# Patient Record
Sex: FEMALE | Race: WHITE | NOT HISPANIC OR LATINO | Employment: OTHER | URBAN - METROPOLITAN AREA
[De-identification: names, ages, dates, MRNs, and addresses within clinical notes are randomized per-mention and may not be internally consistent; named-entity substitution may affect disease eponyms.]

---

## 2017-02-01 ENCOUNTER — TRANSFERRED RECORDS (OUTPATIENT)
Dept: HEALTH INFORMATION MANAGEMENT | Facility: CLINIC | Age: 62
End: 2017-02-01

## 2017-02-09 ENCOUNTER — TELEPHONE (OUTPATIENT)
Dept: OTHER | Facility: CLINIC | Age: 62
End: 2017-02-09

## 2017-02-09 NOTE — TELEPHONE ENCOUNTER
2/9/2017    Call Regarding Onboarding are Choices    Attempt 1    Message on voicemail     Comments:           Outreach   ron

## 2017-02-22 NOTE — TELEPHONE ENCOUNTER
2/22/2017    Call Regarding Onboarding [employer] choices     Attempt     Message     Comments:         Outreach   cnt

## 2017-05-22 NOTE — TELEPHONE ENCOUNTER
5/22/2017    Call Regarding Onboarding [employer]    Attempt 3    Message on voicemail     Comments: No dep        Outreach   cnt

## 2017-12-06 ENCOUNTER — HOSPITAL ENCOUNTER (OUTPATIENT)
Dept: MAMMOGRAPHY | Facility: CLINIC | Age: 62
Discharge: HOME | End: 2017-12-06
Attending: PHYSICIAN ASSISTANT | Admitting: PHYSICIAN ASSISTANT
Payer: COMMERCIAL

## 2017-12-06 DIAGNOSIS — Z12.31 VISIT FOR SCREENING MAMMOGRAM: ICD-10-CM

## 2017-12-06 PROCEDURE — G0202 SCR MAMMO BI INCL CAD: HCPCS

## 2017-12-28 ENCOUNTER — TELEPHONE (OUTPATIENT)
Dept: FAMILY MEDICINE | Facility: OTHER | Age: 62
End: 2017-12-28

## 2017-12-28 DIAGNOSIS — G47.09 OTHER INSOMNIA: ICD-10-CM

## 2017-12-28 DIAGNOSIS — R41.840 POOR CONCENTRATION: ICD-10-CM

## 2017-12-28 DIAGNOSIS — F33.0 MAJOR DEPRESSIVE DISORDER, RECURRENT EPISODE, MILD (H): ICD-10-CM

## 2017-12-28 DIAGNOSIS — M81.0 OSTEOPOROSIS: ICD-10-CM

## 2017-12-28 RX ORDER — RALOXIFENE HYDROCHLORIDE 60 MG/1
1 TABLET, FILM COATED ORAL DAILY
Qty: 30 TABLET | Refills: 0 | Status: SHIPPED | OUTPATIENT
Start: 2017-12-28 | End: 2018-01-17

## 2017-12-28 RX ORDER — AMITRIPTYLINE HYDROCHLORIDE 10 MG/1
40 TABLET ORAL AT BEDTIME
Qty: 120 TABLET | Refills: 0 | Status: SHIPPED | OUTPATIENT
Start: 2017-12-28 | End: 2018-01-17

## 2017-12-28 RX ORDER — BUPROPION HYDROCHLORIDE 100 MG/1
100 TABLET, EXTENDED RELEASE ORAL 2 TIMES DAILY
Qty: 60 TABLET | Refills: 0 | Status: SHIPPED | OUTPATIENT
Start: 2017-12-28 | End: 2018-01-17

## 2017-12-28 NOTE — TELEPHONE ENCOUNTER
Spoke with patient appointment scheduled 1/17/2018  Patient would appreciate 1 month for amityptyline, Bupropion, and Evista, routing to RN  Nina Husain RT (R)

## 2017-12-28 NOTE — TELEPHONE ENCOUNTER
Medication is being filled for 1 time refill only due to:  Patient needs to be seen because it has been more than one year since last visit.     Next 5 appointments (look out 90 days)     Jan 17, 2018  1:00 PM CST   Office Visit with Caitlin Beasley PA-C   [facility] ([facility])    [address] [address]   [phone]                    José Miguel Rivero RN, BSN

## 2017-12-28 NOTE — TELEPHONE ENCOUNTER
Reason for Call:  Same Day Appointment, Requested Provider:  MATTHEW Hirsch    PCP: No Ref-Primary, Physician    Reason for visit: fu depression bones and sleep medication    Duration of symptoms:     Have you been treated for this in the past? Yes    Additional comments: is wondering if Caitlin would work her in on 1-3 or 1-4. Declined another provider  She will be out of her medications.    Can we leave a detailed message on this number? YES    Phone number patient can be reached at: Home number on file [phone] (home)    Best Time: any    Call taken on 12/28/2017 at 3:29 PM by Cristina Sánchez

## 2018-01-15 ENCOUNTER — MYC MEDICAL ADVICE (OUTPATIENT)
Dept: FAMILY MEDICINE | Facility: OTHER | Age: 63
End: 2018-01-15

## 2018-01-16 ASSESSMENT — PATIENT HEALTH QUESTIONNAIRE - PHQ9
SUM OF ALL RESPONSES TO PHQ QUESTIONS 1-9: 5
SUM OF ALL RESPONSES TO PHQ QUESTIONS 1-9: 5
10. IF YOU CHECKED OFF ANY PROBLEMS, HOW DIFFICULT HAVE THESE PROBLEMS MADE IT FOR YOU TO DO YOUR WORK, TAKE CARE OF THINGS AT HOME, OR GET ALONG WITH OTHER PEOPLE: SOMEWHAT DIFFICULT

## 2018-01-16 ASSESSMENT — ANXIETY QUESTIONNAIRES
5. BEING SO RESTLESS THAT IT IS HARD TO SIT STILL: SEVERAL DAYS
6. BECOMING EASILY ANNOYED OR IRRITABLE: SEVERAL DAYS
GAD7 TOTAL SCORE: 7
GAD7 TOTAL SCORE: 7
1. FEELING NERVOUS, ANXIOUS, OR ON EDGE: SEVERAL DAYS
4. TROUBLE RELAXING: SEVERAL DAYS
7. FEELING AFRAID AS IF SOMETHING AWFUL MIGHT HAPPEN: SEVERAL DAYS
2. NOT BEING ABLE TO STOP OR CONTROL WORRYING: SEVERAL DAYS
7. FEELING AFRAID AS IF SOMETHING AWFUL MIGHT HAPPEN: SEVERAL DAYS
3. WORRYING TOO MUCH ABOUT DIFFERENT THINGS: SEVERAL DAYS
GAD7 TOTAL SCORE: 7

## 2018-01-17 ENCOUNTER — OFFICE VISIT (OUTPATIENT)
Dept: FAMILY MEDICINE | Facility: OTHER | Age: 63
End: 2018-01-17
Payer: COMMERCIAL

## 2018-01-17 VITALS
HEART RATE: 76 BPM | HEIGHT: 63 IN | TEMPERATURE: 98.1 F | SYSTOLIC BLOOD PRESSURE: 114 MMHG | BODY MASS INDEX: 26.1 KG/M2 | DIASTOLIC BLOOD PRESSURE: 70 MMHG | RESPIRATION RATE: 12 BRPM | WEIGHT: 147.3 LBS

## 2018-01-17 DIAGNOSIS — K21.9 GASTROESOPHAGEAL REFLUX DISEASE WITHOUT ESOPHAGITIS: ICD-10-CM

## 2018-01-17 DIAGNOSIS — F33.0 MAJOR DEPRESSIVE DISORDER, RECURRENT EPISODE, MILD (H): ICD-10-CM

## 2018-01-17 DIAGNOSIS — R41.840 POOR CONCENTRATION: ICD-10-CM

## 2018-01-17 DIAGNOSIS — M81.0 AGE RELATED OSTEOPOROSIS, UNSPECIFIED PATHOLOGICAL FRACTURE PRESENCE: ICD-10-CM

## 2018-01-17 DIAGNOSIS — Z13.6 CARDIOVASCULAR SCREENING; LDL GOAL LESS THAN 160: ICD-10-CM

## 2018-01-17 DIAGNOSIS — Z76.89 ENCOUNTER TO ESTABLISH CARE: ICD-10-CM

## 2018-01-17 DIAGNOSIS — J45.30 MILD PERSISTENT ASTHMA WITHOUT COMPLICATION: Primary | ICD-10-CM

## 2018-01-17 DIAGNOSIS — Z13.1 SCREENING FOR DIABETES MELLITUS: ICD-10-CM

## 2018-01-17 DIAGNOSIS — G47.09 OTHER INSOMNIA: ICD-10-CM

## 2018-01-17 PROCEDURE — 99214 OFFICE O/P EST MOD 30 MIN: CPT | Performed by: PHYSICIAN ASSISTANT

## 2018-01-17 RX ORDER — AMITRIPTYLINE HYDROCHLORIDE 50 MG/1
50 TABLET ORAL AT BEDTIME
Qty: 30 TABLET | Refills: 5 | Status: SHIPPED | OUTPATIENT
Start: 2018-01-17 | End: 2018-02-01

## 2018-01-17 RX ORDER — RALOXIFENE HYDROCHLORIDE 60 MG/1
1 TABLET, FILM COATED ORAL DAILY
Qty: 90 TABLET | Refills: 3 | Status: SHIPPED | OUTPATIENT
Start: 2018-01-17 | End: 2018-02-22

## 2018-01-17 RX ORDER — BUPROPION HYDROCHLORIDE 150 MG/1
150 TABLET ORAL EVERY MORNING
Qty: 30 TABLET | Refills: 5 | Status: SHIPPED | OUTPATIENT
Start: 2018-01-17 | End: 2018-07-25

## 2018-01-17 ASSESSMENT — PATIENT HEALTH QUESTIONNAIRE - PHQ9: SUM OF ALL RESPONSES TO PHQ QUESTIONS 1-9: 5

## 2018-01-17 ASSESSMENT — ANXIETY QUESTIONNAIRES: GAD7 TOTAL SCORE: 7

## 2018-01-17 NOTE — LETTER
My Depression Action Plan  Name: Margareth Green   Date of Birth 1955  Date: 1/11/2018    My doctor: No Ref-Primary, Physician   My clinic: [address] [address] [phone]          GREEN    ZONE   Good Control    What it looks like:     Things are going generally well. You have normal up s and down s. You may even feel depressed from time to time, but bad moods usually last less than a day.   What you need to do:  1. Continue to care for yourself (see self care plan)  2. Check your depression survival kit and update it as needed  3. Follow your physician s recommendations including any medication.  4. Do not stop taking medication unless you consult with your physician first.           YELLOW         ZONE Getting Worse    What it looks like:     Depression is starting to interfere with your life.     It may be hard to get out of bed; you may be starting to isolate yourself from others.    Symptoms of depression are starting to last most all day and this has happened for several days.     You may have suicidal thoughts but they are not constant.   What you need to do:     1. Call your care team, your response to treatment will improve if you keep your care team informed of your progress. Yellow periods are signs an adjustment may need to be made.     2. Continue your self-care, even if you have to fake it!    3. Talk to someone in your support network    4. Open up your depression survival kit           RED    ZONE Medical Alert - Get Help    What it looks like:     Depression is seriously interfering with your life.     You may experience these or other symptoms: You can t get out of bed most days, can t work or engage in other necessary activities, you have trouble taking care of basic hygiene, or basic responsibilities, thoughts of suicide or death that will not go away, self-injurious behavior.     What you need to do:  1. Call your care  team and request a same-day appointment. If they are not available (weekends or after hours) call your local crisis line, emergency room or 911.      Electronically signed by: Ellis Ash, January 11, 2018    Depression Self Care Plan / Survival Kit    Self-Care for Depression  Here s the deal. Your body and mind are really not as separate as most people think.  What you do and think affects how you feel and how you feel influences what you do and think. This means if you do things that people who feel good do, it will help you feel better.  Sometimes this is all it takes.  There is also a place for medication and therapy depending on how severe your depression is, so be sure to consult with your medical provider and/ or Behavioral Health Consultant if your symptoms are worsening or not improving.     In order to better manage my stress, I will:    Exercise  Get some form of exercise, every day. This will help reduce pain and release endorphins, the  feel good  chemicals in your brain. This is almost as good as taking antidepressants!  This is not the same as joining a gym and then never going! (they count on that by the way ) It can be as simple as just going for a walk or doing some gardening, anything that will get you moving.      Hygiene   Maintain good hygiene (Get out of bed in the morning, Make your bed, Brush your teeth, Take a shower, and Get dressed like you were going to work, even if you are unemployed).  If your clothes don't fit try to get ones that do.    Diet  I will strive to eat foods that are good for me, drink plenty of water, and avoid excessive sugar, caffeine, alcohol, and other mood-altering substances.  Some foods that are helpful in depression are: complex carbohydrates, B vitamins, flaxseed, fish or fish oil, fresh fruits and vegetables.    Psychotherapy  I agree to participate in Individual Therapy (if recommended).    Medication  If prescribed medications, I agree to take  them.  Missing doses can result in serious side effects.  I understand that drinking alcohol, or other illicit drug use, may cause potential side effects.  I will not stop my medication abruptly without first discussing it with my provider.    Staying Connected With Others  I will stay in touch with my friends, family members, and my primary care provider/team.    Use your imagination  Be creative.  We all have a creative side; it doesn t matter if it s oil painting, sand castles, or mud pies! This will also kick up the endorphins.    Witness Beauty  (AKA stop and smell the roses) Take a look outside, even in mid-winter. Notice colors, textures. Watch the squirrels and birds.     Service to others  Be of service to others.  There is always someone else in need.  By helping others we can  get out of ourselves  and remember the really important things.  This also provides opportunities for practicing all the other parts of the program.    Humor  Laugh and be silly!  Adjust your TV habits for less news and crime-drama and more comedy.    Control your stress  Try breathing deep, massage therapy, biofeedback, and meditation. Find time to relax each day.     My support system    Clinic Contact:  Phone number:    Contact 1:  Phone number:    Contact 2:  Phone number:    Quaker/:  Phone number:    Therapist:  Phone number:    Local crisis center:    Phone number:    Other community support:  Phone number:

## 2018-01-17 NOTE — MR AVS SNAPSHOT
After Visit Summary   1/17/2018    Margareth Green    MRN: [id number]           Patient Information     Date Of Birth          1955        Visit Information        Provider Department      1/17/2018 1:00 PM Caitlin Beasley PA-C [facility]        Today's Diagnoses     Mild persistent asthma without complication    -  1    Other insomnia        Poor concentration        Major depressive disorder, recurrent episode, mild (H)        Age related osteoporosis, unspecified pathological fracture presence        Gastroesophageal reflux disease without esophagitis           Follow-ups after your visit        Additional Services     GASTROENTEROLOGY ADULT REF PROCEDURE ONLY       Last Lab Result: Creatinine (mg/dL)       Date                     Value                 10/28/2015               0.70             ----------  Body mass index is 26.46 kg/(m^2).     Needed:  No  Language:  English    Patient will be contacted to schedule procedure.     Please be aware that coverage of these services is subject to the terms and limitations of your health insurance plan.  Call member services at your health plan with any benefit or coverage questions.  Any procedures must be performed at a [place] facility OR coordinated by your clinic's referral office.    Please bring the following with you to your appointment:    (1) Any X-Rays, CTs or MRIs which have been performed.  Contact the facility where they were done to arrange for  prior to your scheduled appointment.    (2) List of current medications   (3) This referral request   (4) Any documents/labs given to you for this referral            [facility] REFERRAL       Your provider has referred you to [facility] Advance Care Planning Facilitator or Serious illness clinic support staff. The facilitator or support staff will contact you to schedule the appointment or for the follow up call    Reason for  "Referral: Basic Advance Care Planning - 1:1 need                  Who to contact     If you have questions or need follow up information about today's clinic visit or your schedule please contact [facility] directly at [phone].  Normal or non-critical lab and imaging results will be communicated to you by [employer], letter or phone within 4 business days after the clinic has received the results. If you do not hear from us within 7 days, please contact the clinic through [employer] or phone. If you have a critical or abnormal lab result, we will notify you by phone as soon as possible.  Submit refill requests through [employer] or call your pharmacy and they will forward the refill request to us. Please allow 3 business days for your refill to be completed.          Additional Information About Your Visit        [employer] Information     [employer] gives you secure access to your electronic health record. If you see a primary care provider, you can also send messages to your care team and make appointments. If you have questions, please call your primary care clinic.  If you do not have a primary care provider, please call [phone] and they will assist you.        Care EveryWhere ID     This is your Care EveryWhere ID. This could be used by other organizations to access your [place] medical records  [id number]        Your Vitals Were     Pulse Temperature Respirations Height Last Period BMI (Body Mass Index)    76 98.1  F (36.7  C) (Temporal) 12 5' 2.56\" (1.589 m) 02/26/2008 26.46 kg/m2       Blood Pressure from Last 3 Encounters:   01/17/18 114/70   11/30/16 110/72   10/28/15 128/76    Weight from Last 3 Encounters:   01/17/18 147 lb 4.8 oz (66.8 kg)   11/30/16 145 lb 14.4 oz (66.2 kg)   10/28/15 145 lb 9.6 oz (66 kg)              We Performed the Following     Asthma Action Plan (AAP)     DEPRESSION ACTION PLAN (DAP)     GASTROENTEROLOGY ADULT REF PROCEDURE ONLY     [facility] REFERRAL        "   Today's Medication Changes          These changes are accurate as of: 1/17/18  1:40 PM.  If you have any questions, ask your nurse or doctor.               Start taking these medicines.        Dose/Directions    buPROPion 150 MG 24 hr tablet   Commonly known as:  WELLBUTRIN XL   Used for:  Poor concentration, Major depressive disorder, recurrent episode, mild (H)   Replaces:  buPROPion 100 MG 12 hr tablet   Started by:  Caitlin Beasley PA-C        Dose:  150 mg   Take 1 tablet (150 mg) by mouth every morning   Quantity:  30 tablet   Refills:  5         These medicines have changed or have updated prescriptions.        Dose/Directions    amitriptyline 50 MG tablet   Commonly known as:  ELAVIL   This may have changed:    - medication strength  - how much to take   Used for:  Other insomnia   Changed by:  Caitlin Beasley PA-C        Dose:  50 mg   Take 1 tablet (50 mg) by mouth At Bedtime   Quantity:  30 tablet   Refills:  5         Stop taking these medicines if you haven't already. Please contact your care team if you have questions.     buPROPion 100 MG 12 hr tablet   Commonly known as:  WELLBUTRIN SR   Replaced by:  buPROPion 150 MG 24 hr tablet   Stopped by:  Caitlin Beasley PA-C                Where to get your medicines      These medications were sent to [facility] [address]   [address] [place] [address]     Phone:  [phone]     amitriptyline 50 MG tablet    buPROPion 150 MG 24 hr tablet    raloxifene 60 MG tablet                Primary Care Provider Fax #    Physician No Ref-Primary [phone]       No address on file        Equal Access to Services     [facility] AH: Hadii isabella avilao Sojavy, waaxda luqadaha, qaybta kaalmada adeegyada, briseyda bai. So [facility] [phone].    ATENCIÓN: Si habla español, tiene a wang disposición servicios gratuitos de asistencia lingüística. Llame al [phone].    We comply with  applicable federal civil rights laws and [place] laws. We do not discriminate on the basis of race, color, national origin, age, disability, sex, sexual orientation, or gender identity.            Thank you!     Thank you for choosing [facility]  for your care. Our goal is always to provide you with excellent care. Hearing back from our patients is one way we can continue to improve our services. Please take a few minutes to complete the written survey that you may receive in the mail after your visit with us. Thank you!             Your Updated Medication List - Protect others around you: Learn how to safely use, store and throw away your medicines at www.disposemymeds.org.          This list is accurate as of: 1/17/18  1:40 PM.  Always use your most recent med list.                   Brand Name Dispense Instructions for use Diagnosis    ACETAMINOPHEN PO      Take 1,000 mg by mouth as needed.        ALLEGRA 180 MG tablet   Generic drug:  fexofenadine     30    1 TABLET DAILY        amitriptyline 50 MG tablet    ELAVIL    30 tablet    Take 1 tablet (50 mg) by mouth At Bedtime    Other insomnia       BIOTIN PO           buPROPion 150 MG 24 hr tablet    WELLBUTRIN XL    30 tablet    Take 1 tablet (150 mg) by mouth every morning    Poor concentration, Major depressive disorder, recurrent episode, mild (H)       CALCIUM /VITAMIN D TABS   OR      1 TABLET DAILY        GAS-X PO           glucosamine sulfate 1000 MG Caps      1 daily        MAGNESIUM OXIDE PO           MECLIZINE HCL PO      Take by mouth as needed for dizziness        mometasone-formoterol 100-5 MCG/ACT oral inhaler    DULERA     Inhale 2 puffs into the lungs 2 times daily        NAPROXEN PO      Patient states she takes one daily        OMEGA-3 FISH OIL PO           PRILOSEC PO      Take by mouth daily        PROAIR  (90 BASE) MCG/ACT Inhaler   Generic drug:  albuterol     1    as needed    Mild persistent asthma       raloxifene  60 MG tablet    Evista    90 tablet    Take 1 tablet (60 mg) by mouth daily    Age related osteoporosis, unspecified pathological fracture presence       ranitidine 150 MG tablet    ZANTAC     Take  by mouth. 1 tablet in am, 75mg in pm    Irritable bowel syndrome       triamcinolone 55 MCG/ACT nasal inhaler    NASACORT     Spray 2 sprays into both nostrils daily        vitamin  s/Minerals Tabs      1 TABLET DAILY        vitamin B complex with vitamin C Tabs tablet    STRESS TAB     Take 2 tablets by mouth daily.        VITAMIN B-1 PO           VITAMIN B-12 PO           VITAMIN B-2 PO           VITAMIN B6 PO           VITAMIN C-MARK HIPS CR 1000 MG Tbcr      1 TABLET DAILY        VITAMIN E PO

## 2018-01-19 ENCOUNTER — TELEPHONE (OUTPATIENT)
Dept: FAMILY MEDICINE | Facility: CLINIC | Age: 63
End: 2018-01-19

## 2018-01-19 DIAGNOSIS — F33.0 MAJOR DEPRESSIVE DISORDER, RECURRENT EPISODE, MILD (H): ICD-10-CM

## 2018-01-19 DIAGNOSIS — K21.9 GASTROESOPHAGEAL REFLUX DISEASE WITHOUT ESOPHAGITIS: ICD-10-CM

## 2018-01-19 DIAGNOSIS — Z13.1 SCREENING FOR DIABETES MELLITUS: ICD-10-CM

## 2018-01-19 DIAGNOSIS — M81.0 AGE RELATED OSTEOPOROSIS, UNSPECIFIED PATHOLOGICAL FRACTURE PRESENCE: ICD-10-CM

## 2018-01-19 DIAGNOSIS — Z13.6 CARDIOVASCULAR SCREENING; LDL GOAL LESS THAN 160: ICD-10-CM

## 2018-01-19 LAB
ALBUMIN SERPL-MCNC: 4 G/DL (ref 3.4–5)
ALP SERPL-CCNC: 65 U/L (ref 40–150)
ALT SERPL W P-5'-P-CCNC: 25 U/L (ref 0–50)
ANION GAP SERPL CALCULATED.3IONS-SCNC: 7 MMOL/L (ref 3–14)
AST SERPL W P-5'-P-CCNC: 22 U/L (ref 0–45)
BILIRUB SERPL-MCNC: 0.5 MG/DL (ref 0.2–1.3)
BUN SERPL-MCNC: 14 MG/DL (ref 7–30)
CALCIUM SERPL-MCNC: 8.7 MG/DL (ref 8.5–10.1)
CHLORIDE SERPL-SCNC: 106 MMOL/L (ref 94–109)
CHOLEST SERPL-MCNC: 151 MG/DL
CO2 SERPL-SCNC: 29 MMOL/L (ref 20–32)
CREAT SERPL-MCNC: 0.88 MG/DL (ref 0.52–1.04)
ERYTHROCYTE [DISTWIDTH] IN BLOOD BY AUTOMATED COUNT: 13.7 % (ref 10–15)
GFR SERPL CREATININE-BSD FRML MDRD: 64 ML/MIN/1.7M2
GLUCOSE SERPL-MCNC: 90 MG/DL (ref 70–99)
HCT VFR BLD AUTO: 43 % (ref 35–47)
HDLC SERPL-MCNC: 75 MG/DL
HGB BLD-MCNC: 14.3 G/DL (ref 11.7–15.7)
LDLC SERPL CALC-MCNC: 60 MG/DL
MCH RBC QN AUTO: 31.4 PG (ref 26.5–33)
MCHC RBC AUTO-ENTMCNC: 33.3 G/DL (ref 31.5–36.5)
MCV RBC AUTO: 95 FL (ref 78–100)
NONHDLC SERPL-MCNC: 76 MG/DL
PLATELET # BLD AUTO: 215 10E9/L (ref 150–450)
POTASSIUM SERPL-SCNC: 4 MMOL/L (ref 3.4–5.3)
PROT SERPL-MCNC: 6.5 G/DL (ref 6.8–8.8)
RBC # BLD AUTO: 4.55 10E12/L (ref 3.8–5.2)
SODIUM SERPL-SCNC: 142 MMOL/L (ref 133–144)
TRIGL SERPL-MCNC: 79 MG/DL
WBC # BLD AUTO: 6.3 10E9/L (ref 4–11)

## 2018-01-19 PROCEDURE — 36415 COLL VENOUS BLD VENIPUNCTURE: CPT | Performed by: PHYSICIAN ASSISTANT

## 2018-01-19 PROCEDURE — 85027 COMPLETE CBC AUTOMATED: CPT | Performed by: PHYSICIAN ASSISTANT

## 2018-01-19 PROCEDURE — 80061 LIPID PANEL: CPT | Performed by: PHYSICIAN ASSISTANT

## 2018-01-19 PROCEDURE — 80053 COMPREHEN METABOLIC PANEL: CPT | Performed by: PHYSICIAN ASSISTANT

## 2018-01-19 ASSESSMENT — ASTHMA QUESTIONNAIRES: ACT_TOTALSCORE: 23

## 2018-01-25 RX ORDER — AMITRIPTYLINE HYDROCHLORIDE 10 MG/1
TABLET ORAL
COMMUNITY
Start: 2017-12-28 | End: 2018-02-23

## 2018-01-31 ENCOUNTER — HOSPITAL ENCOUNTER (OUTPATIENT)
Facility: CLINIC | Age: 63
Discharge: HOME | End: 2018-01-31
Attending: INTERNAL MEDICINE | Admitting: INTERNAL MEDICINE
Payer: COMMERCIAL

## 2018-01-31 VITALS
WEIGHT: 147.3 LBS | BODY MASS INDEX: 26.1 KG/M2 | DIASTOLIC BLOOD PRESSURE: 77 MMHG | TEMPERATURE: 98.1 F | HEART RATE: 74 BPM | RESPIRATION RATE: 14 BRPM | SYSTOLIC BLOOD PRESSURE: 123 MMHG | HEIGHT: 63 IN | OXYGEN SATURATION: 93 %

## 2018-01-31 LAB — UPPER GI ENDOSCOPY: NORMAL

## 2018-01-31 PROCEDURE — 25000128 H RX IP 250 OP 636: Performed by: INTERNAL MEDICINE

## 2018-01-31 PROCEDURE — 40000296 ZZH STATISTIC ENDO RECOVERY CLASS 1:2 FIRST HOUR: Performed by: INTERNAL MEDICINE

## 2018-01-31 PROCEDURE — 25000125 ZZHC RX 250: Performed by: INTERNAL MEDICINE

## 2018-01-31 PROCEDURE — 43235 EGD DIAGNOSTIC BRUSH WASH: CPT | Performed by: INTERNAL MEDICINE

## 2018-01-31 RX ORDER — ONDANSETRON 2 MG/ML
4 INJECTION INTRAMUSCULAR; INTRAVENOUS
Status: DISCONTINUED | OUTPATIENT
Start: 2018-01-31 | End: 2018-01-31 | Stop reason: HOSPADM

## 2018-01-31 RX ORDER — FENTANYL CITRATE 50 UG/ML
INJECTION, SOLUTION INTRAMUSCULAR; INTRAVENOUS PRN
Status: DISCONTINUED | OUTPATIENT
Start: 2018-01-31 | End: 2018-01-31 | Stop reason: HOSPADM

## 2018-01-31 RX ORDER — LIDOCAINE 40 MG/G
CREAM TOPICAL
Status: DISCONTINUED | OUTPATIENT
Start: 2018-01-31 | End: 2018-01-31 | Stop reason: HOSPADM

## 2018-01-31 RX ADMIN — LIDOCAINE HYDROCHLORIDE 1 ML: 10 INJECTION, SOLUTION EPIDURAL; INFILTRATION; INTRACAUDAL; PERINEURAL at 09:46

## 2018-01-31 NOTE — CONSULTS
[facility] GI Pre-Procedure Physical Assessment    Margareth Green MRN# [id number]   Age: 63 year old YOB: 1955      Date of Surgery: 1/31/2018  Location [facility]      Date of Exam 1/31/2018 Facility (Same day)       [facility] clinic[facility]  Primary care provider: No Ref-Primary, Physician         Active problem list:   Patient Active Problem List   Diagnosis     Dermatophytosis of nail     Symptomatic menopausal or female climacteric states     Generalized osteoarthrosis, unspecified site     Insomnia     Malaise and fatigue     Mild persistent asthma     Cervicalgia     Allergic rhinitis     Irritable bowel syndrome     Arthralgia     DDD (degenerative disc disease), lumbar     Menopausal syndrome     Osteopenia     Hammertoe     Derangement of TMJ (temporomandibular joint)     Stenosis of lumbosacral spine     Fall     CARDIOVASCULAR SCREENING; LDL GOAL LESS THAN 160     Atrophic vaginitis     Osteoporosis     Mild major depression (H)     Knee pain, left     Menopausal flushing     Advanced directives, counseling/discussion     Numbness of left jaw     Neck pain     TMJ (temporomandibular joint syndrome)     Thyroid fullness     Epistaxis     Major depressive disorder, recurrent episode, moderate (H)     Gastroesophageal reflux disease without esophagitis     Tremor     Cramp of limb            Medications (include herbals and vitamins):   Any Plavix use in the last 7 days?  No     Current Facility-Administered Medications   Medication     lidocaine 1 % 1 mL     lidocaine (LMX4) kit     sodium chloride (PF) 0.9% PF flush 3 mL     sodium chloride (PF) 0.9% PF flush 3 mL     ondansetron (ZOFRAN) injection 4 mg             Allergies:      Allergies   Allergen Reactions     Penicillins Itching     Yeast infection afterward     Allergy to Latex?  No  Allergy to tape?    No          Social History:     Social History   Substance Use Topics     Smoking status:  "Former Smoker     Packs/day: 0.50     Years: 30.00     Quit date: 3/20/1998     Smokeless tobacco: Never Used      Comment: No smokers in home     Alcohol use 4.0 oz/week      Comment: 2x/week            Physical Exam:   All vitals have been reviewed  Blood pressure 143/82, temperature 98.1  F (36.7  C), temperature source Oral, resp. rate 14, height 1.589 m (5' 2.56\"), weight 66.8 kg (147 lb 4.8 oz), last menstrual period 02/26/2008, SpO2 97 %, not currently breastfeeding.  Airway assessment:   Patient is able to open mouth wide  Patient is able to stick out tongue      Lungs:   No increased work of breathing, good air exchange, clear to auscultation bilaterally, no crackles or wheezing      Cardiovascular:   Normal apical impulse, regular rate and rhythm, normal S1 and S2, no S3 or S4, and no murmur noted           Lab / Radiology Results:   All laboratory data reviewed          Assessment:   Appropriately NPO  Chief complaint or anatomic assessment of involved area: reflux         Plan:   Moderate (conscious) sedation     Patient's active problems diagnostically and therapeutically optimized for the planned procedure  Risks, benefits, alternatives to sedation and blood explained and consent obtained  Risks, benefits, alternatives to procedure explained and consent obtained  Orders and progress notes are in the chart  Discharge from Phase 1 and / or Phase 2 recovery when patient meets criteria    I have reviewed the history and physical, lab finding(s), diagnostic data, medicaitons, and the plan for sedation.  I have determined this patient to be an appropriate candidate for the planned sedation / procedure and have reassessed the patient immediately prior to sedation / procedure.    I have personally and medically directed the administration of medications used.    Tirso Duran MD       "

## 2018-02-01 ENCOUNTER — MYC MEDICAL ADVICE (OUTPATIENT)
Dept: FAMILY MEDICINE | Facility: OTHER | Age: 63
End: 2018-02-01

## 2018-02-01 DIAGNOSIS — G47.09 OTHER INSOMNIA: ICD-10-CM

## 2018-02-01 RX ORDER — AMITRIPTYLINE HYDROCHLORIDE 10 MG/1
50 TABLET ORAL AT BEDTIME
Qty: 150 TABLET | Refills: 5 | Status: SHIPPED | OUTPATIENT
Start: 2018-02-01 | End: 2018-02-22

## 2018-02-01 NOTE — TELEPHONE ENCOUNTER
It appears that the endoscopy was normal, please read her my response to her lab results that I sent her on [facility] for the labs done on Jan 19  Caitlin Beasley PA-C

## 2018-02-01 NOTE — TELEPHONE ENCOUNTER
amitriptyline (ELAVIL) 10 MG tablet  Routing refill request to provider for review/approval because:  Medication is reported/historical    amitriptyline (ELAVIL) 50 MG tablet  Rx was sent 0117/2018 for 30 tabs and 5 refills.     Patient would like Rx changed to the 10mg tablet with signature stating can take up to 5 tablets per night verse the 50mg tablets.     Please review and advise. Kari Daniels, RN, BSN

## 2018-02-01 NOTE — TELEPHONE ENCOUNTER
She is taking 5 tabs a night of the 10mg tablets she would like this at the [facility] pharmacy. Also, she is wondering about her Endoscopy results and recent test results.     Trudy Rouse MA

## 2018-02-01 NOTE — TELEPHONE ENCOUNTER
Relayed results to patient from endoscopy, and January 19th. She is wondering if you have any concerns with her continuing to take the omeprazole, the gastroentologist mentioned she could continue to take this and she wanted to clarify with you that you are okay with this as you two had mentioned at a previous OV to potentially come off it and to only take the Ranitidine. And just as an FYI she is going to take 100mg of the wellbutrin until they are gone and then increase to the 150mg (as she has already paid for these).     Trudy Rouse MA

## 2018-02-01 NOTE — TELEPHONE ENCOUNTER
Ok I can change that, what pharmacy?  Does she take 5 each night, I am wondering how many to give her each month so she does not run out  Caitlin Beasley PA-C

## 2018-02-02 NOTE — TELEPHONE ENCOUNTER
Please call pt- if she is not doing as well on the ranitidine alone, it is ok to go back to the omeprazole.  It is always a good idea to try to only take ranitidine, though sometimes people need to be on omeprazole to control symptoms and that is ok.  Caitlin Beasley PA-C

## 2018-02-02 NOTE — TELEPHONE ENCOUNTER
Left message asking pt to return our call.  Please inform her of the message below.  Ellis Ash, CMA

## 2018-02-05 NOTE — TELEPHONE ENCOUNTER
LMTC,  Please call pt- if she is not doing as well on the ranitidine alone, it is ok to go back to the omeprazole.  It is always a good idea to try to only take ranitidine, though sometimes people need to be on omeprazole to control symptoms and that is ok.  Caitlin Beasley PA-C

## 2018-02-19 ENCOUNTER — HOSPITAL ENCOUNTER (OUTPATIENT)
Dept: BONE DENSITY | Facility: CLINIC | Age: 63
Discharge: HOME | End: 2018-02-19
Attending: PHYSICIAN ASSISTANT | Admitting: PHYSICIAN ASSISTANT
Payer: COMMERCIAL

## 2018-02-19 DIAGNOSIS — M81.0 AGE RELATED OSTEOPOROSIS, UNSPECIFIED PATHOLOGICAL FRACTURE PRESENCE: ICD-10-CM

## 2018-02-19 PROCEDURE — 77080 DXA BONE DENSITY AXIAL: CPT

## 2018-02-22 ENCOUNTER — MYC MEDICAL ADVICE (OUTPATIENT)
Dept: FAMILY MEDICINE | Facility: OTHER | Age: 63
End: 2018-02-22

## 2018-02-22 DIAGNOSIS — G47.09 OTHER INSOMNIA: ICD-10-CM

## 2018-02-22 DIAGNOSIS — M81.0 AGE RELATED OSTEOPOROSIS, UNSPECIFIED PATHOLOGICAL FRACTURE PRESENCE: ICD-10-CM

## 2018-02-22 NOTE — TELEPHONE ENCOUNTER
We did receive a fax request from [facility] that has been sent to our refill pool to address, responded to patient via Laura SAMANIEGO (R)

## 2018-02-23 RX ORDER — RALOXIFENE HYDROCHLORIDE 60 MG/1
1 TABLET, FILM COATED ORAL DAILY
Qty: 90 TABLET | Refills: 3 | Status: SHIPPED | OUTPATIENT
Start: 2018-02-23 | End: 2019-05-20

## 2018-02-23 RX ORDER — AMITRIPTYLINE HYDROCHLORIDE 10 MG/1
50 TABLET ORAL AT BEDTIME
Qty: 450 TABLET | Refills: 1 | Status: SHIPPED | OUTPATIENT
Start: 2018-02-23 | End: 2018-07-25

## 2018-02-26 ENCOUNTER — TELEPHONE (OUTPATIENT)
Dept: FAMILY MEDICINE | Facility: OTHER | Age: 63
End: 2018-02-26

## 2018-02-26 NOTE — TELEPHONE ENCOUNTER
Reason for Call:  Same Day Appointment, Requested Provider:  MATTHEW Hirsch    PCP: No Ref-Primary, Physician    Reason for visit: right side shoulder and neck pain    Duration of symptoms: since Sat    Have you been treated for this in the past? No    Additional comments: patient would like to be seen this week with Caitlin Beasley just not tomorrow.    Can we leave a detailed message on this number? YES    Phone number patient can be reached at: Home number on file [phone] (home) or Cell number on file:    Telephone Information:           Best Time: anytime    Call taken on 2/26/2018 at 1:51 PM by Lynne Rooney

## 2018-02-26 NOTE — PROGRESS NOTES
SUBJECTIVE:   Margareth Green is a 63 year old female who presents to clinic today for the following health issues:      HPI  Back Pain --      Duration: 3 weeks ago        Specific cause: massage and exercises perhaps?  She was doing exercises for seniors online and had a very deep painful massage.  She does recall bending forward feeling pain in her right upper back and neck    Description:   Location of pain: low back right, neck right and shoulders right  Character of pain: stabbing and fullness  Pain radiation:radiates into the right buttocks, radiates into the right leg and under arm on right side  New numbness or weakness in legs, not attributed to pain:  no     Intensity: Currently 7/10    History:   Pain interferes with job: No, however last couple of days has  History of back problems: broke tail bone in 2000  Any previous MRI or X-rays: None not with this new back and shoulder pain  Sees a specialist for back pain:  No, she has done physical therapy craniosacral therapy for neck and back pain in the past with good relief she has been using Tylenol and Advil as well as peppermint oil for some relief.  This morning she woke up and her back was extremely stiff.  The discomfort radiates down into her upper thigh on the right side.  In all the way down to her medial right upper arm  Therapies tried without relief: None    Alleviating factors:   Improved by: none      Precipitating factors:  Worsened by: Lifting, Bending and Standing    Functional and Psychosocial Screen (Papa STarT Back):      Most recent score:    PAPA START BACK TOTAL SCORE 5/22/2014   Total Score (all 9) 7             Accompanying Signs & Symptoms:  Risk of Fracture:  None  Risk of Cauda Equina:  None  Risk of Infection:  None  Risk of Cancer:  None  Risk of Ankylosing Spondylitis:  Onset at age <35, male, AND morning back stiffness. YES            Problem list and histories reviewed & adjusted, as indicated.  Additional history: Also  she continues to see her allergist, Dr. Song.  2-3 weeks ago he prescribed Z-Alvin and prednisone.  Her symptoms have mostly improved, however she has residual postnasal drip that she is using her Allegra Dulera Flonase pro-air and nasal wash for.  She just wants her lungs checked.  She does note that her radicular  symptoms were slightly better while on prednisone        BP Readings from Last 3 Encounters:   02/28/18 120/76   01/31/18 123/77   01/17/18 114/70    Wt Readings from Last 3 Encounters:   02/28/18 149 lb 3.2 oz (67.7 kg)   01/31/18 147 lb 4.8 oz (66.8 kg)   01/17/18 147 lb 4.8 oz (66.8 kg)                  Labs reviewed in EPIC    ROS:  CONSTITUTIONAL: NEGATIVE for fever, chills, change in weight  ENT/MOUTH: Residual postnasal drip  RESP: Asthma exacerbation a few weeks ago resolved with prednisone as prescribed by her allergist  CV: NEGATIVE for chest pain, palpitations or peripheral edema  MUSCULOSKELETAL: Cervical and lumbar radicular symptoms    OBJECTIVE:     /76  Pulse 70  Temp 97.6  F (36.4  C) (Temporal)  Resp 14  Wt 149 lb 3.2 oz (67.7 kg)  LMP 02/26/2008  BMI 26.8 kg/m2  Body mass index is 26.8 kg/(m^2).  GENERAL: healthy, alert and no distress  HENT: ear canals and TM's normal, nose and mouth without ulcers or lesions  HENT: Nasal mucosa is mildly erythematous  NECK: no adenopathy, no asymmetry, masses, or scars and thyroid normal to palpation  NECK: Full range of motion of cervical spine  RESP: lungs clear to auscultation - no rales, rhonchi or wheezes  CV: regular rate and rhythm, normal S1 S2, no S3 or S4, no murmur, click or rub, no peripheral edema and peripheral pulses strong  ABDOMEN: soft, nontender, no hepatosplenomegaly, no masses and bowel sounds normal  MS: no gross musculoskeletal defects noted, no edema  Comprehensive back pain exam:  Tenderness of Right cervicothoracic musculature and lumbosacral musculature, Pain limits the following motions: Flexion and Lower  extremity strength functional and equal on both sides  PSYCH: mentation appears normal, affect normal/bright    Diagnostic Test Results:  No results found. However, due to the size of the patient record, not all encounters were searched. Please check Results Review for a complete set of results.    ASSESSMENT/PLAN:         1. Cervical radiculopathy  Patient requests referral to see her physical therapist, Cathy.  This is a great place to start treatment, if she is not improving we would consider a prednisone burst or taper and even further down the road would be an MRI if she is not getting relief  - PHYSICAL THERAPY REFERRAL    2. Lumbar radiculopathy    - PHYSICAL THERAPY REFERRAL    3. At risk for falling    4.  Recent asthma exacerbation treated through her allergist    Electronically signed by Caitlin Beasley PA-C  [facility]  Answers for HPI/ROS submitted by the patient on 2/28/2018   If you checked off any problems, how difficult have these problems made it for you to do your work, take care of things at home, or get along with other people?: Not difficult at all  PHQ9 TOTAL SCORE: 4  TAMMY 7 TOTAL SCORE: 7  Electronically signed by Caitlin Beasley PA-C

## 2018-02-26 NOTE — TELEPHONE ENCOUNTER
Called patient and set her up with an appointment on 2/28/2018 at 11:00am. No further questions.     Keisha Hernández MA

## 2018-02-28 ENCOUNTER — OFFICE VISIT (OUTPATIENT)
Dept: FAMILY MEDICINE | Facility: OTHER | Age: 63
End: 2018-02-28
Payer: COMMERCIAL

## 2018-02-28 VITALS
SYSTOLIC BLOOD PRESSURE: 120 MMHG | WEIGHT: 149.2 LBS | TEMPERATURE: 97.6 F | DIASTOLIC BLOOD PRESSURE: 76 MMHG | HEART RATE: 70 BPM | BODY MASS INDEX: 26.8 KG/M2 | RESPIRATION RATE: 14 BRPM

## 2018-02-28 DIAGNOSIS — M54.16 LUMBAR RADICULOPATHY: ICD-10-CM

## 2018-02-28 DIAGNOSIS — J45.30 MILD PERSISTENT ASTHMA WITHOUT COMPLICATION: ICD-10-CM

## 2018-02-28 DIAGNOSIS — Z91.81 AT RISK FOR FALLING: ICD-10-CM

## 2018-02-28 DIAGNOSIS — M54.12 CERVICAL RADICULOPATHY: Primary | ICD-10-CM

## 2018-02-28 PROCEDURE — 99214 OFFICE O/P EST MOD 30 MIN: CPT | Performed by: PHYSICIAN ASSISTANT

## 2018-02-28 ASSESSMENT — ANXIETY QUESTIONNAIRES
1. FEELING NERVOUS, ANXIOUS, OR ON EDGE: SEVERAL DAYS
2. NOT BEING ABLE TO STOP OR CONTROL WORRYING: SEVERAL DAYS
GAD7 TOTAL SCORE: 7
6. BECOMING EASILY ANNOYED OR IRRITABLE: SEVERAL DAYS
5. BEING SO RESTLESS THAT IT IS HARD TO SIT STILL: SEVERAL DAYS
3. WORRYING TOO MUCH ABOUT DIFFERENT THINGS: SEVERAL DAYS
7. FEELING AFRAID AS IF SOMETHING AWFUL MIGHT HAPPEN: SEVERAL DAYS
GAD7 TOTAL SCORE: 7
GAD7 TOTAL SCORE: 7
4. TROUBLE RELAXING: SEVERAL DAYS
7. FEELING AFRAID AS IF SOMETHING AWFUL MIGHT HAPPEN: SEVERAL DAYS

## 2018-02-28 ASSESSMENT — PAIN SCALES - GENERAL: PAINLEVEL: SEVERE PAIN (7)

## 2018-02-28 ASSESSMENT — PATIENT HEALTH QUESTIONNAIRE - PHQ9
SUM OF ALL RESPONSES TO PHQ QUESTIONS 1-9: 4
10. IF YOU CHECKED OFF ANY PROBLEMS, HOW DIFFICULT HAVE THESE PROBLEMS MADE IT FOR YOU TO DO YOUR WORK, TAKE CARE OF THINGS AT HOME, OR GET ALONG WITH OTHER PEOPLE: NOT DIFFICULT AT ALL
SUM OF ALL RESPONSES TO PHQ QUESTIONS 1-9: 4

## 2018-02-28 NOTE — MR AVS SNAPSHOT
"              After Visit Summary   2/28/2018    Margareth Green    MRN: [id number]           Patient Information     Date Of Birth          1955        Visit Information        Provider Department      2/28/2018 11:00 AM Caitlin Beasley PA-C [facility] [place]        Today's Diagnoses     Cervical radiculopathy    -  1    Lumbar radiculopathy        At risk for falling        Mild persistent asthma without complication           Follow-ups after your visit        Additional Services     PHYSICAL THERAPY REFERRAL       *This therapy referral will be filtered to a centralized scheduling office at [facility] and the patient will receive a call to schedule an appointment at a [facility] location most convenient for them. *     [facility] provides Physical Therapy evaluation and treatment and many specialty services across the [facility] system.  If requesting a specialty program, please choose from the list below.    If you have not heard from the scheduling office within 2 business days, please call [phone] for all locations, with the exception of [place], please call [phone] and [facility], please call [phone]  Treatment: Evaluation & Treatment  Special Instructions/Modalities: none  Special Programs: None, she prefers to see Cathy James     Please be aware that coverage of these services is subject to the terms and limitations of your health insurance plan.  Call member services at your health plan with any benefit or coverage questions.      **Note to Provider:  If you are referring outside of [facility] for the therapy appointment, please list the name of the location in the \"special instructions\" above, print the referral and give to the patient to schedule the appointment.                  Your next 10 appointments already scheduled     Mar 12, 2018 11:00 AM CDT   Evaluation with Leana James, PT   [facility] [place] ([facility]) [address]  [place] [address] [phone]              Who to contact     If you have questions or need follow up information about today's clinic visit or your schedule please contact [facility] directly at [phone].  Normal or non-critical lab and imaging results will be communicated to you by MyChart, letter or phone within 4 business days after the clinic has received the results. If you do not hear from us within 7 days, please contact the clinic through [facility] or phone. If you have a critical or abnormal lab result, we will notify you by phone as soon as possible.  Submit refill requests through [email or web address] or call your pharmacy and they will forward the refill request to us. Please allow 3 business days for your refill to be completed.          Additional Information About Your Visit        MyChart Information     [email or web address] gives you secure access to your electronic health record. If you see a primary care provider, you can also send messages to your care team and make appointments. If you have questions, please call your primary care clinic.  If you do not have a primary care provider, please call [phone] and they will assist you.        Care EveryWhere ID     This is your Care EveryWhere ID. This could be used by other organizations to access your [place] medical records  [id number]        Your Vitals Were     Pulse Temperature Respirations Last Period BMI (Body Mass Index)       70 97.6  F (36.4  C) (Temporal) 14 02/26/2008 26.8 kg/m2        Blood Pressure from Last 3 Encounters:   02/28/18 120/76   01/31/18 123/77   01/17/18 114/70    Weight from Last 3 Encounters:   02/28/18 149 lb 3.2 oz (67.7 kg)   01/31/18 147 lb 4.8 oz (66.8 kg)   01/17/18 147 lb 4.8 oz (66.8 kg)              We Performed the Following     PHYSICAL THERAPY REFERRAL          Today's Medication Changes          These changes are accurate as of 2/28/18 12:53 PM.  If you have any  questions, ask your nurse or doctor.               These medicines have changed or have updated prescriptions.        Dose/Directions    buPROPion 150 MG 24 hr tablet   Commonly known as:  WELLBUTRIN XL   This may have changed:  how much to take   Used for:  Poor concentration, Major depressive disorder, recurrent episode, mild (H)        Dose:  150 mg   Take 1 tablet (150 mg) by mouth every morning   Quantity:  30 tablet   Refills:  5                Primary Care Provider Office Phone # Fax #    Caitlin Beasley PA-C [phone] [address] [place] [address]        Equal Access to Services     [facility]: Hadii isabella aguilar hadasho Soomaali, waaxda luqadaha, qaybta kaalmada pedro, briseyda berman . So [facility] [phone].    ATENCIÓN: Si habla español, tiene a wang disposición servicios gratuitos de asistencia lingüística. [facility] [phone].    We comply with applicable federal civil rights laws and [place] laws. We do not discriminate on the basis of race, color, national origin, age, disability, sex, sexual orientation, or gender identity.            Thank you!     Thank you for choosing [facility] [place]  for your care. Our goal is always to provide you with excellent care. Hearing back from our patients is one way we can continue to improve our services. Please take a few minutes to complete the written survey that you may receive in the mail after your visit with us. Thank you!             Your Updated Medication List - Protect others around you: Learn how to safely use, store and throw away your medicines at www.disposemymeds.org.          This list is accurate as of 2/28/18 12:53 PM.  Always use your most recent med list.                   Brand Name Dispense Instructions for use Diagnosis    ACETAMINOPHEN PO      Take 1,000 mg by mouth as needed.        ALLEGRA 180 MG tablet   Generic drug:  fexofenadine     30    1 TABLET DAILY         amitriptyline 10 MG tablet    ELAVIL    450 tablet    Take 5 tablets (50 mg) by mouth At Bedtime    Other insomnia       BIOTIN PO           buPROPion 150 MG 24 hr tablet    WELLBUTRIN XL    30 tablet    Take 1 tablet (150 mg) by mouth every morning    Poor concentration, Major depressive disorder, recurrent episode, mild (H)       CALCIUM /VITAMIN D TABS   OR      1 TABLET DAILY        GAS-X PO           glucosamine sulfate 1000 MG Caps      1 daily        MAGNESIUM OXIDE PO           MECLIZINE HCL PO      Take by mouth as needed for dizziness        mometasone-formoterol 100-5 MCG/ACT oral inhaler    DULERA     Inhale 2 puffs into the lungs 2 times daily        NAPROXEN PO      Patient states she takes one daily        OMEGA-3 FISH OIL PO           PRILOSEC PO      Take by mouth daily        PROAIR  (90 BASE) MCG/ACT Inhaler   Generic drug:  albuterol     1    as needed    Mild persistent asthma       raloxifene 60 MG tablet    Evista    90 tablet    Take 1 tablet (60 mg) by mouth daily    Age related osteoporosis, unspecified pathological fracture presence       ranitidine 150 MG tablet    ZANTAC     Take  by mouth. 1 tablet in am, 75mg in pm    Irritable bowel syndrome       triamcinolone 55 MCG/ACT nasal inhaler    NASACORT     Spray 2 sprays into both nostrils daily        vitamin  s/Minerals Tabs      1 TABLET DAILY        vitamin B complex with vitamin C Tabs tablet    STRESS TAB     Take 2 tablets by mouth daily.        VITAMIN B-1 PO           VITAMIN B-12 PO           VITAMIN B-2 PO           VITAMIN B6 PO           VITAMIN C-MARK HIPS CR 1000 MG Tbcr      1 TABLET DAILY        VITAMIN E PO

## 2018-03-01 ASSESSMENT — ANXIETY QUESTIONNAIRES: GAD7 TOTAL SCORE: 7

## 2018-03-01 ASSESSMENT — PATIENT HEALTH QUESTIONNAIRE - PHQ9: SUM OF ALL RESPONSES TO PHQ QUESTIONS 1-9: 4

## 2018-03-07 ENCOUNTER — HOSPITAL ENCOUNTER (OUTPATIENT)
Dept: PHYSICAL THERAPY | Facility: OTHER | Age: 63
Setting detail: THERAPIES SERIES
End: 2018-03-07
Attending: PHYSICIAN ASSISTANT
Payer: COMMERCIAL

## 2018-03-07 PROCEDURE — G8979 MOBILITY GOAL STATUS: HCPCS | Mod: GP,CH | Performed by: PHYSICAL THERAPIST

## 2018-03-07 PROCEDURE — 97110 THERAPEUTIC EXERCISES: CPT | Mod: GP | Performed by: PHYSICAL THERAPIST

## 2018-03-07 PROCEDURE — G8978 MOBILITY CURRENT STATUS: HCPCS | Mod: GP,CJ | Performed by: PHYSICAL THERAPIST

## 2018-03-07 PROCEDURE — 97162 PT EVAL MOD COMPLEX 30 MIN: CPT | Mod: GP | Performed by: PHYSICAL THERAPIST

## 2018-03-07 PROCEDURE — 40000718 ZZHC STATISTIC PT DEPARTMENT ORTHO VISIT: Performed by: PHYSICAL THERAPIST

## 2018-03-07 NOTE — PROGRESS NOTES
03/07/18 1100   General Information   Type of Visit Initial OP Ortho PT Evaluation   Start of Care Date 03/07/18   Referring Physician Caitlin Bealsey    Patient/Family Goals Statement to be able to move and exercise without pain, and able to kneel and get back up.    Orders Evaluate and Treat   Date of Order 02/28/18   Insurance Type [employer]   Medical Diagnosis Cervical radiculopathy, lumbar radiculopathy   Surgical/Medical history reviewed Yes   Precautions/Limitations no known precautions/limitations   Body Part(s)   Body Part(s) Lumbar Spine/SI;Cervical Spine   Presentation and Etiology   Pertinent history of current problem (include personal factors and/or comorbidities that impact the POC) On feb 24th patient started a exercise program on line was doing this since beg of Feb, and also she had a massage done and after that the LBP and whole back area pain. Then she just bent down to get something and pain started from the right side of the neck and went down to the posterior right thigh. Then she stopped what she was doing and couldnt move. Over the next days got a little better. Then 3 days later happened again. Today sx's are: right SI to the upper thigh right #7 worse with some of the ex's, kneeling, bending. neck pain #4 right and into the UT right. Left neck pain #5 and into the left jaw. and the left ear rings this started years ago. But the ear ringing is off and on. PMHX: neck and back pain, asthma, carpal tunnel, toe surgery.    Impairments A. Pain;D. Decreased ROM;E. Decreased flexibility;L. Tingling;M. Locking or catching;N. Headaches   Functional Limitations perform activities of daily living;perform desired leisure / sports activities   Onset date of current episode/exacerbation 02/24/18   Chronicity New   Prior Level of Function   Prior Level of Function-Mobility independent   Current Level of Function   Patient role/employment history F. Retired   Living environment House/townhome   Fall Risk  Screen   Fall screen completed by PT   Have you fallen 2 or more times in the past year? No   Have you fallen and had an injury in the past year? No   Is patient a fall risk? No   System Outcome Measures   Outcome Measures Low Back Pain (see Oswestry and Papa)   Lumbar Spine/SI Objective Findings   Posture good posture   Gait/Locomotion WNL   Balance/Proprioception (Single Leg Stance) WNL   Flexion ROM 70%   Extension ROM WNL   Repeated Extension-Standing ROM WNL and decreased sx's   Repeated Flexion-Standing ROM decreased sx's in standing and supine but not as much as the PPU   Lumbar ROM Comment limited flexion   Pelvic Screen WNL   Hamstring Flexibility poor   Hip Flexor Flexibility WNL   Repeated Extension Prone PPU 10 2 sets pt had the least pain with this, pain decreased to a #3 in the posterior right thigh   Palpation pain LB and right SI region   Cervical Spine   Observation slight forward head   Cervical Flexion ROM WNl   Cervical Extension ROM 50%   Cervical Right Side Bending ROM 50%   Cervical Left Side Bending ROM 50%   Cervical Right Rotation ROM 50%   Cervical Left Rotation ROM 50%   Thoracic Flexion ROM WNL   Shoulder AROM Screen WNL   Shoulder/Wrist/Hand Strength Comments WNL   Palpation tihgtness throughout the UT's and cervical, tightness also noted in the paraspinals   Planned Therapy Interventions   Planned Therapy Interventions joint mobilization;manual therapy;neuromuscular re-education;ROM;strengthening;stretching   Clinical Impression   Criteria for Skilled Therapeutic Interventions Met yes, treatment indicated   PT Diagnosis neck pain, LBP with radiculopathy   Influenced by the following impairments spine tightness, DDD   Functional limitations due to impairments Cannot bend, stoop, or kneel without pain. Looking down also increases pain   Clinical Presentation Evolving/Changing   Clinical Presentation Rationale several functional issues for the LB and neck see NDI and UMESH   Clinical  Decision Making (Complexity) Moderate complexity   Therapy Frequency 2 times/Week   Predicted Duration of Therapy Intervention (days/wks) 10 wks   Risk & Benefits of therapy have been explained Yes   Patient, Family & other staff in agreement with plan of care Yes   Clinical Impression Comments Patient has degenerative spine in the neck and has tightness throughout the neck and OCB. LBP and radiculopathy to the right thigh   Education Assessment   Preferred Learning Style Listening;Reading;Demonstration   Barriers to Learning No barriers   ORTHO GOALS   PT Ortho Eval Goals 1;2;3   Ortho Goal 1   Goal Identifier 1   Goal Description Patient has an increase in ROM of the neck by 20% with no pain and a function increase in the neck as measured by the NDI of 15%    Target Date 05/16/18   Ortho Goal 2   Goal Identifier 2   Goal Description Patient will bend, stoop and kneel with no pain in the LB or leg    Target Date 05/16/18   Ortho Goal 3   Goal Identifier 3   Goal Description Patient has an over all increase in pain free function iwht the LB as measured by the UMESH of 0-10%   Target Date 05/16/18   Total Evaluation Time   Total Evaluation Time 15   Thank you for the referral,            Leana James PT

## 2018-03-12 ENCOUNTER — HOSPITAL ENCOUNTER (OUTPATIENT)
Dept: PHYSICAL THERAPY | Facility: OTHER | Age: 63
Setting detail: THERAPIES SERIES
End: 2018-03-12
Attending: PHYSICIAN ASSISTANT
Payer: COMMERCIAL

## 2018-03-12 PROCEDURE — 40000718 ZZHC STATISTIC PT DEPARTMENT ORTHO VISIT: Performed by: PHYSICAL THERAPIST

## 2018-03-12 PROCEDURE — 97140 MANUAL THERAPY 1/> REGIONS: CPT | Mod: GP | Performed by: PHYSICAL THERAPIST

## 2018-03-14 ENCOUNTER — HOSPITAL ENCOUNTER (OUTPATIENT)
Dept: PHYSICAL THERAPY | Facility: OTHER | Age: 63
Setting detail: THERAPIES SERIES
End: 2018-03-14
Attending: PHYSICIAN ASSISTANT
Payer: COMMERCIAL

## 2018-03-14 PROCEDURE — 97140 MANUAL THERAPY 1/> REGIONS: CPT | Mod: GP | Performed by: PHYSICAL THERAPIST

## 2018-03-14 PROCEDURE — 40000718 ZZHC STATISTIC PT DEPARTMENT ORTHO VISIT: Performed by: PHYSICAL THERAPIST

## 2018-03-19 ENCOUNTER — HOSPITAL ENCOUNTER (OUTPATIENT)
Dept: PHYSICAL THERAPY | Facility: OTHER | Age: 63
Setting detail: THERAPIES SERIES
End: 2018-03-19
Attending: PHYSICIAN ASSISTANT
Payer: COMMERCIAL

## 2018-03-19 PROCEDURE — 97140 MANUAL THERAPY 1/> REGIONS: CPT | Mod: GP | Performed by: PHYSICAL THERAPIST

## 2018-03-19 PROCEDURE — 40000718 ZZHC STATISTIC PT DEPARTMENT ORTHO VISIT: Performed by: PHYSICAL THERAPIST

## 2018-03-23 ENCOUNTER — HOSPITAL ENCOUNTER (OUTPATIENT)
Dept: PHYSICAL THERAPY | Facility: OTHER | Age: 63
Setting detail: THERAPIES SERIES
End: 2018-03-23
Attending: PHYSICIAN ASSISTANT
Payer: COMMERCIAL

## 2018-03-23 PROCEDURE — 97110 THERAPEUTIC EXERCISES: CPT | Mod: GP | Performed by: PHYSICAL THERAPIST

## 2018-03-23 PROCEDURE — 97140 MANUAL THERAPY 1/> REGIONS: CPT | Mod: GP | Performed by: PHYSICAL THERAPIST

## 2018-03-23 PROCEDURE — 40000718 ZZHC STATISTIC PT DEPARTMENT ORTHO VISIT: Performed by: PHYSICAL THERAPIST

## 2018-03-26 ENCOUNTER — HOSPITAL ENCOUNTER (OUTPATIENT)
Dept: PHYSICAL THERAPY | Facility: OTHER | Age: 63
Setting detail: THERAPIES SERIES
End: 2018-03-26
Attending: PHYSICIAN ASSISTANT
Payer: COMMERCIAL

## 2018-03-26 PROCEDURE — 40000718 ZZHC STATISTIC PT DEPARTMENT ORTHO VISIT: Performed by: PHYSICAL THERAPIST

## 2018-03-26 PROCEDURE — 97140 MANUAL THERAPY 1/> REGIONS: CPT | Mod: GP | Performed by: PHYSICAL THERAPIST

## 2018-03-28 ENCOUNTER — HOSPITAL ENCOUNTER (OUTPATIENT)
Dept: PHYSICAL THERAPY | Facility: OTHER | Age: 63
Setting detail: THERAPIES SERIES
End: 2018-03-28
Attending: PHYSICIAN ASSISTANT
Payer: COMMERCIAL

## 2018-03-28 PROCEDURE — 40000718 ZZHC STATISTIC PT DEPARTMENT ORTHO VISIT: Performed by: PHYSICAL THERAPIST

## 2018-03-28 PROCEDURE — 97140 MANUAL THERAPY 1/> REGIONS: CPT | Mod: GP | Performed by: PHYSICAL THERAPIST

## 2018-03-28 PROCEDURE — 97110 THERAPEUTIC EXERCISES: CPT | Mod: GP | Performed by: PHYSICAL THERAPIST

## 2018-04-02 ENCOUNTER — HOSPITAL ENCOUNTER (OUTPATIENT)
Dept: PHYSICAL THERAPY | Facility: OTHER | Age: 63
Setting detail: THERAPIES SERIES
End: 2018-04-02
Attending: PHYSICIAN ASSISTANT
Payer: COMMERCIAL

## 2018-04-02 PROCEDURE — 40000718 ZZHC STATISTIC PT DEPARTMENT ORTHO VISIT: Performed by: PHYSICAL THERAPIST

## 2018-04-02 PROCEDURE — 97140 MANUAL THERAPY 1/> REGIONS: CPT | Mod: GP | Performed by: PHYSICAL THERAPIST

## 2018-04-04 ENCOUNTER — HOSPITAL ENCOUNTER (OUTPATIENT)
Dept: PHYSICAL THERAPY | Facility: OTHER | Age: 63
Setting detail: THERAPIES SERIES
End: 2018-04-04
Attending: PHYSICIAN ASSISTANT
Payer: COMMERCIAL

## 2018-04-04 PROCEDURE — 40000718 ZZHC STATISTIC PT DEPARTMENT ORTHO VISIT: Performed by: PHYSICAL THERAPIST

## 2018-04-04 PROCEDURE — 97140 MANUAL THERAPY 1/> REGIONS: CPT | Mod: GP | Performed by: PHYSICAL THERAPIST

## 2018-04-04 NOTE — PROGRESS NOTES
Outpatient Physical Therapy Progress Note     Patient: Margareth Green  : 1955    Beginning/End Dates of Reporting Period:  3/7/18 to 2018    Referring Provider: Caitlin Parisi Diagnosis: LBP, radiculopathy, neck pain     Client Self Report: Last night some pain from the right UT to the lower right jaw. soreness today #8 in the right upper neck. LBP has improved 60%. The referral pain from the LB is 90% better.    Objective Measurements:  Objective Measure: UMESH  Details: 16%      Objective Measure: NDI  Details: 38%          Goals:  Goal Identifier 1   Goal Description Patient has an increase in ROM of the neck by 20% with no pain and a function increase in the neck as measured by the NDI of 15%    Target Date 18   Date Met      Progress:                                        Not met but the neck is improving, tightness is less. cont     Goal Identifier 2   Goal Description Patient will bend, stoop and kneel with no pain in the LB or leg    Target Date 18   Date Met      Progress:                                      This is improved as seen on the UMESH, cont     Goal Identifier 3   Goal Description Patient has an over all increase in pain free function with the LB as measured by the UMESH of 0-10%   Target Date 18   Date Met      Progress:                                     Not met but much improved to 16%       Progress Toward Goals:   Progress this reporting period: Patient has had 60% improvement in the LBP and no longer has referral pain from the LB. 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 The neck pain is getting better slowly and sleep has improved. The pain does not go from the neck to the LB now. Will be starting on a gym fitness program next wk.           Plan:  Continue therapy per current plan of care. After the  apt will decrease to 1x per wk.    Discharge:  No    Thank you for the referral,            Leana James PT

## 2018-04-11 ENCOUNTER — HOSPITAL ENCOUNTER (OUTPATIENT)
Dept: PHYSICAL THERAPY | Facility: OTHER | Age: 63
Setting detail: THERAPIES SERIES
End: 2018-04-11
Attending: PHYSICIAN ASSISTANT
Payer: COMMERCIAL

## 2018-04-11 PROCEDURE — 97140 MANUAL THERAPY 1/> REGIONS: CPT | Mod: GP | Performed by: PHYSICAL THERAPIST

## 2018-04-11 PROCEDURE — 40000718 ZZHC STATISTIC PT DEPARTMENT ORTHO VISIT: Performed by: PHYSICAL THERAPIST

## 2018-04-16 ENCOUNTER — HOSPITAL ENCOUNTER (OUTPATIENT)
Dept: PHYSICAL THERAPY | Facility: OTHER | Age: 63
Setting detail: THERAPIES SERIES
End: 2018-04-16
Attending: PHYSICIAN ASSISTANT
Payer: COMMERCIAL

## 2018-04-16 PROCEDURE — 97140 MANUAL THERAPY 1/> REGIONS: CPT | Mod: GP | Performed by: PHYSICAL THERAPIST

## 2018-04-16 PROCEDURE — 40000718 ZZHC STATISTIC PT DEPARTMENT ORTHO VISIT: Performed by: PHYSICAL THERAPIST

## 2018-04-18 ENCOUNTER — HOSPITAL ENCOUNTER (OUTPATIENT)
Dept: PHYSICAL THERAPY | Facility: OTHER | Age: 63
Setting detail: THERAPIES SERIES
End: 2018-04-18
Attending: PHYSICIAN ASSISTANT
Payer: COMMERCIAL

## 2018-04-18 PROCEDURE — 40000718 ZZHC STATISTIC PT DEPARTMENT ORTHO VISIT: Performed by: PHYSICAL THERAPIST

## 2018-04-18 PROCEDURE — 97140 MANUAL THERAPY 1/> REGIONS: CPT | Mod: GP | Performed by: PHYSICAL THERAPIST

## 2018-04-25 ENCOUNTER — HOSPITAL ENCOUNTER (OUTPATIENT)
Dept: PHYSICAL THERAPY | Facility: OTHER | Age: 63
Setting detail: THERAPIES SERIES
End: 2018-04-25
Attending: PHYSICIAN ASSISTANT
Payer: COMMERCIAL

## 2018-04-25 PROCEDURE — 97140 MANUAL THERAPY 1/> REGIONS: CPT | Mod: GP | Performed by: PHYSICAL THERAPIST

## 2018-04-25 PROCEDURE — 40000718 ZZHC STATISTIC PT DEPARTMENT ORTHO VISIT: Performed by: PHYSICAL THERAPIST

## 2018-05-07 ENCOUNTER — HOSPITAL ENCOUNTER (OUTPATIENT)
Dept: PHYSICAL THERAPY | Facility: OTHER | Age: 63
Setting detail: THERAPIES SERIES
End: 2018-05-07
Attending: PHYSICIAN ASSISTANT
Payer: COMMERCIAL

## 2018-05-07 PROCEDURE — 97140 MANUAL THERAPY 1/> REGIONS: CPT | Mod: GP | Performed by: PHYSICAL THERAPIST

## 2018-05-07 PROCEDURE — 40000718 ZZHC STATISTIC PT DEPARTMENT ORTHO VISIT: Performed by: PHYSICAL THERAPIST

## 2018-05-07 PROCEDURE — 97110 THERAPEUTIC EXERCISES: CPT | Mod: GP | Performed by: PHYSICAL THERAPIST

## 2018-05-16 ENCOUNTER — HOSPITAL ENCOUNTER (OUTPATIENT)
Dept: PHYSICAL THERAPY | Facility: OTHER | Age: 63
Setting detail: THERAPIES SERIES
End: 2018-05-16
Attending: PHYSICIAN ASSISTANT
Payer: COMMERCIAL

## 2018-05-16 PROCEDURE — 40000718 ZZHC STATISTIC PT DEPARTMENT ORTHO VISIT: Performed by: PHYSICAL THERAPIST

## 2018-05-16 PROCEDURE — 97140 MANUAL THERAPY 1/> REGIONS: CPT | Mod: GP | Performed by: PHYSICAL THERAPIST

## 2018-05-18 ENCOUNTER — TELEPHONE (OUTPATIENT)
Dept: FAMILY MEDICINE | Facility: OTHER | Age: 63
End: 2018-05-18

## 2018-05-18 NOTE — TELEPHONE ENCOUNTER
Reason for Call:  Same Day Appointment, Requested Provider:  Caitlin Beasley     PCP: Caitlin Beasley    Reason for visit: stomach and back issues    Duration of symptoms: Wednesday night    Have you been treated for this in the past? No    Additional comments: is wondering if Caitlin would work her in on Tuesday. States not emergent. Is ok to lm would also like a nurse to call her today. She is aware NP is not in today.    Can we leave a detailed message on this number? YES    Phone number patient can be reached at: [phone]     Best Time: any    Call taken on 5/18/2018 at 8:21 AM by Cristina Sánchez

## 2018-05-18 NOTE — TELEPHONE ENCOUNTER
Spoke with patient. [place].  States that she has back and stomach issues. Just pain in general, couldn't pin point.  Could be gas, but not sure.  Started Wednesday night. States that she just wants and appointment, but will go in where she is if it worsens.  Appointment made for Tuesday.    José Miguel Rivero RN, BSN          The patient has stated that they are not currently in the state of [place].  I do not have a license to practice nursing outside of [place] and   giving medical advice to patients outside of the state I am licensed for constitutes   practicing nursing outside of the state.     Patient encouraged to call a local hospital, urgent care or clinic to seek medical advice.

## 2018-05-18 NOTE — TELEPHONE ENCOUNTER
RN-ok to use same day nurse only if appt needed for requested day. Please make a long appt(30min).    Kyra Dawson CMA (AAMA)

## 2018-07-23 ENCOUNTER — TELEPHONE (OUTPATIENT)
Dept: FAMILY MEDICINE | Facility: OTHER | Age: 63
End: 2018-07-23

## 2018-07-23 NOTE — PROGRESS NOTES
SUBJECTIVE:   Margareth Green is a 63 year old female who presents to clinic today for the following health issues:      HPI  Abnormal Mood Symptoms      Duration: started back in May, she has been taking 100 mg of Wellbutrin and wonders if she needs a higher dosage, at our last visit we increased her to 150 mg that she is think she continues to take the 100 mg dosage.    Description:  Depression: YES  Anxiety: YES- notices more when she has more things going on and really busy, or if wondering if she said wrong thing or if someone gets upset with her  Panic attacks: no     Accompanying signs and symptoms: feels something in her stomach and needs to regroup, see PHQ-9 and TAMMY scores    History (similar episodes/previous evaluation): None    Precipitating or alleviating factors: just happens and then goes away    Therapies tried and outcome: on wellbutrin for depression    Musculoskeletal problem/pain      Duration: started middle of June, noticed that when she stepped up on the ladder getting into the boat    Description  Location: right knee    Intensity:  8/10    Accompanying signs and symptoms: swelling and pain feels like maybe her knee will give out    History  Previous similar problem: YES- but on the left side  Previous evaluation:  x-ray-but thought maybe this was left     Precipitating or alleviating factors:  Trauma or overuse: no   Aggravating factors include: bending, up and down stairs, standing    Therapies tried and outcome: ice, tylenol arthritis, KEDs tape     Problem list and histories reviewed & adjusted, as indicated.  Additional history: as documented        BP Readings from Last 3 Encounters:   07/25/18 120/66   02/28/18 120/76   01/31/18 123/77    Wt Readings from Last 3 Encounters:   07/25/18 145 lb (65.8 kg)   02/28/18 149 lb 3.2 oz (67.7 kg)   01/31/18 147 lb 4.8 oz (66.8 kg)                  Labs reviewed in EPIC    ROS:  CONSTITUTIONAL: NEGATIVE for fever, chills, change in  "weight  ENT/MOUTH: NEGATIVE for ear, mouth and throat problems  RESP: NEGATIVE for significant cough or SOB  CV: NEGATIVE for chest pain, palpitations or peripheral edema  GI: NEGATIVE for nausea, abdominal pain, heartburn, or change in bowel habits  MUSCULOSKELETAL: Knee pain previous history of meniscal tear on the left  PSYCHIATRIC: See PHQ 9 and TAMMY 7 questionnaires for symptoms.   See PHQ 9 and TAMMY 7 questionnaires for symptoms.     OBJECTIVE:     /66  Pulse 78  Temp 97.9  F (36.6  C) (Temporal)  Resp 16  Ht 5' 2.56\" (1.589 m)  Wt 145 lb (65.8 kg)  LMP 02/26/2008  BMI 26.05 kg/m2  Body mass index is 26.05 kg/(m^2).  GENERAL: healthy, alert and no distress  NECK: no adenopathy, no asymmetry, masses, or scars and thyroid normal to palpation  RESP: lungs clear to auscultation - no rales, rhonchi or wheezes  CV: regular rate and rhythm, normal S1 S2, no S3 or S4, no murmur, click or rub, no peripheral edema and peripheral pulses strong  MS: Full range of motion with crepitus of bilateral knees she does have a small effusion on the right knee, no bony point tenderness range of motion passive and active is normal, negative Germania's, ligamentous exam is are stable  PSYCH: mentation appears normal, affect normal/bright    Diagnostic Test Results:  No results found. However, due to the size of the patient record, not all encounters were searched. Please check Results Review for a complete set of results.  Xray -right knee, medial joint line narrowing that is mild otherwise normal    ASSESSMENT/PLAN:         1. Poor concentration  We will try increased dosage to 300 mg,  - buPROPion (WELLBUTRIN XL) 150 MG 24 hr tablet; Take 2 tablets (300 mg) by mouth every morning  Dispense: 60 tablet; Refill: 5    2. Major depressive disorder, recurrent episode, mild (H)  Increase dosage recheck 1 month  - buPROPion (WELLBUTRIN XL) 150 MG 24 hr tablet; Take 2 tablets (300 mg) by mouth every morning  Dispense: 60 tablet; " Refill: 5    3. Other insomnia  Stable continue current meds  - amitriptyline (ELAVIL) 10 MG tablet; Take 5 tablets (50 mg) by mouth At Bedtime  Dispense: 450 tablet; Refill: 1    4. Right knee pain, unspecified chronicity  We will refer to sports medicine for their input and treatment  - XR Knee Right 3 Views; Future  - ORTHO  REFERRAL    This chart documentation was completed in part with Dragon voice recognition software.  Documentation is reviewed after completion, however, some words and grammatical errors may remain.  TOI Hirsch PA-C  [facility]  Electronically signed by Caitlin Beasley PA-C

## 2018-07-23 NOTE — TELEPHONE ENCOUNTER
Reason for Call:  Other appointment    Detailed comments: pt called, needing an appointment with NP for right knee pain. I offered the first opening, Monday, August 6th but she did not want to wait that long and only wanted to see NP. Wondering about being worked in sometime before that. Please advise.     Phone Number Patient can be reached at: Home number on file [phone] (home)    Best Time: any     Can we leave a detailed message on this number? YES    Call taken on 7/23/2018 at 1:16 PM by Merced Hernandez

## 2018-07-25 ENCOUNTER — RADIANT APPOINTMENT (OUTPATIENT)
Dept: GENERAL RADIOLOGY | Facility: OTHER | Age: 63
End: 2018-07-25
Attending: PHYSICIAN ASSISTANT
Payer: COMMERCIAL

## 2018-07-25 ENCOUNTER — OFFICE VISIT (OUTPATIENT)
Dept: FAMILY MEDICINE | Facility: OTHER | Age: 63
End: 2018-07-25
Payer: COMMERCIAL

## 2018-07-25 VITALS
HEIGHT: 63 IN | SYSTOLIC BLOOD PRESSURE: 120 MMHG | WEIGHT: 145 LBS | DIASTOLIC BLOOD PRESSURE: 66 MMHG | HEART RATE: 78 BPM | BODY MASS INDEX: 25.69 KG/M2 | RESPIRATION RATE: 16 BRPM | TEMPERATURE: 97.9 F

## 2018-07-25 DIAGNOSIS — R41.840 POOR CONCENTRATION: ICD-10-CM

## 2018-07-25 DIAGNOSIS — M25.561 RIGHT KNEE PAIN, UNSPECIFIED CHRONICITY: Primary | ICD-10-CM

## 2018-07-25 DIAGNOSIS — G47.09 OTHER INSOMNIA: ICD-10-CM

## 2018-07-25 DIAGNOSIS — M25.561 RIGHT KNEE PAIN, UNSPECIFIED CHRONICITY: ICD-10-CM

## 2018-07-25 DIAGNOSIS — F33.0 MAJOR DEPRESSIVE DISORDER, RECURRENT EPISODE, MILD (H): ICD-10-CM

## 2018-07-25 PROCEDURE — 73562 X-RAY EXAM OF KNEE 3: CPT | Mod: RT

## 2018-07-25 PROCEDURE — 99214 OFFICE O/P EST MOD 30 MIN: CPT | Performed by: PHYSICIAN ASSISTANT

## 2018-07-25 RX ORDER — BUPROPION HYDROCHLORIDE 150 MG/1
300 TABLET ORAL EVERY MORNING
Qty: 60 TABLET | Refills: 5 | Status: SHIPPED | OUTPATIENT
Start: 2018-07-25 | End: 2019-06-24

## 2018-07-25 RX ORDER — BUPROPION HYDROCHLORIDE 150 MG/1
150 TABLET ORAL EVERY MORNING
Qty: 30 TABLET | Refills: 5 | Status: SHIPPED | OUTPATIENT
Start: 2018-07-25 | End: 2018-07-25

## 2018-07-25 RX ORDER — AMITRIPTYLINE HYDROCHLORIDE 10 MG/1
50 TABLET ORAL AT BEDTIME
Qty: 450 TABLET | Refills: 1 | Status: SHIPPED | OUTPATIENT
Start: 2018-07-25 | End: 2018-08-21

## 2018-07-25 ASSESSMENT — PATIENT HEALTH QUESTIONNAIRE - PHQ9: 5. POOR APPETITE OR OVEREATING: SEVERAL DAYS

## 2018-07-25 ASSESSMENT — ANXIETY QUESTIONNAIRES
7. FEELING AFRAID AS IF SOMETHING AWFUL MIGHT HAPPEN: SEVERAL DAYS
1. FEELING NERVOUS, ANXIOUS, OR ON EDGE: MORE THAN HALF THE DAYS
IF YOU CHECKED OFF ANY PROBLEMS ON THIS QUESTIONNAIRE, HOW DIFFICULT HAVE THESE PROBLEMS MADE IT FOR YOU TO DO YOUR WORK, TAKE CARE OF THINGS AT HOME, OR GET ALONG WITH OTHER PEOPLE: SOMEWHAT DIFFICULT
5. BEING SO RESTLESS THAT IT IS HARD TO SIT STILL: SEVERAL DAYS
3. WORRYING TOO MUCH ABOUT DIFFERENT THINGS: SEVERAL DAYS
6. BECOMING EASILY ANNOYED OR IRRITABLE: SEVERAL DAYS
GAD7 TOTAL SCORE: 8
2. NOT BEING ABLE TO STOP OR CONTROL WORRYING: SEVERAL DAYS

## 2018-07-25 ASSESSMENT — PAIN SCALES - GENERAL: PAINLEVEL: EXTREME PAIN (8)

## 2018-07-25 NOTE — MR AVS SNAPSHOT
After Visit Summary   7/25/2018    Margareth Green    MRN: [id number]           Patient Information     Date Of Birth          1955        Visit Information        Provider Department      7/25/2018 11:00 AM Caitlin Beasley PA-C [facility]        Today's Diagnoses     Right knee pain, unspecified chronicity    -  1    Poor concentration        Major depressive disorder, recurrent episode, mild (H)        Other insomnia           Follow-ups after your visit        Additional Services     ORTHO  REFERRAL       [facility] is referring you to the Orthopedic  Services at [facility].       The  Representative will assist you in the coordination of your Orthopedic and Musculoskeletal Care as prescribed by your physician.    The  Representative will call you within 1 business day to help schedule your appointment, or you may contact the  Representative at:    All areas ~ [phone]     Type of Referral : Non Surgical       Timeframe requested: 3 - 5 days    Coverage of these services is subject to the terms and limitations of your health insurance plan.  Please call member services at your health plan with any benefit or coverage questions.      If X-rays, CT or MRI's have been performed, please contact the facility where they were done to arrange for , prior to your scheduled appointment.  Please bring this referral request to your appointment and present it to your specialist.                  Who to contact     If you have questions or need follow up information about today's clinic visit or your schedule please contact [facility] directly at [phone].  Normal or non-critical lab and imaging results will be communicated to you by MyChart, letter or phone within 4 business days after the clinic has received the results. If you do not hear from us within 7 days, please  "contact the clinic through [employer] or phone. If you have a critical or abnormal lab result, we will notify you by phone as soon as possible.  Submit refill requests through [employer] or call your pharmacy and they will forward the refill request to us. Please allow 3 business days for your refill to be completed.          Additional Information About Your Visit        [employer] Information     [employer] gives you secure access to your electronic health record. If you see a primary care provider, you can also send messages to your care team and make appointments. If you have questions, please call your primary care clinic.  If you do not have a primary care provider, please call [phone] and they will assist you.        Care EveryWhere ID     This is your Care EveryWhere ID. This could be used by other organizations to access your [facility] medical records  [id number]        Your Vitals Were     Pulse Temperature Respirations Height Last Period BMI (Body Mass Index)    78 97.9  F (36.6  C) (Temporal) 16 5' 2.56\" (1.589 m) 02/26/2008 26.05 kg/m2       Blood Pressure from Last 3 Encounters:   07/25/18 120/66   02/28/18 120/76   01/31/18 123/77    Weight from Last 3 Encounters:   07/25/18 145 lb (65.8 kg)   02/28/18 149 lb 3.2 oz (67.7 kg)   01/31/18 147 lb 4.8 oz (66.8 kg)              We Performed the Following     ORTHO  REFERRAL          Today's Medication Changes          These changes are accurate as of 7/25/18 12:13 PM.  If you have any questions, ask your nurse or doctor.               These medicines have changed or have updated prescriptions.        Dose/Directions    buPROPion 150 MG 24 hr tablet   Commonly known as:  WELLBUTRIN XL   This may have changed:  how much to take   Used for:  Poor concentration, Major depressive disorder, recurrent episode, mild (H)        Dose:  150 mg   Take 1 tablet (150 mg) by mouth every morning   Quantity:  30 tablet   Refills:  5            Where to get your " medicines      These medications were sent to [facility] - [address]  [address] [address]     Phone:  [phone]     buPROPion 150 MG 24 hr tablet         Call your pharmacy to confirm that your medication is ready for pickup. It may take up to 24 hours for them to receive the prescription. If the prescription is not ready within 3 business days, please contact your clinic or your provider.     We will let you know when these medications are ready. If you don't hear back within 3 business days, please contact us.     amitriptyline 10 MG tablet                Primary Care Provider Office Phone # Fax #    Caitlin Beasley PA-C [phone] [address] [place] [id number]        Equal Access to Services     BASILIA LEMONS : Hadii isabella aguilar hadasho Soomaali, waaxda luqadaha, qaybta kaalmada adeegyada, briseyda berman . So [facility] [phone].    ATENCIÓN: Si habla español, tiene a wang disposición servicios gratuitos de asistencia lingüística. Ll[facility] [phone].    We comply with applicable federal civil rights laws and [place] laws. We do not discriminate on the basis of race, color, national origin, age, disability, sex, sexual orientation, or gender identity.            Thank you!     Thank you for choosing [facility]  for your care. Our goal is always to provide you with excellent care. Hearing back from our patients is one way we can continue to improve our services. Please take a few minutes to complete the written survey that you may receive in the mail after your visit with us. Thank you!             Your Updated Medication List - Protect others around you: Learn how to safely use, store and throw away your medicines at www.disposemymeds.org.          This list is accurate as of 7/25/18 12:13 PM.  Always use your most recent med list.                   Brand Name Dispense Instructions for  use Diagnosis    ACETAMINOPHEN PO      Take 1,000 mg by mouth as needed.        ALLEGRA 180 MG tablet   Generic drug:  fexofenadine     30    1 TABLET DAILY        amitriptyline 10 MG tablet    ELAVIL    450 tablet    Take 5 tablets (50 mg) by mouth At Bedtime    Other insomnia       BIOTIN PO           buPROPion 150 MG 24 hr tablet    WELLBUTRIN XL    30 tablet    Take 1 tablet (150 mg) by mouth every morning    Poor concentration, Major depressive disorder, recurrent episode, mild (H)       CALCIUM /VITAMIN D TABS   OR      1 TABLET DAILY        GAS-X PO           glucosamine sulfate 1000 MG Caps      1 daily        MAGNESIUM OXIDE PO           MECLIZINE HCL PO      Take by mouth as needed for dizziness        mometasone-formoterol 100-5 MCG/ACT oral inhaler    DULERA     Inhale 2 puffs into the lungs 2 times daily        NAPROXEN PO      Patient states she takes one daily        OMEGA-3 FISH OIL PO           PRILOSEC PO      Take by mouth daily        PROAIR  (90 Base) MCG/ACT Inhaler   Generic drug:  albuterol     1    as needed    Mild persistent asthma       raloxifene 60 MG tablet    Evista    90 tablet    Take 1 tablet (60 mg) by mouth daily    Age related osteoporosis, unspecified pathological fracture presence       ranitidine 150 MG tablet    ZANTAC     Take  by mouth. 1 tablet in am, 75mg in pm    Irritable bowel syndrome       triamcinolone 55 MCG/ACT nasal inhaler    NASACORT     Spray 2 sprays into both nostrils daily        vitamin  s/Minerals Tabs      1 TABLET DAILY        vitamin B complex with vitamin C Tabs tablet    STRESS TAB     Take 2 tablets by mouth daily.        VITAMIN B-1 PO           VITAMIN B-12 PO           VITAMIN B-2 PO           VITAMIN B6 PO           VITAMIN C-MARK HIPS CR 1000 MG Tbcr      1 TABLET DAILY        VITAMIN E PO

## 2018-07-26 ASSESSMENT — PATIENT HEALTH QUESTIONNAIRE - PHQ9: SUM OF ALL RESPONSES TO PHQ QUESTIONS 1-9: 10

## 2018-07-26 ASSESSMENT — ANXIETY QUESTIONNAIRES: GAD7 TOTAL SCORE: 8

## 2018-07-26 ASSESSMENT — ASTHMA QUESTIONNAIRES: ACT_TOTALSCORE: 23

## 2018-08-07 NOTE — PROGRESS NOTES
SUBJECTIVE:                                                    Margareth Green is a 62 year old female who presents to clinic today for the following health issues:        History of Present Illness     Diet:  Regular (no restrictions)  Frequency of exercise:  2-3 days/week  Duration of exercise:  Less than 15 minutes  Taking medications regularly:  Yes  Additional concerns today:  YES    Answers for HPI/ROS submitted by the patient on 1/16/2018   If you checked off any problems, how difficult have these problems made it for you to do your work, take care of things at home, or get along with other people?: Somewhat difficult  PHQ9 TOTAL SCORE: 5  TAMMY 7 TOTAL SCORE: 7  PHQ-2 Score: 1      Establish Care-     Patient would like to discuss digestive report from Dr. Denise.  In December 2016 she was seen for increased reflux symptoms.  She was supposed to have upper GI endoscopy.  She was not able to afford this as her insurance no longer covered Dr. Denise or his group.  Her symptoms have improved now with the use of omeprazole 20 mg daily.  She will use an occasional Zantac as needed.  She has no bowel changes or blood in stool.  Her colonoscopy is up-to-date.    She sees Dr. Song for her asthma.  This is currently well controlled.    She is taking Wellbutrin 100 mg SR 1 pill daily for depression and anxiety.  It also helps with her concentration.  She believes her control could be better.  Previously, she took this medication twice daily but was having a hard time sleeping when she did this.  She is currently taking amitriptyline 40 mg nightly for insomnia.  She wonders if she can take a slightly higher dosage.    Her last DEXA scan indicated osteoporosis in 2015.  She is currently taking Evista for this.  Her only fracture was a tailbone fracture several years ago.              Problem list and histories reviewed & adjusted, as indicated.  Additional history: as documented        BP Readings from Last 3  Refill request received for Pravastatin  Refill done with msg that pt is due for fasting lab  Orders placed    "Encounters:   01/17/18 114/70   11/30/16 110/72   10/28/15 128/76    Wt Readings from Last 3 Encounters:   01/17/18 147 lb 4.8 oz (66.8 kg)   11/30/16 145 lb 14.4 oz (66.2 kg)   10/28/15 145 lb 9.6 oz (66 kg)                  Labs reviewed in EPIC      ROS:  C: NEGATIVE for fever, chills, change in weight  E/M: NEGATIVE for ear, mouth and throat problems  R: NEGATIVE for significant cough or SOB  CV: NEGATIVE for chest pain, palpitations or peripheral edema  GI: NEGATIVE for nausea, abdominal pain, heartburn, or change in bowel habits  PSYCHIATRIC: See PHQ 9 and TAMMY 7 questionnaires for symptoms.     OBJECTIVE:     /70 (BP Location: Left arm, Patient Position: Sitting, Cuff Size: Adult Regular)  Pulse 76  Temp 98.1  F (36.7  C) (Temporal)  Resp 12  Ht 5' 2.56\" (1.589 m)  Wt 147 lb 4.8 oz (66.8 kg)  LMP 02/26/2008  BMI 26.46 kg/m2  Body mass index is 26.46 kg/(m^2).  GENERAL: healthy, alert and no distress  NECK: no adenopathy, no asymmetry, masses, or scars and thyroid normal to palpation  RESP: lungs clear to auscultation - no rales, rhonchi or wheezes  CV: regular rate and rhythm, normal S1 S2, no S3 or S4, no murmur, click or rub, no peripheral edema and peripheral pulses strong  ABDOMEN: soft, nontender, no hepatosplenomegaly, no masses and bowel sounds normal  MS: no gross musculoskeletal defects noted, no edema  PSYCH: mentation appears normal, affect normal/bright    Diagnostic Test Results:  Results for orders placed or performed during the hospital encounter of 12/06/17   MA Screening Digital Bilateral    Narrative    MAMMOGRAPHY SCREENING DIGITAL BILATERAL  With CAD 12/6/2017 2:56 PM    BREAST SYMPTOMS: No current breast complaints.    COMPARISON: 11/28/2016, 1/14/2015, 12/11/2013, 12/10/2012.    BREAST DENSITY: Heterogeneously dense limiting mammographic  sensitivity.    COMMENTS: No findings of suspicion for malignancy.         Impression    IMPRESSION: BI-RADS CATEGORY: 1 -  " NEGATIVE.    RECOMMENDED FOLLOW-UP: Annual Mammography.    Exam results letter mailed to patient.    NANDINI TAFOYA MD       ASSESSMENT/PLAN:         1. Other insomnia  We will try a higher dosage, increase to 50 mg.  - [facility] REFERRAL  - amitriptyline (ELAVIL) 50 MG tablet; Take 1 tablet (50 mg) by mouth At Bedtime  Dispense: 30 tablet; Refill: 5    2. Poor concentration  We will try a different formulation discontinue 100 mg daily and increase to 150 mg daily if she is doing well she may call for 90 day refills with plans to recheck in 6 months  - [facility] REFERRAL  - buPROPion (WELLBUTRIN XL) 150 MG 24 hr tablet; Take 1 tablet (150 mg) by mouth every morning  Dispense: 30 tablet; Refill: 5    3. Major depressive disorder, recurrent episode, mild (H)  As above  - [facility] REFERRAL  - buPROPion (WELLBUTRIN XL) 150 MG 24 hr tablet; Take 1 tablet (150 mg) by mouth every morning  Dispense: 30 tablet; Refill: 5  - **CBC with platelets FUTURE anytime; Future  - **Comprehensive metabolic panel FUTURE anytime; Future    4. Age related osteoporosis, unspecified pathological fracture presence  She will schedule DEXA scan continue with current meds  - [facility] REFERRAL  - raloxifene (EVISTA) 60 MG tablet; Take 1 tablet (60 mg) by mouth daily  Dispense: 90 tablet; Refill: 3  - **CBC with platelets FUTURE anytime; Future  - **Comprehensive metabolic panel FUTURE anytime; Future    5. Mild persistent asthma without complication  She is seeing a asthma specialist, Dr. Song  - [facility] REFERRAL  - Asthma Action Plan (AAP)    6. Gastroesophageal reflux disease without esophagitis  We will proceed with upper GI endoscopy, my plan is to have her take ranitidine routinely with omeprazole as needed which is completely opposite of what she is doing now  - [facility] REFERRAL  - GASTROENTEROLOGY ADULT REF PROCEDURE ONLY  - **CBC with platelets FUTURE anytime; Future  - **Comprehensive  metabolic panel FUTURE anytime; Future    7. Encounter to establish care      8. CARDIOVASCULAR SCREENING; LDL GOAL LESS THAN 160    - Lipid panel reflex to direct LDL Fasting; Future    9. Screening for diabetes mellitus    - **Comprehensive metabolic panel FUTURE anytime; Future    This chart documentation was completed in part with Dragon voice recognition software.  Documentation is reviewed after completion, however, some words and grammatical errors may remain.  Caitlin Beasley PA-C     I will see her again in 1 year    Phone visit 6 months for depression insomnia and anxiety    Caitlin Beasley PA-C  [facility]  Electronically signed by Caitlin Beasley PA-C

## 2018-08-21 DIAGNOSIS — G47.09 OTHER INSOMNIA: ICD-10-CM

## 2018-08-22 RX ORDER — AMITRIPTYLINE HYDROCHLORIDE 10 MG/1
TABLET ORAL
Qty: 450 TABLET | Refills: 1 | Status: SHIPPED | OUTPATIENT
Start: 2018-08-22 | End: 2019-02-14

## 2018-08-22 NOTE — TELEPHONE ENCOUNTER
Elavil:  Sent to mail order as requested. Please cancel remaining refills from [facility].     Milana Goetz, RN, BSN

## 2018-08-22 NOTE — TELEPHONE ENCOUNTER
Spoke with Jagruti at [facility] pharmacy, she will cancel remaining refills   Closing encounter  Nina Husain RT (R)

## 2018-09-03 ENCOUNTER — MYC MEDICAL ADVICE (OUTPATIENT)
Dept: FAMILY MEDICINE | Facility: OTHER | Age: 63
End: 2018-09-03

## 2018-09-07 ENCOUNTER — OFFICE VISIT (OUTPATIENT)
Dept: FAMILY MEDICINE | Facility: OTHER | Age: 63
End: 2018-09-07
Payer: COMMERCIAL

## 2018-09-07 ENCOUNTER — RADIANT APPOINTMENT (OUTPATIENT)
Dept: GENERAL RADIOLOGY | Facility: OTHER | Age: 63
End: 2018-09-07
Attending: PHYSICIAN ASSISTANT
Payer: COMMERCIAL

## 2018-09-07 VITALS
HEART RATE: 88 BPM | BODY MASS INDEX: 26.23 KG/M2 | TEMPERATURE: 98.5 F | DIASTOLIC BLOOD PRESSURE: 74 MMHG | WEIGHT: 146 LBS | RESPIRATION RATE: 16 BRPM | SYSTOLIC BLOOD PRESSURE: 136 MMHG

## 2018-09-07 DIAGNOSIS — K21.9 GASTROESOPHAGEAL REFLUX DISEASE WITHOUT ESOPHAGITIS: ICD-10-CM

## 2018-09-07 DIAGNOSIS — M89.8X1 PAIN OF RIGHT CLAVICLE: ICD-10-CM

## 2018-09-07 DIAGNOSIS — M89.8X1 PAIN OF RIGHT CLAVICLE: Primary | ICD-10-CM

## 2018-09-07 DIAGNOSIS — F41.1 GAD (GENERALIZED ANXIETY DISORDER): ICD-10-CM

## 2018-09-07 PROCEDURE — 73000 X-RAY EXAM OF COLLAR BONE: CPT | Mod: RT

## 2018-09-07 PROCEDURE — 99214 OFFICE O/P EST MOD 30 MIN: CPT | Performed by: PHYSICIAN ASSISTANT

## 2018-09-07 RX ORDER — OMEPRAZOLE 40 MG/1
40 CAPSULE, DELAYED RELEASE ORAL DAILY
Qty: 90 CAPSULE | Refills: 1 | Status: SHIPPED | OUTPATIENT
Start: 2018-09-07 | End: 2019-10-14

## 2018-09-07 ASSESSMENT — PAIN SCALES - GENERAL: PAINLEVEL: NO PAIN (0)

## 2018-09-07 NOTE — PROGRESS NOTES
SUBJECTIVE:   Margareth Green is a 63 year old female who presents to clinic today for the following health issues:      HPI  Concern - Lump on chest  Onset: 10 days    Description:   Patient has a lump on the right clavicle     Intensity: mild    Progression of Symptoms:  same    Accompanying Signs & Symptoms:  Denies pain, redness,     Previous history of similar problem:   none    Problem list and histories reviewed & adjusted, as indicated.  Additional history: as documented    Patient Active Problem List   Diagnosis     Dermatophytosis of nail     Symptomatic menopausal or female climacteric states     Generalized osteoarthrosis, unspecified site     TAMMY (generalized anxiety disorder)     Insomnia     Malaise and fatigue     Mild persistent asthma     Cervicalgia     Allergic rhinitis     Irritable bowel syndrome     Arthralgia     DDD (degenerative disc disease), lumbar     Menopausal syndrome     Osteopenia     Hammertoe     Derangement of TMJ (temporomandibular joint)     Stenosis of lumbosacral spine     Fall     Atrophic vaginitis     Osteoporosis     Mild major depression (H)     Knee pain, left     Menopausal flushing     Advanced directives, counseling/discussion     Numbness of left jaw     Neck pain     TMJ (temporomandibular joint syndrome)     Thyroid fullness     Epistaxis     Major depressive disorder, recurrent episode, moderate (H)     Gastroesophageal reflux disease without esophagitis     Tremor     Cramp of limb     Past Surgical History:   Procedure Laterality Date     BL DUPLEX LO EXTREM ART UNILAT/LTD  4/3008    lower extr arterial doppler -no stenosis     C DEXA,BONE DENSITY,APPENDICULR SKELTN  12/2008    osteopenia     C NONSPECIFIC PROCEDURE  2009    Metatarsal phalangeal joint injection     COLONOSCOPY  10/22/2007    bx benign     COLONOSCOPY N/A 4/10/2015    Procedure: COMBINED COLONOSCOPY, SINGLE OR MULTIPLE BIOPSY/POLYPECTOMY BY BIOPSY;  Surgeon: Cl Wagner MD;  Location:   GI     ESOPHAGOSCOPY, GASTROSCOPY, DUODENOSCOPY (EGD), COMBINED N/A 2018    Procedure: COMBINED ESOPHAGOSCOPY, GASTROSCOPY, DUODENOSCOPY (EGD);  COMBINED ESOPHAGOSCOPY, GASTROSCOPY, DUODENOSCOPY (EGD);  Surgeon: Tirso Duran MD;  Location: [facility]     HC COLONOSCOPY THRU STOMA, DIAGNOSTIC  2002    bx. benign - flex sig in 5 yrs  or colonoscopy in 10 yrs     HC ECHO HEART XTHORACIC, STRESS/REST  2005    WNL     HC NCS MOTOR W/O F-WAVE, EACH NERVE  2008    CTS     HC PART REMV BONE METATARSAL HEAD,EA  01/11/10    Right foot plantar plate tear. Also correct hammertoe, 2nd digit & varicose vein ligation, heel.     HC REVISE MEDIAN N/CARPAL TUNNEL SURG      Left in , right in      INJ, ANES/STER, FACET LUMB/SAC      Left L5 nerve root injection     INJECT EPIDURAL TRANSFORAMINAL  10/09/2013    [facility]     INJECT JOINT SACROILIAC  3/19/14,14    [facility]       Social History   Substance Use Topics     Smoking status: Former Smoker     Packs/day: 0.50     Years: 30.00     Types: Cigarettes     Quit date: 3/20/1998     Smokeless tobacco: Never Used      Comment: No smokers in home     Alcohol use 4.0 oz/week      Comment: 2x/week     Family History   Problem Relation Age of Onset     Diabetes Father      Depression Father      Cerebrovascular Disease Mother      age 80     Arthritis Mother      Depression Mother      On meds     Eye Disorder Mother      Glaucoma     Osteoporosis Mother      Respiratory Mother       88 YO COPD     Chronic Obstructive Pulmonary Disease Mother      C.A.D. Brother      65 YO MI -     Chronic Obstructive Pulmonary Disease Brother      Myocardial Infarction Brother      Cancer Brother          Current Outpatient Prescriptions   Medication Sig Dispense Refill     ACETAMINOPHEN PO Take 1,000 mg by mouth as needed.       ALLEGRA 180 MG OR TABS 1 TABLET DAILY 30 0     amitriptyline (ELAVIL) 10 MG tablet TAKE 5 TABLETS AT BEDTIME 450  tablet 1     B Complex Vitamins (VITAMIN B COMPLEX) tablet Take 2 tablets by mouth daily.       BIOTIN PO        buPROPion (WELLBUTRIN XL) 150 MG 24 hr tablet Take 2 tablets (300 mg) by mouth every morning (Patient taking differently: Take 150 mg by mouth every morning ) 60 tablet 5     CALCIUM /VITAMIN D TABS   OR 1 TABLET DAILY       Cyanocobalamin (VITAMIN B-12 PO)        GLUCOSAMINE SULFATE 1000 MG OR CAPS 1 daily       MAGNESIUM OXIDE PO        MECLIZINE HCL PO Take by mouth as needed for dizziness       mometasone-formoterol (DULERA) 100-5 MCG/ACT oral inhaler Inhale 2 puffs into the lungs 2 times daily       NAPROXEN PO Patient states she takes one daily       Omega-3 Fatty Acids (OMEGA-3 FISH OIL PO)        omeprazole (PRILOSEC) 40 MG capsule Take 1 capsule (40 mg) by mouth daily Take 30-60 minutes before a meal. 90 capsule 1     PROAIR  (90 BASE) MCG/ACT IN AERS as needed 1 prn     Pyridoxine HCl (VITAMIN B6 PO)        raloxifene (EVISTA) 60 MG tablet Take 1 tablet (60 mg) by mouth daily 90 tablet 3     ranitidine (ZANTAC) 150 MG tablet Take  by mouth. 1 tablet in am, 75mg in pm       Riboflavin (VITAMIN B-2 PO)        Simethicone (GAS-X PO)        Thiamine HCl (VITAMIN B-1 PO)        triamcinolone (NASACORT) 55 MCG/ACT nasal inhaler Spray 2 sprays into both nostrils daily       UNABLE TO FIND MEDICATION NAME: Colloidal Silver in water swishes does not swallow.       VITAMIN C-MARK HIPS CR 1000 MG OR TBCR 1 TABLET DAILY       VITAMIN E PO        VITAMINS/MINERALS TABS   OR 1 TABLET DAILY       Allergies   Allergen Reactions     Penicillins Itching     Yeast infection afterward     Recent Labs   Lab Test  01/19/18   0829  10/28/15   1222  01/14/15   0915   08/07/13   0959  09/21/12   1021   12/29/11   0822   LDL  60   --   53   --    --    --    --   82   HDL  75   --   82   --    --    --    --   73   TRIG  79   --   63   --    --    --    --   52   ALT  25   --    --    --    --    --    --    --     CR  0.88  0.70   --    < >   --    --    < >   --    GFRESTIMATED  64  85   --    < >   --    --    < >   --    GFRESTBLACK  78  >90   GFR Calc     --    < >   --    --    < >   --    POTASSIUM  4.0  4.1   --    < >   --    --    < >   --    TSH   --    --    --    --   1.96  1.70   --    --     < > = values in this interval not displayed.      BP Readings from Last 3 Encounters:   09/07/18 136/74   07/25/18 120/66   02/28/18 120/76    Wt Readings from Last 3 Encounters:   09/07/18 146 lb (66.2 kg)   07/25/18 145 lb (65.8 kg)   02/28/18 149 lb 3.2 oz (67.7 kg)                  Labs reviewed in EPIC    ROS:  Constitutional, HEENT, cardiovascular, pulmonary, gi and gu systems are negative, except as otherwise noted.    OBJECTIVE:     /74 (Cuff Size: Adult Regular)  Pulse 88  Temp 98.5  F (36.9  C) (Temporal)  Resp 16  Wt 146 lb (66.2 kg)  LMP 02/26/2008  BMI 26.23 kg/m2  Body mass index is 26.23 kg/(m^2).  GENERAL: healthy, alert and no distress  NECK: no adenopathy, no asymmetry, masses, or scars and thyroid normal to palpation  RESP: lungs clear to auscultation - no rales, rhonchi or wheezes  CV: regular rate and rhythm, normal S1 S2, no S3 or S4, no murmur, click or rub, no peripheral edema and peripheral pulses strong  ABDOMEN: soft, nontender, no hepatosplenomegaly, no masses and bowel sounds normal  MS: no gross musculoskeletal defects noted, no edema  MS: She seems to have a slight thickening to the medial aspect of the clavicle on the right.  This is less noted on the left.  No other bony abnormalities are noted.  SKIN: no suspicious lesions or rashes to visible skin.  It is obvious that she enjoys her sun exposure and I cautioned her with respect to his skin cancer types.  NEURO: Normal strength and tone, mentation intact and speech normal  PSYCH: mentation appears normal, affect normal/bright    Diagnostic Test Results:  No results found. However, due to the size of the patient  record, not all encounters were searched. Please check Results Review for a complete set of results.  X-ray: negative for pathology today to my eye.  It will be overread by radiology.    ASSESSMENT/PLAN:     1. Pain of right clavicle  Suspect that there is just a little bit of extra calcium deposition in this region.  She may also have lost a little bit of overlying adipose tissue which is made the clavicle bit more prominent would have her simply observe.  - XR Clavicle Right; Future    2. Gastroesophageal reflux disease without esophagitis  She has been having a little bit more esophageal reflux and most likely acid production from generalized anxiety disorder etc. advised that he would refill her medications and have her observe she can follow-up with her PCP as needed.  - omeprazole (PRILOSEC) 40 MG capsule; Take 1 capsule (40 mg) by mouth daily Take 30-60 minutes before a meal.  Dispense: 90 capsule; Refill: 1    3. TAMMY (generalized anxiety disorder)  She may need to have an alternative to Wellbutrin as she has not tolerated 300 mg dose and is back to 150 mg.  Advised that she follow-up with her primary care provider.  - omeprazole (PRILOSEC) 40 MG capsule; Take 1 capsule (40 mg) by mouth daily Take 30-60 minutes before a meal.  Dispense: 90 capsule; Refill: 1    Follow-up with primary care provider with respect to anxiety related condition in the very near future.    Nathaniel Walton PA-C  [facility]

## 2018-09-07 NOTE — MR AVS SNAPSHOT
After Visit Summary   9/7/2018    Margareth Green    MRN: [id number]           Patient Information     Date Of Birth          1955        Visit Information        Provider Department      9/7/2018 11:00 AM Nathaniel Tucker PA-C [facility]        Today's Diagnoses     Pain of right clavicle    -  1    Gastroesophageal reflux disease without esophagitis        TAMMY (generalized anxiety disorder)           Follow-ups after your visit        Follow-up notes from your care team     Return in about 4 weeks (around 10/5/2018) for Medication Recheck, recheck of current condition.      Who to contact     If you have questions or need follow up information about today's clinic visit or your schedule please contact [facility] directly at [phone].  Normal or non-critical lab and imaging results will be communicated to you by MyChart, letter or phone within 4 business days after the clinic has received the results. If you do not hear from us within 7 days, please contact the clinic through MyChart or phone. If you have a critical or abnormal lab result, we will notify you by phone as soon as possible.  Submit refill requests through [facility] or call your pharmacy and they will forward the refill request to us. Please allow 3 business days for your refill to be completed.          Additional Information About Your Visit        MyChart Information     [employer] gives you secure access to your electronic health record. If you see a primary care provider, you can also send messages to your care team and make appointments. If you have questions, please call your primary care clinic.  If you do not have a primary care provider, please call [phone] and they will assist you.        Care EveryWhere ID     This is your Care EveryWhere ID. This could be used by other organizations to access your [facility] medical records  [id number]        Your Vitals Were     Pulse Temperature  Respirations Last Period BMI (Body Mass Index)       88 98.5  F (36.9  C) (Temporal) 16 02/26/2008 26.23 kg/m2        Blood Pressure from Last 3 Encounters:   09/07/18 136/74   07/25/18 120/66   02/28/18 120/76    Weight from Last 3 Encounters:   09/07/18 146 lb (66.2 kg)   07/25/18 145 lb (65.8 kg)   02/28/18 149 lb 3.2 oz (67.7 kg)                 Today's Medication Changes          These changes are accurate as of 9/7/18 11:58 AM.  If you have any questions, ask your nurse or doctor.               These medicines have changed or have updated prescriptions.        Dose/Directions    buPROPion 150 MG 24 hr tablet   Commonly known as:  WELLBUTRIN XL   This may have changed:  how much to take   Used for:  Poor concentration, Major depressive disorder, recurrent episode, mild (H)        Dose:  300 mg   Take 2 tablets (300 mg) by mouth every morning   Quantity:  60 tablet   Refills:  5       omeprazole 40 MG capsule   Commonly known as:  priLOSEC   This may have changed:    - medication strength  - how much to take  - additional instructions   Used for:  Gastroesophageal reflux disease without esophagitis, TAMMY (generalized anxiety disorder)   Changed by:  Nathaniel Tucker PA-C        Dose:  40 mg   Take 1 capsule (40 mg) by mouth daily Take 30-60 minutes before a meal.   Quantity:  90 capsule   Refills:  1            Where to get your medicines      These medications were sent to [facility] - [address]   [address] [place] [address]     Phone:  [phone]     omeprazole 40 MG capsule                Primary Care Provider Office Phone # Fax #    Caitlin Beasley PA-C [phone]       [address] [place] [address]        Equal Access to Services     JENIFFER LEMONS : Armani Cruz, stephanie noonan, qapatrick kaalchristian vasquez, briseyda bai. [facility] [phone].    ATENCIÓN: Si jayson scwharz wang  disposición servicios gratuitos de asistencia lingüística. Deedee torres [phone].    We comply with applicable federal civil rights laws and [place] laws. We do not discriminate on the basis of race, color, national origin, age, disability, sex, sexual orientation, or gender identity.            Thank you!     Thank you for choosing [facility]  for your care. Our goal is always to provide you with excellent care. Hearing back from our patients is one way we can continue to improve our services. Please take a few minutes to complete the written survey that you may receive in the mail after your visit with us. Thank you!             Your Updated Medication List - Protect others around you: Learn how to safely use, store and throw away your medicines at www.disposemymeds.org.          This list is accurate as of 9/7/18 11:58 AM.  Always use your most recent med list.                   Brand Name Dispense Instructions for use Diagnosis    ACETAMINOPHEN PO      Take 1,000 mg by mouth as needed.        ALLEGRA 180 MG tablet   Generic drug:  fexofenadine     30    1 TABLET DAILY        amitriptyline 10 MG tablet    ELAVIL    450 tablet    TAKE 5 TABLETS AT BEDTIME    Other insomnia       BIOTIN PO           buPROPion 150 MG 24 hr tablet    WELLBUTRIN XL    60 tablet    Take 2 tablets (300 mg) by mouth every morning    Poor concentration, Major depressive disorder, recurrent episode, mild (H)       CALCIUM /VITAMIN D TABS   OR      1 TABLET DAILY        GAS-X PO           glucosamine sulfate 1000 MG Caps      1 daily        MAGNESIUM OXIDE PO           MECLIZINE HCL PO      Take by mouth as needed for dizziness        mometasone-formoterol 100-5 MCG/ACT oral inhaler    DULERA     Inhale 2 puffs into the lungs 2 times daily        NAPROXEN PO      Patient states she takes one daily        OMEGA-3 FISH OIL PO           omeprazole 40 MG capsule    priLOSEC    90 capsule    Take 1 capsule (40 mg) by mouth  daily Take 30-60 minutes before a meal.    Gastroesophageal reflux disease without esophagitis, TAMMY (generalized anxiety disorder)       PROAIR  (90 Base) MCG/ACT inhaler   Generic drug:  albuterol     1    as needed    Mild persistent asthma       raloxifene 60 MG tablet    Evista    90 tablet    Take 1 tablet (60 mg) by mouth daily    Age related osteoporosis, unspecified pathological fracture presence       ranitidine 150 MG tablet    ZANTAC     Take  by mouth. 1 tablet in am, 75mg in pm    Irritable bowel syndrome       triamcinolone 55 MCG/ACT nasal inhaler    NASACORT     Spray 2 sprays into both nostrils daily        UNABLE TO FIND      MEDICATION NAME: Colloidal Silver in water swishes does not swallow.        vitamin  s/Minerals Tabs      1 TABLET DAILY        vitamin B complex with vitamin C Tabs tablet    STRESS TAB     Take 2 tablets by mouth daily.        VITAMIN B-1 PO           VITAMIN B-12 PO           VITAMIN B-2 PO           VITAMIN B6 PO           VITAMIN C-MARK HIPS CR 1000 MG Tbcr      1 TABLET DAILY        VITAMIN E PO

## 2018-10-27 ENCOUNTER — MYC MEDICAL ADVICE (OUTPATIENT)
Dept: FAMILY MEDICINE | Facility: OTHER | Age: 63
End: 2018-10-27

## 2018-11-01 NOTE — TELEPHONE ENCOUNTER
Pt has read message with no reply, will close.    Kyra Dawson CMA ([facility])

## 2018-11-16 ENCOUNTER — HOSPITAL ENCOUNTER (OUTPATIENT)
Dept: MAMMOGRAPHY | Facility: CLINIC | Age: 63
Discharge: HOME | End: 2018-11-16
Attending: PHYSICIAN ASSISTANT | Admitting: PHYSICIAN ASSISTANT
Payer: COMMERCIAL

## 2018-11-16 DIAGNOSIS — Z12.31 VISIT FOR SCREENING MAMMOGRAM: ICD-10-CM

## 2018-11-16 PROCEDURE — 77067 SCR MAMMO BI INCL CAD: CPT

## 2018-11-28 ENCOUNTER — TELEPHONE (OUTPATIENT)
Dept: FAMILY MEDICINE | Facility: OTHER | Age: 63
End: 2018-11-28

## 2018-11-28 NOTE — TELEPHONE ENCOUNTER
Reason for Call:  Form, our goal is to have forms completed with 72 hours, however, some forms may require a visit or additional information.    Type of letter, form or note:  medical    Who is the form from?: [facility] (if other please explain)    Where did the form come from: Patient or family brought in       What clinic location was the form placed at?: [facility] - [phone]    Where the form was placed: 's Box    What number is listed as a contact on the form?: n/a       Additional comments: please mail when completed. Please call and let patient know when card has been mailed.    Call taken on 11/28/2018 at 2:56 PM by Lynne Rooney

## 2018-11-29 NOTE — PROGRESS NOTES
Outpatient Physical Therapy Discharge Note     Patient: Margareth Green  : 1955    Beginning/End Dates of Reporting Period:  3/7/18 to 18    Referring Provider: Caitlin Parisi Diagnosis: neck pain and LBP     Client Self Report: LBP off and on. neck stiffness with the home traction.     Objective Measurements:  Objective Measure: UMESH  Details: 18  Objective Measure: Papa     Objective Measure: NDI  Details: 22       Goals:  Goal Identifier 1   Goal Description Patient has an increase in ROM of the neck by 20% with no pain and a function increase in the neck as measured by the NDI of 15%    Target Date 18   Date Met      Progress:                                       Not met, but improved     Goal Identifier 2   Goal Description Patient will bend, stoop and kneel with no pain in the LB or leg    Target Date 18   Date Met      Progress:                                            Not met     Goal Identifier 3   Goal Description Patient has an over all increase in pain free function with the LB as measured by the UMESH of 0-10%   Target Date 18   Date Met      Progress:                                            Not met       Progress Toward Goals:   Not assessed this period.          Plan:  Discharge from therapy.    Discharge:    Reason for Discharge: Patient chooses to discontinue therapy. Patient decided that she was doing well enough and has completed HEP, that she wanted to go on hold while primary PT was on medical leave. Following that leave pt never requested to return, assuming she is doing well.         Discharge Plan: Patient to continue home program.    Thank you for the referral,            Leana James PT

## 2018-11-29 NOTE — TELEPHONE ENCOUNTER
Form completed.  Please mail and call pt so she knows it has been done and close encounter.  Caitlin Beasley PA-C

## 2018-11-29 NOTE — ADDENDUM NOTE
Encounter addended by: Leana James, PT on: 11/29/2018  3:37 PM<BR>     Actions taken: Sign clinical note, Episode resolved

## 2018-12-05 ENCOUNTER — TELEPHONE (OUTPATIENT)
Dept: FAMILY MEDICINE | Facility: OTHER | Age: 63
End: 2018-12-05

## 2018-12-05 NOTE — TELEPHONE ENCOUNTER
Summary:    Patient is due/failing the following:   PHQ9    Action needed:   Patient needs to do PHQ9.    Type of outreach:    Sent [employer] message.    Questions for provider review:    None                                                                                                                                    Delores Lillisusu       Chart routed to Care Team .          Panel Management Review      Patient has the following on her problem list:     Depression / Dysthymia review    Measure:  Needs PHQ-9 score of 4 or less during index window.  Administer PHQ-9 and if score is 5 or more, send encounter to provider for next steps.        PHQ-9 SCORE 1/16/2018 2/28/2018 7/25/2018   PHQ-9 Total Score - - -   PHQ-9 Total Score MyChart 5 (Mild depression) 4 (Minimal depression) -   PHQ-9 Total Score 5 4 10       If PHQ-9 recheck is 5 or more, route to provider for next steps.    Patient is due for:  PHQ9    Asthma review     ACT Total Scores 7/25/2018   ACT TOTAL SCORE -   ASTHMA ER VISITS -   ASTHMA HOSPITALIZATIONS -   ACT TOTAL SCORE (Goal Greater than or Equal to 20) 23   In the past 12 months, how many times did you visit the emergency room for your asthma without being admitted to the hospital? 0   In the past 12 months, how many times were you hospitalized overnight because of your asthma? 0      1. Is Asthma diagnosis on the Problem List? Yes    2. Is Asthma listed on Health Maintenance? Yes    3. Patient is due for:  none      Composite cancer screening  Chart review shows that this patient is due/due soon for the following None

## 2018-12-26 ENCOUNTER — TELEPHONE (OUTPATIENT)
Dept: FAMILY MEDICINE | Facility: OTHER | Age: 63
End: 2018-12-26

## 2018-12-26 NOTE — TELEPHONE ENCOUNTER
Reason for call:  Symptom  Reason for call:  Patient reporting a symptom    Symptom or request: fell on ice on saturday    Duration (how long have symptoms been present): saturday    Have you been treated for this before? No    Additional comments: pt had broke her tailbone years ago and she had fell on the ice on Saturday and hit her head and injured her tailbone. She is wanting to know so ways to go about healing or should she come in for an evaluation. Pt is doing okay but she wants to make sure she is doing the right things. Her head is fine but her muscles are a little sore.     Phone Number patient can be reached at:  Cell number on file:    Telephone Information:   Mobile [phone]       Best Time:  anytime    Can we leave a detailed message on this number:  YES    Call taken on 12/26/2018 at 3:55 PM by Lluvia Oden

## 2018-12-26 NOTE — TELEPHONE ENCOUNTER
Margareth Green is a 63 year old female who presents with complaints of pain to lower back and back of head after slipping on ice Saturday.    NURSING ASSESSMENT:  /70 (Cuff Size: Adult Regular)  Pulse 6  Description:  Patient reports slipping from standing height Saturday striking her coccyx and back of head. Patient denies LOC, or headache, blurred vision since. Reports history of breaking her tailbone in the past and she was worried due to the pain. Patient denies problems with bowel or bladder, is ambulatory, denies any leg weakness, dizziness, pain to neck or c-spine tenderness. Pain appropriate to presentation without radiation. Patient not on blood thinners, or aspirin, without headache since fall, denies slurred speech.  Onset/duration:  Saturday  Precip. factors:  Slipped and fell  Associated symptoms:  Pain.  Improves/worsens symptoms:  Naproxen, heat  Pain scale (0-10)   5/10    Allergies:   Allergies   Allergen Reactions     Penicillins Itching     Yeast infection afterward       MEDICATIONS:   Taking medication(s) as prescribed? Yes  Taking over the counter medication(s?) Yes  Any medication side effects? No significant side effects    Any barriers to taking medication(s) as prescribed?  No  Medication(s) improving/managing symptoms?  Yes  Medication reconciliation completed: N/A      NURSING PLAN: Huddle with provider, plan includes due patient's ability to ambulate, no loss of bowel or bladder, not reporting intense or unbearable pain, did offer office visit tomorrow. Patient declined after reassurance. Recommended patient to take Naproxen with food and alternate with tylenol and since fall was >48 hours, recommended either heat or cold per patient preference. If pain does not improve 10-14 days make OV for follow up, or seek assessment in ED if she experiences loss of bowel or bladder, thunderclap headache, or othe    RECOMMENDED DISPOSITION:  Home care advice - continue home care measures as  above.  Will comply with recommendation: Yes  If further questions/concerns or if symptoms do not improve, worsen or new symptoms develop, call your PCP or [facility] as soon as possible.      Guideline used: Backpain  Telephone Triage Protocols for Nurses, Fifth Edition, Mitra Benitez RN

## 2019-01-02 ENCOUNTER — ANCILLARY PROCEDURE (OUTPATIENT)
Dept: GENERAL RADIOLOGY | Facility: OTHER | Age: 64
End: 2019-01-02
Payer: COMMERCIAL

## 2019-01-02 ENCOUNTER — TELEPHONE (OUTPATIENT)
Dept: EMERGENCY MEDICINE | Facility: CLINIC | Age: 64
End: 2019-01-02

## 2019-01-02 ENCOUNTER — TELEPHONE (OUTPATIENT)
Dept: FAMILY MEDICINE | Facility: OTHER | Age: 64
End: 2019-01-02

## 2019-01-02 ENCOUNTER — OFFICE VISIT (OUTPATIENT)
Dept: FAMILY MEDICINE | Facility: OTHER | Age: 64
End: 2019-01-02
Payer: COMMERCIAL

## 2019-01-02 VITALS
SYSTOLIC BLOOD PRESSURE: 112 MMHG | TEMPERATURE: 98.4 F | BODY MASS INDEX: 24.98 KG/M2 | RESPIRATION RATE: 16 BRPM | DIASTOLIC BLOOD PRESSURE: 62 MMHG | HEART RATE: 80 BPM | HEIGHT: 63 IN | WEIGHT: 141 LBS

## 2019-01-02 DIAGNOSIS — M54.2 NECK PAIN: ICD-10-CM

## 2019-01-02 DIAGNOSIS — R39.9 UTI SYMPTOMS: Primary | ICD-10-CM

## 2019-01-02 DIAGNOSIS — Z91.81 PERSONAL HISTORY OF FALL: ICD-10-CM

## 2019-01-02 DIAGNOSIS — M54.50 ACUTE BILATERAL LOW BACK PAIN WITHOUT SCIATICA: ICD-10-CM

## 2019-01-02 DIAGNOSIS — R31.9 URINARY TRACT INFECTION WITH HEMATURIA, SITE UNSPECIFIED: ICD-10-CM

## 2019-01-02 DIAGNOSIS — M54.6 ACUTE BILATERAL THORACIC BACK PAIN: ICD-10-CM

## 2019-01-02 DIAGNOSIS — N39.0 URINARY TRACT INFECTION WITH HEMATURIA, SITE UNSPECIFIED: ICD-10-CM

## 2019-01-02 LAB
ALBUMIN UR-MCNC: 30 MG/DL
APPEARANCE UR: CLEAR
BACTERIA #/AREA URNS HPF: ABNORMAL /HPF
BILIRUB UR QL STRIP: NEGATIVE
COLOR UR AUTO: YELLOW
GLUCOSE UR STRIP-MCNC: NEGATIVE MG/DL
HGB UR QL STRIP: ABNORMAL
KETONES UR STRIP-MCNC: NEGATIVE MG/DL
LEUKOCYTE ESTERASE UR QL STRIP: ABNORMAL
MUCOUS THREADS #/AREA URNS LPF: PRESENT /LPF
NITRATE UR QL: NEGATIVE
NON-SQ EPI CELLS #/AREA URNS LPF: ABNORMAL /LPF
PH UR STRIP: 6.5 PH (ref 5–7)
RBC #/AREA URNS AUTO: ABNORMAL /HPF
SOURCE: ABNORMAL
SP GR UR STRIP: 1.01 (ref 1–1.03)
UROBILINOGEN UR STRIP-ACNC: 0.2 EU/DL (ref 0.2–1)
WBC #/AREA URNS AUTO: ABNORMAL /HPF

## 2019-01-02 PROCEDURE — 87186 SC STD MICRODIL/AGAR DIL: CPT | Performed by: PHYSICIAN ASSISTANT

## 2019-01-02 PROCEDURE — 87088 URINE BACTERIA CULTURE: CPT | Performed by: PHYSICIAN ASSISTANT

## 2019-01-02 PROCEDURE — 81001 URINALYSIS AUTO W/SCOPE: CPT | Performed by: PHYSICIAN ASSISTANT

## 2019-01-02 PROCEDURE — 72040 X-RAY EXAM NECK SPINE 2-3 VW: CPT | Mod: FY

## 2019-01-02 PROCEDURE — 72100 X-RAY EXAM L-S SPINE 2/3 VWS: CPT | Mod: FY

## 2019-01-02 PROCEDURE — 87086 URINE CULTURE/COLONY COUNT: CPT | Performed by: PHYSICIAN ASSISTANT

## 2019-01-02 PROCEDURE — 99214 OFFICE O/P EST MOD 30 MIN: CPT | Performed by: PHYSICIAN ASSISTANT

## 2019-01-02 RX ORDER — SULFAMETHOXAZOLE/TRIMETHOPRIM 800-160 MG
1 TABLET ORAL 2 TIMES DAILY
Qty: 14 TABLET | Refills: 0 | Status: SHIPPED | OUTPATIENT
Start: 2019-01-02 | End: 2019-02-14

## 2019-01-02 RX ORDER — PHENAZOPYRIDINE HYDROCHLORIDE 200 MG/1
200 TABLET, FILM COATED ORAL 3 TIMES DAILY PRN
Qty: 6 TABLET | Refills: 0 | Status: SHIPPED | OUTPATIENT
Start: 2019-01-02 | End: 2019-02-14

## 2019-01-02 ASSESSMENT — ANXIETY QUESTIONNAIRES
7. FEELING AFRAID AS IF SOMETHING AWFUL MIGHT HAPPEN: SEVERAL DAYS
1. FEELING NERVOUS, ANXIOUS, OR ON EDGE: SEVERAL DAYS
7. FEELING AFRAID AS IF SOMETHING AWFUL MIGHT HAPPEN: SEVERAL DAYS
6. BECOMING EASILY ANNOYED OR IRRITABLE: SEVERAL DAYS
3. WORRYING TOO MUCH ABOUT DIFFERENT THINGS: SEVERAL DAYS
GAD7 TOTAL SCORE: 7
5. BEING SO RESTLESS THAT IT IS HARD TO SIT STILL: SEVERAL DAYS
GAD7 TOTAL SCORE: 7
4. TROUBLE RELAXING: SEVERAL DAYS
2. NOT BEING ABLE TO STOP OR CONTROL WORRYING: SEVERAL DAYS
GAD7 TOTAL SCORE: 7

## 2019-01-02 ASSESSMENT — PAIN SCALES - GENERAL: PAINLEVEL: EXTREME PAIN (8)

## 2019-01-02 ASSESSMENT — MIFFLIN-ST. JEOR: SCORE: 1146.71

## 2019-01-02 ASSESSMENT — PATIENT HEALTH QUESTIONNAIRE - PHQ9
SUM OF ALL RESPONSES TO PHQ QUESTIONS 1-9: 5
10. IF YOU CHECKED OFF ANY PROBLEMS, HOW DIFFICULT HAVE THESE PROBLEMS MADE IT FOR YOU TO DO YOUR WORK, TAKE CARE OF THINGS AT HOME, OR GET ALONG WITH OTHER PEOPLE: NOT DIFFICULT AT ALL
SUM OF ALL RESPONSES TO PHQ QUESTIONS 1-9: 5

## 2019-01-02 NOTE — TELEPHONE ENCOUNTER
"Drinks a lot of water, afebrile  Margareth Green is a 63 year old female who calls with lower abdominal pain.    NURSING ASSESSMENT:  Description: lower abdominal pressure with urination and blood in urine  Onset/duration:  today  Precip. factors:  Patient had a fall on 12/22/18 and believes she \"broke her tailbone\"; pain has worsened since patient was initially triaged on 12/26/18  Associated symptoms:  Flank pain, constipation, hematuria, denies cloudy urine, denies odor, denies increased frequency, denies fever  Improves/worsens symptoms:  Sitting down to urinate worsens lower abdominal pressure, taking naproxen to relief pain in low back  Pain scale (0-10)   7/10 lower abdominal, 8/10 tailbone  LMP/preg/breast feeding:  Not asked  Last exam/Treatment:  9/7/18  Allergies:   Allergies   Allergen Reactions     Penicillins Itching     Yeast infection afterward       MEDICATIONS:   Taking medication(s) as prescribed? not asked  Taking over the counter medication(s?) naproxen  Any medication side effects? Not asked    Any barriers to taking medication(s) as prescribed?  not asked  Medication(s) improving/managing symptoms?  Yes  Medication reconciliation completed: No      NURSING PLAN: Nursing advice to patient seek medical care within 2-4 hours; spoke with PCP who will work-in patient    RECOMMENDED DISPOSITION:  See in 4 hours-patient scheduled with PCP  Will comply with recommendation: Yes  If further questions/concerns or if symptoms do not improve, worsen or new symptoms develop, call your PCP or [facility] as soon as possible.      Guideline used: abdominal pain, urine abnormal color, urination painful  Telephone Triage Protocols for Nurses, Fifth Edition, Mitra Salazar RN    "

## 2019-01-02 NOTE — PROGRESS NOTES
"Ine   SUBJECTIVE:   Margareth Green is a 63 year old female who presents to clinic today for the following health issues:      HPI  URINARY TRACT SYMPTOMS  Onset: 12/22/18 fell landing on her tailbone, she did hit her head and since has had neck, upper back and lumbar pain and blood in urine started today , she is not sure if this is related or 2 separate things     Description:   Painful urination (Dysuria): no   Blood in urine (Hematuria): YES- just today  Delay in urine (Hesitency): YES- just today     Intensity: moderate, severe    Progression of Symptoms:  same    Accompanying Signs & Symptoms:  Fever/chills: no   Flank pain YES, B thoracic and coccyx ,  Not unilateral or true flank pain  Nausea and vomiting: no   Any vaginal symptoms: none  Abdominal/Pelvic Pain: YES, felt pressure before urinating     History:   History of frequent UTI's: no   History of kidney stones: no   Sexually Active: YES  Possibility of pregnancy: No    Precipitating factors:   Nothing     Therapies Tried and outcome: ice, heat, tylenol arthritis, naproxen   Problem list and histories reviewed & adjusted, as indicated.  Additional history: as documented        BP Readings from Last 3 Encounters:   01/02/19 112/62   09/07/18 136/74   07/25/18 120/66    Wt Readings from Last 3 Encounters:   01/02/19 64 kg (141 lb)   09/07/18 66.2 kg (146 lb)   07/25/18 65.8 kg (145 lb)                  Labs reviewed in EPIC    ROS:  CONSTITUTIONAL: NEGATIVE for fever, chills, change in weight  ENT/MOUTH: NEGATIVE for ear, mouth and throat problems  RESP: NEGATIVE for significant cough or SOB  CV: NEGATIVE for chest pain, palpitations or peripheral edema  : as above  MUSCULOSKELETAL: as above    OBJECTIVE:     /62   Pulse 80   Temp 98.4  F (36.9  C) (Temporal)   Resp 16   Ht 1.589 m (5' 2.56\")   Wt 64 kg (141 lb)   LMP 02/26/2008 (Approximate)   BMI 25.33 kg/m    Body mass index is 25.33 kg/m .  GENERAL: healthy, alert and no " distress  NECK: no adenopathy, no asymmetry, masses, or scars and thyroid normal to palpation  NECK: General vertebral tenderness over her entire cervical spine, also muscular tenderness to palpation over the paraspinal muscles of the cervical and thoracic musculature  RESP: lungs clear to auscultation - no rales, rhonchi or wheezes  CV: regular rate and rhythm, normal S1 S2, no S3 or S4, no murmur, click or rub, no peripheral edema and peripheral pulses strong  ABDOMEN: soft, nontender, no hepatosplenomegaly, no masses and bowel sounds normal  MS: No thoracic vertebral tenderness but generalized lumbar vertebral tenderness, corresponding lumbosacral muscular tenderness  SKIN: no suspicious lesions or rashes  NEURO: Normal strength and tone, sensory exam grossly normal and mentation intact  PSYCH: mentation appears normal, affect normal/bright    Diagnostic Test Results:  Results for orders placed or performed in visit on 01/02/19 (from the past 24 hour(s))   UA reflex to Microscopic and Culture   Result Value Ref Range    Color Urine Yellow     Appearance Urine Clear     Glucose Urine Negative NEG^Negative mg/dL    Bilirubin Urine Negative NEG^Negative    Ketones Urine Negative NEG^Negative mg/dL    Specific Gravity Urine 1.015 1.003 - 1.035    Blood Urine Large (A) NEG^Negative    pH Urine 6.5 5.0 - 7.0 pH    Protein Albumin Urine 30 (A) NEG^Negative mg/dL    Urobilinogen Urine 0.2 0.2 - 1.0 EU/dL    Nitrite Urine Negative NEG^Negative    Leukocyte Esterase Urine Moderate (A) NEG^Negative    Source Midstream Urine    Urine Microscopic   Result Value Ref Range    WBC Urine 10-25 (A) OTO5^0 - 5 /HPF    RBC Urine 5-10 (A) OTO2^O - 2 /HPF    Squamous Epithelial /LPF Urine Moderate (A) FEW^Few /LPF    Bacteria Urine Few (A) NEG^Negative /HPF    Mucous Urine Present (A) NEG^Negative /LPF     *Note: Due to a large number of results and/or encounters for the requested time period, some results have not been displayed. A  complete set of results can be found in Results Review.     Xray -C-spine she does have arthritic changes, L-spine also arthritic changes, she does have some moderate scoliosis noted of the lumbar region, I do not see any acute fractures the radiology report is pending    ASSESSMENT/PLAN:         1. UTI symptoms    - UA reflex to Microscopic and Culture  - Urine Culture Aerobic Bacterial    2. Neck pain  She will continue with naproxen ice and/or heat and do physical therapy to see if this will improve her symptoms  - XR Cervical Spine 2/3 Views; Future  - PHYSICAL THERAPY REFERRAL; Future    3. Acute bilateral low back pain without sciatica  PT as above  - XR Lumbar Spine 2/3 Views; Future  - PHYSICAL THERAPY REFERRAL; Future    4. Urinary tract infection with hematuria, site unspecified  Increase fluids, await culture change meds as needed  - sulfamethoxazole-trimethoprim (BACTRIM DS/SEPTRA DS) 800-160 MG tablet; Take 1 tablet by mouth 2 times daily for 7 days  Dispense: 14 tablet; Refill: 0  - phenazopyridine (PYRIDIUM) 200 MG tablet; Take 1 tablet (200 mg) by mouth 3 times daily as needed for irritation  Dispense: 6 tablet; Refill: 0    5. Acute bilateral thoracic back pain  As above  - PHYSICAL THERAPY REFERRAL; Future    6. Personal history of fall  Fall on ice at her home   - PHYSICAL THERAPY REFERRAL; Future    This chart documentation was completed in part with Dragon voice recognition software.  Documentation is reviewed after completion, however, some words and grammatical errors may remain.  TOI Hirsch PA-C  [facility]  Answers for HPI/ROS submitted by the patient on 1/2/2019   TAMMY 7 TOTAL SCORE: 7    Conflicting answers have been found for some questions. Please document the patient's answers manually.

## 2019-01-02 NOTE — PROGRESS NOTES
"  SUBJECTIVE:   Margareht Green is a 63 year old female who presents to clinic today for the following health issues:  {Provider please address medication reconciliation discrepancies--rooming staff please delete if no med/rec issues}    HPI  ABDOMINAL   PAIN     Onset: ***    Description:   Character: {.:167589}  Location: {.:534869}  Radiation: {.:681222::\"None\"}    Intensity: {.:955497}    Progression of Symptoms:  {.:118313}    Accompanying Signs & Symptoms:  Fever/Chills?: { :255346}  Gas/Bloating: { :715029}  Nausea: { :399065}  Vomitting: { :185790}  Diarrhea?: { :941668}  Constipation:{ :047705}  Dysuria or Hematuria: { :203613}  Blood in urine: YES  Dizzy: { :171418}  Headaches:{ :505773}   History:   Trauma: YES- fell 12/22/18  Previous similar pain: { :110873}   Previous tests done: { .:965215}    Precipitating factors:   ***    Alleviating factors:  ***    Therapies Tried and outcome: ***    LMP:  {NOT applicable:245547::\"not applicable\"}     Problem list and histories reviewed & adjusted, as indicated.  Additional history: {NONE - AS DOCUMENTED:253206::\"as documented\"}    {ACUTE Problem SUPERLIST - brief histories:129435}    {HIST REVIEW/ LINKS 2:772253}    {PROVIDER CHARTING PREFERENCE:383774}  "

## 2019-01-03 ENCOUNTER — HOSPITAL ENCOUNTER (OUTPATIENT)
Dept: CT IMAGING | Facility: CLINIC | Age: 64
Discharge: HOME | End: 2019-01-03
Attending: PHYSICIAN ASSISTANT | Admitting: PHYSICIAN ASSISTANT
Payer: COMMERCIAL

## 2019-01-03 ENCOUNTER — TELEPHONE (OUTPATIENT)
Dept: FAMILY MEDICINE | Facility: OTHER | Age: 64
End: 2019-01-03

## 2019-01-03 DIAGNOSIS — M54.2 NECK PAIN: ICD-10-CM

## 2019-01-03 DIAGNOSIS — M48.02 CERVICAL STENOSIS OF SPINE: ICD-10-CM

## 2019-01-03 DIAGNOSIS — R93.7 ABNORMAL X-RAY OF CERVICAL SPINE: Primary | ICD-10-CM

## 2019-01-03 PROCEDURE — 72125 CT NECK SPINE W/O DYE: CPT

## 2019-01-03 ASSESSMENT — ASTHMA QUESTIONNAIRES: ACT_TOTALSCORE: 23

## 2019-01-03 ASSESSMENT — ANXIETY QUESTIONNAIRES: GAD7 TOTAL SCORE: 7

## 2019-01-03 NOTE — TELEPHONE ENCOUNTER
----- Message from JUAN Mojica CNP sent at 1/2/2019  6:23 PM CST -----  All, I was asked by Dr. Costello to contact Margareth as he was concerned about a finding on her cervical spine x-ray around C6/C7 region, his read is not officially in the computer yet to tell you what he told me but he was concerned about a ligamentous injury it sounds like she saw you in clinic today and has no deficits or concerns, I did encourage her to come to the ER for a CT scan but she did not want to pay an ER bill which I totally understand so I did put in for an outpatient CT of her cervical spine which I wanted her to have done by tomorrow morning, and told her I would send you a message for follow-up.  Thanks much and happy new year,  Susu

## 2019-01-03 NOTE — TELEPHONE ENCOUNTER
LMTC please inform patient of appointment scheduled:  Appointment made with Gallo STORY in [place] at 11:00 am to go over Cervical stenosis and DDD of cervical Spine  Thanks  Nina Husain RT (R)

## 2019-01-03 NOTE — TELEPHONE ENCOUNTER
Spoke with pt and she is going in this morning to have this done. Will leave encounter open until we get results.    Kyra Dawson CMA ([facility])

## 2019-01-03 NOTE — TELEPHONE ENCOUNTER
Please call pt-I received messages from radiology and ER staff,  her order is in for her ct scan of her neck, please help her set it up.  We will get back to her with these results she should hold off on PT before getting these results   Caitlin Beasley PA-C

## 2019-01-03 NOTE — ED PROVIDER NOTES
I called patient per radiology request, Dr. Costello, I informed her of her questionable x-ray findings on her cervical spine that he reported to me, concerns for an unstable neck injury or ligamentous injury.  I advised patient to come into the emergency room for a CT scan of her cervical spine per radiology recommendations.  Patient reports she does not wish to incur an emergency room visit bill and will come in tomorrow morning for the scan.  Risk/benefits discussed, patient verbalized understanding of this.  Patient was evaluated in clinic this morning and denies any current numbness, tingling, neck pain.  Patient encouraged to come to the ED if any of these occur.  I did place an order for an outpatient CT scan and did update her primary care provider, Caitlin Beasley with regard to this phone call and encounter.         Ying Perry, JUAN CNP  01/02/19 [id number]

## 2019-01-03 NOTE — TELEPHONE ENCOUNTER
Spoke with pt- she is aware of her results, Dr. Costello is concerned regarding the progression of her cervical degeneration and the anterolisthesis of of c7/t1.  She was called in for CT this morning, no acute fractures noted thankfully.  There is some concern for inflammatory arthritis causing this as well.  Pt is no longer seeing [place] [facility], she was given the option of seeing a spine specialty in [place] as well as rheumatology however would like to see spine specialty first and see what they recommend  Caitlin Beasley PA-C

## 2019-01-03 NOTE — TELEPHONE ENCOUNTER
Please set up an appointment for her to see Ronald or [facility] cervical stenosis and DDD of cervical spine and call pt with appt info  Caitlin Beasley PA-C

## 2019-01-04 ENCOUNTER — HOSPITAL ENCOUNTER (OUTPATIENT)
Dept: PHYSICAL THERAPY | Facility: OTHER | Age: 64
Setting detail: THERAPIES SERIES
End: 2019-01-04
Attending: PHYSICIAN ASSISTANT
Payer: COMMERCIAL

## 2019-01-04 DIAGNOSIS — M54.6 ACUTE BILATERAL THORACIC BACK PAIN: ICD-10-CM

## 2019-01-04 DIAGNOSIS — Z91.81 PERSONAL HISTORY OF FALL: ICD-10-CM

## 2019-01-04 DIAGNOSIS — M54.50 ACUTE BILATERAL LOW BACK PAIN WITHOUT SCIATICA: ICD-10-CM

## 2019-01-04 DIAGNOSIS — M54.2 NECK PAIN: ICD-10-CM

## 2019-01-04 LAB
BACTERIA SPEC CULT: ABNORMAL
Lab: ABNORMAL
SPECIMEN SOURCE: ABNORMAL

## 2019-01-04 PROCEDURE — G8978 MOBILITY CURRENT STATUS: HCPCS | Mod: GP,CJ | Performed by: PHYSICAL THERAPIST

## 2019-01-04 PROCEDURE — G8979 MOBILITY GOAL STATUS: HCPCS | Mod: GP,CH | Performed by: PHYSICAL THERAPIST

## 2019-01-04 PROCEDURE — 97140 MANUAL THERAPY 1/> REGIONS: CPT | Mod: GP | Performed by: PHYSICAL THERAPIST

## 2019-01-04 PROCEDURE — 97161 PT EVAL LOW COMPLEX 20 MIN: CPT | Mod: GP | Performed by: PHYSICAL THERAPIST

## 2019-01-04 NOTE — PROGRESS NOTES
01/04/19 1300   General Information   Type of Visit OP Ortho PT Re-evaluation   Start of Care Date 01/04/19   Referring Physician Caitlin Beasley PA-C   Patient/Family Goals Statement To have no pain in the back, trunk, and upper shoulders   Orders Evaluate and Treat   Date of Order 01/02/19   Insurance Type [employer]   Medical Diagnosis Neck pain, acute bilateral LBP, acute B thoracic pain, personal HX of a fall   Surgical/Medical history reviewed Yes   Precautions/Limitations no known precautions/limitations   Body Part(s)   Body Part(s) Lumbar Spine/SI;Cervical Spine   Presentation and Etiology   Pertinent history of current problem (include personal factors and/or comorbidities that impact the POC) [place], fell backwards and hit the back of her head, hit tailbone. Felt some neck pain right away. iced neck after this. Came in to see Dr about abdominal pain, and had a UTI. Then Dr said xray for the back and neck. Then ER called her wanted her to come in, and yesterday she had a CAT scan of the neck and the radiologist met with pt to give her the results. And primary set her up with spine specialist. Today primary complaint is LBP #9. thoracic pain #8. UT pain #9. Patient states that her neck does not really hurt. PMHX: includes broken tailbone, spine pain for which she saw PT for in the past and it was very helpful.    Impairments A. Pain;D. Decreased ROM;E. Decreased flexibility;L. Tingling   Functional Limitations perform activities of daily living;perform desired leisure / sports activities   Onset date of current episode/exacerbation 12/22/18   Chronicity New   Pain/symptoms exacerbated by G. Certain positions;B. Walking;A. Sitting;C. Lifting;I. Bending;K. Home tasks;J. ADL   Prior Level of Function   Prior Level of Function-Mobility independent   Current Level of Function   Patient role/employment history F. Retired   Fall Risk Screen   Fall screen completed by PT    Have you fallen 2 or more times in the past year? No   Have you fallen and had an injury in the past year? Yes   Is patient a fall risk? No   Fall screen comments balance is good just slipped on ice   Lumbar Spine/SI Objective Findings   Observation patient is moving very slowly and points to the TL junction area as painful   Posture WNL   Gait/Locomotion WNL   Balance/Proprioception (Single Leg Stance) WNL   Flexion ROM WNL but pain in the TL area and the sacrum   Extension ROM WNL   Lumbar/Hip/Knee/Foot Strength Comments WFL   Hamstring Flexibility WNL   Cervical Spine   Cervical Flexion ROM limited by stiffness in all motions, but no pain   Thoracic Extension ROM painful but WNL   Shoulder/Wrist/Hand Strength Comments WNL   Palpation Pain in the B UT's, sacral area and diaphragm   Planned Therapy Interventions   Planned Therapy Interventions manual therapy;neuromuscular re-education;ROM;strengthening;stretching   Planned Modality Interventions   Planned Modality Interventions Electrical stimulation   Clinical Impression   Criteria for Skilled Therapeutic Interventions Met yes, treatment indicated   PT Diagnosis back pain, neck stiffness, TL junction pain, sacral pain   Influenced by the following impairments degerative spine   Functional limitations due to impairments Patient is having pain moving in and out of postures   Clinical Presentation Evolving/Changing   Clinical Presentation Rationale thinks she is getting better over all   Clinical Decision Making (Complexity) Low complexity   Therapy Frequency 1 time/week   Predicted Duration of Therapy Intervention (days/wks) 10 wks   Risk & Benefits of therapy have been explained Yes   Patient, Family & other staff in agreement with plan of care Yes   Clinical Impression Comments Patient is having a lot of stiffness and hard time moving in and out of postures. Her diaphragm and TL junction area is tight and painful.    Education Assessment   Preferred Learning  Style Listening;Reading;Demonstration   Barriers to Learning No barriers   ORTHO GOALS   PT Ortho Eval Goals 1;2   Ortho Goal 1   Goal Identifier 1   Goal Description Patient can resume her walks with no back pain   Target Date 03/15/19   Ortho Goal 2   Goal Identifier 2   Goal Description Patient is able to go in and out of postures with no pain   Target Date 03/15/19   Total Evaluation Time   PT Catherine, Low Complexity Minutes ([id number]) 15   Thank you for the referral,            Leana James PT

## 2019-01-08 ENCOUNTER — HOSPITAL ENCOUNTER (OUTPATIENT)
Dept: PHYSICAL THERAPY | Facility: OTHER | Age: 64
Setting detail: THERAPIES SERIES
End: 2019-01-08
Attending: PHYSICIAN ASSISTANT
Payer: COMMERCIAL

## 2019-01-08 PROCEDURE — 97140 MANUAL THERAPY 1/> REGIONS: CPT | Mod: GP | Performed by: PHYSICAL THERAPIST

## 2019-01-14 ENCOUNTER — OFFICE VISIT (OUTPATIENT)
Dept: NEUROSURGERY | Facility: CLINIC | Age: 64
End: 2019-01-14
Payer: COMMERCIAL

## 2019-01-14 VITALS
HEIGHT: 63 IN | WEIGHT: 142 LBS | DIASTOLIC BLOOD PRESSURE: 70 MMHG | SYSTOLIC BLOOD PRESSURE: 130 MMHG | BODY MASS INDEX: 25.16 KG/M2 | TEMPERATURE: 97.8 F

## 2019-01-14 DIAGNOSIS — M50.30 DEGENERATION OF CERVICAL DISC WITHOUT MYELOPATHY: Primary | ICD-10-CM

## 2019-01-14 DIAGNOSIS — M54.2 CERVICALGIA: ICD-10-CM

## 2019-01-14 PROCEDURE — 99203 OFFICE O/P NEW LOW 30 MIN: CPT | Performed by: PHYSICIAN ASSISTANT

## 2019-01-14 ASSESSMENT — MIFFLIN-ST. JEOR: SCORE: 1151.73

## 2019-01-14 NOTE — PATIENT INSTRUCTIONS
1. Cervical flexion/extension MRI ordered at [facility]. Please call to schedule at [phone]. I will call you with results.     2. Continue with physical therapy    3. Please call our office at [phone] with any questions or concerns

## 2019-01-14 NOTE — LETTER
1/14/2019         RE: Margareth Green  [address] [facility] [address]        Dear Colleague,    Thank you for referring your patient, Margareth Green, to the [facility]. Please see a copy of my visit note below.    Martin Cardenas  [facility]  Neurosurgery Clinic Visit      CC: Neck pain    Primary care Provider: Caitlin Beasley      Reason For Visit:   I was asked by Caitlin Beasley PA-C to consult on the patient for cervical stenosis of spine.      HPI: Margareth Green is a 63 year old female who presents for evaluation of chronic neck pain x many yeras. Pain is located in bilateral neck pain L>R and radiates to bilateral shoulders. Describes the pain as a constant ache. Cannot recall anything in particular that worsens the pain. Pain is alleviated with physical therapy and heat/ice. She has been doing physical therapy. No MRI or injections. Denies radicular pain to the extremities, weakness, paresthesias, gait issues, or bladder/bowel incontinence.    Current pain: 5/10 At worst: 10/10    Past Medical History:   Diagnosis Date     Abnormal Papanicolaou smear of vagina and vaginal HPV 12/02    ASCUS (HPV neg). 3/03 WNL but no transitional zome - repeat 3months     Allergic rhinitis, cause unspecified      Backache, unspecified      Carpal tunnel syndrome     s/p repair     Cervicalgia      Depressive disorder, not elsewhere classified     doing well on Wellbutrin     Derangement of TMJ (temporomandibular joint) 3/2010    internal derangement     Dermatophytosis of nail      Diaphragmatic hernia without mention of obstruction or gangrene 9/07    small hiatal hernia     Diffuse cystic mastopathy 2000    FCBD     Esophageal reflux 4/2005     Hammertoe 10/09    see podiatry consult     Headache(784.0)     Muscle contrature headaches     Intramural leiomyoma of uterus 7/2007    on US     Irregular menstrual cycle     better with BCP -also PMS is better with BCP      IRRITABLE COLON 2/18/2008     Menopause     lmp 2/2008     Mild persistent asthma      Mole (skin) 2003    9 x 6 mm rt ant chest     Motion sickness      Neuroma 11/2008    lt foot     Osteopenia 12/2008     Other malaise and fatigue 4/2005    stress     Personal history of tobacco use, presenting hazards to health     Quit in 1998     Solitary cyst of breast 12/2005    lt 6 oclock     Stenosis of lumbosacral spine     l4-5 and l5-s1     Symptomatic menopausal or female climacteric states 12/2002    perimenopausal. LMP 2/2008       Past Medical History reviewed with patient during visit.    Past Surgical History:   Procedure Laterality Date     BL DUPLEX LO EXTREM ART UNILAT/LTD  4/3008    lower extr arterial doppler -no stenosis     C DEXA,BONE DENSITY,APPENDICULR SKELTN  12/2008    osteopenia     C NONSPECIFIC PROCEDURE  2009    Metatarsal phalangeal joint injection     COLONOSCOPY  10/22/2007    bx benign     COLONOSCOPY N/A 4/10/2015    Procedure: COMBINED COLONOSCOPY, SINGLE OR MULTIPLE BIOPSY/POLYPECTOMY BY BIOPSY;  Surgeon: Cl Wagner MD;  Location: [facility]     ESOPHAGOSCOPY, GASTROSCOPY, DUODENOSCOPY (EGD), COMBINED N/A 1/31/2018    Procedure: COMBINED ESOPHAGOSCOPY, GASTROSCOPY, DUODENOSCOPY (EGD);  COMBINED ESOPHAGOSCOPY, GASTROSCOPY, DUODENOSCOPY (EGD);  Surgeon: Tirso Duran MD;  Location: [facility]     HC COLONOSCOPY THRU STOMA, DIAGNOSTIC  7/2002    bx. benign - flex sig in 5 yrs  or colonoscopy in 10 yrs     HC ECHO HEART XTHORACIC, STRESS/REST  2/2005    WNL     HC NCS MOTOR W/O F-WAVE, EACH NERVE  7/2008    CTS     HC PART REMV BONE METATARSAL HEAD,EA  01/11/10    Right foot plantar plate tear. Also correct hammertoe, 2nd digit & varicose vein ligation, heel.     HC REVISE MEDIAN N/CARPAL TUNNEL SURG      Left in 1997, right in 1999     INJ, ANES/STER, FACET LUMB/SAC  2010    Left L5 nerve root injection     INJECT EPIDURAL TRANSFORAMINAL  10/09/2013    [facility] [place]     INJECT JOINT  SACROILIAC  3/19/14,4/21/14    [facility]     Past Surgical History reviewed with patient during visit.    Current Outpatient Medications   Medication     ACETAMINOPHEN PO     ALLEGRA 180 MG OR TABS     amitriptyline (ELAVIL) 10 MG tablet     B Complex Vitamins (VITAMIN B COMPLEX) tablet     BIOTIN PO     buPROPion (WELLBUTRIN XL) 150 MG 24 hr tablet     CALCIUM /VITAMIN D TABS   OR     Cyanocobalamin (VITAMIN B-12 PO)     GLUCOSAMINE SULFATE 1000 MG OR CAPS     MAGNESIUM OXIDE PO     MECLIZINE HCL PO     mometasone-formoterol (DULERA) 100-5 MCG/ACT oral inhaler     NAPROXEN PO     Omega-3 Fatty Acids (OMEGA-3 FISH OIL PO)     omeprazole (PRILOSEC) 40 MG capsule     PROAIR  (90 BASE) MCG/ACT IN AERS     Pyridoxine HCl (VITAMIN B6 PO)     raloxifene (EVISTA) 60 MG tablet     ranitidine (ZANTAC) 150 MG tablet     Riboflavin (VITAMIN B-2 PO)     Simethicone (GAS-X PO)     Thiamine HCl (VITAMIN B-1 PO)     triamcinolone (NASACORT) 55 MCG/ACT nasal inhaler     UNABLE TO FIND     VITAMIN C-MARK HIPS CR 1000 MG OR TBCR     VITAMIN E PO     VITAMINS/MINERALS TABS   OR     No current facility-administered medications for this visit.        Allergies   Allergen Reactions     Penicillins Itching     Yeast infection afterward       Social History     Socioeconomic History     Marital status:      Spouse name: Robel     Number of children: 1     Years of education: 12     Highest education level: Not on file   Social Needs     Financial resource strain: Not on file     Food insecurity - worry: Not on file     Food insecurity - inability: Not on file     Transportation needs - medical: Not on file     Transportation needs - non-medical: Not on file   Occupational History     Occupation: HIMS clermary     Employer: [employer]   Tobacco Use     Smoking status: Former Smoker     Packs/day: 0.50     Years: 30.00     Pack years: 15.00     Types: Cigarettes     Last attempt to quit: 3/20/1998      Years since quittin.8     Smokeless tobacco: Never Used     Tobacco comment: No smokers in home   Substance and Sexual Activity     Alcohol use: Yes     Alcohol/week: 4.0 oz     Comment: 2x/week     Drug use: No     Sexual activity: Yes     Partners: Male     Birth control/protection: Surgical, Male Surgical     Comment: spouse -vas   Other Topics Concern      Service No     Blood Transfusions No     Caffeine Concern Yes     Comment: pop: 1c/wk     Occupational Exposure No     Hobby Hazards No     Sleep Concern Yes     Comment: On meds     Stress Concern Yes     Weight Concern No     Special Diet No     Back Care Yes     Comment: Hx back pain     Exercise Yes     Comment: stretching qd     Bike Helmet No     Seat Belt Yes     Self-Exams Yes     Parent/sibling w/ CABG, MI or angioplasty before 65F 55M? Not Asked   Social History Narrative    Lives with spouse.. No domestic violence issues.       Family History   Problem Relation Age of Onset     Diabetes Father      Depression Father      Cerebrovascular Disease Mother         age 80     Arthritis Mother      Depression Mother         On meds     Eye Disorder Mother         Glaucoma     Osteoporosis Mother      Respiratory Mother          86 YO COPD     Chronic Obstructive Pulmonary Disease Mother      C.A.D. Brother         67 YO MI -     Chronic Obstructive Pulmonary Disease Brother      Myocardial Infarction Brother      Cancer Brother           ROS: 10 point ROS neg other than the symptoms noted above in the HPI.    Vital Signs: LMP 2008 (Approximate)     Examination:  Constitutional:  Alert, well nourished, NAD.  HEENT: Normocephalic, atraumatic.   Pulmonary:  Without shortness of breath, normal effort.   Lymph: no lymphadenopathy to low back or LE.   Integumentary: Skin is free of rashes or lesions.   Cardiovascular:  No pitting edema of BLE.      Neurological:  Awake  Alert  Oriented x 3  Speech clear  Cranial nerves II - XII grossly  intact  PERRL  EOMI  Face symmetric  Tongue midline  Motor exam   Shoulder Abduction:  Right:  5/5   Left:  5/5  Biceps:                      Right:  5/5   Left:  5/5  Triceps:                     Right:  5/5   Left:  5/5  Wrist Extensors:       Right:  5/5   Left:  5/5  Wrist Flexors:           Right:  5/5   Left:  5/5  Intrinsics:                   Right:  5/5   Left:  5/5  Hip Flexor:                Right: 5/5  Left:  5/5  Hip Adductor:             Right:  5/5  Left:  5/5  Hip Abductor:             Right:  5/5  Left:  5/5  Gastroc Soleus:        Right:  5/5  Left:  5/5  Tib/Ant:                      Right:  5/5  Left:  5/5  EHL:                          Right:  5/5  Left:  5/5       Sensation normal to bilateral upper and lower extremities.    Reflexes are 2+ in the brachial radialis and triceps. Negative Katharine sign bilaterally.    Musculoskeletal:  Gait: Able to stand from a seated position. Normal non-antalgic, non-myelopathic gait.  Able to heel/toe walk without loss of balance  Cervical examination reveals slightly decreased range of motion with extension and rotation to the left with slight increase in pain.  Mild tenderness to palpation of the cervical spine and paraspinous muscles bilaterally.      Imaging:   CT CERVICAL SPINE WITHOUT CONTRAST January 3, 2019 9:58 AM      HISTORY: Neck pain.     TECHNIQUE: CT cervical spine without IV contrast. Transverse, sagittal and coronal reformatted images were obtained from the source data. Radiation dose for this scan was reduced using automated exposure  control, adjustment of the mA and/or kV according to patient size, or iterative reconstruction technique.     COMPARISON: Plain film cervical spine dated 1/2/2019, CT cervical spine dated 5/1/2014 and MRI cervical spine dated 8/12/2013.     FINDINGS: No acute fracture is identified. Multilevel degenerative changes are seen throughout the cervical spine and will be discussed on a level by level basis below.  Prevertebral soft tissues are unremarkable. No adenopathy is identified. Visualized portions of the  lung apices are clear. Findings on a level by level are discussed below:     C1-C2: Severe degenerative changes are seen at the left lateral mass articulation between C1 and C2 with near complete joint space loss and with subchondral cyst formation. There is mild subluxation of C1 to the right as compared to C2. No fracture.     C2-C3: There is severe disc height loss with endplate eburnation and large anterior spondylotic spurs and small uncovertebral spurs. Left-sided facet arthropathy causes mild left neural foraminal stenosis. No significant central canal stenosis or right neural foraminal stenosis.     C3-C4: There is severe disc height loss. There is endplate eburnation and cyst formation. Large anterior spondylotic spurs are noted. They are small to moderate size uncovertebral spurs especially on the right. The uncovertebral spurs cause moderate right neural foraminal  stenosis but no significant left neural foraminal stenosis. The uncovertebral spurring and mild retrolisthesis of C3 on C4 mildly efface the anterior thecal sac and displaces the cord posteriorly but does not cause cord deformation. AP dimension of the canal measures approximately 0.9 cm at this level.     C4-C5: Anterolisthesis of C4 on C5 is again noted, similar to the prior study. There is moderate to severe disc height loss. There is mild eburnation of the endplates and cyst formation. Moderate to large size anterior spondylotic spurs are noted. Tiny uncovertebral spurs are seen. There is moderate right facet arthropathy which along with  minimal facet arthropathy combine to cause moderate right neural foraminal stenosis. There is mild effacement of the anterior cecal sac with minimal posterior displacement of the cord but no cord deformation. AP dimension of the canal measures 1.2 cm at this level.     C5-C6: There is severe disc height  loss. Large anterior spondylotic spurring is noted. There is endplate eburnation and cyst formation. There are small to moderate size uncovertebral spurs which cause  moderate to severe left neural foraminal stenosis and mild right neural foraminal stenosis. The uncovertebral spurs efface the anterior thecal sac and displace the cord posteriorly but do not cause cord deformation. The canal measures 1.0 cm in AP dimension at this level.     C6-C7: There is severe disc height loss. There are moderate anterior spondylotic spurs and posterior uncovertebral spurs. The uncovertebral spurring causes moderate to severe left neural foraminal stenosis but  no significant right neural foraminal stenosis. The uncovertebral spurring also effaces the anterior thecal sac and mildly displaces it posteriorly but does not indent the cord. The AP dimension of the canal is 1.0 cm at this level.     C7-T1: There is moderate disc height loss which is new since the prior study. There is anterolisthesis of C7 on T1 measuring up to 0.5 cm. This is also new since the prior study. There is severe facet arthropathy bilaterally but worse on the right. Cyst formation in the superior aspect of the right T1 facet is noted. The inferior facet of  C7 on the right extends into this area cystic change and could cause pain. Facet arthropathy and mild uncovertebral spurring combine to cause moderate to severe left and mild-to-moderate right neural foraminal stenosis. There does not appear to be significant central  canal stenosis at this level. No fracture is seen at this level.     T1-T2: Disc is of normal appearance. Facets are normal in appearance. No significant central canal or neural foraminal stenosis.                                                                      IMPRESSION:  1. No acute fracture.  2. New anterolisthesis of C7 on T1 measuring 0.5 cm since the prior CT dated 5/1/2014. This appears to be degenerative in etiology due to new  degenerative disc disease and severe facet arthropathy bilaterally.  There is cystic change in the superior articular facet of T1 at the C7-T1 joint with subluxation of the inferior facet of C7 into the cystic area causing anterolisthesis and may also cause pain. Given the significant change in this area as compared to the prior CT, this could represent an inflammatory arthropathy. Degenerative change may  also be due to lack of motion elsewhere in the neck secondary to chronic degenerative change and with most of the motion concentrated at the C7-T1 articulation.  3. Multilevel degenerative disc and facet disease as described above. There are areas of anterolisthesis and retrolisthesis which are stable as compared to the prior studies. Central canal and neural foraminal stenosis as described above. This has slightly worsened at multiple  levels as compared to the prior study.     I discussed the findings with the patient at the time of the exam. I also discussed the findings and lack of acute fracture with Caitlin Beasley at the time of the exam.     NANDINI TAFOYA MD    Assessment/Plan:   Margareth Green is a 63 year old female who presents for evaluation of chronic neck pain x many yeras. Pain is located in bilateral neck pain L>R and radiates to bilateral shoulders. Describes the pain as a constant ache.  Cervical CT reviewed in office today with multilevel degenerative change and worsening subluxation at C7-T1. She does not have any deficits or weakness on exam. She has also been doing physical therapy with good relief. Will obtain cervical flex/ex MRI to evaluate areas of listhesis and r/o any ligamentous injury. I will call her with these results. Patient voiced understanding and agreement.          Gretel Ralph PA-C  [facility]  [address] [address]    Tel [phone]  Pager [phone]      Again, thank you for allowing me to  participate in the care of your patient.        Sincerely,        Gretel Ralph PA-C

## 2019-01-14 NOTE — PROGRESS NOTES
Martin Cardenas  [facility]  Neurosurgery Clinic Visit      CC: Neck pain    Primary care Provider: Caitlin Beasley      Reason For Visit:   I was asked by Caitlin Beasley PA-C to consult on the patient for cervical stenosis of spine.      HPI: Margareth Green is a 63 year old female who presents for evaluation of chronic neck pain x many yeras. Pain is located in bilateral neck pain L>R and radiates to bilateral shoulders. Describes the pain as a constant ache. Cannot recall anything in particular that worsens the pain. Pain is alleviated with physical therapy and heat/ice. She has been doing physical therapy. No MRI or injections. Denies radicular pain to the extremities, weakness, paresthesias, gait issues, or bladder/bowel incontinence.    Current pain: 5/10 At worst: 10/10    Past Medical History:   Diagnosis Date     Abnormal Papanicolaou smear of vagina and vaginal HPV 12/02    ASCUS (HPV neg). 3/03 WNL but no transitional zome - repeat 3months     Allergic rhinitis, cause unspecified      Backache, unspecified      Carpal tunnel syndrome     s/p repair     Cervicalgia      Depressive disorder, not elsewhere classified     doing well on Wellbutrin     Derangement of TMJ (temporomandibular joint) 3/2010    internal derangement     Dermatophytosis of nail      Diaphragmatic hernia without mention of obstruction or gangrene 9/07    small hiatal hernia     Diffuse cystic mastopathy 2000    FCBD     Esophageal reflux 4/2005     Susieertoe 10/09    see podiatry consult     Headache(784.0)     Muscle contrature headaches     Intramural leiomyoma of uterus 7/2007    on US     Irregular menstrual cycle     better with BCP -also PMS is better with BCP     IRRITABLE COLON 2/18/2008     Menopause     lmp 2/2008     Mild persistent asthma      Mole (skin) 2003    9 x 6 mm rt ant chest     Motion sickness      Neuroma 11/2008    lt foot     Osteopenia 12/2008     Other malaise and fatigue 4/2005     stress     Personal history of tobacco use, presenting hazards to health     Quit in 1998     Solitary cyst of breast 12/2005    lt 6 oclock     Stenosis of lumbosacral spine     l4-5 and l5-s1     Symptomatic menopausal or female climacteric states 12/2002    perimenopausal. LMP 2/2008       Past Medical History reviewed with patient during visit.    Past Surgical History:   Procedure Laterality Date     BL DUPLEX LO EXTREM ART UNILAT/LTD  4/3008    lower extr arterial doppler -no stenosis     C DEXA,BONE DENSITY,APPENDICULR SKELTN  12/2008    osteopenia     C NONSPECIFIC PROCEDURE  2009    Metatarsal phalangeal joint injection     COLONOSCOPY  10/22/2007    bx benign     COLONOSCOPY N/A 4/10/2015    Procedure: COMBINED COLONOSCOPY, SINGLE OR MULTIPLE BIOPSY/POLYPECTOMY BY BIOPSY;  Surgeon: Cl Wagner MD;  Location: [facility]     ESOPHAGOSCOPY, GASTROSCOPY, DUODENOSCOPY (EGD), COMBINED N/A 1/31/2018    Procedure: COMBINED ESOPHAGOSCOPY, GASTROSCOPY, DUODENOSCOPY (EGD);  COMBINED ESOPHAGOSCOPY, GASTROSCOPY, DUODENOSCOPY (EGD);  Surgeon: Tirso Duran MD;  Location: [facility]     HC COLONOSCOPY THRU STOMA, DIAGNOSTIC  7/2002    bx. benign - flex sig in 5 yrs  or colonoscopy in 10 yrs     HC ECHO HEART XTHORACIC, STRESS/REST  2/2005    WNL     HC NCS MOTOR W/O F-WAVE, EACH NERVE  7/2008    CTS     HC PART REMV BONE METATARSAL HEAD,EA  01/11/10    Right foot plantar plate tear. Also correct hammertoe, 2nd digit & varicose vein ligation, heel.     HC REVISE MEDIAN N/CARPAL TUNNEL SURG      Left in 1997, right in 1999     INJ, ANES/STER, FACET LUMB/SAC  2010    Left L5 nerve root injection     INJECT EPIDURAL TRANSFORAMINAL  10/09/2013    [facility]     INJECT JOINT SACROILIAC  3/19/14,4/21/14    [facility]     Past Surgical History reviewed with patient during visit.    Current Outpatient Medications   Medication     ACETAMINOPHEN PO     ALLEGRA 180 MG OR TABS     amitriptyline (ELAVIL) 10 MG  tablet     B Complex Vitamins (VITAMIN B COMPLEX) tablet     BIOTIN PO     buPROPion (WELLBUTRIN XL) 150 MG 24 hr tablet     CALCIUM /VITAMIN D TABS   OR     Cyanocobalamin (VITAMIN B-12 PO)     GLUCOSAMINE SULFATE 1000 MG OR CAPS     MAGNESIUM OXIDE PO     MECLIZINE HCL PO     mometasone-formoterol (DULERA) 100-5 MCG/ACT oral inhaler     NAPROXEN PO     Omega-3 Fatty Acids (OMEGA-3 FISH OIL PO)     omeprazole (PRILOSEC) 40 MG capsule     PROAIR  (90 BASE) MCG/ACT IN AERS     Pyridoxine HCl (VITAMIN B6 PO)     raloxifene (EVISTA) 60 MG tablet     ranitidine (ZANTAC) 150 MG tablet     Riboflavin (VITAMIN B-2 PO)     Simethicone (GAS-X PO)     Thiamine HCl (VITAMIN B-1 PO)     triamcinolone (NASACORT) 55 MCG/ACT nasal inhaler     UNABLE TO FIND     VITAMIN C-MARK HIPS CR 1000 MG OR TBCR     VITAMIN E PO     VITAMINS/MINERALS TABS   OR     No current facility-administered medications for this visit.        Allergies   Allergen Reactions     Penicillins Itching     Yeast infection afterward       Social History     Socioeconomic History     Marital status:      Spouse name: Robel     Number of children: 1     Years of education: 12     Highest education level: Not on file   Social Needs     Financial resource strain: Not on file     Food insecurity - worry: Not on file     Food insecurity - inability: Not on file     Transportation needs - medical: Not on file     Transportation needs - non-medical: Not on file   Occupational History     Occupation: HIMS clerk     Employer: [employer]   Tobacco Use     Smoking status: Former Smoker     Packs/day: 0.50     Years: 30.00     Pack years: 15.00     Types: Cigarettes     Last attempt to quit: 3/20/1998     Years since quittin.8     Smokeless tobacco: Never Used     Tobacco comment: No smokers in home   Substance and Sexual Activity     Alcohol use: Yes     Alcohol/week: 4.0 oz     Comment: 2x/week     Drug use: No     Sexual activity: Yes      Partners: Male     Birth control/protection: Surgical, Male Surgical     Comment: spouse -vas   Other Topics Concern      Service No     Blood Transfusions No     Caffeine Concern Yes     Comment: pop: 1c/wk     Occupational Exposure No     Hobby Hazards No     Sleep Concern Yes     Comment: On meds     Stress Concern Yes     Weight Concern No     Special Diet No     Back Care Yes     Comment: Hx back pain     Exercise Yes     Comment: stretching qd     Bike Helmet No     Seat Belt Yes     Self-Exams Yes     Parent/sibling w/ CABG, MI or angioplasty before 65F 55M? Not Asked   Social History Narrative    Lives with spouse.. No domestic violence issues.       Family History   Problem Relation Age of Onset     Diabetes Father      Depression Father      Cerebrovascular Disease Mother         age 80     Arthritis Mother      Depression Mother         On meds     Eye Disorder Mother         Glaucoma     Osteoporosis Mother      Respiratory Mother          86 YO COPD     Chronic Obstructive Pulmonary Disease Mother      C.A.D. Brother         67 YO MI -     Chronic Obstructive Pulmonary Disease Brother      Myocardial Infarction Brother      Cancer Brother           ROS: 10 point ROS neg other than the symptoms noted above in the HPI.    Vital Signs: LMP 2008 (Approximate)     Examination:  Constitutional:  Alert, well nourished, NAD.  HEENT: Normocephalic, atraumatic.   Pulmonary:  Without shortness of breath, normal effort.   Lymph: no lymphadenopathy to low back or LE.   Integumentary: Skin is free of rashes or lesions.   Cardiovascular:  No pitting edema of BLE.      Neurological:  Awake  Alert  Oriented x 3  Speech clear  Cranial nerves II - XII grossly intact  PERRL  EOMI  Face symmetric  Tongue midline  Motor exam   Shoulder Abduction:  Right:  5/5   Left:  5/5  Biceps:                      Right:  5/5   Left:  5/5  Triceps:                     Right:  5/5   Left:  5/5  Wrist Extensors:        Right:  5/5   Left:  5/5  Wrist Flexors:           Right:  5/5   Left:  5/5  Intrinsics:                   Right:  5/5   Left:  5/5  Hip Flexor:                Right: 5/5  Left:  5/5  Hip Adductor:             Right:  5/5  Left:  5/5  Hip Abductor:             Right:  5/5  Left:  5/5  Gastroc Soleus:        Right:  5/5  Left:  5/5  Tib/Ant:                      Right:  5/5  Left:  5/5  EHL:                          Right:  5/5  Left:  5/5       Sensation normal to bilateral upper and lower extremities.    Reflexes are 2+ in the brachial radialis and triceps. Negative Katharine sign bilaterally.    Musculoskeletal:  Gait: Able to stand from a seated position. Normal non-antalgic, non-myelopathic gait.  Able to heel/toe walk without loss of balance  Cervical examination reveals slightly decreased range of motion with extension and rotation to the left with slight increase in pain.  Mild tenderness to palpation of the cervical spine and paraspinous muscles bilaterally.      Imaging:   CT CERVICAL SPINE WITHOUT CONTRAST January 3, 2019 9:58 AM      HISTORY: Neck pain.     TECHNIQUE: CT cervical spine without IV contrast. Transverse, sagittal and coronal reformatted images were obtained from the source data. Radiation dose for this scan was reduced using automated exposure  control, adjustment of the mA and/or kV according to patient size, or iterative reconstruction technique.     COMPARISON: Plain film cervical spine dated 1/2/2019, CT cervical spine dated 5/1/2014 and MRI cervical spine dated 8/12/2013.     FINDINGS: No acute fracture is identified. Multilevel degenerative changes are seen throughout the cervical spine and will be discussed on a level by level basis below. Prevertebral soft tissues are unremarkable. No adenopathy is identified. Visualized portions of the  lung apices are clear. Findings on a level by level are discussed below:     C1-C2: Severe degenerative changes are seen at the left lateral mass  articulation between C1 and C2 with near complete joint space loss and with subchondral cyst formation. There is mild subluxation of C1 to the right as compared to C2. No fracture.     C2-C3: There is severe disc height loss with endplate eburnation and large anterior spondylotic spurs and small uncovertebral spurs. Left-sided facet arthropathy causes mild left neural foraminal stenosis. No significant central canal stenosis or right neural foraminal stenosis.     C3-C4: There is severe disc height loss. There is endplate eburnation and cyst formation. Large anterior spondylotic spurs are noted. They are small to moderate size uncovertebral spurs especially on the right. The uncovertebral spurs cause moderate right neural foraminal  stenosis but no significant left neural foraminal stenosis. The uncovertebral spurring and mild retrolisthesis of C3 on C4 mildly efface the anterior thecal sac and displaces the cord posteriorly but does not cause cord deformation. AP dimension of the canal measures approximately 0.9 cm at this level.     C4-C5: Anterolisthesis of C4 on C5 is again noted, similar to the prior study. There is moderate to severe disc height loss. There is mild eburnation of the endplates and cyst formation. Moderate to large size anterior spondylotic spurs are noted. Tiny uncovertebral spurs are seen. There is moderate right facet arthropathy which along with  minimal facet arthropathy combine to cause moderate right neural foraminal stenosis. There is mild effacement of the anterior cecal sac with minimal posterior displacement of the cord but no cord deformation. AP dimension of the canal measures 1.2 cm at this level.     C5-C6: There is severe disc height loss. Large anterior spondylotic spurring is noted. There is endplate eburnation and cyst formation. There are small to moderate size uncovertebral spurs which cause  moderate to severe left neural foraminal stenosis and mild right neural foraminal  stenosis. The uncovertebral spurs efface the anterior thecal sac and displace the cord posteriorly but do not cause cord deformation. The canal measures 1.0 cm in AP dimension at this level.     C6-C7: There is severe disc height loss. There are moderate anterior spondylotic spurs and posterior uncovertebral spurs. The uncovertebral spurring causes moderate to severe left neural foraminal stenosis but  no significant right neural foraminal stenosis. The uncovertebral spurring also effaces the anterior thecal sac and mildly displaces it posteriorly but does not indent the cord. The AP dimension of the canal is 1.0 cm at this level.     C7-T1: There is moderate disc height loss which is new since the prior study. There is anterolisthesis of C7 on T1 measuring up to 0.5 cm. This is also new since the prior study. There is severe facet arthropathy bilaterally but worse on the right. Cyst formation in the superior aspect of the right T1 facet is noted. The inferior facet of  C7 on the right extends into this area cystic change and could cause pain. Facet arthropathy and mild uncovertebral spurring combine to cause moderate to severe left and mild-to-moderate right neural foraminal stenosis. There does not appear to be significant central  canal stenosis at this level. No fracture is seen at this level.     T1-T2: Disc is of normal appearance. Facets are normal in appearance. No significant central canal or neural foraminal stenosis.                                                                      IMPRESSION:  1. No acute fracture.  2. New anterolisthesis of C7 on T1 measuring 0.5 cm since the prior CT dated 5/1/2014. This appears to be degenerative in etiology due to new degenerative disc disease and severe facet arthropathy bilaterally.  There is cystic change in the superior articular facet of T1 at the C7-T1 joint with subluxation of the inferior facet of C7 into the cystic area causing anterolisthesis and may  also cause pain. Given the significant change in this area as compared to the prior CT, this could represent an inflammatory arthropathy. Degenerative change may  also be due to lack of motion elsewhere in the neck secondary to chronic degenerative change and with most of the motion concentrated at the C7-T1 articulation.  3. Multilevel degenerative disc and facet disease as described above. There are areas of anterolisthesis and retrolisthesis which are stable as compared to the prior studies. Central canal and neural foraminal stenosis as described above. This has slightly worsened at multiple  levels as compared to the prior study.     I discussed the findings with the patient at the time of the exam. I also discussed the findings and lack of acute fracture with Caitlin Beasley at the time of the exam.     NANDINI TAFOYA MD    Assessment/Plan:   Margareth Green is a 63 year old female who presents for evaluation of chronic neck pain x many yeras. Pain is located in bilateral neck pain L>R and radiates to bilateral shoulders. Describes the pain as a constant ache.  Cervical CT reviewed in office today with multilevel degenerative change and worsening subluxation at C7-T1. She does not have any deficits or weakness on exam. She has also been doing physical therapy with good relief. Will obtain cervical flex/ex MRI to evaluate areas of listhesis and r/o any ligamentous injury. I will call her with these results. Patient voiced understanding and agreement.          Gretel Ralph PA-C  [facility]  [address] [address]    Tel [phone]  Pager [phone]

## 2019-01-16 ENCOUNTER — TRANSFERRED RECORDS (OUTPATIENT)
Dept: HEALTH INFORMATION MANAGEMENT | Facility: CLINIC | Age: 64
End: 2019-01-16

## 2019-01-17 ENCOUNTER — HOSPITAL ENCOUNTER (OUTPATIENT)
Dept: PHYSICAL THERAPY | Facility: OTHER | Age: 64
Setting detail: THERAPIES SERIES
End: 2019-01-17
Attending: PHYSICIAN ASSISTANT
Payer: COMMERCIAL

## 2019-01-17 PROCEDURE — 97140 MANUAL THERAPY 1/> REGIONS: CPT | Mod: GP | Performed by: PHYSICAL THERAPIST

## 2019-01-18 ENCOUNTER — TELEPHONE (OUTPATIENT)
Dept: NEUROSURGERY | Facility: CLINIC | Age: 64
End: 2019-01-18

## 2019-01-18 NOTE — TELEPHONE ENCOUNTER
LVM for patient regarding MRI results. Multilevel degenerative changes noted along with fixed listhesis at two different levels. Not having nay radicular symptoms or weakness. Would recommend continue with PT at this time. Can also try AIRAM if pain persists.

## 2019-01-21 NOTE — TELEPHONE ENCOUNTER
Patient returned call went over below results. She will continue with PT as recommended and will call back if she wants to proceed with an AIRAM.

## 2019-01-31 ENCOUNTER — HOSPITAL ENCOUNTER (OUTPATIENT)
Dept: PHYSICAL THERAPY | Facility: OTHER | Age: 64
Setting detail: THERAPIES SERIES
End: 2019-01-31
Attending: PHYSICIAN ASSISTANT
Payer: COMMERCIAL

## 2019-01-31 PROCEDURE — 97140 MANUAL THERAPY 1/> REGIONS: CPT | Mod: GP | Performed by: PHYSICAL THERAPIST

## 2019-02-07 ENCOUNTER — HOSPITAL ENCOUNTER (OUTPATIENT)
Dept: PHYSICAL THERAPY | Facility: OTHER | Age: 64
Setting detail: THERAPIES SERIES
End: 2019-02-07
Attending: PHYSICIAN ASSISTANT
Payer: COMMERCIAL

## 2019-02-07 PROCEDURE — 97140 MANUAL THERAPY 1/> REGIONS: CPT | Mod: GP | Performed by: PHYSICAL THERAPIST

## 2019-02-07 NOTE — PROGRESS NOTES
"SUBJECTIVE:   Margareth Green is a 64 year old female who presents for Preventive Visit.  {PVP to remind patient that this is not necessarily a physical exam; physical exam may or may not be done:423412::\"click delete button to remove this line now\"}  {PVP to inform patient that additional E&M charge may apply, if additional problems addressed:810489::\"click delete button to remove this line now\"}  Are you in the first 12 months of your Medicare coverage?  {No Yes:805241::\"No\"}    HPI  Do you feel safe in your environment? {YES/NO/NA:829371}    Do you have a Health Care Directive? {HEALTHCARE DIRECTIVE STATUS:761881}    {Hearing Test Done (Optional):859969}  Fall risk  {Document Fall Risk in the Assessments Section of the Navigator:024941}  {If any of the above assessments are answered yes, consider ordering appropriate referrals (Optional):808945::\"click delete button to remove this line now\"}  Cognitive Screening { :746670}    {Do you have sleep apnea, excessive snoring or daytime drowsiness? (Optional):912125}    Reviewed and updated as needed this visit by clinical staff         Reviewed and updated as needed this visit by Provider        Social History     Tobacco Use     Smoking status: Former Smoker     Packs/day: 0.50     Years: 30.00     Pack years: 15.00     Types: Cigarettes     Last attempt to quit: 3/20/1998     Years since quittin.9     Smokeless tobacco: Never Used     Tobacco comment: No smokers in home   Substance Use Topics     Alcohol use: Yes     Alcohol/week: 4.0 oz     Comment: 2x/week       No flowsheet data found.{add AUDIT responses (Optional) (A score of 7 for adult men is an indication of hazardous drinking; a score of 8 or more is an indication of an alcohol use disorder.  A score of 7 or more for adult women is an indication of hazardous drinking or an alchohol use disorder):304255}    {Outside tests to abstract? :919921}    {additional problems to add (Optional):402408}    Current " "providers sharing in care for this patient include:   Patient Care Team:  Caitlin Beasley PA-C as PCP - General (Family Practice)  Caitlin Beasley PA-C as PCP - Assigned PCP    The following health maintenance items are reviewed in Epic and correct as of today:  Health Maintenance   Topic Date Due     ZOSTER IMMUNIZATION (1 of 2) 2003     ADVANCE DIRECTIVE PLANNING Q5 YRS  2017     ASTHMA ACTION PLAN Q1 YR  2019     ASTHMA CONTROL TEST Q6 MOS  2019     PHQ-9 Q6 MONTHS  2019     FALL RISK ASSESSMENT  2019     PAP Q3 YR  2019     HPV Q3 Years  2019     PNEUMOCOCCAL IMMUNIZATION 65+ LOW/MEDIUM RISK (2 of 2 - PPSV23) 2019     DEXA Q2 YR  2020     MAMMO SCREEN Q2 YR (SYSTEM ASSIGNED)  2020     DTAP/TDAP/TD IMMUNIZATION (4 - Td) 2022     LIPID SCREEN Q5 YR FEMALE (SYSTEM ASSIGNED)  2023     COLON CANCER SCREEN (SYSTEM ASSIGNED)  04/10/2025     DEPRESSION ACTION PLAN  Completed     INFLUENZA VACCINE  Completed     HEPATITIS C SCREENING  Completed     IPV IMMUNIZATION  Aged Out     MENINGITIS IMMUNIZATION  Aged Out     {Chronicprobdata (Optional):534665}  {Decision Support (Optional):978716}    Review of Systems  {ROS COMP (Optional):886498}    OBJECTIVE:   LMP 2008 (Approximate)  Estimated body mass index is 25.48 kg/m  as calculated from the following:    Height as of 19: 1.59 m (5' 2.59\").    Weight as of 19: 64.4 kg (142 lb).  Physical Exam  {Exam (Optional) :875634}    {Diagnostic Test Results (Optional):052622::\"Diagnostic Test Results:\",\"none \"}    ASSESSMENT / PLAN:   {Diag Picklist:789874}    End of Life Planning:  Patient currently has an advanced directive: { :589578}    COUNSELING:  {Medicare Counselin}    BP Readings from Last 1 Encounters:   19 130/70     Estimated body mass index is 25.48 kg/m  as calculated from the following:    Height as of 19: 1.59 m (5' 2.59\").    Weight as of 19: 64.4 " kg (142 lb).    {BP Counseling- Complete if BP >= 120/80  (Optional):745592}  {Weight Management Plan (ACO) Complete if BMI is abnormal-  Ages 18-64  BMI >24.9.  Age 65+ with BMI <23 or >30 (Optional):146243}     reports that she quit smoking about 20 years ago. Her smoking use included cigarettes. She has a 15.00 pack-year smoking history. she has never used smokeless tobacco.  {Tobacco Cessation -- Complete if patient is a smoker (Optional):374308}    Appropriate preventive services were discussed with this patient, including applicable screening as appropriate for cardiovascular disease, diabetes, osteopenia/osteoporosis, and glaucoma.  As appropriate for age/gender, discussed screening for colorectal cancer, prostate cancer, breast cancer, and cervical cancer. Checklist reviewing preventive services available has been given to the patient.    Reviewed patients plan of care and provided an AVS. The {CarePlan:625989} for Margareth meets the Care Plan requirement. This Care Plan has been established and reviewed with the {PATIENT, FAMILY MEMBER, CAREGIVER:943572}.    Counseling Resources:  ATP IV Guidelines  Pooled Cohorts Equation Calculator  Breast Cancer Risk Calculator  FRAX Risk Assessment  ICSI Preventive Guidelines  Dietary Guidelines for Americans, 2010  USDA's MyPlate  ASA Prophylaxis  Lung CA Screening    Caitlin Beasley PA-C  [facility]

## 2019-02-12 ENCOUNTER — MYC MEDICAL ADVICE (OUTPATIENT)
Dept: FAMILY MEDICINE | Facility: OTHER | Age: 64
End: 2019-02-12

## 2019-02-13 ENCOUNTER — HOSPITAL ENCOUNTER (OUTPATIENT)
Dept: PHYSICAL THERAPY | Facility: OTHER | Age: 64
Setting detail: THERAPIES SERIES
End: 2019-02-13
Attending: PHYSICIAN ASSISTANT
Payer: COMMERCIAL

## 2019-02-13 PROCEDURE — 97140 MANUAL THERAPY 1/> REGIONS: CPT | Mod: GP | Performed by: PHYSICAL THERAPIST

## 2019-02-13 ASSESSMENT — PATIENT HEALTH QUESTIONNAIRE - PHQ9
SUM OF ALL RESPONSES TO PHQ QUESTIONS 1-9: 6
10. IF YOU CHECKED OFF ANY PROBLEMS, HOW DIFFICULT HAVE THESE PROBLEMS MADE IT FOR YOU TO DO YOUR WORK, TAKE CARE OF THINGS AT HOME, OR GET ALONG WITH OTHER PEOPLE: SOMEWHAT DIFFICULT
SUM OF ALL RESPONSES TO PHQ QUESTIONS 1-9: 6

## 2019-02-13 ASSESSMENT — ANXIETY QUESTIONNAIRES
6. BECOMING EASILY ANNOYED OR IRRITABLE: NOT AT ALL
1. FEELING NERVOUS, ANXIOUS, OR ON EDGE: SEVERAL DAYS
3. WORRYING TOO MUCH ABOUT DIFFERENT THINGS: SEVERAL DAYS
2. NOT BEING ABLE TO STOP OR CONTROL WORRYING: SEVERAL DAYS
7. FEELING AFRAID AS IF SOMETHING AWFUL MIGHT HAPPEN: NOT AT ALL
GAD7 TOTAL SCORE: 3
4. TROUBLE RELAXING: NOT AT ALL
7. FEELING AFRAID AS IF SOMETHING AWFUL MIGHT HAPPEN: NOT AT ALL
5. BEING SO RESTLESS THAT IT IS HARD TO SIT STILL: NOT AT ALL

## 2019-02-14 ENCOUNTER — ANCILLARY PROCEDURE (OUTPATIENT)
Dept: GENERAL RADIOLOGY | Facility: OTHER | Age: 64
End: 2019-02-14
Attending: PHYSICIAN ASSISTANT
Payer: COMMERCIAL

## 2019-02-14 ENCOUNTER — OFFICE VISIT (OUTPATIENT)
Dept: FAMILY MEDICINE | Facility: OTHER | Age: 64
End: 2019-02-14
Payer: COMMERCIAL

## 2019-02-14 VITALS
RESPIRATION RATE: 16 BRPM | BODY MASS INDEX: 24.95 KG/M2 | DIASTOLIC BLOOD PRESSURE: 70 MMHG | WEIGHT: 139 LBS | HEART RATE: 76 BPM | TEMPERATURE: 98.5 F | SYSTOLIC BLOOD PRESSURE: 126 MMHG

## 2019-02-14 DIAGNOSIS — J45.41 MODERATE PERSISTENT ASTHMA WITH EXACERBATION: ICD-10-CM

## 2019-02-14 DIAGNOSIS — R00.0 TACHYCARDIA: ICD-10-CM

## 2019-02-14 DIAGNOSIS — R06.09 DOE (DYSPNEA ON EXERTION): ICD-10-CM

## 2019-02-14 DIAGNOSIS — R05.9 COUGH: ICD-10-CM

## 2019-02-14 DIAGNOSIS — G47.09 OTHER INSOMNIA: ICD-10-CM

## 2019-02-14 DIAGNOSIS — R06.09 DOE (DYSPNEA ON EXERTION): Primary | ICD-10-CM

## 2019-02-14 LAB
ERYTHROCYTE [DISTWIDTH] IN BLOOD BY AUTOMATED COUNT: 13.2 % (ref 10–15)
HCT VFR BLD AUTO: 41.6 % (ref 35–47)
HGB BLD-MCNC: 13.7 G/DL (ref 11.7–15.7)
MCH RBC QN AUTO: 30.9 PG (ref 26.5–33)
MCHC RBC AUTO-ENTMCNC: 32.9 G/DL (ref 31.5–36.5)
MCV RBC AUTO: 94 FL (ref 78–100)
PLATELET # BLD AUTO: 291 10E9/L (ref 150–450)
RBC # BLD AUTO: 4.43 10E12/L (ref 3.8–5.2)
TSH SERPL DL<=0.005 MIU/L-ACNC: 1.5 MU/L (ref 0.4–4)
WBC # BLD AUTO: 11.6 10E9/L (ref 4–11)

## 2019-02-14 PROCEDURE — 84443 ASSAY THYROID STIM HORMONE: CPT | Performed by: PHYSICIAN ASSISTANT

## 2019-02-14 PROCEDURE — 36415 COLL VENOUS BLD VENIPUNCTURE: CPT | Performed by: PHYSICIAN ASSISTANT

## 2019-02-14 PROCEDURE — 71046 X-RAY EXAM CHEST 2 VIEWS: CPT | Mod: FY

## 2019-02-14 PROCEDURE — 93000 ELECTROCARDIOGRAM COMPLETE: CPT | Performed by: PHYSICIAN ASSISTANT

## 2019-02-14 PROCEDURE — 99214 OFFICE O/P EST MOD 30 MIN: CPT | Performed by: PHYSICIAN ASSISTANT

## 2019-02-14 PROCEDURE — 85027 COMPLETE CBC AUTOMATED: CPT | Performed by: PHYSICIAN ASSISTANT

## 2019-02-14 RX ORDER — NORTRIPTYLINE HCL 10 MG
10-30 CAPSULE ORAL AT BEDTIME
Qty: 270 CAPSULE | Refills: 3 | Status: SHIPPED | OUTPATIENT
Start: 2019-02-14 | End: 2019-11-21 | Stop reason: DRUGHIGH

## 2019-02-14 ASSESSMENT — ANXIETY QUESTIONNAIRES: GAD7 TOTAL SCORE: 3

## 2019-02-14 ASSESSMENT — PAIN SCALES - GENERAL: PAINLEVEL: NO PAIN (0)

## 2019-02-14 ASSESSMENT — PATIENT HEALTH QUESTIONNAIRE - PHQ9: SUM OF ALL RESPONSES TO PHQ QUESTIONS 1-9: 6

## 2019-02-14 NOTE — PROGRESS NOTES
"ekg    SUBJECTIVE:   Margareth Green is a 64 year old female who presents to clinic today for the following health issues:      Asthma     Asthma:     Cough::  YES (brown sputum )    Wheezing::  YES    Dyspnea::  YES    Use of rescue inhaler::  At least daily (at least twice a day)    Taking Asthma medication as prescribed::  Yes    Asthma triggers::  Cold air, Humidity, Pollens, Dust mites, Smoke and Exercise or sports    ER/UC Visits or Admissions::  None    Would like to discuss heart concerns   Shortness of breath-heart or asthma  Feels like heart races from time to time  This only happens when walking even short distances in the last few weeks--is currently having her first asthma exacerbation in many years.  She sees Dr. Song at [facility] in [place].  He has been seeing her since she was age 14.  They have treated her with a prednisone burst and Zithromax for her cough.  She no longer is feeling ill though she is still fatigued.  She is using her pro-air inhaler more frequently to improve her symptoms.  She is not exercising at all because of her asthma and also because of her chronic neck issues that she is seeing Cathy in physical therapy for.  She wonders if she is just deconditioned.  Before her asthma attack, while on vacation, they were walking and she did notice that her heart rate was increased and she felt a little dyspnea on exertion.  It resolved with rest in 5-10 minutes.  Patient states she has not been doing exercise as above and wonders if this is why.  She did not feel lightheaded, dizzy, no chest pain, radiation of pain to her arm or her neck, and she denies palpitations.        Review MRI from when she fell just to follow up  Seeing [facility]      Vaginal Concerns  itching and has used vagisil  \"feel something hard\"--she uses Vagisil and a feminine itch wipe which resolves her symptoms, she did notice while cleaning that there was something hard in her left labia that " was not on the right side.  Does not bother her other than the fact she does not know what it is        Problem list and histories reviewed & adjusted, as indicated.  Additional history: Insurance will not cover amitriptyline anymore.  They prefer nortriptyline.  She is using this for insomnia        BP Readings from Last 3 Encounters:   02/14/19 126/70   01/14/19 130/70   01/02/19 112/62    Wt Readings from Last 3 Encounters:   02/14/19 63 kg (139 lb)   01/14/19 64.4 kg (142 lb)   01/02/19 64 kg (141 lb)                  Labs reviewed in EPIC    ROS:  CONSTITUTIONAL: NEGATIVE for fever, chills, change in weight  ENT/MOUTH: NEGATIVE for ear, mouth and throat problems  RESP: Asthma exacerbation being treated by her allergist/asthma specialist  CV: Increased heart rate, she has never measured her temp to see what it actually was is only occurs with exercise  GI: NEGATIVE for nausea, abdominal pain, heartburn, or change in bowel habits  : Hard spot within her left labia    OBJECTIVE:     /70   Pulse 76   Temp 98.5  F (36.9  C) (Temporal)   Resp 16   Wt 63 kg (139 lb)   LMP 02/26/2008 (Approximate)   BMI 24.95 kg/m    Body mass index is 24.95 kg/m .  GENERAL: healthy, alert and no distress  NECK: no adenopathy, no asymmetry, masses, or scars and thyroid normal to palpation  RESP: lungs clear to auscultation - no rales, rhonchi or wheezes  CV: regular rate and rhythm, normal S1 S2, no S3 or S4, no murmur, click or rub, no peripheral edema and peripheral pulses strong  ABDOMEN: soft, nontender, no hepatosplenomegaly, no masses and bowel sounds normal   (female): normal female external genitalia, when palpating her labia minorna on the left there is a palpable thickness with in the superior aspect of this there is a similar thickness on the right just it is not as firm visibly though they appear normal no masses or visual abnormalities of the labia majora is bilaterally  MS: no gross musculoskeletal defects  noted, no edema  SKIN: no suspicious lesions or rashes  PSYCH: mentation appears normal, affect normal/bright    Diagnostic Test Results:  No results found. However, due to the size of the patient record, not all encounters were searched. Please check Results Review for a complete set of results.  CXR -Per my read, she has calcified spots in her left upper quadrant no cardiomegaly or obvious infiltrates, radiology report pending  EKG - appears normal, NSR, normal axis, normal intervals, no acute ST/T changes c/w ischemia, no LVH by voltage criteria, unchanged from previous tracings    ASSESSMENT/PLAN:         1. PHILIP (dyspnea on exertion)  May be due to deconditioning, we will observe for now and await her laboratory workup.  If it is worsening or changing she may need stress testing and Holter monitor.    - EKG 12-lead complete w/read - Clinics  - XR Chest 2 Views; Future    2. Other insomnia  May require dosage adjustment she becomes accustomed to a new medication  - nortriptyline (PAMELOR) 10 MG capsule; Take 1-3 capsules (10-30 mg) by mouth At Bedtime  Dispense: 270 capsule; Refill: 3    3. Tachycardia  If this is persisting, we would consider Holter monitor to further define she will try to measure her heart rate the next time it happens it could be due to deconditioning and increase use of albuterol though it was present prior to this  - EKG 12-lead complete w/read - Clinics  - XR Chest 2 Views; Future  - TSH with free T4 reflex  - CBC with platelets    4. Moderate persistent asthma with exacerbation  Improving, she will continue to treat with her asthma specialist  - XR Chest 2 Views; Future    5. Cough  I do not see any need to extend her antibiotics at this time  - XR Chest 2 Views; Future    This chart documentation was completed in part with Dragon voice recognition software.  Documentation is reviewed after completion, however, some words and grammatical errors may remain.  Caitlin Beasley PA-C       Caitlin Beasley PA-C  [facility]  Answers for HPI/ROS submitted by the patient on 2/13/2019   If you checked off any problems, how difficult have these problems made it for you to do your work, take care of things at home, or get along with other people?: Somewhat difficult  PHQ9 TOTAL SCORE: 6  TAMMY 7 TOTAL SCORE: 3

## 2019-02-15 ASSESSMENT — ASTHMA QUESTIONNAIRES: ACT_TOTALSCORE: 11

## 2019-02-19 ENCOUNTER — TELEPHONE (OUTPATIENT)
Dept: OTHER | Facility: CLINIC | Age: 64
End: 2019-02-19

## 2019-02-19 NOTE — TELEPHONE ENCOUNTER
RN spoke with Catilin.  Recommended CTA abdomen/pelvis with contrast to evaluate for splenic artery aneurysms.  Caitlin stated she will call with results to determine if referral is needed.  WENDY RobbN, RN

## 2019-02-19 NOTE — TELEPHONE ENCOUNTER
JEZ Hirsch at [facility], states on Margareth's chest xray on 2/14/19 the report indicates possible splenic artery aneurysms.  She wants to know if action is needed. She can also put in a referral to our group. Her number is [phone].    Routing to [facility] RN Surg Triage to review w/dylan Zarate -  at [facility]

## 2019-02-20 ENCOUNTER — TELEPHONE (OUTPATIENT)
Dept: FAMILY MEDICINE | Facility: OTHER | Age: 64
End: 2019-02-20

## 2019-02-20 DIAGNOSIS — R93.5 ABNORMAL FINDINGS ON DIAGNOSTIC IMAGING OF OTHER ABDOMINAL REGIONS, INCLUDING RETROPERITONEUM: Primary | ICD-10-CM

## 2019-02-20 NOTE — TELEPHONE ENCOUNTER
Please call pt- I have ordered the CTA of abdomen pelvic as recommended by vascular.      Caitlin Beasley PA-C       I spoke to Margareth earlier this afternoon guarding the incidental finding of calcific densities on her left upper abdomen that may represent splenic artery aneurysm.  I called to speak to vascular yesterday, they recommended doing a CTA of abdomen pelvis with contrast and following up with them if this does turn out to be a splenic artery aneurysm to see if further diagnostic studies or treatment warranted.  Caitlin Beasley PA-C

## 2019-02-20 NOTE — TELEPHONE ENCOUNTER
Patient has been informed, she will call to schedule   Closing encounter  Nina Husain RT (R)

## 2019-02-21 ENCOUNTER — HOSPITAL ENCOUNTER (OUTPATIENT)
Dept: PHYSICAL THERAPY | Facility: OTHER | Age: 64
Setting detail: THERAPIES SERIES
End: 2019-02-21
Attending: PHYSICIAN ASSISTANT
Payer: COMMERCIAL

## 2019-02-21 ENCOUNTER — HOSPITAL ENCOUNTER (OUTPATIENT)
Dept: CT IMAGING | Facility: CLINIC | Age: 64
Discharge: HOME | End: 2019-02-21
Attending: PHYSICIAN ASSISTANT | Admitting: PHYSICIAN ASSISTANT
Payer: COMMERCIAL

## 2019-02-21 DIAGNOSIS — R93.5 ABNORMAL FINDINGS ON DIAGNOSTIC IMAGING OF OTHER ABDOMINAL REGIONS, INCLUDING RETROPERITONEUM: ICD-10-CM

## 2019-02-21 PROCEDURE — 25000125 ZZHC RX 250: Performed by: RADIOLOGY

## 2019-02-21 PROCEDURE — 25000128 H RX IP 250 OP 636: Performed by: RADIOLOGY

## 2019-02-21 PROCEDURE — 97140 MANUAL THERAPY 1/> REGIONS: CPT | Mod: GP

## 2019-02-21 PROCEDURE — 74174 CTA ABD&PLVS W/CONTRAST: CPT

## 2019-02-21 RX ORDER — IOPAMIDOL 755 MG/ML
500 INJECTION, SOLUTION INTRAVASCULAR ONCE
Status: COMPLETED | OUTPATIENT
Start: 2019-02-21 | End: 2019-02-21

## 2019-02-21 RX ADMIN — IOPAMIDOL 85 ML: 755 INJECTION, SOLUTION INTRAVENOUS at 14:38

## 2019-02-21 RX ADMIN — SODIUM CHLORIDE 80 ML: 9 INJECTION, SOLUTION INTRAVENOUS at 14:38

## 2019-02-22 ENCOUNTER — MYC MEDICAL ADVICE (OUTPATIENT)
Dept: FAMILY MEDICINE | Facility: OTHER | Age: 64
End: 2019-02-22

## 2019-02-22 DIAGNOSIS — K76.9 HEPATIC LESION: Primary | ICD-10-CM

## 2019-02-25 NOTE — TELEPHONE ENCOUNTER
I just spoke to patient, she is aware of liver lesions, I will order liver MRI, please call her to schedule.  She will be at her home number all day  Caitlin Beasley PA-C

## 2019-02-26 ENCOUNTER — HOSPITAL ENCOUNTER (OUTPATIENT)
Dept: MRI IMAGING | Facility: CLINIC | Age: 64
Discharge: HOME | End: 2019-02-26
Attending: PHYSICIAN ASSISTANT | Admitting: PHYSICIAN ASSISTANT
Payer: COMMERCIAL

## 2019-02-26 ENCOUNTER — MYC MEDICAL ADVICE (OUTPATIENT)
Dept: FAMILY MEDICINE | Facility: OTHER | Age: 64
End: 2019-02-26

## 2019-02-26 DIAGNOSIS — K76.9 HEPATIC LESION: ICD-10-CM

## 2019-02-26 PROCEDURE — 74183 MRI ABD W/O CNTR FLWD CNTR: CPT

## 2019-02-26 PROCEDURE — 25500064 ZZH RX 255 OP 636: Performed by: PHYSICIAN ASSISTANT

## 2019-02-26 PROCEDURE — A9585 GADOBUTROL INJECTION: HCPCS | Performed by: PHYSICIAN ASSISTANT

## 2019-02-26 RX ORDER — GADOBUTROL 604.72 MG/ML
7.5 INJECTION INTRAVENOUS ONCE
Status: COMPLETED | OUTPATIENT
Start: 2019-02-26 | End: 2019-02-26

## 2019-02-26 RX ADMIN — GADOBUTROL 6.5 ML: 604.72 INJECTION INTRAVENOUS at 09:17

## 2019-02-27 ENCOUNTER — HOSPITAL ENCOUNTER (OUTPATIENT)
Dept: PHYSICAL THERAPY | Facility: OTHER | Age: 64
Setting detail: THERAPIES SERIES
End: 2019-02-27
Attending: PHYSICIAN ASSISTANT
Payer: COMMERCIAL

## 2019-02-27 PROCEDURE — 97140 MANUAL THERAPY 1/> REGIONS: CPT | Mod: GP | Performed by: PHYSICAL THERAPIST

## 2019-03-06 ENCOUNTER — HOSPITAL ENCOUNTER (OUTPATIENT)
Dept: PHYSICAL THERAPY | Facility: OTHER | Age: 64
Setting detail: THERAPIES SERIES
End: 2019-03-06
Attending: PHYSICIAN ASSISTANT
Payer: COMMERCIAL

## 2019-03-06 PROCEDURE — 97110 THERAPEUTIC EXERCISES: CPT | Mod: GP | Performed by: PHYSICAL THERAPIST

## 2019-03-06 PROCEDURE — 97140 MANUAL THERAPY 1/> REGIONS: CPT | Mod: GP | Performed by: PHYSICAL THERAPIST

## 2019-03-22 ENCOUNTER — HOSPITAL ENCOUNTER (OUTPATIENT)
Dept: PHYSICAL THERAPY | Facility: OTHER | Age: 64
Setting detail: THERAPIES SERIES
End: 2019-03-22
Attending: PHYSICIAN ASSISTANT
Payer: COMMERCIAL

## 2019-03-22 PROCEDURE — 97140 MANUAL THERAPY 1/> REGIONS: CPT | Mod: GP | Performed by: PHYSICAL THERAPIST

## 2019-03-25 NOTE — ADDENDUM NOTE
Encounter addended by: Leana James, PT on: 3/25/2019 1:46 PM   Actions taken: Flowsheet accepted, Sign clinical note, Document created, Document edited

## 2019-03-25 NOTE — PROGRESS NOTES
[facility]          OUTPATIENT PHYSICAL THERAPY ORTHOPEDIC EVALUATION  PLAN OF TREATMENT FOR OUTPATIENT REHABILITATION  (COMPLETE FOR INITIAL CLAIMS ONLY)  Patient's Last Name, First Name, M.I.  YOB: 1955  Margareth Green    Provider s Name:  [facility]   Medical Record No.  [id number]   Start of Care Date:  01/04/19   Onset Date:  12/22/18   Type:     _X__PT   ___OT   ___SLP Medical Diagnosis:  Neck pain, acute bilateral LBP, acute B thoracic pain, personal HX of a fall     PT Diagnosis:  back pain, neck stiffness, TL junction pain, sacral pain   Visits from SOC:  1      _________________________________________________________________________________  Plan of Treatment/Functional Goals:  manual therapy, neuromuscular re-education, ROM, strengthening, stretching     Electrical stimulation     Goals  Goal Identifier: 1  Goal Description: Patient can resume her walks with no back pain  Target Date: 03/15/19    Goal Identifier: 2  Goal Description: Patient is able to go in and out of postures with no pain  Target Date: 03/15/19        Therapy Frequency:  1 time/week  Predicted Duration of Therapy Intervention:  10 wks    Leana James, PT                 I CERTIFY THE NEED FOR THESE SERVICES FURNISHED UNDER        THIS PLAN OF TREATMENT AND WHILE UNDER MY CARE     (Physician co-signature of this document indicates review and certification of the therapy plan).                       Certification Date From:  01/04/19   Certification Date To:  03/15/19    Referring Provider:  Caitlin Beasley PA-C    Initial Assessment        See Epic Evaluation Start of Care Date: 01/04/19

## 2019-03-25 NOTE — PROGRESS NOTES
01/04/19 1300   General Information   Type of Visit OP Ortho PT Re-evaluation   Start of Care Date 01/04/19   Referring Physician Caitlin Beasley PA-C   Patient/Family Goals Statement To have no pain in the back, trunk, and upper shoulders   Orders Evaluate and Treat   Date of Order 01/02/19   Insurance Type [employer]   Medical Diagnosis Neck pain, acute bilateral LBP, acute B thoracic pain, personal HX of a fall   Surgical/Medical history reviewed Yes   Precautions/Limitations no known precautions/limitations   Body Part(s)   Body Part(s) Lumbar Spine/SI;Cervical Spine   Presentation and Etiology   Pertinent history of current problem (include personal factors and/or comorbidities that impact the POC) [place], fell backwards and hit the back of her head, hit tailbone. Felt some neck pain right away. iced neck after this. Came in to see Dr about abdominal pain, and had a UTI. Then Dr said xray for the back and neck. Then ER called her wanted her to come in, and yesterday she had a CAT scan of the neck and the radiologist met with pt to give her the results. And primary set her up with spine specialist. Today primary complaint is LBP #9. thoracic pain #8. UT pain #9. Patient states that her neck does not really hurt. PMHX: includes broken tailbone, spine pain for which she saw PT for in the past and it was very helpful.    Impairments A. Pain;D. Decreased ROM;E. Decreased flexibility;L. Tingling   Functional Limitations perform activities of daily living;perform desired leisure / sports activities   Onset date of current episode/exacerbation 12/22/18   Chronicity New   Pain/symptoms exacerbated by G. Certain positions;B. Walking;A. Sitting;C. Lifting;I. Bending;K. Home tasks;J. ADL   Prior Level of Function   Prior Level of Function-Mobility independent   Current Level of Function   Patient role/employment history F. Retired   Fall Risk Screen   Fall screen completed by PT    Have you fallen 2 or more times in the past year? No   Have you fallen and had an injury in the past year? Yes   Is patient a fall risk? No   Fall screen comments balance is good just slipped on ice   Lumbar Spine/SI Objective Findings   Observation patient is moving very slowly and points to the TL junction area as painful   Posture WNL   Gait/Locomotion WNL   Balance/Proprioception (Single Leg Stance) WNL   Flexion ROM WNL but pain in the TL area and the sacrum   Extension ROM WNL   Lumbar/Hip/Knee/Foot Strength Comments WFL   Hamstring Flexibility WNL   Cervical Spine   Cervical Flexion ROM limited by stiffness in all motions, but no pain   Thoracic Extension ROM painful but WNL   Shoulder/Wrist/Hand Strength Comments WNL   Palpation Pain in the B UT's, sacral area and diaphragm   Planned Therapy Interventions   Planned Therapy Interventions manual therapy;neuromuscular re-education;ROM;strengthening;stretching   Planned Modality Interventions   Planned Modality Interventions Electrical stimulation   Clinical Impression   Criteria for Skilled Therapeutic Interventions Met yes, treatment indicated   PT Diagnosis back pain, neck stiffness, TL junction pain, sacral pain   Influenced by the following impairments degerative spine   Functional limitations due to impairments Patient is having pain moving in and out of postures   Clinical Presentation Evolving/Changing   Clinical Presentation Rationale thinks she is getting better over all   Clinical Decision Making (Complexity) Low complexity   Therapy Frequency 1 time/week   Predicted Duration of Therapy Intervention (days/wks) 10 wks   Risk & Benefits of therapy have been explained Yes   Patient, Family & other staff in agreement with plan of care Yes   Clinical Impression Comments Patient is having a lot of stiffness and hard time moving in and out of postures. Her diaphragm and TL junction area is tight and painful.    Education Assessment   Preferred Learning  Style Listening;Reading;Demonstration   Barriers to Learning No barriers   ORTHO GOALS   PT Ortho Eval Goals 1;2   Ortho Goal 1   Goal Identifier 1   Goal Description Patient can resume her walks with no back pain   Target Date 03/15/19   Ortho Goal 2   Goal Identifier 2   Goal Description Patient is able to go in and out of postures with no pain   Target Date 03/15/19   Total Evaluation Time   PT Eval, Low Complexity Minutes (86112) 15   PT G-Codes   Mobility: Walking and Moving Around Current Status () CJ   Current Mobility Modifier Rationale stiffness with changing postures   Mobility: Walking and Moving Around Goal Status () CH   Therapy Certification   Certification date from 01/04/19   Certification date to 03/15/19   Medical Diagnosis Neck pain, acute bilateral LBP, acute B thoracic pain, personal HX of a fall   Thank you for the referral,            Leana James PT

## 2019-06-03 ENCOUNTER — MYC MEDICAL ADVICE (OUTPATIENT)
Dept: FAMILY MEDICINE | Facility: OTHER | Age: 64
End: 2019-06-03

## 2019-06-03 DIAGNOSIS — M81.0 AGE RELATED OSTEOPOROSIS, UNSPECIFIED PATHOLOGICAL FRACTURE PRESENCE: ICD-10-CM

## 2019-06-04 RX ORDER — RALOXIFENE HYDROCHLORIDE 60 MG/1
1 TABLET, FILM COATED ORAL DAILY
Qty: 90 TABLET | Refills: 3 | Status: SHIPPED | OUTPATIENT
Start: 2019-06-04 | End: 2020-09-17

## 2019-06-04 NOTE — TELEPHONE ENCOUNTER
Prescription approved per [facility] Refill Protocol.  José Miguel Rivero, RN, BSN

## 2019-06-24 ENCOUNTER — MYC REFILL (OUTPATIENT)
Dept: FAMILY MEDICINE | Facility: OTHER | Age: 64
End: 2019-06-24

## 2019-06-24 DIAGNOSIS — F33.0 MAJOR DEPRESSIVE DISORDER, RECURRENT EPISODE, MILD (H): ICD-10-CM

## 2019-06-24 DIAGNOSIS — R41.840 POOR CONCENTRATION: ICD-10-CM

## 2019-06-25 RX ORDER — BUPROPION HYDROCHLORIDE 150 MG/1
300 TABLET ORAL EVERY MORNING
Qty: 180 TABLET | Refills: 0 | Status: SHIPPED | OUTPATIENT
Start: 2019-06-25 | End: 2019-10-14

## 2019-06-25 NOTE — TELEPHONE ENCOUNTER
Prescription approved per [facility] Refill Protocol.  José Miguel Rivero, RN, BSN

## 2019-09-04 NOTE — PROGRESS NOTES
Outpatient Physical Therapy Discharge Note     Patient: Margareth Green  : 1955    Beginning/End Dates of Reporting Period:  19 to 3/22/19    Referring Provider: Caitlin Beasley PA-C    Therapy Diagnosis: neck pain, LBP, fall     Client Self Report: no huge complaints but LBP still off and on and if comes on avg is about #6. Patient called on 4/10 and is doing well all goals met.     Objective Measurements:      doing well no pain          Goals:  Goal Identifier 1   Goal Description Patient can resume her walks with no back pain   Target Date 03/15/19   Date Met   4/10/19   Progress:     Goal Identifier 2   Goal Description Patient is able to go in and out of postures with no pain   Target Date 03/15/19   Date Met   4/10/19   Progress:       Progress Toward Goals:   Not assessed this period.          Plan:  Discharge from therapy.    Discharge:    Reason for Discharge: Patient has met all goals.        Discharge Plan: Patient to continue home program.    Thank you for the referral,            Leana James PT

## 2019-09-04 NOTE — ADDENDUM NOTE
Encounter addended by: Leana James, PT on: 9/4/2019 8:47 AM   Actions taken: Sign clinical note, Episode resolved

## 2019-09-27 ENCOUNTER — HEALTH MAINTENANCE LETTER (OUTPATIENT)
Age: 64
End: 2019-09-27

## 2019-10-09 NOTE — PROGRESS NOTES
Subjective     Margareth Green is a 64 year old female who presents to clinic today for the following health issues:    HPI   Rash  Onset: since summer, like July on     Description:   Location: under the breasts she believes is related to being sweaty and hot.,  She will also get a rash on her face intermittently.  She is not sure this is related.  This rash can be more pimply and red.  They do itch a slight amount sometimes it can be red and flaky as well  Character: raised, flakey, red  Itching (Pruritis): YES    Progression of Symptoms:  intermittent    Accompanying Signs & Symptoms:  Fever: no   Body aches or joint pain: no   Sore throat symptoms: no   Recent cold symptoms: no     History:   Previous similar rash: no     Precipitating factors:   Exposure to similar rash: no   New exposures: None   Recent travel: no     Alleviating factors:  A & D ointment, silver water with cotton swab    Therapies Tried and outcome: cornstarch with paper towel, A & D ointment, silver water with cotton swab, lavender oil    She has noticed worsening of anxiety.  She did try increasing the Wellbutrin to 300 mg that was too much and she went back to her her 150 mg dosage.  She just notices that she is struggling more with depression and anxiety than she typically does  See PHQ 9 and TAMMY 7 questionnaires for symptoms.     In February when I saw her last, she had been struggling with some dyspnea on exertion and intermittent tachycardia.  At the time she opted to observe her symptoms and follow-up if not improving.  She continues to have the symptoms on occasion.  She has the tachycardia a few times a week.  She notices her heart rate is fast and pounds in her chest.  She has never checked her pulse.  She relaxes and sits down or lays down and it self resolves in time.  It may happen with activity or with rest.  No specific chest pains, however she does still get winded more easily.  She is still not exercising routinely.  She  does have a history of left-sided neck pain, shoulder pain, and arm pain.  Sometimes when her heart is beating faster she has pains on the left side of her upper extremities though only when laying down , this does not occur when she is being active.  She is not sure if that is related to her heart or to her known musculoskeletal issues.  She has seen physical therapy for this in the past but is not currently.  Also,her heartburn has been increasing.  When I saw her last we took her off of her daily omeprazole and had her use Zantac.  Now that Zantac has a questionable carcinogen, she talked to the pharmacist who recommended Pepcid.  That does not always control her symptoms.  She is increased belching and gas.  She has no blood in her stools.    Patient Active Problem List   Diagnosis     Dermatophytosis of nail     Symptomatic menopausal or female climacteric states     Generalized osteoarthrosis, unspecified site     TAMMY (generalized anxiety disorder)     Insomnia     Malaise and fatigue     Mild persistent asthma     Cervicalgia     Allergic rhinitis     Irritable bowel syndrome     Arthralgia     DDD (degenerative disc disease), lumbar     Menopausal syndrome     Osteopenia     Hammertoe     Derangement of TMJ (temporomandibular joint)     Stenosis of lumbosacral spine     Fall     Atrophic vaginitis     Osteoporosis     Mild major depression (H)     Knee pain, left     Menopausal flushing     Advanced directives, counseling/discussion     Numbness of left jaw     Neck pain     TMJ (temporomandibular joint syndrome)     Thyroid fullness     Epistaxis     Major depressive disorder, recurrent episode, moderate (H)     Gastroesophageal reflux disease without esophagitis     Tremor     Cramp of limb     Past Surgical History:   Procedure Laterality Date     BL DUPLEX LO EXTREM ART UNILAT/LTD  4/3008    lower extr arterial doppler -no stenosis     C DEXA,BONE DENSITY,APPENDICULR SKELTN  12/2008    osteopenia     C  NONSPECIFIC PROCEDURE      Metatarsal phalangeal joint injection     COLONOSCOPY  10/22/2007    bx benign     COLONOSCOPY N/A 4/10/2015    Procedure: COMBINED COLONOSCOPY, SINGLE OR MULTIPLE BIOPSY/POLYPECTOMY BY BIOPSY;  Surgeon: Cl Wagner MD;  Location: [facility]     ESOPHAGOSCOPY, GASTROSCOPY, DUODENOSCOPY (EGD), COMBINED N/A 2018    Procedure: COMBINED ESOPHAGOSCOPY, GASTROSCOPY, DUODENOSCOPY (EGD);  COMBINED ESOPHAGOSCOPY, GASTROSCOPY, DUODENOSCOPY (EGD);  Surgeon: Tirso Duran MD;  Location: [facility]     HC COLONOSCOPY THRU STOMA, DIAGNOSTIC  2002    bx. benign - flex sig in 5 yrs  or colonoscopy in 10 yrs     HC ECHO HEART XTHORACIC, STRESS/REST  2005    WNL     HC NCS MOTOR W/O F-WAVE, EACH NERVE  2008    CTS     HC PART REMV BONE METATARSAL HEAD,EA  01/11/10    Right foot plantar plate tear. Also correct hammertoe, 2nd digit & varicose vein ligation, heel.     HC REVISE MEDIAN N/CARPAL TUNNEL SURG      Left in , right in      INJ, ANES/STER, FACET LUMB/SAC  2010    Left L5 nerve root injection     INJECT EPIDURAL TRANSFORAMINAL  10/09/2013    [facility]     INJECT JOINT SACROILIAC  3/19/14,14    [facility]       Social History     Tobacco Use     Smoking status: Former Smoker     Packs/day: 0.50     Years: 30.00     Pack years: 15.00     Types: Cigarettes     Last attempt to quit: 3/20/1998     Years since quittin.5     Smokeless tobacco: Never Used     Tobacco comment: No smokers in home   Substance Use Topics     Alcohol use: Yes     Alcohol/week: 6.7 standard drinks     Comment: 2x/week     Family History   Problem Relation Age of Onset     Diabetes Father      Depression Father      Cerebrovascular Disease Mother         age 80     Arthritis Mother      Depression Mother         On meds     Eye Disorder Mother         Glaucoma     Osteoporosis Mother      Respiratory Mother          86 YO COPD     Chronic Obstructive Pulmonary Disease  Mother      [email or web address]. Brother         65 YO MI -     Chronic Obstructive Pulmonary Disease Brother      Myocardial Infarction Brother      Cancer Brother          Current Outpatient Medications   Medication Sig Dispense Refill     ACETAMINOPHEN PO Take 500-650 mg by mouth as needed        ALLEGRA 180 MG OR TABS 1 TABLET DAILY 30 0     B Complex Vitamins (VITAMIN B COMPLEX) tablet Take 1 tablet by mouth daily        buPROPion (WELLBUTRIN XL) 150 MG 24 hr tablet Take 1 tablet (150 mg) by mouth every morning 90 tablet 1     CALCIUM /VITAMIN D TABS   OR 1 TABLET DAILY       Cholecalciferol (VITAMIN D PO)        Cyanocobalamin (VITAMIN B-12 PO)        Famotidine (PEPCID PO)        GLUCOSAMINE SULFATE 1000 MG OR CAPS 1 daily       MAGNESIUM OXIDE PO        MECLIZINE HCL PO Take by mouth as needed for dizziness       MELATONIN PO        mometasone-formoterol (DULERA) 100-5 MCG/ACT oral inhaler Inhale 1 puff into the lungs 2 times daily        NAPROXEN PO Patient states she takes one daily       nortriptyline (PAMELOR) 10 MG capsule Take 1-3 capsules (10-30 mg) by mouth At Bedtime 270 capsule 3     Omega-3 Fatty Acids (OMEGA-3 FISH OIL PO)        PROAIR  (90 BASE) MCG/ACT IN AERS as needed 1 prn     Pyridoxine HCl (VITAMIN B6 PO)        raloxifene (EVISTA) 60 MG tablet Take 1 tablet (60 mg) by mouth daily 90 tablet 3     Riboflavin (VITAMIN B-2 PO)        sertraline (ZOLOFT) 25 MG tablet Take 1 tablet (25 mg) by mouth daily 30 tablet 5     Simethicone (GAS-X PO)        Thiamine HCl (VITAMIN B-1 PO)        triamcinolone (NASACORT) 55 MCG/ACT nasal inhaler Spray 2 sprays into both nostrils daily       UNABLE TO FIND daily MEDICATION NAME: Tumeric       UNABLE TO FIND MEDICATION NAME: Colloidal Silver in water swishes does not swallow.       VITAMIN C-MARK HIPS CR 1000 MG OR TBCR 1 TABLET DAILY       VITAMIN E PO        VITAMINS/MINERALS TABS   OR 1 TABLET DAILY       BP Readings from Last 3 Encounters:   10/14/19 108/60    02/14/19 126/70   01/14/19 130/70    Wt Readings from Last 3 Encounters:   10/14/19 63 kg (139 lb)   02/14/19 63 kg (139 lb)   01/14/19 64.4 kg (142 lb)                    Reviewed and updated as needed this visit by Provider  Tobacco  Allergies  Meds  Problems  Med Hx  Surg Hx  Fam Hx         Review of Systems   ROS COMP: CONSTITUTIONAL: NEGATIVE for fever, chills, change in weight  ENT/MOUTH: NEGATIVE for ear, mouth and throat problems  RESP: Dyspnea on exertion  CV: NEGATIVE for chest pain, palpitations or peripheral edema  GI: Increased heartburn, not taking omeprazole routinely as we discussed using famotidine currently  MUSCULOSKELETAL: Left-sided neck pain shoulder pain left upper arm pain --has history of this wonders if this is related to her heart or to her musculoskeletal issue  PSYCHIATRIC: Increased depression and anxiety See PHQ 9 and TAMMY 7 questionnaires for symptoms.       Objective    /60   Pulse 80   Temp 97.8  F (36.6  C) (Temporal)   Resp 16   Wt 63 kg (139 lb)   LMP 02/26/2008 (Approximate)   BMI 24.95 kg/m    Body mass index is 24.95 kg/m .  Physical Exam   GENERAL: healthy, alert and no distress  NECK: no adenopathy, no asymmetry, masses, or scars and thyroid normal to palpation  RESP: lungs clear to auscultation - no rales, rhonchi or wheezes  CV: regular rate and rhythm, normal S1 S2, no S3 or S4, no murmur, click or rub, no peripheral edema and peripheral pulses strong  CV: no chest wall pain  ABDOMEN: soft, nontender, no hepatosplenomegaly, no masses and bowel sounds normal  MS: no gross musculoskeletal defects noted, no edema  NEURO: Normal strength and tone, mentation intact and speech normal  PSYCH: mentation appears normal, affect normal/bright    Diagnostic Test Results:  Labs reviewed in Epic  Results for orders placed or performed in visit on 10/14/19 (from the past 24 hour(s))   CBC with platelets   Result Value Ref Range    WBC 7.6 4.0 - 11.0 10e9/L    RBC  Count 4.20 3.8 - 5.2 10e12/L    Hemoglobin 13.1 11.7 - 15.7 g/dL    Hematocrit 40.2 35.0 - 47.0 %    MCV 96 78 - 100 fl    MCH 31.2 26.5 - 33.0 pg    MCHC 32.6 31.5 - 36.5 g/dL    RDW 12.7 10.0 - 15.0 %    Platelet Count 223 150 - 450 10e9/L     *Note: Due to a large number of results and/or encounters for the requested time period, some results have not been displayed. A complete set of results can be found in Results Review.     EKG - appears normal, NSR, normal axis, normal intervals, no acute ST/T changes c/w ischemia, no LVH by voltage criteria, unchanged from previous tracings        Assessment & Plan     1. Tachycardia, paroxysmal (H)  Will assess with zio patch, consider cardio depending on results   - EKG 12-lead complete w/read - Clinics  - CBC with platelets  - TSH with free T4 reflex  - Comprehensive metabolic panel (BMP + Alb, Alk Phos, ALT, AST, Total. Bili, TP)  - Zio Patch Holter Adult Pediatric Greater than 48 hrs; Future  - Echocardiogram Exercise Stress; Future    2. PHILIP (dyspnea on exertion)  unknown etiology, we will work-up from a cardiac standpoint  - EKG 12-lead complete w/read - Clinics  - CBC with platelets  - TSH with free T4 reflex  - Comprehensive metabolic panel (BMP + Alb, Alk Phos, ALT, AST, Total. Bili, TP)  - Zio Patch Holter Adult Pediatric Greater than 48 hrs; Future  - Echocardiogram Exercise Stress; Future    3. Cervical radiculopathy      4. Family history of MI (myocardial infarction)  We will rule out her dyspnea on exertion and tachycardia with the stress test  - Echocardiogram Exercise Stress; Future    5. Pain in joint, upper arm, left    - Echocardiogram Exercise Stress; Future    6. Poor concentration  Continue same dosage  - buPROPion (WELLBUTRIN XL) 150 MG 24 hr tablet; Take 1 tablet (150 mg) by mouth every morning  Dispense: 90 tablet; Refill: 1    7. Major depressive disorder, recurrent episode, mild (H)  We will add Zoloft,Discussed use of medication, common side  effects, how medication works and the fact that improvement in anticipated in 4-6 weeks.   - sertraline (ZOLOFT) 25 MG tablet; Take 1 tablet (25 mg) by mouth daily  Dispense: 30 tablet; Refill: 5  - buPROPion (WELLBUTRIN XL) 150 MG 24 hr tablet; Take 1 tablet (150 mg) by mouth every morning  Dispense: 90 tablet; Refill: 1  Should recheck in 1 month if not improving  8. Anxiety    - sertraline (ZOLOFT) 25 MG tablet; Take 1 tablet (25 mg) by mouth daily  Dispense: 30 tablet; Refill: 5       This chart documentation was completed in part with Dragon voice recognition software.  Documentation is reviewed after completion, however, some words and grammatical errors may remain.  Caitlin Beasley PA-C      Return in about 6 months (around 4/14/2020) for depression/anxiety.    Caitlin Beasley PA-C  [facility]

## 2019-10-10 ENCOUNTER — TELEPHONE (OUTPATIENT)
Dept: FAMILY MEDICINE | Facility: OTHER | Age: 64
End: 2019-10-10

## 2019-10-10 NOTE — TELEPHONE ENCOUNTER
Patient is schedule for Monday. Please gather more information on her symptoms.    José Miguel Rivero, RN, BSN

## 2019-10-14 ENCOUNTER — OFFICE VISIT (OUTPATIENT)
Dept: FAMILY MEDICINE | Facility: OTHER | Age: 64
End: 2019-10-14
Payer: COMMERCIAL

## 2019-10-14 VITALS
DIASTOLIC BLOOD PRESSURE: 60 MMHG | TEMPERATURE: 97.8 F | WEIGHT: 139 LBS | SYSTOLIC BLOOD PRESSURE: 108 MMHG | RESPIRATION RATE: 16 BRPM | HEART RATE: 80 BPM | BODY MASS INDEX: 24.95 KG/M2

## 2019-10-14 DIAGNOSIS — M54.12 CERVICAL RADICULOPATHY: ICD-10-CM

## 2019-10-14 DIAGNOSIS — F41.9 ANXIETY: ICD-10-CM

## 2019-10-14 DIAGNOSIS — R41.840 POOR CONCENTRATION: ICD-10-CM

## 2019-10-14 DIAGNOSIS — M25.522 PAIN IN JOINT, UPPER ARM, LEFT: ICD-10-CM

## 2019-10-14 DIAGNOSIS — R06.09 DOE (DYSPNEA ON EXERTION): ICD-10-CM

## 2019-10-14 DIAGNOSIS — Z82.49 FAMILY HISTORY OF MI (MYOCARDIAL INFARCTION): ICD-10-CM

## 2019-10-14 DIAGNOSIS — F33.0 MAJOR DEPRESSIVE DISORDER, RECURRENT EPISODE, MILD (H): ICD-10-CM

## 2019-10-14 DIAGNOSIS — I47.9 TACHYCARDIA, PAROXYSMAL (H): Primary | ICD-10-CM

## 2019-10-14 LAB
ALBUMIN SERPL-MCNC: 3.6 G/DL (ref 3.4–5)
ALP SERPL-CCNC: 57 U/L (ref 40–150)
ALT SERPL W P-5'-P-CCNC: 27 U/L (ref 0–50)
ANION GAP SERPL CALCULATED.3IONS-SCNC: 6 MMOL/L (ref 3–14)
AST SERPL W P-5'-P-CCNC: 23 U/L (ref 0–45)
BILIRUB SERPL-MCNC: 0.2 MG/DL (ref 0.2–1.3)
BUN SERPL-MCNC: 22 MG/DL (ref 7–30)
CALCIUM SERPL-MCNC: 8.4 MG/DL (ref 8.5–10.1)
CHLORIDE SERPL-SCNC: 107 MMOL/L (ref 94–109)
CO2 SERPL-SCNC: 27 MMOL/L (ref 20–32)
CREAT SERPL-MCNC: 1.18 MG/DL (ref 0.52–1.04)
ERYTHROCYTE [DISTWIDTH] IN BLOOD BY AUTOMATED COUNT: 12.7 % (ref 10–15)
GFR SERPL CREATININE-BSD FRML MDRD: 48 ML/MIN/{1.73_M2}
GLUCOSE SERPL-MCNC: 107 MG/DL (ref 70–99)
HCT VFR BLD AUTO: 40.2 % (ref 35–47)
HGB BLD-MCNC: 13.1 G/DL (ref 11.7–15.7)
MCH RBC QN AUTO: 31.2 PG (ref 26.5–33)
MCHC RBC AUTO-ENTMCNC: 32.6 G/DL (ref 31.5–36.5)
MCV RBC AUTO: 96 FL (ref 78–100)
PLATELET # BLD AUTO: 223 10E9/L (ref 150–450)
POTASSIUM SERPL-SCNC: 3.8 MMOL/L (ref 3.4–5.3)
PROT SERPL-MCNC: 6.2 G/DL (ref 6.8–8.8)
RBC # BLD AUTO: 4.2 10E12/L (ref 3.8–5.2)
SODIUM SERPL-SCNC: 140 MMOL/L (ref 133–144)
TSH SERPL DL<=0.005 MIU/L-ACNC: 2.29 MU/L (ref 0.4–4)
WBC # BLD AUTO: 7.6 10E9/L (ref 4–11)

## 2019-10-14 PROCEDURE — 80053 COMPREHEN METABOLIC PANEL: CPT | Performed by: PHYSICIAN ASSISTANT

## 2019-10-14 PROCEDURE — 84443 ASSAY THYROID STIM HORMONE: CPT | Performed by: PHYSICIAN ASSISTANT

## 2019-10-14 PROCEDURE — 85027 COMPLETE CBC AUTOMATED: CPT | Performed by: PHYSICIAN ASSISTANT

## 2019-10-14 PROCEDURE — 36415 COLL VENOUS BLD VENIPUNCTURE: CPT | Performed by: PHYSICIAN ASSISTANT

## 2019-10-14 PROCEDURE — 99214 OFFICE O/P EST MOD 30 MIN: CPT | Performed by: PHYSICIAN ASSISTANT

## 2019-10-14 PROCEDURE — 93000 ELECTROCARDIOGRAM COMPLETE: CPT | Performed by: PHYSICIAN ASSISTANT

## 2019-10-14 RX ORDER — BUPROPION HYDROCHLORIDE 150 MG/1
150 TABLET ORAL EVERY MORNING
Qty: 90 TABLET | Refills: 1 | Status: SHIPPED | OUTPATIENT
Start: 2019-10-14 | End: 2020-05-19

## 2019-10-14 RX ORDER — SERTRALINE HYDROCHLORIDE 25 MG/1
25 TABLET, FILM COATED ORAL DAILY
Qty: 30 TABLET | Refills: 5 | Status: SHIPPED | OUTPATIENT
Start: 2019-10-14 | End: 2020-02-05

## 2019-10-14 ASSESSMENT — ASTHMA QUESTIONNAIRES
QUESTION_5 LAST FOUR WEEKS HOW WOULD YOU RATE YOUR ASTHMA CONTROL: WELL CONTROLLED
ACT_TOTALSCORE: 21
QUESTION_1 LAST FOUR WEEKS HOW MUCH OF THE TIME DID YOUR ASTHMA KEEP YOU FROM GETTING AS MUCH DONE AT WORK, SCHOOL OR AT HOME: A LITTLE OF THE TIME
ACUTE_EXACERBATION_TODAY: NO
QUESTION_4 LAST FOUR WEEKS HOW OFTEN HAVE YOU USED YOUR RESCUE INHALER OR NEBULIZER MEDICATION (SUCH AS ALBUTEROL): ONCE A WEEK OR LESS
QUESTION_2 LAST FOUR WEEKS HOW OFTEN HAVE YOU HAD SHORTNESS OF BREATH: ONCE OR TWICE A WEEK
QUESTION_3 LAST FOUR WEEKS HOW OFTEN DID YOUR ASTHMA SYMPTOMS (WHEEZING, COUGHING, SHORTNESS OF BREATH, CHEST TIGHTNESS OR PAIN) WAKE YOU UP AT NIGHT OR EARLIER THAN USUAL IN THE MORNING: NOT AT ALL

## 2019-10-14 ASSESSMENT — PAIN SCALES - GENERAL: PAINLEVEL: NO PAIN (0)

## 2019-10-14 NOTE — LETTER
My Asthma Action Plan    Name: Margareth Green   YOB: 1955  Date: 10/14/2019   My doctor: Caitlin Beasley PA-C   My clinic: [facility]        My Control Medicine: Mometasone furoate + formoterol (Dulera) -  100/5 mcg 1 puff bid  My Rescue Medicine: Albuterol (Proair/Ventolin/Proventil HFA) 2-4 puffs EVERY 4 HOURS as needed. Use a spacer if recommended by your provider.   My Asthma Severity:   Moderate Persistent  Know your asthma triggers:                GREEN ZONE   Good Control    I feel good    No cough or wheeze    Can work, sleep and play without asthma symptoms       Take your asthma control medicine every day.     1. If exercise triggers your asthma, take your rescue medication    15 minutes before exercise or sports, and    During exercise if you have asthma symptoms  2. Spacer to use with inhaler: If you have a spacer, make sure to use it with your inhaler             YELLOW ZONE Getting Worse  I have ANY of these:    I do not feel good    Cough or wheeze    Chest feels tight    Wake up at night   1. Keep taking your Green Zone medications  2. Start taking your rescue medicine:    every 20 minutes for up to 1 hour. Then every 4 hours for 24-48 hours.  3. If you stay in the Yellow Zone for more than 12-24 hours, contact your doctor.  4. If you do not return to the Green Zone in 12-24 hours or you get worse, start taking your oral steroid medicine if prescribed by your provider.           RED ZONE Medical Alert - Get Help  I have ANY of these:    I feel awful    Medicine is not helping    Breathing getting harder    Trouble walking or talking    Nose opens wide to breathe       1. Take your rescue medicine NOW  2. If your provider has prescribed an oral steroid medicine, start taking it NOW  3. Call your doctor NOW  4. If you are still in the Red Zone after 20 minutes and you have not reached your doctor:    Take your rescue medicine again and    Call 911 or go to the  emergency room right away    See your regular doctor within 2 weeks of an Emergency Room or Urgent Care visit for follow-up treatment.          Annual Reminders:  Meet with Asthma Educator,  Flu Shot in the Fall, consider Pneumonia Vaccination for patients with asthma (aged 19 and older).    [facility]    [facility] [address] [facility] [id number] [place]   [facility]  [facility] [address] [place], [place] - [address]  [facility]  FOR [facility] - [address]  [facility] - [address]                          Asthma Triggers  How To Control Things That Make Your Asthma Worse    Triggers are things that make your asthma worse.  Look at the list below to help you find your triggers and what you can do about them.  You can help prevent asthma flare-ups by staying away from your triggers.      Trigger                                                          What you can do   Cigarette Smoke  Tobacco smoke can make asthma worse. Do not allow smoking in your home, car or around you.  Be sure no one smokes at a child s day care or school.  If you smoke, ask your health care provider for ways to help you quit.  Ask family members to quit too.  Ask your health care provider for a referral to Quit Plan to help you quit smoking, or call 9-293-417-PLAN.     Colds, Flu, Bronchitis  These are common triggers of asthma. Wash your hands often.  Don t touch your eyes, nose or mouth.  Get a flu shot every year.     Dust Mites  These are tiny bugs that live in cloth or carpet. They are too small to see. Wash sheets and blankets in hot water every week.   Encase pillows and mattress in dust mite proof covers.  Avoid having carpet if you can. If you have carpet, vacuum weekly.   Use a dust mask and HEPA vacuum.   Pollen and Outdoor Mold  Some people are  allergic to trees, grass, or weed pollen, or molds. Try to keep your windows closed.  Limit time out doors when pollen count is high.   Ask you health care provider about taking medicine during allergy season.     Animal Dander  Some people are allergic to skin flakes, urine or saliva from pets with fur or feathers. Keep pets with fur or feathers out of your home.    If you can t keep the pet outdoors, then keep the pet out of your bedroom.  Keep the bedroom door closed.  Keep pets off cloth furniture and away from stuffed toys.     Mice, Rats, and Cockroaches   Some people are allergic to the waste from these pests.   Cover food and garbage.  Clean up spills and food crumbs.  Store grease in the refrigerator.   Keep food out of the bedroom.   Indoor Mold  This can be a trigger if your home has high moisture. Fix leaking faucets, pipes, or other sources of water.   Clean moldy surfaces.  Dehumidify basement if it is damp and smelly.   Smoke, Strong Odors, and Sprays  These can reduce air quality. Stay away from strong odors and sprays, such as perfume, powder, hair spray, paints, smoke incense, paint, cleaning products, candles and new carpet.   Exercise or Sports  Some people with asthma have this trigger. Be active!  Ask your doctor about taking medicine before sports or exercise to prevent symptoms.    Warm up for 5-10 minutes before and after sports or exercise.     Other Triggers of Asthma  Cold air:  Cover your nose and mouth with a scarf.  Sometimes laughing or crying can be a trigger.  Some medicines and food can trigger asthma.

## 2019-10-15 ASSESSMENT — ASTHMA QUESTIONNAIRES: ACT_TOTALSCORE: 21

## 2019-10-16 ENCOUNTER — TELEPHONE (OUTPATIENT)
Dept: FAMILY MEDICINE | Facility: OTHER | Age: 64
End: 2019-10-16

## 2019-10-16 ENCOUNTER — HOSPITAL ENCOUNTER (OUTPATIENT)
Dept: CARDIOLOGY | Facility: CLINIC | Age: 64
Discharge: HOME | End: 2019-10-16
Attending: PHYSICIAN ASSISTANT | Admitting: PHYSICIAN ASSISTANT
Payer: COMMERCIAL

## 2019-10-16 DIAGNOSIS — M25.522 PAIN IN JOINT, UPPER ARM, LEFT: ICD-10-CM

## 2019-10-16 DIAGNOSIS — I47.9 TACHYCARDIA, PAROXYSMAL (H): ICD-10-CM

## 2019-10-16 DIAGNOSIS — R06.09 DOE (DYSPNEA ON EXERTION): ICD-10-CM

## 2019-10-16 DIAGNOSIS — Z82.49 FAMILY HISTORY OF MI (MYOCARDIAL INFARCTION): ICD-10-CM

## 2019-10-16 PROCEDURE — 93325 DOPPLER ECHO COLOR FLOW MAPG: CPT | Mod: TC

## 2019-10-16 PROCEDURE — 0298T ZZC EXT ECG > 48HR TO 21 DAY REVIEW AND INTERPRETATN: CPT | Performed by: INTERNAL MEDICINE

## 2019-10-16 PROCEDURE — 0296T ZIO PATCH HOLTER ADULT PEDIATRIC GREATER THAN 48 HRS: CPT

## 2019-10-16 PROCEDURE — 93016 CV STRESS TEST SUPVJ ONLY: CPT | Performed by: INTERNAL MEDICINE

## 2019-10-16 PROCEDURE — 93321 DOPPLER ECHO F-UP/LMTD STD: CPT | Mod: 26 | Performed by: INTERNAL MEDICINE

## 2019-10-16 PROCEDURE — 93325 DOPPLER ECHO COLOR FLOW MAPG: CPT | Mod: 26 | Performed by: INTERNAL MEDICINE

## 2019-10-16 PROCEDURE — 93018 CV STRESS TEST I&R ONLY: CPT | Performed by: INTERNAL MEDICINE

## 2019-10-16 PROCEDURE — 93350 STRESS TTE ONLY: CPT | Mod: 26 | Performed by: INTERNAL MEDICINE

## 2019-10-16 NOTE — TELEPHONE ENCOUNTER
Please call patient, her stress test was completely normal.  This is great news.  Caitlin Beasley PA-C

## 2019-10-23 ENCOUNTER — TELEPHONE (OUTPATIENT)
Dept: FAMILY MEDICINE | Facility: OTHER | Age: 64
End: 2019-10-23

## 2019-10-23 DIAGNOSIS — H93.13 TINNITUS, BILATERAL: Primary | ICD-10-CM

## 2019-10-23 NOTE — TELEPHONE ENCOUNTER
Pt walked into clinic this evening with questions on Wellbutrin, Zoloft and nortriptyline. She is wondering when is the best time to take them and will this effect her sleep. She also stated she saw her asthma doctor today, Dr Edenilson Song, and they would like her stress test results faxed to them, pt is giving a one time verbal ok to do this. She did not have a fax number for them just a phone number, [phone]; please fax this. Pt also stated she continues to have ringing in her ears and Dr Song would like her to follow up with ENT and would like ENT referral placed for this. She also stated she is wondering if she will need to see cardiology or not and does have the Ziopatch on currently and wanted to know when she would get results. I did tell her this takes several weeks for the results and that this will tell us which way to go in regards to the referral. She stated understanding. She also wanted to know when her pap/hpv was done last and when she would be due for her next one. This is due this November and pt was scheduled.    Huddled with Caitlin and she stated to take Wellbutrin in the morning and the zoloft and nortriptyline at bed time as these can make you drowsy.   Information given to pt and states understanding. She would like a call when the referral for ENT is done to help schedule.     Provider please review and advise. ENT referral pending and stress test needs to be faxed tomorrow morning.    Kyra Dawson CMA ([facility])

## 2019-10-24 NOTE — TELEPHONE ENCOUNTER
Patient was given numbers to schedule her appointment with ENT- per her request. Will look into faxing stress test results.  Kristal Nolan MA on 10/24/2019 at 12:17 PM

## 2019-10-28 ENCOUNTER — MYC MEDICAL ADVICE (OUTPATIENT)
Dept: FAMILY MEDICINE | Facility: OTHER | Age: 64
End: 2019-10-28

## 2019-10-28 DIAGNOSIS — I47.9 TACHYCARDIA, PAROXYSMAL (H): Primary | ICD-10-CM

## 2019-10-28 DIAGNOSIS — R06.09 DOE (DYSPNEA ON EXERTION): ICD-10-CM

## 2019-10-28 NOTE — TELEPHONE ENCOUNTER
Please call pt- cardio referral is placed, please help her schedule she wants to ensure her zio patch results are in before her appt  Caitlin Beasley PA-C

## 2019-11-06 ENCOUNTER — TELEPHONE (OUTPATIENT)
Dept: FAMILY MEDICINE | Facility: OTHER | Age: 64
End: 2019-11-06

## 2019-11-06 DIAGNOSIS — G47.09 OTHER INSOMNIA: ICD-10-CM

## 2019-11-06 NOTE — TELEPHONE ENCOUNTER
Ally is having trouble swallowing 3 Nortriptyline capsules. Would it work to change her dose from 30mg to 25mg taking 1 capsule nightly so she does not have to swallow 3 capsules?  Please send to [facility] pharmacy if appropriate.    Thank you,  Meagan Moe [facility]  [phone]

## 2019-11-07 RX ORDER — NORTRIPTYLINE HCL 25 MG
25 CAPSULE ORAL AT BEDTIME
Qty: 90 CAPSULE | Refills: 0 | Status: SHIPPED | OUTPATIENT
Start: 2019-11-07 | End: 2019-11-21

## 2019-11-08 ENCOUNTER — OFFICE VISIT (OUTPATIENT)
Dept: FAMILY MEDICINE | Facility: OTHER | Age: 64
End: 2019-11-08
Payer: COMMERCIAL

## 2019-11-08 VITALS
TEMPERATURE: 97.7 F | SYSTOLIC BLOOD PRESSURE: 112 MMHG | BODY MASS INDEX: 25.47 KG/M2 | DIASTOLIC BLOOD PRESSURE: 80 MMHG | WEIGHT: 138.4 LBS | HEART RATE: 72 BPM | OXYGEN SATURATION: 97 % | RESPIRATION RATE: 18 BRPM | HEIGHT: 62 IN

## 2019-11-08 DIAGNOSIS — M54.2 NECK PAIN: Primary | ICD-10-CM

## 2019-11-08 DIAGNOSIS — R20.0 NUMBNESS OF LEFT JAW: ICD-10-CM

## 2019-11-08 DIAGNOSIS — M54.2 CERVICALGIA: ICD-10-CM

## 2019-11-08 PROCEDURE — 99214 OFFICE O/P EST MOD 30 MIN: CPT | Performed by: PHYSICIAN ASSISTANT

## 2019-11-08 RX ORDER — PREDNISONE 20 MG/1
40 TABLET ORAL DAILY
Qty: 10 TABLET | Refills: 0 | Status: SHIPPED | OUTPATIENT
Start: 2019-11-08 | End: 2019-11-21

## 2019-11-08 RX ORDER — CYCLOBENZAPRINE HCL 5 MG
5 TABLET ORAL 3 TIMES DAILY PRN
Qty: 30 TABLET | Refills: 0 | Status: SHIPPED | OUTPATIENT
Start: 2019-11-08 | End: 2020-09-17

## 2019-11-08 ASSESSMENT — ANXIETY QUESTIONNAIRES
5. BEING SO RESTLESS THAT IT IS HARD TO SIT STILL: SEVERAL DAYS
7. FEELING AFRAID AS IF SOMETHING AWFUL MIGHT HAPPEN: NOT AT ALL
IF YOU CHECKED OFF ANY PROBLEMS ON THIS QUESTIONNAIRE, HOW DIFFICULT HAVE THESE PROBLEMS MADE IT FOR YOU TO DO YOUR WORK, TAKE CARE OF THINGS AT HOME, OR GET ALONG WITH OTHER PEOPLE: NOT DIFFICULT AT ALL
2. NOT BEING ABLE TO STOP OR CONTROL WORRYING: NOT AT ALL
6. BECOMING EASILY ANNOYED OR IRRITABLE: SEVERAL DAYS
GAD7 TOTAL SCORE: 4
1. FEELING NERVOUS, ANXIOUS, OR ON EDGE: SEVERAL DAYS
3. WORRYING TOO MUCH ABOUT DIFFERENT THINGS: NOT AT ALL

## 2019-11-08 ASSESSMENT — PATIENT HEALTH QUESTIONNAIRE - PHQ9
SUM OF ALL RESPONSES TO PHQ QUESTIONS 1-9: 5
5. POOR APPETITE OR OVEREATING: SEVERAL DAYS

## 2019-11-08 ASSESSMENT — MIFFLIN-ST. JEOR: SCORE: 1124.28

## 2019-11-08 NOTE — PATIENT INSTRUCTIONS
- Heat/ice   - Massage is fine  - Topical Salonpas  - Tylenol as needed  - Prednisone 2 tablets once daily in the morning. Food in stomach.   - Cyclobenzaprine 1-2 tablets up 3 times per day as needed, no driving on medication.   Let us know if you need physical therapy referral.

## 2019-11-08 NOTE — PROGRESS NOTES
"Subjective     Margareth Green is a 64 year old female who presents to clinic today for the following health issues:    HPI   Neck Pain  Onset: started Wednesday evening in mouth right below cheek line on the left side, right side is having some pinching.     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 She was doing a lot more than usual and was doing a lot of mopping.   Previous neck pain: YES  Previous surgery or injections: YES- many years ago on the back injections.   Previous Imaging (MRI,X ray): YES- last February and March    Precipitating factors:   Does movement increase the pain:  YES- a little bit.     Alleviating factors:  Tylenol arthritis, naproxen(but she states that she isn't supposed to take this)    Therapies Tried and outcome:  Prednisone(has taken this in the past to \"calm this down\") she has done PT in the past but is wanting to see if she can get it \"calmed down\" before she tries it again. Tylenol arthritis     -she notes that her ears are ringing but she does have an appt with ENT at the end of the month.  -she will be seeing Caitlin Beasley as well for her physical and wants to make sure that this is all cleared up.     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  On Wednesday she woke up and was having right sided neck pain into the shoulder.  Hurts with movements.  It is about the same since it " started. She has had this in the past, in January had a fall and had MRI done showing some abnormalities.  She was able to go through physical therapy and resolve her symptoms.  In the past when this has caused flares she was able to use prednisone and resolve her symptoms.   - She also started to have left jaw numbness and facial numbness near the cheek that started Wednesday night. She notes this is not a new problem for her and has happened in the past when her neck flares.  She denies jaw locking or catching, no history of TMJ disorder, no dental concerns.    - She has chronic headaches that are unchanged an no vision changes.  No difficulty moving or speaking outside of pain from the neck.  No pain into the arms.  No chest pain, shortness of breath. No fevers or chills.  No rashes.  No sinus symptoms such as rhinorrhea, congestion, sore throat, ear pain.  She does have tinnitus that she will be seeing Dr. Amador for coming up soon. No recent head trauma.  No signs of facial droop.   - She did recently start zoloft, feeling a little sluggish but noticed that it is helping her. She continues on Wellbutrin and started taking a single 25 mg tablet of amitryptline at night since it was causing her indigestion with taking 3 tablets.      Patient Active Problem List   Diagnosis     Dermatophytosis of nail     Symptomatic menopausal or female climacteric states     Generalized osteoarthrosis, unspecified site     TAMMY (generalized anxiety disorder)     Insomnia     Malaise and fatigue     Mild persistent asthma     Cervicalgia     Allergic rhinitis     Irritable bowel syndrome     Arthralgia     DDD (degenerative disc disease), lumbar     Menopausal syndrome     Osteopenia     Hammertoe     Derangement of TMJ (temporomandibular joint)     Stenosis of lumbosacral spine     Fall     Atrophic vaginitis     Osteoporosis     Mild major depression (H)     Knee pain, left     Menopausal flushing     Advanced directives,  counseling/discussion     Numbness of left jaw     Neck pain     TMJ (temporomandibular joint syndrome)     Thyroid fullness     Epistaxis     Major depressive disorder, recurrent episode, moderate (H)     Gastroesophageal reflux disease without esophagitis     Tremor     Cramp of limb     Past Surgical History:   Procedure Laterality Date     BL DUPLEX LO EXTREM ART UNILAT/LTD  3008    lower extr arterial doppler -no stenosis     C DEXA,BONE DENSITY,APPENDICULR SKELTN  2008    osteopenia     C NONSPECIFIC PROCEDURE      Metatarsal phalangeal joint injection     COLONOSCOPY  10/22/2007    bx benign     COLONOSCOPY N/A 4/10/2015    Procedure: COMBINED COLONOSCOPY, SINGLE OR MULTIPLE BIOPSY/POLYPECTOMY BY BIOPSY;  Surgeon: Cl Wagner MD;  Location: [facility]     ESOPHAGOSCOPY, GASTROSCOPY, DUODENOSCOPY (EGD), COMBINED N/A 2018    Procedure: COMBINED ESOPHAGOSCOPY, GASTROSCOPY, DUODENOSCOPY (EGD);  COMBINED ESOPHAGOSCOPY, GASTROSCOPY, DUODENOSCOPY (EGD);  Surgeon: Tirso Duran MD;  Location: [facility]     HC COLONOSCOPY THRU STOMA, DIAGNOSTIC  2002    bx. benign - flex sig in 5 yrs  or colonoscopy in 10 yrs     HC ECHO HEART XTHORACIC, STRESS/REST  2005    WNL     HC NCS MOTOR W/O F-WAVE, EACH NERVE  2008    CTS     HC PART REMV BONE METATARSAL HEAD,EA  01/11/10    Right foot plantar plate tear. Also correct hammertoe, 2nd digit & varicose vein ligation, heel.     HC REVISE MEDIAN N/CARPAL TUNNEL SURG      Left in , right in      INJ, ANES/STER, FACET LUMB/SAC      Left L5 nerve root injection     INJECT EPIDURAL TRANSFORAMINAL  10/09/2013    [facility]     INJECT JOINT SACROILIAC  3/19/14,14    [facility]       Social History     Tobacco Use     Smoking status: Former Smoker     Packs/day: 0.50     Years: 30.00     Pack years: 15.00     Types: Cigarettes     Last attempt to quit: 3/20/1998     Years since quittin.6     Smokeless tobacco: Never Used      Tobacco comment: No smokers in home   Substance Use Topics     Alcohol use: Yes     Alcohol/week: 6.7 standard drinks     Comment: 2x/week     Family History   Problem Relation Age of Onset     Diabetes Father      Depression Father      Cerebrovascular Disease Mother         age 80     Arthritis Mother      Depression Mother         On meds     Eye Disorder Mother         Glaucoma     Osteoporosis Mother      Respiratory Mother          88 YO COPD     Chronic Obstructive Pulmonary Disease Mother      C.A.D. Brother         67 YO MI -     Chronic Obstructive Pulmonary Disease Brother      Myocardial Infarction Brother      Cancer Brother          Current Outpatient Medications   Medication Sig Dispense Refill     ACETAMINOPHEN PO Take 500-650 mg by mouth as needed        ALLEGRA 180 MG OR TABS 1 TABLET DAILY 30 0     B Complex Vitamins (VITAMIN B COMPLEX) tablet Take 1 tablet by mouth daily        buPROPion (WELLBUTRIN XL) 150 MG 24 hr tablet Take 1 tablet (150 mg) by mouth every morning 90 tablet 1     CALCIUM /VITAMIN D TABS   OR 1 TABLET DAILY       Cholecalciferol (VITAMIN D PO)        Cyanocobalamin (VITAMIN B-12 PO)        cyclobenzaprine (FLEXERIL) 5 MG tablet Take 1 tablet (5 mg) by mouth 3 times daily as needed for muscle spasms 30 tablet 0     Famotidine (PEPCID PO)        GLUCOSAMINE SULFATE 1000 MG OR CAPS 1 daily       MAGNESIUM OXIDE PO        MECLIZINE HCL PO Take by mouth as needed for dizziness       MELATONIN PO        mometasone-formoterol (DULERA) 100-5 MCG/ACT oral inhaler Inhale 1 puff into the lungs 2 times daily        NAPROXEN PO Patient states she takes one daily       nortriptyline (PAMELOR) 10 MG capsule Take 1-3 capsules (10-30 mg) by mouth At Bedtime 270 capsule 3     Omega-3 Fatty Acids (OMEGA-3 FISH OIL PO)        predniSONE (DELTASONE) 20 MG tablet Take 2 tablets (40 mg) by mouth daily for 5 days 10 tablet 0     PROAIR  (90 BASE) MCG/ACT IN AERS as needed 1 prn      "raloxifene (EVISTA) 60 MG tablet Take 1 tablet (60 mg) by mouth daily 90 tablet 3     Riboflavin (VITAMIN B-2 PO)        sertraline (ZOLOFT) 25 MG tablet Take 1 tablet (25 mg) by mouth daily 30 tablet 5     Simethicone (GAS-X PO)        Thiamine HCl (VITAMIN B-1 PO)        triamcinolone (NASACORT) 55 MCG/ACT nasal inhaler Spray 2 sprays into both nostrils daily       UNABLE TO FIND daily MEDICATION NAME: Tumeric       UNABLE TO FIND MEDICATION NAME: Colloidal Silver in water swishes does not swallow.       VITAMIN C-MARK HIPS CR 1000 MG OR TBCR 1 TABLET DAILY       VITAMIN E PO        VITAMINS/MINERALS TABS   OR 1 TABLET DAILY       nortriptyline (PAMELOR) 25 MG capsule Take 1 capsule (25 mg) by mouth At Bedtime (Patient not taking: Reported on 11/8/2019) 90 capsule 0     Pyridoxine HCl (VITAMIN B6 PO)        Allergies   Allergen Reactions     Penicillins Itching     Yeast infection afterward       Reviewed and updated as needed this visit by Provider         Review of Systems   ROS COMP: Constitutional, HEENT, cardiovascular, pulmonary, GI, , musculoskeletal, neuro, skin, endocrine and psych systems are negative, except as otherwise noted.      Objective    /80   Pulse 72   Temp 97.7  F (36.5  C) (Temporal)   Resp 18   Ht 1.58 m (5' 2.21\")   Wt 62.8 kg (138 lb 6.4 oz)   LMP 02/26/2008 (Approximate)   SpO2 97%   BMI 25.15 kg/m    Body mass index is 25.15 kg/m .  Physical Exam   GENERAL: healthy, alert and no distress  EYES: Eyes grossly normal to inspection, PERRL and conjunctivae and sclerae normal  HENT: ear canals and TM's normal, nose and mouth without ulcers or lesions, non-tender over TM joints bilaterally without catching or locking.  Dentition appears normal, gumline healthy upper and lower.   NECK: no adenopathy, no asymmetry, masses, or scars and thyroid normal to palpation  RESP: lungs clear to auscultation - no rales, rhonchi or wheezes  CV: regular rate and rhythm, normal S1 S2, no S3 or " S4, no murmur, click or rub, no peripheral edema and peripheral pulses strong  MS: no gross musculoskeletal defects noted, no edema, 5/5 upper extremity strength bilaterally normal  strength bilaterally. Decreased active ROM of the cervical spine with rotation bilaterally and lateral bending bilaterally, questionably positive spurlings on left with increased numbness sensation in face no arm radiculopathy or paresthesias., negative Spurlings on right. No point tenderness over the cervical spine, increased muscle tension and pain with palpation over the right trapezius, minimal on left.   SKIN: no suspicious lesions or rashes  NEURO: Normal strength and tone, sensory exam grossly normal, mentation intact, speech normal, cranial nerves 2-12 intact, DTR's normal and symmetric 1+ biceps and gait normal  PSYCH: mentation appears normal, affect normal/bright    Diagnostic Test Results:  Labs reviewed in Epic        Assessment & Plan       ICD-10-CM    1. Neck pain M54.2 predniSONE (DELTASONE) 20 MG tablet     cyclobenzaprine (FLEXERIL) 5 MG tablet   2. Numbness of left jaw R20.0 predniSONE (DELTASONE) 20 MG tablet     cyclobenzaprine (FLEXERIL) 5 MG tablet   3. Cervicalgia M54.2 predniSONE (DELTASONE) 20 MG tablet     cyclobenzaprine (FLEXERIL) 5 MG tablet        She notes that these are not new symptoms just a flare and she responds to prednisone in the past as well as physical therapy.  On examination and per history there are no red flag signs which is reassuring.  Reminded of potential side effects with prednisone.  Discussed OTC therapies including tylenol, ice/heat, Salonpas.  Also given cyclobenzaprine to use as needed, advised no driving, discussed potential side effects. She will consider physical therapy and let us know if she needs a referral.  Otherwise will follow-up if not improving, worse or new concerns.     Return in about 1 week (around 11/15/2019) for If not improving, sooner if worse or new  concerns.     Options for treatment and follow-up care were reviewed with the patient and/or guardian. Patient and/or guardian engaged in the decision making process and verbalized understanding of the options discussed and agreed with the final plan.     Reynaldo Aguilera PA-C  [facility]

## 2019-11-09 ASSESSMENT — ASTHMA QUESTIONNAIRES: ACT_TOTALSCORE: 22

## 2019-11-09 ASSESSMENT — ANXIETY QUESTIONNAIRES: GAD7 TOTAL SCORE: 4

## 2019-11-13 ENCOUNTER — MYC MEDICAL ADVICE (OUTPATIENT)
Dept: FAMILY MEDICINE | Facility: OTHER | Age: 64
End: 2019-11-13

## 2019-11-15 NOTE — PATIENT INSTRUCTIONS
Patient Education   Personalized Prevention Plan  You are due for the preventive services outlined below.  Your care team is available to assist you in scheduling these services.  If you have already completed any of these items, please share that information with your care team to update in your medical record.  Health Maintenance Due   Topic Date Due     Zoster (Shingles) Vaccine (1 of 2) 01/21/2003     Discuss Advance Care Planning  09/21/2017     Annual Wellness Visit  01/21/2018     Flu Vaccine (1) 09/01/2019             Last pneumonia shot is due after Dec 28, 2019

## 2019-11-18 ASSESSMENT — ENCOUNTER SYMPTOMS
HEMATOCHEZIA: 0
PARESTHESIAS: 0
NERVOUS/ANXIOUS: 0
HEARTBURN: 1
HEADACHES: 1
PALPITATIONS: 0
DIARRHEA: 0
CHILLS: 0
JOINT SWELLING: 0
EYE PAIN: 0
WEAKNESS: 1
SHORTNESS OF BREATH: 1
NAUSEA: 0
COUGH: 0
DIZZINESS: 0
BREAST MASS: 0
ARTHRALGIAS: 1
SORE THROAT: 0
DYSURIA: 0
ABDOMINAL PAIN: 0
CONSTIPATION: 0
HEMATURIA: 0
FREQUENCY: 0
FEVER: 0
MYALGIAS: 1

## 2019-11-18 ASSESSMENT — ANXIETY QUESTIONNAIRES
7. FEELING AFRAID AS IF SOMETHING AWFUL MIGHT HAPPEN: NOT AT ALL
5. BEING SO RESTLESS THAT IT IS HARD TO SIT STILL: NOT AT ALL
2. NOT BEING ABLE TO STOP OR CONTROL WORRYING: SEVERAL DAYS
6. BECOMING EASILY ANNOYED OR IRRITABLE: NOT AT ALL
4. TROUBLE RELAXING: SEVERAL DAYS
GAD7 TOTAL SCORE: 3
3. WORRYING TOO MUCH ABOUT DIFFERENT THINGS: SEVERAL DAYS
7. FEELING AFRAID AS IF SOMETHING AWFUL MIGHT HAPPEN: NOT AT ALL
GAD7 TOTAL SCORE: 3
1. FEELING NERVOUS, ANXIOUS, OR ON EDGE: NOT AT ALL
GAD7 TOTAL SCORE: 3

## 2019-11-18 ASSESSMENT — ACTIVITIES OF DAILY LIVING (ADL): CURRENT_FUNCTION: NO ASSISTANCE NEEDED

## 2019-11-19 ASSESSMENT — PATIENT HEALTH QUESTIONNAIRE - PHQ9: SUM OF ALL RESPONSES TO PHQ QUESTIONS 1-9: 5

## 2019-11-19 ASSESSMENT — ANXIETY QUESTIONNAIRES: GAD7 TOTAL SCORE: 3

## 2019-11-20 ENCOUNTER — HOSPITAL ENCOUNTER (OUTPATIENT)
Dept: MAMMOGRAPHY | Facility: CLINIC | Age: 64
Discharge: HOME | End: 2019-11-20
Attending: PHYSICIAN ASSISTANT | Admitting: PHYSICIAN ASSISTANT
Payer: COMMERCIAL

## 2019-11-20 DIAGNOSIS — Z12.31 VISIT FOR SCREENING MAMMOGRAM: ICD-10-CM

## 2019-11-20 PROCEDURE — 77067 SCR MAMMO BI INCL CAD: CPT

## 2019-11-21 ENCOUNTER — OFFICE VISIT (OUTPATIENT)
Dept: FAMILY MEDICINE | Facility: OTHER | Age: 64
End: 2019-11-21
Payer: COMMERCIAL

## 2019-11-21 VITALS
DIASTOLIC BLOOD PRESSURE: 66 MMHG | WEIGHT: 137 LBS | SYSTOLIC BLOOD PRESSURE: 126 MMHG | HEART RATE: 74 BPM | HEIGHT: 62 IN | BODY MASS INDEX: 25.21 KG/M2 | RESPIRATION RATE: 16 BRPM | TEMPERATURE: 98.4 F

## 2019-11-21 DIAGNOSIS — G47.09 OTHER INSOMNIA: ICD-10-CM

## 2019-11-21 DIAGNOSIS — Z13.220 SCREENING FOR HYPERLIPIDEMIA: ICD-10-CM

## 2019-11-21 DIAGNOSIS — Z00.00 ENCOUNTER FOR MEDICARE ANNUAL WELLNESS EXAM: Primary | ICD-10-CM

## 2019-11-21 LAB
ANION GAP SERPL CALCULATED.3IONS-SCNC: 4 MMOL/L (ref 3–14)
BUN SERPL-MCNC: 17 MG/DL (ref 7–30)
CALCIUM SERPL-MCNC: 8.6 MG/DL (ref 8.5–10.1)
CHLORIDE SERPL-SCNC: 107 MMOL/L (ref 94–109)
CHOLEST SERPL-MCNC: 155 MG/DL
CO2 SERPL-SCNC: 30 MMOL/L (ref 20–32)
CREAT SERPL-MCNC: 1.03 MG/DL (ref 0.52–1.04)
GFR SERPL CREATININE-BSD FRML MDRD: 56 ML/MIN/{1.73_M2}
GLUCOSE SERPL-MCNC: 93 MG/DL (ref 70–99)
HDLC SERPL-MCNC: 76 MG/DL
LDLC SERPL CALC-MCNC: 66 MG/DL
NONHDLC SERPL-MCNC: 79 MG/DL
POTASSIUM SERPL-SCNC: 3.9 MMOL/L (ref 3.4–5.3)
SODIUM SERPL-SCNC: 141 MMOL/L (ref 133–144)
TRIGL SERPL-MCNC: 67 MG/DL

## 2019-11-21 PROCEDURE — 36415 COLL VENOUS BLD VENIPUNCTURE: CPT | Performed by: PHYSICIAN ASSISTANT

## 2019-11-21 PROCEDURE — 80061 LIPID PANEL: CPT | Performed by: PHYSICIAN ASSISTANT

## 2019-11-21 PROCEDURE — 80048 BASIC METABOLIC PNL TOTAL CA: CPT | Performed by: PHYSICIAN ASSISTANT

## 2019-11-21 PROCEDURE — G0402 INITIAL PREVENTIVE EXAM: HCPCS | Performed by: PHYSICIAN ASSISTANT

## 2019-11-21 RX ORDER — NORTRIPTYLINE HCL 25 MG
25 CAPSULE ORAL AT BEDTIME
Qty: 90 CAPSULE | Refills: 1 | Status: SHIPPED | OUTPATIENT
Start: 2019-11-21 | End: 2020-02-05 | Stop reason: ALTCHOICE

## 2019-11-21 ASSESSMENT — ENCOUNTER SYMPTOMS
HEMATURIA: 0
SORE THROAT: 0
NERVOUS/ANXIOUS: 0
FREQUENCY: 0
CONSTIPATION: 0
ABDOMINAL PAIN: 0
DIARRHEA: 0
JOINT SWELLING: 0
PALPITATIONS: 0
CHILLS: 0
NAUSEA: 0
HEADACHES: 1
WEAKNESS: 1
PARESTHESIAS: 0
MYALGIAS: 1
SHORTNESS OF BREATH: 1
EYE PAIN: 0
COUGH: 0
DYSURIA: 0
HEARTBURN: 1
DIZZINESS: 0
BREAST MASS: 0
ARTHRALGIAS: 1
HEMATOCHEZIA: 0
FEVER: 0

## 2019-11-21 ASSESSMENT — PAIN SCALES - GENERAL: PAINLEVEL: NO PAIN (0)

## 2019-11-21 ASSESSMENT — ACTIVITIES OF DAILY LIVING (ADL): CURRENT_FUNCTION: NO ASSISTANCE NEEDED

## 2019-11-21 ASSESSMENT — MIFFLIN-ST. JEOR: SCORE: 1120.43

## 2019-11-21 NOTE — ACP (ADVANCE CARE PLANNING)
Have you ever done Advance Care Planning? (For example, a Health Directive, POLST, or a discussion with a medical provider or your loved ones about your wishes): Yes, advance care planning is on file.    Kyra Dawson CMA (AAMA)

## 2019-11-22 ASSESSMENT — ASTHMA QUESTIONNAIRES: ACT_TOTALSCORE: 22

## 2019-11-22 NOTE — PROGRESS NOTES
ENT Consultation    Margareth Green is a 64 year old female who is seen in consultation at the request of self.        Chief Complaint - Tinnitus    History of Present Illness - Margareth Green is a 64 year old female who presents to me today with pulsatile tinnitus involving her left ear.  It was more intermittent but lately more steady.  This is been ongoing for 4 years but again more steady now.  And she feels a heartbeat in here.  In the last few weeks has been pretty much constant.  She does wear earplugs in the left side because her  snores loudly to her left.  She also has had some left ear neck and shoulder sharp pains off and on.  She does have temporomandibular disorder and TMJ issues and does wear .  Does not feel that hearing change significantly.  She has no difficulty understanding others.  Denies any history of ear infections.  She denies any dizziness or vertigo associated with her pains or tinnitus..      Past Medical History -   Patient Active Problem List   Diagnosis     Dermatophytosis of nail     Symptomatic menopausal or female climacteric states     Generalized osteoarthrosis, unspecified site     TAMMY (generalized anxiety disorder)     Insomnia     Malaise and fatigue     Mild persistent asthma     Cervicalgia     Allergic rhinitis     Irritable bowel syndrome     Arthralgia     DDD (degenerative disc disease), lumbar     Menopausal syndrome     Osteopenia     Hammertoe     Derangement of TMJ (temporomandibular joint)     Stenosis of lumbosacral spine     Fall     Atrophic vaginitis     Osteoporosis     Mild major depression (H)     Knee pain, left     Menopausal flushing     Advanced directives, counseling/discussion     Numbness of left jaw     Neck pain     TMJ (temporomandibular joint syndrome)     Thyroid fullness     Epistaxis     Major depressive disorder, recurrent episode, moderate (H)     Gastroesophageal reflux disease without esophagitis     Tremor     Cramp of  limb       Current Medications -   Current Outpatient Medications:      ACETAMINOPHEN PO, Take 500-650 mg by mouth as needed , Disp: , Rfl:      ALLEGRA 180 MG OR TABS, 1 TABLET DAILY, Disp: 30, Rfl: 0     B Complex Vitamins (VITAMIN B COMPLEX) tablet, Take 1 tablet by mouth daily , Disp: , Rfl:      buPROPion (WELLBUTRIN XL) 150 MG 24 hr tablet, Take 1 tablet (150 mg) by mouth every morning, Disp: 90 tablet, Rfl: 1     CALCIUM /VITAMIN D TABS   OR, 1 TABLET DAILY, Disp: , Rfl:      Cholecalciferol (VITAMIN D PO), , Disp: , Rfl:      Cyanocobalamin (VITAMIN B-12 PO), , Disp: , Rfl:      cyclobenzaprine (FLEXERIL) 5 MG tablet, Take 1 tablet (5 mg) by mouth 3 times daily as needed for muscle spasms, Disp: 30 tablet, Rfl: 0     Famotidine (PEPCID PO), , Disp: , Rfl:      GLUCOSAMINE SULFATE 1000 MG OR CAPS, 1 daily, Disp: , Rfl:      MAGNESIUM OXIDE PO, , Disp: , Rfl:      MECLIZINE HCL PO, Take by mouth as needed for dizziness, Disp: , Rfl:      MELATONIN PO, , Disp: , Rfl:      mometasone-formoterol (DULERA) 100-5 MCG/ACT oral inhaler, Inhale 1 puff into the lungs 2 times daily , Disp: , Rfl:      NAPROXEN PO, Take by mouth as needed , Disp: , Rfl:      nortriptyline (PAMELOR) 25 MG capsule, Take 1 capsule (25 mg) by mouth At Bedtime, Disp: 90 capsule, Rfl: 1     Omega-3 Fatty Acids (OMEGA-3 FISH OIL PO), , Disp: , Rfl:      PROAIR  (90 BASE) MCG/ACT IN AERS, as needed, Disp: 1, Rfl: prn     raloxifene (EVISTA) 60 MG tablet, Take 1 tablet (60 mg) by mouth daily, Disp: 90 tablet, Rfl: 3     Riboflavin (VITAMIN B-2 PO), , Disp: , Rfl:      sertraline (ZOLOFT) 25 MG tablet, Take 1 tablet (25 mg) by mouth daily, Disp: 30 tablet, Rfl: 5     Simethicone (GAS-X PO), , Disp: , Rfl:      Thiamine HCl (VITAMIN B-1 PO), , Disp: , Rfl:      triamcinolone (NASACORT) 55 MCG/ACT nasal inhaler, Spray 2 sprays into both nostrils daily, Disp: , Rfl:      UNABLE TO FIND, daily MEDICATION NAME: Tumeric, Disp: , Rfl:      UNABLE  TO FIND, MEDICATION NAME: Colloidal Silver in water swishes does not swallow., Disp: , Rfl:      VITAMIN C-MARK HIPS CR 1000 MG OR TBCR, 1 TABLET DAILY, Disp: , Rfl:      VITAMIN E PO, , Disp: , Rfl:      VITAMINS/MINERALS TABS   OR, 1 TABLET DAILY, Disp: , Rfl:     Allergies -   Allergies   Allergen Reactions     Penicillins Itching     Yeast infection afterward       Social History -   Social History     Socioeconomic History     Marital status:      Spouse name: Robel     Number of children: 1     Years of education: 12     Highest education level: Not on file   Occupational History     Occupation: [employer]     Employer: [employer]   Social Needs     Financial resource strain: Not on file     Food insecurity:     Worry: Not on file     Inability: Not on file     Transportation needs:     Medical: Not on file     Non-medical: Not on file   Tobacco Use     Smoking status: Former Smoker     Packs/day: 0.50     Years: 30.00     Pack years: 15.00     Types: Cigarettes     Last attempt to quit: 3/20/1998     Years since quittin.6     Smokeless tobacco: Never Used     Tobacco comment: No smokers in home   Substance and Sexual Activity     Alcohol use: Yes     Alcohol/week: 6.7 standard drinks     Comment: 2x/week     Drug use: No     Sexual activity: Yes     Partners: Male     Birth control/protection: None     Comment: spouse -vas   Lifestyle     Physical activity:     Days per week: Not on file     Minutes per session: Not on file     Stress: Not on file   Relationships     Social connections:     Talks on phone: Not on file     Gets together: Not on file     Attends Cheondoism service: Not on file     Active member of club or organization: Not on file     Attends meetings of clubs or organizations: Not on file     Relationship status: Not on file     Intimate partner violence:     Fear of current or ex partner: Not on file     Emotionally abused: Not on file     Physically abused: Not on  file     Forced sexual activity: Not on file   Other Topics Concern      Service No     Blood Transfusions No     Caffeine Concern Yes     Comment: pop: 1c/wk     Occupational Exposure No     Hobby Hazards No     Sleep Concern Yes     Comment: On meds     Stress Concern Yes     Weight Concern No     Special Diet No     Back Care Yes     Comment: Hx back pain     Exercise Yes     Comment: stretching qd     Bike Helmet No     Seat Belt Yes     Self-Exams Yes     Parent/sibling w/ CABG, MI or angioplasty before 65F 55M? Yes     Comment: Father - Heart attack   Social History Narrative    Lives with spouse.. No domestic violence issues.       Family History -   Family History   Problem Relation Age of Onset     Diabetes Father      Depression Father      Cerebrovascular Disease Mother         age 80     Arthritis Mother      Depression Mother         On meds     Eye Disorder Mother         Glaucoma     Osteoporosis Mother      Respiratory Mother          88 YO COPD     Chronic Obstructive Pulmonary Disease Mother      C.A.D. Brother         65 YO MI -     Chronic Obstructive Pulmonary Disease Brother      Myocardial Infarction Brother      Other Cancer Brother      Cancer Brother        Review of Systems - As per HPI and PMHx, otherwise 10+ system review of the head and neck negative.    Physical Exam    LMP 2008 (Approximate)     General - The patient is in no distress. Alert and oriented to person and place, answers questions and cooperates with examination appropriately.    SKIN - No suspicious lesions or rashes.  Respiration - No respiratory distress.     Neurologic - CN II-XII are grossly intact. No focal neurologic deficits.     Voice and Breathing - The patient was breathing comfortably without the use of accessory muscles. There was no wheezing, stridor, or stertor.  The patients voice was clear and strong.    Ears - The tympanic membranes are normal in appearance, bony landmarks are intact.   No retraction, perforation, or masses. No fluid or purulence was seen in the external canal or the middle ear. No evidence of infection of the middle ear or external canal, cerumen was normal in appearance.    Eyes - Extraocular movements intact, and the pupils were reactive to light.  Sclera were not icteric or injected, conjunctiva were pink and moist.    Mouth - Examination of the oral cavity showed pink, healthy oral mucosa. No lesions or ulcerations noted.  The tongue was mobile and midline.    Throat - The walls of the oropharynx were smooth, symmetric, and had no lesions or ulcerations.  The uvula was midline on elevation.      Neck - Palpation of the occipital, submental, submandibular, internal jugular chain, and supraclavicular nodes did not demonstrate any abnormal lymph nodes or masses. No parotid masses. Palpation of the thyroid was soft and smooth, with no nodules or goiter appreciated.  The trachea was mobile and midline.  There is definite appears to be some tenderness in the area of the temporomandibular  space on palpation on the left as well as around the TMJ area.      Performed in clinic today:  Audiologic Studies - An audiogram and tympanogram were performed today as part of the evaluation and personally reviewed. The tympanogram shows Type A curves on the right and Type A curves on the left, with Normal canal volumes and middle ear pressures.  The audiogram showed Mild high-frequency SNHL on the right and Mild 1 low-frequency SNHL and then the mild high-frequency SNHLon the left.  Word recognition score 100% on the right versus 100% on the left.      Assessment and Plan - Margareth Green is a 64 year old female who presents to me today with subjective pulsatile tinnitus also temporomandibular disorder on the left side possibly reflecting the pain that she is experiencing.  In the meantime advised evaluation physical therapy for the neck issues as well as follow-up with a dental  specialist in the TMJ area to further address this problem which could be the source of her other symptomatology.  Patient will see me back after MRI of the brain will be obtained with and without contrast to further evaluate pulsatile tinnitus.      Sly Amador MD  Otolaryngology  [facility]

## 2019-11-25 ENCOUNTER — OFFICE VISIT (OUTPATIENT)
Dept: OTOLARYNGOLOGY | Facility: CLINIC | Age: 64
End: 2019-11-25
Payer: COMMERCIAL

## 2019-11-25 ENCOUNTER — OFFICE VISIT (OUTPATIENT)
Dept: AUDIOLOGY | Facility: CLINIC | Age: 64
End: 2019-11-25
Payer: COMMERCIAL

## 2019-11-25 VITALS
SYSTOLIC BLOOD PRESSURE: 140 MMHG | DIASTOLIC BLOOD PRESSURE: 90 MMHG | BODY MASS INDEX: 25.21 KG/M2 | HEIGHT: 62 IN | WEIGHT: 137 LBS

## 2019-11-25 DIAGNOSIS — H93.13 TINNITUS OF BOTH EARS: ICD-10-CM

## 2019-11-25 DIAGNOSIS — H93.12 TINNITUS, LEFT: ICD-10-CM

## 2019-11-25 DIAGNOSIS — M26.609 TMJ (TEMPOROMANDIBULAR JOINT SYNDROME): ICD-10-CM

## 2019-11-25 DIAGNOSIS — H90.3 SENSORINEURAL HEARING LOSS, BILATERAL: Primary | ICD-10-CM

## 2019-11-25 DIAGNOSIS — M54.2 CERVICALGIA: Primary | ICD-10-CM

## 2019-11-25 PROCEDURE — 99204 OFFICE O/P NEW MOD 45 MIN: CPT | Performed by: OTOLARYNGOLOGY

## 2019-11-25 PROCEDURE — 92557 COMPREHENSIVE HEARING TEST: CPT | Performed by: AUDIOLOGIST

## 2019-11-25 PROCEDURE — 99207 ZZC NO CHARGE LOS: CPT | Performed by: AUDIOLOGIST

## 2019-11-25 PROCEDURE — 92550 TYMPANOMETRY & REFLEX THRESH: CPT | Performed by: AUDIOLOGIST

## 2019-11-25 ASSESSMENT — MIFFLIN-ST. JEOR: SCORE: 1120.4

## 2019-11-25 ASSESSMENT — PAIN SCALES - GENERAL: PAINLEVEL: NO PAIN (0)

## 2019-11-25 NOTE — LETTER
11/25/2019         RE: Margareth Green  [address] [facility] [address]        Dear Colleague,    Thank you for referring your patient, Margareth Green, to the [facility]. Please see a copy of my visit note below.    ENT Consultation    Margareth Green is a 64 year old female who is seen in consultation at the request of self.        Chief Complaint - Tinnitus    History of Present Illness - Margareth Green is a 64 year old female who presents to me today with pulsatile tinnitus involving her left ear.  It was more intermittent but lately more steady.  This is been ongoing for 4 years but again more steady now.  And she feels a heartbeat in here.  In the last few weeks has been pretty much constant.  She does wear earplugs in the left side because her  snores loudly to her left.  She also has had some left ear neck and shoulder sharp pains off and on.  She does have temporomandibular disorder and TMJ issues and does wear .  Does not feel that hearing change significantly.  She has no difficulty understanding others.  Denies any history of ear infections.  She denies any dizziness or vertigo associated with her pains or tinnitus..      Past Medical History -   Patient Active Problem List   Diagnosis     Dermatophytosis of nail     Symptomatic menopausal or female climacteric states     Generalized osteoarthrosis, unspecified site     TAMMY (generalized anxiety disorder)     Insomnia     Malaise and fatigue     Mild persistent asthma     Cervicalgia     Allergic rhinitis     Irritable bowel syndrome     Arthralgia     DDD (degenerative disc disease), lumbar     Menopausal syndrome     Osteopenia     Hammertoe     Derangement of TMJ (temporomandibular joint)     Stenosis of lumbosacral spine     Fall     Atrophic vaginitis     Osteoporosis     Mild major depression (H)     Knee pain, left     Menopausal flushing     Advanced directives, counseling/discussion      Numbness of left jaw     Neck pain     TMJ (temporomandibular joint syndrome)     Thyroid fullness     Epistaxis     Major depressive disorder, recurrent episode, moderate (H)     Gastroesophageal reflux disease without esophagitis     Tremor     Cramp of limb       Current Medications -   Current Outpatient Medications:      ACETAMINOPHEN PO, Take 500-650 mg by mouth as needed , Disp: , Rfl:      ALLEGRA 180 MG OR TABS, 1 TABLET DAILY, Disp: 30, Rfl: 0     B Complex Vitamins (VITAMIN B COMPLEX) tablet, Take 1 tablet by mouth daily , Disp: , Rfl:      buPROPion (WELLBUTRIN XL) 150 MG 24 hr tablet, Take 1 tablet (150 mg) by mouth every morning, Disp: 90 tablet, Rfl: 1     CALCIUM /VITAMIN D TABS   OR, 1 TABLET DAILY, Disp: , Rfl:      Cholecalciferol (VITAMIN D PO), , Disp: , Rfl:      Cyanocobalamin (VITAMIN B-12 PO), , Disp: , Rfl:      cyclobenzaprine (FLEXERIL) 5 MG tablet, Take 1 tablet (5 mg) by mouth 3 times daily as needed for muscle spasms, Disp: 30 tablet, Rfl: 0     Famotidine (PEPCID PO), , Disp: , Rfl:      GLUCOSAMINE SULFATE 1000 MG OR CAPS, 1 daily, Disp: , Rfl:      MAGNESIUM OXIDE PO, , Disp: , Rfl:      MECLIZINE HCL PO, Take by mouth as needed for dizziness, Disp: , Rfl:      MELATONIN PO, , Disp: , Rfl:      mometasone-formoterol (DULERA) 100-5 MCG/ACT oral inhaler, Inhale 1 puff into the lungs 2 times daily , Disp: , Rfl:      NAPROXEN PO, Take by mouth as needed , Disp: , Rfl:      nortriptyline (PAMELOR) 25 MG capsule, Take 1 capsule (25 mg) by mouth At Bedtime, Disp: 90 capsule, Rfl: 1     Omega-3 Fatty Acids (OMEGA-3 FISH OIL PO), , Disp: , Rfl:      PROAIR  (90 BASE) MCG/ACT IN AERS, as needed, Disp: 1, Rfl: prn     raloxifene (EVISTA) 60 MG tablet, Take 1 tablet (60 mg) by mouth daily, Disp: 90 tablet, Rfl: 3     Riboflavin (VITAMIN B-2 PO), , Disp: , Rfl:      sertraline (ZOLOFT) 25 MG tablet, Take 1 tablet (25 mg) by mouth daily, Disp: 30 tablet, Rfl: 5     Simethicone (GAS-X PO),  , Disp: , Rfl:      Thiamine HCl (VITAMIN B-1 PO), , Disp: , Rfl:      triamcinolone (NASACORT) 55 MCG/ACT nasal inhaler, Spray 2 sprays into both nostrils daily, Disp: , Rfl:      UNABLE TO FIND, daily MEDICATION NAME: Tumeric, Disp: , Rfl:      UNABLE TO FIND, MEDICATION NAME: Colloidal Silver in water swishes does not swallow., Disp: , Rfl:      VITAMIN C-MARK HIPS CR 1000 MG OR TBCR, 1 TABLET DAILY, Disp: , Rfl:      VITAMIN E PO, , Disp: , Rfl:      VITAMINS/MINERALS TABS   OR, 1 TABLET DAILY, Disp: , Rfl:     Allergies -   Allergies   Allergen Reactions     Penicillins Itching     Yeast infection afterward       Social History -   Social History     Socioeconomic History     Marital status:      Spouse name: Robel     Number of children: 1     Years of education: 12     Highest education level: Not on file   Occupational History     Occupation: HIMS clerk     Employer: [employer]   Social Needs     Financial resource strain: Not on file     Food insecurity:     Worry: Not on file     Inability: Not on file     Transportation needs:     Medical: Not on file     Non-medical: Not on file   Tobacco Use     Smoking status: Former Smoker     Packs/day: 0.50     Years: 30.00     Pack years: 15.00     Types: Cigarettes     Last attempt to quit: 3/20/1998     Years since quittin.6     Smokeless tobacco: Never Used     Tobacco comment: No smokers in home   Substance and Sexual Activity     Alcohol use: Yes     Alcohol/week: 6.7 standard drinks     Comment: 2x/week     Drug use: No     Sexual activity: Yes     Partners: Male     Birth control/protection: None     Comment: spouse -vas   Lifestyle     Physical activity:     Days per week: Not on file     Minutes per session: Not on file     Stress: Not on file   Relationships     Social connections:     Talks on phone: Not on file     Gets together: Not on file     Attends Anabaptist service: Not on file     Active member of club or organization:  Not on file     Attends meetings of clubs or organizations: Not on file     Relationship status: Not on file     Intimate partner violence:     Fear of current or ex partner: Not on file     Emotionally abused: Not on file     Physically abused: Not on file     Forced sexual activity: Not on file   Other Topics Concern      Service No     Blood Transfusions No     Caffeine Concern Yes     Comment: pop: 1c/wk     Occupational Exposure No     Hobby Hazards No     Sleep Concern Yes     Comment: On meds     Stress Concern Yes     Weight Concern No     Special Diet No     Back Care Yes     Comment: Hx back pain     Exercise Yes     Comment: stretching qd     Bike Helmet No     Seat Belt Yes     Self-Exams Yes     Parent/sibling w/ CABG, MI or angioplasty before 65F 55M? Yes     Comment: Father - Heart attack   Social History Narrative    Lives with spouse.. No domestic violence issues.       Family History -   Family History   Problem Relation Age of Onset     Diabetes Father      Depression Father      Cerebrovascular Disease Mother         age 80     Arthritis Mother      Depression Mother         On meds     Eye Disorder Mother         Glaucoma     Osteoporosis Mother      Respiratory Mother          88 YO COPD     Chronic Obstructive Pulmonary Disease Mother      C.A.D. Brother         65 YO MI -     Chronic Obstructive Pulmonary Disease Brother      Myocardial Infarction Brother      Other Cancer Brother      Cancer Brother        Review of Systems - As per HPI and PMHx, otherwise 10+ system review of the head and neck negative.    Physical Exam    LMP 2008 (Approximate)     General - The patient is in no distress. Alert and oriented to person and place, answers questions and cooperates with examination appropriately.    SKIN - No suspicious lesions or rashes.  Respiration - No respiratory distress.     Neurologic - CN II-XII are grossly intact. No focal neurologic deficits.     Voice and Breathing  - The patient was breathing comfortably without the use of accessory muscles. There was no wheezing, stridor, or stertor.  The patients voice was clear and strong.    Ears - The tympanic membranes are normal in appearance, bony landmarks are intact.  No retraction, perforation, or masses. No fluid or purulence was seen in the external canal or the middle ear. No evidence of infection of the middle ear or external canal, cerumen was normal in appearance.    Eyes - Extraocular movements intact, and the pupils were reactive to light.  Sclera were not icteric or injected, conjunctiva were pink and moist.    Mouth - Examination of the oral cavity showed pink, healthy oral mucosa. No lesions or ulcerations noted.  The tongue was mobile and midline.    Throat - The walls of the oropharynx were smooth, symmetric, and had no lesions or ulcerations.  The uvula was midline on elevation.      Neck - Palpation of the occipital, submental, submandibular, internal jugular chain, and supraclavicular nodes did not demonstrate any abnormal lymph nodes or masses. No parotid masses. Palpation of the thyroid was soft and smooth, with no nodules or goiter appreciated.  The trachea was mobile and midline.  There is definite appears to be some tenderness in the area of the temporomandibular  space on palpation on the left as well as around the TMJ area.      Performed in clinic today:  Audiologic Studies - An audiogram and tympanogram were performed today as part of the evaluation and personally reviewed. The tympanogram shows Type A curves on the right and Type A curves on the left, with Normal canal volumes and middle ear pressures.  The audiogram showed Mild high-frequency SNHL on the right and Mild 1 low-frequency SNHL and then the mild high-frequency SNHLon the left.  Word recognition score 100% on the right versus 100% on the left.      Assessment and Plan - Margareth Green is a 64 year old female who presents to me today  with subjective pulsatile tinnitus also temporomandibular disorder on the left side possibly reflecting the pain that she is experiencing.  In the meantime advised evaluation physical therapy for the neck issues as well as follow-up with a dental specialist in the TMJ area to further address this problem which could be the source of her other symptomatology.  Patient will see me back after MRI of the brain will be obtained with and without contrast to further evaluate pulsatile tinnitus.      Sly Amador MD  Otolaryngology  [facility]      Again, thank you for allowing me to participate in the care of your patient.        Sincerely,        Sly Amador MD, MD

## 2019-11-25 NOTE — PROGRESS NOTES
AUDIOLOGY REPORT     SUMMARY: Audiology visit completed. See audiogram for results.     RECOMMENDATIONS: Follow-up with ENT    Sterling Rodrigues Licensed Audiologist #[id number]

## 2019-11-27 ENCOUNTER — ALLIED HEALTH/NURSE VISIT (OUTPATIENT)
Dept: FAMILY MEDICINE | Facility: CLINIC | Age: 64
End: 2019-11-27
Payer: COMMERCIAL

## 2019-11-27 ENCOUNTER — HOSPITAL ENCOUNTER (OUTPATIENT)
Dept: MRI IMAGING | Facility: CLINIC | Age: 64
Discharge: HOME | End: 2019-11-27
Attending: OTOLARYNGOLOGY | Admitting: OTOLARYNGOLOGY
Payer: COMMERCIAL

## 2019-11-27 ENCOUNTER — TELEPHONE (OUTPATIENT)
Dept: FAMILY MEDICINE | Facility: OTHER | Age: 64
End: 2019-11-27

## 2019-11-27 ENCOUNTER — OFFICE VISIT (OUTPATIENT)
Dept: CARDIOLOGY | Facility: CLINIC | Age: 64
End: 2019-11-27
Attending: PHYSICIAN ASSISTANT
Payer: COMMERCIAL

## 2019-11-27 VITALS
HEIGHT: 65 IN | HEART RATE: 88 BPM | DIASTOLIC BLOOD PRESSURE: 76 MMHG | WEIGHT: 138.5 LBS | OXYGEN SATURATION: 98 % | SYSTOLIC BLOOD PRESSURE: 124 MMHG | BODY MASS INDEX: 23.07 KG/M2

## 2019-11-27 DIAGNOSIS — H93.12 TINNITUS, LEFT: ICD-10-CM

## 2019-11-27 DIAGNOSIS — Z01.00 EXAMINATION OF EYES AND VISION: Primary | ICD-10-CM

## 2019-11-27 DIAGNOSIS — I47.9 TACHYCARDIA, PAROXYSMAL (H): Primary | ICD-10-CM

## 2019-11-27 PROCEDURE — 25500064 ZZH RX 255 OP 636: Performed by: OTOLARYNGOLOGY

## 2019-11-27 PROCEDURE — 99207 ZZC NO CHARGE NURSE ONLY: CPT

## 2019-11-27 PROCEDURE — A9585 GADOBUTROL INJECTION: HCPCS | Performed by: OTOLARYNGOLOGY

## 2019-11-27 PROCEDURE — 99203 OFFICE O/P NEW LOW 30 MIN: CPT | Performed by: INTERNAL MEDICINE

## 2019-11-27 PROCEDURE — 70553 MRI BRAIN STEM W/O & W/DYE: CPT

## 2019-11-27 RX ORDER — GADOBUTROL 604.72 MG/ML
7.5 INJECTION INTRAVENOUS ONCE
Status: COMPLETED | OUTPATIENT
Start: 2019-11-27 | End: 2019-11-27

## 2019-11-27 RX ADMIN — GADOBUTROL 6 ML: 604.72 INJECTION INTRAVENOUS at 15:28

## 2019-11-27 ASSESSMENT — MIFFLIN-ST. JEOR: SCORE: 1169.11

## 2019-11-27 NOTE — TELEPHONE ENCOUNTER
Please call Margareth, as our clinic  was reviewing the charges on her most recent visit, she noticed that Margareth was within the first 12 months of her medicare coverage.  We should have done a vision screening test for her, is she willing to come in to see the float nurse to have her vision screened?  There will be no charge for this.  She can come in next week or whenever it is convenient for her in the next 2 weeks.      When she comes in, please document on her wellness visit   Caitlin Beasley PA-C

## 2019-11-27 NOTE — LETTER
2019    Caitlin Beasley PA-C  [address] [place] [address]    RE: Margareth Green       Dear Colleague,    I had the pleasure of seeing Margareth Green in the [facility].    HISTORY OF PRESENT ILLNESS:  64  1 child 2 grand daughters Has asthma ( Dr.Harold Song) asthma and allergy specialist. PCN allergies. Occasional alcohol. No illicit drugs. Not involved in regular exercise. Retired worked in medical records. 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     Father  of MI/brother  of MI. Gilbert that at times heart was pumping more rapidly/pounding after exercise, after alcohol. For several monhts. Occasional chest tightness.Having insommia.  with sleep apnea and had been snoring.     Orders this Visit:  No orders of the defined types were placed in this encounter.    No orders of the defined types were placed in this encounter.    There are no discontinued medications.    No diagnosis found.    CURRENT MEDICATIONS:  Current Outpatient Medications   Medication Sig Dispense Refill     ACETAMINOPHEN PO Take 500-650 mg by mouth as needed        ALLEGRA 180 MG OR TABS 1 TABLET DAILY 30 0     B Complex Vitamins (VITAMIN B COMPLEX) tablet Take 1 tablet by mouth daily        buPROPion (WELLBUTRIN XL) 150 MG 24 hr tablet Take 1 tablet (150 mg) by mouth every morning 90 tablet 1     CALCIUM /VITAMIN D TABS   OR 1 TABLET DAILY       Cholecalciferol (VITAMIN D PO)        Cyanocobalamin (VITAMIN B-12 PO)        cyclobenzaprine (FLEXERIL) 5 MG tablet Take 1 tablet (5 mg) by mouth 3 times daily as needed for muscle spasms 30 tablet 0     Famotidine (PEPCID PO)        GLUCOSAMINE SULFATE 1000 MG OR CAPS 1 daily       MAGNESIUM OXIDE PO        MECLIZINE HCL PO Take by mouth as needed for dizziness       MELATONIN PO        mometasone-formoterol (DULERA) 100-5 MCG/ACT oral inhaler Inhale 1 puff into the lungs 2 times daily        NAPROXEN PO Take by mouth as needed  "       nortriptyline (PAMELOR) 25 MG capsule Take 1 capsule (25 mg) by mouth At Bedtime 90 capsule 1     Omega-3 Fatty Acids (OMEGA-3 FISH OIL PO)        PROAIR  (90 BASE) MCG/ACT IN AERS as needed 1 prn     raloxifene (EVISTA) 60 MG tablet Take 1 tablet (60 mg) by mouth daily 90 tablet 3     Riboflavin (VITAMIN B-2 PO)        sertraline (ZOLOFT) 25 MG tablet Take 1 tablet (25 mg) by mouth daily 30 tablet 5     Simethicone (GAS-X PO)        Thiamine HCl (VITAMIN B-1 PO)        triamcinolone (NASACORT) 55 MCG/ACT nasal inhaler Spray 2 sprays into both nostrils daily       UNABLE TO FIND daily MEDICATION NAME: Tumeric       UNABLE TO FIND MEDICATION NAME: Colloidal Silver in water swishes does not swallow.       VITAMIN C-MARK HIPS CR 1000 MG OR TBCR 1 TABLET DAILY       VITAMIN E PO        VITAMINS/MINERALS TABS   OR 1 TABLET DAILY         ALLERGIES     Allergies   Allergen Reactions     Penicillins Itching     Yeast infection afterward       PAST MEDICAL, SURGICAL, FAMILY, SOCIAL HISTORY:  History was reviewed and updated as needed, see medical record.    Review of Systems:  A 12-point review of systems was completed, see medical record for detailed review of systems information.    Physical Exam:  Vitals: /76 (BP Location: Right arm, Patient Position: Fowlers, Cuff Size: Adult Regular)   Pulse 88   Ht 1.651 m (5' 5\")   Wt 62.8 kg (138 lb 8 oz)   LMP 02/26/2008 (Approximate)   SpO2 98%   BMI 23.05 kg/m       Constitutional:           Skin:           Head:           Eyes:           ENT:           Neck:           Chest:           Cardiac:                    Abdomen:           Vascular:                                        Extremities and Back:           Neurological:           ASSESSMENT: No evidence of pathologic rhythm. Cholesterol and SBP ideal.        RECOMMENDATIONS:   1) Mediterranean style diet and exercise program   No further cardiac testing  Consider thyroid function testing      Recent " Lab Results:  LIPID RESULTS:  Lab Results   Component Value Date    CHOL 155 2019    HDL 76 2019    LDL 66 2019    TRIG 67 2019    CHOLHDLRATIO 1.8 2015       LIVER ENZYME RESULTS:  Lab Results   Component Value Date    AST 23 10/14/2019    ALT 27 10/14/2019       CBC RESULTS:  Lab Results   Component Value Date    WBC 7.6 10/14/2019    RBC 4.20 10/14/2019    HGB 13.1 10/14/2019    HCT 40.2 10/14/2019    MCV 96 10/14/2019    MCH 31.2 10/14/2019    MCHC 32.6 10/14/2019    RDW 12.7 10/14/2019     10/14/2019       BMP RESULTS:  Lab Results   Component Value Date     2019    POTASSIUM 3.9 2019    CHLORIDE 107 2019    CO2 30 2019    ANIONGAP 4 2019    GLC 93 2019    BUN 17 2019    CR 1.03 2019    GFRESTIMATED 56 (L) 2019    GFRESTBLACK 65 2019    TOLU 8.6 2019        A1C RESULTS:  No results found for: A1C    INR RESULTS:  Lab Results   Component Value Date    INR 1.00 2012       We greatly appreciate the opportunity to be involved in the care of your patient, Margareth Green.    Sincerely,  Lui Tyson MD        Caitlin Beasley PA-C  [address] [place] [address]                                                                       Service Date: 2019      Caitlin Beasley PA-C    [facility]    [address] [place] [address]      RE: Margareth Green   MRN: [id number]   : 1955      Dear Ms. Gale:       Margareth Green, a 64-year-old woman, was evaluated in consultation at your request for palpitations and a family history of coronary artery disease.      During a recent physical examination, Ms. Green mentioned that she had been subject to occasional palpitations and was concerned about her heart because of a family history of coronary disease in her father and brother.  You referred the patient for both a stress echocardiogram and a Zio Patch/Holter  monitor study.      The patient completed 8 minutes and 27 seconds on a Dewayne protocol (10 METs) without chest pain or EKG changes.  Her baseline echo showed a normal ejection fraction of 55%-60% with normal LV chamber size and no regional wall motion abnormalities.  With exercise, there was no evidence of inducible ischemia.  There were occasional premature atrial contractions present.      Her Zio Patch monitor showed a normal sinus rhythm with an average of 86 beats per minute.  There were rare episodes of nonsustained SVT which did not provoke symptoms.      Followup was arranged in Cardiology Clinic.      The patient denies exertional chest discomfort, syncope, lightheadedness or loss of consciousness.  Her episodes of palpitations seem most prominent after alcohol consumption or vigorous exercise.  Occasionally, she notes her heart pounding in the early morning hours.  There have been no episodes awaking her from sleep or causing significant disability.      Her cardiac risk factors include a family history of premature coronary disease.  There is no history of hypertension, dyslipidemia, cigarette smoking or diabetes mellitus.      REVIEW OF SYSTEMS:  A 12-point review of systems was performed.  The patient mentioned difficulties with insomnia.  She has never had thyroid function studies drawn.  She sees an allergist on a regular basis and carries a diagnosis of asthma but is currently not wheezing.      MEDICATIONS:     1.  Famotidine on a p.r.n. basis.   2.  She is on multiple vitamins.     3.  Raloxifene (Evista) 60 mg daily.      ALLERGIES:  Penicillin, manifested by rash and itching.      SOCIAL HISTORY:  The patient is .  She has 1 child and 2 grandchildren.  She frequently looks after her 10-year-old and 12-year-old grandchildren.  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      PHYSICAL EXAMINATION:   GENERAL:  Today, demonstrates a very pleasant,  cooperative and intelligent 64-year-old woman.   VITAL SIGNS:  Her blood pressure was 124/76, her heart rate is 88 and regular.  Her height is 1.65 meters, her weight is 62.8 kg, BMI is 23.   LUNGS:  Clear to percussion and auscultation.   CARDIOVASCULAR:  Shows a normal S1 with a normal S2.  There is no S3.  There is no murmur, rub or click.   EXTREMITIES:  Her pulses are full symmetrical.      DIAGNOSTIC STUDIES:  The results of the patient's stress echo and Zio Patch monitor are in the above text.  Her ECG performed on 10/04/2019 showed a normal sinus rhythm with normal axis, normal intervals.      ASSESSMENT:  Ms. Green is subject to occasional palpitations but Holter monitor does not demonstrate worrisome  dysrhythmia.  Her negative stress echo is reassuring.  The patient's LDL cholesterol and systolic blood pressure are optimal.      I do not believe any further cardiac testing is warranted at this time.  The patient would likely benefit from an exercise program and a Mediterranean-style diet.  You may wish to consider thyroid function studies. We would be happy to reassess if her symptoms change or progress.     RECOMMENDATIONS:   1.  Mediterranean-style diet.   2.  Regular exercise program 4-7 times per week, 40 minutes.    3.  No indication for further cardiac testing.   4.  You may wish to consider thyroid function studies.      We have appreciated the opportunity to see your patient, Margareth Green.       Sincerely,          Lui Tyson MD           D: 2019   T: 2019   MT: VINCE      Name:     MARGARETH GREEN   MRN:      [id number]        Account:      [id number]   :      1955           Service Date: 2019      Document: [id number]        Thank you for allowing me to participate in the care of your patient.    Sincerely,     Lui Tyson MD     [facility]

## 2019-11-27 NOTE — PROGRESS NOTES
Service Date: 2019      Caitlin Beasley PA-C    [facility]    [address] [place] [address]      RE: Margareth Green   MRN: [id number]   : 1955      Dear Ms. Beasley:       Margareth Green, a 64-year-old woman, was evaluated in consultation at your request for palpitations and a family history of coronary artery disease.      During a recent physical examination, Ms. Green mentioned that she had been subject to occasional palpitations and was concerned about her heart because of a family history of coronary disease in her father and brother.  You referred the patient for both a stress echocardiogram and a Zio Patch/Holter monitor study.      The patient completed 8 minutes and 27 seconds on a Dewayne protocol (10 METs) without chest pain or EKG changes.  Her baseline echo showed a normal ejection fraction of 55%-60% with normal LV chamber size and no regional wall motion abnormalities.  With exercise, there was no evidence of inducible ischemia.  There were occasional premature atrial contractions present.      Her Zio Patch monitor showed a normal sinus rhythm with an average of 86 beats per minute.  There were rare episodes of nonsustained SVT which did not provoke symptoms.      Followup was arranged in Cardiology Clinic.      The patient denies exertional chest discomfort, syncope, lightheadedness or loss of consciousness.  Her episodes of palpitations seem most prominent after alcohol consumption or vigorous exercise.  Occasionally, she notes her heart pounding in the early morning hours.  There have been no episodes awaking her from sleep or causing significant disability.      Her cardiac risk factors include a family history of premature coronary disease.  There is no history of hypertension, dyslipidemia, cigarette smoking or diabetes mellitus.      REVIEW OF SYSTEMS:  A 12-point review of systems was performed.  The patient mentioned difficulties with insomnia.  She  has never had thyroid function studies drawn.  She sees an allergist on a regular basis and carries a diagnosis of asthma but is currently not wheezing.      MEDICATIONS:     1.  Famotidine on a p.r.n. basis.   2.  She is on multiple vitamins.     3.  Raloxifene (Evista) 60 mg daily.      ALLERGIES:  Penicillin, manifested by rash and itching.      SOCIAL HISTORY:  The patient is .  She has 1 child and 2 grandchildren.  She frequently looks after her 10-year-old and 12-year-old grandchildren.  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      PHYSICAL EXAMINATION:   GENERAL:  Today, demonstrates a very pleasant, cooperative and intelligent 64-year-old woman.   VITAL SIGNS:  Her blood pressure was 124/76, her heart rate is 88 and regular.  Her height is 1.65 meters, her weight is 62.8 kg, BMI is 23.   LUNGS:  Clear to percussion and auscultation.   CARDIOVASCULAR:  Shows a normal S1 with a normal S2.  There is no S3.  There is no murmur, rub or click.   EXTREMITIES:  Her pulses are full symmetrical.      DIAGNOSTIC STUDIES:  The results of the patient's stress echo and Zio Patch monitor are in the above text.  Her ECG performed on 10/04/2019 showed a normal sinus rhythm with normal axis, normal intervals.      ASSESSMENT:  Ms. Green is subject to occasional palpitations but Holter monitor does not demonstrate worrisome  dysrhythmia.  Her negative stress echo is reassuring.  The patient's LDL cholesterol and systolic blood pressure are optimal.      I do not believe any further cardiac testing is warranted at this time.  The patient would likely benefit from an exercise program and a Mediterranean-style diet.  You may wish to consider thyroid function studies. We would be happy to reassess if her symptoms change or progress.     RECOMMENDATIONS:   1.  Mediterranean-style diet.   2.  Regular exercise program 4-7 times per week, 40 minutes.    3.  No indication for  further cardiac testing.   4.  You may wish to consider thyroid function studies.      We have appreciated the opportunity to see your patient, Margareth Green.       Sincerely,          MD ELMA Coon MD             D: 2019   T: 2019   MT: VINCE      Name:     MARGARETH GREEN   MRN:      [id number]        Account:      [id number]   :      1955           Service Date: 2019      Document: [id number]

## 2019-11-27 NOTE — TELEPHONE ENCOUNTER
Patient is going to [place]  today and will schedule her vision screening with float. Please copy into the actual Medicare visit discussed below. Thank you.  Kristal Nolan MA on 11/27/2019 at 9:50 AM

## 2019-11-27 NOTE — PROGRESS NOTES
Patient came in for an eye exam for her midicare physical from 11/21/19 that was not completed at the visit.  it was     VISION   Wears glasses: worn for testing  Tool used: Cannon   Right eye:        10/12.5 (20/25)  Left eye:          10/12.5 (20/25)  Visual Acuity: Pass  H Plus Lens Screening: Pass  Color vision screening: Pass     Merline Bee MA 11/27/2019

## 2019-11-27 NOTE — PROGRESS NOTES
HISTORY OF PRESENT ILLNESS:  64  1 child 2 grand daughters Has asthma ( Dr.Harold Song) asthma and allergy specialist. PCN allergies. Occasional alcohol. No illicit drugs. Not involved in regular exercise. Retired worked in medical records. 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     Father  of MI/brother  of MI. [place] that at times heart was pumping more rapidly/pounding after exercise, after alcohol. For several monhts. Occasional chest tightness.Having insommia.  with sleep apnea and had been snoring.     Orders this Visit:  No orders of the defined types were placed in this encounter.    No orders of the defined types were placed in this encounter.    There are no discontinued medications.    No diagnosis found.    CURRENT MEDICATIONS:  Current Outpatient Medications   Medication Sig Dispense Refill     ACETAMINOPHEN PO Take 500-650 mg by mouth as needed        ALLEGRA 180 MG OR TABS 1 TABLET DAILY 30 0     B Complex Vitamins (VITAMIN B COMPLEX) tablet Take 1 tablet by mouth daily        buPROPion (WELLBUTRIN XL) 150 MG 24 hr tablet Take 1 tablet (150 mg) by mouth every morning 90 tablet 1     CALCIUM /VITAMIN D TABS   OR 1 TABLET DAILY       Cholecalciferol (VITAMIN D PO)        Cyanocobalamin (VITAMIN B-12 PO)        cyclobenzaprine (FLEXERIL) 5 MG tablet Take 1 tablet (5 mg) by mouth 3 times daily as needed for muscle spasms 30 tablet 0     Famotidine (PEPCID PO)        GLUCOSAMINE SULFATE 1000 MG OR CAPS 1 daily       MAGNESIUM OXIDE PO        MECLIZINE HCL PO Take by mouth as needed for dizziness       MELATONIN PO        mometasone-formoterol (DULERA) 100-5 MCG/ACT oral inhaler Inhale 1 puff into the lungs 2 times daily        NAPROXEN PO Take by mouth as needed        nortriptyline (PAMELOR) 25 MG capsule Take 1 capsule (25 mg) by mouth At Bedtime 90 capsule 1     Omega-3 Fatty Acids (OMEGA-3 FISH OIL PO)        PROAIR  (90 BASE) MCG/ACT IN AERS as needed 1 prn      "raloxifene (EVISTA) 60 MG tablet Take 1 tablet (60 mg) by mouth daily 90 tablet 3     Riboflavin (VITAMIN B-2 PO)        sertraline (ZOLOFT) 25 MG tablet Take 1 tablet (25 mg) by mouth daily 30 tablet 5     Simethicone (GAS-X PO)        Thiamine HCl (VITAMIN B-1 PO)        triamcinolone (NASACORT) 55 MCG/ACT nasal inhaler Spray 2 sprays into both nostrils daily       UNABLE TO FIND daily MEDICATION NAME: Tumeric       UNABLE TO FIND MEDICATION NAME: Colloidal Silver in water swishes does not swallow.       VITAMIN C-MARK HIPS CR 1000 MG OR TBCR 1 TABLET DAILY       VITAMIN E PO        VITAMINS/MINERALS TABS   OR 1 TABLET DAILY         ALLERGIES     Allergies   Allergen Reactions     Penicillins Itching     Yeast infection afterward       PAST MEDICAL, SURGICAL, FAMILY, SOCIAL HISTORY:  History was reviewed and updated as needed, see medical record.    Review of Systems:  A 12-point review of systems was completed, see medical record for detailed review of systems information.    Physical Exam:  Vitals: /76 (BP Location: Right arm, Patient Position: Fowlers, Cuff Size: Adult Regular)   Pulse 88   Ht 1.651 m (5' 5\")   Wt 62.8 kg (138 lb 8 oz)   LMP 02/26/2008 (Approximate)   SpO2 98%   BMI 23.05 kg/m      Constitutional:           Skin:           Head:           Eyes:           ENT:           Neck:           Chest:           Cardiac:                    Abdomen:           Vascular:                                        Extremities and Back:           Neurological:           ASSESSMENT: No evidence of pathologic rhythm. Cholesterol and SBP ideal.        RECOMMENDATIONS:   1) Mediterranean style diet and exercise program   No further cardiac testing  Consider thyroid function testing      Recent Lab Results:  LIPID RESULTS:  Lab Results   Component Value Date    CHOL 155 11/21/2019    HDL 76 11/21/2019    LDL 66 11/21/2019    TRIG 67 11/21/2019    CHOLHDLRATIO 1.8 01/14/2015       LIVER ENZYME RESULTS:  Lab " Results   Component Value Date    AST 23 10/14/2019    ALT 27 10/14/2019       CBC RESULTS:  Lab Results   Component Value Date    WBC 7.6 10/14/2019    RBC 4.20 10/14/2019    HGB 13.1 10/14/2019    HCT 40.2 10/14/2019    MCV 96 10/14/2019    MCH 31.2 10/14/2019    MCHC 32.6 10/14/2019    RDW 12.7 10/14/2019     10/14/2019       BMP RESULTS:  Lab Results   Component Value Date     11/21/2019    POTASSIUM 3.9 11/21/2019    CHLORIDE 107 11/21/2019    CO2 30 11/21/2019    ANIONGAP 4 11/21/2019    GLC 93 11/21/2019    BUN 17 11/21/2019    CR 1.03 11/21/2019    GFRESTIMATED 56 (L) 11/21/2019    GFRESTBLACK 65 11/21/2019    TOLU 8.6 11/21/2019        A1C RESULTS:  No results found for: A1C    INR RESULTS:  Lab Results   Component Value Date    INR 1.00 01/30/2012       We greatly appreciate the opportunity to be involved in the care of your patient, Margarteh Green.    Sincerely,  Lui Tyson MD      CC  [facility]  [address] [place] [address]

## 2019-12-02 ENCOUNTER — DOCUMENTATION ONLY (OUTPATIENT)
Dept: OTHER | Facility: CLINIC | Age: 64
End: 2019-12-02

## 2019-12-02 ENCOUNTER — MYC MEDICAL ADVICE (OUTPATIENT)
Dept: OTOLARYNGOLOGY | Facility: CLINIC | Age: 64
End: 2019-12-02

## 2019-12-02 NOTE — TELEPHONE ENCOUNTER
I have discussed the results of MRI with the patient.  She understands that venous formation that was noted may need further evaluation with neurosurgery.  I am placing an referral order to neurosurgery here in [place].  Sly Amador MD

## 2019-12-03 ENCOUNTER — MYC MEDICAL ADVICE (OUTPATIENT)
Dept: OTOLARYNGOLOGY | Facility: CLINIC | Age: 64
End: 2019-12-03

## 2019-12-03 DIAGNOSIS — Q28.3: Primary | ICD-10-CM

## 2019-12-12 ENCOUNTER — TELEPHONE (OUTPATIENT)
Dept: NEUROSURGERY | Facility: OTHER | Age: 64
End: 2019-12-12

## 2019-12-12 ENCOUNTER — MYC MEDICAL ADVICE (OUTPATIENT)
Dept: FAMILY MEDICINE | Facility: OTHER | Age: 64
End: 2019-12-12

## 2019-12-12 ENCOUNTER — OFFICE VISIT (OUTPATIENT)
Dept: NEUROSURGERY | Facility: CLINIC | Age: 64
End: 2019-12-12
Payer: COMMERCIAL

## 2019-12-12 VITALS
OXYGEN SATURATION: 99 % | HEART RATE: 99 BPM | WEIGHT: 137.9 LBS | BODY MASS INDEX: 24.43 KG/M2 | TEMPERATURE: 98.3 F | HEIGHT: 63 IN | DIASTOLIC BLOOD PRESSURE: 86 MMHG | SYSTOLIC BLOOD PRESSURE: 140 MMHG

## 2019-12-12 DIAGNOSIS — G50.1 ATYPICAL FACIAL PAIN: ICD-10-CM

## 2019-12-12 DIAGNOSIS — I67.9 CEREBRAL VASCULAR DISORDER: Primary | ICD-10-CM

## 2019-12-12 PROCEDURE — 99203 OFFICE O/P NEW LOW 30 MIN: CPT | Performed by: NEUROLOGICAL SURGERY

## 2019-12-12 ASSESSMENT — MIFFLIN-ST. JEOR: SCORE: 1126.7

## 2019-12-12 ASSESSMENT — PAIN SCALES - GENERAL: PAINLEVEL: NO PAIN (0)

## 2019-12-12 NOTE — PROGRESS NOTES
"I was asked by Dr. Amador to see this patient in consultation    64F w/ pulsatile tinnitus, left facial pain, left jugular vein dilation.  Chronic ringing in the left ear, worse in the last 6 months.  Worse with elevated heart rate and activity.  Describes as a \"spring or soft animal\" type of noise.  Daily, nearly constant.  No hearing loss.  Also having dysesthesia, and occasional sharp pains in the left face.  MR Brain without overt trigeminal compression.  Also shows left dominant venous system, with large jugular vein, and possible focal stenosis near the jugular foramen.       Past Medical History:   Diagnosis Date     Abnormal Papanicolaou smear of vagina and vaginal HPV 12/02    ASCUS (HPV neg). 3/03 WNL but no transitional zome - repeat 3months     Allergic rhinitis, cause unspecified      Arthritis      Backache, unspecified      Carpal tunnel syndrome     s/p repair     Cervicalgia      Depressive disorder      Depressive disorder, not elsewhere classified     doing well on Wellbutrin     Derangement of TMJ (temporomandibular joint) 3/2010    internal derangement     Dermatophytosis of nail      Diaphragmatic hernia without mention of obstruction or gangrene 9/07    small hiatal hernia     Diffuse cystic mastopathy 2000    FCBD     Esophageal reflux 4/2005     Hammertoe 10/09    see podiatry consult     Headache(784.0)     Muscle contrature headaches     Intramural leiomyoma of uterus 7/2007    on US     Irregular menstrual cycle     better with BCP -also PMS is better with BCP     IRRITABLE COLON 2/18/2008     Menopause     lmp 2/2008     Mild persistent asthma      Mole (skin) 2003    9 x 6 mm rt ant chest     Motion sickness      Neuroma 11/2008    lt foot     Osteopenia 12/2008     Other malaise and fatigue 4/2005    stress     Personal history of tobacco use, presenting hazards to health     Quit in 1998     Solitary cyst of breast 12/2005    lt 6 oclock     Stenosis of lumbosacral spine     l4-5 " and l5-s1     Symptomatic menopausal or female climacteric states 12/2002    perimenopausal. LMP 2/2008     Past Surgical History:   Procedure Laterality Date     BIOPSY  4/10/2015     BL DUPLEX LO EXTREM ART UNILAT/LTD  4/3008    lower extr arterial doppler -no stenosis     C DEXA,BONE DENSITY,APPENDICULR SKELTN  12/2008    osteopenia     C NONSPECIFIC PROCEDURE  2009    Metatarsal phalangeal joint injection     COLONOSCOPY  10/22/2007    bx benign     COLONOSCOPY N/A 4/10/2015    Procedure: COMBINED COLONOSCOPY, SINGLE OR MULTIPLE BIOPSY/POLYPECTOMY BY BIOPSY;  Surgeon: Cl Wagner MD;  Location:  GI     ESOPHAGOSCOPY, GASTROSCOPY, DUODENOSCOPY (EGD), COMBINED N/A 1/31/2018    Procedure: COMBINED ESOPHAGOSCOPY, GASTROSCOPY, DUODENOSCOPY (EGD);  COMBINED ESOPHAGOSCOPY, GASTROSCOPY, DUODENOSCOPY (EGD);  Surgeon: Tirso Duran MD;  Location:  GI     EYE SURGERY       HC COLONOSCOPY THRU STOMA, DIAGNOSTIC  7/2002    bx. benign - flex sig in 5 yrs  or colonoscopy in 10 yrs     HC ECHO HEART XTHORACIC, STRESS/REST  2/2005    WNL     HC NCS MOTOR W/O F-WAVE, EACH NERVE  7/2008    CTS     HC PART REMV BONE METATARSAL HEAD,EA  01/11/10    Right foot plantar plate tear. Also correct hammertoe, 2nd digit & varicose vein ligation, heel.     HC REVISE MEDIAN N/CARPAL TUNNEL SURG      Left in 1997, right in 1999     INJ, ANES/STER, FACET LUMB/SAC  2010    Left L5 nerve root injection     INJECT EPIDURAL TRANSFORAMINAL  10/09/2013    [facility]     INJECT JOINT SACROILIAC  3/19/14,4/21/14    [facility]     Social History     Socioeconomic History     Marital status:      Spouse name: Robel     Number of children: 1     Years of education: 12     Highest education level: Not on file   Occupational History     Occupation: HIMS clerk     Employer: [employer]   Social Needs     Financial resource strain: Not on file     Food insecurity:     Worry: Not on file     Inability:  Not on file     Transportation needs:     Medical: Not on file     Non-medical: Not on file   Tobacco Use     Smoking status: Former Smoker     Packs/day: 0.50     Years: 30.00     Pack years: 15.00     Types: Cigarettes     Last attempt to quit: 3/20/1998     Years since quittin.7     Smokeless tobacco: Never Used     Tobacco comment: No smokers in home   Substance and Sexual Activity     Alcohol use: Yes     Alcohol/week: 6.7 standard drinks     Comment: 2x/week     Drug use: No     Sexual activity: Yes     Partners: Male     Birth control/protection: None     Comment: spouse -vas   Lifestyle     Physical activity:     Days per week: Not on file     Minutes per session: Not on file     Stress: Not on file   Relationships     Social connections:     Talks on phone: Not on file     Gets together: Not on file     Attends [employer]ism service: Not on file     Active member of club or organization: Not on file     Attends meetings of clubs or organizations: Not on file     Relationship status: Not on file     Intimate partner violence:     Fear of current or ex partner: Not on file     Emotionally abused: Not on file     Physically abused: Not on file     Forced sexual activity: Not on file   Other Topics Concern      Service No     Blood Transfusions No     Caffeine Concern Yes     Comment: pop: 1c/wk     Occupational Exposure No     Hobby Hazards No     Sleep Concern Yes     Comment: On meds     Stress Concern Yes     Weight Concern No     Special Diet No     Back Care Yes     Comment: Hx back pain     Exercise Yes     Comment: stretching qd     Bike Helmet No     Seat Belt Yes     Self-Exams Yes     Parent/sibling w/ CABG, MI or angioplasty before 65F 55M? Yes     Comment: Father - Heart attack   Social History Narrative    Lives with spouse.. No domestic violence issues.     Family History   Problem Relation Age of Onset     Diabetes Father      Depression Father      Cerebrovascular Disease Mother         " age 80     Arthritis Mother      Depression Mother         On meds     Eye Disorder Mother         Glaucoma     Osteoporosis Mother      Respiratory Mother          86 YO COPD     Chronic Obstructive Pulmonary Disease Mother      C.A.D. Brother         65 YO MI -     Chronic Obstructive Pulmonary Disease Brother      Myocardial Infarction Brother      Other Cancer Brother      Cancer Brother         ROS: 10 point ROS neg other than the symptoms noted above in the HPI.    Physical Exam  BP (!) 140/86   Pulse 99   Temp 98.3  F (36.8  C) (Temporal)   Ht 1.588 m (5' 2.5\")   Wt 62.6 kg (137 lb 14.4 oz)   LMP 2008 (Approximate)   SpO2 99%   BMI 24.82 kg/m    HEENT:  Normocephalic, atraumatic.  PERRLA.  EOM s intact.  Visual fields full to gross exam  Neck:  Supple, non-tender, without lymphadenopathy.  Heart:  No peripheral edema  Lungs:  No SOB  Abdomen:  Non-distended.   Skin:  Warm and dry.  Extremities:  No edema, cyanosis or clubbing.  Psychiatric:  No apparent distress  Musculoskeletal:  Normal bulk and tone    NEUROLOGICAL EXAMINATION:     Mental status:  Alert and Oriented x 3, speech is fluent.  Cranial nerves:  II-XII intact.   Motor:    Shoulder Abduction:  Right:  5/5   Left:  5/5  Biceps:                      Right:  5/5   Left:  5/5  Triceps:                     Right:  5/5   Left:  5/5  Wrist Extensors:       Right:  5/5   Left:  5/5  Wrist Flexors:           Right:  5/5   Left:  5/5  interosseus :            Right:  5/5   Left:  5/5  Hip Flexion:                Right: 5/5  Left:  5/5  Quadriceps:             Right:  5/5  Left:  5/5  Hamstrings:             Right:  5/5  Left:  5/5  Gastroc Soleus:        Right:  5/5  Left:  5/5  Tib/Ant:                      Right:  5/5  Left:  5/5  EHL:                     Right:  5/5  Left:  5/5  Sensation:  Intact  Reflexes:  Negative Babinski.  Negative Clonus.  Negative Mcdonald's.  Coordination:  Smooth finger to nose testing.   Negative pronator " drift.  Smooth tandem walking.    A/P:  64F w/ pulsatile tinnitus, left facial pain, left jugular vein dilation    I had a discussion with the patient, reviewing the history, symptoms, and imaging  Will obtain MR Venogram  Will refer to Dr. Corrigan to discuss possible role for angiogram

## 2019-12-12 NOTE — LETTER
"    12/12/2019         RE: Margareth Green  [address] [facility] [address]        Dear Colleague,    Thank you for referring your patient, Margareth Green, to the [facility]. Please see a copy of my visit note below.    Margareth Green is a 64 year old female who presents for:  Chief Complaint   Patient presents with     Consult     Referred by Dr. Arita        Initial Vitals:  BP (!) 140/86   Pulse 99   Temp 98.3  F (36.8  C) (Temporal)   Ht 5' 2.5\" (1.588 m)   Wt 137 lb 14.4 oz (62.6 kg)   LMP 02/26/2008 (Approximate)   SpO2 99%   BMI 24.82 kg/m    Estimated body mass index is 24.82 kg/m  as calculated from the following:    Height as of this encounter: 5' 2.5\" (1.588 m).    Weight as of this encounter: 137 lb 14.4 oz (62.6 kg).. Body surface area is 1.66 meters squared. BP completed using cuff size: regular  No Pain (0)      Symptoms:  Ringing in left ear.        Mitra Manzo MA      I was asked by Dr. Amador to see this patient in consultation    64F w/ pulsatile tinnitus, left facial pain, left jugular vein dilation.  Chronic ringing in the left ear, worse in the last 6 months.  Worse with elevated heart rate and activity.  Describes as a \"spring or soft animal\" type of noise.  Daily, nearly constant.  No hearing loss.  Also having dysesthesia, and occasional sharp pains in the left face.  MR Brain without overt trigeminal compression.  Also shows left dominant venous system, with large jugular vein, and possible focal stenosis near the jugular foramen.       Past Medical History:   Diagnosis Date     Abnormal Papanicolaou smear of vagina and vaginal HPV 12/02    ASCUS (HPV neg). 3/03 WNL but no transitional zome - repeat 3months     Allergic rhinitis, cause unspecified      Arthritis      Backache, unspecified      Carpal tunnel syndrome     s/p repair     Cervicalgia      Depressive disorder      Depressive disorder, not elsewhere classified     doing well " on Wellbutrin     Derangement of TMJ (temporomandibular joint) 3/2010    internal derangement     Dermatophytosis of nail      Diaphragmatic hernia without mention of obstruction or gangrene 9/07    small hiatal hernia     Diffuse cystic mastopathy 2000    FCBD     Esophageal reflux 4/2005     Hammertoe 10/09    see podiatry consult     Headache(784.0)     Muscle contrature headaches     Intramural leiomyoma of uterus 7/2007    on US     Irregular menstrual cycle     better with BCP -also PMS is better with BCP     IRRITABLE COLON 2/18/2008     Menopause     lmp 2/2008     Mild persistent asthma      Mole (skin) 2003    9 x 6 mm rt ant chest     Motion sickness      Neuroma 11/2008    lt foot     Osteopenia 12/2008     Other malaise and fatigue 4/2005    stress     Personal history of tobacco use, presenting hazards to health     Quit in 1998     Solitary cyst of breast 12/2005    lt 6 oclock     Stenosis of lumbosacral spine     l4-5 and l5-s1     Symptomatic menopausal or female climacteric states 12/2002    perimenopausal. LMP 2/2008     Past Surgical History:   Procedure Laterality Date     BIOPSY  4/10/2015     BL DUPLEX LO EXTREM ART UNILAT/LTD  4/3008    lower extr arterial doppler -no stenosis     C DEXA,BONE DENSITY,APPENDICULR SKELTN  12/2008    osteopenia     C NONSPECIFIC PROCEDURE  2009    Metatarsal phalangeal joint injection     COLONOSCOPY  10/22/2007    bx benign     COLONOSCOPY N/A 4/10/2015    Procedure: COMBINED COLONOSCOPY, SINGLE OR MULTIPLE BIOPSY/POLYPECTOMY BY BIOPSY;  Surgeon: Cl Wagner MD;  Location:  GI     ESOPHAGOSCOPY, GASTROSCOPY, DUODENOSCOPY (EGD), COMBINED N/A 1/31/2018    Procedure: COMBINED ESOPHAGOSCOPY, GASTROSCOPY, DUODENOSCOPY (EGD);  COMBINED ESOPHAGOSCOPY, GASTROSCOPY, DUODENOSCOPY (EGD);  Surgeon: Tirso Duran MD;  Location:  GI     EYE SURGERY       HC COLONOSCOPY THRU STOMA, DIAGNOSTIC  7/2002    bx. benign - flex sig in 5 yrs  or colonoscopy in 10 yrs      HC ECHO HEART XTHORACIC, STRESS/REST  2005    WNL     HC NCS MOTOR W/O F-WAVE, EACH NERVE  2008    CTS     HC PART REMV BONE METATARSAL HEAD,EA  01/11/10    Right foot plantar plate tear. Also correct hammertoe, 2nd digit & varicose vein ligation, heel.     HC REVISE MEDIAN N/CARPAL TUNNEL SURG      Left in , right in      INJ, ANES/STER, FACET LUMB/SAC      Left L5 nerve root injection     INJECT EPIDURAL TRANSFORAMINAL  10/09/2013    [facility]     INJECT JOINT SACROILIAC  3/19/14,14    [facility]     Social History     Socioeconomic History     Marital status:      Spouse name: Robel     Number of children: 1     Years of education: 12     Highest education level: Not on file   Occupational History     Occupation: HIMS clerk     Employer: [employer]   Social Needs     Financial resource strain: Not on file     Food insecurity:     Worry: Not on file     Inability: Not on file     Transportation needs:     Medical: Not on file     Non-medical: Not on file   Tobacco Use     Smoking status: Former Smoker     Packs/day: 0.50     Years: 30.00     Pack years: 15.00     Types: Cigarettes     Last attempt to quit: 3/20/1998     Years since quittin.7     Smokeless tobacco: Never Used     Tobacco comment: No smokers in home   Substance and Sexual Activity     Alcohol use: Yes     Alcohol/week: 6.7 standard drinks     Comment: 2x/week     Drug use: No     Sexual activity: Yes     Partners: Male     Birth control/protection: None     Comment: spouse -vas   Lifestyle     Physical activity:     Days per week: Not on file     Minutes per session: Not on file     Stress: Not on file   Relationships     Social connections:     Talks on phone: Not on file     Gets together: Not on file     Attends Yazidism service: Not on file     Active member of club or organization: Not on file     Attends meetings of clubs or organizations: Not on file      "Relationship status: Not on file     Intimate partner violence:     Fear of current or ex partner: Not on file     Emotionally abused: Not on file     Physically abused: Not on file     Forced sexual activity: Not on file   Other Topics Concern      Service No     Blood Transfusions No     Caffeine Concern Yes     Comment: pop: 1c/wk     Occupational Exposure No     Hobby Hazards No     Sleep Concern Yes     Comment: On meds     Stress Concern Yes     Weight Concern No     Special Diet No     Back Care Yes     Comment: Hx back pain     Exercise Yes     Comment: stretching qd     Bike Helmet No     Seat Belt Yes     Self-Exams Yes     Parent/sibling w/ CABG, MI or angioplasty before 65F 55M? Yes     Comment: Father - Heart attack   Social History Narrative    Lives with spouse.. No domestic violence issues.     Family History   Problem Relation Age of Onset     Diabetes Father      Depression Father      Cerebrovascular Disease Mother         age 80     Arthritis Mother      Depression Mother         On meds     Eye Disorder Mother         Glaucoma     Osteoporosis Mother      Respiratory Mother          88 YO COPD     Chronic Obstructive Pulmonary Disease Mother      C.A.D. Brother         67 YO MI -     Chronic Obstructive Pulmonary Disease Brother      Myocardial Infarction Brother      Other Cancer Brother      Cancer Brother         ROS: 10 point ROS neg other than the symptoms noted above in the HPI.    Physical Exam  BP (!) 140/86   Pulse 99   Temp 98.3  F (36.8  C) (Temporal)   Ht 1.588 m (5' 2.5\")   Wt 62.6 kg (137 lb 14.4 oz)   LMP 2008 (Approximate)   SpO2 99%   BMI 24.82 kg/m     HEENT:  Normocephalic, atraumatic.  PERRLA.  EOM s intact.  Visual fields full to gross exam  Neck:  Supple, non-tender, without lymphadenopathy.  Heart:  No peripheral edema  Lungs:  No SOB  Abdomen:  Non-distended.   Skin:  Warm and dry.  Extremities:  No edema, cyanosis or clubbing.  Psychiatric:  No " apparent distress  Musculoskeletal:  Normal bulk and tone    NEUROLOGICAL EXAMINATION:     Mental status:  Alert and Oriented x 3, speech is fluent.  Cranial nerves:  II-XII intact.   Motor:    Shoulder Abduction:  Right:  5/5   Left:  5/5  Biceps:                      Right:  5/5   Left:  5/5  Triceps:                     Right:  5/5   Left:  5/5  Wrist Extensors:       Right:  5/5   Left:  5/5  Wrist Flexors:           Right:  5/5   Left:  5/5  interosseus :            Right:  5/5   Left:  5/5  Hip Flexion:                Right: 5/5  Left:  5/5  Quadriceps:             Right:  5/5  Left:  5/5  Hamstrings:             Right:  5/5  Left:  5/5  Gastroc Soleus:        Right:  5/5  Left:  5/5  Tib/Ant:                      Right:  5/5  Left:  5/5  EHL:                     Right:  5/5  Left:  5/5  Sensation:  Intact  Reflexes:  Negative Babinski.  Negative Clonus.  Negative Mcdonald's.  Coordination:  Smooth finger to nose testing.   Negative pronator drift.  Smooth tandem walking.    A/P:  64F w/ pulsatile tinnitus, left facial pain, left jugular vein dilation    I had a discussion with the patient, reviewing the history, symptoms, and imaging  Will obtain MR Venogram  Will refer to Dr. Corrigan to discuss possible role for angiogram         Again, thank you for allowing me to participate in the care of your patient.        Sincerely,        Ethan Cardenas MD

## 2019-12-12 NOTE — PROGRESS NOTES
"Margareth Green is a 64 year old female who presents for:  Chief Complaint   Patient presents with     Consult     Referred by Dr. Arita        Initial Vitals:  BP (!) 140/86   Pulse 99   Temp 98.3  F (36.8  C) (Temporal)   Ht 5' 2.5\" (1.588 m)   Wt 137 lb 14.4 oz (62.6 kg)   LMP 02/26/2008 (Approximate)   SpO2 99%   BMI 24.82 kg/m   Estimated body mass index is 24.82 kg/m  as calculated from the following:    Height as of this encounter: 5' 2.5\" (1.588 m).    Weight as of this encounter: 137 lb 14.4 oz (62.6 kg).. Body surface area is 1.66 meters squared. BP completed using cuff size: regular  No Pain (0)      Symptoms:  Ringing in left ear.        Mitra Manzo MA    "

## 2019-12-12 NOTE — TELEPHONE ENCOUNTER
Reason for Call:  Other call back    Detailed comments: Pt called and said that her scan has to be coded differently for her ins to cover it. Please call her when this is complete so she can schedule     Phone Number Patient can be reached at: Home number on file [phone] (home)    Best Time: NA    Can we leave a detailed message on this number? YES    Call taken on 12/12/2019 at 4:37 PM by Joaquina Rouse

## 2019-12-12 NOTE — PATIENT INSTRUCTIONS
Today's Visit    1. Ordered a MR venogram scan. To schedule, please call [phone]. You should schedule this within one week. We will call you with the results. If you don't hear from us 7 days after the scan, please call us.  2.  Recommendation to see Toñito Loredo at the [facility]: Call Phone: [phone]    Please call our clinic with any questions or concerns    Manuela GUZMAN RN ([facility] Nurse)  [facility] -- [address] [address]  Phone:  [phone]   Fax:  [phone]

## 2019-12-13 ENCOUNTER — TELEPHONE (OUTPATIENT)
Dept: FAMILY MEDICINE | Facility: OTHER | Age: 64
End: 2019-12-13

## 2019-12-13 NOTE — TELEPHONE ENCOUNTER
LM for pt to return call. When call is returned verify pt is going here as I do not see anything in her chart that she does and if so she will need to come and sign a KENIA for us to share records with them as we do not have a KENIA or a referral on file for this clinic. We will also need to know more specifically which date they are looking for.    Kyra Dawson CMA ([facility])

## 2019-12-13 NOTE — TELEPHONE ENCOUNTER
Patient called and was informed by insurance that we need to call [employer], ask for the PA-C line and ask them for the PC code that would cover the procedure. Called [facility] and they stated that we need to talk to our coding department to get a code that will be covered.

## 2019-12-13 NOTE — TELEPHONE ENCOUNTER
LOV with Dr. Cardenas on 12/12. Patient states that when she called to schedule MR venogram yesterday they told her that her insurance will not cover it. They also said that the order needs to be coded differently in order for them to do so. Informed patient that she should call her insurance and find out what they need us to put in order for them to cover it.

## 2019-12-13 NOTE — TELEPHONE ENCOUNTER
Reason for Call:  Other Records    Detailed comments: [facility] in [place] received records on EKGs done but not the OV notes. Please send OV notes from last several appointments.     Phone Number Patient can be reached at: Allergy and Asthma Phone: [phone]   FAX # [phone]    Best Time: yes    Can we leave a detailed message on this number? YES    Call taken on 12/13/2019 at 1:59 PM by Lisset Jacobson

## 2019-12-16 ENCOUNTER — TELEPHONE (OUTPATIENT)
Dept: NEUROSURGERY | Facility: CLINIC | Age: 64
End: 2019-12-16

## 2019-12-16 NOTE — TELEPHONE ENCOUNTER
KENIA left at  to complete. Once this is done we can then fax records as requested below. Will post pone till Wednesday for follow up.    Kyra Dawson CMA ([facility])

## 2019-12-16 NOTE — TELEPHONE ENCOUNTER
Patient called back and said the NP knows she goes there since she was 14 years old.     Patient will stop in to fill out KENIA.

## 2019-12-16 NOTE — TELEPHONE ENCOUNTER
According to MRI scheduling, order needs to be more specific in order for insurance to cover. Addended prior visit and changed order. Called and spoke to [facility] MRI scheduling, and they said that everything should be good to go now. Talked with patient, gave her number to call and schedule. No further questions or concerns at this time.

## 2019-12-18 NOTE — TELEPHONE ENCOUNTER
KENIA still up at [facility]  awaiting completion. Will post pone until Friday 12/20/2019 if has not done by Friday can reach out to patient.     Trudy Rouse MA

## 2019-12-20 ENCOUNTER — HOSPITAL ENCOUNTER (OUTPATIENT)
Dept: MRI IMAGING | Facility: CLINIC | Age: 64
Discharge: HOME | End: 2019-12-20
Attending: NEUROLOGICAL SURGERY | Admitting: NEUROLOGICAL SURGERY
Payer: COMMERCIAL

## 2019-12-20 DIAGNOSIS — I67.9 CEREBRAL VASCULAR DISORDER: ICD-10-CM

## 2019-12-20 DIAGNOSIS — G50.1 ATYPICAL FACIAL PAIN: ICD-10-CM

## 2019-12-20 PROCEDURE — 25500064 ZZH RX 255 OP 636: Performed by: NEUROLOGICAL SURGERY

## 2019-12-20 PROCEDURE — A9585 GADOBUTROL INJECTION: HCPCS | Performed by: NEUROLOGICAL SURGERY

## 2019-12-20 PROCEDURE — 70546 MR ANGIOGRAPH HEAD W/O&W/DYE: CPT

## 2019-12-20 RX ORDER — GADOBUTROL 604.72 MG/ML
7.5 INJECTION INTRAVENOUS ONCE
Status: COMPLETED | OUTPATIENT
Start: 2019-12-20 | End: 2019-12-20

## 2019-12-20 RX ADMIN — GADOBUTROL 6 ML: 604.72 INJECTION INTRAVENOUS at 17:04

## 2019-12-20 NOTE — TELEPHONE ENCOUNTER
Spoke with patient she states she is aware and will stop by to sign in the next week or so  Postponing   Thanks  Nina Husain RT (R)

## 2019-12-23 ENCOUNTER — TELEPHONE (OUTPATIENT)
Dept: NEUROSURGERY | Facility: CLINIC | Age: 64
End: 2019-12-23

## 2019-12-23 NOTE — TELEPHONE ENCOUNTER
Per Dr. Cardenas, MR Venogram didn't show any obvious abnormality, although there still could be a focal stenosis as we were concerned about on the MRI.  Okay to see Dr. Corrigan as previously discussed (referral already placed). Dr. Cardenas also said if the symptoms aren't too bothersome, we can plan for conservative management and observation.  No high risk pathologic issues going on.     Discussed options with patient and she has decided to monitor her symptoms for now. Advised her to call our clinic with any questions/concerns and provided her with the # for scheduling with Dr. Corrigan. Patient voiced agreement with plan.

## 2020-01-07 NOTE — TELEPHONE ENCOUNTER
Closing encounter as staff at clinic have not seen pt come to do this and it is not scanned into her chart. Multiple attempts made to have KENIA signed.    Kyra Dawson CMA ([facility])

## 2020-01-10 ENCOUNTER — MYC MEDICAL ADVICE (OUTPATIENT)
Dept: FAMILY MEDICINE | Facility: OTHER | Age: 65
End: 2020-01-10

## 2020-01-13 ENCOUNTER — TRANSFERRED RECORDS (OUTPATIENT)
Dept: HEALTH INFORMATION MANAGEMENT | Facility: CLINIC | Age: 65
End: 2020-01-13
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
## 2020-02-04 ASSESSMENT — ANXIETY QUESTIONNAIRES
GAD7 TOTAL SCORE: 7
2. NOT BEING ABLE TO STOP OR CONTROL WORRYING: SEVERAL DAYS
3. WORRYING TOO MUCH ABOUT DIFFERENT THINGS: SEVERAL DAYS
4. TROUBLE RELAXING: SEVERAL DAYS
7. FEELING AFRAID AS IF SOMETHING AWFUL MIGHT HAPPEN: SEVERAL DAYS
7. FEELING AFRAID AS IF SOMETHING AWFUL MIGHT HAPPEN: SEVERAL DAYS
1. FEELING NERVOUS, ANXIOUS, OR ON EDGE: SEVERAL DAYS
5. BEING SO RESTLESS THAT IT IS HARD TO SIT STILL: SEVERAL DAYS
GAD7 TOTAL SCORE: 7
6. BECOMING EASILY ANNOYED OR IRRITABLE: SEVERAL DAYS
GAD7 TOTAL SCORE: 7

## 2020-02-05 ENCOUNTER — OFFICE VISIT (OUTPATIENT)
Dept: FAMILY MEDICINE | Facility: OTHER | Age: 65
End: 2020-02-05
Payer: COMMERCIAL

## 2020-02-05 VITALS
TEMPERATURE: 98.1 F | WEIGHT: 138.8 LBS | HEART RATE: 88 BPM | RESPIRATION RATE: 16 BRPM | DIASTOLIC BLOOD PRESSURE: 70 MMHG | HEIGHT: 62 IN | BODY MASS INDEX: 25.54 KG/M2 | SYSTOLIC BLOOD PRESSURE: 120 MMHG

## 2020-02-05 DIAGNOSIS — F33.0 MAJOR DEPRESSIVE DISORDER, RECURRENT EPISODE, MILD (H): ICD-10-CM

## 2020-02-05 DIAGNOSIS — R94.4 ABNORMAL RENAL FUNCTION TEST: ICD-10-CM

## 2020-02-05 DIAGNOSIS — F41.9 ANXIETY: ICD-10-CM

## 2020-02-05 DIAGNOSIS — G47.09 OTHER INSOMNIA: Primary | ICD-10-CM

## 2020-02-05 PROCEDURE — 99214 OFFICE O/P EST MOD 30 MIN: CPT | Performed by: PHYSICIAN ASSISTANT

## 2020-02-05 RX ORDER — SERTRALINE HYDROCHLORIDE 25 MG/1
25 TABLET, FILM COATED ORAL DAILY
Qty: 90 TABLET | Refills: 1 | Status: SHIPPED | OUTPATIENT
Start: 2020-02-05 | End: 2020-09-17 | Stop reason: ALTCHOICE

## 2020-02-05 RX ORDER — AMITRIPTYLINE HYDROCHLORIDE 10 MG/1
30-40 TABLET ORAL AT BEDTIME
Qty: 270 TABLET | Status: SHIPPED | OUTPATIENT
Start: 2020-02-05 | End: 2021-04-13

## 2020-02-05 ASSESSMENT — ANXIETY QUESTIONNAIRES: GAD7 TOTAL SCORE: 7

## 2020-02-05 ASSESSMENT — PAIN SCALES - GENERAL: PAINLEVEL: NO PAIN (0)

## 2020-02-05 ASSESSMENT — PATIENT HEALTH QUESTIONNAIRE - PHQ9: SUM OF ALL RESPONSES TO PHQ QUESTIONS 1-9: 6

## 2020-02-05 ASSESSMENT — MIFFLIN-ST. JEOR: SCORE: 1121.09

## 2020-02-06 ASSESSMENT — ASTHMA QUESTIONNAIRES: ACT_TOTALSCORE: 22

## 2020-03-04 DIAGNOSIS — R94.4 ABNORMAL RENAL FUNCTION TEST: ICD-10-CM

## 2020-03-04 LAB
CREAT SERPL-MCNC: 0.92 MG/DL (ref 0.52–1.04)
GFR SERPL CREATININE-BSD FRML MDRD: 64 ML/MIN/{1.73_M2}

## 2020-03-04 PROCEDURE — 82565 ASSAY OF CREATININE: CPT | Performed by: PHYSICIAN ASSISTANT

## 2020-03-04 PROCEDURE — 36415 COLL VENOUS BLD VENIPUNCTURE: CPT | Performed by: PHYSICIAN ASSISTANT

## 2020-03-16 ENCOUNTER — NURSE TRIAGE (OUTPATIENT)
Dept: FAMILY MEDICINE | Facility: OTHER | Age: 65
End: 2020-03-16

## 2020-03-16 ENCOUNTER — APPOINTMENT (OUTPATIENT)
Dept: CT IMAGING | Facility: CLINIC | Age: 65
End: 2020-03-16
Attending: EMERGENCY MEDICINE
Payer: COMMERCIAL

## 2020-03-16 ENCOUNTER — HOSPITAL ENCOUNTER (EMERGENCY)
Facility: CLINIC | Age: 65
Discharge: HOME | End: 2020-03-16
Attending: EMERGENCY MEDICINE | Admitting: EMERGENCY MEDICINE
Payer: COMMERCIAL

## 2020-03-16 VITALS
RESPIRATION RATE: 18 BRPM | OXYGEN SATURATION: 100 % | BODY MASS INDEX: 24.71 KG/M2 | DIASTOLIC BLOOD PRESSURE: 83 MMHG | SYSTOLIC BLOOD PRESSURE: 143 MMHG | HEART RATE: 82 BPM | TEMPERATURE: 98.6 F | WEIGHT: 136 LBS

## 2020-03-16 DIAGNOSIS — K57.32 DIVERTICULITIS OF COLON: ICD-10-CM

## 2020-03-16 LAB
ALBUMIN SERPL-MCNC: 3.6 G/DL (ref 3.4–5)
ALBUMIN UR-MCNC: NEGATIVE MG/DL
ALP SERPL-CCNC: 56 U/L (ref 40–150)
ALT SERPL W P-5'-P-CCNC: 23 U/L (ref 0–50)
ANION GAP SERPL CALCULATED.3IONS-SCNC: 5 MMOL/L (ref 3–14)
APPEARANCE UR: ABNORMAL
AST SERPL W P-5'-P-CCNC: 20 U/L (ref 0–45)
BASOPHILS # BLD AUTO: 0.1 10E9/L (ref 0–0.2)
BASOPHILS NFR BLD AUTO: 0.7 %
BILIRUB SERPL-MCNC: 0.4 MG/DL (ref 0.2–1.3)
BILIRUB UR QL STRIP: NEGATIVE
BUN SERPL-MCNC: 17 MG/DL (ref 7–30)
CALCIUM SERPL-MCNC: 8.5 MG/DL (ref 8.5–10.1)
CHLORIDE SERPL-SCNC: 107 MMOL/L (ref 94–109)
CO2 SERPL-SCNC: 28 MMOL/L (ref 20–32)
COLOR UR AUTO: ABNORMAL
CREAT SERPL-MCNC: 0.91 MG/DL (ref 0.52–1.04)
DIFFERENTIAL METHOD BLD: NORMAL
EOSINOPHIL NFR BLD AUTO: 3.3 %
ERYTHROCYTE [DISTWIDTH] IN BLOOD BY AUTOMATED COUNT: 13.1 % (ref 10–15)
GFR SERPL CREATININE-BSD FRML MDRD: 65 ML/MIN/{1.73_M2}
GLUCOSE SERPL-MCNC: 92 MG/DL (ref 70–99)
GLUCOSE UR STRIP-MCNC: NEGATIVE MG/DL
HCT VFR BLD AUTO: 40.4 % (ref 35–47)
HGB BLD-MCNC: 13.2 G/DL (ref 11.7–15.7)
HGB UR QL STRIP: NEGATIVE
HYALINE CASTS #/AREA URNS LPF: 3 /LPF (ref 0–2)
IMM GRANULOCYTES # BLD: 0.1 10E9/L (ref 0–0.4)
IMM GRANULOCYTES NFR BLD: 0.5 %
KETONES UR STRIP-MCNC: NEGATIVE MG/DL
LEUKOCYTE ESTERASE UR QL STRIP: NEGATIVE
LIPASE SERPL-CCNC: 195 U/L (ref 73–393)
LYMPHOCYTES # BLD AUTO: 2.3 10E9/L (ref 0.8–5.3)
LYMPHOCYTES NFR BLD AUTO: 21.4 %
MCH RBC QN AUTO: 31 PG (ref 26.5–33)
MCHC RBC AUTO-ENTMCNC: 32.7 G/DL (ref 31.5–36.5)
MCV RBC AUTO: 95 FL (ref 78–100)
MONOCYTES # BLD AUTO: 1 10E9/L (ref 0–1.3)
MONOCYTES NFR BLD AUTO: 8.8 %
MUCOUS THREADS #/AREA URNS LPF: PRESENT /LPF
NEUTROPHILS # BLD AUTO: 7 10E9/L (ref 1.6–8.3)
NEUTROPHILS NFR BLD AUTO: 65.3 %
NITRATE UR QL: NEGATIVE
NRBC # BLD AUTO: 0 10*3/UL
NRBC BLD AUTO-RTO: 0 /100
PH UR STRIP: 5 PH (ref 5–7)
PLATELET # BLD AUTO: 244 10E9/L (ref 150–450)
POTASSIUM SERPL-SCNC: 3.6 MMOL/L (ref 3.4–5.3)
PROT SERPL-MCNC: 7.1 G/DL (ref 6.8–8.8)
RBC # BLD AUTO: 4.26 10E12/L (ref 3.8–5.2)
RBC #/AREA URNS AUTO: 6 /HPF (ref 0–2)
SODIUM SERPL-SCNC: 140 MMOL/L (ref 133–144)
SOURCE: ABNORMAL
SP GR UR STRIP: 1.02 (ref 1–1.03)
SQUAMOUS #/AREA URNS AUTO: 1 /HPF (ref 0–1)
UROBILINOGEN UR STRIP-MCNC: 0 MG/DL (ref 0–2)
WBC # BLD AUTO: 10.8 10E9/L (ref 4–11)
WBC #/AREA URNS AUTO: 2 /HPF (ref 0–5)

## 2020-03-16 PROCEDURE — 25800030 ZZH RX IP 258 OP 636: Performed by: EMERGENCY MEDICINE

## 2020-03-16 PROCEDURE — 96374 THER/PROPH/DIAG INJ IV PUSH: CPT | Mod: 59 | Performed by: EMERGENCY MEDICINE

## 2020-03-16 PROCEDURE — 25000128 H RX IP 250 OP 636: Performed by: EMERGENCY MEDICINE

## 2020-03-16 PROCEDURE — 74177 CT ABD & PELVIS W/CONTRAST: CPT

## 2020-03-16 PROCEDURE — 83690 ASSAY OF LIPASE: CPT | Performed by: EMERGENCY MEDICINE

## 2020-03-16 PROCEDURE — 85025 COMPLETE CBC W/AUTO DIFF WBC: CPT | Performed by: EMERGENCY MEDICINE

## 2020-03-16 PROCEDURE — 81001 URINALYSIS AUTO W/SCOPE: CPT | Performed by: EMERGENCY MEDICINE

## 2020-03-16 PROCEDURE — 96361 HYDRATE IV INFUSION ADD-ON: CPT | Performed by: EMERGENCY MEDICINE

## 2020-03-16 PROCEDURE — 99284 EMERGENCY DEPT VISIT MOD MDM: CPT | Mod: Z6 | Performed by: EMERGENCY MEDICINE

## 2020-03-16 PROCEDURE — 80053 COMPREHEN METABOLIC PANEL: CPT | Performed by: EMERGENCY MEDICINE

## 2020-03-16 PROCEDURE — 99285 EMERGENCY DEPT VISIT HI MDM: CPT | Mod: 25 | Performed by: EMERGENCY MEDICINE

## 2020-03-16 RX ORDER — CIPROFLOXACIN 500 MG/1
500 TABLET, FILM COATED ORAL 2 TIMES DAILY
Qty: 14 TABLET | Refills: 0 | Status: SHIPPED | OUTPATIENT
Start: 2020-03-16 | End: 2020-03-23

## 2020-03-16 RX ORDER — METRONIDAZOLE 500 MG/1
500 TABLET ORAL 4 TIMES DAILY
Qty: 28 TABLET | Refills: 0 | Status: SHIPPED | OUTPATIENT
Start: 2020-03-16 | End: 2020-03-23

## 2020-03-16 RX ORDER — SODIUM CHLORIDE 9 MG/ML
1000 INJECTION, SOLUTION INTRAVENOUS CONTINUOUS
Status: DISCONTINUED | OUTPATIENT
Start: 2020-03-16 | End: 2020-03-16 | Stop reason: HOSPADM

## 2020-03-16 RX ORDER — IOPAMIDOL 755 MG/ML
100 INJECTION, SOLUTION INTRAVASCULAR ONCE
Status: COMPLETED | OUTPATIENT
Start: 2020-03-16 | End: 2020-03-16

## 2020-03-16 RX ORDER — KETOROLAC TROMETHAMINE 15 MG/ML
15 INJECTION, SOLUTION INTRAMUSCULAR; INTRAVENOUS ONCE
Status: COMPLETED | OUTPATIENT
Start: 2020-03-16 | End: 2020-03-16

## 2020-03-16 RX ORDER — OXYCODONE AND ACETAMINOPHEN 5; 325 MG/1; MG/1
1-2 TABLET ORAL EVERY 4 HOURS PRN
Qty: 12 TABLET | Refills: 0 | Status: SHIPPED | OUTPATIENT
Start: 2020-03-16 | End: 2020-09-17

## 2020-03-16 RX ADMIN — KETOROLAC TROMETHAMINE 15 MG: 15 INJECTION, SOLUTION INTRAMUSCULAR; INTRAVENOUS at 15:57

## 2020-03-16 RX ADMIN — SODIUM CHLORIDE 1000 ML: 9 INJECTION, SOLUTION INTRAVENOUS at 15:55

## 2020-03-16 RX ADMIN — IOPAMIDOL 70 ML: 755 INJECTION, SOLUTION INTRAVENOUS at 16:52

## 2020-03-16 NOTE — ED PROVIDER NOTES
"  History     Chief Complaint   Patient presents with     Abdominal Pain     The history is provided by the patient.     Margareth Green is a 65 year old female who presents to the emergency department for abdominal pain. Patient reports having lower abdominal pain for 3 days. She states it is lower/generalized abdominal pain. She states the pain is worse when she goes from sitting to standing or when she bends over. She states it is a constant pain. She did have a loose stool today. She reports having headaches and back pain with it. She denies any nausea, vomiting, diarrhea, urinary symptoms, vaginal bleeding, fever or chills. She denies any known injury, history of kidney infections, abdominal surgeries, exposure, recent travel or recent antibiotic use. She has had a colonoscopy done which was \"normal.\"  However records show that in 2015 her colonoscopy revealed diverticulosis of the sigmoid colon.  Patient states she has asthma but has not been coughing or have any shortness of breath recently.    Allergies:  Allergies   Allergen Reactions     Penicillins Itching     Yeast infection afterward       Problem List:    Patient Active Problem List    Diagnosis Date Noted     Gastroesophageal reflux disease without esophagitis 11/30/2016     Priority: Medium     Tremor 11/30/2016     Priority: Medium     Cramp of limb 11/30/2016     Priority: Medium     Major depressive disorder, recurrent episode, moderate (H) 10/13/2015     Priority: Medium     Epistaxis 08/15/2013     Priority: Medium     Do you wish to do the replacement in the background? yes         Numbness of left jaw 08/07/2013     Priority: Medium     Neck pain 08/07/2013     Priority: Medium     TMJ (temporomandibular joint syndrome) 08/07/2013     Priority: Medium     Thyroid fullness 08/07/2013     Priority: Medium     Menopausal flushing 05/10/2012     Priority: Medium     Knee pain, left 03/22/2012     Priority: Medium     Mild major depression (H) " 11/21/2011     Priority: Medium     Osteoporosis 12/30/2010     Priority: Medium     Atrophic vaginitis 12/07/2010     Priority: Medium     Fall 10/19/2010     Priority: Medium     Stenosis of lumbosacral spine      Priority: Medium     l4-5 and l5-s1       Derangement of TMJ (temporomandibular joint) 03/04/2010     Priority: Medium     Hammertoe      Priority: Medium     Osteopenia      Priority: Medium     Menopausal syndrome 12/06/2008     Priority: Medium     DDD (degenerative disc disease), lumbar 06/24/2008     Priority: Medium     Arthralgia 06/12/2008     Priority: Medium     Irritable bowel syndrome 02/18/2008     Priority: Medium     Allergic rhinitis 11/25/2005     Priority: Medium     Problem list name updated by automated process. Provider to review       Mild persistent asthma 10/25/2005     Priority: Medium     Cervicalgia 10/25/2005     Priority: Medium     Malaise and fatigue 04/26/2005     Priority: Medium     Problem list name updated by automated process. Provider to review       Insomnia 12/09/2004     Priority: Medium     Problem list name updated by automated process. Provider to review       TAMMY (generalized anxiety disorder) 09/12/2003     Priority: Medium     Generalized osteoarthrosis, unspecified site 12/19/2002     Priority: Medium     Symptomatic menopausal or female climacteric states 12/15/2002     Priority: Medium     Dermatophytosis of nail 08/20/2002     Priority: Medium        Past Medical History:    Past Medical History:   Diagnosis Date     Abnormal Papanicolaou smear of vagina and vaginal HPV 12/02     Allergic rhinitis, cause unspecified      Arthritis      Backache, unspecified      Carpal tunnel syndrome      Cervicalgia      Depressive disorder      Depressive disorder, not elsewhere classified      Derangement of TMJ (temporomandibular joint) 3/2010     Dermatophytosis of nail      Diaphragmatic hernia without mention of obstruction or gangrene 9/07     Diffuse cystic  mastopathy 2000     Esophageal reflux 4/2005     Hammertoe 10/09     Headache(784.0)      Intramural leiomyoma of uterus 7/2007     Irregular menstrual cycle      IRRITABLE COLON 2/18/2008     Malaise and fatigue      Menopause      Mild persistent asthma      Mole (skin) 2003     Motion sickness      Neuroma 11/2008     Osteopenia 12/2008     Other malaise and fatigue 4/2005     Personal history of tobacco use, presenting hazards to health      Solitary cyst of breast 12/2005     Stenosis of lumbosacral spine      Symptomatic menopausal or female climacteric states 12/2002       Past Surgical History:    Past Surgical History:   Procedure Laterality Date     BIOPSY  4/10/2015     BL DUPLEX LO EXTREM ART UNILAT/LTD  4/3008    lower extr arterial doppler -no stenosis     C DEXA,BONE DENSITY,APPENDICULR SKELTN  12/2008    osteopenia     C NONSPECIFIC PROCEDURE  2009    Metatarsal phalangeal joint injection     COLONOSCOPY  10/22/2007    bx benign     COLONOSCOPY N/A 4/10/2015    Procedure: COMBINED COLONOSCOPY, SINGLE OR MULTIPLE BIOPSY/POLYPECTOMY BY BIOPSY;  Surgeon: Cl Wagner MD;  Location:  GI     ESOPHAGOSCOPY, GASTROSCOPY, DUODENOSCOPY (EGD), COMBINED N/A 1/31/2018    Procedure: COMBINED ESOPHAGOSCOPY, GASTROSCOPY, DUODENOSCOPY (EGD);  COMBINED ESOPHAGOSCOPY, GASTROSCOPY, DUODENOSCOPY (EGD);  Surgeon: Tirso uDran MD;  Location:  GI     EYE SURGERY       HC COLONOSCOPY THRU STOMA, DIAGNOSTIC  7/2002    bx. benign - flex sig in 5 yrs  or colonoscopy in 10 yrs     HC ECHO HEART XTHORACIC, STRESS/REST  2/2005    WNL     HC NCS MOTOR W/O F-WAVE, EACH NERVE  7/2008    CTS     HC PART REMV BONE METATARSAL HEAD,EA  01/11/10    Right foot plantar plate tear. Also correct hammertoe, 2nd digit & varicose vein ligation, heel.     HC REVISE MEDIAN N/CARPAL TUNNEL SURG      Left in 1997, right in 1999     INJ, ANES/STER, FACET LUMB/SAC  2010    Left L5 nerve root injection     INJECT EPIDURAL TRANSFORAMINAL   10/09/2013    [facility]     INJECT JOINT SACROILIAC  3/19/14,14    [facility]       Family History:    Family History   Problem Relation Age of Onset     Diabetes Father      Depression Father      Cerebrovascular Disease Mother         age 80     Arthritis Mother      Depression Mother         On meds     Eye Disorder Mother         Glaucoma     Osteoporosis Mother      Respiratory Mother          86 YO COPD     Chronic Obstructive Pulmonary Disease Mother      C.A.D. Brother         67 YO MI -     Chronic Obstructive Pulmonary Disease Brother      Myocardial Infarction Brother      Other Cancer Brother      Cancer Brother        Social History:  Marital Status:   [2]  Social History     Tobacco Use     Smoking status: Former Smoker     Packs/day: 0.50     Years: 30.00     Pack years: 15.00     Types: Cigarettes     Last attempt to quit: 3/20/1998     Years since quittin.0     Smokeless tobacco: Never Used     Tobacco comment: No smokers in home   Substance Use Topics     Alcohol use: Yes     Alcohol/week: 6.7 standard drinks     Comment: 2x/week     Drug use: No        Medications:    ciprofloxacin (CIPRO) 500 MG tablet  metroNIDAZOLE (FLAGYL) 500 MG tablet  oxyCODONE-acetaminophen (PERCOCET) 5-325 MG tablet  ACETAMINOPHEN PO  ALLEGRA 180 MG OR TABS  amitriptyline (ELAVIL) 10 MG tablet  B Complex Vitamins (VITAMIN B COMPLEX) tablet  buPROPion (WELLBUTRIN XL) 150 MG 24 hr tablet  CALCIUM /VITAMIN D TABS   OR  Cholecalciferol (VITAMIN D PO)  Cyanocobalamin (VITAMIN B-12 PO)  cyclobenzaprine (FLEXERIL) 5 MG tablet  Famotidine (PEPCID PO)  GLUCOSAMINE SULFATE 1000 MG OR CAPS  MAGNESIUM OXIDE PO  MECLIZINE HCL PO  MELATONIN PO  mometasone-formoterol (DULERA) 100-5 MCG/ACT oral inhaler  NAPROXEN PO  Omega-3 Fatty Acids (OMEGA-3 FISH OIL PO)  PROAIR  (90 BASE) MCG/ACT IN AERS  raloxifene (EVISTA) 60 MG tablet  Riboflavin (VITAMIN B-2 PO)  sertraline (ZOLOFT) 25 MG  tablet  Simethicone (GAS-X PO)  Thiamine HCl (VITAMIN B-1 PO)  triamcinolone (NASACORT) 55 MCG/ACT nasal inhaler  UNABLE TO FIND  UNABLE TO FIND  VITAMIN C-MARK HIPS CR 1000 MG OR TBCR  VITAMIN E PO  VITAMINS/MINERALS TABS   OR          Review of Systems   All other systems reviewed and are negative.      Physical Exam   BP: (!) 143/83  Pulse: 82  Temp: 98.6  F (37  C)  Resp: 18  Weight: 61.7 kg (136 lb)  SpO2: 100 %      Physical Exam  Vitals signs and nursing note reviewed.     Alert cooperative female in mild to moderate stress.  HEENT shows no scleral icterus.  On mucosa is moist.  Speech is clear.  Neck is supple.  Lungs were clear without adventitious sounds.  Cardiac auscultation was normal.  No CVA tenderness.  Abdominal exam she has active bowel sounds.  On palpation she is diffusely tender in the lower abdomen without guarding rebound.  No organomegaly or masses are noted.  No skin rash over the flank or abdomen.    ED Course        Procedures               Critical Care time:  none               Results for orders placed or performed during the hospital encounter of 03/16/20 (from the past 24 hour(s))   CBC with platelets differential   Result Value Ref Range    WBC 10.8 4.0 - 11.0 10e9/L    RBC Count 4.26 3.8 - 5.2 10e12/L    Hemoglobin 13.2 11.7 - 15.7 g/dL    Hematocrit 40.4 35.0 - 47.0 %    MCV 95 78 - 100 fl    MCH 31.0 26.5 - 33.0 pg    MCHC 32.7 31.5 - 36.5 g/dL    RDW 13.1 10.0 - 15.0 %    Platelet Count 244 150 - 450 10e9/L    Diff Method Automated Method     % Neutrophils 65.3 %    % Lymphocytes 21.4 %    % Monocytes 8.8 %    % Eosinophils 3.3 %    % Basophils 0.7 %    % Immature Granulocytes 0.5 %    Nucleated RBCs 0 0 /100    Absolute Neutrophil 7.0 1.6 - 8.3 10e9/L    Absolute Lymphocytes 2.3 0.8 - 5.3 10e9/L    Absolute Monocytes 1.0 0.0 - 1.3 10e9/L    Absolute Basophils 0.1 0.0 - 0.2 10e9/L    Abs Immature Granulocytes 0.1 0 - 0.4 10e9/L    Absolute Nucleated RBC 0.0    Comprehensive  metabolic panel   Result Value Ref Range    Sodium 140 133 - 144 mmol/L    Potassium 3.6 3.4 - 5.3 mmol/L    Chloride 107 94 - 109 mmol/L    Carbon Dioxide 28 20 - 32 mmol/L    Anion Gap 5 3 - 14 mmol/L    Glucose 92 70 - 99 mg/dL    Urea Nitrogen 17 7 - 30 mg/dL    Creatinine 0.91 0.52 - 1.04 mg/dL    GFR Estimate 65 >60 mL/min/[1.73_m2]    GFR Estimate If Black 76 >60 mL/min/[1.73_m2]    Calcium 8.5 8.5 - 10.1 mg/dL    Bilirubin Total 0.4 0.2 - 1.3 mg/dL    Albumin 3.6 3.4 - 5.0 g/dL    Protein Total 7.1 6.8 - 8.8 g/dL    Alkaline Phosphatase 56 40 - 150 U/L    ALT 23 0 - 50 U/L    AST 20 0 - 45 U/L   Lipase   Result Value Ref Range    Lipase 195 73 - 393 U/L   UA reflex to Microscopic   Result Value Ref Range    Color Urine Marge     Appearance Urine Cloudy     Glucose Urine Negative NEG^Negative mg/dL    Bilirubin Urine Negative NEG^Negative    Ketones Urine Negative NEG^Negative mg/dL    Specific Gravity Urine 1.017 1.003 - 1.035    Blood Urine Negative NEG^Negative    pH Urine 5.0 5.0 - 7.0 pH    Protein Albumin Urine Negative NEG^Negative mg/dL    Urobilinogen mg/dL 0.0 0.0 - 2.0 mg/dL    Nitrite Urine Negative NEG^Negative    Leukocyte Esterase Urine Negative NEG^Negative    Source Midstream Urine     RBC Urine 6 (H) 0 - 2 /HPF    WBC Urine 2 0 - 5 /HPF    Squamous Epithelial /HPF Urine 1 0 - 1 /HPF    Mucous Urine Present (A) NEG^Negative /LPF    Hyaline Casts 3 (H) 0 - 2 /LPF   CT Abdomen Pelvis w Contrast    Narrative    CT ABDOMEN AND PELVIS WITH CONTRAST  3/16/2020 4:56 PM     HISTORY: Lower abdominal pain with history of sigmoid diverticulosis.    COMPARISON: February 21, 2019    TECHNIQUE: Volumetric helical acquisition of CT images from the lung  bases through the symphysis pubis were acquired after administration  of Isovue 370, 70mL  intravenous contrast. Coronal images  reconstructed from axial image data. Radiation dose for this scan was  reduced using automated exposure control, adjustment of  the mA and/or  kV according to patient size, or iterative reconstruction technique.    FINDINGS: There is inflammation around the sigmoid colon consistent  with acute diverticulitis. No evidence for intraperitoneal free air or  abscess formation. Trace free fluid. Mild diverticulosis. Peripherally  calcified lesion in the splenic hilum is stable, of unknown etiology.  Hemangiomata and cysts in the liver are stable The liver, spleen,  adrenal glands, kidneys and pancreas demonstrate no worrisome focal  lesion. Nonobstructing stone in the upper left kidney noted measuring  4 mm. No ureteral stones or hydronephrosis. Lung bases are clear. No  pleural or pericardial effusion. Bone windows reveal no suspicious  lesions. No free air in the abdomen. There are no abdominal or pelvic  lymph nodes that are abnormal by size criteria. There are no dilated  loops of small intestine or large bowel to suggest ileus or  obstruction.      Impression    IMPRESSION: Acute diverticulitis. No evidence for intraperitoneal free  air or abscess formation.     *Note: Due to a large number of results and/or encounters for the requested time period, some results have not been displayed. A complete set of results can be found in Results Review.       Medications   0.9% sodium chloride BOLUS (0 mLs Intravenous Stopped 3/16/20 1652)     Followed by   sodium chloride 0.9% infusion (has no administration in time range)   ketorolac (TORADOL) injection 15 mg (15 mg Intravenous Given 3/16/20 1557)   sodium chloride (PF) 0.9% PF flush 100 mL (60 mLs Intracatheter Given 3/16/20 1652)   sodium chloride (PF) 0.9% PF flush 3 mL (3 mLs Intravenous Given 3/16/20 1652)   iopamidol (ISOVUE-370) solution 100 mL (70 mLs Intravenous Given 3/16/20 1652)     IV is established and blood work is ordered.  Urinalysis is ordered.  With her previous history of diverticulosis by colonoscopy a CT of the abdomen with oral water and IV contrast is ordered to further assess  "for diverticulitis.  Assessments & Plan (with Medical Decision Making)   Margareth Green is a 65 year old female who presents to the emergency department for abdominal pain. Patient reports having lower abdominal pain for 3 days. She states it is lower/generalized abdominal pain. She states the pain is worse when she goes from sitting to standing or when she bends over. She states it is a constant pain. She did have a loose stool today. She reports having headaches and back pain with it. She denies any nausea, vomiting, diarrhea, urinary symptoms, vaginal bleeding, fever or chills. She denies any known injury, history of kidney infections, abdominal surgeries, exposure, recent travel or recent antibiotic use. She has had a colonoscopy done which was \"normal.\"  However records show that in 2015 her colonoscopy revealed diverticulosis of the sigmoid colon.  Patient states she has asthma but has not been coughing or have any shortness of breath recently.  On exam patient was afebrile vitally stable.  She had diffuse lower abdominal pain without guarding or rebound.  No organomegaly or masses.  No CVA tenderness.  Her blood and urine are unremarkable.  With the known history of diverticulosis by colonoscopy a CT was obtained and showed acute diverticulitis of the sigmoid colon.  Suspect this is the cause of her pain.  Should be given Cipro/ Flagyl due to a penicillin allergy.  Also Percocet for severe pain.  Handout on diverticulitis and diverticular diet is provided.  Reasons to return to emergency room were discussed.  I have reviewed the nursing notes.    I have reviewed the findings, diagnosis, plan and need for follow up with the patient.       New Prescriptions    CIPROFLOXACIN (CIPRO) 500 MG TABLET    Take 1 tablet (500 mg) by mouth 2 times daily for 7 days    METRONIDAZOLE (FLAGYL) 500 MG TABLET    Take 1 tablet (500 mg) by mouth 4 times daily for 7 days    OXYCODONE-ACETAMINOPHEN (PERCOCET) 5-325 MG TABLET    " Take 1-2 tablets by mouth every 4 hours as needed for severe pain       Final diagnoses:   Diverticulitis of colon     This document serves as a record of services personally performed by Morris Day MD. It was created on their behalf by Ebonie Garcia, a trained medical scribe. The creation of this record is based on the provider's personal observations and the statements of the patient. This document has been checked and approved by the attending provider.    Note: Chart documentation done in part with Dragon Voice Recognition software. Although reviewed after completion, some word and grammatical errors may remain.    3/16/2020   [facility] EMERGENCY DEPARTMENT     Morris Day MD  03/16/20 3360

## 2020-03-16 NOTE — ED TRIAGE NOTES
Presents to ED with generalized lower abdominal pain. Reports no N/V/D. Patient noticed the pain Saturday. No known injury. Patient states she notices the pain when she goes from sitting to standing or when she bends over. Denies urinary symptoms.

## 2020-03-16 NOTE — TELEPHONE ENCOUNTER
Reason for call:  Patient reporting a symptom    Symptom or request: lower abdominal pain, pressure and hurts with movement. Feels best when laying down. Normal BM movements    Duration (how long have symptoms been present): since Saturday afternoon.     Have you been treated for this before? No    Additional comments: not sure what to do    Phone Number patient can be reached at:  Home number on file [phone] (home)    Best Time:  any    Can we leave a detailed message on this number:  YES    Call taken on 3/16/2020 at 10:43 AM by Hailey Mcneil

## 2020-03-16 NOTE — TELEPHONE ENCOUNTER
I spoke with the pt who states on Saturday she started to have abdominal pain. When she sits it hurts. BMs have been normal, a little more runny today. No fever. Headaches. Heat helps some. Pain and pressure with sitting. Lower abdominal pain, low by pubic bone. Normal urination. No appetite. Drinking a lot. Little nausea. Feels a little light headed. Movement makes the pain worse. If she pushes in that area it hurts. Lower to mid-back is also sore. No bumps, feels like her stomach is somewhat distended around belly button area. No recent travel, asthma. 7/10    Additional Information    Negative: Passed out (i.e., fainted, collapsed and was not responding)    Negative: Shock suspected (e.g., cold/pale/clammy skin, too weak to stand, low BP, rapid pulse)    Negative: Sounds like a life-threatening emergency to the triager    Negative: Chest pain    Negative: Pain is mainly in upper abdomen (if needed ask: 'is it mainly above the belly button?')    Negative: Abdominal pain and pregnant > 20 weeks    Negative: Abdominal pain and pregnant < 20 weeks    Negative: SEVERE abdominal pain (e.g., excruciating)    Negative: Vomiting red blood or black (coffee ground) material    Negative: Bloody, black, or tarry bowel movements    Negative: Vomiting bile (green color)    Negative: Patient sounds very sick or weak to the triager    Negative: Vomiting and abdomen looks much more swollen than usual    Negative: White of the eyes have turned yellow (i.e., jaundice)    Negative: Blood in urine (red, pink, or tea-colored)    Negative: Fever > 103 F (39.4 C)    Negative: Fever > 101 F (38.3 C) and over 60 years of age    Negative: Fever > 100.0 F (37.8 C) and has diabetes mellitus or a weak immune system (e.g., HIV positive, cancer chemotherapy, organ transplant, splenectomy, chronic steroids)    Negative: Fever > 100.0 F (37.8 C) and bedridden (e.g., nursing home patient, stroke, chronic illness, recovering from surgery)     Negative: Pregnant or could be pregnant (i.e., missed last menstrual period)    Constant abdominal pain lasting > 2 hours    Protocols used: ABDOMINAL PAIN - FEMALE-A-OH    Marge Guevara, MSN, RN

## 2020-03-16 NOTE — DISCHARGE INSTRUCTIONS
Cipro twice daily for 7 days.  Flagyl 4 times daily for 7 days.  Do not drink alcohol with this.  Ibuprofen or Percocet for pain.  Encourage fluids.  If you have worsening symptoms develop worsening abdominal pain, vomiting, fever, or bloody stools you should return for reassessment

## 2020-03-16 NOTE — ED AVS SNAPSHOT
[facility] Emergency Department  [address] [place] [address]  Phone:  [phone]  Fax:  [phone]                                    Margareth Green   MRN: [id number]    Department:  [facility] Emergency Department   Date of Visit:  3/16/2020           After Visit Summary Signature Page    I have received my discharge instructions, and my questions have been answered. I have discussed any challenges I see with this plan with the nurse or doctor.    ..........................................................................................................................................  Patient/Patient Representative Signature      ..........................................................................................................................................  Patient Representative Print Name and Relationship to Patient    ..................................................               ................................................  Date                                   Time    ..........................................................................................................................................  Reviewed by Signature/Title    ...................................................              ..............................................  Date                                               Time          22EPIC Rev 08/18

## 2020-03-20 ENCOUNTER — TELEPHONE (OUTPATIENT)
Dept: FAMILY MEDICINE | Facility: OTHER | Age: 65
End: 2020-03-20

## 2020-03-20 DIAGNOSIS — K57.32 DIVERTICULITIS OF COLON: Primary | ICD-10-CM

## 2020-03-20 RX ORDER — SULFAMETHOXAZOLE/TRIMETHOPRIM 800-160 MG
1 TABLET ORAL 2 TIMES DAILY
Qty: 6 TABLET | Refills: 0 | Status: SHIPPED | OUTPATIENT
Start: 2020-03-20 | End: 2020-03-23

## 2020-03-20 NOTE — TELEPHONE ENCOUNTER
I think it would be reasonable to continue the metronidazole, stop cipro and in place of cipro, start bactrim for 6 doses. I called and left message for patient regarding this.  Dulce Maria Jacobson, JEEVAN-C

## 2020-03-20 NOTE — TELEPHONE ENCOUNTER
Patient is having following side effects from medications  Flagyl and Cipro. Nightmares, dixzzines, depression, restlessness.  Has been on both meds for 5 days. Wondering if she can or should stop the cipro and just take flagyl?   Wanting Caitlin to look into her chart and see lab results and test's from ER visit on 3/16/2020 for diverticulitis and see what she thinks, patient is on bland diet and not having much pain at this time. [phone] is asking for call back today from anyone.

## 2020-04-20 ENCOUNTER — MYC MEDICAL ADVICE (OUTPATIENT)
Dept: FAMILY MEDICINE | Facility: OTHER | Age: 65
End: 2020-04-20

## 2020-04-20 ENCOUNTER — TELEPHONE (OUTPATIENT)
Dept: FAMILY MEDICINE | Facility: OTHER | Age: 65
End: 2020-04-20

## 2020-04-20 NOTE — TELEPHONE ENCOUNTER
Reason for Call:  Other call back    Detailed comments: Pt called and she said she has not had a normal bowl movement since she was in the ER on 3/16. She denies any fever,coouph she feels fine. She was on medications and she was wondering if that could be a side effect. And can she take fiber pills if that would help her. Avelino advise thank you    Phone Number Patient can be reached at: Home number on file [phone] (home)    Best Time: anytime    Can we leave a detailed message on this number? YES    Call taken on 4/20/2020 at 1:13 PM by Myla Salgado

## 2020-05-06 ENCOUNTER — MYC MEDICAL ADVICE (OUTPATIENT)
Dept: FAMILY MEDICINE | Facility: OTHER | Age: 65
End: 2020-05-06

## 2020-05-06 NOTE — TELEPHONE ENCOUNTER
Spoke to patient and advised her of message below. Patient declined appointment. Patient would like to wait for NP to return to clinic and view her message and would like response from PCP. Will flag for provider to review. Please advise.

## 2020-05-06 NOTE — TELEPHONE ENCOUNTER
I think it would be best to set up a virtual visit to discuss her changes in bowel movements since ER visit in March. She can set up an appointment with Caitlin who will be back next week or I can see her if she would like to talk sooner.  Dulce Maria Jacobson, JEEVAN-C

## 2020-05-08 NOTE — TELEPHONE ENCOUNTER
Laura almazan has not been read, I spoke with  informed him that she has MyChart message from Caitlin he will relay   Thanks  Nina Husain RT (R)

## 2020-05-19 DIAGNOSIS — F33.0 MAJOR DEPRESSIVE DISORDER, RECURRENT EPISODE, MILD (H): ICD-10-CM

## 2020-05-19 DIAGNOSIS — R41.840 POOR CONCENTRATION: ICD-10-CM

## 2020-05-19 RX ORDER — BUPROPION HYDROCHLORIDE 150 MG/1
TABLET ORAL
Qty: 90 TABLET | Refills: 0 | Status: SHIPPED | OUTPATIENT
Start: 2020-05-19 | End: 2020-09-17

## 2020-05-19 NOTE — TELEPHONE ENCOUNTER
Pending Prescriptions:                       Disp   Refills    buPROPion (WELLBUTRIN XL) 150 MG 24 hr tab*90 tab*3        Sig: TAKE 1 TABLET EVERY MORNING    PHQ 11/8/2019 11/18/2019 2/4/2020   PHQ-9 Total Score 5 5 6   Q9: Thoughts of better off dead/self-harm past 2 weeks Not at all Not at all Not at all       Routing refill request to provider for review/approval because:  Labs out of range:  PHQ9    Marge Guevara, MSN, RN

## 2020-09-11 DIAGNOSIS — F33.0 MAJOR DEPRESSIVE DISORDER, RECURRENT EPISODE, MILD (H): ICD-10-CM

## 2020-09-11 DIAGNOSIS — R41.840 POOR CONCENTRATION: ICD-10-CM

## 2020-09-11 DIAGNOSIS — M81.0 AGE RELATED OSTEOPOROSIS, UNSPECIFIED PATHOLOGICAL FRACTURE PRESENCE: ICD-10-CM

## 2020-09-13 NOTE — TELEPHONE ENCOUNTER
Pending Prescriptions:                       Disp   Refills    buPROPion (WELLBUTRIN XL) 150 MG 24 hr tab*90 tab*3        Sig: TAKE 1 TABLET EVERY MORNING    raloxifene (EVISTA) 60 MG tablet [Pharmacy*90 tab*3        Sig: TAKE 1 TABLET DAILY      Support staff:  Please call patient to schedule a visit. (F2F, video, phone, or E Visit) follow up  Then ask if the patient will need a refill of the above medication before the visit.  Please reflag for RN and route to the Refill pool to give a 30 or 90 day helen to get them to their next visit or to remove medication and to close the encounter.    After 3 attempts to reach patient, please route to provider to review and advise.    Thank you,  Merced Bishop RN

## 2020-09-17 ENCOUNTER — VIRTUAL VISIT (OUTPATIENT)
Dept: FAMILY MEDICINE | Facility: OTHER | Age: 65
End: 2020-09-17
Payer: COMMERCIAL

## 2020-09-17 DIAGNOSIS — M81.0 AGE RELATED OSTEOPOROSIS, UNSPECIFIED PATHOLOGICAL FRACTURE PRESENCE: ICD-10-CM

## 2020-09-17 DIAGNOSIS — R41.840 POOR CONCENTRATION: ICD-10-CM

## 2020-09-17 DIAGNOSIS — F33.0 MAJOR DEPRESSIVE DISORDER, RECURRENT EPISODE, MILD (H): ICD-10-CM

## 2020-09-17 DIAGNOSIS — G47.09 OTHER INSOMNIA: ICD-10-CM

## 2020-09-17 PROCEDURE — 99214 OFFICE O/P EST MOD 30 MIN: CPT | Mod: 95 | Performed by: PHYSICIAN ASSISTANT

## 2020-09-17 RX ORDER — RALOXIFENE HYDROCHLORIDE 60 MG/1
1 TABLET, FILM COATED ORAL DAILY
Qty: 90 TABLET | Refills: 3 | Status: SHIPPED | OUTPATIENT
Start: 2020-09-17 | End: 2021-06-23

## 2020-09-17 RX ORDER — BUPROPION HYDROCHLORIDE 150 MG/1
TABLET ORAL
Qty: 90 TABLET | Refills: 3 | OUTPATIENT
Start: 2020-09-17

## 2020-09-17 RX ORDER — BUPROPION HYDROCHLORIDE 150 MG/1
TABLET ORAL
Qty: 90 TABLET | Refills: 1 | Status: SHIPPED | OUTPATIENT
Start: 2020-09-17 | End: 2021-03-16

## 2020-09-17 RX ORDER — RALOXIFENE HYDROCHLORIDE 60 MG/1
TABLET, FILM COATED ORAL
Qty: 90 TABLET | Refills: 3 | OUTPATIENT
Start: 2020-09-17

## 2020-09-17 ASSESSMENT — ANXIETY QUESTIONNAIRES
GAD7 TOTAL SCORE: 5
1. FEELING NERVOUS, ANXIOUS, OR ON EDGE: SEVERAL DAYS
3. WORRYING TOO MUCH ABOUT DIFFERENT THINGS: SEVERAL DAYS
2. NOT BEING ABLE TO STOP OR CONTROL WORRYING: SEVERAL DAYS
IF YOU CHECKED OFF ANY PROBLEMS ON THIS QUESTIONNAIRE, HOW DIFFICULT HAVE THESE PROBLEMS MADE IT FOR YOU TO DO YOUR WORK, TAKE CARE OF THINGS AT HOME, OR GET ALONG WITH OTHER PEOPLE: SOMEWHAT DIFFICULT
5. BEING SO RESTLESS THAT IT IS HARD TO SIT STILL: MORE THAN HALF THE DAYS
7. FEELING AFRAID AS IF SOMETHING AWFUL MIGHT HAPPEN: NOT AT ALL
6. BECOMING EASILY ANNOYED OR IRRITABLE: NOT AT ALL

## 2020-09-17 ASSESSMENT — PATIENT HEALTH QUESTIONNAIRE - PHQ9
SUM OF ALL RESPONSES TO PHQ QUESTIONS 1-9: 7
5. POOR APPETITE OR OVEREATING: NOT AT ALL

## 2020-09-17 NOTE — PROGRESS NOTES
"Margareth Green is a 65 year old female who is being evaluated via a billable telephone visit.      The patient has been notified of following:     \"This telephone visit will be conducted via a call between you and your physician/provider. We have found that certain health care needs can be provided without the need for a physical exam.  This service lets us provide the care you need with a short phone conversation.  If a prescription is necessary we can send it directly to your pharmacy.  If lab work is needed we can place an order for that and you can then stop by our lab to have the test done at a later time.    Telephone visits are billed at different rates depending on your insurance coverage. During this emergency period, for some insurers they may be billed the same as an in-person visit.  Please reach out to your insurance provider with any questions.    If during the course of the call the physician/provider feels a telephone visit is not appropriate, you will not be charged for this service.\"    Patient has given verbal consent for Telephone visit?  Yes    What phone number would you like to be contacted at? [phone]    How would you like to obtain your AVS? Laura Richmond     Margareth Green is a 65 year old female who presents via phone visit today for the following health issues:    HPI           Depression and Anxiety Follow-Up    How are you doing with your depression since your last visit? No change, she continues to do well with 150 mg of bupropion.  At times she thinks she may want to try a higher dosage but does not want to go all the way up to 300.  Overall she is doing well    How are you doing with your anxiety since your last visit?  No change    Are you having other symptoms that might be associated with depression or anxiety? No    Have you had a significant life event? No     Do you have any concerns with your use of alcohol or other drugs? No    Social History     Tobacco Use "     Smoking status: Former Smoker     Packs/day: 0.50     Years: 30.00     Pack years: 15.00     Types: Cigarettes     Last attempt to quit: 3/20/1998     Years since quittin.5     Smokeless tobacco: Never Used     Tobacco comment: No smokers in home   Substance Use Topics     Alcohol use: Yes     Alcohol/week: 6.7 standard drinks     Comment: 2x/week     Drug use: No     PHQ 2019   PHQ-9 Total Score 5 6 7   Q9: Thoughts of better off dead/self-harm past 2 weeks Not at all Not at all Not at all     TAMMY-7 SCORE 2019   Total Score - - -   Total Score 3 (minimal anxiety) 7 (mild anxiety) -   Total Score 3 7 5     Last PHQ-9 2020   1.  Little interest or pleasure in doing things 1   2.  Feeling down, depressed, or hopeless 0   3.  Trouble falling or staying asleep, or sleeping too much 3   4.  Feeling tired or having little energy 2   5.  Poor appetite or overeating 0   6.  Feeling bad about yourself 0   7.  Trouble concentrating 1   8.  Moving slowly or restless 0   Q9: Thoughts of better off dead/self-harm past 2 weeks 0   PHQ-9 Total Score 7   Difficulty at work, home, or with people Somewhat difficult     TAMMY-7  2020   1. Feeling nervous, anxious, or on edge 1   2. Not being able to stop or control worrying 1   3. Worrying too much about different things 1   4. Trouble relaxing 0   5. Being so restless that it is hard to sit still 2   6. Becoming easily annoyed or irritable 0   7. Feeling afraid, as if something awful might happen 0   TAMMY-7 Total Score 5   If you checked any problems, how difficult have they made it for you to do your work, take care of things at home, or get along with other people? Somewhat difficult       Suicide Assessment Five-step Evaluation and Treatment (SAFE-T)            Review of Systems   CONSTITUTIONAL: NEGATIVE for fever, chills, change in weight  ENT/MOUTH: She has a bloody nose on occasion after using her nasal spray.   She is easily able to get it to stop she does wonders if it something that she should worry about.  RESP: NEGATIVE for significant cough or SOB  CV: NEGATIVE for chest pain, palpitations or peripheral edema  MUSCULOSKELETAL: She is more stiff and sore than usual, she has not followed up with physical therapy in a while.  She wonders if she should contact her physical therapist.  She has not had any injuries.  She uses Tylenol arthritis with some relief and when she is really having a bad day she will use naproxen but does limit its use.  PSYCHIATRIC: See PHQ 9 and TAMMY 7 questionnaires for symptoms.  She sleeps best with 40 mg of amitriptyline at bedtime but then wakes up groggy.  She does not wake up groggy with 30 mg of amitriptyline but it does not work as well       Objective          Vitals:  No vitals were obtained today due to virtual visit.    healthy, alert and no distress  PSYCH: Alert and oriented times 3; coherent speech, normal   rate and volume, able to articulate logical thoughts, able   to abstract reason, no tangential thoughts, no hallucinations   or delusions  Her affect is normal and pleasant  RESP: No cough, no audible wheezing, able to talk in full sentences  Remainder of exam unable to be completed due to telephone visits            Assessment/Plan:    Patient reports she had her second shingles vaccination over the summer at [facility] I would like herrecord updated but she is not sure when she had the shot done    Assessment & Plan   This visit was conducted virtually due to the current COVID pandemic  Poor concentration  Symptoms improved, continue current meds she does not wish to make med changes at this time  - buPROPion (WELLBUTRIN XL) 150 MG 24 hr tablet; TAKE 1 TABLET EVERY MORNING    Major depressive disorder, recurrent episode, mild (H)  Improved, continue current meds  - buPROPion (WELLBUTRIN XL) 150 MG 24 hr tablet; TAKE 1 TABLET EVERY MORNING    Age related osteoporosis,  "unspecified pathological fracture presence  She is doing well on current meds, she is due for a bone density test but prefers to wait due to COVID  - raloxifene (EVISTA) 60 MG tablet; Take 1 tablet (60 mg) by mouth daily    Other insomnia  Doing well with amitriptyline, occasionally uses melatonin as well       BMI:   Estimated body mass index is 24.71 kg/m  as calculated from the following:    Height as of 2/5/20: 1.58 m (5' 2.21\").    Weight as of 3/16/20: 61.7 kg (136 lb).               No follow-ups on file.    Caitlin Beasley PA-C  [facility]    Phone call duration:  18 minutes              "

## 2020-09-18 ASSESSMENT — ANXIETY QUESTIONNAIRES: GAD7 TOTAL SCORE: 5

## 2020-10-09 ENCOUNTER — ALLIED HEALTH/NURSE VISIT (OUTPATIENT)
Dept: FAMILY MEDICINE | Facility: OTHER | Age: 65
End: 2020-10-09
Payer: COMMERCIAL

## 2020-10-09 ENCOUNTER — TELEPHONE (OUTPATIENT)
Dept: FAMILY MEDICINE | Facility: OTHER | Age: 65
End: 2020-10-09

## 2020-10-09 VITALS — RESPIRATION RATE: 18 BRPM | DIASTOLIC BLOOD PRESSURE: 80 MMHG | SYSTOLIC BLOOD PRESSURE: 139 MMHG | HEART RATE: 87 BPM

## 2020-10-09 DIAGNOSIS — Z78.9 TRIAGE ASSESSMENT CLASS 3, NONURGENT: Primary | ICD-10-CM

## 2020-10-09 DIAGNOSIS — Z01.30 BP CHECK: ICD-10-CM

## 2020-10-09 NOTE — TELEPHONE ENCOUNTER
Call lost in transfer.  Per reception: elevated heart rate at allergy appointment and they said to follow up with primary.    Will try patient later as she was also going to an eye doctor appointment.  José Miguel Rivero, WENDYN, RN, PHN

## 2020-10-09 NOTE — PROGRESS NOTES
Patient here because allergy had let her know her pulse was elevated.  She does not recall what it was.  Vitals taken in clinic  /80   Pulse 87   Resp 18   LMP 02/26/2008 (Approximate)      She denies any pain, SOB, dizziness, and headache.   She does appear worried or slight anxious about her pulse. She does state that she has been itchy all over advised to try some lotion and follow up at physical or sooner if worsening. Patient education on how to take her pulse and will monitor at home and call clinic with any questions or concerns.    ACT  Updated.    ACT Total Scores 10/9/2020   ACT TOTAL SCORE -   ASTHMA ER VISITS -   ASTHMA HOSPITALIZATIONS -   ACT TOTAL SCORE (Goal Greater than or Equal to 20) 20   In the past 12 months, how many times did you visit the emergency room for your asthma without being admitted to the hospital? 0   In the past 12 months, how many times were you hospitalized overnight because of your asthma? 0         No further questions at this time.    José Miguel Rivero, WENDYN, RN, PHN

## 2020-10-10 ASSESSMENT — ASTHMA QUESTIONNAIRES: ACT_TOTALSCORE: 20

## 2020-10-28 ENCOUNTER — MYC MEDICAL ADVICE (OUTPATIENT)
Dept: FAMILY MEDICINE | Facility: OTHER | Age: 65
End: 2020-10-28

## 2020-10-28 DIAGNOSIS — M54.50 ACUTE BILATERAL LOW BACK PAIN WITHOUT SCIATICA: Primary | ICD-10-CM

## 2020-11-04 ENCOUNTER — HOSPITAL ENCOUNTER (OUTPATIENT)
Dept: PHYSICAL THERAPY | Facility: CLINIC | Age: 65
Setting detail: THERAPIES SERIES
End: 2020-11-04
Attending: PHYSICIAN ASSISTANT
Payer: COMMERCIAL

## 2020-11-04 DIAGNOSIS — M54.50 ACUTE BILATERAL LOW BACK PAIN WITHOUT SCIATICA: ICD-10-CM

## 2020-11-04 PROCEDURE — 97162 PT EVAL MOD COMPLEX 30 MIN: CPT | Mod: GP | Performed by: PHYSICAL THERAPIST

## 2020-11-04 PROCEDURE — 97530 THERAPEUTIC ACTIVITIES: CPT | Mod: GP | Performed by: PHYSICAL THERAPIST

## 2020-11-04 PROCEDURE — 97110 THERAPEUTIC EXERCISES: CPT | Mod: GP | Performed by: PHYSICAL THERAPIST

## 2020-11-04 NOTE — PROGRESS NOTES
[facility]          OUTPATIENT PHYSICAL THERAPY ORTHOPEDIC EVALUATION  PLAN OF TREATMENT FOR OUTPATIENT REHABILITATION  (COMPLETE FOR INITIAL CLAIMS ONLY)  Patient's Last Name, First Name, M.I.  YOB: 1955  Margareth Green    Provider s Name:  [facility]   Medical Record No.  [id number]   Start of Care Date:  11/04/20   Onset Date:  09/04/20   Type:     _X__PT   ___OT   ___SLP Medical Diagnosis:  Acute LBP without sciatica     PT Diagnosis:  LBP with referral pain to the right LE   Visits from SOC:  1      _________________________________________________________________________________  Plan of Treatment/Functional Goals:  joint mobilization, manual therapy, neuromuscular re-education, ROM, strengthening, stretching     Electrical stimulation, Ultrasound     Goals  Goal Identifier: 1  Goal Description: Patient has a 50% decrease in LBP with all functions see by UMESH score of 15%  Target Date: 02/01/21    Goal Identifier: 2  Goal Description: Patient is able to go sit to stand from any chair and toilet without pain in the LB or the right LE  Target Date: 02/01/21           Therapy Frequency:  1 time/week  Predicted Duration of Therapy Intervention:  90 days    Leana James, PT                 I CERTIFY THE NEED FOR THESE SERVICES FURNISHED UNDER        THIS PLAN OF TREATMENT AND WHILE UNDER MY CARE     (Physician co-signature of this document indicates review and certification of the therapy plan).                       Certification Date From:  11/04/20   Certification Date To:  02/01/21    Referring Provider:  Caitlin Beasley PA-C    Initial Assessment        See Epic Evaluation Start of Care Date: 11/04/20

## 2020-11-04 NOTE — PROGRESS NOTES
11/04/20 1300   General Information   Type of Visit Initial OP Ortho PT Evaluation   Start of Care Date 11/04/20   Referring Physician Caitlin Beasley PA-C   Patient/Family Goals Statement To no longer have back pain and able to go sit to stand with out pain   Orders Evaluate and Treat   Date of Order 10/30/20   Certification Required? Yes   Medical Diagnosis Acute bilateral LBP without sciatica   Surgical/Medical history reviewed Yes   Precautions/Limitations no known precautions/limitations   Body Part(s)   Body Part(s) Lumbar Spine/SI   Presentation and Etiology   Pertinent history of current problem (include personal factors and/or comorbidities that impact the POC) Patient has LBP that started about 2 months ago.  incidious onset. Got the new chair for the living room and hurts worse with sitting in it for periods of time. hip pain is on the right, points to the right SI/buttock region. Butt pain is a #7, LBP #7. seems the pain is getting worse. Worse with bending, can wakes with this, always worse in the am. Took 2 capsules of CBD oil and was almost painfree. But has no more of them. When lays flat on the belly, the pain decreases. Pain increases in sitting and then it is very hard for her to stand from the sitting position and even off the toilet.    Impairments A. Pain;D. Decreased ROM;E. Decreased flexibility;F. Decreased strength and endurance   Functional Limitations perform activities of daily living;perform desired leisure / sports activities   Symptom Location LB right SI buttocks   How/Where did it occur From insidious onset   Onset date of current episode/exacerbation 09/04/20   Chronicity Recurrent   Prior Level of Function   Prior Level of Function-Mobility independent   Current Level of Function   Patient role/employment history F. Retired   [place]   Fall Risk Screen   Fall screen completed by PT   Have you fallen 2 or more times in the past year? No   Have you fallen  and had an injury in the past year? No   Is patient a fall risk? No   Lumbar Spine/SI Objective Findings   Posture foreward head   Gait/Locomotion WNL   Balance/Proprioception (Single Leg Stance) WNL   Flexion ROM 80%   Extension ROM WNL   Repeated Extension-Standing ROM WNL and decreased the thigh pain   Repeated Flexion-Standing ROM increased LBP to a #7 and the thigh still hurt   Lumbar/Hip/Knee/Foot Strength Comments WFL   Hamstring Flexibility slight tightness   Hip Flexor Flexibility slight tightness   Repeated Extension Prone best postion is CRISTOBAL pain in the thigh moved upper thigh and was a #.5 and LBP #4-5 and smaller size   Segmental Mobility pain at L5-S1   Palpation pain in the LB and the right SI region and upper thigh right   Planned Therapy Interventions   Planned Therapy Interventions joint mobilization;manual therapy;neuromuscular re-education;ROM;strengthening;stretching   Planned Modality Interventions   Planned Modality Interventions Electrical stimulation;Ultrasound   Clinical Impression   Criteria for Skilled Therapeutic Interventions Met yes, treatment indicated   PT Diagnosis LBP with referral pain to the right LE   Influenced by the following impairments degenerative disc   Functional limitations due to impairments See UMESH   Clinical Presentation Evolving/Changing   Clinical Presentation Rationale several functions an issue   Clinical Decision Making (Complexity) Moderate complexity   Therapy Frequency 1 time/week   Predicted Duration of Therapy Intervention (days/wks) 90 days   Risk & Benefits of therapy have been explained Yes   Patient, Family & other staff in agreement with plan of care Yes   Clinical Impression Comments LBP with referral to the right LE, extensor responder   Education Assessment   Preferred Learning Style Listening;Reading;Demonstration   Barriers to Learning No barriers   ORTHO GOALS   PT Ortho Eval Goals 1;2   Ortho Goal 1   Goal Identifier 1   Goal Description Patient  has a 50% decrease in LBP with all functions see by UMESH score of 15%   Target Date 02/01/21   Ortho Goal 2   Goal Identifier 2   Goal Description Patient is able to go sit to stand from any chair and toilet without pain in the LB or the right LE   Target Date 02/01/21   Total Evaluation Time   PT Eval, Moderate Complexity Minutes (50991) 15   Therapy Certification   Certification date from 11/04/20   Certification date to 02/01/21   Medical Diagnosis t+89   Thank you for the referral,            Leana James PT

## 2020-11-10 ENCOUNTER — HOSPITAL ENCOUNTER (OUTPATIENT)
Dept: PHYSICAL THERAPY | Facility: CLINIC | Age: 65
Setting detail: THERAPIES SERIES
End: 2020-11-10
Attending: PHYSICIAN ASSISTANT
Payer: COMMERCIAL

## 2020-11-10 PROCEDURE — 97110 THERAPEUTIC EXERCISES: CPT | Mod: GP | Performed by: PHYSICAL THERAPIST

## 2020-11-10 PROCEDURE — 97140 MANUAL THERAPY 1/> REGIONS: CPT | Mod: GP | Performed by: PHYSICAL THERAPIST

## 2020-11-17 ENCOUNTER — HOSPITAL ENCOUNTER (OUTPATIENT)
Dept: PHYSICAL THERAPY | Facility: CLINIC | Age: 65
Setting detail: THERAPIES SERIES
End: 2020-11-17
Attending: PHYSICIAN ASSISTANT
Payer: COMMERCIAL

## 2020-11-17 PROCEDURE — 97110 THERAPEUTIC EXERCISES: CPT | Mod: GP | Performed by: PHYSICAL THERAPIST

## 2020-11-17 PROCEDURE — 97140 MANUAL THERAPY 1/> REGIONS: CPT | Mod: GP | Performed by: PHYSICAL THERAPIST

## 2020-11-20 NOTE — PATIENT INSTRUCTIONS
Patient Education   Personalized Prevention Plan  You are due for the preventive services outlined below.  Your care team is available to assist you in scheduling these services.  If you have already completed any of these items, please share that information with your care team to update in your medical record.  Health Maintenance Due   Topic Date Due     Pneumococcal Vaccine (2 of 2 - PPSV23) 12/26/2019     Osteoporosis Screening  02/19/2020     Asthma Action Plan - yearly  10/14/2020     Annual Wellness Visit  11/21/2020     FALL RISK ASSESSMENT  11/21/2020

## 2020-11-20 NOTE — PROGRESS NOTES
"Answers for HPI/ROS submitted by the patient on 11/24/2020   If you checked off any problems, how difficult have these problems made it for you to do your work, take care of things at home, or get along with other people?: Not difficult at all  PHQ9 TOTAL SCORE: 7  TAMMY 7 TOTAL SCORE: 7  SUBJECTIVE:   Margareth Green is a 65 year old female who presents for Preventive Visit.      Patient has been advised of split billing requirements and indicates understanding: Yes   Are you in the first 12 months of your Medicare coverage?  No    Healthy Habits:     In general, how would you rate your overall health?  Good    Frequency of exercise:  2-3 days/week    Duration of exercise:  15-30 minutes    Do you usually eat at least 4 servings of fruit and vegetables a day, include whole grains    & fiber and avoid regularly eating high fat or \"junk\" foods?  Yes    Taking medications regularly:  Yes    Medication side effects:  None    Ability to successfully perform activities of daily living:  No assistance needed    Home Safety:  No safety concerns identified    Hearing Impairment:  Difficulty understanding soft or whispered speech    In the past 6 months, have you been bothered by leaking of urine?  No    In general, how would you rate your overall mental or emotional health?  Good      PHQ-2 Total Score: 2    Additional concerns today:  No    Do you feel safe in your environment? Yes    Have you ever done Advance Care Planning? (For example, a Health Directive, POLST, or a discussion with a medical provider or your loved ones about your wishes): Yes, advance care planning is on file.      Fall risk   Completed  Normal see flow sheet      Cognitive Screening   1) Repeat 3 items (Leader, Season, Table)    2) Clock draw: NORMAL  3) 3 item recall: Recalls 2 objects   Results: NORMAL clock, 1-2 items recalled: COGNITIVE IMPAIRMENT LESS LIKELY    Mini-CogTM Copyright S [employer]. Licensed by the author for use in [employer] " Services; reprinted with permission (soob@[facility]). All rights reserved.      Do you have sleep apnea, excessive snoring or daytime drowsiness?: no    Reviewed and updated as needed this visit by clinical staff  Tobacco  Allergies  Meds   Med Hx  Surg Hx  Fam Hx  Soc Hx        Reviewed and updated as needed this visit by Provider                Social History     Tobacco Use     Smoking status: Former Smoker     Packs/day: 0.50     Years: 30.00     Pack years: 15.00     Types: Cigarettes     Quit date: 3/20/1998     Years since quittin.6     Smokeless tobacco: Never Used     Tobacco comment: No smokers in home   Substance Use Topics     Alcohol use: Yes     Alcohol/week: 6.7 standard drinks     Comment: 2x/week         Alcohol Use 2020   Prescreen: >3 drinks/day or >7 drinks/week? Yes   Prescreen: >3 drinks/day or >7 drinks/week? -   AUDIT SCORE  4           Current providers sharing in care for this patient include:   Patient Care Team:  Caitlin Beasley PA-C as PCP - General (Family Practice)  Caitlin Beasley PA-C as Assigned PCP  Lui Tyson MD as Assigned Heart and Vascular Provider  Sly Amador MD as Assigned Surgical Provider  Ethan Cardenas MD as Assigned Neuroscience Provider    The following health maintenance items are reviewed in Epic and correct as of today:  Health Maintenance   Topic Date Due     DEXA  2020     ASTHMA ACTION PLAN  10/14/2020     FALL RISK ASSESSMENT  2020     ASTHMA CONTROL TEST  2021     PHQ-9  2021     MAMMO SCREENING  2021     MEDICARE ANNUAL WELLNESS VISIT  2021     DTAP/TDAP/TD IMMUNIZATION (4 - Td) 2022     LIPID  2024     COLORECTAL CANCER SCREENING  04/10/2025     ADVANCE CARE PLANNING  2025     HEPATITIS C SCREENING  Completed     DEPRESSION ACTION PLAN  Completed     INFLUENZA VACCINE  Completed     Pneumococcal Vaccine: 65+ Years  Completed     ZOSTER IMMUNIZATION  Completed      Pneumococcal Vaccine: Pediatrics (0 to 5 Years) and At-Risk Patients (6 to 64 Years)  Aged Out     IPV IMMUNIZATION  Aged Out     MENINGITIS IMMUNIZATION  Aged Out     HEPATITIS B IMMUNIZATION  Aged Out     Labs reviewed in EPIC  BP Readings from Last 3 Encounters:   11/24/20 120/72   10/09/20 139/80   03/16/20 (!) 143/83    Wt Readings from Last 3 Encounters:   11/24/20 63.7 kg (140 lb 8 oz)   03/16/20 61.7 kg (136 lb)   02/05/20 63 kg (138 lb 12.8 oz)                  Patient Active Problem List   Diagnosis     Dermatophytosis of nail     Symptomatic menopausal or female climacteric states     Generalized osteoarthrosis, unspecified site     TAMMY (generalized anxiety disorder)     Insomnia     Malaise and fatigue     Mild persistent asthma     Cervicalgia     Allergic rhinitis     Irritable bowel syndrome     Arthralgia     DDD (degenerative disc disease), lumbar     Menopausal syndrome     Osteopenia     Hammertoe     Derangement of TMJ (temporomandibular joint)     Stenosis of lumbosacral spine     Fall     Atrophic vaginitis     Osteoporosis     Mild major depression (H)     Knee pain, left     Menopausal flushing     Numbness of left jaw     Neck pain     TMJ (temporomandibular joint syndrome)     Thyroid fullness     Epistaxis     Major depressive disorder, recurrent episode, moderate (H)     Gastroesophageal reflux disease without esophagitis     Tremor     Cramp of limb     Past Surgical History:   Procedure Laterality Date     BIOPSY  4/10/2015     BL DUPLEX LO EXTREM ART UNILAT/LTD  4/3008    lower extr arterial doppler -no stenosis     C DEXA,BONE DENSITY,APPENDICULR SKELTN  12/2008    osteopenia     COLONOSCOPY  10/22/2007    bx benign     COLONOSCOPY N/A 4/10/2015    Procedure: COMBINED COLONOSCOPY, SINGLE OR MULTIPLE BIOPSY/POLYPECTOMY BY BIOPSY;  Surgeon: Cl Wagner MD;  Location: [facility]     ESOPHAGOSCOPY, GASTROSCOPY, DUODENOSCOPY (EGD), COMBINED N/A 1/31/2018    Procedure: COMBINED  ESOPHAGOSCOPY, GASTROSCOPY, DUODENOSCOPY (EGD);  COMBINED ESOPHAGOSCOPY, GASTROSCOPY, DUODENOSCOPY (EGD);  Surgeon: Tirso Duran MD;  Location:  GI     EYE SURGERY       HC COLONOSCOPY THRU STOMA, DIAGNOSTIC  2002    bx. benign - flex sig in 5 yrs  or colonoscopy in 10 yrs     HC ECHO HEART XTHORACIC, STRESS/REST  2005    WNL     HC NCS MOTOR W/O F-WAVE, EACH NERVE  2008    CTS     HC PART REMV BONE METATARSAL HEAD,EA  01/11/10    Right foot plantar plate tear. Also correct hammertoe, 2nd digit & varicose vein ligation, heel.     HC REVISE MEDIAN N/CARPAL TUNNEL SURG      Left in , right in      INJ, ANES/STER, FACET LUMB/SAC      Left L5 nerve root injection     INJECT EPIDURAL TRANSFORAMINAL  10/09/2013    [facility]     INJECT JOINT SACROILIAC  3/19/14,14    [facility]     ZZC NONSPECIFIC PROCEDURE  2009    Metatarsal phalangeal joint injection       Social History     Tobacco Use     Smoking status: Former Smoker     Packs/day: 0.50     Years: 30.00     Pack years: 15.00     Types: Cigarettes     Quit date: 3/20/1998     Years since quittin.6     Smokeless tobacco: Never Used     Tobacco comment: No smokers in home   Substance Use Topics     Alcohol use: Yes     Alcohol/week: 6.7 standard drinks     Comment: 2x/week     Family History   Problem Relation Age of Onset     Diabetes Father      Depression Father      Cerebrovascular Disease Mother         age 80     Arthritis Mother      Depression Mother         On meds     Eye Disorder Mother         Glaucoma     Osteoporosis Mother      Respiratory Mother          88 YO COPD     Chronic Obstructive Pulmonary Disease Mother      C.A.D. Brother         67 YO MI -     Chronic Obstructive Pulmonary Disease Brother      Myocardial Infarction Brother      Other Cancer Brother      Cancer Brother          Current Outpatient Medications   Medication Sig Dispense Refill     ACETAMINOPHEN PO Take 500-650 mg  by mouth as needed        ALLEGRA 180 MG OR TABS 1 TABLET DAILY 30 0     amitriptyline (ELAVIL) 10 MG tablet Take 3-4 tablets (30-40 mg) by mouth At Bedtime 270 tablet prn     B Complex Vitamins (VITAMIN B COMPLEX) tablet Take 1 tablet by mouth daily        buPROPion (WELLBUTRIN XL) 150 MG 24 hr tablet TAKE 1 TABLET EVERY MORNING 90 tablet 1     CALCIUM /VITAMIN D TABS   OR 1 TABLET DAILY       Cholecalciferol (VITAMIN D PO)        Cyanocobalamin (VITAMIN B-12 PO)        Famotidine (PEPCID PO)        GLUCOSAMINE SULFATE 1000 MG OR CAPS 1 daily       MECLIZINE HCL PO Take by mouth as needed for dizziness       MELATONIN PO        mometasone-formoterol (DULERA) 100-5 MCG/ACT oral inhaler Inhale 1 puff into the lungs 2 times daily        NAPROXEN PO Take by mouth as needed        Omega-3 Fatty Acids (OMEGA-3 FISH OIL PO)        omeprazole (PRILOSEC) 20 MG DR capsule Take 1 capsule (20 mg) by mouth daily 30 capsule 0     PROAIR  (90 BASE) MCG/ACT IN AERS as needed 1 prn     raloxifene (EVISTA) 60 MG tablet Take 1 tablet (60 mg) by mouth daily 90 tablet 3     Riboflavin (VITAMIN B-2 PO)        Simethicone (GAS-X PO)        Thiamine HCl (VITAMIN B-1 PO)        triamcinolone (NASACORT) 55 MCG/ACT nasal inhaler Spray 2 sprays into both nostrils daily       UNABLE TO FIND daily MEDICATION NAME: Tumeric       UNABLE TO FIND MEDICATION NAME: Colloidal Silver in water swishes does not swallow.       VITAMIN C-MARK HIPS CR 1000 MG OR TBCR 1 TABLET DAILY       VITAMIN E PO        VITAMINS/MINERALS TABS   OR 1 TABLET DAILY       Allergies   Allergen Reactions     Penicillins Itching     Yeast infection afterward     Pneumonia Vaccine:UTD  Mammogram Screening: Mammogram Screening: Patient over age 50, mutual decision to screen reflected in health maintenance.  History of abnormal Pap smear: NO - age 65 - see link Cervical Cytology Screening Guidelines  Last 3 Pap and HPV Results:   PAP / HPV Latest Ref Rng & Units 12/5/2016  "11/30/2016 12/23/2013   PAP - - NIL NIL   HPV 16 DNA NEG Negative - -   HPV 18 DNA NEG Negative - -   OTHER HR HPV NEG Negative - -       Review of Systems   Gastrointestinal: Positive for heartburn.   Breasts:  Negative for tenderness, breast mass and discharge.   Genitourinary: Negative for pelvic pain, vaginal bleeding and vaginal discharge.   Musculoskeletal: Positive for myalgias.   Psychiatric/Behavioral: The patient is nervous/anxious.    She has started to have heartburn daily despite use of famotidine over-the-counter.  In the past she has been on omeprazole which has been most effective.  She wonders if she can use it again for short time.    Physical therapy has helped her back pain.  It is no longer radiating all the way down to her mid thigh it only radiates to her buttocks now.  She is needing less Tylenol sometimes as needed needed at all in a day.  She is found lying on a massage table and relaxing to be most beneficial multiple times a day.  Patient wonders if she needs an MRI if she is getting better    She follows with her asthma specialist for her asthma.  She continues to have good control.    On occasion she has a lot of itching in the anterior aspect of her neck though she does not have any rash.  Its been intermittent for a year.    Her sleep can still be a challenge.  She takes 30 mg of amitriptyline.  Sometimes she will use melatonin as well    She has done well with raloxifene for her osteopenia.  Last DEXA scan was in 2018      OBJECTIVE:   /72   Pulse 73   Temp 97.6  F (36.4  C) (Temporal)   Resp (!) 120   Ht 1.584 m (5' 2.36\")   Wt 63.7 kg (140 lb 8 oz)   LMP 02/26/2008 (Approximate)   SpO2 99%   BMI 25.40 kg/m   Estimated body mass index is 25.4 kg/m  as calculated from the following:    Height as of this encounter: 1.584 m (5' 2.36\").    Weight as of this encounter: 63.7 kg (140 lb 8 oz).  Physical Exam  GENERAL APPEARANCE: healthy, alert and no distress  EYES: Eyes " grossly normal to inspection, PERRL and conjunctivae and sclerae normal  HENT: ear canals and TM's normal, nose and mouth without ulcers or lesions, oropharynx clear and oral mucous membranes moist  NECK: no adenopathy, no asymmetry, masses, or scars and thyroid normal to palpation  RESP: lungs clear to auscultation - no rales, rhonchi or wheezes  BREAST: normal without masses, tenderness or nipple discharge and no palpable axillary masses or adenopathy  CV: regular rate and rhythm, normal S1 S2, no S3 or S4, no murmur, click or rub, no peripheral edema and peripheral pulses strong  ABDOMEN: soft, nontender, no hepatosplenomegaly, no masses and bowel sounds normal  MS: no musculoskeletal defects are noted and gait is age appropriate without ataxia  SKIN: no suspicious lesions or rashes  NEURO: Normal strength and tone, sensory exam grossly normal, mentation intact and speech normal  PSYCH: mentation appears normal and affect normal/bright    Diagnostic Test Results:  Labs reviewed in Epic  none     ASSESSMENT / PLAN:   1. Encounter for Medicare annual wellness exam  Recommended annual wellness visits, as far as her back pain goes there is no need to do an MRI of her symptoms are improving though if symptoms worsen we can readdress this at a later date  2. Gastroesophageal reflux disease without esophagitis  She may continue with famotidine we will also treat for 30 days with omeprazole to get her symptoms to calm down.  If symptoms are persisting we need to do further evaluation  - omeprazole (PRILOSEC) 20 MG DR capsule; Take 1 capsule (20 mg) by mouth daily  Dispense: 30 capsule; Refill: 0    3. Need for vaccination  Updated  - Pneumococcal vaccine 23 valent PPSV23  (Pneumovax) [88403]    4. Encounter for screening mammogram for breast cancer  Ordered however she would like to wait until the surgeon cases of Covid has gone down  - *MA Screening Digital Bilateral; Future    5. Screening for hyperlipidemia  Ordered  "for future  - Lipid panel reflex to direct LDL Fasting; Future    6. Tachycardia, paroxysmal (H)  Patient will try to increase fluids, she has been evaluated by cardiology and is already avoiding caffeine    7. Osteopenia, unspecified location  Patient continues with raloxifene, we should repeat a DEXA scan at some point the patient would like to wait on this as well until Covid is over      Patient has been advised of split billing requirements and indicates understanding: Yes  COUNSELING:  Reviewed preventive health counseling, as reflected in patient instructions    Estimated body mass index is 25.4 kg/m  as calculated from the following:    Height as of this encounter: 1.584 m (5' 2.36\").    Weight as of this encounter: 63.7 kg (140 lb 8 oz).        She reports that she quit smoking about 22 years ago. Her smoking use included cigarettes. She has a 15.00 pack-year smoking history. She has never used smokeless tobacco.      Appropriate preventive services were discussed with this patient, including applicable screening as appropriate for cardiovascular disease, diabetes, osteopenia/osteoporosis, and glaucoma.  As appropriate for age/gender, discussed screening for colorectal cancer, prostate cancer, breast cancer, and cervical cancer. Checklist reviewing preventive services available has been given to the patient.    Reviewed patients plan of care and provided an AVS. The Basic Care Plan (routine screening as documented in Health Maintenance) for Margareth meets the Care Plan requirement. This Care Plan has been established and reviewed with the Patient.    Counseling Resources:  ATP IV Guidelines  Pooled Cohorts Equation Calculator  Breast Cancer Risk Calculator  Breast Cancer: Medication to Reduce Risk  FRAX Risk Assessment  ICSI Preventive Guidelines  Dietary Guidelines for Americans, 2010  USDA's MyPlate  ASA Prophylaxis  Lung CA Screening    Caitlin Beasley PA-C  [facility]    Identified " Health Risks:

## 2020-11-24 ENCOUNTER — OFFICE VISIT (OUTPATIENT)
Dept: FAMILY MEDICINE | Facility: OTHER | Age: 65
End: 2020-11-24
Payer: COMMERCIAL

## 2020-11-24 VITALS
OXYGEN SATURATION: 99 % | SYSTOLIC BLOOD PRESSURE: 120 MMHG | BODY MASS INDEX: 25.86 KG/M2 | HEIGHT: 62 IN | TEMPERATURE: 97.6 F | WEIGHT: 140.5 LBS | RESPIRATION RATE: 120 BRPM | HEART RATE: 73 BPM | DIASTOLIC BLOOD PRESSURE: 72 MMHG

## 2020-11-24 DIAGNOSIS — I47.9 TACHYCARDIA, PAROXYSMAL (H): ICD-10-CM

## 2020-11-24 DIAGNOSIS — Z13.220 SCREENING FOR HYPERLIPIDEMIA: ICD-10-CM

## 2020-11-24 DIAGNOSIS — Z12.31 ENCOUNTER FOR SCREENING MAMMOGRAM FOR BREAST CANCER: ICD-10-CM

## 2020-11-24 DIAGNOSIS — Z23 NEED FOR VACCINATION: ICD-10-CM

## 2020-11-24 DIAGNOSIS — M85.80 OSTEOPENIA, UNSPECIFIED LOCATION: ICD-10-CM

## 2020-11-24 DIAGNOSIS — K21.9 GASTROESOPHAGEAL REFLUX DISEASE WITHOUT ESOPHAGITIS: ICD-10-CM

## 2020-11-24 DIAGNOSIS — Z00.00 ENCOUNTER FOR MEDICARE ANNUAL WELLNESS EXAM: Primary | ICD-10-CM

## 2020-11-24 PROCEDURE — G0009 ADMIN PNEUMOCOCCAL VACCINE: HCPCS | Performed by: PHYSICIAN ASSISTANT

## 2020-11-24 PROCEDURE — 90732 PPSV23 VACC 2 YRS+ SUBQ/IM: CPT | Performed by: PHYSICIAN ASSISTANT

## 2020-11-24 PROCEDURE — G0438 PPPS, INITIAL VISIT: HCPCS | Performed by: PHYSICIAN ASSISTANT

## 2020-11-24 ASSESSMENT — ENCOUNTER SYMPTOMS
BREAST MASS: 0
NERVOUS/ANXIOUS: 1
MYALGIAS: 1
HEARTBURN: 1

## 2020-11-24 ASSESSMENT — ANXIETY QUESTIONNAIRES
GAD7 TOTAL SCORE: 7
3. WORRYING TOO MUCH ABOUT DIFFERENT THINGS: SEVERAL DAYS
7. FEELING AFRAID AS IF SOMETHING AWFUL MIGHT HAPPEN: SEVERAL DAYS
GAD7 TOTAL SCORE: 7
5. BEING SO RESTLESS THAT IT IS HARD TO SIT STILL: SEVERAL DAYS
1. FEELING NERVOUS, ANXIOUS, OR ON EDGE: SEVERAL DAYS
6. BECOMING EASILY ANNOYED OR IRRITABLE: SEVERAL DAYS
4. TROUBLE RELAXING: SEVERAL DAYS
7. FEELING AFRAID AS IF SOMETHING AWFUL MIGHT HAPPEN: SEVERAL DAYS
GAD7 TOTAL SCORE: 7
2. NOT BEING ABLE TO STOP OR CONTROL WORRYING: SEVERAL DAYS

## 2020-11-24 ASSESSMENT — ACTIVITIES OF DAILY LIVING (ADL): CURRENT_FUNCTION: NO ASSISTANCE NEEDED

## 2020-11-24 ASSESSMENT — PATIENT HEALTH QUESTIONNAIRE - PHQ9
SUM OF ALL RESPONSES TO PHQ QUESTIONS 1-9: 7
10. IF YOU CHECKED OFF ANY PROBLEMS, HOW DIFFICULT HAVE THESE PROBLEMS MADE IT FOR YOU TO DO YOUR WORK, TAKE CARE OF THINGS AT HOME, OR GET ALONG WITH OTHER PEOPLE: NOT DIFFICULT AT ALL
SUM OF ALL RESPONSES TO PHQ QUESTIONS 1-9: 7

## 2020-11-24 ASSESSMENT — MIFFLIN-ST. JEOR: SCORE: 1131.3

## 2020-11-25 ENCOUNTER — HOSPITAL ENCOUNTER (OUTPATIENT)
Dept: PHYSICAL THERAPY | Facility: CLINIC | Age: 65
Setting detail: THERAPIES SERIES
End: 2020-11-25
Attending: PHYSICIAN ASSISTANT
Payer: COMMERCIAL

## 2020-11-25 DIAGNOSIS — Z13.220 SCREENING FOR HYPERLIPIDEMIA: ICD-10-CM

## 2020-11-25 LAB
CHOLEST SERPL-MCNC: 169 MG/DL
HDLC SERPL-MCNC: 82 MG/DL
LDLC SERPL CALC-MCNC: 66 MG/DL
NONHDLC SERPL-MCNC: 87 MG/DL
TRIGL SERPL-MCNC: 105 MG/DL

## 2020-11-25 PROCEDURE — 97140 MANUAL THERAPY 1/> REGIONS: CPT | Mod: GP | Performed by: PHYSICAL THERAPIST

## 2020-11-25 PROCEDURE — 36415 COLL VENOUS BLD VENIPUNCTURE: CPT | Performed by: PHYSICIAN ASSISTANT

## 2020-11-25 PROCEDURE — 80061 LIPID PANEL: CPT | Performed by: PHYSICIAN ASSISTANT

## 2020-11-25 ASSESSMENT — ANXIETY QUESTIONNAIRES: GAD7 TOTAL SCORE: 7

## 2020-11-25 ASSESSMENT — ASTHMA QUESTIONNAIRES: ACT_TOTALSCORE: 21

## 2020-12-03 ENCOUNTER — HOSPITAL ENCOUNTER (OUTPATIENT)
Dept: PHYSICAL THERAPY | Facility: CLINIC | Age: 65
Setting detail: THERAPIES SERIES
End: 2020-12-03
Attending: PHYSICIAN ASSISTANT
Payer: COMMERCIAL

## 2020-12-03 PROCEDURE — 97110 THERAPEUTIC EXERCISES: CPT | Mod: GP | Performed by: PHYSICAL THERAPIST

## 2020-12-03 PROCEDURE — 97140 MANUAL THERAPY 1/> REGIONS: CPT | Mod: GP | Performed by: PHYSICAL THERAPIST

## 2020-12-29 ENCOUNTER — HOSPITAL ENCOUNTER (OUTPATIENT)
Dept: PHYSICAL THERAPY | Facility: CLINIC | Age: 65
Setting detail: THERAPIES SERIES
End: 2020-12-29
Attending: PHYSICIAN ASSISTANT
Payer: COMMERCIAL

## 2020-12-29 PROCEDURE — 97140 MANUAL THERAPY 1/> REGIONS: CPT | Mod: GP | Performed by: PHYSICAL THERAPIST

## 2021-01-05 NOTE — PROGRESS NOTES
Outpatient Physical Therapy Discharge Note     Patient: Margareth Green  : 1955    Beginning/End Dates of Reporting Period:  20 to 2021    Referring Provider: Caitlin Beasley PA-C    Therapy Diagnosis: LBP     Client Self Report: Sometimes has #0 pain now. Very pleased and wants to cont on her own.     Objective Measurements:  LBP #0 and comes and goes at a low level       Goals:  Goal Identifier 1   Goal Description Patient has a 50% decrease in LBP with all functions see by UMESH score of 15%   Target Date 21   Date Met      Progress:                                             Did not do the UMESH at the last visit     Goal Identifier 2   Goal Description Patient is able to go sit to stand from any chair and toilet without pain in the LB or the right LE(goal met)   Target Date 21   Date Met   20   Progress:                                                 Progress Toward Goals:   Progress this reporting period: Patient is doing very well, has a massage table she is using for her HEP. Patient feels she has the tools to cont to progress and would like to be discharged today.           Plan:  Discharge from therapy.    Discharge:    Reason for Discharge: Patient chooses to discontinue therapy.        Discharge Plan: Patient to continue home program.    Thank you for the referral,            Leana James PT

## 2021-01-15 ENCOUNTER — MYC MEDICAL ADVICE (OUTPATIENT)
Dept: FAMILY MEDICINE | Facility: OTHER | Age: 66
End: 2021-01-15

## 2021-03-13 DIAGNOSIS — R41.840 POOR CONCENTRATION: ICD-10-CM

## 2021-03-13 DIAGNOSIS — F33.0 MAJOR DEPRESSIVE DISORDER, RECURRENT EPISODE, MILD (H): ICD-10-CM

## 2021-03-15 NOTE — TELEPHONE ENCOUNTER
Pending Prescriptions:                       Disp   Refills    buPROPion (WELLBUTRIN XL) 150 MG 24 hr tab*90 tab*3        Sig: TAKE 1 TABLET EVERY MORNING    Routing refill request to provider for review/approval because:  Labs out of range:  Phq9    PHQ-9 score:    PHQ 11/24/2020   PHQ-9 Total Score 7   Q9: Thoughts of better off dead/self-harm past 2 weeks Not at all

## 2021-03-16 RX ORDER — BUPROPION HYDROCHLORIDE 150 MG/1
TABLET ORAL
Qty: 90 TABLET | Refills: 0 | Status: SHIPPED | OUTPATIENT
Start: 2021-03-16 | End: 2021-06-23

## 2021-03-16 NOTE — TELEPHONE ENCOUNTER
90-day supply approved, please call patient she is due for a virtual visit or in person if she would rather for a recheck of this med before her next refill  Caitlin Beasley PA-C

## 2021-04-11 DIAGNOSIS — G47.09 OTHER INSOMNIA: ICD-10-CM

## 2021-04-13 RX ORDER — AMITRIPTYLINE HYDROCHLORIDE 10 MG/1
TABLET ORAL
Qty: 270 TABLET | Refills: 1 | Status: SHIPPED | OUTPATIENT
Start: 2021-04-13 | End: 2021-06-23

## 2021-04-13 NOTE — TELEPHONE ENCOUNTER
Prescription approved per [facility] Refill Protocol.    Nina Mcleod RN on 4/13/2021 at 7:41 AM

## 2021-05-01 NOTE — NURSING NOTE
Prior to immunization administration, verified patients identity using patient s name and date of birth. Please see Immunization Activity for additional information.     Screening Questionnaire for Adult Immunization    Are you sick today?   No   Do you have allergies to medications, food, a vaccine component or latex?   No   Have you ever had a serious reaction after receiving a vaccination?   No   Do you have a long-term health problem with heart, lung, kidney, or metabolic disease (e.g., diabetes), asthma, a blood disorder, no spleen, complement component deficiency, a cochlear implant, or a spinal fluid leak?  Are you on long-term aspirin therapy?   Yes   Do you have cancer, leukemia, HIV/AIDS, or any other immune system problem?   No   Do you have a parent, brother, or sister with an immune system problem?   No   In the past 3 months, have you taken medications that affect  your immune system, such as prednisone, other steroids, or anticancer drugs; drugs for the treatment of rheumatoid arthritis, Crohn s disease, or psoriasis; or have you had radiation treatments?   No   Have you had a seizure, or a brain or other nervous system problem?   No   During the past year, have you received a transfusion of blood or blood    products, or been given immune (gamma) globulin or antiviral drug?   No   For women: Are you pregnant or is there a chance you could become       pregnant during the next month?   No   Have you received any vaccinations in the past 4 weeks?   No     Immunization questionnaire: Pt answered yes to asthma, provider aware and vaccine given..        Per orders of Caitlin Beasley PA-C, injection of Pneumovax  given by Genna Rivera MA. Patient instructed to remain in clinic for 15 minutes afterwards, and to report any adverse reaction to me immediately.       Screening performed by Genna Rivera MA on 11/24/2020 at 11:37 AM.    
today

## 2021-06-21 ENCOUNTER — MYC MEDICAL ADVICE (OUTPATIENT)
Dept: FAMILY MEDICINE | Facility: OTHER | Age: 66
End: 2021-06-21

## 2021-06-21 NOTE — PROGRESS NOTES
Assessment & Plan     Other insomnia  Working well at current dosage, continue current meds  - amitriptyline (ELAVIL) 10 MG tablet; TAKE 3 TO 4 TABLETS AT BEDTIME    Poor concentration  Improved concentration continue current meds recheck 6 months  - buPROPion (WELLBUTRIN XL) 150 MG 24 hr tablet; TAKE 1 TABLET EVERY MORNING    Major depressive disorder, recurrent episode, mild (H)  Well-controlled, stable, continue current meds recheck 6 months  - buPROPion (WELLBUTRIN XL) 150 MG 24 hr tablet; TAKE 1 TABLET EVERY MORNING    Age related osteoporosis, unspecified pathological fracture presence  Doing well, continue current meds, if bone density is progressing would consider switching over to Fosamax  - raloxifene (EVISTA) 60 MG tablet; Take 1 tablet (60 mg) by mouth daily    Symptomatic menopausal or female climacteric states    - DEXA HIP/PELVIS/SPINE - Future; Future    Osteopenia, unspecified location    - DEXA HIP/PELVIS/SPINE - Future; Future    Gastroesophageal reflux disease without esophagitis  Doing well with famotidine over-the-counter occasionally uses omeprazole which is fine to use as needed    Encounter for screening mammogram for breast cancer  Patient will schedule  - MA Screen Bilateral w/Clarence; Future    Strain of thoracic spine--discussed x-rays however because she had no direct trauma and she has no bony point tenderness we will hold off on any imaging at this time.  This can be reconsidered in the future if she fails to improve with physical therapy as anticipated  New diagnosis, she will see her therapist, patient request prednisone as this has worked well for her in the past  - PHYSICAL THERAPY REFERRAL; Future  - predniSONE (DELTASONE) 20 MG tablet; Take 1 tablet (20 mg) by mouth daily    Long term (current) use of aromatase inhibitors     - DEXA HIP/PELVIS/SPINE - Future; Future    Tachycardia, paroxysmal (H)  Has not experienced any symptoms recently, and is not tachycardic today              "  BMI:   Estimated body mass index is 22.6 kg/m  as calculated from the following:    Height as of 11/24/20: 1.584 m (5' 2.36\").    Weight as of this encounter: 56.7 kg (125 lb).           Return in about 6 months (around 12/23/2021), or if symptoms worsen or fail to improve, for Physical Exam.    TOI Hirsch [facility] JHONY Zuluaga is a 66 year old who presents for the following health issues     History of Present Illness       Back Pain:  She presents for follow up of back pain. Patient's back pain is a recurring problem.  Location of back pain:  Left lower back, left middle of back, left side of neck, left shoulder, left hip and left side of waist  Description of back pain: sharp and stabbing  Back pain spreads: left buttocks, left shoulder and left side of neck    Since patient first noticed back pain, pain is: always present, but gets better and worse  Does back pain interfere with her job:  Not applicable      She eats 2-3 servings of fruits and vegetables daily.She consumes 1 sweetened beverage(s) daily.She exercises with enough effort to increase her heart rate 9 or less minutes per day.  She exercises with enough effort to increase her heart rate 3 or less days per week.   She is taking medications regularly.     She reports she typically has had intermittent left lumbar pain but about 2 weeks ago after working in her garden she has experienced left thoracic back pain.  It does radiate down into her lumbar region and now is affecting her upper left back and neck.  She contacted her physical therapist, Cathy James, who gave her some exercises but is just not helpful.  She feels that things might actually be getting worse.  She is applied ice heat she is tried to sit with good posture and she has tried Tylenol and naproxen.  Naproxen helps temporarily but she is not getting better.  In the past, she has used prednisone which has been very helpful for her " musculoskeletal pain as well.  She has a massage table that she typically uses but it is just too painful even to use her massage table.  She continues to see an allergy and asthma specialist in [place].    Her long-term allergist has retired she is seeing one of his partners but now this person is now retiring.  She is considering her options.  I assured her I am able to manage and prescribe her asthma if she wishes.  She will consider this    Her reflux is well controlled with famotidine daily she does use omeprazole only when needed but that is not very often.    She is doing well with Evista for her osteopenia.  She has no side effects or issues with its use.    She continues to use amitriptyline at bedtime and she does get good sleep with this.  She is using Wellbutrin for her concentration and depression.  She is find this to be very helpful without any side effects.See PHQ 9 and TAMMY 7 questionnaires for symptoms.               Review of Systems   Constitutional, HEENT, cardiovascular, pulmonary, gi and gu systems are negative, except as otherwise noted.      Objective    LMP 02/26/2008 (Approximate)   There is no height or weight on file to calculate BMI.  Physical Exam   GENERAL: healthy, alert and no distress  NECK: no adenopathy, no asymmetry, masses, or scars and thyroid normal to palpation  RESP: lungs clear to auscultation - no rales, rhonchi or wheezes  CV: regular rate and rhythm, normal S1 S2, no S3 or S4, no murmur, click or rub, no peripheral edema and peripheral pulses strong  ABDOMEN: soft, nontender, no hepatosplenomegaly, no masses and bowel sounds normal  MS: no gross musculoskeletal defects noted, no edema  Comprehensive back pain exam:  Tenderness of Thoracic left-sided musculature, no vertebral tenderness at the cervical thoracic or lumbar levels  PSYCH: mentation appears normal, affect normal/bright    No results found for any visits on 06/23/21.

## 2021-06-23 ENCOUNTER — OFFICE VISIT (OUTPATIENT)
Dept: FAMILY MEDICINE | Facility: OTHER | Age: 66
End: 2021-06-23
Payer: COMMERCIAL

## 2021-06-23 VITALS
TEMPERATURE: 98.7 F | SYSTOLIC BLOOD PRESSURE: 116 MMHG | HEART RATE: 79 BPM | WEIGHT: 125 LBS | BODY MASS INDEX: 22.6 KG/M2 | OXYGEN SATURATION: 97 % | DIASTOLIC BLOOD PRESSURE: 64 MMHG | RESPIRATION RATE: 14 BRPM

## 2021-06-23 DIAGNOSIS — Z12.31 ENCOUNTER FOR SCREENING MAMMOGRAM FOR BREAST CANCER: ICD-10-CM

## 2021-06-23 DIAGNOSIS — M81.0 AGE RELATED OSTEOPOROSIS, UNSPECIFIED PATHOLOGICAL FRACTURE PRESENCE: ICD-10-CM

## 2021-06-23 DIAGNOSIS — K21.9 GASTROESOPHAGEAL REFLUX DISEASE WITHOUT ESOPHAGITIS: ICD-10-CM

## 2021-06-23 DIAGNOSIS — M85.80 OSTEOPENIA, UNSPECIFIED LOCATION: ICD-10-CM

## 2021-06-23 DIAGNOSIS — F33.0 MAJOR DEPRESSIVE DISORDER, RECURRENT EPISODE, MILD (H): ICD-10-CM

## 2021-06-23 DIAGNOSIS — R41.840 POOR CONCENTRATION: ICD-10-CM

## 2021-06-23 DIAGNOSIS — Z79.811 LONG TERM (CURRENT) USE OF AROMATASE INHIBITORS: ICD-10-CM

## 2021-06-23 DIAGNOSIS — G47.09 OTHER INSOMNIA: Primary | ICD-10-CM

## 2021-06-23 DIAGNOSIS — N95.1 SYMPTOMATIC MENOPAUSAL OR FEMALE CLIMACTERIC STATES: ICD-10-CM

## 2021-06-23 DIAGNOSIS — I47.9 TACHYCARDIA, PAROXYSMAL (H): ICD-10-CM

## 2021-06-23 DIAGNOSIS — S29.012A STRAIN OF THORACIC SPINE: ICD-10-CM

## 2021-06-23 PROCEDURE — 99214 OFFICE O/P EST MOD 30 MIN: CPT | Performed by: PHYSICIAN ASSISTANT

## 2021-06-23 RX ORDER — RALOXIFENE HYDROCHLORIDE 60 MG/1
1 TABLET, FILM COATED ORAL DAILY
Qty: 90 TABLET | Refills: 3 | Status: SHIPPED | OUTPATIENT
Start: 2021-06-23 | End: 2021-11-09

## 2021-06-23 RX ORDER — PREDNISONE 20 MG/1
20 TABLET ORAL DAILY
Qty: 5 TABLET | Refills: 0 | Status: SHIPPED | OUTPATIENT
Start: 2021-06-23 | End: 2021-07-13

## 2021-06-23 RX ORDER — BUPROPION HYDROCHLORIDE 150 MG/1
TABLET ORAL
Qty: 90 TABLET | Refills: 1 | Status: SHIPPED | OUTPATIENT
Start: 2021-06-23 | End: 2021-11-17

## 2021-06-23 RX ORDER — AMITRIPTYLINE HYDROCHLORIDE 10 MG/1
TABLET ORAL
Qty: 270 TABLET | Status: SHIPPED | OUTPATIENT
Start: 2021-06-23 | End: 2022-08-17

## 2021-06-23 ASSESSMENT — ASTHMA QUESTIONNAIRES
QUESTION_2 LAST FOUR WEEKS HOW OFTEN HAVE YOU HAD SHORTNESS OF BREATH: ONCE OR TWICE A WEEK
ACT_TOTALSCORE: 23
QUESTION_3 LAST FOUR WEEKS HOW OFTEN DID YOUR ASTHMA SYMPTOMS (WHEEZING, COUGHING, SHORTNESS OF BREATH, CHEST TIGHTNESS OR PAIN) WAKE YOU UP AT NIGHT OR EARLIER THAN USUAL IN THE MORNING: NOT AT ALL
QUESTION_5 LAST FOUR WEEKS HOW WOULD YOU RATE YOUR ASTHMA CONTROL: COMPLETELY CONTROLLED
QUESTION_1 LAST FOUR WEEKS HOW MUCH OF THE TIME DID YOUR ASTHMA KEEP YOU FROM GETTING AS MUCH DONE AT WORK, SCHOOL OR AT HOME: NONE OF THE TIME
QUESTION_4 LAST FOUR WEEKS HOW OFTEN HAVE YOU USED YOUR RESCUE INHALER OR NEBULIZER MEDICATION (SUCH AS ALBUTEROL): ONCE A WEEK OR LESS

## 2021-06-23 ASSESSMENT — PAIN SCALES - GENERAL: PAINLEVEL: NO PAIN (0)

## 2021-06-24 ASSESSMENT — ASTHMA QUESTIONNAIRES: ACT_TOTALSCORE: 23

## 2021-06-30 ENCOUNTER — HOSPITAL ENCOUNTER (OUTPATIENT)
Dept: MAMMOGRAPHY | Facility: CLINIC | Age: 66
End: 2021-06-30
Attending: PHYSICIAN ASSISTANT
Payer: COMMERCIAL

## 2021-06-30 ENCOUNTER — HOSPITAL ENCOUNTER (OUTPATIENT)
Dept: BONE DENSITY | Facility: CLINIC | Age: 66
End: 2021-06-30
Attending: PHYSICIAN ASSISTANT
Payer: COMMERCIAL

## 2021-06-30 DIAGNOSIS — Z79.811 LONG TERM (CURRENT) USE OF AROMATASE INHIBITORS: ICD-10-CM

## 2021-06-30 DIAGNOSIS — M85.80 OSTEOPENIA, UNSPECIFIED LOCATION: ICD-10-CM

## 2021-06-30 DIAGNOSIS — N95.1 SYMPTOMATIC MENOPAUSAL OR FEMALE CLIMACTERIC STATES: ICD-10-CM

## 2021-06-30 DIAGNOSIS — Z12.31 ENCOUNTER FOR SCREENING MAMMOGRAM FOR BREAST CANCER: ICD-10-CM

## 2021-06-30 PROCEDURE — 77063 BREAST TOMOSYNTHESIS BI: CPT

## 2021-06-30 PROCEDURE — 77080 DXA BONE DENSITY AXIAL: CPT

## 2021-07-07 ENCOUNTER — HOSPITAL ENCOUNTER (OUTPATIENT)
Dept: PHYSICAL THERAPY | Facility: CLINIC | Age: 66
Setting detail: THERAPIES SERIES
End: 2021-07-07
Attending: PHYSICIAN ASSISTANT
Payer: COMMERCIAL

## 2021-07-07 DIAGNOSIS — S29.012A STRAIN OF THORACIC SPINE: ICD-10-CM

## 2021-07-07 PROCEDURE — 97161 PT EVAL LOW COMPLEX 20 MIN: CPT | Mod: GP | Performed by: PHYSICAL THERAPIST

## 2021-07-07 PROCEDURE — 97112 NEUROMUSCULAR REEDUCATION: CPT | Mod: GP | Performed by: PHYSICAL THERAPIST

## 2021-07-07 PROCEDURE — 97140 MANUAL THERAPY 1/> REGIONS: CPT | Mod: GP | Performed by: PHYSICAL THERAPIST

## 2021-07-08 ENCOUNTER — HOSPITAL ENCOUNTER (OUTPATIENT)
Dept: PHYSICAL THERAPY | Facility: CLINIC | Age: 66
Setting detail: THERAPIES SERIES
End: 2021-07-08
Attending: PHYSICIAN ASSISTANT
Payer: COMMERCIAL

## 2021-07-08 ENCOUNTER — TELEPHONE (OUTPATIENT)
Dept: FAMILY MEDICINE | Facility: OTHER | Age: 66
End: 2021-07-08

## 2021-07-08 PROCEDURE — 97140 MANUAL THERAPY 1/> REGIONS: CPT | Mod: GP | Performed by: PHYSICAL THERAPIST

## 2021-07-08 NOTE — TELEPHONE ENCOUNTER
Patient was seen by PT specialist (DENIS Velasquez). The patient was informed she needed an x-ray and that she should schedule with Caitlin to discuss further. Patient is wondering if Caitlin can work her in sooner than the end of July?      Please have team contact patient and scheduled.       Lesly Murillo RN, BSN  [facility]  July 8, 2021

## 2021-07-08 NOTE — PROGRESS NOTES
07/07/21 1416   General Information   Type of Visit Initial OP Ortho PT Evaluation   Start of Care Date 07/07/21   Referring Physician Caitlin RUELAS   Patient/Family Goals Statement get rid of the current pain or at least decrease it so she can exercise and complete her home activities with less pain.    Orders Evaluate and Treat   Date of Order 06/23/21   Certification Required? Yes  ([employer]are non fairivew partners, televisits are ok)   Medical Diagnosis Strain of thoracic spine S29.012A    Surgical/Medical history reviewed Yes   Precautions/Limitations no known precautions/limitations   Weight-Bearing Status - LUE full weight-bearing   Weight-Bearing Status - RUE full weight-bearing   Weight-Bearing Status - LLE full weight-bearing   Weight-Bearing Status - RLE full weight-bearing   General Information Comments History: Insomnia, decreased concentration ,depression, osteoporosis/osteopenia, menopausal states, chronic pain low back. neck and shoulders       Present No   Body Part(s)   Body Part(s) Cervical Spine;Lumbar Spine/SI   Presentation and Etiology   Pertinent history of current problem (include personal factors and/or comorbidities that impact the POC) 65 yo female who has had low back, neck and shoulder pain for years. She had been seeing Leana James PT for this and she feels it was very helpful. She has been completing her PT exercises until this episode as the pain is stopping her. Today, she is here for a different pain. She reports this pain started approximately 1 month ago after bending for her gardening and yard work.  She also reports using a ladder to get out of a pool and this set off her R side of her back. She called PARDEEP James PT for advice and it was recommended she walk, use ice, and bend no more than 10minutes at a time. She has tried the recommendations and stretching on her massage table without success. She had 5 days of Prednisone without benefit. She  has insomnia and feels 6 ours of sleep is good for her. She has had this for yrs. She feels she has been using good bending technique. She has increased symptoms with sneezing and coughing, bowel movement, bladder, deep breathing, swallowing are negative. She has tinnitus and this has been present for yrs without change.    Impairments A. Pain;F. Decreased strength and endurance;N. Headaches  (light headedness a little)   Functional Limitations perform activities of daily living;perform required work activities;perform desired leisure / sports activities   Symptom Location Symptoms from L side T7 to iliac crest and into the posterior thigh a few inches above knee and shifted to the R.    How/Where did it occur   (repeated bending, gradual onset)   Onset date of current episode/exacerbation 06/07/21  (approximately 1 month ago)   Chronicity New  (in the area, had in neck and shld, LB)   Pain rating (0-10 point scale) Other   Pain rating comment Now: 8/10, range: 5/10 to 10/10   Pain quality A. Sharp   Frequency of pain/symptoms A. Constant   Pain/symptoms are: Other   Pain symptoms comment morning right when she gets up needs medication and sometimes at night   Pain/symptoms exacerbated by I. Bending;M. Other;A. Sitting   Pain exacerbation comment rising from bed and chairs, rolling in bed   Pain/symptoms eased by I. OTC medication(s);K. Other   Pain eased by comment Standing helps, heat/ice, walking   Progression of symptoms since onset: Unchanged   Current / Previous Interventions   Diagnostic Tests:   (none at this time will consider based on progress)   Prior Level of Function   Functional Level Prior Comment symptoms but manageable with exercises and PT, medications for flare ups   Current Level of Function   Current Community Support   ( - supportive)   Patient role/employment history F. Retired   Living environment House/townhome   Home/community accessibility no concerns   Fall Risk Screen   Fall  screen completed by PT   Have you fallen 2 or more times in the past year? No   Have you fallen and had an injury in the past year? No   Is patient a fall risk? No   Abuse Screen (yes response referral indicated)   Feels Unsafe at Home or Work/School no   Feels Threatened by Someone no   Does Anyone Try to Keep You From Having Contact with Others or Doing Things Outside Your Home? no   Physical Signs of Abuse Present no   System Outcome Measures   Outcome Measures Low Back Pain (see Oswestry and Papa)  (UMESH: 16-10-32, PAPA 5-2 medium)   Lumbar Spine/SI Objective Findings   Observation appears uncomfortable and changing positions in chair. Offered other positions such as laying or standing   Integumentary negative low back   Posture forward head, scoliosis of spine with L pelvic rotation, hips are ER and eversion R>L in standing and lumbar curve concave R with concave L in thoracic spine.    Gait/Locomotion slow, cautious   Flexion ROM no change supine bilateral knee to chest   Extension ROM no change prone lying   Right Side Bending ROM moderately tight with increased stretch L low back   Left Side Bending ROM Moderately tight   Repeated Extension-Standing ROM x 10 no change in back and decrease in size L lower posterior thigh from orange to grape after 2 nd set of 10 reps to 4 inches below gluteal fold. Completed after flexion.    Repeated Flexion-Standing ROM x 10 increased L posterior symptoms baseline 4/10 size of orange 4 inches below gluteal fold -> 5/10 grape size to just above the posterior knee joint L   Pelvic Screen positive prone spring test, negative posterior SIJ compression test, negative thigh thrust - bilaterally   Hip Flexor Flexibility in prone reproduction of symptoms with femoral nerve test leg component R and L   SLR positive bilaterally sural nerve R approximately 45 degrees and L approximately 30 degrees. No change with neck flexion- completed supine. She had positive median nerve test  bilaterally noting reproduction of symptoms in R low back with L and R testing in sitting.    Crossover SLR positive R  and negative L   Palpation increased symptoms lumbar and thoracic spine in prone with grade 1-2 PA glides, tender along her paraspinal muscles as well. Non tender over SIJs.    Planned Therapy Interventions   Planned Therapy Interventions joint mobilization;manual therapy;neuromuscular re-education;strengthening;ROM;stretching;other (see comments)   Planned Therapy Interventions Comment Back and neck care, pain education   Planned Modality Interventions   Planned Modality Interventions Comments as needed   Clinical Impression   Criteria for Skilled Therapeutic Interventions Met yes, treatment indicated   PT Diagnosis neural tightness and soft tissue pain   Influenced by the following impairments responded better to extension vs flexion, positive dural tightness, nerve tightness, spreading pain, chronic pain, history of osetoporosis/osteopenia   Functional limitations due to impairments general activities especially with flexion, sleep   Clinical Presentation Stable/Uncomplicated   Clinical Presentation Rationale clinical judgement   Clinical Decision Making (Complexity) Low complexity   Predicted Duration of Therapy Intervention (days/wks) 2 times per week x 6 weeks    Risk & Benefits of therapy have been explained Yes   Patient, Family & other staff in agreement with plan of care Yes   Clinical Impression Comments Margareth has a long history of back, neck and shoulder pain. She is noting her symptoms are spreading and this is inhibiting her ability to complete her exercises. She is also not having success with the Prednisone. She has had maual MFR in the past which was helpful. She was uncomfortable after the assessment today. She is reponding to extension and she has positive neural symptoms of tightness. Nociceptive: 30%, neurogenic: 50% and nociplastic 20%. She had successfully been seeing Cathy  Erika PT and they have a great therapeutic alliance. I recommended she continue her sessions with Cathy and she agreed.    Education Assessment   Preferred Learning Style Reading;Listening;Demonstration;Pictures/video   Barriers to Learning No barriers   ORTHO GOALS   PT Ortho Eval Goals 1;2;3   Ortho Goal 1   Goal Identifier Calm symptoms   Goal Description Margareth will be able to apply tools including positioning, posture and gentle activities to calm her symptoms 40%   Target Date 07/28/21   Ortho Goal 2   Goal Identifier Exercises   Goal Description Margareth will be able to return to her previous home program which was helpful in manging her symptoms as noted with a 20 point improvement in UMESH and shift toward low with AKILA   Target Date 08/19/21   Ortho Goal 3   Goal Identifier Function   Goal Description Margareth will be able to return to her gardening and yard work without her symptoms increasing and demonstrate proper technique /bosy posture   Target Date 08/19/21   Total Evaluation Time   PT Eval, Low Complexity Minutes (22945) 40   Therapy Certification   Certification date from 07/07/21   Certification date to 08/18/21   Medical Diagnosis Strain of thoracic spine S29.012A

## 2021-07-08 NOTE — PROGRESS NOTES
[facility]          OUTPATIENT PHYSICAL THERAPY ORTHOPEDIC EVALUATION  PLAN OF TREATMENT FOR OUTPATIENT REHABILITATION  (COMPLETE FOR INITIAL CLAIMS ONLY)  Patient's Last Name, First Name, M.I.  YOB: 1955  Margareth Green    Provider s Name:  [facility]   Medical Record No.  [id number]   Start of Care Date:  07/07/21   Onset Date:  06/07/21(approximately 1 month ago)   Type:     _X__PT   ___OT   ___SLP Medical Diagnosis:  Strain of thoracic spine S29.012A      PT Diagnosis:  neural tightness and soft tissue pain   Visits from SOC:  1      _________________________________________________________________________________  Plan of Treatment/Functional Goals:  joint mobilization, manual therapy, neuromuscular re-education, strengthening, ROM, stretching, other (see comments)  Back and neck care, pain education     as needed  Goals  Goal Identifier: Calm symptoms  Goal Description: Margareth will be able to apply tools including positioning, posture and gentle activities to calm her symptoms 40%  Target Date: 07/28/21    Goal Identifier: Exercises  Goal Description: Margareth will be able to return to her previous home program which was helpful in manging her symptoms as noted with a 20 point improvement in UMESH and shift toward low with AKIAL  Target Date: 08/19/21    Goal Identifier: Function  Goal Description: Margareth will be able to return to her gardening and yard work without her symptoms increasing and demonstrate proper technique /bosy posture  Target Date: 08/19/21          Therapy Frequency:     Predicted Duration of Therapy Intervention:  2 times per week x 6 weeks     Joaquina Velasquez, PT                 I CERTIFY THE NEED FOR THESE SERVICES FURNISHED UNDER        THIS PLAN OF TREATMENT AND WHILE UNDER MY CARE     (Physician co-signature of this document indicates review and certification of the therapy plan).                        Certification Date From:  07/07/21   Certification Date To:  08/18/21    Referring Provider:  Caitlin RUELAS    Initial Assessment        See Epic Evaluation Start of Care Date: 07/07/21

## 2021-07-12 ASSESSMENT — ANXIETY QUESTIONNAIRES
3. WORRYING TOO MUCH ABOUT DIFFERENT THINGS: NOT AT ALL
7. FEELING AFRAID AS IF SOMETHING AWFUL MIGHT HAPPEN: NOT AT ALL
7. FEELING AFRAID AS IF SOMETHING AWFUL MIGHT HAPPEN: NOT AT ALL
GAD7 TOTAL SCORE: 0
1. FEELING NERVOUS, ANXIOUS, OR ON EDGE: NOT AT ALL
5. BEING SO RESTLESS THAT IT IS HARD TO SIT STILL: NOT AT ALL
GAD7 TOTAL SCORE: 0
2. NOT BEING ABLE TO STOP OR CONTROL WORRYING: NOT AT ALL
GAD7 TOTAL SCORE: 0
6. BECOMING EASILY ANNOYED OR IRRITABLE: NOT AT ALL
4. TROUBLE RELAXING: NOT AT ALL

## 2021-07-12 ASSESSMENT — PATIENT HEALTH QUESTIONNAIRE - PHQ9
10. IF YOU CHECKED OFF ANY PROBLEMS, HOW DIFFICULT HAVE THESE PROBLEMS MADE IT FOR YOU TO DO YOUR WORK, TAKE CARE OF THINGS AT HOME, OR GET ALONG WITH OTHER PEOPLE: NOT DIFFICULT AT ALL
SUM OF ALL RESPONSES TO PHQ QUESTIONS 1-9: 3
SUM OF ALL RESPONSES TO PHQ QUESTIONS 1-9: 3

## 2021-07-12 NOTE — PROGRESS NOTES
Assessment & Plan     Acute left-sided thoracic back pain  We will await her laboratory work-up, x-ray findings are normal no sign of any fracture  - XR Ribs & Chest Left G/E 3 Views; Future  - CBC with platelets; Future  - Comprehensive metabolic panel (BMP + Alb, Alk Phos, ALT, AST, Total. Bili, TP); Future  - UA Macro with Reflex to Micro and Culture - lab collect; Future  - UA Macro with Reflex to Micro and Culture - lab collect  - CBC with platelets  - Comprehensive metabolic panel (BMP + Alb, Alk Phos, ALT, AST, Total. Bili, TP)  - Urine Microscopic  She may proceed with physical therapy, if she continues to not improve, we could look at some abdominal imaging though she has no symptoms to support that today  Screening for diabetes mellitus    - Comprehensive metabolic panel (BMP + Alb, Alk Phos, ALT, AST, Total. Bili, TP); Future  - Comprehensive metabolic panel (BMP + Alb, Alk Phos, ALT, AST, Total. Bili, TP)                 Return in about 1 month (around 8/13/2021), or if symptoms worsen or fail to improve.    Caitlin Beasley PA-C  [facility]    Basiloi Zuluaga is a 66 year old who presents for the following health issues     HPI       Pt presents for a follow up on her back pain. Was seen by physical therapy and advised to have an xray done before  therapy can begin.  She has a long-term history of lumbar back pain.  She presented to clinic several weeks ago after she had an onset of left thoracic pain while gardening in early June.  She had no trauma or direct blows to her back.  Therapy is worried that she may have a fracture.  Thankfully she does not have any pain at night that wakes her from sleep.  Her symptoms are not improving as they typically would with physical therapy.  She denies any urinary frequency, urgency, dysuria or hematuria.  Her reflux is normal and well controlled.  She denies any ab pain, bowel changes, coughing, shortness of breath, or chest pains.   She admits to bruising easily, she points out a bruise that she got for no apparent reason but states she is always bruised easily.  This is nothing new she had a colonoscopy in 2015 which was normal other than diverticulosis and an upper GI endoscopy in 2018 which was also normal        Review of Systems   Constitutional, HEENT, cardiovascular, pulmonary, gi and gu systems are negative, except as otherwise noted.      Objective    /60   Pulse 85   Temp 98  F (36.7  C) (Temporal)   Resp 18   Wt 57.6 kg (127 lb)   LMP 02/26/2008 (Approximate)   SpO2 99%   BMI 22.96 kg/m    Body mass index is 22.96 kg/m .  Physical Exam   GENERAL: healthy, alert and no distress  NECK: no adenopathy, no asymmetry, masses, or scars and thyroid normal to palpation  RESP: lungs clear to auscultation - no rales, rhonchi or wheezes  CV: regular rate and rhythm, normal S1 S2, no S3 or S4, no murmur, click or rub, no peripheral edema and peripheral pulses strong  ABDOMEN: soft, nontender, no hepatosplenomegaly, no masses and bowel sounds normal  MS: She is tender to palpation over left posterior lateral ribs overlying skin is normal  SKIN: no suspicious lesions or rashes  NEURO: Normal strength and tone, mentation intact and speech normal  PSYCH: mentation appears normal, affect normal/bright    Results for orders placed or performed in visit on 07/13/21   XR Ribs & Chest Left G/E 3 Views     Status: None    Narrative    XR RIBS & CHEST LT 3VW 7/13/2021 10:18 AM     HISTORY: Acute left-sided thoracic back pain      Impression    IMPRESSION: No apparent left rib displaced fracture. The lungs appear  clear. No apparent pneumothorax or pleural effusion. 2 adjacent  calcific rings are noted in the left upper quadrant, possibly  representing splenic artery aneurysm, the appearance unchanged from  2/14/2019. Left renal stones may be present as well. Left shoulder  calcific tendinitis/bursitis is incidentally noted.    JAMARI NORTH MD          SYSTEM ID:  [id number]   Results for orders placed or performed in visit on 07/13/21   UA Macro with Reflex to Micro and Culture - lab collect     Status: Abnormal    Specimen: Urine, Clean Catch   Result Value Ref Range    Color Urine Yellow Colorless, Straw, Light Yellow, Yellow    Appearance Urine Clear Clear    Glucose Urine Negative Negative mg/dL    Bilirubin Urine Negative Negative    Ketones Urine Negative Negative mg/dL    Specific Gravity Urine 1.010 1.003 - 1.035    Blood Urine Trace (A) Negative    pH Urine 7.0 5.0 - 7.0    Protein Albumin Urine Negative Negative mg/dL    Urobilinogen Urine 0.2 0.2, 1.0 E.U./dL    Nitrite Urine Negative Negative    Leukocyte Esterase Urine Negative Negative   CBC with platelets     Status: Normal   Result Value Ref Range    WBC Count 6.3 4.0 - 11.0 10e3/uL    RBC Count 4.29 3.80 - 5.20 10e6/uL    Hemoglobin 13.5 11.7 - 15.7 g/dL    Hematocrit 41.2 35.0 - 47.0 %    MCV 96 78 - 100 fL    MCH 31.5 26.5 - 33.0 pg    MCHC 32.8 31.5 - 36.5 g/dL    RDW 14.0 10.0 - 15.0 %    Platelet Count 227 150 - 450 10e3/uL   Comprehensive metabolic panel (BMP + Alb, Alk Phos, ALT, AST, Total. Bili, TP)     Status: Abnormal   Result Value Ref Range    Sodium 141 133 - 144 mmol/L    Potassium 3.6 3.4 - 5.3 mmol/L    Chloride 109 94 - 109 mmol/L    Carbon Dioxide (CO2) 31 20 - 32 mmol/L    Anion Gap 1 (L) 3 - 14 mmol/L    Urea Nitrogen 21 7 - 30 mg/dL    Creatinine 0.99 0.52 - 1.04 mg/dL    Calcium 8.8 8.5 - 10.1 mg/dL    Glucose 77 70 - 99 mg/dL    Alkaline Phosphatase 60 40 - 150 U/L    AST 22 0 - 45 U/L    ALT 25 0 - 50 U/L    Protein Total 6.7 (L) 6.8 - 8.8 g/dL    Albumin 3.7 3.4 - 5.0 g/dL    Bilirubin Total 0.5 0.2 - 1.3 mg/dL    GFR Estimate 59 (L) >60 mL/min/1.73m2   Urine Microscopic     Status: Abnormal   Result Value Ref Range    RBC Urine 0-2 0-2 /HPF /HPF    WBC Urine 0-5 0-5 /HPF /HPF    Squamous Epithelials Urine Few (A) None Seen /LPF    Narrative    Urine Culture not  indicated     XR Ribs & Chest Left G/E 3 Views    Result Date: 7/13/2021  XR RIBS & CHEST LT 3VW 7/13/2021 10:18 AM HISTORY: Acute left-sided thoracic back pain     IMPRESSION: No apparent left rib displaced fracture. The lungs appear clear. No apparent pneumothorax or pleural effusion. 2 adjacent calcific rings are noted in the left upper quadrant, possibly representing splenic artery aneurysm, the appearance unchanged from 2/14/2019. Left renal stones may be present as well. Left shoulder calcific tendinitis/bursitis is incidentally noted. JAMARI NORTH MD   SYSTEM ID:  [id number]            Answers for HPI/ROS submitted by the patient on 7/12/2021  If you checked off any problems, how difficult have these problems made it for you to do your work, take care of things at home, or get along with other people?: Not difficult at all  PHQ9 TOTAL SCORE: 3  TAMMY 7 TOTAL SCORE: 0

## 2021-07-13 ENCOUNTER — OFFICE VISIT (OUTPATIENT)
Dept: FAMILY MEDICINE | Facility: OTHER | Age: 66
End: 2021-07-13
Payer: COMMERCIAL

## 2021-07-13 ENCOUNTER — ANCILLARY PROCEDURE (OUTPATIENT)
Dept: GENERAL RADIOLOGY | Facility: OTHER | Age: 66
End: 2021-07-13
Attending: PHYSICIAN ASSISTANT
Payer: COMMERCIAL

## 2021-07-13 VITALS
DIASTOLIC BLOOD PRESSURE: 60 MMHG | OXYGEN SATURATION: 99 % | WEIGHT: 127 LBS | SYSTOLIC BLOOD PRESSURE: 110 MMHG | BODY MASS INDEX: 22.96 KG/M2 | RESPIRATION RATE: 18 BRPM | TEMPERATURE: 98 F | HEART RATE: 85 BPM

## 2021-07-13 DIAGNOSIS — M54.6 ACUTE LEFT-SIDED THORACIC BACK PAIN: ICD-10-CM

## 2021-07-13 DIAGNOSIS — Z13.1 SCREENING FOR DIABETES MELLITUS: ICD-10-CM

## 2021-07-13 DIAGNOSIS — R31.21 ASYMPTOMATIC MICROSCOPIC HEMATURIA: ICD-10-CM

## 2021-07-13 DIAGNOSIS — N18.31 STAGE 3A CHRONIC KIDNEY DISEASE (H): ICD-10-CM

## 2021-07-13 DIAGNOSIS — M54.6 ACUTE LEFT-SIDED THORACIC BACK PAIN: Primary | ICD-10-CM

## 2021-07-13 LAB
ALBUMIN SERPL-MCNC: 3.7 G/DL (ref 3.4–5)
ALBUMIN UR-MCNC: NEGATIVE MG/DL
ALP SERPL-CCNC: 60 U/L (ref 40–150)
ALT SERPL W P-5'-P-CCNC: 25 U/L (ref 0–50)
ANION GAP SERPL CALCULATED.3IONS-SCNC: 1 MMOL/L (ref 3–14)
APPEARANCE UR: CLEAR
AST SERPL W P-5'-P-CCNC: 22 U/L (ref 0–45)
BILIRUB SERPL-MCNC: 0.5 MG/DL (ref 0.2–1.3)
BILIRUB UR QL STRIP: NEGATIVE
BUN SERPL-MCNC: 21 MG/DL (ref 7–30)
CALCIUM SERPL-MCNC: 8.8 MG/DL (ref 8.5–10.1)
CHLORIDE BLD-SCNC: 109 MMOL/L (ref 94–109)
CO2 SERPL-SCNC: 31 MMOL/L (ref 20–32)
COLOR UR AUTO: YELLOW
CREAT SERPL-MCNC: 0.99 MG/DL (ref 0.52–1.04)
ERYTHROCYTE [DISTWIDTH] IN BLOOD BY AUTOMATED COUNT: 14 % (ref 10–15)
GFR SERPL CREATININE-BSD FRML MDRD: 59 ML/MIN/1.73M2
GLUCOSE BLD-MCNC: 77 MG/DL (ref 70–99)
GLUCOSE UR STRIP-MCNC: NEGATIVE MG/DL
HCT VFR BLD AUTO: 41.2 % (ref 35–47)
HGB BLD-MCNC: 13.5 G/DL (ref 11.7–15.7)
HGB UR QL STRIP: ABNORMAL
KETONES UR STRIP-MCNC: NEGATIVE MG/DL
LEUKOCYTE ESTERASE UR QL STRIP: NEGATIVE
MCH RBC QN AUTO: 31.5 PG (ref 26.5–33)
MCHC RBC AUTO-ENTMCNC: 32.8 G/DL (ref 31.5–36.5)
MCV RBC AUTO: 96 FL (ref 78–100)
NITRATE UR QL: NEGATIVE
PH UR STRIP: 7 [PH] (ref 5–7)
PLATELET # BLD AUTO: 227 10E3/UL (ref 150–450)
POTASSIUM BLD-SCNC: 3.6 MMOL/L (ref 3.4–5.3)
PROT SERPL-MCNC: 6.7 G/DL (ref 6.8–8.8)
RBC # BLD AUTO: 4.29 10E6/UL (ref 3.8–5.2)
RBC #/AREA URNS AUTO: ABNORMAL /HPF
SODIUM SERPL-SCNC: 141 MMOL/L (ref 133–144)
SP GR UR STRIP: 1.01 (ref 1–1.03)
SQUAMOUS #/AREA URNS AUTO: ABNORMAL /LPF
UROBILINOGEN UR STRIP-ACNC: 0.2 E.U./DL
WBC # BLD AUTO: 6.3 10E3/UL (ref 4–11)
WBC #/AREA URNS AUTO: ABNORMAL /HPF

## 2021-07-13 PROCEDURE — 99214 OFFICE O/P EST MOD 30 MIN: CPT | Performed by: PHYSICIAN ASSISTANT

## 2021-07-13 PROCEDURE — 71101 X-RAY EXAM UNILAT RIBS/CHEST: CPT | Mod: LT | Performed by: RADIOLOGY

## 2021-07-13 PROCEDURE — 81001 URINALYSIS AUTO W/SCOPE: CPT | Performed by: PHYSICIAN ASSISTANT

## 2021-07-13 PROCEDURE — 80053 COMPREHEN METABOLIC PANEL: CPT | Performed by: PHYSICIAN ASSISTANT

## 2021-07-13 PROCEDURE — 85027 COMPLETE CBC AUTOMATED: CPT | Performed by: PHYSICIAN ASSISTANT

## 2021-07-13 PROCEDURE — 36415 COLL VENOUS BLD VENIPUNCTURE: CPT | Performed by: PHYSICIAN ASSISTANT

## 2021-07-13 ASSESSMENT — PATIENT HEALTH QUESTIONNAIRE - PHQ9: SUM OF ALL RESPONSES TO PHQ QUESTIONS 1-9: 3

## 2021-07-13 ASSESSMENT — PAIN SCALES - GENERAL: PAINLEVEL: MODERATE PAIN (5)

## 2021-07-13 ASSESSMENT — ANXIETY QUESTIONNAIRES: GAD7 TOTAL SCORE: 0

## 2021-07-14 ENCOUNTER — HOSPITAL ENCOUNTER (OUTPATIENT)
Dept: PHYSICAL THERAPY | Facility: CLINIC | Age: 66
Setting detail: THERAPIES SERIES
End: 2021-07-14
Attending: PHYSICIAN ASSISTANT
Payer: COMMERCIAL

## 2021-07-14 PROBLEM — N18.30 CHRONIC KIDNEY DISEASE, STAGE 3 (H): Status: ACTIVE | Noted: 2021-07-14

## 2021-07-14 PROCEDURE — 97140 MANUAL THERAPY 1/> REGIONS: CPT | Mod: GP | Performed by: PHYSICAL THERAPIST

## 2021-07-14 ASSESSMENT — ASTHMA QUESTIONNAIRES: ACT_TOTALSCORE: 23

## 2021-07-19 ENCOUNTER — HOSPITAL ENCOUNTER (OUTPATIENT)
Dept: PHYSICAL THERAPY | Facility: CLINIC | Age: 66
Setting detail: THERAPIES SERIES
End: 2021-07-19
Attending: PHYSICIAN ASSISTANT
Payer: COMMERCIAL

## 2021-07-19 PROCEDURE — 97140 MANUAL THERAPY 1/> REGIONS: CPT | Mod: GP | Performed by: PHYSICAL THERAPIST

## 2021-07-22 ENCOUNTER — HOSPITAL ENCOUNTER (OUTPATIENT)
Dept: PHYSICAL THERAPY | Facility: CLINIC | Age: 66
Setting detail: THERAPIES SERIES
End: 2021-07-22
Attending: PHYSICIAN ASSISTANT
Payer: COMMERCIAL

## 2021-07-22 PROCEDURE — 97140 MANUAL THERAPY 1/> REGIONS: CPT | Mod: GP | Performed by: PHYSICAL THERAPIST

## 2021-07-29 ENCOUNTER — HOSPITAL ENCOUNTER (OUTPATIENT)
Dept: PHYSICAL THERAPY | Facility: CLINIC | Age: 66
Setting detail: THERAPIES SERIES
End: 2021-07-29
Attending: PHYSICIAN ASSISTANT
Payer: COMMERCIAL

## 2021-07-29 PROCEDURE — 97140 MANUAL THERAPY 1/> REGIONS: CPT | Mod: GP | Performed by: PHYSICAL THERAPIST

## 2021-08-03 ENCOUNTER — MYC MEDICAL ADVICE (OUTPATIENT)
Dept: FAMILY MEDICINE | Facility: OTHER | Age: 66
End: 2021-08-03

## 2021-08-05 ENCOUNTER — APPOINTMENT (OUTPATIENT)
Dept: LAB | Facility: CLINIC | Age: 66
End: 2021-08-05
Payer: COMMERCIAL

## 2021-08-05 ENCOUNTER — HOSPITAL ENCOUNTER (OUTPATIENT)
Dept: PHYSICAL THERAPY | Facility: CLINIC | Age: 66
Setting detail: THERAPIES SERIES
End: 2021-08-05
Attending: PHYSICIAN ASSISTANT
Payer: COMMERCIAL

## 2021-08-05 LAB
ALBUMIN UR-MCNC: NEGATIVE MG/DL
APPEARANCE UR: CLEAR
BILIRUB UR QL STRIP: NEGATIVE
COLOR UR AUTO: YELLOW
GLUCOSE UR STRIP-MCNC: NEGATIVE MG/DL
HGB UR QL STRIP: NEGATIVE
KETONES UR STRIP-MCNC: NEGATIVE MG/DL
LEUKOCYTE ESTERASE UR QL STRIP: NEGATIVE
NITRATE UR QL: NEGATIVE
PH UR STRIP: 7 [PH] (ref 5–7)
SP GR UR STRIP: 1.01 (ref 1–1.03)
UROBILINOGEN UR STRIP-MCNC: NORMAL MG/DL

## 2021-08-05 PROCEDURE — 97140 MANUAL THERAPY 1/> REGIONS: CPT | Mod: GP | Performed by: PHYSICAL THERAPIST

## 2021-08-05 PROCEDURE — 97110 THERAPEUTIC EXERCISES: CPT | Mod: GP | Performed by: PHYSICAL THERAPIST

## 2021-08-05 PROCEDURE — 81003 URINALYSIS AUTO W/O SCOPE: CPT | Performed by: PHYSICIAN ASSISTANT

## 2021-08-12 ENCOUNTER — HOSPITAL ENCOUNTER (OUTPATIENT)
Dept: PHYSICAL THERAPY | Facility: CLINIC | Age: 66
Setting detail: THERAPIES SERIES
End: 2021-08-12
Attending: PHYSICIAN ASSISTANT
Payer: COMMERCIAL

## 2021-08-12 PROCEDURE — 97140 MANUAL THERAPY 1/> REGIONS: CPT | Mod: GP | Performed by: PHYSICAL THERAPIST

## 2021-08-19 ENCOUNTER — HOSPITAL ENCOUNTER (OUTPATIENT)
Dept: PHYSICAL THERAPY | Facility: CLINIC | Age: 66
Setting detail: THERAPIES SERIES
End: 2021-08-19
Attending: PHYSICIAN ASSISTANT
Payer: COMMERCIAL

## 2021-08-19 PROCEDURE — 97140 MANUAL THERAPY 1/> REGIONS: CPT | Mod: GP | Performed by: PHYSICAL THERAPIST

## 2021-08-19 NOTE — PROGRESS NOTES
Rehabilitation Services      OUTPATIENT PHYSICAL THERAPY  PLAN OF TREATMENT FOR OUTPATIENT REHABILITATION    Patient's Last Name, First Name, M.I.                YOB: 1955  Margareth Green                        Provider's Name  Leana James, PT Medical Record No.  [id number]                               Onset Date: 6/7/21   Start of Care Date: 7/7/21   Type:     _X_PT   ___OT   ___SLP Medical Diagnosis: strain of the thoracic spine                       PT Diagnosis: Neck pain, thoracic pain and LBP      _________________________________________________________________________________  Plan of Treatment:    Frequency/Duration: 1x wk for 5 wks      Goals:  Goal Identifier Calm symptoms   Goal Description Margareth will be able to apply tools including positioning, posture and gentle activities to calm her symptoms 40%   Target Date 07/28/21   Date Met  08/19/21   Progress (detail required for progress note):     Goal Identifier Exercises   Goal Description Margareth will be able to return to her previous home program which was helpful in manging her symptoms as noted with a 20 point improvement in UMESH and shift toward low with AKILA   Target Date 08/19/21   Date Met  08/19/21   Progress (detail required for progress note):     Goal Identifier Function   Goal Description Margareth will be able to return to her gardening and yard work without her symptoms increasing and demonstrate proper technique /bosy posture   Target Date 09/22/21   Date Met      Progress (detail required for progress note):not met but improving           Certification date from 8/18/21 to 9/22/21.    Leana James, PT          I CERTIFY THE NEED FOR THESE SERVICES FURNISHED UNDER        THIS PLAN OF TREATMENT AND WHILE UNDER MY CARE     (Physician co-signature of this document indicates review and certification of the therapy plan).                 Referring Provider: Caitlin RUELAS

## 2021-09-03 ENCOUNTER — HOSPITAL ENCOUNTER (OUTPATIENT)
Dept: PHYSICAL THERAPY | Facility: CLINIC | Age: 66
Setting detail: THERAPIES SERIES
End: 2021-09-03
Attending: PHYSICIAN ASSISTANT
Payer: COMMERCIAL

## 2021-09-03 PROCEDURE — 97140 MANUAL THERAPY 1/> REGIONS: CPT | Mod: GP | Performed by: PHYSICAL THERAPIST

## 2021-09-09 ENCOUNTER — HOSPITAL ENCOUNTER (OUTPATIENT)
Dept: PHYSICAL THERAPY | Facility: CLINIC | Age: 66
Setting detail: THERAPIES SERIES
End: 2021-09-09
Attending: PHYSICIAN ASSISTANT
Payer: COMMERCIAL

## 2021-09-09 PROCEDURE — 97140 MANUAL THERAPY 1/> REGIONS: CPT | Mod: GP | Performed by: PHYSICAL THERAPIST

## 2021-09-10 DIAGNOSIS — M81.0 AGE RELATED OSTEOPOROSIS, UNSPECIFIED PATHOLOGICAL FRACTURE PRESENCE: ICD-10-CM

## 2021-09-13 RX ORDER — RALOXIFENE HYDROCHLORIDE 60 MG/1
TABLET, FILM COATED ORAL
Qty: 90 TABLET | Refills: 3 | OUTPATIENT
Start: 2021-09-13

## 2021-09-16 ENCOUNTER — HOSPITAL ENCOUNTER (OUTPATIENT)
Dept: PHYSICAL THERAPY | Facility: CLINIC | Age: 66
Setting detail: THERAPIES SERIES
End: 2021-09-16
Attending: PHYSICIAN ASSISTANT
Payer: COMMERCIAL

## 2021-09-16 PROCEDURE — 97140 MANUAL THERAPY 1/> REGIONS: CPT | Mod: GP | Performed by: PHYSICAL THERAPIST

## 2021-09-16 NOTE — PROGRESS NOTES
Outpatient Physical Therapy Discharge Note     Patient: Margareth Green  : 1955    Beginning/End Dates of Reporting Period:  21 to 21    Referring Provider: Caitlin kay PA-C    Therapy Diagnosis: trunk and rib pain and neck pain     Client Self Report: Pain #2 in the ribs    Objective Measurements:  Pain minimal in the ribs              Goals:  Goal Identifier Calm symptoms   Goal Description Margareth will be able to apply tools including positioning, posture and gentle activities to calm her symptoms 40%   Target Date 21   Date Met  21   Progress (detail required for progress note):       Goal Identifier Exercises   Goal Description Margareth will be able to return to her previous home program which was helpful in manging her symptoms as noted with a 20 point improvement in UMESH and shift toward low with AKILA   Target Date 21   Date Met  21   Progress (detail required for progress note):       Goal Identifier Function   Goal Description Margareth will be able to return to her gardening and yard work without her symptoms increasing and demonstrate proper technique /bosy posture   Target Date 21   Date Met  21   Progress (detail required for progress note): has some sx's with this but rests often.          Plan:  Discharge from therapy.    Discharge:    Reason for Discharge: Patient has met all goals.        Discharge Plan: Patient to continue home program.    Thank you for the referral,            Leana James PT

## 2021-09-17 ENCOUNTER — MYC MEDICAL ADVICE (OUTPATIENT)
Dept: FAMILY MEDICINE | Facility: OTHER | Age: 66
End: 2021-09-17

## 2021-10-23 ENCOUNTER — HEALTH MAINTENANCE LETTER (OUTPATIENT)
Age: 66
End: 2021-10-23

## 2021-11-08 ENCOUNTER — MYC MEDICAL ADVICE (OUTPATIENT)
Dept: FAMILY MEDICINE | Facility: OTHER | Age: 66
End: 2021-11-08
Payer: COMMERCIAL

## 2021-11-08 DIAGNOSIS — M81.0 AGE RELATED OSTEOPOROSIS, UNSPECIFIED PATHOLOGICAL FRACTURE PRESENCE: ICD-10-CM

## 2021-11-09 DIAGNOSIS — M81.0 AGE RELATED OSTEOPOROSIS, UNSPECIFIED PATHOLOGICAL FRACTURE PRESENCE: ICD-10-CM

## 2021-11-09 RX ORDER — RALOXIFENE HYDROCHLORIDE 60 MG/1
1 TABLET, FILM COATED ORAL DAILY
Qty: 90 TABLET | Refills: 3 | OUTPATIENT
Start: 2021-11-09

## 2021-11-09 RX ORDER — RALOXIFENE HYDROCHLORIDE 60 MG/1
1 TABLET, FILM COATED ORAL DAILY
Qty: 90 TABLET | Refills: 2 | Status: SHIPPED | OUTPATIENT
Start: 2021-11-09 | End: 2022-09-15

## 2021-11-09 NOTE — TELEPHONE ENCOUNTER
Responded to [employer].     Called [facility]. RX was cancelled on their end from June. We have been denying requests on our end, because in our system we provided 3 additional refills from your Ram[facility] prescription and thought you would not be out. [facility] is working on getting this fixed on their end.   Spoke to technician to get this matter figured out.   This nurse sent in a new RX because it would be 7-10 days to get this fixed.   New RX sent.   Patient was updated.   No further action is needed.       Lesly NGUYENN, RN, PHN  November 9, 2021

## 2021-11-17 DIAGNOSIS — F33.0 MAJOR DEPRESSIVE DISORDER, RECURRENT EPISODE, MILD (H): ICD-10-CM

## 2021-11-17 DIAGNOSIS — R41.840 POOR CONCENTRATION: ICD-10-CM

## 2021-11-17 RX ORDER — BUPROPION HYDROCHLORIDE 150 MG/1
TABLET ORAL
Qty: 90 TABLET | Refills: 1 | Status: SHIPPED | OUTPATIENT
Start: 2021-11-17 | End: 2022-07-06

## 2021-12-02 ENCOUNTER — MYC MEDICAL ADVICE (OUTPATIENT)
Dept: FAMILY MEDICINE | Facility: OTHER | Age: 66
End: 2021-12-02
Payer: COMMERCIAL

## 2021-12-02 DIAGNOSIS — U07.1 INFECTION DUE TO 2019 NOVEL CORONAVIRUS: Primary | ICD-10-CM

## 2021-12-03 RX ORDER — PREDNISONE 20 MG/1
20 TABLET ORAL DAILY
Qty: 7 TABLET | Refills: 0 | Status: SHIPPED | OUTPATIENT
Start: 2021-12-03 | End: 2021-12-10

## 2021-12-06 ENCOUNTER — HOSPITAL ENCOUNTER (EMERGENCY)
Facility: CLINIC | Age: 66
Discharge: ANOTHER HOSPITAL | End: 2021-12-06
Attending: FAMILY MEDICINE | Admitting: STUDENT IN AN ORGANIZED HEALTH CARE EDUCATION/TRAINING PROGRAM
Payer: COMMERCIAL

## 2021-12-06 ENCOUNTER — APPOINTMENT (OUTPATIENT)
Dept: CT IMAGING | Facility: CLINIC | Age: 66
End: 2021-12-06
Attending: FAMILY MEDICINE
Payer: COMMERCIAL

## 2021-12-06 ENCOUNTER — NURSE TRIAGE (OUTPATIENT)
Dept: FAMILY MEDICINE | Facility: OTHER | Age: 66
End: 2021-12-06
Payer: COMMERCIAL

## 2021-12-06 VITALS
TEMPERATURE: 97.9 F | DIASTOLIC BLOOD PRESSURE: 82 MMHG | RESPIRATION RATE: 20 BRPM | BODY MASS INDEX: 22.06 KG/M2 | HEART RATE: 84 BPM | SYSTOLIC BLOOD PRESSURE: 128 MMHG | OXYGEN SATURATION: 92 % | WEIGHT: 122 LBS

## 2021-12-06 DIAGNOSIS — D69.6 THROMBOCYTOPENIA (H): ICD-10-CM

## 2021-12-06 DIAGNOSIS — R09.02 HYPOXIA: ICD-10-CM

## 2021-12-06 DIAGNOSIS — U07.1 COVID-19 VIRUS INFECTION: ICD-10-CM

## 2021-12-06 DIAGNOSIS — Z87.09 HISTORY OF ASTHMA: ICD-10-CM

## 2021-12-06 LAB
ALBUMIN SERPL-MCNC: 2.4 G/DL (ref 3.4–5)
ALP SERPL-CCNC: 33 U/L (ref 40–150)
ALT SERPL W P-5'-P-CCNC: 22 U/L (ref 0–50)
ANION GAP SERPL CALCULATED.3IONS-SCNC: 5 MMOL/L (ref 3–14)
AST SERPL W P-5'-P-CCNC: 54 U/L (ref 0–45)
BASE EXCESS BLDA CALC-SCNC: 0.8 MMOL/L (ref -9–1.8)
BASOPHILS # BLD AUTO: 0 10E3/UL (ref 0–0.2)
BASOPHILS NFR BLD AUTO: 0 %
BILIRUB SERPL-MCNC: 0.3 MG/DL (ref 0.2–1.3)
BUN SERPL-MCNC: 13 MG/DL (ref 7–30)
CALCIUM SERPL-MCNC: 7.9 MG/DL (ref 8.5–10.1)
CHLORIDE BLD-SCNC: 105 MMOL/L (ref 94–109)
CO2 SERPL-SCNC: 26 MMOL/L (ref 20–32)
CREAT SERPL-MCNC: 0.68 MG/DL (ref 0.52–1.04)
D DIMER PPP FEU-MCNC: 0.77 UG/ML FEU (ref 0–0.5)
EOSINOPHIL # BLD AUTO: 0 10E3/UL (ref 0–0.7)
EOSINOPHIL NFR BLD AUTO: 0 %
ERYTHROCYTE [DISTWIDTH] IN BLOOD BY AUTOMATED COUNT: 13 % (ref 10–15)
GFR SERPL CREATININE-BSD FRML MDRD: 90 ML/MIN/1.73M2
GLUCOSE BLD-MCNC: 135 MG/DL (ref 70–99)
HCO3 BLD-SCNC: 24 MMOL/L (ref 21–28)
HCT VFR BLD AUTO: 39.4 % (ref 35–47)
HGB BLD-MCNC: 13.2 G/DL (ref 11.7–15.7)
IMM GRANULOCYTES # BLD: 0 10E3/UL
IMM GRANULOCYTES NFR BLD: 1 %
LIPASE SERPL-CCNC: 284 U/L (ref 73–393)
LYMPHOCYTES # BLD AUTO: 0.3 10E3/UL (ref 0.8–5.3)
LYMPHOCYTES NFR BLD AUTO: 6 %
MAGNESIUM SERPL-MCNC: 1.9 MG/DL (ref 1.6–2.3)
MCH RBC QN AUTO: 31 PG (ref 26.5–33)
MCHC RBC AUTO-ENTMCNC: 33.5 G/DL (ref 31.5–36.5)
MCV RBC AUTO: 93 FL (ref 78–100)
MONOCYTES # BLD AUTO: 0.2 10E3/UL (ref 0–1.3)
MONOCYTES NFR BLD AUTO: 4 %
NEUTROPHILS # BLD AUTO: 4.5 10E3/UL (ref 1.6–8.3)
NEUTROPHILS NFR BLD AUTO: 89 %
NRBC # BLD AUTO: 0 10E3/UL
NRBC BLD AUTO-RTO: 0 /100
NT-PROBNP SERPL-MCNC: 393 PG/ML (ref 0–900)
O2/TOTAL GAS SETTING VFR VENT: 40 %
PCO2 BLD: 34 MM HG (ref 35–45)
PH BLD: 7.46 [PH] (ref 7.35–7.45)
PLATELET # BLD AUTO: 90 10E3/UL (ref 150–450)
PO2 BLD: 53 MM HG (ref 80–105)
POTASSIUM BLD-SCNC: 3.9 MMOL/L (ref 3.4–5.3)
PROT SERPL-MCNC: 6 G/DL (ref 6.8–8.8)
RBC # BLD AUTO: 4.26 10E6/UL (ref 3.8–5.2)
SODIUM SERPL-SCNC: 136 MMOL/L (ref 133–144)
TROPONIN I SERPL HS-MCNC: 13 NG/L
WBC # BLD AUTO: 5 10E3/UL (ref 4–11)

## 2021-12-06 PROCEDURE — 85379 FIBRIN DEGRADATION QUANT: CPT | Performed by: FAMILY MEDICINE

## 2021-12-06 PROCEDURE — 250N000013 HC RX MED GY IP 250 OP 250 PS 637: Performed by: FAMILY MEDICINE

## 2021-12-06 PROCEDURE — 99285 EMERGENCY DEPT VISIT HI MDM: CPT | Performed by: STUDENT IN AN ORGANIZED HEALTH CARE EDUCATION/TRAINING PROGRAM

## 2021-12-06 PROCEDURE — 96375 TX/PRO/DX INJ NEW DRUG ADDON: CPT | Performed by: FAMILY MEDICINE

## 2021-12-06 PROCEDURE — 36600 WITHDRAWAL OF ARTERIAL BLOOD: CPT

## 2021-12-06 PROCEDURE — 250N000011 HC RX IP 250 OP 636: Performed by: FAMILY MEDICINE

## 2021-12-06 PROCEDURE — 84484 ASSAY OF TROPONIN QUANT: CPT | Performed by: FAMILY MEDICINE

## 2021-12-06 PROCEDURE — 96361 HYDRATE IV INFUSION ADD-ON: CPT | Performed by: STUDENT IN AN ORGANIZED HEALTH CARE EDUCATION/TRAINING PROGRAM

## 2021-12-06 PROCEDURE — 99285 EMERGENCY DEPT VISIT HI MDM: CPT | Mod: 25 | Performed by: FAMILY MEDICINE

## 2021-12-06 PROCEDURE — 258N000003 HC RX IP 258 OP 636: Performed by: FAMILY MEDICINE

## 2021-12-06 PROCEDURE — 96361 HYDRATE IV INFUSION ADD-ON: CPT | Performed by: FAMILY MEDICINE

## 2021-12-06 PROCEDURE — 250N000009 HC RX 250: Performed by: FAMILY MEDICINE

## 2021-12-06 PROCEDURE — 82803 BLOOD GASES ANY COMBINATION: CPT | Performed by: FAMILY MEDICINE

## 2021-12-06 PROCEDURE — 36415 COLL VENOUS BLD VENIPUNCTURE: CPT | Performed by: FAMILY MEDICINE

## 2021-12-06 PROCEDURE — 83880 ASSAY OF NATRIURETIC PEPTIDE: CPT | Performed by: FAMILY MEDICINE

## 2021-12-06 PROCEDURE — 96374 THER/PROPH/DIAG INJ IV PUSH: CPT | Mod: 59 | Performed by: FAMILY MEDICINE

## 2021-12-06 PROCEDURE — 83690 ASSAY OF LIPASE: CPT | Performed by: FAMILY MEDICINE

## 2021-12-06 PROCEDURE — 99285 EMERGENCY DEPT VISIT HI MDM: CPT | Mod: 25 | Performed by: STUDENT IN AN ORGANIZED HEALTH CARE EDUCATION/TRAINING PROGRAM

## 2021-12-06 PROCEDURE — 96375 TX/PRO/DX INJ NEW DRUG ADDON: CPT | Mod: 59 | Performed by: STUDENT IN AN ORGANIZED HEALTH CARE EDUCATION/TRAINING PROGRAM

## 2021-12-06 PROCEDURE — 83735 ASSAY OF MAGNESIUM: CPT | Performed by: FAMILY MEDICINE

## 2021-12-06 PROCEDURE — 85025 COMPLETE CBC W/AUTO DIFF WBC: CPT | Performed by: FAMILY MEDICINE

## 2021-12-06 PROCEDURE — 82040 ASSAY OF SERUM ALBUMIN: CPT | Performed by: FAMILY MEDICINE

## 2021-12-06 PROCEDURE — 71275 CT ANGIOGRAPHY CHEST: CPT

## 2021-12-06 PROCEDURE — 96374 THER/PROPH/DIAG INJ IV PUSH: CPT | Mod: 59 | Performed by: STUDENT IN AN ORGANIZED HEALTH CARE EDUCATION/TRAINING PROGRAM

## 2021-12-06 RX ORDER — AMITRIPTYLINE HYDROCHLORIDE 10 MG/1
40 TABLET ORAL AT BEDTIME
Status: DISCONTINUED | OUTPATIENT
Start: 2021-12-06 | End: 2021-12-07 | Stop reason: HOSPADM

## 2021-12-06 RX ORDER — DEXAMETHASONE SODIUM PHOSPHATE 10 MG/ML
6 INJECTION, SOLUTION INTRAMUSCULAR; INTRAVENOUS EVERY 24 HOURS
Status: DISCONTINUED | OUTPATIENT
Start: 2021-12-06 | End: 2021-12-07 | Stop reason: HOSPADM

## 2021-12-06 RX ORDER — ALBUTEROL SULFATE 90 UG/1
2 AEROSOL, METERED RESPIRATORY (INHALATION)
Status: DISCONTINUED | OUTPATIENT
Start: 2021-12-06 | End: 2021-12-07 | Stop reason: HOSPADM

## 2021-12-06 RX ORDER — FAMOTIDINE 20 MG/1
20 TABLET, FILM COATED ORAL DAILY PRN
COMMUNITY
End: 2024-02-21

## 2021-12-06 RX ORDER — MECLIZINE HYDROCHLORIDE 25 MG/1
25 TABLET ORAL DAILY PRN
COMMUNITY
End: 2024-07-08

## 2021-12-06 RX ORDER — PHENOL 1.4 %
10 AEROSOL, SPRAY (ML) MUCOUS MEMBRANE
COMMUNITY
End: 2024-07-08

## 2021-12-06 RX ORDER — RALOXIFENE HYDROCHLORIDE 60 MG/1
60 TABLET, FILM COATED ORAL DAILY
Status: DISCONTINUED | OUTPATIENT
Start: 2021-12-06 | End: 2021-12-07 | Stop reason: HOSPADM

## 2021-12-06 RX ORDER — SENNOSIDES 8.6 MG
650 CAPSULE ORAL EVERY 8 HOURS PRN
Status: ON HOLD | COMMUNITY
End: 2023-10-30

## 2021-12-06 RX ORDER — ONDANSETRON 2 MG/ML
4 INJECTION INTRAMUSCULAR; INTRAVENOUS EVERY 6 HOURS PRN
Status: DISCONTINUED | OUTPATIENT
Start: 2021-12-06 | End: 2021-12-07 | Stop reason: HOSPADM

## 2021-12-06 RX ORDER — PANTOPRAZOLE SODIUM 40 MG/1
40 TABLET, DELAYED RELEASE ORAL DAILY
Status: DISCONTINUED | OUTPATIENT
Start: 2021-12-06 | End: 2021-12-07 | Stop reason: HOSPADM

## 2021-12-06 RX ORDER — ACETAMINOPHEN 500 MG
1000 TABLET ORAL EVERY 6 HOURS PRN
Status: DISCONTINUED | OUTPATIENT
Start: 2021-12-06 | End: 2021-12-07 | Stop reason: HOSPADM

## 2021-12-06 RX ORDER — IOPAMIDOL 755 MG/ML
100 INJECTION, SOLUTION INTRAVASCULAR ONCE
Status: COMPLETED | OUTPATIENT
Start: 2021-12-06 | End: 2021-12-06

## 2021-12-06 RX ORDER — BUPROPION HYDROCHLORIDE 150 MG/1
150 TABLET ORAL EVERY MORNING
Status: DISCONTINUED | OUTPATIENT
Start: 2021-12-07 | End: 2021-12-07 | Stop reason: HOSPADM

## 2021-12-06 RX ORDER — MULTIPLE VITAMINS W/ MINERALS TAB 9MG-400MCG
1 TAB ORAL DAILY
COMMUNITY

## 2021-12-06 RX ORDER — NAPROXEN SODIUM 220 MG
220 TABLET ORAL DAILY PRN
COMMUNITY
End: 2021-12-21

## 2021-12-06 RX ADMIN — SODIUM CHLORIDE 70 ML: 9 INJECTION, SOLUTION INTRAVENOUS at 13:03

## 2021-12-06 RX ADMIN — ACETAMINOPHEN 1000 MG: 500 TABLET, FILM COATED ORAL at 21:35

## 2021-12-06 RX ADMIN — DEXAMETHASONE SODIUM PHOSPHATE 6 MG: 10 INJECTION, SOLUTION INTRAMUSCULAR; INTRAVENOUS at 12:21

## 2021-12-06 RX ADMIN — IOPAMIDOL 65 ML: 755 INJECTION, SOLUTION INTRAVENOUS at 13:03

## 2021-12-06 RX ADMIN — PANTOPRAZOLE SODIUM 40 MG: 40 TABLET, DELAYED RELEASE ORAL at 17:59

## 2021-12-06 RX ADMIN — SODIUM CHLORIDE 50 ML: 9 INJECTION, SOLUTION INTRAVENOUS at 17:27

## 2021-12-06 RX ADMIN — REMDESIVIR 200 MG: 100 INJECTION, POWDER, LYOPHILIZED, FOR SOLUTION INTRAVENOUS at 17:20

## 2021-12-06 RX ADMIN — SODIUM CHLORIDE, POTASSIUM CHLORIDE, SODIUM LACTATE AND CALCIUM CHLORIDE 500 ML: 600; 310; 30; 20 INJECTION, SOLUTION INTRAVENOUS at 12:20

## 2021-12-06 RX ADMIN — AMITRIPTYLINE HYDROCHLORIDE 40 MG: 10 TABLET, FILM COATED ORAL at 21:35

## 2021-12-06 RX ADMIN — ACETAMINOPHEN 1000 MG: 500 TABLET, FILM COATED ORAL at 15:41

## 2021-12-06 NOTE — TELEPHONE ENCOUNTER
Nurse Triage SBAR  Is this a 2nd Level Triage? NO    SITUATION:                                                    (Clearly and briefly define the situation)  Margareth Green is a 66 year old female     Patient calling stating that she was prescribed Prednisone on Friday and started taking on Saturday, still taking it and doing better on it.     Current O2 level is at 83%    BACKGROUND:                                                    (Provide clear, relevant background information that relates to the situation)  Current Medication: Prednisone 20 MG and inhalers  Hx: Asthma  Last seen: 7/13/2021    Patient currently COVID positive. Last week patient was having some SOB, worsening. Has been checking O2 and running 80's to 90's.     NURSE ASSESSMENT:                                                    (A statement of your professional conclusion)    Patient's O2 levels are low and have been consistently ranging between 80's and 90's with currently at 83%.        (See information below for more triage details.)  RECOMMENDATION(S) and PLAN:                                                    (What do you need from this individual?)  Protocol Recommended Disposition: To ED, another person to drive - due to O2 level  Will comply with recommendation: yes  Going to [facility]    Routing to provider for recommendation on-No.     GO TO ED NOW: You need to be seen in the Emergency Department. Go to the ER at [facility]. Leave now. Drive carefully.      Encourage to return call clinic triage nurse if further questions/concerns that may come up or if symptoms do not improve, worsen, or new symptoms develop.    NOTES: Disposition was determined by the first positive assessment question, therefore all previous assessment questions were negative.  Guideline used: Breathing difficulties  Protocol Clinical References    WING Lozano/[facility]  December 6, 2021      Reason  for Disposition    MODERATE difficulty breathing (e.g., speaks in phrases, SOB even at rest, pulse 100-120) of new onset or worse than normal    Additional Information    Negative: Breathing stopped and hasn't returned    Negative: Choking on something    Negative: SEVERE difficulty breathing (e.g., struggling for each breath, speaks in single words, pulse > 120)    Negative: Bluish (or gray) lips or face    Negative: Difficult to awaken or acting confused (e.g., disoriented, slurred speech)    Negative: Passed out (i.e., fainted, collapsed and was not responding)    Negative: Wheezing started suddenly after medicine, an allergic food, or bee sting    Negative: Stridor    Negative: Slow, shallow and weak breathing    Negative: Sounds like a life-threatening emergency to the triager    Negative: Chest pain    Negative: Wheezing (high pitched whistling sound) and previous asthma attacks or use of asthma medicines    Negative: Difficulty breathing and only present when coughing    Negative: Difficulty breathing and only from stuffy or runny nose    Negative: Difficulty breathing and within 14 days of COVID-19 Exposure    Protocols used: BREATHING DIFFICULTY-A-OH

## 2021-12-06 NOTE — ED PROVIDER NOTES
History     Chief Complaint   Patient presents with     Shortness of Breath     HPI  Margareth Green is a 66 year old female who presents with concerns of complications from her COVID-19 infection.  Patient tested positive a week ago.  Patient has had symptoms for 10 days now.  Patient does have a history of asthma which she is on inhalers for.  Patient states since being diagnosed with Covid she has been monitoring her oxygen levels at home.  She uses her inhalers which helps for a little bit.  She has been having sats in the mid 80s but this morning they were down in the 70s.  Patient states that she is continuing to have a cough, she has had some nausea but no vomiting but has had some diarrhea.  Been having some back pain that she is unsure if it is pleuritic.  She is also been having some headaches and body aches.  Patient is not vaccinated for COVID-19.    Allergies:  Allergies   Allergen Reactions     Penicillins Itching     Yeast infection afterward       Problem List:    Patient Active Problem List    Diagnosis Date Noted     Chronic kidney disease, stage 3 (H) 07/14/2021     Priority: Medium     Tachycardia, paroxysmal (H) 11/24/2020     Priority: Medium     Gastroesophageal reflux disease without esophagitis 11/30/2016     Priority: Medium     Tremor 11/30/2016     Priority: Medium     Cramp of limb 11/30/2016     Priority: Medium     Major depressive disorder, recurrent episode, moderate (H) 10/13/2015     Priority: Medium     Epistaxis 08/15/2013     Priority: Medium     Do you wish to do the replacement in the background? yes         Numbness of left jaw 08/07/2013     Priority: Medium     Neck pain 08/07/2013     Priority: Medium     TMJ (temporomandibular joint syndrome) 08/07/2013     Priority: Medium     Thyroid fullness 08/07/2013     Priority: Medium     Menopausal flushing 05/10/2012     Priority: Medium     Knee pain, left 03/22/2012     Priority: Medium     Mild major depression (H)  11/21/2011     Priority: Medium     Osteoporosis 12/30/2010     Priority: Medium     Atrophic vaginitis 12/07/2010     Priority: Medium     Fall 10/19/2010     Priority: Medium     Stenosis of lumbosacral spine      Priority: Medium     l4-5 and l5-s1       Derangement of TMJ (temporomandibular joint) 03/04/2010     Priority: Medium     Hammertoe      Priority: Medium     Osteopenia      Priority: Medium     Menopausal syndrome 12/06/2008     Priority: Medium     DDD (degenerative disc disease), lumbar 06/24/2008     Priority: Medium     Arthralgia 06/12/2008     Priority: Medium     Irritable bowel syndrome 02/18/2008     Priority: Medium     Allergic rhinitis 11/25/2005     Priority: Medium     Problem list name updated by automated process. Provider to review       Mild persistent asthma 10/25/2005     Priority: Medium     Cervicalgia 10/25/2005     Priority: Medium     Malaise and fatigue 04/26/2005     Priority: Medium     Problem list name updated by automated process. Provider to review       Insomnia 12/09/2004     Priority: Medium     Problem list name updated by automated process. Provider to review       TAMMY (generalized anxiety disorder) 09/12/2003     Priority: Medium     Generalized osteoarthrosis, unspecified site 12/19/2002     Priority: Medium     Symptomatic menopausal or female climacteric states 12/15/2002     Priority: Medium     Dermatophytosis of nail 08/20/2002     Priority: Medium        Past Medical History:    Past Medical History:   Diagnosis Date     Abnormal Papanicolaou smear of vagina and vaginal HPV 12/02     Allergic rhinitis, cause unspecified      Arthritis      Backache, unspecified      Carpal tunnel syndrome      Cervicalgia      Depressive disorder      Depressive disorder, not elsewhere classified      Derangement of TMJ (temporomandibular joint) 3/2010     Dermatophytosis of nail      Diaphragmatic hernia without mention of obstruction or gangrene 9/07     Diffuse cystic  mastopathy 2000     Esophageal reflux 4/2005     Hammertoe 10/09     Headache(784.0)      Intramural leiomyoma of uterus 7/2007     Irregular menstrual cycle      IRRITABLE COLON 2/18/2008     Malaise and fatigue      Menopause      Mild persistent asthma      Mole (skin) 2003     Motion sickness      Neuroma 11/2008     Osteopenia 12/2008     Other malaise and fatigue 4/2005     Personal history of tobacco use, presenting hazards to health      Solitary cyst of breast 12/2005     Stenosis of lumbosacral spine      Symptomatic menopausal or female climacteric states 12/2002     Tachycardia, paroxysmal (H) 11/24/2020       Past Surgical History:    Past Surgical History:   Procedure Laterality Date     BIOPSY  4/10/2015     BL DUPLEX LO EXTREM ART UNILAT/LTD  4/3008    lower extr arterial doppler -no stenosis     C DEXA,BONE DENSITY,APPENDICULR SKELTN  12/2008    osteopenia     COLONOSCOPY  10/22/2007    bx benign     COLONOSCOPY N/A 4/10/2015    Procedure: COMBINED COLONOSCOPY, SINGLE OR MULTIPLE BIOPSY/POLYPECTOMY BY BIOPSY;  Surgeon: Cl Wagner MD;  Location:  GI     ESOPHAGOSCOPY, GASTROSCOPY, DUODENOSCOPY (EGD), COMBINED N/A 1/31/2018    Procedure: COMBINED ESOPHAGOSCOPY, GASTROSCOPY, DUODENOSCOPY (EGD);  COMBINED ESOPHAGOSCOPY, GASTROSCOPY, DUODENOSCOPY (EGD);  Surgeon: Tirso Duran MD;  Location:  GI     EYE SURGERY       HC ECHO HEART XTHORACIC, STRESS/REST  2/2005    WNL     HC PART REMV BONE METATARSAL HEAD,EA  01/11/10    Right foot plantar plate tear. Also correct hammertoe, 2nd digit & varicose vein ligation, heel.     HC REVISE MEDIAN N/CARPAL TUNNEL SURG      Left in 1997, right in 1999     INJ, ANES/STER, FACET LUMB/SAC  2010    Left L5 nerve root injection     INJECT EPIDURAL TRANSFORAMINAL  10/09/2013    [facility]     INJECT JOINT SACROILIAC  3/19/14,4/21/14    [facility]     Z NONSPECIFIC PROCEDURE  2009    Metatarsal phalangeal joint injection     [facility]  COLONOSCOPY THRU STOMA, DIAGNOSTIC  2002    bx. benign - flex sig in 5 yrs  or colonoscopy in 10 yrs     [facility] NCS MOTOR W/O F-WAVE, EACH NERVE  2008    CTS       Family History:    Family History   Problem Relation Age of Onset     Diabetes Father      Depression Father      Cerebrovascular Disease Mother         age 80     Arthritis Mother      Depression Mother         On meds     Eye Disorder Mother         Glaucoma     Osteoporosis Mother      Respiratory Mother          88 YO COPD     Chronic Obstructive Pulmonary Disease Mother      C.A.D. Brother         65 YO MI -     Chronic Obstructive Pulmonary Disease Brother      Myocardial Infarction Brother      Other Cancer Brother      Cancer Brother        Social History:  Marital Status:   [2]  Social History     Tobacco Use     Smoking status: Former Smoker     Packs/day: 0.50     Years: 30.00     Pack years: 15.00     Types: Cigarettes     Quit date: 3/20/1998     Years since quittin.7     Smokeless tobacco: Never Used     Tobacco comment: No smokers in home   Substance Use Topics     Alcohol use: Yes     Alcohol/week: 6.7 standard drinks     Comment: 2x/week     Drug use: No        Medications:    ACETAMINOPHEN PO  ALLEGRA 180 MG OR TABS  amitriptyline (ELAVIL) 10 MG tablet  buPROPion (WELLBUTRIN XL) 150 MG 24 hr tablet  CALCIUM /VITAMIN D TABS   OR  Cholecalciferol (VITAMIN D PO)  Famotidine (PEPCID PO)  MECLIZINE HCL PO  MELATONIN PO  mometasone-formoterol (DULERA) 100-5 MCG/ACT oral inhaler  NAPROXEN PO  omeprazole (PRILOSEC) 20 MG DR capsule  predniSONE (DELTASONE) 20 MG tablet  PROAIR  (90 BASE) MCG/ACT IN AERS  raloxifene (EVISTA) 60 MG tablet  Simethicone (GAS-X PO)  triamcinolone (NASACORT) 55 MCG/ACT nasal inhaler  UNABLE TO FIND  VITAMIN C-MARK HIPS CR 1000 MG OR TBCR  VITAMIN E PO  VITAMINS/MINERALS TABS   OR          Review of Systems   All other systems reviewed and are negative.      Physical Exam   BP: 128/87  Pulse:  105  Temp: 98.5  F (36.9  C)  Resp: 20  Weight: 55.3 kg (122 lb)  SpO2: (!) 85 %      Physical Exam  Vitals and nursing note reviewed.   Constitutional:       General: She is not in acute distress.     Appearance: She is well-developed. She is not diaphoretic.   HENT:      Head: Normocephalic and atraumatic.      Nose: Nose normal.      Mouth/Throat:      Pharynx: No oropharyngeal exudate.   Eyes:      Conjunctiva/sclera: Conjunctivae normal.   Cardiovascular:      Rate and Rhythm: Normal rate and regular rhythm.      Heart sounds: Normal heart sounds. No murmur heard.  No friction rub.   Pulmonary:      Effort: Pulmonary effort is normal. No respiratory distress.      Breath sounds: No stridor. Wheezing and rhonchi present. No rales.   Abdominal:      General: Bowel sounds are normal. There is no distension.      Palpations: Abdomen is soft. There is no mass.      Tenderness: There is no abdominal tenderness. There is no guarding.   Musculoskeletal:         General: No tenderness. Normal range of motion.      Cervical back: Normal range of motion and neck supple.   Skin:     General: Skin is warm and dry.      Capillary Refill: Capillary refill takes less than 2 seconds.      Findings: No erythema.   Neurological:      Mental Status: She is alert and oriented to person, place, and time.   Psychiatric:         Judgment: Judgment normal.         ED Course                 Procedures      Results for orders placed or performed during the hospital encounter of 12/06/21 (from the past 24 hour(s))   CBC with platelets differential    Narrative    The following orders were created for panel order CBC with platelets differential.  Procedure                               Abnormality         Status                     ---------                               -----------         ------                     CBC with platelets and d...[[id number]]  Abnormal            Final result                 Please view results for these tests  on the individual orders.   Comprehensive metabolic panel   Result Value Ref Range    Sodium 136 133 - 144 mmol/L    Potassium 3.9 3.4 - 5.3 mmol/L    Chloride 105 94 - 109 mmol/L    Carbon Dioxide (CO2) 26 20 - 32 mmol/L    Anion Gap 5 3 - 14 mmol/L    Urea Nitrogen 13 7 - 30 mg/dL    Creatinine 0.68 0.52 - 1.04 mg/dL    Calcium 7.9 (L) 8.5 - 10.1 mg/dL    Glucose 135 (H) 70 - 99 mg/dL    Alkaline Phosphatase 33 (L) 40 - 150 U/L    AST 54 (H) 0 - 45 U/L    ALT 22 0 - 50 U/L    Protein Total 6.0 (L) 6.8 - 8.8 g/dL    Albumin 2.4 (L) 3.4 - 5.0 g/dL    Bilirubin Total 0.3 0.2 - 1.3 mg/dL    GFR Estimate 90 >60 mL/min/1.73m2   Troponin I   Result Value Ref Range    Troponin I High Sensitivity 13 <54 ng/L   Magnesium   Result Value Ref Range    Magnesium 1.9 1.6 - 2.3 mg/dL   Nt probnp inpatient (BNP)   Result Value Ref Range    N terminal Pro BNP Inpatient 393 0 - 900 pg/mL   D dimer quantitative   Result Value Ref Range    D-Dimer Quantitative 0.77 (H) 0.00 - 0.50 ug/mL FEU    Narrative    This D-dimer assay is intended for use in conjunction with a clinical pretest probability assessment model to exclude pulmonary embolism (PE) and deep venous thrombosis (DVT) in outpatients suspected of PE or DVT. The cut-off value is 0.50 ug/mL FEU.   Lipase   Result Value Ref Range    Lipase 284 73 - 393 U/L   CBC with platelets and differential   Result Value Ref Range    WBC Count 5.0 4.0 - 11.0 10e3/uL    RBC Count 4.26 3.80 - 5.20 10e6/uL    Hemoglobin 13.2 11.7 - 15.7 g/dL    Hematocrit 39.4 35.0 - 47.0 %    MCV 93 78 - 100 fL    MCH 31.0 26.5 - 33.0 pg    MCHC 33.5 31.5 - 36.5 g/dL    RDW 13.0 10.0 - 15.0 %    Platelet Count 90 (L) 150 - 450 10e3/uL    % Neutrophils 89 %    % Lymphocytes 6 %    % Monocytes 4 %    % Eosinophils 0 %    % Basophils 0 %    % Immature Granulocytes 1 %    NRBCs per 100 WBC 0 <1 /100    Absolute Neutrophils 4.5 1.6 - 8.3 10e3/uL    Absolute Lymphocytes 0.3 (L) 0.8 - 5.3 10e3/uL    Absolute  Monocytes 0.2 0.0 - 1.3 10e3/uL    Absolute Eosinophils 0.0 0.0 - 0.7 10e3/uL    Absolute Basophils 0.0 0.0 - 0.2 10e3/uL    Absolute Immature Granulocytes 0.0 <=0.4 10e3/uL    Absolute NRBCs 0.0 10e3/uL   CT Chest Pulmonary Embolism w Contrast    Narrative    CT CHEST PULMONARY EMBOLISM WITH CONTRAST 12/6/2021 1:21 PM    CLINICAL HISTORY: PE suspected, high probability.    TECHNIQUE: CT angiogram chest during arterial phase injection IV  contrast. 2D and 3D MIP reconstructions were performed by the CT  technologist. Dose reduction techniques were used.     CONTRAST: 65mL, Isovue 370    COMPARISON: Chest x-ray 7/13/2021. CT abdomen and pelvis 3/16/2020.    FINDINGS:  ANGIOGRAM CHEST: Pulmonary arteries are normal caliber and negative  for pulmonary emboli. Thoracic aorta is negative for dissection.    LUNGS AND PLEURA: Patchy bilateral irregular groundglass areas of  consolidation involving all lobes of both lungs. This is worse on the  left than the right, and most severe at the lower lungs. No effusions.  Right lower lobe nodule is 0.5 cm series 6 image 182.    MEDIASTINUM/AXILLAE: Small hiatal hernia. Several enlarged lymph  nodes. Subcarinal example is 1.3 cm, series 4 image 70. Right hilar  example is 1.5 cm image 70. There are other examples.    CORONARY ARTERY CALCIFICATION: Mild.    UPPER ABDOMEN: No acute abnormality. Hepatic cyst incidentally seen.  Peripherally calcified lobulated structure at the spleen may be an old  granulomatous process measuring 2.2 cm image 117.    MUSCULOSKELETAL: Age indeterminant T12 compression deformity. It is  new since a CT of the abdomen from 3/16/2020.      Impression    IMPRESSION:  1.  Patchy bilateral prominent irregular groundglass consolidation  most consistent with an atypical infectious etiology such as  potentially COVID-19 pneumonia.  2.  No evidence for pulmonary embolism.  3.  Pulmonary nodule incidentally seen on the right. See below for  follow up.  4.  Age  indeterminant T12 compression fracture deformity. It is new  since a prior CT abdomen from 3/16/2020.    Recommendations for one or multiple incidental lung nodules < 6mm :    Low risk patients: No routine follow-up.    High risk patients: Optional follow-up CT at 12 months; if  unchanged, no further follow-up.    *Low Risk: Minimal or absent history of smoking or other known risk  factors.  *Nonsolid (ground-glass) or partly solid nodules may require longer  follow-up to exclude indolent adenocarcinoma.  *Recommendations based on Guidelines for the Management of Incidental  Pulmonary Nodules Detected at CT: From the Fleischner Society 2017,  Radiology 2017.    ES SIEGEL MD         SYSTEM ID:  [id number]   Blood gas arterial   Result Value Ref Range    pH Arterial 7.46 (H) 7.35 - 7.45    pCO2 Arterial 34 (L) 35 - 45 mm Hg    pO2 Arterial 53 (L) 80 - 105 mm Hg    FIO2 40     Bicarbonate Arterial 24 21 - 28 mmol/L    Base Excess/Deficit (+/-) 0.8 -9.0 - 1.8 mmol/L     *Note: Due to a large number of results and/or encounters for the requested time period, some results have not been displayed. A complete set of results can be found in Results Review.       Medications   albuterol (PROVENTIL HFA/VENTOLIN HFA) inhaler (has no administration in time range)   lactated ringers BOLUS 500 mL (has no administration in time range)   ondansetron (ZOFRAN) injection 4 mg (has no administration in time range)   acetaminophen (TYLENOL) tablet 1,000 mg (has no administration in time range)   dexamethasone PF (DECADRON) injection 6 mg (has no administration in time range)     Is a 66-year-old female who is on day 10 of symptoms and is Covid positive.  Patient has a history of asthma and chronic renal insufficiency.  Labs were consistent with COVID-19.  CT scan was done did not show any PE and findings consistent with COVID-19.  Patient does have a history of chronic renal insufficiency but her GFR was high enough that we were able to  start remdesivir on her today.  Patient was started on dexamethasone.  ABG was a little bit low with the oxygenation level, we did move the patient up from a nasal cannula to a liters oxygen mask.  Patient sats have been very stable with this.  At this point patient is getting need hospitalization for hypoxia and COVID-19 infection.  There are no beds available in the [facility] and patient will be signed out at change of shift.  Patient remains stable.    Assessments & Plan (with Medical Decision Making)  COVID-19 infection     I have reviewed the nursing notes.    I have reviewed the findings, diagnosis, plan and need for follow up with the patient.              12/6/2021   [facility] EMERGENCY DEPT     Wilmer Jose MD  12/06/21 [id number]

## 2021-12-06 NOTE — ED PROVIDER NOTES
Emergency Department Patient Sign-out     Brief HPI:  This is a 66 year old female signed out to me by Dr. Jose.  Please see initial provider note for details of the presentation.      Exam:   Patient Vitals for the past 24 hrs:   BP Temp Temp src Pulse Resp SpO2 Weight   12/06/21 2005 (!) 144/66 97.9  F (36.6  C) Oral 91 20 93 % --   12/06/21 1800 125/81 -- -- 84 -- 94 % --   12/06/21 1700 116/75 -- -- 70 -- 94 % --   12/06/21 1600 (!) 143/61 -- -- 84 -- 96 % --   12/06/21 1538 -- -- -- -- -- 96 % --   12/06/21 1500 128/69 -- -- 82 -- 95 % --   12/06/21 1430 -- -- -- -- -- 94 % --   12/06/21 1400 128/69 -- -- 88 -- 94 % --   12/06/21 1340 125/70 -- -- 96 -- 93 % --   12/06/21 1300 -- -- -- -- -- 94 % --   12/06/21 1245 -- -- -- -- -- 97 % --   12/06/21 1240 -- -- -- -- -- (!) 87 % --   12/06/21 1230 -- -- -- -- -- 95 % --   12/06/21 1220 -- -- -- -- -- 91 % --   12/06/21 1210 -- -- -- -- -- 95 % --   12/06/21 1200 -- -- -- -- -- 97 % --   12/06/21 1150 -- -- -- -- -- 93 % --   12/06/21 1145 -- -- -- -- -- 97 % --   12/06/21 1140 -- -- -- -- -- 96 % --   12/06/21 1135 -- -- -- -- -- 95 % --   12/06/21 1130 -- -- -- -- -- 95 % --   12/06/21 1125 -- -- -- -- -- 96 % --   12/06/21 1045 128/87 98.5  F (36.9  C) Oral 105 20 (!) 85 % 55.3 kg (122 lb)         ED RESULTS:   Results for orders placed or performed during the hospital encounter of 12/06/21 (from the past 24 hour(s))   CBC with platelets differential     Status: Abnormal    Collection Time: 12/06/21 12:13 PM    Narrative    The following orders were created for panel order CBC with platelets differential.  Procedure                               Abnormality         Status                     ---------                               -----------         ------                     CBC with platelets and d...[[id number]]  Abnormal            Final result                 Please view results for these tests on the individual orders.   Comprehensive metabolic  panel     Status: Abnormal    Collection Time: 12/06/21 12:13 PM   Result Value Ref Range    Sodium 136 133 - 144 mmol/L    Potassium 3.9 3.4 - 5.3 mmol/L    Chloride 105 94 - 109 mmol/L    Carbon Dioxide (CO2) 26 20 - 32 mmol/L    Anion Gap 5 3 - 14 mmol/L    Urea Nitrogen 13 7 - 30 mg/dL    Creatinine 0.68 0.52 - 1.04 mg/dL    Calcium 7.9 (L) 8.5 - 10.1 mg/dL    Glucose 135 (H) 70 - 99 mg/dL    Alkaline Phosphatase 33 (L) 40 - 150 U/L    AST 54 (H) 0 - 45 U/L    ALT 22 0 - 50 U/L    Protein Total 6.0 (L) 6.8 - 8.8 g/dL    Albumin 2.4 (L) 3.4 - 5.0 g/dL    Bilirubin Total 0.3 0.2 - 1.3 mg/dL    GFR Estimate 90 >60 mL/min/1.73m2   Troponin I     Status: Normal    Collection Time: 12/06/21 12:13 PM   Result Value Ref Range    Troponin I High Sensitivity 13 <54 ng/L   Magnesium     Status: Normal    Collection Time: 12/06/21 12:13 PM   Result Value Ref Range    Magnesium 1.9 1.6 - 2.3 mg/dL   Nt probnp inpatient (BNP)     Status: Normal    Collection Time: 12/06/21 12:13 PM   Result Value Ref Range    N terminal Pro BNP Inpatient 393 0 - 900 pg/mL   D dimer quantitative     Status: Abnormal    Collection Time: 12/06/21 12:13 PM   Result Value Ref Range    D-Dimer Quantitative 0.77 (H) 0.00 - 0.50 ug/mL FEU    Narrative    This D-dimer assay is intended for use in conjunction with a clinical pretest probability assessment model to exclude pulmonary embolism (PE) and deep venous thrombosis (DVT) in outpatients suspected of PE or DVT. The cut-off value is 0.50 ug/mL FEU.   Lipase     Status: Normal    Collection Time: 12/06/21 12:13 PM   Result Value Ref Range    Lipase 284 73 - 393 U/L   CBC with platelets and differential     Status: Abnormal    Collection Time: 12/06/21 12:13 PM   Result Value Ref Range    WBC Count 5.0 4.0 - 11.0 10e3/uL    RBC Count 4.26 3.80 - 5.20 10e6/uL    Hemoglobin 13.2 11.7 - 15.7 g/dL    Hematocrit 39.4 35.0 - 47.0 %    MCV 93 78 - 100 fL    MCH 31.0 26.5 - 33.0 pg    MCHC 33.5 31.5 - 36.5  g/dL    RDW 13.0 10.0 - 15.0 %    Platelet Count 90 (L) 150 - 450 10e3/uL    % Neutrophils 89 %    % Lymphocytes 6 %    % Monocytes 4 %    % Eosinophils 0 %    % Basophils 0 %    % Immature Granulocytes 1 %    NRBCs per 100 WBC 0 <1 /100    Absolute Neutrophils 4.5 1.6 - 8.3 10e3/uL    Absolute Lymphocytes 0.3 (L) 0.8 - 5.3 10e3/uL    Absolute Monocytes 0.2 0.0 - 1.3 10e3/uL    Absolute Eosinophils 0.0 0.0 - 0.7 10e3/uL    Absolute Basophils 0.0 0.0 - 0.2 10e3/uL    Absolute Immature Granulocytes 0.0 <=0.4 10e3/uL    Absolute NRBCs 0.0 10e3/uL   CT Chest Pulmonary Embolism w Contrast     Status: None    Collection Time: 12/06/21  1:21 PM    Narrative    CT CHEST PULMONARY EMBOLISM WITH CONTRAST 12/6/2021 1:21 PM    CLINICAL HISTORY: PE suspected, high probability.    TECHNIQUE: CT angiogram chest during arterial phase injection IV  contrast. 2D and 3D MIP reconstructions were performed by the CT  technologist. Dose reduction techniques were used.     CONTRAST: 65mL, Isovue 370    COMPARISON: Chest x-ray 7/13/2021. CT abdomen and pelvis 3/16/2020.    FINDINGS:  ANGIOGRAM CHEST: Pulmonary arteries are normal caliber and negative  for pulmonary emboli. Thoracic aorta is negative for dissection.    LUNGS AND PLEURA: Patchy bilateral irregular groundglass areas of  consolidation involving all lobes of both lungs. This is worse on the  left than the right, and most severe at the lower lungs. No effusions.  Right lower lobe nodule is 0.5 cm series 6 image 182.    MEDIASTINUM/AXILLAE: Small hiatal hernia. Several enlarged lymph  nodes. Subcarinal example is 1.3 cm, series 4 image 70. Right hilar  example is 1.5 cm image 70. There are other examples.    CORONARY ARTERY CALCIFICATION: Mild.    UPPER ABDOMEN: No acute abnormality. Hepatic cyst incidentally seen.  Peripherally calcified lobulated structure at the spleen may be an old  granulomatous process measuring 2.2 cm image 117.    MUSCULOSKELETAL: Age indeterminant T12  compression deformity. It is  new since a CT of the abdomen from 3/16/2020.      Impression    IMPRESSION:  1.  Patchy bilateral prominent irregular groundglass consolidation  most consistent with an atypical infectious etiology such as  potentially COVID-19 pneumonia.  2.  No evidence for pulmonary embolism.  3.  Pulmonary nodule incidentally seen on the right. See below for  follow up.  4.  Age indeterminant T12 compression fracture deformity. It is new  since a prior CT abdomen from 3/16/2020.    Recommendations for one or multiple incidental lung nodules < 6mm :    Low risk patients: No routine follow-up.    High risk patients: Optional follow-up CT at 12 months; if  unchanged, no further follow-up.    *Low Risk: Minimal or absent history of smoking or other known risk  factors.  *Nonsolid (ground-glass) or partly solid nodules may require longer  follow-up to exclude indolent adenocarcinoma.  *Recommendations based on Guidelines for the Management of Incidental  Pulmonary Nodules Detected at CT: From the Fleischner Society 2017,  Radiology 2017.    ES SIEGEL MD         SYSTEM ID:  [id number]   Blood gas arterial     Status: Abnormal    Collection Time: 12/06/21  1:42 PM   Result Value Ref Range    pH Arterial 7.46 (H) 7.35 - 7.45    pCO2 Arterial 34 (L) 35 - 45 mm Hg    pO2 Arterial 53 (L) 80 - 105 mm Hg    FIO2 40     Bicarbonate Arterial 24 21 - 28 mmol/L    Base Excess/Deficit (+/-) 0.8 -9.0 - 1.8 mmol/L         ED MEDICATIONS:   Medications   albuterol (PROVENTIL HFA/VENTOLIN HFA) inhaler (has no administration in time range)   ondansetron (ZOFRAN) injection 4 mg (has no administration in time range)   acetaminophen (TYLENOL) tablet 1,000 mg (1,000 mg Oral Given 12/6/21 2135)   dexamethasone PF (DECADRON) injection 6 mg (6 mg Intravenous Given 12/6/21 1221)   remdesivir (VEKURY) 100 mg in 0.9% NaCl 250 mL intermittent infusion SOLN 100 mg (has no administration in time range)     And   0.9% sodium chloride  BOLUS (has no administration in time range)   amitriptyline (ELAVIL) tablet 40 mg (40 mg Oral Given 12/6/21 2135)   buPROPion (WELLBUTRIN XL) 24 hr tablet 150 mg (has no administration in time range)   pantoprazole (PROTONIX) EC tablet 40 mg (40 mg Oral Given 12/6/21 1759)   raloxifene (EVISTA) tablet 60 mg (0 mg Oral Hold 12/6/21 1919)   melatonin tablet 10 mg (has no administration in time range)   lactated ringers BOLUS 500 mL (0 mLs Intravenous Stopped 12/6/21 1400)   iopamidol (ISOVUE-370) solution 100 mL (65 mLs Intravenous Given 12/6/21 1303)   sodium chloride 0.9 % bag 500mL for CT scan flush use (70 mLs As instructed Given 12/6/21 1303)   remdesivir 200 mg in sodium chloride 0.9 % 250 mL intermittent infusion (200 mg Intravenous New Bag 12/6/21 1720)     Followed by   0.9% sodium chloride BOLUS (50 mLs Intravenous New Bag 12/6/21 1727)         Plan:    Plan at time of transfer of care was to continue monitoring patient while she remained on oxygen mask at 8 L while awaiting available bed during Covid pandemic.  She seemed to tolerate supplemental oxygen well and without requirement of titration or additional symptomatic medications.  Patient was eventually accepted by [facility] hospitalist Dr. Jordan without reservation.  Patient and daughter Lisbeth are in agreement with transfer plan.      Impression:    ICD-10-CM    1. COVID-19 virus infection  U07.1    2. Hypoxia  R09.02    3. History of asthma  Z87.09    4. Thrombocytopenia (H)  D69.6            DO Yahaira Marmolejo Thomas, DO  12/06/21 2102

## 2021-12-07 NOTE — PROGRESS NOTES
Report given to Renu, and Doctor Yahaira talked with patient and patient's daughter Lisbeth via phone of transfer

## 2021-12-08 ENCOUNTER — PATIENT OUTREACH (OUTPATIENT)
Dept: FAMILY MEDICINE | Facility: OTHER | Age: 66
End: 2021-12-08
Payer: COMMERCIAL

## 2021-12-08 NOTE — TELEPHONE ENCOUNTER
Reason for follow up call: Margareth CHINMAY Peter appeared on our list for being seen in and recenlty discharge from the Emergency Room/In Patient Hospital Admission.    Chief Complaint   Patient presents with     Hospital F/U     COVID       TCM Episode no    Encounter routed for Clinic Triage RN to call for follow up    See mychart encounters.    José Miguel Rivero, WENDYN, RN, PHN  [facility] ~ Registered Nurse  Clinic Triage ~ [facility]  December 8, 2021

## 2021-12-17 ENCOUNTER — MEDICAL CORRESPONDENCE (OUTPATIENT)
Dept: HEALTH INFORMATION MANAGEMENT | Facility: CLINIC | Age: 66
End: 2021-12-17
Payer: COMMERCIAL

## 2021-12-20 ENCOUNTER — TELEPHONE (OUTPATIENT)
Dept: FAMILY MEDICINE | Facility: OTHER | Age: 66
End: 2021-12-20
Payer: COMMERCIAL

## 2021-12-20 NOTE — TELEPHONE ENCOUNTER
Please call homecare -orders approved, she will be seen by one of my partners tomorrow in clinic for her face-to-face appointment.  Caitlin Beasley PA-C

## 2021-12-20 NOTE — TELEPHONE ENCOUNTER
Skilled nursing   2x week for 2 weeks  1x week for 4 weeks  PT evaluation     Went home on supplemental oxygen, education,   [facility]  Ph. [phone]

## 2021-12-20 NOTE — PROGRESS NOTES
Assessment & Plan     Hospital discharge follow-up  Infection due to 2019 novel coronavirus  Overall patient still feels about the same as she did when she left the hospital but she is requiring less oxygen as of now.  Does feel slightly more shortness of breath especially with exertion and working with home physical therapy.  Utilizing approximately 1 L at rest and 3 with exertion.  She does have frequent nursing visits and home PT visits.  COVID-19 get well loop referral was placed.  Has 1 more day left of prednisone.  Continue with daily Dulera and as needed albuterol  - COVID-19 GetWell Loop Referral    Other insomnia  Appears to be chronic and has been challenging at times.  Recent prednisone has worsened this.  Currently on amitriptyline.  Will prescribe hydroxyzine to see if this makes any difference for her.  Advised her to cut down on the Ativan use which she thinks is fair.  - hydrOXYzine (ATARAX) 25 MG tablet; Take 1 tablet (25 mg) by mouth nightly as needed for anxiety    GERD  Stable, wishes to only utilize Pepcid, stop omeprazole    Dizziness  Chronic and stable taking as needed meclizine    Chronic back pain  Patient takes naproxen as needed for this, it appears she did have a kidney injury earlier in the year but not recently, patient does not have CKD.  Advised her to utilize Tylenol arthritis versus NSAIDs.      Return in about 4 weeks (around 1/18/2022).    SELIN ESCALONA MD  [facility] JHONY Zuluaga is a 66 year old who presents for the following health issues     History of Present Illness       She eats 0-1 servings of fruits and vegetables daily.She consumes 2 sweetened beverage(s) daily.She exercises with enough effort to increase her heart rate 9 or less minutes per day.  She exercises with enough effort to increase her heart rate 3 or less days per week.   She is taking medications regularly.     Post hospitalization for      How are you feeling  today? No change  In the past 24 hours have you had shortness of breath when speaking, walking, or climbing stairs? I don't have breathing problems  Do you have a cough? I don't have a cough  When is the last time you had a fever greater than 100? 0  Are you having any other symptoms? None   Do you have any other stressors you would like to discuss with your provider? No        Hospital Follow-up Visit:    Hospital/Nursing Home/IP Rehab Facility: Critical access hospital  Date of Admission: 12/7/21  Date of Discharge: 12/15/2021  Reason(s) for Admission: Covid pneumonia with hypoxia      Was your hospitalization related to COVID-19? YES   How are you feeling today? No change  In the past 24 hours have you had shortness of breath when speaking, walking, or climbing stairs? My breathing issues have improved  Do you have a cough? I don't have a cough  When is the last time you had a fever greater than 100? ?  Are you having any other symptoms? Shortness of breath   Do you have any other stressors you would like to discuss with your provider? OTHER: Noted in assessment and plan          Summary of hospitalization:  CareEverywhere information obtained and reviewed  Diagnostic Tests/Treatments reviewed.  Follow up needed: Noted in assessment and plan  Other Healthcare Providers Involved in Patient s Care:         With [facility] nursing visits as well as home physical therapy  Update since discharge: stable.       Post Discharge Medication Reconciliation: discharge medications reconciled and changed, per note/orders.  Plan of care communicated with patient                Review of Systems   Constitutional, HEENT, cardiovascular, pulmonary, gi and gu systems are negative, except as otherwise noted.      Objective    /88   Pulse 84   Temp 98.2  F (36.8  C) (Temporal)   Resp 18   LMP 02/26/2008 (Approximate)   SpO2 97%   There is no height or weight on file to calculate BMI.  Physical Exam  Vitals and  nursing note reviewed.   Constitutional:       General: She is not in acute distress.     Appearance: Normal appearance. She is not ill-appearing, toxic-appearing or diaphoretic.      Comments: On supplemental oxygen   HENT:      Head: Normocephalic and atraumatic.      Right Ear: Tympanic membrane, ear canal and external ear normal. There is no impacted cerumen.      Left Ear: Tympanic membrane, ear canal and external ear normal. There is no impacted cerumen.      Nose: Nose normal. No congestion or rhinorrhea.      Mouth/Throat:      Mouth: Mucous membranes are moist.      Pharynx: Oropharynx is clear. No oropharyngeal exudate or posterior oropharyngeal erythema.   Eyes:      General: No scleral icterus.        Right eye: No discharge.         Left eye: No discharge.      Extraocular Movements: Extraocular movements intact.      Conjunctiva/sclera: Conjunctivae normal.      Pupils: Pupils are equal, round, and reactive to light.   Cardiovascular:      Rate and Rhythm: Normal rate and regular rhythm.      Heart sounds: No murmur heard.      Pulmonary:      Effort: Pulmonary effort is normal. No respiratory distress.      Breath sounds: Normal breath sounds. No stridor.      Comments: Decreased breath sounds throughout  Abdominal:      General: There is no distension.      Palpations: Abdomen is soft.      Tenderness: There is no abdominal tenderness.      Hernia: No hernia is present.   Musculoskeletal:         General: Normal range of motion.      Cervical back: Normal range of motion.      Right lower leg: No edema.      Left lower leg: No edema.   Lymphadenopathy:      Cervical: No cervical adenopathy.   Skin:     General: Skin is warm.   Neurological:      Mental Status: She is alert.   Psychiatric:         Mood and Affect: Mood normal.         Behavior: Behavior normal.         Thought Content: Thought content normal.         Judgment: Judgment normal.

## 2021-12-21 ENCOUNTER — TELEPHONE (OUTPATIENT)
Dept: FAMILY MEDICINE | Facility: OTHER | Age: 66
End: 2021-12-21

## 2021-12-21 ENCOUNTER — OFFICE VISIT (OUTPATIENT)
Dept: FAMILY MEDICINE | Facility: OTHER | Age: 66
End: 2021-12-21
Payer: COMMERCIAL

## 2021-12-21 VITALS
SYSTOLIC BLOOD PRESSURE: 114 MMHG | RESPIRATION RATE: 18 BRPM | TEMPERATURE: 98.2 F | OXYGEN SATURATION: 97 % | HEART RATE: 84 BPM | DIASTOLIC BLOOD PRESSURE: 88 MMHG

## 2021-12-21 DIAGNOSIS — U07.1 INFECTION DUE TO 2019 NOVEL CORONAVIRUS: ICD-10-CM

## 2021-12-21 DIAGNOSIS — G47.09 OTHER INSOMNIA: ICD-10-CM

## 2021-12-21 DIAGNOSIS — Z09 HOSPITAL DISCHARGE FOLLOW-UP: Primary | ICD-10-CM

## 2021-12-21 PROCEDURE — 99214 OFFICE O/P EST MOD 30 MIN: CPT | Performed by: STUDENT IN AN ORGANIZED HEALTH CARE EDUCATION/TRAINING PROGRAM

## 2021-12-21 RX ORDER — HYDROXYZINE HYDROCHLORIDE 25 MG/1
25 TABLET, FILM COATED ORAL
Qty: 30 TABLET | Refills: 0 | Status: SHIPPED | OUTPATIENT
Start: 2021-12-21 | End: 2022-01-20

## 2021-12-21 RX ORDER — NAPROXEN 500 MG/1
500 TABLET ORAL PRN
Status: ON HOLD | COMMUNITY
End: 2023-10-30

## 2021-12-21 RX ORDER — LORAZEPAM 0.5 MG/1
0.5 TABLET ORAL
COMMUNITY
Start: 2021-12-15 | End: 2022-01-20

## 2021-12-21 RX ORDER — HYDROXYZINE HYDROCHLORIDE 25 MG/1
25 TABLET, FILM COATED ORAL
Qty: 20 TABLET | Refills: 0 | Status: SHIPPED | OUTPATIENT
Start: 2021-12-21 | End: 2021-12-21

## 2021-12-21 ASSESSMENT — PATIENT HEALTH QUESTIONNAIRE - PHQ9
SUM OF ALL RESPONSES TO PHQ QUESTIONS 1-9: 7
SUM OF ALL RESPONSES TO PHQ QUESTIONS 1-9: 7
10. IF YOU CHECKED OFF ANY PROBLEMS, HOW DIFFICULT HAVE THESE PROBLEMS MADE IT FOR YOU TO DO YOUR WORK, TAKE CARE OF THINGS AT HOME, OR GET ALONG WITH OTHER PEOPLE: SOMEWHAT DIFFICULT

## 2021-12-21 ASSESSMENT — PAIN SCALES - GENERAL: PAINLEVEL: NO PAIN (0)

## 2021-12-22 ASSESSMENT — PATIENT HEALTH QUESTIONNAIRE - PHQ9: SUM OF ALL RESPONSES TO PHQ QUESTIONS 1-9: 7

## 2021-12-22 ASSESSMENT — ASTHMA QUESTIONNAIRES: ACT_TOTALSCORE: 8

## 2021-12-22 NOTE — TELEPHONE ENCOUNTER
Reason for Call: Request for an order or referral:    Order or referral being requested: requesting verbal orders for treatment and   1x a week for 1 week   2x a week for 2 weeks   1x for 4 weeks   Except for 12/21    Date needed: as soon as possible    Has the patient been seen by the PCP for this problem? Not Applicable    Additional comments: none     Phone number Patient can be reached at:  Other phone number:  [phone]    Best Time:  Any     Can we leave a detailed message on this number?  YES    Call taken on 12/21/2021 at 6:56 PM by Aisha Rodriguez

## 2021-12-27 ENCOUNTER — TELEPHONE (OUTPATIENT)
Dept: FAMILY MEDICINE | Facility: OTHER | Age: 66
End: 2021-12-27
Payer: COMMERCIAL

## 2021-12-27 DIAGNOSIS — U07.1 INFECTION DUE TO 2019 NOVEL CORONAVIRUS: Primary | ICD-10-CM

## 2021-12-27 RX ORDER — PREDNISONE 20 MG/1
40 TABLET ORAL DAILY
Qty: 10 TABLET | Refills: 0 | Status: SHIPPED | OUTPATIENT
Start: 2021-12-27 | End: 2022-01-01
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
## 2021-12-27 NOTE — TELEPHONE ENCOUNTER
Reason for Call:  Form, our goal is to have forms completed with 72 hours, however, some forms may require a visit or additional information.    Type of letter, form or note:  Home Health Certification    Who is the form from?: Home care    Where did the form come from: form was faxed in    What clinic location was the form placed at?: [facility] [phone]    Where the form was placed: Team D Box/Folder    What number is listed as a contact on the form?: [phone]       Additional comments: Please complete form and return to [phone]    Call taken on 12/27/2021 at 12:13 PM by Magdalena Livingston

## 2021-12-28 ENCOUNTER — TELEPHONE (OUTPATIENT)
Dept: FAMILY MEDICINE | Facility: OTHER | Age: 66
End: 2021-12-28

## 2021-12-28 DIAGNOSIS — Z53.9 DIAGNOSIS NOT YET DEFINED: Primary | ICD-10-CM

## 2021-12-28 PROCEDURE — G0180 MD CERTIFICATION HHA PATIENT: HCPCS | Performed by: PHYSICIAN ASSISTANT

## 2021-12-28 NOTE — TELEPHONE ENCOUNTER
The prednisone works better at a 40 mg dose, be sure she is taking it in the morning, she should take it with food/breakfast so it does not upset her stomach.  She can try to 20 mg but it may not be as effective for her breathing.  Let me know when she thinks of this.       Thank you,     Freddy Prater MD

## 2021-12-28 NOTE — TELEPHONE ENCOUNTER
Dr. Prater - please advise patient.     Patient was prescribed prednisone yesterday. Patient to take 2 tabs (40mg total) by mouth daily for 5 days.   She took the first dose this morning and now she is feeling very jittery and anxious to the point it is making her uncomfortable.  Patient is wondering if she should continue on this dose or if she should taper down to 20mg a day instead.   She does not believe she has ever taken this high of a prednisone dose before.     Thank you!  Hellen NGUYENN, RN

## 2022-01-19 ENCOUNTER — TELEPHONE (OUTPATIENT)
Dept: FAMILY MEDICINE | Facility: OTHER | Age: 67
End: 2022-01-19

## 2022-01-19 ENCOUNTER — OFFICE VISIT (OUTPATIENT)
Dept: FAMILY MEDICINE | Facility: OTHER | Age: 67
End: 2022-01-19
Payer: COMMERCIAL

## 2022-01-19 VITALS
DIASTOLIC BLOOD PRESSURE: 70 MMHG | WEIGHT: 121 LBS | BODY MASS INDEX: 22.84 KG/M2 | RESPIRATION RATE: 20 BRPM | TEMPERATURE: 98.6 F | HEART RATE: 98 BPM | HEIGHT: 61 IN | OXYGEN SATURATION: 98 % | SYSTOLIC BLOOD PRESSURE: 124 MMHG

## 2022-01-19 DIAGNOSIS — Z13.1 SCREENING FOR DIABETES MELLITUS: ICD-10-CM

## 2022-01-19 DIAGNOSIS — F33.0 MAJOR DEPRESSIVE DISORDER, RECURRENT EPISODE, MILD (H): ICD-10-CM

## 2022-01-19 DIAGNOSIS — Z00.00 ENCOUNTER FOR MEDICARE ANNUAL WELLNESS EXAM: Primary | ICD-10-CM

## 2022-01-19 DIAGNOSIS — R73.09 HIGH GLUCOSE LEVEL: ICD-10-CM

## 2022-01-19 DIAGNOSIS — S22.080S T12 COMPRESSION FRACTURE, SEQUELA: ICD-10-CM

## 2022-01-19 DIAGNOSIS — Z86.16 PERSONAL HISTORY OF COVID-19: ICD-10-CM

## 2022-01-19 DIAGNOSIS — J45.30 MILD PERSISTENT ASTHMA, UNSPECIFIED WHETHER COMPLICATED: ICD-10-CM

## 2022-01-19 PROBLEM — I47.9 TACHYCARDIA, PAROXYSMAL (H): Status: RESOLVED | Noted: 2020-11-24 | Resolved: 2022-01-19

## 2022-01-19 PROBLEM — N18.30 CHRONIC KIDNEY DISEASE, STAGE 3 (H): Status: RESOLVED | Noted: 2021-07-14 | Resolved: 2022-01-19

## 2022-01-19 LAB
CREAT UR-MCNC: 30 MG/DL
HBA1C MFR BLD: 5.6 % (ref 0–5.6)
MICROALBUMIN UR-MCNC: <5 MG/L
MICROALBUMIN/CREAT UR: NORMAL MG/G{CREAT}

## 2022-01-19 PROCEDURE — 99397 PER PM REEVAL EST PAT 65+ YR: CPT | Performed by: STUDENT IN AN ORGANIZED HEALTH CARE EDUCATION/TRAINING PROGRAM

## 2022-01-19 PROCEDURE — 82043 UR ALBUMIN QUANTITATIVE: CPT | Performed by: STUDENT IN AN ORGANIZED HEALTH CARE EDUCATION/TRAINING PROGRAM

## 2022-01-19 PROCEDURE — 83036 HEMOGLOBIN GLYCOSYLATED A1C: CPT | Performed by: STUDENT IN AN ORGANIZED HEALTH CARE EDUCATION/TRAINING PROGRAM

## 2022-01-19 PROCEDURE — 36415 COLL VENOUS BLD VENIPUNCTURE: CPT | Performed by: STUDENT IN AN ORGANIZED HEALTH CARE EDUCATION/TRAINING PROGRAM

## 2022-01-19 RX ORDER — ALBUTEROL SULFATE 90 UG/1
1 AEROSOL, METERED RESPIRATORY (INHALATION) EVERY 6 HOURS PRN
Qty: 1 G | Status: SHIPPED | OUTPATIENT
Start: 2022-01-19 | End: 2023-06-26

## 2022-01-19 ASSESSMENT — ENCOUNTER SYMPTOMS
COUGH: 0
NERVOUS/ANXIOUS: 0
HEADACHES: 1
FEVER: 0
DIZZINESS: 0
CHILLS: 0
HEARTBURN: 1
CONSTIPATION: 0
NAUSEA: 0
HEMATURIA: 0
MYALGIAS: 1
FREQUENCY: 0
PARESTHESIAS: 0
ABDOMINAL PAIN: 0
HEMATOCHEZIA: 0
EYE PAIN: 0
SORE THROAT: 0
PALPITATIONS: 0
SHORTNESS OF BREATH: 1
DYSURIA: 0
JOINT SWELLING: 0
DIARRHEA: 0
BREAST MASS: 0
WEAKNESS: 1

## 2022-01-19 ASSESSMENT — PAIN SCALES - GENERAL: PAINLEVEL: NO PAIN (0)

## 2022-01-19 ASSESSMENT — ACTIVITIES OF DAILY LIVING (ADL)
CURRENT_FUNCTION: HOUSEWORK REQUIRES ASSISTANCE
CURRENT_FUNCTION: SHOPPING REQUIRES ASSISTANCE
CURRENT_FUNCTION: TRANSPORTATION REQUIRES ASSISTANCE

## 2022-01-19 ASSESSMENT — MIFFLIN-ST. JEOR: SCORE: 1022.84

## 2022-01-19 NOTE — PROGRESS NOTES
The patient reports that she has difficulty with activities of daily living. I have asked that the patient make a follow up appointment in 8-12 weeks where this issue will be further evaluated and addressed.

## 2022-01-19 NOTE — PATIENT INSTRUCTIONS
SLEEP HYGIENE INSTRUCTIONS    Sleep only as much as you need to feel refreshed during the following day. Restricting your time in bed helps to consolidate and deepen your sleep. Excessively long times in bed lead to fragmented and shallow sleep. Get up at your regular time the next day, no matter how little you slept.     Get up at the same time each day, 7 days a week. A regular wake time in the morning leads to regular times of sleep onset, and helps you to set your  biological clock .      Exercise regularly. Schedule exercise times so that they do not occur within 3 hours of when you intend to go to bed. Exercise makes it easier to initiate sleep and deepen sleep. Even a simple walk can be helpful.       Make sure your bedroom is comfortable and free from light and noise. A comfortable, noise-free sleep environment will reduce the likelihood that you will wake up during the night. Noise that does not awaken you may also disturb the quality of your sleep. Carpeting, insulated curtains and closing the door may help.      Make sure that your bedroom is at a comfortable temperature during the night. Excessively warm or cold sleep environments may disturb sleep.      Eat regular meals and do not go to bed hungry. Hunger may disturb sleep. A light snack at bedtime (especially carbohydrates) may help sleep, but avoid greasy or  heavy  foods. Try not to eat any large meals within 2-3 hours of bedtime     Avoid excessive liquids in the evening. Reducing liquid intake will minimize the need for nighttime trips to the bathroom.      Cut down on all caffeine products. Caffeinated beverages and food (coffee, tea, cola, chocolate) can cause difficulty falling asleep, awakenings during the night, and shallow sleep. Even caffeine early in the day can disrupt nighttime sleep. Avoid caffeine any time after lunch     Avoid alcohol, especially in the evening. Although alcohol helps tense people fall asleep more easily, it causes  "awakenings later in the night.      Smoking may disturb sleep. Nicotine is a stimulant. Try not to smoke during the night when you have trouble sleeping.     Don t take your problems to bed. Plan some time earlier in the evening for working on your problems or planning the next day s activities. Worrying may interfere with initiating sleep and produce shallow sleep.     Train yourself to use the bedroom only for sleeping and sexual activity. This will help condition your brain to see bed as the place for falling sleeping. Do not read, watch TV, or eat in bed.       Get up and get out of bed if unable to fall asleep after 20 to 30 minutes of lying in bed.  Instead, turn on the light, leave the bedroom and do something different like reading a book. Don t engage in stimulating activity. Return to bed only when you are sleepy.     Put the clock under the bed or turn it so that you can t see it. Clock watching may lead to frustration, anger, and worry, which interfere with sleep.       Avoid naps. Staying awake during the day helps you to fall asleep at night.     Avoid \"screen time.\" At least 1 hour prior to bedtime avoid any time on electronic devices such as cell phones, tablets, television and computers.  These screens can be activating to the brain and may disturb sleep.              Patient Education   Personalized Prevention Plan  You are due for the preventive services outlined below.  Your care team is available to assist you in scheduling these services.  If you have already completed any of these items, please share that information with your care team to update in your medical record.  Health Maintenance Due   Topic Date Due     Kidney Microalbumin Urine Test  Never done     COVID-19 Vaccine (1) Never done     Asthma Action Plan - yearly  10/14/2020     Flu Vaccine (1) 09/01/2021     FALL RISK ASSESSMENT  11/24/2021     Cholesterol Lab  11/25/2021     Activities of Daily Living    Your Health Risk Assessment " indicates you have difficulties with activities of daily living such as housework, bathing, preparing meals, taking medication, etc. Please make a follow up appointment for us to address this issue in more detail.

## 2022-01-19 NOTE — TELEPHONE ENCOUNTER
Home care nurse calling. Was seen by NM today    Sounds like she having some esophageal spasm that she did not mention in the visit.  Saying that she is getting around them.  Will lead to a coughing fit that will last for hours.  Trying to avoid things that will aggravate it.     She is having some other neurologic symptoms.  Like swelling in right foot that she wanted to mention.      She is also Requesting at Theraputic titration to 0 Liters of oxygen.     Any further questions please call  [phone] Mitra at [facility].    José Miguel Rivero, WENDYN, RN, PHN  [facility] ~ Registered Nurse  Clinic Triage ~ [facility]  January 19, 2022

## 2022-01-20 ENCOUNTER — TELEPHONE (OUTPATIENT)
Dept: FAMILY MEDICINE | Facility: OTHER | Age: 67
End: 2022-01-20
Payer: COMMERCIAL

## 2022-01-20 RX ORDER — ALBUTEROL SULFATE 90 UG/1
2 AEROSOL, METERED RESPIRATORY (INHALATION) EVERY 6 HOURS
Qty: 18 G | Refills: 1 | Status: SHIPPED | OUTPATIENT
Start: 2022-01-20 | End: 2023-09-25

## 2022-01-20 NOTE — TELEPHONE ENCOUNTER
Question set received. Placed in [facility] folder for completion.     Prior Authorization Retail Medication Request    Medication/Dose: albuterol (PROAIR HFA/PROVENTIL HFA/VENTOLIN HFA) 108 (90 Base) MCG/ACT inhaler  ICD code (if different than what is on RX):  Mild persistent asthma, unspecified whether complicated [J45.30] - Personal history of COVID-19 [Z86.16]   Previously Tried and Failed:    Rationale:      Insurance Name: [employer]  Insurance ID:  [id number]    Pharmacy Information (if different than what is on RX)  Name:  [facility] - [facility] [address]  Phone:  [phone]

## 2022-01-20 NOTE — TELEPHONE ENCOUNTER
Yes she did not mention most first complaints to me, we could set up by virtual/video visit to discuss them further?  In regards to her oxygen I thought I mentioned to her that she can titrate down as needed especially if she is no longer needing the oxygen.  It appears that she was down to 1 L and at times not requiring any oxygen at rest.  We can also discuss this over a virtual visit if it is easiest.      Thank you,    Freddy Prater MD

## 2022-01-25 NOTE — TELEPHONE ENCOUNTER
Called to inform Mitra of the message below. No further questions or concerns at this time.     WENDY GonsalvesN, RN, PHN  [facility]  January 25, 2022

## 2022-01-25 NOTE — TELEPHONE ENCOUNTER
Central Prior Authorization Team   Phone: [phone]    PA Initiation    Medication: albuterol (PROAIR HFA/PROVENTIL HFA/VENTOLIN HFA) 108 (90 Base) MCG/ACT inhaler  Insurance Company: [employer] - Phone [phone] Fax [phone]  Pharmacy Filling the Rx: [facility] - [address]  Filling Pharmacy Phone: [phone]  Filling Pharmacy Fax:    Start Date: 1/25/2022

## 2022-01-29 NOTE — TELEPHONE ENCOUNTER
Prior Authorization Not Needed per Insurance    Medication: albuterol (PROAIR HFA/PROVENTIL HFA/VENTOLIN HFA) 108 (90 Base) MCG/ACT inhaler  Insurance Company: [employer] - Phone [phone] Fax [phone]  Expected CoPay:      Pharmacy Filling the Rx: [facility] - [address]  Pharmacy Notified: Yes  Patient Notified: Yes

## 2022-02-09 ENCOUNTER — MYC MEDICAL ADVICE (OUTPATIENT)
Dept: FAMILY MEDICINE | Facility: OTHER | Age: 67
End: 2022-02-09
Payer: COMMERCIAL

## 2022-02-09 DIAGNOSIS — R09.02 HYPOXEMIA: Primary | ICD-10-CM

## 2022-02-10 ENCOUNTER — MYC MEDICAL ADVICE (OUTPATIENT)
Dept: FAMILY MEDICINE | Facility: OTHER | Age: 67
End: 2022-02-10
Payer: COMMERCIAL

## 2022-02-22 ENCOUNTER — TELEPHONE (OUTPATIENT)
Dept: FAMILY MEDICINE | Facility: OTHER | Age: 67
End: 2022-02-22
Payer: COMMERCIAL

## 2022-02-22 NOTE — TELEPHONE ENCOUNTER
Patient calling in regards to COVID. Was positive November 28th.     Patient states she is going to an event and needs a negative test    Positive test in media on 12/6/21    Called to try and schedule a test but was told no due to still being within 90 days.     Can letter be written or should patient be tested again?    Patient requesting this go to [facility]  February 22, 2022

## 2022-02-22 NOTE — TELEPHONE ENCOUNTER
Notified patient that provider has written a letter and this can be found in my chart.     Hellen NGUYENN, RN

## 2022-02-22 NOTE — LETTER
[facility]  [address]  [place] [address]  Phone: [phone]    February 22, 2022        Margareth Green  [address] [address]          To whom it may concern:    RE: Margareth MOY Green    I would not recommend COVID testing for Margareth at this time since she is within her 90 day window of her previous positive test. There is a high chance of false-positive result.     Please contact me for questions or concerns.      Sincerely,        Freddy Prater MD

## 2022-03-07 ENCOUNTER — MYC MEDICAL ADVICE (OUTPATIENT)
Dept: FAMILY MEDICINE | Facility: OTHER | Age: 67
End: 2022-03-07
Payer: COMMERCIAL

## 2022-03-08 ENCOUNTER — TELEPHONE (OUTPATIENT)
Dept: FAMILY MEDICINE | Facility: OTHER | Age: 67
End: 2022-03-08
Payer: COMMERCIAL

## 2022-03-08 NOTE — TELEPHONE ENCOUNTER
I am sorry, my availability tomorrow morning has already been filled.  I am only virtual on Thursday.  I could see her next week in one of my same days otherwise she can see one of my partners.  Sorry I cannot make that work tomorrow.  Caitlin Beasley PA-C

## 2022-03-08 NOTE — TELEPHONE ENCOUNTER
Nurse Triage SBAR  Is this a 2nd Level Triage? NO    SITUATION:                                                      Margareth Green is a 67 year old female right sided hip and back pain since last week.   CT on 12/6/21 showed T12 compression Fracture    BACKGROUND:                                                      Current Medication related to illness: Has tried Tylenol Arthritis, Naproxen, Ice packs, Massage table, and Chiropractic care which do help control symptoms.    Hx: Patient reports she developed right sided hip and back pain last week. No injury, was doing dishes and noticed it started to hurt. Describes the pain as a burning sensation that starts in her right hip and radiates up her back. No weakness, numbness, or tingling in her legs. No difficulty ambulating. Activity makes the pain worse. Currently rates pain as mild, but states that is because she has just been resting this morning.   No urinary symptoms present. No loss of bladder or bowel control.     NURSE ASSESSMENT:                                                      Provider review for scheduling     RECOMMENDATION(S) and PLAN:                                                      Patient prefers to be seen by PCP. Are you able to work her in tomorrow, or see her in clinic Wed morning?

## 2022-03-08 NOTE — TELEPHONE ENCOUNTER
Please call Russ and triage her symptoms.  It is not clear if it is musculoskeletal or urinary based on her brief comments.  Caitlin Beasley PA-C

## 2022-03-13 ASSESSMENT — ANXIETY QUESTIONNAIRES
7. FEELING AFRAID AS IF SOMETHING AWFUL MIGHT HAPPEN: NOT AT ALL
3. WORRYING TOO MUCH ABOUT DIFFERENT THINGS: MORE THAN HALF THE DAYS
4. TROUBLE RELAXING: MORE THAN HALF THE DAYS
5. BEING SO RESTLESS THAT IT IS HARD TO SIT STILL: SEVERAL DAYS
GAD7 TOTAL SCORE: 9
2. NOT BEING ABLE TO STOP OR CONTROL WORRYING: MORE THAN HALF THE DAYS
7. FEELING AFRAID AS IF SOMETHING AWFUL MIGHT HAPPEN: NOT AT ALL
1. FEELING NERVOUS, ANXIOUS, OR ON EDGE: SEVERAL DAYS
6. BECOMING EASILY ANNOYED OR IRRITABLE: SEVERAL DAYS

## 2022-03-13 ASSESSMENT — PATIENT HEALTH QUESTIONNAIRE - PHQ9
SUM OF ALL RESPONSES TO PHQ QUESTIONS 1-9: 10
10. IF YOU CHECKED OFF ANY PROBLEMS, HOW DIFFICULT HAVE THESE PROBLEMS MADE IT FOR YOU TO DO YOUR WORK, TAKE CARE OF THINGS AT HOME, OR GET ALONG WITH OTHER PEOPLE: SOMEWHAT DIFFICULT
SUM OF ALL RESPONSES TO PHQ QUESTIONS 1-9: 10

## 2022-03-14 ENCOUNTER — OFFICE VISIT (OUTPATIENT)
Dept: FAMILY MEDICINE | Facility: OTHER | Age: 67
End: 2022-03-14
Payer: COMMERCIAL

## 2022-03-14 VITALS
HEIGHT: 62 IN | BODY MASS INDEX: 24.11 KG/M2 | WEIGHT: 131 LBS | SYSTOLIC BLOOD PRESSURE: 120 MMHG | DIASTOLIC BLOOD PRESSURE: 70 MMHG | RESPIRATION RATE: 20 BRPM | HEART RATE: 78 BPM | OXYGEN SATURATION: 99 % | TEMPERATURE: 97.9 F

## 2022-03-14 DIAGNOSIS — M54.6 ACUTE RIGHT-SIDED THORACIC BACK PAIN: ICD-10-CM

## 2022-03-14 DIAGNOSIS — U07.1 INFECTION DUE TO 2019 NOVEL CORONAVIRUS: ICD-10-CM

## 2022-03-14 DIAGNOSIS — M54.50 LUMBOSACRAL PAIN: ICD-10-CM

## 2022-03-14 DIAGNOSIS — M25.551 HIP PAIN, RIGHT: Primary | ICD-10-CM

## 2022-03-14 DIAGNOSIS — Z23 NEED FOR VACCINATION: ICD-10-CM

## 2022-03-14 DIAGNOSIS — G47.09 OTHER INSOMNIA: ICD-10-CM

## 2022-03-14 DIAGNOSIS — F41.1 GAD (GENERALIZED ANXIETY DISORDER): ICD-10-CM

## 2022-03-14 PROCEDURE — 90715 TDAP VACCINE 7 YRS/> IM: CPT | Performed by: PHYSICIAN ASSISTANT

## 2022-03-14 PROCEDURE — 90471 IMMUNIZATION ADMIN: CPT | Performed by: PHYSICIAN ASSISTANT

## 2022-03-14 PROCEDURE — 99214 OFFICE O/P EST MOD 30 MIN: CPT | Mod: 25 | Performed by: PHYSICIAN ASSISTANT

## 2022-03-14 RX ORDER — HYDROXYZINE HYDROCHLORIDE 25 MG/1
25-50 TABLET, FILM COATED ORAL EVERY 8 HOURS PRN
Qty: 60 TABLET | Refills: 1 | Status: SHIPPED | OUTPATIENT
Start: 2022-03-14 | End: 2023-04-05

## 2022-03-14 ASSESSMENT — ANXIETY QUESTIONNAIRES: GAD7 TOTAL SCORE: 9

## 2022-03-14 ASSESSMENT — PATIENT HEALTH QUESTIONNAIRE - PHQ9
10. IF YOU CHECKED OFF ANY PROBLEMS, HOW DIFFICULT HAVE THESE PROBLEMS MADE IT FOR YOU TO DO YOUR WORK, TAKE CARE OF THINGS AT HOME, OR GET ALONG WITH OTHER PEOPLE: SOMEWHAT DIFFICULT
SUM OF ALL RESPONSES TO PHQ QUESTIONS 1-9: 10

## 2022-03-14 ASSESSMENT — PAIN SCALES - GENERAL: PAINLEVEL: SEVERE PAIN (7)

## 2022-03-14 NOTE — PROGRESS NOTES
Assessment & Plan     Hip pain, right  May be sciatic related though patient states it feels different than usual, we will have her do some physical therapy she may have a component of bursitis as well could consider referral for injection if not improving  - Physical Therapy Referral; Future    Lumbosacral pain  Ice, heat, rest physical therapy  - Physical Therapy Referral; Future    Acute right-sided thoracic back pain  Physical therapy may use ice and heat as above  - Physical Therapy Referral; Future    Infection due to 2019 novel coronavirus  Patient is curious about her immune status, she plans to do this test in a few months as she recently has had Covid  - SARS-CoV-2 Nucleocapsid Total Ab; Future    Other insomnia  She may try to increase dosing for insomnia or anxiety throughout the day  - hydrOXYzine (ATARAX) 25 MG tablet; Take 1-2 tablets (25-50 mg) by mouth every 8 hours as needed for anxiety or other (insomnia)    TAMMY (generalized anxiety disorder)  We will continue her on Wellbutrin, could add a low-dose fluoxetine as well if needed  - hydrOXYzine (ATARAX) 25 MG tablet; Take 1-2 tablets (25-50 mg) by mouth every 8 hours as needed for anxiety or other (insomnia)    Need for vaccination  Updated she declined Covid and flu shot today  - TDAP VACCINE (Adacel, Boostrix)  [[id number]]                 No follow-ups on file.    TO[facility]    Basilio Zuluaga is a 67 year old who presents for the following health issues     History of Present Illness       Back Pain:  She presents for follow up of back pain. Patient's back pain is a recurring problem.  Location of back pain:  Right lower back  Description of back pain: burning  Back pain spreads: right buttocks    Since patient first noticed back pain, pain is: gradually worsening  Does back pain interfere with her job:  Not applicable      Mental Health Follow-up:  Patient presents to follow-up on Depression &  Anxiety.Patient's depression since last visit has been:  Medium  The patient is not having other symptoms associated with depression.  Patient's anxiety since last visit has been:  Better  The patient is having other symptoms associated with anxiety.  Any significant life events: health concerns  Patient is not feeling anxious or having panic attacks.  Patient has no concerns about alcohol or drug use.       Today's PHQ-9         PHQ-9 Total Score: 10  PHQ-9 Q9 Thoughts of better off dead/self-harm past 2 weeks :   (P) Not at all    How difficult have these problems made it for you to do your work, take care of things at home, or get along with other people: Somewhat difficult    Today's TAMMY-7 Score: 9    She eats 2-3 servings of fruits and vegetables daily.She consumes 1 sweetened beverage(s) daily.She exercises with enough effort to increase her heart rate 10 to 19 minutes per day.  She exercises with enough effort to increase her heart rate 3 or less days per week.   She is taking medications regularly.     Patient states since her COVID hospitalization in [facility], she has been feeling better.  She still does not have her taste and smell back but her shortness of breath and dyspnea on exertion is improving.  She is no longer requiring oxygen.  She does check her oxygenation routinely.  She is no longer doing home care PT either.  She would like to check her antibodies in a few months to see where they are at.  She is still not vaccinated against Covid.  She is not sure if she got monoclonal antibodies or not, therefore we should wait till closer to 90 days after her infection.      While in the hospital, they discovered that she had a T12 compound fracture of unknown acuity.  She does not have any specific pain in that region but wonders if her buttocks pain may be related to this.  She has had since being hospitalized a lot of upper back pain neck and shoulder discomfort left-sided greater than  "right.  She did see a chiropractor for this which has been helpful.  She is using Tylenol and naproxen as well as using her massage table, and ice.  The pain she gets in her buttocks is a burning pain.  She states it feels different than her typical sciatic pain and she also has pain into her thoracic region.    She continues to sleep poorly. Dr. Prater  gave her hydroxyzine 25 mg at bedtime but she does not think that it worked very well.  She does have some depression and anxiety.  She feels that depression may be more of an issue than anxiety.  She still wants to wait to make any major medical changes until it has been a bit longer after her Covid infection.    Review of Systems   As documented above       Objective    /70   Pulse 78   Temp 97.9  F (36.6  C) (Temporal)   Resp 20   Ht 1.57 m (5' 1.81\")   Wt 59.4 kg (131 lb)   LMP 02/26/2008 (Approximate)   SpO2 99%   BMI 24.11 kg/m    Body mass index is 24.11 kg/m .  Physical Exam   GENERAL: healthy, alert and no distress  NECK: no adenopathy, no asymmetry, masses, or scars and thyroid normal to palpation  RESP: lungs clear to auscultation - no rales, rhonchi or wheezes  CV: regular rate and rhythm, normal S1 S2, no S3 or S4, no murmur, click or rub, no peripheral edema and peripheral pulses strong  ABDOMEN: soft, nontender, no hepatosplenomegaly, no masses and bowel sounds normal  MS: no gross musculoskeletal defects noted, no edema  SKIN: no suspicious lesions or rashes  NEURO: Normal strength and tone, mentation intact and speech normal  BACK: She is tender to palpation over her right upper back and thoracic regions, no CVA tenderness, no lumbar vertebral tenderness though she does have some lumbosacral muscular tenderness on the right greater than the left she is tender also to a patient over her right trochanteric bursa  PSYCH: mentation appears normal, affect normal/bright    Office Visit on 01/19/2022   Component Date Value Ref Range Status "     Creatinine Urine mg/dL 01/19/2022 30  mg/dL Final     Albumin Urine mg/L 01/19/2022 <5  mg/L Final     Albumin Urine mg/g Cr 01/19/2022    Final    Unable to calculate:  Urine creatinine or albumin value below detectable level     Hemoglobin A1C 01/19/2022 5.6  0.0 - 5.6 % Final    Normal <5.7%   Prediabetes 5.7-6.4%    Diabetes 6.5% or higher     Note: Adopted from ADA consensus guidelines.

## 2022-03-14 NOTE — PATIENT INSTRUCTIONS
genesight  Psychotropic - please check on insurance coverage  sars  cov2 antibody test- code is U07.1  May use hydroxyzine 1-2 pills ( 25-50 mg) at bedtime to help you sleep

## 2022-03-14 NOTE — PROGRESS NOTES
Prior to immunization administration, verified patients identity using patient s name and date of birth. Please see Immunization Activity for additional information.     Screening Questionnaire for Adult Immunization    Are you sick today?   No   Do you have allergies to medications, food, a vaccine component or latex?   No   Have you ever had a serious reaction after receiving a vaccination?   No   Do you have a long-term health problem with heart, lung, kidney, or metabolic disease (e.g., diabetes), asthma, a blood disorder, no spleen, complement component deficiency, a cochlear implant, or a spinal fluid leak?  Are you on long-term aspirin therapy?   No   Do you have cancer, leukemia, HIV/AIDS, or any other immune system problem?   No   Do you have a parent, brother, or sister with an immune system problem?   No   In the past 3 months, have you taken medications that affect  your immune system, such as prednisone, other steroids, or anticancer drugs; drugs for the treatment of rheumatoid arthritis, Crohn s disease, or psoriasis; or have you had radiation treatments?   No   Have you had a seizure, or a brain or other nervous system problem?   No   During the past year, have you received a transfusion of blood or blood    products, or been given immune (gamma) globulin or antiviral drug?   No   For women: Are you pregnant or is there a chance you could become       pregnant during the next month?   No   Have you received any vaccinations in the past 4 weeks?   No     Immunization questionnaire answers were all negative.        Per orders of NP, injection of TDAP given by Snow Morales MA. Patient instructed to remain in clinic for 15 minutes afterwards, and to report any adverse reaction to me immediately.       Screening performed by Snow Morales MA on 3/14/2022 at 11:16 AM.

## 2022-03-22 ENCOUNTER — HOSPITAL ENCOUNTER (OUTPATIENT)
Dept: PHYSICAL THERAPY | Facility: CLINIC | Age: 67
Setting detail: THERAPIES SERIES
Discharge: HOME | End: 2022-03-22
Attending: PHYSICIAN ASSISTANT
Payer: COMMERCIAL

## 2022-03-22 DIAGNOSIS — M54.6 ACUTE RIGHT-SIDED THORACIC BACK PAIN: ICD-10-CM

## 2022-03-22 DIAGNOSIS — M25.551 HIP PAIN, RIGHT: ICD-10-CM

## 2022-03-22 DIAGNOSIS — M54.50 LUMBOSACRAL PAIN: ICD-10-CM

## 2022-03-22 PROCEDURE — 97162 PT EVAL MOD COMPLEX 30 MIN: CPT | Mod: GP | Performed by: PHYSICAL THERAPIST

## 2022-03-22 PROCEDURE — 97140 MANUAL THERAPY 1/> REGIONS: CPT | Mod: GP | Performed by: PHYSICAL THERAPIST

## 2022-03-22 PROCEDURE — 97110 THERAPEUTIC EXERCISES: CPT | Mod: GP | Performed by: PHYSICAL THERAPIST

## 2022-03-22 NOTE — PROGRESS NOTES
[facility]    OUTPATIENT PHYSICAL THERAPY ORTHOPEDIC EVALUATION  PLAN OF TREATMENT FOR OUTPATIENT REHABILITATION  (COMPLETE FOR INITIAL CLAIMS ONLY)  Patient's Last Name, First Name, M.I.  YOB: 1955  Margareth Green    Provider s Name:  [facility]   Medical Record No.  [id number]   Start of Care Date:  03/22/22   Onset Date:  03/08/22   Type:     _X__PT   ___OT   ___SLP Medical Diagnosis:  Hip pain right, lumbosacral pain, acute right sided thoracic pain     PT Diagnosis:  LBP with referral pain to the right buttocks, neck pain with referral pain to the right UT, right shoulder blade region pain   Visits from SOC:  1      _________________________________________________________________________________  Plan of Treatment/Functional Goals:  joint mobilization, manual therapy, motor coordination training, neuromuscular re-education, ROM, strengthening, stretching     Electrical stimulation     Goals  Goal Identifier: 1  Goal Description: Patient LBP/buttock pain is 50% better and seen by a 50% increase in function with a ODDI score of 33%  Target Date: 06/19/22    Goal Identifier: 2  Goal Description: Patient has 20% incresae in AROM of the neck with no pain and no referral pain into the shoulder region  Target Date: 06/19/22           Therapy Frequency:  1 time/week  Predicted Duration of Therapy Intervention:  90 days    Leana James, PT                 I CERTIFY THE NEED FOR THESE SERVICES FURNISHED UNDER        THIS PLAN OF TREATMENT AND WHILE UNDER MY CARE     (Physician co-signature of this document indicates review and certification of the therapy plan).                       Certification Date From:  03/22/22   Certification Date To:  06/19/22    Referring Provider:  Caitlin Beasley PA-C    Initial Assessment        See Epic Evaluation Start of Care Date:  03/22/22

## 2022-03-22 NOTE — PROGRESS NOTES
"   03/22/22 1300   General Information   Type of Visit Initial OP Ortho PT Evaluation   Start of Care Date 03/22/22   Referring Physician Caitlin Beasley PA-C   Patient/Family Goals Statement To have not more pain in the LB, right pelvis, right hip and upper back and neck   Orders Evaluate and Treat   Date of Order 03/14/22   Certification Required? Yes   Medical Diagnosis Hip pain right, lumbosacral pain, acute right sided thoracic pain   Surgical/Medical history reviewed Yes   Precautions/Limitations no known precautions/limitations   Body Part(s)   Body Part(s) Cervical Spine;Lumbar Spine/SI   Presentation and Etiology   Pertinent history of current problem (include personal factors and/or comorbidities that impact the POC) In the Middle of Nov had pnuemonia and got more prednisone. Then pt got Covid in late Nov. and had CT for the lungs and found that she has a compression fx at T12. Needed to admit pt for Covid and was sent to [facility] to the hospital. Was in hospital for 10 days. Then went home with home O2 for 2 months. Also had home PT and home nursing. Out of the blue has right hip and back pain burning with standing or walking and so intense needs to lay or sit to get rid of it. This will last for 10 min then will go back to a #0 again. Also has pain on the right upper back shoulder region #6. Right side of the neck is a #6. Cannot lay on her right side without pain increase in the neck and upper shoulder region. Pain is not every time she stands or moves, but is worse as the day goes on and with increase in activity. Seen chiro for the \"knots\" in the upper back.    Impairments A. Pain;D. Decreased ROM;E. Decreased flexibility;F. Decreased strength and endurance;J. Burning   Functional Limitations perform activities of daily living;perform desired leisure / sports activities   How/Where did it occur From insidious onset   Onset date of current episode/exacerbation 03/08/22   Chronicity Recurrent "   Pain/symptoms eased by F. Certain positions;E. Changing positions   Progression of symptoms since onset: Unchanged   Prior Level of Function   Prior Level of Function-Mobility independent   Current Level of Function   Patient role/employment history F. Retired   [facility] [place]   Fall Risk Screen   Fall screen completed by PT   Have you fallen 2 or more times in the past year? No   Have you fallen and had an injury in the past year? No   Is patient a fall risk? No   Abuse Screen (yes response referral indicated)   Feels Unsafe at Home or Work/School no   Feels Threatened by Someone no   Does Anyone Try to Keep You From Having Contact with Others or Doing Things Outside Your Home? no   Physical Signs of Abuse Present no   System Outcome Measures   Outcome Measures Low Back Pain (see Oswestry and Papa)   Lumbar Spine/SI Objective Findings   Posture rounded shoulders and forward head   Gait/Locomotion WNL   Balance/Proprioception (Single Leg Stance) WNL   Flexion ROM 70%   Extension ROM 90%   Repeated Extension-Standing ROM able to do with no increase in pain   Repeated Flexion-Standing ROM able to do but increase pain in the buttocks and LB   Pelvic Screen WNL   Lumbar/Hip/Knee/Foot Strength Comments 4- for all major mm groups   Hamstring Flexibility tightness   Hip Flexor Flexibility tightness   Repeated Extension Prone pt best pain relieving position is prone over 1 pillow   Palpation pain at the L5-S1 right   Cervical Spine   Observation more forward head than when PT seen pt in the past   Posture rounded shoulders and forward head   Cervical Flexion ROM WNL   Cervical Extension ROM 80%   Cervical Right Side Bending ROM 50%   Cervical Left Side Bending ROM 50%   Cervical Right Rotation ROM 50%   Cervical Left Rotation ROM 50%   Thoracic Flexion ROM WNL   Thoracic Extension ROM poor   Shoulder/Wrist/Hand Strength Comments 4- for all tests   Upper Trapezius Flexibility tightness   Levator Scapula  Flexibility tightness   Scalene Flexibility tightness  (SCM B very tight)   Pectoralis Minor Flexibility tightness   Cervical Flexibility Comments SCM very tight and forward head, PT can fit 3 fingers behind pt head when she stands with back to the wall   Segmental Mobility-Cervical C7-T3 very tight and rotated right   Palpation pain at the OCB and C7 and C5-6 right   Planned Therapy Interventions   Planned Therapy Interventions joint mobilization;manual therapy;motor coordination training;neuromuscular re-education;ROM;strengthening;stretching   Planned Modality Interventions   Planned Modality Interventions Electrical stimulation   Clinical Impression   Criteria for Skilled Therapeutic Interventions Met yes, treatment indicated   PT Diagnosis LBP with referral pain to the right buttocks, neck pain with referral pain to the right UT, right shoulder blade region   Influenced by the following impairments spine DDD, covid 19   Functional limitations due to impairments pt UMESH is 46.67%, pain is limiting a lot of function   Clinical Presentation Evolving/Changing   Clinical Presentation Rationale several issues including deconditioned due to Covid    Clinical Decision Making (Complexity) Moderate complexity   Therapy Frequency 1 time/week   Predicted Duration of Therapy Intervention (days/wks) 90 days   Risk & Benefits of therapy have been explained Yes   Patient, Family & other staff in agreement with plan of care Yes   Clinical Impression Comments LBP with referral pain to the right buttocks, neck pain with referral pain to the right UT, right shoulder blade region. Patient very deconditioned due to Covid 19   Education Assessment   Preferred Learning Style Listening;Reading;Demonstration   Barriers to Learning No barriers   ORTHO GOALS   PT Ortho Eval Goals 1;2   Ortho Goal 1   Goal Identifier 1   Goal Description Patient LBP/buttock pain is 50% better and seen by a 50% increase in function with a ODDI score of 33%    Target Date 06/19/22   Ortho Goal 2   Goal Identifier 2   Goal Description Patient has 20% incresae in AROM of the neck with no pain and no referral pain into the shoulder region   Target Date 06/19/22   Total Evaluation Time   PT Eval, Moderate Complexity Minutes (02021) 15   Therapy Certification   Certification date from 03/22/22   Certification date to 03/22/11   Medical Diagnosis Hip pain right, lumbosacral pain, acute right sided thoracic pain   Thank you for the referral,            Leana James PT

## 2022-03-25 ENCOUNTER — TELEPHONE (OUTPATIENT)
Dept: FAMILY MEDICINE | Facility: OTHER | Age: 67
End: 2022-03-25
Payer: COMMERCIAL

## 2022-03-25 ENCOUNTER — VIRTUAL VISIT (OUTPATIENT)
Dept: FAMILY MEDICINE | Facility: OTHER | Age: 67
End: 2022-03-25
Payer: COMMERCIAL

## 2022-03-25 DIAGNOSIS — J45.31 MILD PERSISTENT ASTHMA WITH EXACERBATION: Primary | ICD-10-CM

## 2022-03-25 PROCEDURE — 99441 PR PHYSICIAN TELEPHONE EVALUATION 5-10 MIN: CPT | Mod: 95 | Performed by: PHYSICIAN ASSISTANT

## 2022-03-25 RX ORDER — PREDNISONE 20 MG/1
40 TABLET ORAL DAILY
Qty: 10 TABLET | Refills: 0 | Status: SHIPPED | OUTPATIENT
Start: 2022-03-25 | End: 2022-03-30

## 2022-03-25 NOTE — TELEPHONE ENCOUNTER
Patient calling regarding cough. States she has a history of asthma and her cough seems to be worsening since last night. She is spitting out phlegm that she coughs up but is fearful of it going into her lungs - hospitalized for 10 days back in December with covid.   Denies fever. O2 96%. Continuing to monitor these at home. Taking her inhalers as well.  She states her pulmonary provider had given her 25 mg of Prednisone to keep on hand for these coughing flares but she is almost out. She would like this refilled (not active on med list). PCP is out today. Recommended she be seen in person and advised urgent care as no clinic openings. Patient was wondering about telephone appointment - scheduled with a covering provider for telephone appointment this afternoon to discuss medication. Patient will be seen in urgent care if she feels her symptoms worsen and cancel her telephone appointment.    Hellen NGUYENN, RN

## 2022-03-25 NOTE — PROGRESS NOTES
Margareth is a 67 year old who is being evaluated via a billable telephone visit.      What phone number would you like to be contacted at? [phone]  How would you like to obtain your AVS? MyChart    Assessment & Plan     Mild persistent asthma with exacerbation  Suspect new acute viral URI.  Encouraged her to take home COVID test to be sure it isn't related.  Encouraged to take OTC therapies as needed and monitor symptoms.  Did give Prednisone to use for 5 days, take in the AM with food in stomach, she has tolerated this medication previously.  She will use her Proair every 4-6 hours as needed.   - predniSONE (DELTASONE) 20 MG tablet; Take 2 tablets (40 mg) by mouth daily for 5 days      Return in about 1 week (around 4/1/2022) for If not improving, sooner if worse or new concerns.    Options for treatment and follow-up care were reviewed with the patient and/or guardian. Patient and/or guardian engaged in the decision making process and verbalized understanding of the options discussed and agreed with the final plan.    Reynaldo Aguilera PA-C  [facility]   Margareth is a 67 year old who presents for the following health issues     HPI     Acute Illness  Acute illness concerns: Saw Caitlin Beasley PA-C recently.  At that time she had started physical therapy which has been going fine.  But the last few days she feels something coming  On.  She had an asthma attack over night.  She had COVID in December and was hospitalized for 10 days.  She has been coughing and coughing up mucus, brown/green in color.  O2 is 98%  Onset/Duration: This started overnight.   Symptoms:  Fever: no  Chills/Sweats: no  Headache (location?): YES  Sinus Pressure: no  Conjunctivitis:  no  Ear Pain: no  Rhinorrhea: no  Congestion: YES  Sore Throat: no  Cough: YES-productive of green sputum  Wheeze: no  Shortness of breath: no  Decreased Appetite: no  Nausea: no  Vomiting: no  Diarrhea: no  Fatigue/Achiness:  YES- but was due to some issues with back pain.   Sick/Strep Exposure: no  Therapies tried and outcome: Using her Proair inhaler 2 puffs today.  Allegra.  Dulera.  Nasacort. Last night took some cough syrup.     Review of Systems   Constitutional, HEENT, cardiovascular, pulmonary, gi and gu systems are negative, except as otherwise noted.      Objective           Vitals:  No vitals were obtained today due to virtual visit.    Physical Exam   healthy, alert and no distress  PSYCH: Alert and oriented times 3; coherent speech, normal   rate and volume, able to articulate logical thoughts, able   to abstract reason, no tangential thoughts, no hallucinations   or delusions  Her affect is normal  RESP: No cough, no audible wheezing, able to talk in full sentences  Remainder of exam unable to be completed due to telephone visits            Phone call duration: 9:35 minutes

## 2022-03-29 ENCOUNTER — HOSPITAL ENCOUNTER (OUTPATIENT)
Dept: PHYSICAL THERAPY | Facility: CLINIC | Age: 67
Setting detail: THERAPIES SERIES
Discharge: HOME | End: 2022-03-29
Attending: PHYSICIAN ASSISTANT
Payer: COMMERCIAL

## 2022-03-29 PROCEDURE — 97140 MANUAL THERAPY 1/> REGIONS: CPT | Mod: GP | Performed by: PHYSICAL THERAPIST

## 2022-04-06 ENCOUNTER — HOSPITAL ENCOUNTER (OUTPATIENT)
Dept: PHYSICAL THERAPY | Facility: CLINIC | Age: 67
Setting detail: THERAPIES SERIES
Discharge: HOME | End: 2022-04-06
Attending: PHYSICIAN ASSISTANT
Payer: COMMERCIAL

## 2022-04-06 PROCEDURE — 97140 MANUAL THERAPY 1/> REGIONS: CPT | Mod: GP | Performed by: PHYSICAL THERAPIST

## 2022-04-06 PROCEDURE — 97530 THERAPEUTIC ACTIVITIES: CPT | Mod: GP | Performed by: PHYSICAL THERAPIST

## 2022-04-08 ENCOUNTER — LAB (OUTPATIENT)
Dept: LAB | Facility: OTHER | Age: 67
End: 2022-04-08
Payer: COMMERCIAL

## 2022-04-08 DIAGNOSIS — U07.1 INFECTION DUE TO 2019 NOVEL CORONAVIRUS: ICD-10-CM

## 2022-04-08 PROCEDURE — 36415 COLL VENOUS BLD VENIPUNCTURE: CPT

## 2022-04-08 PROCEDURE — 99000 SPECIMEN HANDLING OFFICE-LAB: CPT

## 2022-04-08 PROCEDURE — 86769 SARS-COV-2 COVID-19 ANTIBODY: CPT | Mod: 90

## 2022-04-10 LAB — SARS-COV-2 AB SERPL QL IA: POSITIVE

## 2022-04-11 ENCOUNTER — MYC MEDICAL ADVICE (OUTPATIENT)
Dept: FAMILY MEDICINE | Facility: OTHER | Age: 67
End: 2022-04-11
Payer: COMMERCIAL

## 2022-04-11 NOTE — TELEPHONE ENCOUNTER
Please call to gather more information, I will do my best to call her today but more info would be good  Caitlin Beasley PA-C

## 2022-04-11 NOTE — TELEPHONE ENCOUNTER
Patient has questions about her COVID antibody Positive test result. RN reviewed basics of that lab result. Patient reports that she has had COVID in the past, but still has questions for provider.     Rosa Villagomez RN on 4/11/2022 at 1:02 PM

## 2022-04-11 NOTE — TELEPHONE ENCOUNTER
Spoke to pt-she wonders how long her antibodies will last.  I told her there is no way of knowing this, she had COVID in December 2021.  She is able to have the vaccination at this point.  I recommended [employer] or [employer].  She is worried she will get sick from the vaccine.  I assured her she will not get Covid from the vaccination but may have an immune response.  Patient understood  Caitlin Beasley PA-C

## 2022-04-13 ENCOUNTER — HOSPITAL ENCOUNTER (OUTPATIENT)
Dept: PHYSICAL THERAPY | Facility: CLINIC | Age: 67
Setting detail: THERAPIES SERIES
Discharge: HOME | End: 2022-04-13
Attending: PHYSICIAN ASSISTANT
Payer: COMMERCIAL

## 2022-04-13 PROCEDURE — 97530 THERAPEUTIC ACTIVITIES: CPT | Mod: GP | Performed by: PHYSICAL THERAPIST

## 2022-04-13 PROCEDURE — 97110 THERAPEUTIC EXERCISES: CPT | Mod: GP | Performed by: PHYSICAL THERAPIST

## 2022-04-13 PROCEDURE — 97140 MANUAL THERAPY 1/> REGIONS: CPT | Mod: GP | Performed by: PHYSICAL THERAPIST

## 2022-04-20 ENCOUNTER — HOSPITAL ENCOUNTER (OUTPATIENT)
Dept: PHYSICAL THERAPY | Facility: CLINIC | Age: 67
Setting detail: THERAPIES SERIES
Discharge: HOME | End: 2022-04-20
Attending: PHYSICIAN ASSISTANT
Payer: COMMERCIAL

## 2022-04-20 PROCEDURE — 97140 MANUAL THERAPY 1/> REGIONS: CPT | Mod: GP | Performed by: PHYSICAL THERAPIST

## 2022-04-25 ENCOUNTER — MYC MEDICAL ADVICE (OUTPATIENT)
Dept: FAMILY MEDICINE | Facility: OTHER | Age: 67
End: 2022-04-25
Payer: COMMERCIAL

## 2022-04-25 DIAGNOSIS — J45.21 EXACERBATION OF INTERMITTENT ASTHMA, UNSPECIFIED ASTHMA SEVERITY: Primary | ICD-10-CM

## 2022-04-25 RX ORDER — PREDNISONE 20 MG/1
20 TABLET ORAL DAILY
Qty: 5 TABLET | Refills: 0 | Status: SHIPPED | OUTPATIENT
Start: 2022-04-25 | End: 2022-04-30

## 2022-04-26 ENCOUNTER — ANCILLARY PROCEDURE (OUTPATIENT)
Dept: GENERAL RADIOLOGY | Facility: OTHER | Age: 67
End: 2022-04-26
Attending: PHYSICIAN ASSISTANT
Payer: COMMERCIAL

## 2022-04-26 ENCOUNTER — OFFICE VISIT (OUTPATIENT)
Dept: FAMILY MEDICINE | Facility: OTHER | Age: 67
End: 2022-04-26
Payer: COMMERCIAL

## 2022-04-26 ENCOUNTER — NURSE TRIAGE (OUTPATIENT)
Dept: FAMILY MEDICINE | Facility: OTHER | Age: 67
End: 2022-04-26
Payer: COMMERCIAL

## 2022-04-26 VITALS
SYSTOLIC BLOOD PRESSURE: 117 MMHG | BODY MASS INDEX: 23.74 KG/M2 | WEIGHT: 129 LBS | TEMPERATURE: 98.6 F | OXYGEN SATURATION: 96 % | HEART RATE: 87 BPM | HEIGHT: 62 IN | DIASTOLIC BLOOD PRESSURE: 60 MMHG

## 2022-04-26 DIAGNOSIS — J01.00 ACUTE NON-RECURRENT MAXILLARY SINUSITIS: ICD-10-CM

## 2022-04-26 DIAGNOSIS — R51.9 HEADACHE DUE TO VIRAL INFECTION: ICD-10-CM

## 2022-04-26 DIAGNOSIS — B34.9 HEADACHE DUE TO VIRAL INFECTION: ICD-10-CM

## 2022-04-26 DIAGNOSIS — R05.9 COUGH: ICD-10-CM

## 2022-04-26 DIAGNOSIS — J18.9 PNEUMONIA OF LEFT LUNG DUE TO INFECTIOUS ORGANISM, UNSPECIFIED PART OF LUNG: Primary | ICD-10-CM

## 2022-04-26 PROCEDURE — 71046 X-RAY EXAM CHEST 2 VIEWS: CPT | Performed by: RADIOLOGY

## 2022-04-26 PROCEDURE — U0003 INFECTIOUS AGENT DETECTION BY NUCLEIC ACID (DNA OR RNA); SEVERE ACUTE RESPIRATORY SYNDROME CORONAVIRUS 2 (SARS-COV-2) (CORONAVIRUS DISEASE [COVID-19]), AMPLIFIED PROBE TECHNIQUE, MAKING USE OF HIGH THROUGHPUT TECHNOLOGIES AS DESCRIBED BY CMS-2020-01-R: HCPCS | Performed by: PHYSICIAN ASSISTANT

## 2022-04-26 PROCEDURE — 99214 OFFICE O/P EST MOD 30 MIN: CPT | Mod: CS | Performed by: PHYSICIAN ASSISTANT

## 2022-04-26 PROCEDURE — U0005 INFEC AGEN DETEC AMPLI PROBE: HCPCS | Performed by: PHYSICIAN ASSISTANT

## 2022-04-26 RX ORDER — CEFDINIR 300 MG/1
300 CAPSULE ORAL 2 TIMES DAILY
Qty: 20 CAPSULE | Refills: 0 | Status: CANCELLED | OUTPATIENT
Start: 2022-04-26 | End: 2022-05-06

## 2022-04-26 RX ORDER — AZITHROMYCIN 250 MG/1
TABLET, FILM COATED ORAL
Qty: 6 TABLET | Refills: 0 | Status: SHIPPED | OUTPATIENT
Start: 2022-04-26 | End: 2022-05-01

## 2022-04-26 RX ORDER — CEFDINIR 300 MG/1
300 CAPSULE ORAL 2 TIMES DAILY
Qty: 20 CAPSULE | Refills: 0 | Status: SHIPPED | OUTPATIENT
Start: 2022-04-26 | End: 2022-05-06

## 2022-04-26 ASSESSMENT — PAIN SCALES - GENERAL: PAINLEVEL: MODERATE PAIN (5)

## 2022-04-26 NOTE — TELEPHONE ENCOUNTER
Provider Response to 2nd Level Triage Request    I have reviewed the RN documentation. My recommendation is:  Face To Face Visit. have her come at 130, she is being squeezed in I cannot address anyother issure and there will likely be a wait   That is 130 today  Caitlin Beasley PA-C

## 2022-04-26 NOTE — PROGRESS NOTES
Assessment & Plan     Cough    - XR Chest 2 Views; Future  - Symptomatic; Yes; 4/23/2022 COVID-19 Virus (Coronavirus) by PCR Nose    Headache due to viral infection    - Symptomatic; Yes; 4/23/2022 COVID-19 Virus (Coronavirus) by PCR Nose    Acute non-recurrent maxillary sinusitis  We will treat with Zithromax  - azithromycin (ZITHROMAX) 250 MG tablet; Take 2 tablets (500 mg) by mouth daily for 1 day, THEN 1 tablet (250 mg) daily for 4 days.    Pneumonia of left lung due to infectious organism, unspecified part of lung  It is not clear if this is a new evolving infection or still residual from her COVID pneumonia she had in December.  Since she is feeling ill and is symptomatic, we will treat her as an acute community-acquired pneumonia with both cefdinir and Zithromax.  She will continue to use her inhalers and prednisone as prescribed  - cefdinir (OMNICEF) 300 MG capsule; Take 1 capsule (300 mg) by mouth 2 times daily for 10 days  Her COVID test is still pending         Return in about 3 days (around 4/29/2022).    Caitlin Beasley PA-C  [facility]    Basilio Zuluaga is a 67 year old who presents for the following health issues     HPI       Patient has sent multiple [employer] messages regarding her symptoms.  Her symptoms began Saturday but worsened Sunday, 3 days ago.  She is feeling very rundown she has had a temp as high as 100 last evening.  She  has a lot of sinus pain and pressure and brown nasal drainage as well as sputum.  She has some flecks of blood in her sputum.  She is having drainage from both sinuses as well as a productive cough.  She did have COVID early in December but has not done a COVID test at this point.  Her inhalers have been helping but she has been having to use them every 2 hours.  Also instead of 1 puff that she normally would use she is requiring 2.  She was started on prednisone yesterday but that has not entirely helped at this point.  She has a lifelong  "history of asthma her asthma specialist previously would just prescribe antibiotics for her over the phone since he had known her since her teens however he has retired.      Review of Systems   As documented above       Objective    /60   Pulse 87   Temp 98.6  F (37  C) (Temporal)   Ht 1.572 m (5' 1.89\")   Wt 58.5 kg (129 lb)   LMP 02/26/2008 (Approximate)   SpO2 96%   BMI 23.68 kg/m    Body mass index is 23.68 kg/m .  Physical Exam   GENERAL: healthy, alert and no distress  EYES: Eyes grossly normal to inspection, PERRL and conjunctivae and sclerae normal  HENT: normal cephalic/atraumatic, ear canals and TM's normal, nose and mouth without ulcers or lesions, nasal mucosa edematous , oropharynx clear, oral mucous membranes moist and sinuses: maxillary tenderness on bilaterally  NECK: no adenopathy, no asymmetry, masses, or scars and thyroid normal to palpation  RESP: lungs clear to auscultation - no rales, rhonchi or wheezes  RESP: She is moving a good amount of air however she does cough with deep inspiration  CV: regular rate and rhythm, normal S1 S2, no S3 or S4, no murmur, click or rub, no peripheral edema and peripheral pulses strong  ABDOMEN: soft, nontender, no hepatosplenomegaly, no masses and bowel sounds normal  MS: no gross musculoskeletal defects noted, no edema    Lab on 04/08/2022   Component Date Value Ref Range Status     SARS-CoV-2 Nucleocapsid Total Ab, S 04/08/2022 Positive (A) Negative Final    SARS-CoV-2 antibodies detected. Results suggest recent   or prior SARS-CoV-2 infection. Correlation with   epidemiologic risk factors and other clinical and   laboratory findings is recommended. Serologic results   should not be used to diagnose recent SARS-CoV-2 infection.   Protective immunity cannot be inferred based on these   results alone. False positive results may occur due to   cross reactivity from pre-existing antibodies or   other possible causes.     -------------------ADDITIONAL " INFORMATION-------------------  Testing was performed using the Roche Elecsys   Anti-SARS-CoV-2 Reagent assay from [employer],   which has received Emergency Use Authorization(EUA)  by the U.S. Food and Drug Administration.     Fact sheets for this Emergency Use Authorization (EUA)   assay can be found at the following links:      For Healthcare Providers:  https://www.fda.gov/media/827731/download  For Patients:  https://www.fda.gov/media/310523/download     Patient's Race 04/08/2022 White   Final     Patient's Ethnicity 04/08/2022 Not    Final       Test Performed by:  [place] [address]  : Lokesh Juarez M.D. Ph.D.; CLIA# [id number]

## 2022-04-26 NOTE — TELEPHONE ENCOUNTER
Called patient and she will be here at 1:30    Patient scheduled.    Jagruti Veronica, WENDYN, RN  [facility]  April 26, 2022

## 2022-04-26 NOTE — TELEPHONE ENCOUNTER
Patient calling back looking for status of work in    José Miguel Rivero, WENDYN, RN, PHN  [facility] ~ Registered Nurse  Clinic Triage ~ [facility]  April 26, 2022

## 2022-04-26 NOTE — TELEPHONE ENCOUNTER
Coughing since Sunday. With the weather changes it has been worse.     No coughing up blood. No breathing issues. But breathing and chest feel heavy. No chest pain, just heavy. Has Asthma and has been triggered more.    Temp was 99.6 today.     Sneezing. Is also having wheezing. Has a headache pain at 5/10.    Took Prednisone yesterday and one today.     Recommended steam shower and honey in warm water. Patient states she will try this and also humidifier.     Patient states this is a reoccurrence and she needs to be seen in-person verses virtually.     Per protocol patient should be seen in-person today due to the wheezing.     Patient states she cannot do virtual or e-visit due to insurance this costs more and cannot afford it.     Requesting to be worked-in today.    Routing to PCP    CHENTE Lozano, RN  [facility]  April 26, 2022      Reason for Disposition    Wheezing is present    Additional Information    Negative: Bluish (or gray) lips or face    Negative: Severe difficulty breathing (e.g., struggling for each breath, speaks in single words)    Negative: Rapid onset of cough and has hives    Negative: Coughing started suddenly after medicine, an allergic food or bee sting    Negative: Difficulty breathing after exposure to flames, smoke, or fumes    Negative: Sounds like a life-threatening emergency to the triager    Negative: Previous asthma attacks and this feels like asthma attack    Negative: Chest pain present when not coughing    Negative: Difficulty breathing    Negative: Passed out (i.e., fainted, collapsed and was not responding)    Negative: Patient sounds very sick or weak to the triager    Negative: Coughed up > 1 tablespoon (15 ml) blood (Exception: blood-tinged sputum)    Negative: Fever > 103 F (39.4 C)    Negative: Fever > 101 F (38.3 C) and over 60 years of age    Negative: Fever > 100.0 F (37.8 C) and has diabetes mellitus or a weak immune system (e.g., HIV  positive, cancer chemotherapy, organ transplant, splenectomy, chronic steroids)    Negative: Fever > 100.0 F (37.8 C) and bedridden (e.g., nursing home patient, stroke, chronic illness, recovering from surgery)    Negative: Increasing ankle swelling    Protocols used: COUGH-A-OH

## 2022-04-27 ENCOUNTER — HOSPITAL ENCOUNTER (OUTPATIENT)
Dept: PHYSICAL THERAPY | Facility: CLINIC | Age: 67
Setting detail: THERAPIES SERIES
Discharge: HOME | End: 2022-04-27
Attending: PHYSICIAN ASSISTANT
Payer: COMMERCIAL

## 2022-04-27 LAB — SARS-COV-2 RNA RESP QL NAA+PROBE: NEGATIVE

## 2022-04-27 PROCEDURE — 97140 MANUAL THERAPY 1/> REGIONS: CPT | Mod: GP | Performed by: PHYSICAL THERAPIST

## 2022-04-27 NOTE — TELEPHONE ENCOUNTER
Routing to provider. Patient has further questions regarding pneumonia.     Hellen Briones BSN, RN

## 2022-05-05 ENCOUNTER — HOSPITAL ENCOUNTER (OUTPATIENT)
Dept: PHYSICAL THERAPY | Facility: CLINIC | Age: 67
Setting detail: THERAPIES SERIES
Discharge: HOME | End: 2022-05-05
Attending: PHYSICIAN ASSISTANT
Payer: COMMERCIAL

## 2022-05-05 PROCEDURE — 97140 MANUAL THERAPY 1/> REGIONS: CPT | Mod: GP | Performed by: PHYSICAL THERAPIST

## 2022-05-10 ENCOUNTER — HOSPITAL ENCOUNTER (OUTPATIENT)
Dept: PHYSICAL THERAPY | Facility: CLINIC | Age: 67
Setting detail: THERAPIES SERIES
Discharge: HOME | End: 2022-05-10
Attending: PHYSICIAN ASSISTANT
Payer: COMMERCIAL

## 2022-05-10 PROCEDURE — 97530 THERAPEUTIC ACTIVITIES: CPT | Mod: GP | Performed by: PHYSICAL THERAPIST

## 2022-05-10 PROCEDURE — 97140 MANUAL THERAPY 1/> REGIONS: CPT | Mod: GP | Performed by: PHYSICAL THERAPIST

## 2022-05-10 PROCEDURE — 97110 THERAPEUTIC EXERCISES: CPT | Mod: GP | Performed by: PHYSICAL THERAPIST

## 2022-05-18 ENCOUNTER — HOSPITAL ENCOUNTER (OUTPATIENT)
Dept: PHYSICAL THERAPY | Facility: CLINIC | Age: 67
Setting detail: THERAPIES SERIES
Discharge: HOME | End: 2022-05-18
Attending: PHYSICIAN ASSISTANT
Payer: COMMERCIAL

## 2022-05-18 PROCEDURE — 97140 MANUAL THERAPY 1/> REGIONS: CPT | Mod: GP | Performed by: PHYSICAL THERAPIST

## 2022-05-25 ENCOUNTER — TELEPHONE (OUTPATIENT)
Dept: FAMILY MEDICINE | Facility: OTHER | Age: 67
End: 2022-05-25
Payer: COMMERCIAL

## 2022-05-25 ENCOUNTER — HOSPITAL ENCOUNTER (OUTPATIENT)
Dept: PHYSICAL THERAPY | Facility: CLINIC | Age: 67
Setting detail: THERAPIES SERIES
Discharge: HOME | End: 2022-05-25
Attending: PHYSICIAN ASSISTANT
Payer: COMMERCIAL

## 2022-05-25 DIAGNOSIS — J45.40 MODERATE PERSISTENT ASTHMA, UNSPECIFIED WHETHER COMPLICATED: Primary | ICD-10-CM

## 2022-05-25 PROCEDURE — 97140 MANUAL THERAPY 1/> REGIONS: CPT | Mod: GP | Performed by: PHYSICAL THERAPIST

## 2022-05-25 NOTE — TELEPHONE ENCOUNTER
----- Message from Leana James, PT sent at 5/25/2022  9:27 AM CDT -----  Regarding: referral to pulmonary rehab  Margareth Manriquez is here in PT and we both feel she is improving with the spine pain, upper shoulder pain and LBP.     However her SOB and ability to increase activity is very tough. I it possible to get her an order for pulmonary rehab so she can work with them on this and is safely monitored. Doing this in PT is very tough because we cannot monitor other than O2 SAT and off and on BP.      THANKS    Cathy

## 2022-05-25 NOTE — PROGRESS NOTES
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 Also c/o neck stiffness yet and leg weakness.    Objective Measurements:  Objective Measure: UMESH  Details: 22.22%  Objective Measure: neck pain  Details: #4       Goals:  Goal Identifier 1   Goal Description Patient LBP/buttock pain is 50% better and seen by a 50% increase in function with a UMESH score of 33%   Target Date 22   Date Met  22   Progress (detail required for progress note): goal met     Goal Identifier 2   Goal Description Patient has 20% increase in AROM of the neck with no pain and no referral pain into the shoulder region   Target Date 22   Date Met      Progress (detail required for progress note): Neck pain is 20% better with pain but still stiff and ROM is 50% of normal with rotation, cont goal     Goal Identifier 3   Goal Description Patient is able to walk from the house to the [facility] about 600 ft without leg fatigue and weakness and can safely get on and off her jet ski   Target Date 22   Date Met      Progress (detail required for progress note): new goal         Plan:  Continue therapy per current plan of care.    Discharge:  No    Thank you for the referral,            Leana James PT

## 2022-06-01 ENCOUNTER — HOSPITAL ENCOUNTER (OUTPATIENT)
Dept: PHYSICAL THERAPY | Facility: CLINIC | Age: 67
Setting detail: THERAPIES SERIES
Discharge: HOME | End: 2022-06-01
Attending: PHYSICIAN ASSISTANT
Payer: COMMERCIAL

## 2022-06-01 PROCEDURE — 97140 MANUAL THERAPY 1/> REGIONS: CPT | Mod: GP | Performed by: PHYSICAL THERAPIST

## 2022-06-08 ENCOUNTER — HOSPITAL ENCOUNTER (OUTPATIENT)
Dept: PHYSICAL THERAPY | Facility: CLINIC | Age: 67
Setting detail: THERAPIES SERIES
Discharge: HOME | End: 2022-06-08
Attending: PHYSICIAN ASSISTANT
Payer: COMMERCIAL

## 2022-06-08 PROCEDURE — 97140 MANUAL THERAPY 1/> REGIONS: CPT | Mod: GP | Performed by: PHYSICAL THERAPIST

## 2022-06-15 ENCOUNTER — HOSPITAL ENCOUNTER (OUTPATIENT)
Dept: PHYSICAL THERAPY | Facility: CLINIC | Age: 67
Setting detail: THERAPIES SERIES
Discharge: HOME | End: 2022-06-15
Attending: PHYSICIAN ASSISTANT
Payer: COMMERCIAL

## 2022-06-15 PROCEDURE — 97140 MANUAL THERAPY 1/> REGIONS: CPT | Mod: GP | Performed by: PHYSICAL THERAPIST

## 2022-06-22 ENCOUNTER — HOSPITAL ENCOUNTER (OUTPATIENT)
Dept: CARDIAC REHAB | Facility: CLINIC | Age: 67
Discharge: HOME | End: 2022-06-22
Attending: PHYSICIAN ASSISTANT
Payer: COMMERCIAL

## 2022-06-22 DIAGNOSIS — J45.40 MODERATE PERSISTENT ASTHMA, UNSPECIFIED WHETHER COMPLICATED: ICD-10-CM

## 2022-06-22 PROCEDURE — 94625 PHY/QHP OP PULM RHB W/O MNTR: CPT

## 2022-06-28 ENCOUNTER — HOSPITAL ENCOUNTER (OUTPATIENT)
Dept: CARDIAC REHAB | Facility: CLINIC | Age: 67
Discharge: HOME | End: 2022-06-28
Attending: PHYSICIAN ASSISTANT
Payer: COMMERCIAL

## 2022-06-28 PROCEDURE — 94625 PHY/QHP OP PULM RHB W/O MNTR: CPT

## 2022-06-29 ENCOUNTER — HOSPITAL ENCOUNTER (OUTPATIENT)
Dept: PHYSICAL THERAPY | Facility: CLINIC | Age: 67
Setting detail: THERAPIES SERIES
Discharge: HOME | End: 2022-06-29
Attending: PHYSICIAN ASSISTANT
Payer: COMMERCIAL

## 2022-06-29 PROCEDURE — 97140 MANUAL THERAPY 1/> REGIONS: CPT | Mod: GP | Performed by: PHYSICAL THERAPIST

## 2022-06-29 PROCEDURE — 97110 THERAPEUTIC EXERCISES: CPT | Mod: GP | Performed by: PHYSICAL THERAPIST

## 2022-06-30 ENCOUNTER — HOSPITAL ENCOUNTER (OUTPATIENT)
Dept: CARDIAC REHAB | Facility: CLINIC | Age: 67
Discharge: HOME | End: 2022-06-30
Attending: PHYSICIAN ASSISTANT
Payer: COMMERCIAL

## 2022-06-30 PROCEDURE — G0239 OTH RESP PROC, GROUP: HCPCS | Performed by: REHABILITATION PRACTITIONER

## 2022-07-05 ENCOUNTER — HOSPITAL ENCOUNTER (OUTPATIENT)
Dept: CARDIAC REHAB | Facility: CLINIC | Age: 67
Discharge: HOME | End: 2022-07-05
Attending: PHYSICIAN ASSISTANT
Payer: COMMERCIAL

## 2022-07-05 PROCEDURE — 94625 PHY/QHP OP PULM RHB W/O MNTR: CPT | Performed by: REHABILITATION PRACTITIONER

## 2022-07-06 ENCOUNTER — MYC MEDICAL ADVICE (OUTPATIENT)
Dept: FAMILY MEDICINE | Facility: CLINIC | Age: 67
End: 2022-07-06

## 2022-07-12 ENCOUNTER — HOSPITAL ENCOUNTER (OUTPATIENT)
Dept: PHYSICAL THERAPY | Facility: CLINIC | Age: 67
Setting detail: THERAPIES SERIES
Discharge: HOME | End: 2022-07-12
Attending: PHYSICIAN ASSISTANT
Payer: COMMERCIAL

## 2022-07-12 PROCEDURE — 97110 THERAPEUTIC EXERCISES: CPT | Mod: GP | Performed by: PHYSICAL THERAPIST

## 2022-07-12 PROCEDURE — 97140 MANUAL THERAPY 1/> REGIONS: CPT | Mod: GP | Performed by: PHYSICAL THERAPIST

## 2022-07-12 NOTE — PROGRESS NOTES
[facility]    OUTPATIENT PHYSICAL THERAPY  PLAN OF TREATMENT FOR OUTPATIENT REHABILITATION AND PROGRESS NOTE           Patient's Last Name, First Name, Margareth Gamez Date of Birth  1955   Provider's Name  [facility] Medical Record No.  [id number]    Onset Date  3/8/22 Start of Care Date  3/22/22   Type:     _X_PT   ___OT   ___SLP Medical Diagnosis  Hip pain right, lumbosacral pain, acute right sided thoracic pain   PT Diagnosis  All resolved except the right sided pain, right shoulder pain Plan of Treatment  Frequency/Duration: 1x per wk 90 days  Certification date from 6/19/22 to 9/19/22     Goals:  Goal Identifier 1   Goal Description Patient LBP/buttock pain is 50% better and seen by a 50% increase in function with a UMESH score of 33%   Target Date 06/19/22   Date Met  05/25/22   Progress (detail required for progress note): goal met     Goal Identifier 2   Goal Description Patient has 20% increase in AROM of the neck with no pain and no referral pain into the shoulder region   Target Date 06/19/22   Date Met  07/12/22   Progress (detail required for progress note):       Goal Identifier 3   Goal Description Patient is able to walk from the house to the [facility] about 600 ft without leg fatigue and weakness and can safely get on and off her jet ski   Target Date 06/19/22   Date Met  06/29/22   Progress (detail required for progress note): new goal     Goal Identifier 4   Goal Description Patient is able to sleep on her right side with no right shoulder pain   Target Date 09/19/22   Date Met      Progress (detail required for progress note): new goal           Beginning/End Dates of Progress Note Reporting Period:  5/25/22 to 7/12/22    Progress Toward Goals:   Progress this reporting period: Patient has met many goal see above, no longer LBP and  trunk pain. However since pt had Covid she she had right shoulder pain and is unable to lay on the right side. The AC joint is tight and poor scapular humeral rhythm. .    Client Self (Subjective) Report for Progress Note Reporting Period: Last wk was hurting and thinks she had a virus bug. pulmonary rehab is complete. Still cannot sleep on the right side and the shoulder seems tight all the time.        Objective Measurements:   Objective Measure: Shoulder ROM  Details: WZ=820, ER=80, IR=WNL    Objective Measure: neck pain  Details: #2    Objective Measure: Ability to lay on the right side  Details: unable                              I CERTIFY THE NEED FOR THESE SERVICES FURNISHED UNDER        THIS PLAN OF TREATMENT AND WHILE UNDER MY CARE     (Physician co-signature of this document indicates review and certification of the therapy plan).                Referring Provider: Caitlin James, PT

## 2022-07-15 ENCOUNTER — OFFICE VISIT (OUTPATIENT)
Dept: FAMILY MEDICINE | Facility: OTHER | Age: 67
End: 2022-07-15
Payer: COMMERCIAL

## 2022-07-15 ENCOUNTER — ALLIED HEALTH/NURSE VISIT (OUTPATIENT)
Dept: FAMILY MEDICINE | Facility: OTHER | Age: 67
End: 2022-07-15

## 2022-07-15 VITALS
DIASTOLIC BLOOD PRESSURE: 76 MMHG | WEIGHT: 126 LBS | RESPIRATION RATE: 16 BRPM | OXYGEN SATURATION: 100 % | SYSTOLIC BLOOD PRESSURE: 136 MMHG | TEMPERATURE: 98.4 F | BODY MASS INDEX: 23.13 KG/M2 | HEART RATE: 82 BPM

## 2022-07-15 DIAGNOSIS — L03.311 CELLULITIS OF RIGHT ABDOMINAL WALL: Primary | ICD-10-CM

## 2022-07-15 DIAGNOSIS — Z78.9 TRIAGE ASSESSMENT CLASS 3, NONURGENT: Primary | ICD-10-CM

## 2022-07-15 PROCEDURE — 99207 PR NO CHARGE NURSE ONLY: CPT

## 2022-07-15 PROCEDURE — 99213 OFFICE O/P EST LOW 20 MIN: CPT | Performed by: PHYSICIAN ASSISTANT

## 2022-07-15 RX ORDER — CEPHALEXIN 500 MG/1
500 CAPSULE ORAL 3 TIMES DAILY
Qty: 30 CAPSULE | Refills: 0 | Status: SHIPPED | OUTPATIENT
Start: 2022-07-15 | End: 2022-07-25

## 2022-07-15 ASSESSMENT — PAIN SCALES - GENERAL: PAINLEVEL: MILD PAIN (2)

## 2022-07-15 NOTE — PROGRESS NOTES
See provider visit.  Possible cellulitis from unknown bite.    José Miguel Rivero, WENDYN, RN, PHN  [facility] ~ Registered Nurse  Clinic Triage ~ [place] [facility]  July 15, 2022

## 2022-07-15 NOTE — PROGRESS NOTES
Assessment & Plan     Cellulitis of right abdominal wall  Right lateral abdominal wall at the belt line thought to be due to bug bite.  Treat and observe  - cephALEXin (KEFLEX) 500 MG capsule; Take 1 capsule (500 mg) by mouth 3 times daily for 10 days     No follow-ups on file.    Nathaniel Walton PA-C  [facility] JHONY Zuluaga is a 67 year old, presenting for the following health issues:  Insect Bite      HPI     Patient walk into clinic with concern of bug bite on right hip.  Thought it may have happened wednesday when she was in the woods and gardening. More red today and warm to touch.  approx 3 x 2 with 1 inch pink around it.      Huddled with provider.  Ok to add.  MA to complete rooming.    José Miguel Rivero, WENDYN, RN, PHN  [facility] ~ Registered Nurse  Clinic Triage ~ [place] [facility]  July 15, 2022    Review of Systems   Constitutional, HEENT, cardiovascular, pulmonary, GI, , musculoskeletal, neuro, skin, endocrine and psych systems are negative, except as otherwise noted.      Objective    /76 (BP Location: Right arm, Patient Position: Chair, Cuff Size: Adult Regular)   Pulse 82   Temp 98.4  F (36.9  C) (Temporal)   Resp 16   Wt 57.2 kg (126 lb)   LMP 02/26/2008 (Approximate)   SpO2 100%   BMI 23.13 kg/m    Body mass index is 23.13 kg/m .  Physical Exam   GENERAL: healthy, alert and no distress  MS: no gross musculoskeletal defects noted, no edema  SKIN: no suspicious lesions or rashes to exposed visible skin with exception of an area of cellulitis to the right lower abdominal wall laterally at the belt line.  Is approximately 3 cm wide by 10 cm long and running in a horizontal orientation in this region.  There is a fair amount of induration.  I do not see any evidence of a bug bite at this point time no central necrosis is noted and no bull's-eye type rash is present at this point.  She denies having felt a sting or bite in this region.  She denies  any true concerns related to tick bite.  NEURO: Normal strength and tone, mentation intact and speech normal  PSYCH: mentation appears normal, affect normal/bright    No results found. However, due to the size of the patient record, not all encounters were searched. Please check Results Review for a complete set of results.              .  ..

## 2022-07-18 ENCOUNTER — MYC MEDICAL ADVICE (OUTPATIENT)
Dept: FAMILY MEDICINE | Facility: OTHER | Age: 67
End: 2022-07-18

## 2022-07-18 NOTE — TELEPHONE ENCOUNTER
See [employer] message and media picture.    Quyen Tello RN  [facility] ~ Registered Nurse  Clinic Triage ~ [facility]  July 18, 2022

## 2022-07-26 ENCOUNTER — MYC MEDICAL ADVICE (OUTPATIENT)
Dept: FAMILY MEDICINE | Facility: CLINIC | Age: 67
End: 2022-07-26

## 2022-07-26 ASSESSMENT — PATIENT HEALTH QUESTIONNAIRE - PHQ9
SUM OF ALL RESPONSES TO PHQ QUESTIONS 1-9: 4
SUM OF ALL RESPONSES TO PHQ QUESTIONS 1-9: 4

## 2022-07-27 ENCOUNTER — HOSPITAL ENCOUNTER (OUTPATIENT)
Dept: PHYSICAL THERAPY | Facility: CLINIC | Age: 67
Setting detail: THERAPIES SERIES
Discharge: HOME | End: 2022-07-27
Attending: PHYSICIAN ASSISTANT
Payer: COMMERCIAL

## 2022-07-27 ENCOUNTER — VIRTUAL VISIT (OUTPATIENT)
Dept: FAMILY MEDICINE | Facility: CLINIC | Age: 67
End: 2022-07-27
Payer: COMMERCIAL

## 2022-07-27 VITALS
TEMPERATURE: 98.1 F | OXYGEN SATURATION: 98 % | DIASTOLIC BLOOD PRESSURE: 80 MMHG | SYSTOLIC BLOOD PRESSURE: 132 MMHG | BODY MASS INDEX: 23.37 KG/M2 | RESPIRATION RATE: 12 BRPM | HEART RATE: 87 BPM | WEIGHT: 127 LBS | HEIGHT: 62 IN

## 2022-07-27 DIAGNOSIS — R41.840 POOR CONCENTRATION: ICD-10-CM

## 2022-07-27 DIAGNOSIS — M54.2 CERVICALGIA: Primary | ICD-10-CM

## 2022-07-27 DIAGNOSIS — M79.675 PAIN OF TOE OF LEFT FOOT: ICD-10-CM

## 2022-07-27 DIAGNOSIS — F33.0 MAJOR DEPRESSIVE DISORDER, RECURRENT EPISODE, MILD (H): ICD-10-CM

## 2022-07-27 DIAGNOSIS — M25.511 RIGHT SHOULDER PAIN, UNSPECIFIED CHRONICITY: ICD-10-CM

## 2022-07-27 PROCEDURE — 97140 MANUAL THERAPY 1/> REGIONS: CPT | Mod: GP | Performed by: PHYSICAL THERAPIST

## 2022-07-27 PROCEDURE — 99214 OFFICE O/P EST MOD 30 MIN: CPT | Performed by: PHYSICIAN ASSISTANT

## 2022-07-27 RX ORDER — BUPROPION HYDROCHLORIDE 150 MG/1
TABLET ORAL
Qty: 90 TABLET | Refills: 1 | Status: SHIPPED | OUTPATIENT
Start: 2022-07-27 | End: 2022-10-06

## 2022-07-27 ASSESSMENT — ASTHMA QUESTIONNAIRES
QUESTION_2 LAST FOUR WEEKS HOW OFTEN HAVE YOU HAD SHORTNESS OF BREATH: ONCE OR TWICE A WEEK
QUESTION_5 LAST FOUR WEEKS HOW WOULD YOU RATE YOUR ASTHMA CONTROL: COMPLETELY CONTROLLED
ACT_TOTALSCORE: 23
QUESTION_4 LAST FOUR WEEKS HOW OFTEN HAVE YOU USED YOUR RESCUE INHALER OR NEBULIZER MEDICATION (SUCH AS ALBUTEROL): ONCE A WEEK OR LESS
QUESTION_3 LAST FOUR WEEKS HOW OFTEN DID YOUR ASTHMA SYMPTOMS (WHEEZING, COUGHING, SHORTNESS OF BREATH, CHEST TIGHTNESS OR PAIN) WAKE YOU UP AT NIGHT OR EARLIER THAN USUAL IN THE MORNING: NOT AT ALL
QUESTION_1 LAST FOUR WEEKS HOW MUCH OF THE TIME DID YOUR ASTHMA KEEP YOU FROM GETTING AS MUCH DONE AT WORK, SCHOOL OR AT HOME: NONE OF THE TIME
ACT_TOTALSCORE: 23

## 2022-07-27 ASSESSMENT — PAIN SCALES - GENERAL: PAINLEVEL: MODERATE PAIN (4)

## 2022-07-27 ASSESSMENT — PATIENT HEALTH QUESTIONNAIRE - PHQ9
SUM OF ALL RESPONSES TO PHQ QUESTIONS 1-9: 4
10. IF YOU CHECKED OFF ANY PROBLEMS, HOW DIFFICULT HAVE THESE PROBLEMS MADE IT FOR YOU TO DO YOUR WORK, TAKE CARE OF THINGS AT HOME, OR GET ALONG WITH OTHER PEOPLE: SOMEWHAT DIFFICULT

## 2022-07-27 NOTE — PROGRESS NOTES
"Margareth is a 67 year old who is being evaluated via a billable telephone visit.      What phone number would you like to be contacted at? ***  How would you like to obtain your AVS? {AVS Preference:940847}    {PROVIDER CHARTING PREFERENCE:720200}    Subjective   Margareth is a 67 year old{ACCOMPANIED BY STATEMENT (Optional):909094}, presenting for the following health issues:  No chief complaint on file.      History of Present Illness       Reason for visit:  Right arm/shoulder pain  Symptom onset:  More than a month  Symptoms include:  Tightness, sore  Symptom intensity:  Moderate  Symptom progression:  Staying the same  Had these symptoms before:  No  What makes it worse:  Sleeping on my right side  What makes it better:  PT helps    She eats 2-3 servings of fruits and vegetables daily.She consumes 1 sweetened beverage(s) daily.She exercises with enough effort to increase her heart rate 20 to 29 minutes per day.  She exercises with enough effort to increase her heart rate 4 days per week.   She is taking medications regularly.    Today's PHQ-9         PHQ-9 Total Score: 4    PHQ-9 Q9 Thoughts of better off dead/self-harm past 2 weeks :   Not at all    How difficult have these problems made it for you to do your work, take care of things at home, or get along with other people: Somewhat difficult       {SUPERLIST (Optional):543646}  {additonal problems for provider to add (Optional):493738}    Review of Systems   {ROS COMP (Optional):694151}      Objective           Vitals:  No vitals were obtained today due to virtual visit.    Physical Exam   {GENERAL APPEARANCE:50::\"healthy\",\"alert\",\"no distress\"}  PSYCH: Alert and oriented times 3; coherent speech, normal   rate and volume, able to articulate logical thoughts, able   to abstract reason, no tangential thoughts, no hallucinations   or delusions  Her affect is { :0822941::\"normal\"}  RESP: No cough, no audible wheezing, able to talk in full sentences  Remainder of " exam unable to be completed due to telephone visits    {Diagnostic Test Results (Optional):558390}    {AMBULATORY ATTESTATION (Optional):606526}        Phone call duration: *** minutes    .  ..

## 2022-07-27 NOTE — PROGRESS NOTES
Assessment & Plan     Cervicalgia  Will consider MRI of her cervical spine, physical therapy is helpful but is not improving her symptoms as we had hoped  - XR Cervical Spine 2/3 Views; Future    Right shoulder pain, unspecified chronicity  Questioning radicular pain into her right shoulder, she does have some arthritic changes noted certainly but she could have both issues  - XR Shoulder Right G/E 3 Views; Future    Poor concentration  For now we will continue current dosage, could consider increasing dosage to 300 mg  - buPROPion (WELLBUTRIN XL) 150 MG 24 hr tablet; TAKE 1 TABLET EVERY MORNING    Major depressive disorder, recurrent episode, mild (H)  Stable, continue current meds  - buPROPion (WELLBUTRIN XL) 150 MG 24 hr tablet; TAKE 1 TABLET EVERY MORNING    Pain of toe of left foot  Patient continues to have issues with her toe, we will refer to podiatry  - Orthopedic  Referral; Future    Hematomas of left forearm they are very tiny, she will continue to observe and follow-up at once if they seem to be worsening    No follow-ups on file.    Caitlin Beasley PA-C  [facility] JAMES MOY Peter is a 67 year old female who presents to clinic today for the following health issues accompanied by her :    Shoulder Pain    Arm Pain    History of Present Illness       Reason for visit:  Right arm/shoulder pain  Symptom onset:  More than a month  Symptoms include:  Tightness, sore  Symptom intensity:  Moderate  Symptom progression:  Staying the same  Had these symptoms before:  No  What makes it worse:  Sleeping on my right side  What makes it better:  PT helps    She eats 2-3 servings of fruits and vegetables daily.She consumes 1 sweetened beverage(s) daily.She exercises with enough effort to increase her heart rate 20 to 29 minutes per day.  She exercises with enough effort to increase her heart rate 4 days per week.   She is taking medications regularly.    Today's PHQ-9    "      PHQ-9 Total Score: 4    PHQ-9 Q9 Thoughts of better off dead/self-harm past 2 weeks :   Not at all    How difficult have these problems made it for you to do your work, take care of things at home, or get along with other people: Somewhat difficult  She has been working with physical therapy for neck pain and shoulder pain.  Her right shoulder has caused a lot of pain.  It is very tight and her physical therapist is wondering if she is not having a radicular, referred pain from her neck.  She has significant arthritic changes in her neck.  She also has a lot of tightness in the musculature of her upper chest.  She is here today to consider x-rays and possibly MRI.    She bumped her left forearm recently.  She had a significant bruise which has since healed.  She has several small bumps in the region of the bruise and would like me to check this.  One bump is already gone away.    Several years ago, she had surgery to correct hammertoe on her left second toe.  She continues to have trouble with it.  She wonders what she should do at this point.          Review of Systems   As documented above       Objective    /80   Pulse 87   Temp 98.1  F (36.7  C) (Temporal)   Resp 12   Ht 1.572 m (5' 1.89\")   Wt 57.6 kg (127 lb)   LMP 02/26/2008 (Approximate)   SpO2 98%   BMI 23.31 kg/m    Body mass index is 23.31 kg/m .  Physical Exam   GENERAL: healthy, alert and no distress  NECK: Range of motion limited by pain she is tender to palpation of her cervical musculature no specific bony point tenderness  RESP: lungs clear to auscultation - no rales, rhonchi or wheezes  CV: regular rate and rhythm, normal S1 S2, no S3 or S4, no murmur, click or rub, no peripheral edema and peripheral pulses strong  MS: Right shoulder she is tender to palpation over the anterior aspect of the shoulder in the region of the subacromial bursa, she is also tender at the AC joint  SKIN: Left dorsal forearm, she has 2 small " subcutaneous masses that are approximately 2 to 3 mm each overlying skin is normal  PSYCH: mentation appears normal, affect normal/bright    Results for orders placed or performed in visit on 07/27/22   XR Shoulder Right G/E 3 Views     Status: None    Narrative    RIGHT SHOULDER THREE OR MORE VIEWS   7/27/2022 2:45 PM     HISTORY:  Right shoulder pain, unspecified chronicity.    COMPARISON: Radiographs from 9/7/2018.      Impression    IMPRESSION: Minimal osteoarthrosis of the AC joint and severe  osteoarthrosis of the glenohumeral joint again noted. Diffuse bone  demineralization. There is no evidence of fracture. No subluxation or  dislocation.    NARESH PERKINS MD         SYSTEM ID:  [id number]   Results for orders placed or performed in visit on 07/27/22   XR Cervical Spine 2/3 Views     Status: None    Narrative    CERVICAL SPINE TWO - THREE VIEWS   7/27/2022 2:46 PM     HISTORY: Chronic neck pain; Cervicalgia.    COMPARISON: CT cervical spine dated 1/3/2019, Cervical spine x-rays  dated 1/2/2019.    FINDINGS:  The cervical spine is visualized from the craniocervical  junction through the  cervical thoracic junction on the lateral view.  Degenerative changes are seen throughout the cervical spine with  significant disc height loss seen from C2 through at least T1. Grade 2  anterolisthesis of C4 on C5 measures approximately 0.4 cm which may be  slightly more prominent than on the prior CT examination and is  slightly more prominent than on the prior plain films dated 1/2/2019.  This is likely due to facet arthropathy. Grade 2 anterolisthesis of C7  on T1 measures approximately 0.6 cm and is similar to the prior study.  Mild retrolisthesis of C6 on C7 measures less than 0.2 cm and grade 1  anterolisthesis of C5 on C6 also measures less than 0.2 cm.  Prevertebral soft tissues are grossly within normal limits. No obvious  acute fracture or malalignment is otherwise seen. Facet arthropathy is  seen throughout the  cervical spine. Visualized portions of the lung  apices are clear. The lateral masses of C1 appear to be slightly  subluxed to the right as compared to the lateral masses of C2 but they  appear symmetric.      Impression    IMPRESSION:  1. Multilevel degenerative changes of the cervical spine are again  noted. Grade 2 anterolisthesis of C7 on T1 is again noted and is  similar to the prior CT and x-rays of the cervical spine from 2019.  Grade 2 anterolisthesis of C4 on C5 appears to have progressed since  the prior studies. These are likely due to facet arthropathy. Further  evaluation with MRI cervical spine may be helpful.  2. No definite acute fracture is seen. No other evidence for  malalignment.    NANDINI TAFOYA MD         SYSTEM ID:  [id number]

## 2022-07-27 NOTE — TELEPHONE ENCOUNTER
Reji Zuluaga,    This is scheduled as in-person visit. I will go ahead and change this to a telephone call. If you prefer a video visit then let us know and I can change that. Otherwise you are all set for a telephone call between 1:40-2:00 pm with Caitlin Beasley.     Let us know if you need anything else.     Take Care,  WENDY LozanoN, RN  Dk/Samuel oTm [facility]

## 2022-08-01 ENCOUNTER — HOSPITAL ENCOUNTER (OUTPATIENT)
Dept: MRI IMAGING | Facility: CLINIC | Age: 67
Discharge: HOME | End: 2022-08-01
Attending: PHYSICIAN ASSISTANT | Admitting: PHYSICIAN ASSISTANT
Payer: COMMERCIAL

## 2022-08-01 DIAGNOSIS — M50.30 DDD (DEGENERATIVE DISC DISEASE), CERVICAL: ICD-10-CM

## 2022-08-01 DIAGNOSIS — M54.12 CERVICAL RADICULOPATHY: ICD-10-CM

## 2022-08-01 PROCEDURE — 72141 MRI NECK SPINE W/O DYE: CPT

## 2022-08-03 ENCOUNTER — MYC MEDICAL ADVICE (OUTPATIENT)
Dept: FAMILY MEDICINE | Facility: CLINIC | Age: 67
End: 2022-08-03

## 2022-08-03 ENCOUNTER — HOSPITAL ENCOUNTER (OUTPATIENT)
Dept: PHYSICAL THERAPY | Facility: CLINIC | Age: 67
Setting detail: THERAPIES SERIES
Discharge: HOME | End: 2022-08-03
Attending: PHYSICIAN ASSISTANT
Payer: COMMERCIAL

## 2022-08-03 PROCEDURE — 97110 THERAPEUTIC EXERCISES: CPT | Mod: GP | Performed by: PHYSICAL THERAPIST

## 2022-08-10 ENCOUNTER — HOSPITAL ENCOUNTER (OUTPATIENT)
Dept: PHYSICAL THERAPY | Facility: CLINIC | Age: 67
Setting detail: THERAPIES SERIES
Discharge: HOME | End: 2022-08-10
Attending: PHYSICIAN ASSISTANT
Payer: COMMERCIAL

## 2022-08-10 PROCEDURE — 97140 MANUAL THERAPY 1/> REGIONS: CPT | Mod: GP | Performed by: PHYSICAL THERAPIST

## 2022-08-16 DIAGNOSIS — G47.09 OTHER INSOMNIA: ICD-10-CM

## 2022-08-17 RX ORDER — AMITRIPTYLINE HYDROCHLORIDE 10 MG/1
TABLET ORAL
Qty: 270 TABLET | Refills: 4 | Status: SHIPPED | OUTPATIENT
Start: 2022-08-17 | End: 2023-08-28

## 2022-08-18 ENCOUNTER — HOSPITAL ENCOUNTER (OUTPATIENT)
Dept: PHYSICAL THERAPY | Facility: CLINIC | Age: 67
Setting detail: THERAPIES SERIES
Discharge: HOME | End: 2022-08-18
Attending: PHYSICIAN ASSISTANT
Payer: COMMERCIAL

## 2022-08-18 PROCEDURE — 97140 MANUAL THERAPY 1/> REGIONS: CPT | Mod: GP | Performed by: PHYSICAL THERAPIST

## 2022-08-30 ENCOUNTER — HOSPITAL ENCOUNTER (OUTPATIENT)
Dept: PHYSICAL THERAPY | Facility: CLINIC | Age: 67
Setting detail: THERAPIES SERIES
Discharge: HOME | End: 2022-08-30
Attending: PHYSICIAN ASSISTANT
Payer: COMMERCIAL

## 2022-08-30 PROCEDURE — 97140 MANUAL THERAPY 1/> REGIONS: CPT | Mod: GP | Performed by: PHYSICAL THERAPIST

## 2022-09-01 ENCOUNTER — MYC MEDICAL ADVICE (OUTPATIENT)
Dept: FAMILY MEDICINE | Facility: CLINIC | Age: 67
End: 2022-09-01

## 2022-09-01 DIAGNOSIS — M50.30 DDD (DEGENERATIVE DISC DISEASE), CERVICAL: ICD-10-CM

## 2022-09-01 DIAGNOSIS — M54.12 CERVICAL RADICULOPATHY: Primary | ICD-10-CM

## 2022-09-07 ENCOUNTER — HOSPITAL ENCOUNTER (OUTPATIENT)
Dept: PHYSICAL THERAPY | Facility: CLINIC | Age: 67
Setting detail: THERAPIES SERIES
Discharge: HOME | End: 2022-09-07
Attending: PHYSICIAN ASSISTANT
Payer: COMMERCIAL

## 2022-09-07 PROCEDURE — 97140 MANUAL THERAPY 1/> REGIONS: CPT | Mod: GP | Performed by: PHYSICAL THERAPIST

## 2022-09-12 ENCOUNTER — OFFICE VISIT (OUTPATIENT)
Dept: NEUROSURGERY | Facility: CLINIC | Age: 67
End: 2022-09-12
Attending: PHYSICIAN ASSISTANT
Payer: COMMERCIAL

## 2022-09-12 DIAGNOSIS — M50.30 DDD (DEGENERATIVE DISC DISEASE), CERVICAL: ICD-10-CM

## 2022-09-12 DIAGNOSIS — M54.12 CERVICAL RADICULOPATHY: ICD-10-CM

## 2022-09-12 PROCEDURE — 99213 OFFICE O/P EST LOW 20 MIN: CPT | Performed by: PHYSICIAN ASSISTANT

## 2022-09-12 NOTE — LETTER
9/12/2022         RE: Margareth Green  [address] [facility] [address]        Dear Colleague,    Thank you for referring your patient, Margareth Green, to the [facility] [place]. Please see a copy of my visit note below.    Dr. Ethan Cardenas  [facility]  Neurosurgery Clinic Visit      CC: right side neck pain, right arm pain    Primary care Provider: Caitlin Beasley    Referring Provider:  Caitlin Beasley PA-C      Reason For Visit:   I was asked to consult on the patient for neck pain, arm pain.      HPI: Margareth Green is a 67 year old female who presents for evaluation of her chief complaint of right sided neck and arm pain, as well as some numbness in the outer aspect of her right lower arm and fifth finger.  She has a lot of trouble sleeping on her right side.  She had previously been having trouble sleeping on her left side, so has really had bilateral symptoms.  She has been working with physical therapy, which does provide her with good symptomatic improvement.  She has not had any injections for her neck, but has had them years ago in her lower back.  No focal deficits that she has noticed.  No problems with fine motor control.  No bowel or bladder issues, or any problems with balance or coordination.    Past Medical History:   Diagnosis Date     Abnormal Papanicolaou smear of vagina and vaginal HPV 12/02    ASCUS (HPV neg). 3/03 WNL but no transitional zome - repeat 3months     Allergic rhinitis, cause unspecified      Arthritis      Backache, unspecified      Carpal tunnel syndrome     s/p repair     Cervicalgia      Chronic kidney disease, stage 3 (H) 7/14/2021     Depressive disorder      Depressive disorder, not elsewhere classified     doing well on Wellbutrin     Derangement of TMJ (temporomandibular joint) 3/2010    internal derangement     Dermatophytosis of nail      Diaphragmatic hernia without mention of obstruction or gangrene  9/07    small hiatal hernia     Diffuse cystic mastopathy 2000    FCBD     Esophageal reflux 4/2005     Hammertoe 10/09    see podiatry consult     Headache(784.0)     Muscle contrature headaches     Intramural leiomyoma of uterus 7/2007    on US     Irregular menstrual cycle     better with BCP -also PMS is better with BCP     IRRITABLE COLON 2/18/2008     Malaise and fatigue      Menopause     lmp 2/2008     Mild persistent asthma      Mole (skin) 2003    9 x 6 mm rt ant chest     Motion sickness      Neuroma 11/2008    lt foot     Osteopenia 12/2008     Other malaise and fatigue 4/2005    stress     Personal history of tobacco use, presenting hazards to health     Quit in 1998     Solitary cyst of breast 12/2005    lt 6 oclock     Stenosis of lumbosacral spine     l4-5 and l5-s1     Symptomatic menopausal or female climacteric states 12/2002    perimenopausal. LMP 2/2008     Tachycardia, paroxysmal (H) 11/24/2020       Past Medical History reviewed with patient during visit.    Past Surgical History:   Procedure Laterality Date     BIOPSY  4/10/2015     BL DUPLEX LO EXTREM ART UNILAT/LTD  4/3008    lower extr arterial doppler -no stenosis     C DEXA,BONE DENSITY,APPENDICULR SKELTN  12/2008    osteopenia     COLONOSCOPY  10/22/2007    bx benign     COLONOSCOPY N/A 4/10/2015    Procedure: COMBINED COLONOSCOPY, SINGLE OR MULTIPLE BIOPSY/POLYPECTOMY BY BIOPSY;  Surgeon: Cl Wagner MD;  Location:  GI     ESOPHAGOSCOPY, GASTROSCOPY, DUODENOSCOPY (EGD), COMBINED N/A 1/31/2018    Procedure: COMBINED ESOPHAGOSCOPY, GASTROSCOPY, DUODENOSCOPY (EGD);  COMBINED ESOPHAGOSCOPY, GASTROSCOPY, DUODENOSCOPY (EGD);  Surgeon: Tirso Duran MD;  Location:  GI     EYE SURGERY       HC PART REMV BONE METATARSAL HEAD,EA  01/11/10    Right foot plantar plate tear. Also correct hammertoe, 2nd digit & varicose vein ligation, heel.     HC REVISE MEDIAN N/CARPAL TUNNEL SURG      Left in 1997, right in 1999     INJ, ANES/STER,  FACET LUMB/SAC  2010    Left L5 nerve root injection     INJECT EPIDURAL TRANSFORAMINAL  10/09/2013    [facility]     INJECT JOINT SACROILIAC  3/19/14,4/21/14    [facility]     [facility] NONSPECIFIC PROCEDURE  2009    Metatarsal phalangeal joint injection     [facility] COLONOSCOPY THRU STOMA, DIAGNOSTIC  7/2002    bx. benign - flex sig in 5 yrs  or colonoscopy in 10 yrs     [facility] ECHO HEART XTHORACIC, STRESS/REST  2/2005    WNL     [facility] NCS MOTOR W/O F-WAVE, EACH NERVE  7/2008    CTS     Past Surgical History reviewed with patient during visit.    Current Outpatient Medications   Medication     acetaminophen (TYLENOL) 650 MG CR tablet     albuterol (PROAIR HFA) 108 (90 Base) MCG/ACT inhaler     albuterol (PROAIR HFA/PROVENTIL HFA/VENTOLIN HFA) 108 (90 Base) MCG/ACT inhaler     ALLEGRA 180 MG OR TABS     amitriptyline (ELAVIL) 10 MG tablet     buPROPion (WELLBUTRIN XL) 150 MG 24 hr tablet     CALCIUM /VITAMIN D TABS   OR     Cholecalciferol (VITAMIN D PO)     famotidine (PEPCID) 20 MG tablet     hydrOXYzine (ATARAX) 25 MG tablet     meclizine (ANTIVERT) 25 MG tablet     Melatonin 10 MG TABS tablet     mometasone-formoterol (DULERA) 100-5 MCG/ACT oral inhaler     multivitamin w/minerals (THERA-VIT-M) tablet     naproxen (NAPROSYN) 500 MG tablet     raloxifene (EVISTA) 60 MG tablet     Simethicone (GAS-X PO)     triamcinolone (NASACORT) 55 MCG/ACT nasal inhaler     UNABLE TO FIND     VITAMIN C-MARK HIPS CR 1000 MG OR TBCR     VITAMIN E PO     No current facility-administered medications for this visit.       Allergies   Allergen Reactions     Penicillins Itching     Vaginal itching - Yeast infection afterward       Social History     Socioeconomic History     Marital status:      Spouse name: Robel     Number of children: 1     Years of education: 12   Occupational History     Occupation: HIMS clerk     Employer: [employer]   Tobacco Use     Smoking status: Former Smoker      Packs/day: 0.50     Years: 30.00     Pack years: 15.00     Types: Cigarettes     Quit date: 3/20/1998     Years since quittin.4     Smokeless tobacco: Never Used     Tobacco comment: No smokers in home   Vaping Use     Vaping Use: Never used   Substance and Sexual Activity     Alcohol use: Yes     Alcohol/week: 6.7 standard drinks     Comment: 2x/week     Drug use: No     Sexual activity: Yes     Partners: Male     Birth control/protection: None     Comment: spouse -vas   Other Topics Concern      Service No     Blood Transfusions No     Caffeine Concern Yes     Comment: pop: 1c/wk     Occupational Exposure No     Hobby Hazards No     Sleep Concern Yes     Comment: On meds     Stress Concern Yes     Weight Concern No     Special Diet No     Back Care Yes     Comment: Hx back pain     Exercise Yes     Comment: stretching qd     Bike Helmet No     Seat Belt Yes     Self-Exams Yes     Parent/sibling w/ CABG, MI or angioplasty before 65F 55M? Yes     Comment: Father - Heart attack   Social History Narrative    Lives with spouse.. No domestic violence issues.       Family History   Problem Relation Age of Onset     Diabetes Father      Depression Father      Cerebrovascular Disease Mother         age 80     Arthritis Mother      Depression Mother         On meds     Eye Disorder Mother         Glaucoma     Osteoporosis Mother      Respiratory Mother          88 YO COPD     Chronic Obstructive Pulmonary Disease Mother      C.A.D. Brother         67 YO MI -     Chronic Obstructive Pulmonary Disease Brother      Myocardial Infarction Brother      Other Cancer Brother      Cancer Brother           ROS: 10 point ROS neg other than the symptoms noted above in the HPI.    Vital Signs: LMP 2008 (Approximate)     Examination:  Constitutional:  Alert, well nourished, NAD.  HEENT: Normocephalic, atraumatic.   Pulmonary:  Without shortness of breath, normal effort.   Lymph: no lymphadenopathy to low back or  LE.   Integumentary: Skin is free of rashes or lesions.   Cardiovascular:  No pitting edema of BLE.    Psych: Normal affect, no apparent distress    Neurological:  Awake  Alert  Oriented x 3  Speech clear  Cranial nerves II - XII grossly intact  Motor exam   Shoulder Abduction:  Right:  5/5   Left:  5/5  Biceps:                      Right:  5/5   Left:  5/5  Triceps:                     Right:  5/5   Left:  5/5  Wrist Extensors:       Right:  5/5   Left:  5/5  Wrist Flexors:           Right:  5/5   Left:  5/5  Intrinsics:                   Right:  5/5   Left:  5/5  Sensation normal to bilateral upper and lower extremities.    Reflexes are 2+ in the patellar and Achilles. There is no clonus. Downgoing Babinski.  Musculoskeletal:  Gait: Able to stand from a seated position. Normal non-antalgic, non-myelopathic gait.      Cervical examination reveals good range of motion.  No tenderness to palpation of the cervical spine or paraspinous muscles bilaterally.       Imaging:   MRI of the cervical spine was reviewed in the office today.     MRI CERVICAL SPINE WITHOUT CONTRAST 8/1/2022 1:17 PM      HISTORY: Neck pain; Neck pain, no complicating feature; Chronic neck  pain duration >= 3 months; Worsening or not improving; Adequate  conservative therapy; No known/automatically detected potential  contraindications to MRI; Cervical radiculopathy; DDD (degenerative  disc disease), cervical      TECHNIQUE: Multiplanar, multisequence MRI of the cervical spine  without contrast.      COMPARISON: Cervical spine radiographs 7/27/2022. CT cervical spine  1/3/2019. MRI cervical spine 8/12/2013.     FINDINGS: Normal vertebral body heights. Retrolisthesis of C3 on C4  measuring 3-4 mm, anterolisthesis of C4 on C5 measuring 2-3 mm,  retrolisthesis of C6 on C7 measuring 1-2 mm and anterolisthesis of C7  on T1 measuring 3-4 mm. Modic type I degenerative endplate signal  changes at C3-C4 and C4-C5. Minimal nonspecific T2/STIR  hyperintense  signal centered about the right C5-C6 facet joint, which may be  reactive to degenerative facet arthropathy. No spinal cord signal  abnormality is identified. Small nonspecific ovoid T2 hyperintense  lesion measuring 4 mm in the right paramedian predental region,  possibly representing a degenerative cyst. The visualized paraspinal  soft tissues otherwise appear grossly unremarkable.     Seminal analysis:  Craniovertebral Junction/C1-C2: Severe degenerative arthropathy  involving the left lateral atlantoaxial joint. Mild to moderate  degenerative arthropathy of the median atlantoaxial joint. Mild  thickening of the retro-odontoid ligamentous structures. Overall, no  significant change.     C2-C3: Severe disc height loss. Symmetric disc osteophyte complex.  Bilateral uncovertebral spurring. Moderate to severe left and mild to  moderate right facet arthropathy. Ligamentum flavum thickening. Mild  spinal canal stenosis. Mild to moderate bilateral neural foraminal  stenosis. Overall, no significant change.     C3-C4: Severe disc height loss. Symmetric disc bulge with posterior  endplate osteophytic ridging and bilateral uncovertebral spurring.  Moderate right and mild to moderate left facet arthropathy. Ligamentum  flavum thickening. Mild to moderate spinal canal stenosis. Moderate to  severe right and mild left neural foraminal stenosis. Overall, no  significant change.     C4-C5: Severe disc height loss. Disc bulge with posterior endplate  osteophytic ridging, slightly eccentric to the right. Bilateral  uncovertebral spurring. Severe right and mild left facet arthropathy.  Ligamentum flavum thickening. Mild to moderate spinal canal stenosis.  Severe right and mild left neural foraminal stenosis. Overall, no  significant change.     C5-C6: Severe disc height loss. Symmetric disc bulge with posterior  endplate osteophytic ridging. Bilateral uncovertebral spurring.  Moderate right and mild left facet  arthropathy. Ligamentum flavum  thickening. Mild to moderate spinal canal stenosis. Moderate left  neural foraminal stenosis. Mild to moderate bordering on moderate  right neural foraminal stenosis. Overall, no significant change.     C6-C7: Severe disc height loss. Symmetric disc bulge with posterior  endplate osteophytic ridging and possible superimposed small caudally  migrating central disc extrusion. Bilateral uncovertebral spurring.  Moderate bilateral facet arthropathy. Mild spinal canal stenosis.  Moderate to severe left neural foraminal stenosis. Mild right neural  foraminal stenosis. Overall, no significant change.     C7-T1: Severe disc height loss. Uncovering of the posterior aspect of  the disc related to anterolisthesis of C7 on T1. Diffuse disc bulge.  Bilateral uncovertebral spurring. Severe bilateral facet arthropathy.  No significant spinal canal stenosis. Moderate to severe bilateral  neural foraminal stenosis. Overall, no significant change.                                                                      IMPRESSION:  1. Overall, no significant change in advanced multilevel degenerative  findings of the cervical spine, as described.  2. Unchanged multilevel degenerative spondylolisthesis, as described.  3. Mild/mild to moderate multilevel spinal canal stenosis.  4. Varying degrees of multilevel neural foraminal stenosis, moderate  to severe on the right at C3-C4, severe on the right at C4-C5,  moderate on the left at C5-C6, moderate to severe on the left at  C6-C7, and moderate to severe bilaterally at C7-T1. Please see the  body of the report for additional details and level by level findings    Assessment/Plan:     Cervicalgia  Cervical radiculopathy    Margareth Green is a 67 year old female.  I did have a discussion the patient regarding her symptoms.  We also reviewed her MRI in detail.  She has multilevel severe disc degeneration, as well as multilevel foraminal stenosis.  She has a  degenerative spondylolisthesis at C7-T1 with bilateral severe narrowing in the foramen.  I think this is certainly contributing to her neck and arm pain.  She has been working with physical therapy with good improvement.  At this point, we will go ahead and get a cervical spine epidural injection set up for her.  She did wish to proceed with that option.  She will also continue with her physical therapy.  She can follow-up with us to discuss additional options if the injection is not helpful.  She voiced agreement and understanding.          Enrique Goss PA-C  [facility] [place] [address]    Tel [phone]  Pager [phone]      The use of Dragon/Identified dictation services may have been used to construct the content in this note; any grammatical or spelling errors are non-intentional. Please contact the author of this note directly if you are in need of any clarification.        Again, thank you for allowing me to participate in the care of your patient.        Sincerely,        Enrique Goss PA-C

## 2022-09-12 NOTE — PROGRESS NOTES
Dr. Ethan Cardenas  [facility]  Neurosurgery Clinic Visit      CC: right side neck pain, right arm pain    Primary care Provider: Caitlin Beasley    Referring Provider:  Caitlin Beasley PA-C      Reason For Visit:   I was asked to consult on the patient for neck pain, arm pain.      HPI: Margareth Green is a 67 year old female who presents for evaluation of her chief complaint of right sided neck and arm pain, as well as some numbness in the outer aspect of her right lower arm and fifth finger.  She has a lot of trouble sleeping on her right side.  She had previously been having trouble sleeping on her left side, so has really had bilateral symptoms.  She has been working with physical therapy, which does provide her with good symptomatic improvement.  She has not had any injections for her neck, but has had them years ago in her lower back.  No focal deficits that she has noticed.  No problems with fine motor control.  No bowel or bladder issues, or any problems with balance or coordination.    Past Medical History:   Diagnosis Date     Abnormal Papanicolaou smear of vagina and vaginal HPV 12/02    ASCUS (HPV neg). 3/03 WNL but no transitional zome - repeat 3months     Allergic rhinitis, cause unspecified      Arthritis      Backache, unspecified      Carpal tunnel syndrome     s/p repair     Cervicalgia      Chronic kidney disease, stage 3 (H) 7/14/2021     Depressive disorder      Depressive disorder, not elsewhere classified     doing well on Wellbutrin     Derangement of TMJ (temporomandibular joint) 3/2010    internal derangement     Dermatophytosis of nail      Diaphragmatic hernia without mention of obstruction or gangrene 9/07    small hiatal hernia     Diffuse cystic mastopathy 2000    FCBD     Esophageal reflux 4/2005     Jossue 10/09    see podiatry consult     Headache(784.0)     Muscle contrature headaches     Intramural leiomyoma of uterus 7/2007    on US     Irregular menstrual  cycle     better with BCP -also PMS is better with BCP     IRRITABLE COLON 2/18/2008     Malaise and fatigue      Menopause     lmp 2/2008     Mild persistent asthma      Mole (skin) 2003    9 x 6 mm rt ant chest     Motion sickness      Neuroma 11/2008    lt foot     Osteopenia 12/2008     Other malaise and fatigue 4/2005    stress     Personal history of tobacco use, presenting hazards to health     Quit in 1998     Solitary cyst of breast 12/2005    lt 6 oclock     Stenosis of lumbosacral spine     l4-5 and l5-s1     Symptomatic menopausal or female climacteric states 12/2002    perimenopausal. LMP 2/2008     Tachycardia, paroxysmal (H) 11/24/2020       Past Medical History reviewed with patient during visit.    Past Surgical History:   Procedure Laterality Date     BIOPSY  4/10/2015     BL DUPLEX LO EXTREM ART UNILAT/LTD  4/3008    lower extr arterial doppler -no stenosis     C DEXA,BONE DENSITY,APPENDICULR SKELTN  12/2008    osteopenia     COLONOSCOPY  10/22/2007    bx benign     COLONOSCOPY N/A 4/10/2015    Procedure: COMBINED COLONOSCOPY, SINGLE OR MULTIPLE BIOPSY/POLYPECTOMY BY BIOPSY;  Surgeon: Cl Wagner MD;  Location:  GI     ESOPHAGOSCOPY, GASTROSCOPY, DUODENOSCOPY (EGD), COMBINED N/A 1/31/2018    Procedure: COMBINED ESOPHAGOSCOPY, GASTROSCOPY, DUODENOSCOPY (EGD);  COMBINED ESOPHAGOSCOPY, GASTROSCOPY, DUODENOSCOPY (EGD);  Surgeon: Tirso Duran MD;  Location:  GI     EYE SURGERY       HC PART REMV BONE METATARSAL HEAD,EA  01/11/10    Right foot plantar plate tear. Also correct hammertoe, 2nd digit & varicose vein ligation, heel.     HC REVISE MEDIAN N/CARPAL TUNNEL SURG      Left in 1997, right in 1999     INJ, ANES/STER, FACET LUMB/SAC  2010    Left L5 nerve root injection     INJECT EPIDURAL TRANSFORAMINAL  10/09/2013    [facility]     INJECT JOINT SACROILIAC  3/19/14,4/21/14    [facility]     ZZC NONSPECIFIC PROCEDURE  2009    Metatarsal phalangeal joint  injection     [facility] COLONOSCOPY THRU STOMA, DIAGNOSTIC  2002    bx. benign - flex sig in 5 yrs  or colonoscopy in 10 yrs     [facility] ECHO HEART XTHORACIC, STRESS/REST  2005    WNL     [facility] NCS MOTOR W/O F-WAVE, EACH NERVE  2008    CTS     Past Surgical History reviewed with patient during visit.    Current Outpatient Medications   Medication     acetaminophen (TYLENOL) 650 MG CR tablet     albuterol (PROAIR HFA) 108 (90 Base) MCG/ACT inhaler     albuterol (PROAIR HFA/PROVENTIL HFA/VENTOLIN HFA) 108 (90 Base) MCG/ACT inhaler     ALLEGRA 180 MG OR TABS     amitriptyline (ELAVIL) 10 MG tablet     buPROPion (WELLBUTRIN XL) 150 MG 24 hr tablet     CALCIUM /VITAMIN D TABS   OR     Cholecalciferol (VITAMIN D PO)     famotidine (PEPCID) 20 MG tablet     hydrOXYzine (ATARAX) 25 MG tablet     meclizine (ANTIVERT) 25 MG tablet     Melatonin 10 MG TABS tablet     mometasone-formoterol (DULERA) 100-5 MCG/ACT oral inhaler     multivitamin w/minerals (THERA-VIT-M) tablet     naproxen (NAPROSYN) 500 MG tablet     raloxifene (EVISTA) 60 MG tablet     Simethicone (GAS-X PO)     triamcinolone (NASACORT) 55 MCG/ACT nasal inhaler     UNABLE TO FIND     VITAMIN C-MARK HIPS CR 1000 MG OR TBCR     VITAMIN E PO     No current facility-administered medications for this visit.       Allergies   Allergen Reactions     Penicillins Itching     Vaginal itching - Yeast infection afterward       Social History     Socioeconomic History     Marital status:      Spouse name: Robel     Number of children: 1     Years of education: 12   Occupational History     Occupation: HIMS clerNumari     Employer: [employer]   Tobacco Use     Smoking status: Former Smoker     Packs/day: 0.50     Years: 30.00     Pack years: 15.00     Types: Cigarettes     Quit date: 3/20/1998     Years since quittin.4     Smokeless tobacco: Never Used     Tobacco comment: No smokers in home   Vaping Use     Vaping Use: Never used   Substance and Sexual  Activity     Alcohol use: Yes     Alcohol/week: 6.7 standard drinks     Comment: 2x/week     Drug use: No     Sexual activity: Yes     Partners: Male     Birth control/protection: None     Comment: spouse -vas   Other Topics Concern      Service No     Blood Transfusions No     Caffeine Concern Yes     Comment: pop: 1c/wk     Occupational Exposure No     Hobby Hazards No     Sleep Concern Yes     Comment: On meds     Stress Concern Yes     Weight Concern No     Special Diet No     Back Care Yes     Comment: Hx back pain     Exercise Yes     Comment: stretching qd     Bike Helmet No     Seat Belt Yes     Self-Exams Yes     Parent/sibling w/ CABG, MI or angioplasty before 65F 55M? Yes     Comment: Father - Heart attack   Social History Narrative    Lives with spouse.. No domestic violence issues.       Family History   Problem Relation Age of Onset     Diabetes Father      Depression Father      Cerebrovascular Disease Mother         age 80     Arthritis Mother      Depression Mother         On meds     Eye Disorder Mother         Glaucoma     Osteoporosis Mother      Respiratory Mother          86 YO COPD     Chronic Obstructive Pulmonary Disease Mother      C.A.D. Brother         65 YO MI -     Chronic Obstructive Pulmonary Disease Brother      Myocardial Infarction Brother      Other Cancer Brother      Cancer Brother           ROS: 10 point ROS neg other than the symptoms noted above in the HPI.    Vital Signs: LMP 2008 (Approximate)     Examination:  Constitutional:  Alert, well nourished, NAD.  HEENT: Normocephalic, atraumatic.   Pulmonary:  Without shortness of breath, normal effort.   Lymph: no lymphadenopathy to low back or LE.   Integumentary: Skin is free of rashes or lesions.   Cardiovascular:  No pitting edema of BLE.    Psych: Normal affect, no apparent distress    Neurological:  Awake  Alert  Oriented x 3  Speech clear  Cranial nerves II - XII grossly intact  Motor exam   Shoulder  Abduction:  Right:  5/5   Left:  5/5  Biceps:                      Right:  5/5   Left:  5/5  Triceps:                     Right:  5/5   Left:  5/5  Wrist Extensors:       Right:  5/5   Left:  5/5  Wrist Flexors:           Right:  5/5   Left:  5/5  Intrinsics:                   Right:  5/5   Left:  5/5  Sensation normal to bilateral upper and lower extremities.    Reflexes are 2+ in the patellar and Achilles. There is no clonus. Downgoing Babinski.  Musculoskeletal:  Gait: Able to stand from a seated position. Normal non-antalgic, non-myelopathic gait.      Cervical examination reveals good range of motion.  No tenderness to palpation of the cervical spine or paraspinous muscles bilaterally.       Imaging:   MRI of the cervical spine was reviewed in the office today.     MRI CERVICAL SPINE WITHOUT CONTRAST 8/1/2022 1:17 PM      HISTORY: Neck pain; Neck pain, no complicating feature; Chronic neck  pain duration >= 3 months; Worsening or not improving; Adequate  conservative therapy; No known/automatically detected potential  contraindications to MRI; Cervical radiculopathy; DDD (degenerative  disc disease), cervical      TECHNIQUE: Multiplanar, multisequence MRI of the cervical spine  without contrast.      COMPARISON: Cervical spine radiographs 7/27/2022. CT cervical spine  1/3/2019. MRI cervical spine 8/12/2013.     FINDINGS: Normal vertebral body heights. Retrolisthesis of C3 on C4  measuring 3-4 mm, anterolisthesis of C4 on C5 measuring 2-3 mm,  retrolisthesis of C6 on C7 measuring 1-2 mm and anterolisthesis of C7  on T1 measuring 3-4 mm. Modic type I degenerative endplate signal  changes at C3-C4 and C4-C5. Minimal nonspecific T2/STIR hyperintense  signal centered about the right C5-C6 facet joint, which may be  reactive to degenerative facet arthropathy. No spinal cord signal  abnormality is identified. Small nonspecific ovoid T2 hyperintense  lesion measuring 4 mm in the right paramedian predental  region,  possibly representing a degenerative cyst. The visualized paraspinal  soft tissues otherwise appear grossly unremarkable.     Seminal analysis:  Craniovertebral Junction/C1-C2: Severe degenerative arthropathy  involving the left lateral atlantoaxial joint. Mild to moderate  degenerative arthropathy of the median atlantoaxial joint. Mild  thickening of the retro-odontoid ligamentous structures. Overall, no  significant change.     C2-C3: Severe disc height loss. Symmetric disc osteophyte complex.  Bilateral uncovertebral spurring. Moderate to severe left and mild to  moderate right facet arthropathy. Ligamentum flavum thickening. Mild  spinal canal stenosis. Mild to moderate bilateral neural foraminal  stenosis. Overall, no significant change.     C3-C4: Severe disc height loss. Symmetric disc bulge with posterior  endplate osteophytic ridging and bilateral uncovertebral spurring.  Moderate right and mild to moderate left facet arthropathy. Ligamentum  flavum thickening. Mild to moderate spinal canal stenosis. Moderate to  severe right and mild left neural foraminal stenosis. Overall, no  significant change.     C4-C5: Severe disc height loss. Disc bulge with posterior endplate  osteophytic ridging, slightly eccentric to the right. Bilateral  uncovertebral spurring. Severe right and mild left facet arthropathy.  Ligamentum flavum thickening. Mild to moderate spinal canal stenosis.  Severe right and mild left neural foraminal stenosis. Overall, no  significant change.     C5-C6: Severe disc height loss. Symmetric disc bulge with posterior  endplate osteophytic ridging. Bilateral uncovertebral spurring.  Moderate right and mild left facet arthropathy. Ligamentum flavum  thickening. Mild to moderate spinal canal stenosis. Moderate left  neural foraminal stenosis. Mild to moderate bordering on moderate  right neural foraminal stenosis. Overall, no significant change.     C6-C7: Severe disc height loss. Symmetric  disc bulge with posterior  endplate osteophytic ridging and possible superimposed small caudally  migrating central disc extrusion. Bilateral uncovertebral spurring.  Moderate bilateral facet arthropathy. Mild spinal canal stenosis.  Moderate to severe left neural foraminal stenosis. Mild right neural  foraminal stenosis. Overall, no significant change.     C7-T1: Severe disc height loss. Uncovering of the posterior aspect of  the disc related to anterolisthesis of C7 on T1. Diffuse disc bulge.  Bilateral uncovertebral spurring. Severe bilateral facet arthropathy.  No significant spinal canal stenosis. Moderate to severe bilateral  neural foraminal stenosis. Overall, no significant change.                                                                      IMPRESSION:  1. Overall, no significant change in advanced multilevel degenerative  findings of the cervical spine, as described.  2. Unchanged multilevel degenerative spondylolisthesis, as described.  3. Mild/mild to moderate multilevel spinal canal stenosis.  4. Varying degrees of multilevel neural foraminal stenosis, moderate  to severe on the right at C3-C4, severe on the right at C4-C5,  moderate on the left at C5-C6, moderate to severe on the left at  C6-C7, and moderate to severe bilaterally at C7-T1. Please see the  body of the report for additional details and level by level findings    Assessment/Plan:     Cervicalgia  Cervical radiculopathy    Margareth Green is a 67 year old female.  I did have a discussion the patient regarding her symptoms.  We also reviewed her MRI in detail.  She has multilevel severe disc degeneration, as well as multilevel foraminal stenosis.  She has a degenerative spondylolisthesis at C7-T1 with bilateral severe narrowing in the foramen.  I think this is certainly contributing to her neck and arm pain.  She has been working with physical therapy with good improvement.  At this point, we will go ahead and get a cervical spine  epidural injection set up for her.  She did wish to proceed with that option.  She will also continue with her physical therapy.  She can follow-up with us to discuss additional options if the injection is not helpful.  She voiced agreement and understanding.          Enrique Goss PA-C  [facility] Neurosurgery  [address] [address]    Tel [phone]  Pager [phone]      The use of [email or web address] dictation services may have been used to construct the content in this note; any grammatical or spelling errors are non-intentional. Please contact the author of this note directly if you are in need of any clarification.

## 2022-09-15 ENCOUNTER — MYC MEDICAL ADVICE (OUTPATIENT)
Dept: NEUROSURGERY | Facility: CLINIC | Age: 67
End: 2022-09-15

## 2022-09-15 DIAGNOSIS — M81.0 AGE RELATED OSTEOPOROSIS, UNSPECIFIED PATHOLOGICAL FRACTURE PRESENCE: ICD-10-CM

## 2022-09-15 DIAGNOSIS — M54.12 CERVICAL RADICULOPATHY: Primary | ICD-10-CM

## 2022-09-15 RX ORDER — RALOXIFENE HYDROCHLORIDE 60 MG/1
TABLET, FILM COATED ORAL
Qty: 90 TABLET | Refills: 1 | Status: SHIPPED | OUTPATIENT
Start: 2022-09-15 | End: 2023-03-30

## 2022-09-16 ENCOUNTER — TELEPHONE (OUTPATIENT)
Dept: PALLIATIVE MEDICINE | Facility: CLINIC | Age: 67
End: 2022-09-16

## 2022-09-19 ENCOUNTER — TELEPHONE (OUTPATIENT)
Dept: PALLIATIVE MEDICINE | Facility: CLINIC | Age: 67
End: 2022-09-19

## 2022-09-21 ENCOUNTER — HOSPITAL ENCOUNTER (OUTPATIENT)
Dept: MAMMOGRAPHY | Facility: CLINIC | Age: 67
Discharge: HOME | End: 2022-09-21
Attending: PHYSICIAN ASSISTANT | Admitting: PHYSICIAN ASSISTANT
Payer: COMMERCIAL

## 2022-09-21 DIAGNOSIS — Z12.31 VISIT FOR SCREENING MAMMOGRAM: ICD-10-CM

## 2022-09-21 PROCEDURE — 77067 SCR MAMMO BI INCL CAD: CPT

## 2022-09-29 ENCOUNTER — HOSPITAL ENCOUNTER (OUTPATIENT)
Facility: CLINIC | Age: 67
Discharge: HOME | End: 2022-09-29
Attending: ANESTHESIOLOGY | Admitting: ANESTHESIOLOGY
Payer: COMMERCIAL

## 2022-09-29 ENCOUNTER — HOSPITAL ENCOUNTER (OUTPATIENT)
Dept: GENERAL RADIOLOGY | Facility: CLINIC | Age: 67
Discharge: HOME | End: 2022-09-29
Attending: ANESTHESIOLOGY | Admitting: ANESTHESIOLOGY
Payer: COMMERCIAL

## 2022-09-29 VITALS
TEMPERATURE: 98.1 F | DIASTOLIC BLOOD PRESSURE: 77 MMHG | SYSTOLIC BLOOD PRESSURE: 124 MMHG | HEART RATE: 76 BPM | OXYGEN SATURATION: 97 % | RESPIRATION RATE: 16 BRPM

## 2022-09-29 DIAGNOSIS — M54.12 CERVICAL RADICULOPATHY: ICD-10-CM

## 2022-09-29 PROCEDURE — 999N000179 XR SURGERY CARM FLUORO LESS THAN 5 MIN W STILLS: Mod: TC

## 2022-09-29 PROCEDURE — 62321 NJX INTERLAMINAR CRV/THRC: CPT | Performed by: ANESTHESIOLOGY

## 2022-09-29 PROCEDURE — 250N000009 HC RX 250: Performed by: NURSE ANESTHETIST, CERTIFIED REGISTERED

## 2022-09-29 PROCEDURE — 250N000011 HC RX IP 250 OP 636: Performed by: ANESTHESIOLOGY

## 2022-09-29 RX ORDER — IOPAMIDOL 612 MG/ML
INJECTION, SOLUTION INTRATHECAL PRN
Status: DISCONTINUED | OUTPATIENT
Start: 2022-09-29 | End: 2022-09-29 | Stop reason: HOSPADM

## 2022-09-29 RX ORDER — TRIAMCINOLONE ACETONIDE 40 MG/ML
INJECTION, SUSPENSION INTRA-ARTICULAR; INTRAMUSCULAR PRN
Status: DISCONTINUED | OUTPATIENT
Start: 2022-09-29 | End: 2022-09-29 | Stop reason: HOSPADM

## 2022-09-29 RX ORDER — SODIUM CHLORIDE, SODIUM LACTATE, POTASSIUM CHLORIDE, CALCIUM CHLORIDE 600; 310; 30; 20 MG/100ML; MG/100ML; MG/100ML; MG/100ML
INJECTION, SOLUTION INTRAVENOUS CONTINUOUS
Status: DISCONTINUED | OUTPATIENT
Start: 2022-09-29 | End: 2022-09-29 | Stop reason: HOSPADM

## 2022-09-29 RX ADMIN — LIDOCAINE HYDROCHLORIDE 0.1 ML: 10 INJECTION, SOLUTION EPIDURAL; INFILTRATION; INTRACAUDAL; PERINEURAL at 11:56

## 2022-09-29 ASSESSMENT — ACTIVITIES OF DAILY LIVING (ADL): ADLS_ACUITY_SCORE: 35

## 2022-09-29 NOTE — OP NOTE
CHIEF COMPLAINT: Neck pain secondary to cervical spondylosis and cervical disc degeneration  PROCEDURE: C7-T1 Interlaminar epidural steroid injection using fluoroscopic guidance with contrast dye.   PROCEDURE DETAILS: After written informed consent was obtained from the patient, the patient was escorted to the procedure room.  The patient was placed in the prone position.  A  time out  was conducted to verify patient identity, procedure to be performed, side, site, allergies and any special requirements.  The skin over the neck and upper back region were prepped and draped in normal sterile fashion. Fluoroscopy was used to identify the C7-T1 interspace in an AP view and the skin was anesthetized with 2 mL of 1% lidocaine with bicarbonate buffer.  A 20-gauge 3-1/2 inch Tuohy needle was advanced using the loss of resistance technique with preservative free normal saline with fluoroscopic guidance. After negative aspiration for CSF and blood, 1.5 cc of Isovue contrast dye was injected revealing the appropriate cervical epidurogram without evidence of intrathecal or intravascular spread. Following this, a 3-mL solution of 40 mg of triamcinolone with 2 cc preservative-free normal saline was slowly injected.  Good spread of medication covering the right C7 and C8 and T1 nerve roots off into the right epidural gutter.  After injection of the medication, as the needle tip was withdrawn, it was flushed with local anesthetic.  The patient was monitored with blood pressure and pulse oximetry machines with the assistance of an RN throughout the procedure.  The patient was alert and responsive to questions throughout the procedure.   The patient tolerated the procedure well and was observed in the post-procedural area.  The patient was dismissed without apparent complications.     BLOOD LOSS:  less than 5 cc    DIAGNOSIS:  1.  Right-sided neck pain secondary to cervical spondylosis and cervical disc degeneration  2.  Right C8  radiculopathy secondary to a spondylolisthesis at C7-T1  3.  Severe facet arthropathy at C5-6 on the right side  PLAN:  1. Performed a C7-T1 interlaminar epidural steroid injection.   2. The patient was instructed to call the [facility] if today's procedure is not helpful.  I also discussed with her preoperatively about other options if the epidural injection is not helpful.  The plan is to repeat the injection if she has a partial response to today's treatment.  If she has no response to today's treatment I think her options are either surgery for the instability at C7-T1 or she could consider regenerative injections into the interspinous and supraspinous ligaments.  I discussed the experimental nature of that treatment.  She also would need to have a telemedicine consult at my clinic in [place].  I gave her the number for that which is [phone].  I have had good outcomes with patients that have instability with roughly 1 to 3 mm of spondylolisthesis in the cervical spine.  I also recommended that she goes home and does some of her own research online regarding regenerative medicine and if she is interested in that she should schedule a consult with me.    Tyler Bay MD  Diplomate of the American Board of Anesthesiology, Pain Medicine

## 2022-09-29 NOTE — DISCHARGE INSTRUCTIONS

## 2022-10-02 DIAGNOSIS — F33.0 MAJOR DEPRESSIVE DISORDER, RECURRENT EPISODE, MILD (H): ICD-10-CM

## 2022-10-02 DIAGNOSIS — R41.840 POOR CONCENTRATION: ICD-10-CM

## 2022-10-03 ENCOUNTER — MYC MEDICAL ADVICE (OUTPATIENT)
Dept: FAMILY MEDICINE | Facility: CLINIC | Age: 67
End: 2022-10-03

## 2022-10-03 DIAGNOSIS — F33.0 MAJOR DEPRESSIVE DISORDER, RECURRENT EPISODE, MILD (H): ICD-10-CM

## 2022-10-03 DIAGNOSIS — R41.840 POOR CONCENTRATION: ICD-10-CM

## 2022-10-04 RX ORDER — BUPROPION HYDROCHLORIDE 150 MG/1
TABLET ORAL
Qty: 90 TABLET | Refills: 3 | OUTPATIENT
Start: 2022-10-04

## 2022-10-06 RX ORDER — BUPROPION HYDROCHLORIDE 150 MG/1
TABLET ORAL
Qty: 90 TABLET | Refills: 1 | Status: SHIPPED | OUTPATIENT
Start: 2022-10-06 | End: 2022-10-06

## 2022-10-06 RX ORDER — BUPROPION HYDROCHLORIDE 150 MG/1
TABLET ORAL
Qty: 90 TABLET | Refills: 2 | Status: SHIPPED | OUTPATIENT
Start: 2022-10-06 | End: 2023-07-11

## 2022-10-06 NOTE — TELEPHONE ENCOUNTER
Changed pharmacy per patient request and sent script to her requested pharmacy [facility].    Quyen Tello RN  [facility] ~ Registered Nurse  Clinic Triage ~ [place]  October 6, 2022

## 2022-10-09 ENCOUNTER — HEALTH MAINTENANCE LETTER (OUTPATIENT)
Age: 67
End: 2022-10-09

## 2022-11-03 ENCOUNTER — MYC MEDICAL ADVICE (OUTPATIENT)
Dept: FAMILY MEDICINE | Facility: CLINIC | Age: 67
End: 2022-11-03

## 2022-12-05 ASSESSMENT — ENCOUNTER SYMPTOMS
CONSTIPATION: 0
DIARRHEA: 0
JOINT SWELLING: 0
HEMATURIA: 0
CHILLS: 0
HEMATOCHEZIA: 0
EYE PAIN: 0
DYSURIA: 0
WEAKNESS: 1
MYALGIAS: 1
PALPITATIONS: 0
PARESTHESIAS: 0
FEVER: 0
COUGH: 0
ABDOMINAL PAIN: 1
HEARTBURN: 1
ARTHRALGIAS: 0
NERVOUS/ANXIOUS: 0
BREAST MASS: 0
NAUSEA: 0
SORE THROAT: 0
FREQUENCY: 0
DIZZINESS: 0
HEADACHES: 1
SHORTNESS OF BREATH: 0

## 2022-12-05 ASSESSMENT — ACTIVITIES OF DAILY LIVING (ADL): CURRENT_FUNCTION: NO ASSISTANCE NEEDED

## 2022-12-06 ENCOUNTER — OFFICE VISIT (OUTPATIENT)
Dept: FAMILY MEDICINE | Facility: CLINIC | Age: 67
End: 2022-12-06
Payer: COMMERCIAL

## 2022-12-06 VITALS
TEMPERATURE: 98 F | HEIGHT: 62 IN | HEART RATE: 97 BPM | RESPIRATION RATE: 18 BRPM | SYSTOLIC BLOOD PRESSURE: 128 MMHG | WEIGHT: 130 LBS | OXYGEN SATURATION: 99 % | DIASTOLIC BLOOD PRESSURE: 64 MMHG | BODY MASS INDEX: 23.92 KG/M2

## 2022-12-06 DIAGNOSIS — R13.10 DYSPHAGIA, UNSPECIFIED TYPE: ICD-10-CM

## 2022-12-06 DIAGNOSIS — K21.01 GASTROESOPHAGEAL REFLUX DISEASE WITH ESOPHAGITIS AND HEMORRHAGE: Primary | ICD-10-CM

## 2022-12-06 DIAGNOSIS — R10.2 PELVIC PAIN IN FEMALE: ICD-10-CM

## 2022-12-06 PROCEDURE — 99214 OFFICE O/P EST MOD 30 MIN: CPT | Performed by: PHYSICIAN ASSISTANT

## 2022-12-06 ASSESSMENT — ENCOUNTER SYMPTOMS
CHILLS: 0
COUGH: 0
NAUSEA: 0
FEVER: 0
ABDOMINAL PAIN: 1
HEARTBURN: 1
CONSTIPATION: 0
EYE PAIN: 0
HEMATURIA: 0
MYALGIAS: 1
HEADACHES: 1
SHORTNESS OF BREATH: 0
NERVOUS/ANXIOUS: 0
BREAST MASS: 0
SORE THROAT: 0
DYSURIA: 0
ARTHRALGIAS: 0
DIZZINESS: 0
HEMATOCHEZIA: 0
PALPITATIONS: 0
WEAKNESS: 1
PARESTHESIAS: 0
DIARRHEA: 0
FREQUENCY: 0
JOINT SWELLING: 0

## 2022-12-06 ASSESSMENT — ACTIVITIES OF DAILY LIVING (ADL): CURRENT_FUNCTION: NO ASSISTANCE NEEDED

## 2022-12-06 ASSESSMENT — PAIN SCALES - GENERAL: PAINLEVEL: NO PAIN (0)

## 2022-12-06 NOTE — PROGRESS NOTES
"SUBJECTIVE:   Margareth is a 67 year old who presents for Preventive Visit.  Patient has been advised of split billing requirements and indicates understanding: Yes  Are you in the first 12 months of your Medicare coverage?  No      It is too soon for patient to have her wellness visit, last wellness visit was January 19, 2022.  We will complete as an office visit instead  Healthy Habits:     In general, how would you rate your overall health?  Good    Frequency of exercise:  2-3 days/week    Duration of exercise:  15-30 minutes    Do you usually eat at least 4 servings of fruit and vegetables a day, include whole grains    & fiber and avoid regularly eating high fat or \"junk\" foods?  Yes    Taking medications regularly:  Yes    Medication side effects:  Not applicable    Ability to successfully perform activities of daily living:  No assistance needed    Home Safety:  No safety concerns identified    Hearing Impairment:  No hearing concerns    In the past 6 months, have you been bothered by leaking of urine?  No    In general, how would you rate your overall mental or emotional health?  Good      PHQ-2 Total Score: 1    Additional concerns today:  No      Have you ever done Advance Care Planning? (For example, a Health Directive, POLST, or a discussion with a medical provider or your loved ones about your wishes): Yes, advance care planning is on file.       Fall risk  Fallen 2 or more times in the past year?: No  Any fall with injury in the past year?: No    Cognitive Screening   1) Repeat 3 items (Leader, Season, Table)    2) Clock draw: NORMAL  3) 3 item recall: Recalls 1 object   Results: NORMAL clock, 1-2 items recalled: COGNITIVE IMPAIRMENT LESS LIKELY    Mini-CogTM Copyright BRANDON Kyle. Licensed by the author for use in [facility]; reprinted with permission (rd@.[facility]). All rights reserved.          Reviewed and updated as needed this visit by clinical staff   Tobacco  Allergies  Meds          "     Reviewed and updated as needed this visit by Provider                 Social History     Tobacco Use     Smoking status: Former     Packs/day: 0.50     Years: 30.00     Pack years: 15.00     Types: Cigarettes     Quit date: 3/20/1998     Years since quittin.7     Smokeless tobacco: Never     Tobacco comments:     No smokers in home   Substance Use Topics     Alcohol use: Yes     Alcohol/week: 6.7 standard drinks     Comment: 2x/week         Alcohol Use 2022   Prescreen: >3 drinks/day or >7 drinks/week? No   Prescreen: >3 drinks/day or >7 drinks/week? -   AUDIT SCORE  -               Current providers sharing in care for this patient include:   Patient Care Team:  Caitlin Beasley PA-C as PCP - General (Family Practice)  Caitlin Beasley PA-C as Assigned PCP  Enrique Goss PA-C as Assigned Musculoskeletal Provider    The following health maintenance items are reviewed in Epic and correct as of today:  Health Maintenance   Topic Date Due     ASTHMA ACTION PLAN  10/14/2020     COVID-19 Vaccine (3 - Booster for Moderna series) 2022     INFLUENZA VACCINE (1) 2022     MEDICARE ANNUAL WELLNESS VISIT  2023     ASTHMA CONTROL TEST  2023     PHQ-9  2023     TAMMY ASSESSMENT  2023     DEXA  2023     ANNUAL REVIEW OF HM ORDERS  2023     FALL RISK ASSESSMENT  2023     MAMMO SCREENING  2024     COLORECTAL CANCER SCREENING  04/10/2025     LIPID  2025     ADVANCE CARE PLANNING  2027     DTAP/TDAP/TD IMMUNIZATION (5 - Td or Tdap) 2032     HEPATITIS C SCREENING  Completed     DEPRESSION ACTION PLAN  Completed     Pneumococcal Vaccine: 65+ Years  Completed     ZOSTER IMMUNIZATION  Completed     IPV IMMUNIZATION  Aged Out     MENINGITIS IMMUNIZATION  Aged Out     Labs reviewed in EPIC  BP Readings from Last 3 Encounters:   22 128/64   22 124/77   22 132/80    Wt Readings from Last 3 Encounters:   22 59 kg (130 lb)    07/27/22 57.6 kg (127 lb)   07/15/22 57.2 kg (126 lb)                  Patient Active Problem List   Diagnosis     Dermatophytosis of nail     Symptomatic menopausal or female climacteric states     Generalized osteoarthrosis, unspecified site     TAMMY (generalized anxiety disorder)     Insomnia     Malaise and fatigue     Mild persistent asthma     Cervicalgia     Allergic rhinitis     Irritable bowel syndrome     Arthralgia     DDD (degenerative disc disease), lumbar     Menopausal syndrome     Osteopenia     Hammertoe     Derangement of TMJ (temporomandibular joint)     Stenosis of lumbosacral spine     Atrophic vaginitis     Osteoporosis     Mild major depression (H)     Knee pain, left     Menopausal flushing     Numbness of left jaw     Neck pain     TMJ (temporomandibular joint syndrome)     Thyroid fullness     Epistaxis     Major depressive disorder, recurrent episode, moderate (H)     Gastroesophageal reflux disease without esophagitis     Tremor     Cramp of limb     Personal history of COVID-19     T12 compression fracture, sequela     Past Surgical History:   Procedure Laterality Date     BIOPSY  4/10/2015     BL DUPLEX LO EXTREM ART UNILAT/LTD  4/3008    lower extr arterial doppler -no stenosis     C DEXA,BONE DENSITY,APPENDICULR SKELTN  12/2008    osteopenia     COLONOSCOPY  10/22/2007    bx benign     COLONOSCOPY N/A 4/10/2015    Procedure: COMBINED COLONOSCOPY, SINGLE OR MULTIPLE BIOPSY/POLYPECTOMY BY BIOPSY;  Surgeon: Cl Wagner MD;  Location:  GI     ESOPHAGOSCOPY, GASTROSCOPY, DUODENOSCOPY (EGD), COMBINED N/A 1/31/2018    Procedure: COMBINED ESOPHAGOSCOPY, GASTROSCOPY, DUODENOSCOPY (EGD);  COMBINED ESOPHAGOSCOPY, GASTROSCOPY, DUODENOSCOPY (EGD);  Surgeon: Tirso Duran MD;  Location:  GI     EYE SURGERY       HC PART REMV BONE METATARSAL HEAD,EA  01/11/10    Right foot plantar plate tear. Also correct hammertoe, 2nd digit & varicose vein ligation, heel.     HC REVISE MEDIAN  N/CARPAL TUNNEL SURG      Left in , right in      INJ, ANES/STER, FACET LUMB/SAC  2010    Left L5 nerve root injection     INJECT EPIDURAL CERVICAL Right 2022    Procedure: Cervical 7-Thoracic 1 Interlaminar epidural steroid injection using fluoroscopic guidance with contrast dye, Right;  Surgeon: Tyler Bay MD;  Location: [facility]     INJECT EPIDURAL TRANSFORAMINAL  10/09/2013    [facility]     INJECT JOINT SACROILIAC  3/19/14,14    [facility]     [facility] NONSPECIFIC PROCEDURE  2009    Metatarsal phalangeal joint injection     [facility] COLONOSCOPY THRU STOMA, DIAGNOSTIC  2002    bx. benign - flex sig in 5 yrs  or colonoscopy in 10 yrs     [facility] ECHO HEART XTHORACIC, STRESS/REST  2005    WNL     [facility] NCS MOTOR W/O F-WAVE, EACH NERVE  2008    CTS       Social History     Tobacco Use     Smoking status: Former     Packs/day: 0.50     Years: 30.00     Pack years: 15.00     Types: Cigarettes     Quit date: 3/20/1998     Years since quittin.7     Smokeless tobacco: Never     Tobacco comments:     No smokers in home   Substance Use Topics     Alcohol use: Yes     Alcohol/week: 6.7 standard drinks     Comment: 2x/week     Family History   Problem Relation Age of Onset     Diabetes Father      Depression Father      Cerebrovascular Disease Mother         age 80     Arthritis Mother      Depression Mother         On meds     Eye Disorder Mother         Glaucoma     Osteoporosis Mother      Respiratory Mother          88 YO COPD     Chronic Obstructive Pulmonary Disease Mother      C.A.D. Brother         65 YO MI -     Chronic Obstructive Pulmonary Disease Brother      Myocardial Infarction Brother      Other Cancer Brother      Cancer Brother          Current Outpatient Medications   Medication Sig Dispense Refill     acetaminophen (TYLENOL) 650 MG CR tablet Take 650 mg by mouth every 8 hours as needed for mild pain or fever       albuterol (PROAIR HFA) 108 (90 Base)  MCG/ACT inhaler Inhale 1 puff into the lungs every 6 hours as needed for shortness of breath / dyspnea or wheezing 1 g prn     albuterol (PROAIR HFA/PROVENTIL HFA/VENTOLIN HFA) 108 (90 Base) MCG/ACT inhaler Inhale 2 puffs into the lungs every 6 hours 18 g 1     ALLEGRA 180 MG OR TABS Take 180 mg by mouth daily  30 0     amitriptyline (ELAVIL) 10 MG tablet TAKE 3 TO 4 TABLETS AT BEDTIME 270 tablet 4     buPROPion (WELLBUTRIN XL) 150 MG 24 hr tablet TAKE 1 TABLET EVERY MORNING 90 tablet 2     CALCIUM /VITAMIN D TABS   OR Take 1 tablet by mouth daily        Cholecalciferol (VITAMIN D PO) Take 2 tablets by mouth daily        hydrOXYzine (ATARAX) 25 MG tablet Take 1-2 tablets (25-50 mg) by mouth every 8 hours as needed for anxiety or other (insomnia) 60 tablet 1     meclizine (ANTIVERT) 25 MG tablet Take 25 mg by mouth daily as needed for dizziness       Melatonin 10 MG TABS tablet Take 10 mg by mouth nightly as needed for sleep       mometasone-formoterol (DULERA) 100-5 MCG/ACT oral inhaler Inhale 1 puff into the lungs 2 times daily        multivitamin w/minerals (THERA-VIT-M) tablet Take 1 tablet by mouth daily RAW Brand       naproxen (NAPROSYN) 500 MG tablet Take 500 mg by mouth       omeprazole (PRILOSEC) 20 MG DR capsule Take 1 capsule (20 mg) by mouth daily 30 capsule 1     raloxifene (EVISTA) 60 MG tablet TAKE 1 TABLET DAILY 90 tablet 1     Simethicone (GAS-X PO) Take 160 mg by mouth 4 times daily as needed for intestinal gas        triamcinolone (NASACORT) 55 MCG/ACT nasal inhaler Spray 2 sprays into both nostrils 2 times daily        UNABLE TO FIND Swish and swallow 1 capful in mouth daily MEDICATION NAME: Colloidal Silver water.  Swish for about a minute and then swallow.       VITAMIN C-MARK HIPS CR 1000 MG OR TBCR 1 TABLET DAILY       VITAMIN E PO Take 2 capsules by mouth daily        famotidine (PEPCID) 20 MG tablet Take 20 mg by mouth daily as needed       Allergies   Allergen Reactions     Penicillins  "Itching     Vaginal itching - Yeast infection afterward         Breast CA Risk Assessment (FHS-7) 12/5/2022   Do you have a family history of breast, colon, or ovarian cancer? No / Unknown           Pertinent mammograms are reviewed under the imaging tab.    Review of Systems   Constitutional: Negative for chills and fever.   HENT: Negative for congestion, ear pain, hearing loss and sore throat.    Eyes: Negative for pain and visual disturbance.   Respiratory: Negative for cough and shortness of breath.    Cardiovascular: Positive for peripheral edema. Negative for chest pain and palpitations.   Gastrointestinal: Positive for abdominal pain and heartburn. Negative for constipation, diarrhea, hematochezia and nausea.   Breasts:  Negative for tenderness, breast mass and discharge.   Genitourinary: Negative for dysuria, frequency, genital sores, hematuria, pelvic pain, urgency, vaginal bleeding and vaginal discharge.   Musculoskeletal: Positive for myalgias. Negative for arthralgias and joint swelling.   Skin: Negative for rash.   Neurological: Positive for weakness and headaches. Negative for dizziness and paresthesias.   Psychiatric/Behavioral: Negative for mood changes. The patient is not nervous/anxious.      Patient has concerns today about reflux.  She states it has been \"awful recently.  She is taking Pepcid over-the-counter.  It helps to some extent but recently, she is unsure how long, she has had a sensation that food is getting stuck in her throat or in her chest.  She has not actually choked on anything.  She does have a burning acidic feeling in her upper abdomen and chest as well no blood in her stool.  She has been taking more naproxen than typical.  She did have upper GI endoscopy in 2018 which was entirely normal.    She has had right sided pelvic pain is very sharp at times.  She was taking a vitamin D supplement that caused some diarrhea and gassiness.  She has since discontinued this in the diarrhea " "and gassiness has improved.  She does not feel any lateral hip pain but does have right inguinal pain.  She has never had any surgeries done of her uterus or ovaries.  She thinks perhaps in retrospect it is more her hip joint that is causing pain but cannot be sure.  This is better now as well    She did have a steroid injection completed that has helped as long she is very careful with use of her arm her pain is tolerable.    She reports that she can have nosebleeds at any time.  She sees an asthma and allergy specialist.  They prescribed her medications including a steroid nasal spray.  She wonders if this is not was causing her nosebleeds.    OBJECTIVE:   /64   Pulse 97   Temp 98  F (36.7  C) (Temporal)   Resp 18   Ht 1.572 m (5' 1.89\")   Wt 59 kg (130 lb)   LMP 02/26/2008 (Approximate)   SpO2 99%   Breastfeeding No   BMI 23.86 kg/m   Estimated body mass index is 23.86 kg/m  as calculated from the following:    Height as of this encounter: 1.572 m (5' 1.89\").    Weight as of this encounter: 59 kg (130 lb).  Physical Exam  GENERAL: healthy, alert and no distress  NECK: no adenopathy, no asymmetry, masses, or scars and thyroid normal to palpation  RESP: lungs clear to auscultation - no rales, rhonchi or wheezes  CV: regular rate and rhythm, normal S1 S2, no S3 or S4, no murmur, click or rub, no peripheral edema and peripheral pulses strong  ABDOMEN: soft, nontender, no hepatosplenomegaly, no masses and bowel sounds normal  MS: no gross musculoskeletal defects noted, no edema  PSYCH: mentation appears normal, affect normal/bright    Diagnostic Test Results:  Labs reviewed in Epic  none     ASSESSMENT / PLAN:   (K21.01) Gastroesophageal reflux disease with esophagitis and hemorrhage  (primary encounter diagnosis)  Comment: We will have her discontinue famotidine for now and we will order upper GI endoscopy she will start omeprazole daily  Plan: Adult GI  Referral - Procedure Only,         " omeprazole (PRILOSEC) 20 MG DR capsule        Limiting the naproxen would be important and when she must take it she should take it with food.  Further follow-up once we receive the results of her upper GI endoscopy    (R13.10) Dysphagia, unspecified type  Comment:   Plan: Adult GI  Referral - Procedure Only,         omeprazole (PRILOSEC) 20 MG DR capsule        As above    (R10.2) Pelvic pain in female  Comment: Patient does not have pain currently but we will rule out any ovarian pathology with a pelvic ultrasound  Plan: US Pelvic Complete with Transvaginal        If right pelvic/hip pain comes back we could consider x-ray of her hip as well.            COUNSELING:          She reports that she quit smoking about 24 years ago. Her smoking use included cigarettes. She has a 15.00 pack-year smoking history. She has never used smokeless tobacco.      Appropriate preventive services were discussed with this patient, including applicable screening as appropriate for cardiovascular disease, diabetes, osteopenia/osteoporosis, and glaucoma.  As appropriate for age/gender, discussed screening for colorectal cancer, prostate cancer, breast cancer, and cervical cancer. Checklist reviewing preventive services available has been given to the patient.    Reviewed patients plan of care and provided an AVS. The  for Margareth meets the Care Plan requirement. This Care Plan has been established and reviewed with the .      Caitlin Beasley PA-C  [facility]    Identified Health Risks:

## 2022-12-07 ENCOUNTER — TELEPHONE (OUTPATIENT)
Dept: GASTROENTEROLOGY | Facility: CLINIC | Age: 67
End: 2022-12-07

## 2022-12-07 NOTE — TELEPHONE ENCOUNTER
Caller: Margareth    Procedure: EGD    Date, Location, and Surgeon of Procedure Cancelled: 12/12/2022, Thais PALOMO    Ordering Provider:HAFSA ARROYO    Reason for cancel (please be detailed, any staff messages or encounters to note?): Patient called right back to change because insurance company told patient it needs to be in the new year.         Rescheduled: Yes     If rescheduled:    Date: 2/1/2023   Location:    Prep Resent: NO(changes to prep?)   Covid Test Rescheduled:    Note any change or update to original order/sedation: NO

## 2022-12-07 NOTE — TELEPHONE ENCOUNTER
Screening Questions  BLUE  KIND OF PREP RED  LOCATION [review exclusion criteria] GREEN  SEDATION TYPE    Y Are you active on mychart?   Caitlin Beasley PA-C  Ordering/Referring Provider?    [facility]/MEDICARE  What type of coverage do you have?  N Have you had a positive covid test in the last 14 days?    24.6  1. BMI  [BMI 40+ - review exclusion criteria]    SELF   2. Are you able to give consent for your medical care? [IF NO,RN REVIEW]        N  3. Are you taking any prescription pain medications on a routine schedule?        N 3a. EXTENDED PREP What kind of prescription?     N 4. Do you have any chemical dependencies such as alcohol, street drugs, or methadone?    N 5. Do you have any history of post-traumatic stress syndrome, severe anxiety or history of psychosis?      **If yes 3- 5 , please schedule with MAC sedation.**          IF YES TO ANY 6 - 10 - HOSPITAL SETTING ONLY.     N 6.   Do you need assistance transferring?     N 7.   Have you had a heart or lung transplant?    N 8.   Are you currently on dialysis?   N 9.   Do you use daily home oxygen?   N 10. Do you take nitroglycerin?   10a. N If yes, how often?     11. [FEMALES]   Are you currently pregnant?     11a.  If yes, how many weeks? [ Greater than 12 weeks, OR NEEDED]    N 12. [review exclusion criteria]  Do you have any implantable devices in your body (pacemaker, defib, LVAD)?    N 13. Do you have Pulmonary Hypertension?             *NEED PAC APPT AT [facility]*     N 14. In the past 6 months, have you had any heart related issues including cardiomyopathy or heart attack?     N 14a. If yes, did it require cardiac stenting if so when?     N 15. Have you had a stroke or Transient ischemic attack (TIA - aka  mini stroke ) within 6 months?      N 16. Do you have mod to severe Obstructive Sleep Apnea?  [Hospital only - Ok at [place]]    N 17. Do you have SEVERE AND UNCONTROLLED asthma?              *NEED PAC APPT AT [facility]*     N 18. Are you currently taking  "any blood thinners?     18a. If yes, inform patient to \"follow up w/ ordering provider for bridging instructions.\"    N 19. Do you take the medication Phentermine?    19a. If yes, \"Hold for 7 days before procedure.  Please consult your prescribing provider if you have questions about holding this medication.\"     N  20. Do you have chronic kidney disease?      N  21. Do you have a diagnosis of diabetes?     N  22. On a regular basis do you go 3-5 days between bowel movements?     23. Preferred LOCAL Pharmacy for Pre Prescription    [ LIST ONLY ONE PHARMACY]        [address]          - CLOSING REMINDERS -    Informed patient they will need an adult    Cannot take any type of public or medical transportation alone    Conscious Sedation- Needs  for 6 hours after the procedure       MAC/General-Needs  for 2124 hours after procedure    Pre-Procedure Covid test to be completed [[facility] PCR Testing Required]    Confirmed Nurse will call to complete assessment       - SCHEDULING DETAILS -    Additional comments:  SHELL MURRY   Surgeon   12/12/2022  Date of Procedure  MAC  Sedation Type     N PAC / Pre-op Required   Location  [facility]        Type of Procedure Scheduled  Upper Endoscopy [EGD]            "

## 2022-12-15 ENCOUNTER — HOSPITAL ENCOUNTER (OUTPATIENT)
Dept: ULTRASOUND IMAGING | Facility: CLINIC | Age: 67
Discharge: HOME | End: 2022-12-15
Attending: PHYSICIAN ASSISTANT | Admitting: PHYSICIAN ASSISTANT
Payer: COMMERCIAL

## 2022-12-15 DIAGNOSIS — R10.2 PELVIC PAIN IN FEMALE: ICD-10-CM

## 2022-12-15 PROCEDURE — 76830 TRANSVAGINAL US NON-OB: CPT

## 2023-01-03 NOTE — PROGRESS NOTES
[facility]    Outpatient Physical Therapy Discharge Note  Patient: Margareth Green  : 1955    Beginning/End Dates of Reporting Period:  3/22/22 to 22    Referring Provider: Caitlin Beasley PA-C    Therapy Diagnosis: neck and upper back pain     Client Self Report: from the neck to the mid right shoulder #3, the pain will past the shoulder about 1x per day , goes away in 10 min, palm of the right hand is a #1    Objective Measurements:       Objective Measure: neck pain  Details: #3  Objective Measure: Ability to lay on the right side  Details: cannot  Objective Measure: right UE pain  Details: see above       Goals:  Pain not fully resolved seeing spine Dr    Plan:  Discharge from therapy.    Discharge:    Reason for Discharge: Patient seeing spine specialist      Discharge Plan: Patient to continue home program.    Thank you for the referral,            Leana James PT

## 2023-01-31 ENCOUNTER — ANESTHESIA EVENT (OUTPATIENT)
Dept: GASTROENTEROLOGY | Facility: CLINIC | Age: 68
End: 2023-01-31
Payer: COMMERCIAL

## 2023-01-31 ASSESSMENT — LIFESTYLE VARIABLES: TOBACCO_USE: 1

## 2023-01-31 NOTE — H&P
[facility] Anesthesia Pre-op History and Physical    Margareth Green MRN# [id number]   Age: 68 year old YOB: 1955      Date of Surgery: 2/01/2023 Location [facility]      Date of Exam 2/01/2023 Facility (Same day)       Home clinic: [facility]  Primary care provider: Caitlin Beasley         Chief Complaint and/or Reason for Procedure:   No chief complaint on file.  EGD.  Dysphagia. GERD.  2018 EGD reviewed.       Active problem list:     Patient Active Problem List    Diagnosis Date Noted     Personal history of COVID-19 01/19/2022     Priority: Medium     T12 compression fracture, sequela 01/19/2022     Priority: Medium     Gastroesophageal reflux disease without esophagitis 11/30/2016     Priority: Medium     Tremor 11/30/2016     Priority: Medium     Cramp of limb 11/30/2016     Priority: Medium     Major depressive disorder, recurrent episode, moderate (H) 10/13/2015     Priority: Medium     Epistaxis 08/15/2013     Priority: Medium     Do you wish to do the replacement in the background? yes         Numbness of left jaw 08/07/2013     Priority: Medium     Neck pain 08/07/2013     Priority: Medium     TMJ (temporomandibular joint syndrome) 08/07/2013     Priority: Medium     Thyroid fullness 08/07/2013     Priority: Medium     Menopausal flushing 05/10/2012     Priority: Medium     Knee pain, left 03/22/2012     Priority: Medium     Mild major depression (H) 11/21/2011     Priority: Medium     Osteoporosis 12/30/2010     Priority: Medium     Atrophic vaginitis 12/07/2010     Priority: Medium     Stenosis of lumbosacral spine      Priority: Medium     l4-5 and l5-s1       Derangement of TMJ (temporomandibular joint) 03/04/2010     Priority: Medium     Hammertoe      Priority: Medium     Osteopenia      Priority: Medium     Menopausal syndrome 12/06/2008     Priority: Medium     DDD (degenerative disc disease), lumbar 06/24/2008      Priority: Medium     Arthralgia 06/12/2008     Priority: Medium     Irritable bowel syndrome 02/18/2008     Priority: Medium     Allergic rhinitis 11/25/2005     Priority: Medium     Problem list name updated by automated process. Provider to review       Mild persistent asthma 10/25/2005     Priority: Medium     Cervicalgia 10/25/2005     Priority: Medium     Malaise and fatigue 04/26/2005     Priority: Medium     Problem list name updated by automated process. Provider to review       Insomnia 12/09/2004     Priority: Medium     Problem list name updated by automated process. Provider to review       TAMMY (generalized anxiety disorder) 09/12/2003     Priority: Medium     Generalized osteoarthrosis, unspecified site 12/19/2002     Priority: Medium     Symptomatic menopausal or female climacteric states 12/15/2002     Priority: Medium     Dermatophytosis of nail 08/20/2002     Priority: Medium            Medications (include herbals and vitamins):   Any Plavix use in the last 7 days? No     No current facility-administered medications for this encounter.     Current Outpatient Medications   Medication Sig     acetaminophen (TYLENOL) 650 MG CR tablet Take 650 mg by mouth every 8 hours as needed for mild pain or fever     albuterol (PROAIR HFA) 108 (90 Base) MCG/ACT inhaler Inhale 1 puff into the lungs every 6 hours as needed for shortness of breath / dyspnea or wheezing     albuterol (PROAIR HFA/PROVENTIL HFA/VENTOLIN HFA) 108 (90 Base) MCG/ACT inhaler Inhale 2 puffs into the lungs every 6 hours     ALLEGRA 180 MG OR TABS Take 180 mg by mouth daily      amitriptyline (ELAVIL) 10 MG tablet TAKE 3 TO 4 TABLETS AT BEDTIME     buPROPion (WELLBUTRIN XL) 150 MG 24 hr tablet TAKE 1 TABLET EVERY MORNING     CALCIUM /VITAMIN D TABS   OR Take 1 tablet by mouth daily      Cholecalciferol (VITAMIN D PO) Take 2 tablets by mouth daily      famotidine (PEPCID) 20 MG tablet Take 20 mg by mouth daily as needed     hydrOXYzine (ATARAX)  25 MG tablet Take 1-2 tablets (25-50 mg) by mouth every 8 hours as needed for anxiety or other (insomnia)     meclizine (ANTIVERT) 25 MG tablet Take 25 mg by mouth daily as needed for dizziness     Melatonin 10 MG TABS tablet Take 10 mg by mouth nightly as needed for sleep     mometasone-formoterol (DULERA) 100-5 MCG/ACT oral inhaler Inhale 1 puff into the lungs 2 times daily      multivitamin w/minerals (THERA-VIT-M) tablet Take 1 tablet by mouth daily RAW Brand     naproxen (NAPROSYN) 500 MG tablet Take 500 mg by mouth     omeprazole (PRILOSEC) 20 MG DR capsule Take 1 capsule (20 mg) by mouth daily     raloxifene (EVISTA) 60 MG tablet TAKE 1 TABLET DAILY     Simethicone (GAS-X PO) Take 160 mg by mouth 4 times daily as needed for intestinal gas      triamcinolone (NASACORT) 55 MCG/ACT nasal inhaler Spray 2 sprays into both nostrils 2 times daily      UNABLE TO FIND Swish and swallow 1 capful in mouth daily MEDICATION NAME: Colloidal Silver water.  Swish for about a minute and then swallow.     VITAMIN C-MARK HIPS CR 1000 MG OR TBCR 1 TABLET DAILY     VITAMIN E PO Take 2 capsules by mouth daily              Allergies:      Allergies   Allergen Reactions     Penicillins Itching     Vaginal itching - Yeast infection afterward     Allergy to Latex? No  Allergy to tape?   No  Intolerances:             Physical Exam:   All vitals have been reviewed  No data found.  No intake/output data recorded.  Lungs:   No increased work of breathing, good air exchange, clear to auscultation bilaterally, no crackles or wheezing     Cardiovascular:   Normal apical impulse, regular rate and rhythm, normal S1 and S2, no S3 or S4, and no murmur noted             Lab / Radiology Results:            Anesthetic risk and/or ASA classification:       Lui Vences MD      (4) walks frequently

## 2023-02-01 ENCOUNTER — ANESTHESIA (OUTPATIENT)
Dept: GASTROENTEROLOGY | Facility: CLINIC | Age: 68
End: 2023-02-01
Payer: COMMERCIAL

## 2023-02-01 ENCOUNTER — HOSPITAL ENCOUNTER (OUTPATIENT)
Facility: CLINIC | Age: 68
Discharge: HOME | End: 2023-02-01
Attending: INTERNAL MEDICINE | Admitting: INTERNAL MEDICINE
Payer: COMMERCIAL

## 2023-02-01 VITALS
OXYGEN SATURATION: 97 % | DIASTOLIC BLOOD PRESSURE: 89 MMHG | HEART RATE: 88 BPM | SYSTOLIC BLOOD PRESSURE: 105 MMHG | RESPIRATION RATE: 20 BRPM | TEMPERATURE: 98.7 F

## 2023-02-01 PROCEDURE — 250N000009 HC RX 250: Performed by: NURSE ANESTHETIST, CERTIFIED REGISTERED

## 2023-02-01 PROCEDURE — 258N000003 HC RX IP 258 OP 636: Performed by: NURSE ANESTHETIST, CERTIFIED REGISTERED

## 2023-02-01 PROCEDURE — 370N000017 HC ANESTHESIA TECHNICAL FEE, PER MIN: Performed by: INTERNAL MEDICINE

## 2023-02-01 PROCEDURE — 43235 EGD DIAGNOSTIC BRUSH WASH: CPT | Performed by: INTERNAL MEDICINE

## 2023-02-01 PROCEDURE — 250N000011 HC RX IP 250 OP 636: Performed by: NURSE ANESTHETIST, CERTIFIED REGISTERED

## 2023-02-01 RX ORDER — LIDOCAINE HYDROCHLORIDE 20 MG/ML
INJECTION, SOLUTION INFILTRATION; PERINEURAL PRN
Status: DISCONTINUED | OUTPATIENT
Start: 2023-02-01 | End: 2023-02-01

## 2023-02-01 RX ORDER — SODIUM CHLORIDE, SODIUM LACTATE, POTASSIUM CHLORIDE, CALCIUM CHLORIDE 600; 310; 30; 20 MG/100ML; MG/100ML; MG/100ML; MG/100ML
INJECTION, SOLUTION INTRAVENOUS CONTINUOUS
Status: DISCONTINUED | OUTPATIENT
Start: 2023-02-01 | End: 2023-02-01 | Stop reason: HOSPADM

## 2023-02-01 RX ORDER — PROPOFOL 10 MG/ML
INJECTION, EMULSION INTRAVENOUS CONTINUOUS PRN
Status: DISCONTINUED | OUTPATIENT
Start: 2023-02-01 | End: 2023-02-01

## 2023-02-01 RX ORDER — PROPOFOL 10 MG/ML
INJECTION, EMULSION INTRAVENOUS PRN
Status: DISCONTINUED | OUTPATIENT
Start: 2023-02-01 | End: 2023-02-01

## 2023-02-01 RX ADMIN — SODIUM CHLORIDE, POTASSIUM CHLORIDE, SODIUM LACTATE AND CALCIUM CHLORIDE: 600; 310; 30; 20 INJECTION, SOLUTION INTRAVENOUS at 13:33

## 2023-02-01 RX ADMIN — LIDOCAINE HYDROCHLORIDE 60 MG: 20 INJECTION, SOLUTION INFILTRATION; PERINEURAL at 14:02

## 2023-02-01 RX ADMIN — PROPOFOL 50 MG: 10 INJECTION, EMULSION INTRAVENOUS at 14:02

## 2023-02-01 RX ADMIN — PROPOFOL 150 MCG/KG/MIN: 10 INJECTION, EMULSION INTRAVENOUS at 14:03

## 2023-02-01 RX ADMIN — LIDOCAINE HYDROCHLORIDE 1 ML: 10 INJECTION, SOLUTION EPIDURAL; INFILTRATION; INTRACAUDAL; PERINEURAL at 13:33

## 2023-02-01 ASSESSMENT — ACTIVITIES OF DAILY LIVING (ADL): ADLS_ACUITY_SCORE: 33

## 2023-02-01 NOTE — DISCHARGE INSTRUCTIONS
[facility]    Home Care Following Endoscopy          Activity:  You have just undergone an endoscopic procedure usually performed with conscious sedation.  Do not work or operate machinery (including a car) for at least 12 hours.    I encourage you to walk and attempt to pass this air as soon as possible.    Diet:  Return to the diet you were on before your procedure but eat lightly for the first 12-24 hours.  Drink plenty of water.  Resume any regular medications unless otherwise advised by your physician.  Please begin any new medication prescribed as a result of your procedure as directed by your physician.   If you had any biopsy or polyp removed please refrain from aspirin or aspirin products for 2 days.  If on Coumadin please restart as instructed by your physician.   Pain:  You may take Tylenol as needed for pain.  Expected Recovery:  You can expect some mild abdominal fullness and/or discomfort due to the air used to inflate your intestinal tract. It is also normal to have a mild sore throat after upper endoscopy.    Call Your Physician if You Have:  After Upper Endoscopy:  Shoulder, back or chest pain.  Difficulty breathing or swallowing.  Vomiting blood.    Any questions or concerns about your recovery, please call [phone] or after hours [phone] Nurse Advice Line.    Follow-up Care:  You did have polyps/biopsy tissue sample(s) removed.  The polyps/biopsy tissue sample(s) will be sent to pathology.  You should receive letter in your My Chart from *** with your results within 1-2 weeks. If you do not participate in My Chart a physical letter will come in the mail in 2-3 weeks.  Please call if you have not received a notification of your results.  If asked to return to clinic please make an appointment 1 week after your procedure.  Call [phone].

## 2023-02-01 NOTE — ANESTHESIA CARE TRANSFER NOTE
Patient: Margareth Green    Procedure: Procedure(s):  ESOPHAGOGASTRODUODENOSCOPY (EGD)       Diagnosis: Gastroesophageal reflux disease with esophagitis and hemorrhage [K21.01]  Dysphagia, unspecified type [R13.10]  Diagnosis Additional Information: No value filed.    Anesthesia Type:   MAC     Note:    Oropharynx: oropharynx clear of all foreign objects and spontaneously breathing  Level of Consciousness: drowsy  Oxygen Supplementation: face mask    Independent Airway: airway patency satisfactory and stable  Dentition: dentition unchanged  Vital Signs Stable: post-procedure vital signs reviewed and stable  Report to RN Given: handoff report given  Patient transferred to: Phase II    Handoff Report: Identifed the Patient, Identified the Reponsible Provider, Reviewed the pertinent medical history, Discussed the surgical course, Reviewed Intra-OP anesthesia mangement and issues during anesthesia, Set expectations for post-procedure period and Allowed opportunity for questions and acknowledgement of understanding      Vitals:  Vitals Value Taken Time   BP     Temp     Pulse     Resp     SpO2         Electronically Signed By: JUAN Burdick CRNA  February 1, 2023  2:08 PM

## 2023-02-01 NOTE — ANESTHESIA POSTPROCEDURE EVALUATION
Patient: Margareth Green    Procedure: Procedure(s):  ESOPHAGOGASTRODUODENOSCOPY (EGD)       Anesthesia Type:  MAC    Note:  Disposition: Outpatient   Postop Pain Control: Uneventful            Sign Out: Well controlled pain   PONV: No   Neuro/Psych: Uneventful            Sign Out: Acceptable/Baseline neuro status   Airway/Respiratory: Uneventful            Sign Out: Acceptable/Baseline resp. status   CV/Hemodynamics: Uneventful            Sign Out: Acceptable CV status   Other NRE: NONE   DID A NON-ROUTINE EVENT OCCUR? No    Event details/Postop Comments:  Pt was happy with anesthesia care.  No complications.  I will follow up with the pt if needed.           Last vitals:  Vitals:    02/01/23 1324   BP: 134/87   Resp: 20   Temp: 98.7  F (37.1  C)   SpO2: 99%       Electronically Signed By: JUAN Burdick CRNA  February 1, 2023  2:25 PM

## 2023-02-01 NOTE — ANESTHESIA PREPROCEDURE EVALUATION
Anesthesia Pre-Procedure Evaluation    Patient: Margareth Green   MRN: [id number] : 1955        Procedure : Procedure(s):  ESOPHAGOGASTRODUODENOSCOPY (EGD)          Past Medical History:   Diagnosis Date     Abnormal Papanicolaou smear of vagina and vaginal HPV 2002    ASCUS (HPV neg). 3 WNL but no transitional zome - repeat 3months     Allergic rhinitis, cause unspecified      Arthritis      Backache, unspecified      Carpal tunnel syndrome     s/p repair     Cervicalgia      Chronic kidney disease, stage 3 (H) 2021     Depressive disorder      Depressive disorder, not elsewhere classified     doing well on Wellbutrin     Derangement of TMJ (temporomandibular joint) 2010    internal derangement     Dermatophytosis of nail      Diaphragmatic hernia without mention of obstruction or gangrene 2007    small hiatal hernia     Diffuse cystic mastopathy     FCBD     Esophageal reflux 2005     Hammertoe 10/2009    see podiatry consult     Headache(784.0)     Muscle contrature headaches     Intramural leiomyoma of uterus 2007    on US     Irregular menstrual cycle     better with BCP -also PMS is better with BCP     IRRITABLE COLON 2008     Malaise and fatigue      Menopause     lmp 2008     Mild persistent asthma      Mole (skin)     9 x 6 mm rt ant chest     Motion sickness      Neuroma 2008    lt foot     Osteopenia 2008     Other malaise and fatigue 2005    stress     Personal history of tobacco use, presenting hazards to health     Quit in      Solitary cyst of breast 2005    lt 6 oclock     Stenosis of lumbosacral spine     l4-5 and l5-s1     Symptomatic menopausal or female climacteric states 2002    perimenopausal. LMP 2008     Tachycardia, paroxysmal (H) 2020      Past Surgical History:   Procedure Laterality Date     BIOPSY  4/10/2015     BL DUPLEX LO EXTREM ART UNILAT/LTD  3008    lower extr arterial doppler -no stenosis     C DEXA,BONE  DENSITY,APPENDICULR SKELTN  2008    osteopenia     COLONOSCOPY  10/22/2007    bx benign     COLONOSCOPY N/A 4/10/2015    Procedure: COMBINED COLONOSCOPY, SINGLE OR MULTIPLE BIOPSY/POLYPECTOMY BY BIOPSY;  Surgeon: Cl Wagner MD;  Location:  GI     ESOPHAGOSCOPY, GASTROSCOPY, DUODENOSCOPY (EGD), COMBINED N/A 2018    Procedure: COMBINED ESOPHAGOSCOPY, GASTROSCOPY, DUODENOSCOPY (EGD);  COMBINED ESOPHAGOSCOPY, GASTROSCOPY, DUODENOSCOPY (EGD);  Surgeon: Tirso Duran MD;  Location:  GI     EYE SURGERY       HC PART REMV BONE METATARSAL HEAD,EA  01/11/10    Right foot plantar plate tear. Also correct hammertoe, 2nd digit & varicose vein ligation, heel.     HC REVISE MEDIAN N/CARPAL TUNNEL SURG      Left in , right in      INJ, ANES/STER, FACET LUMB/SAC      Left L5 nerve root injection     INJECT EPIDURAL CERVICAL Right 2022    Procedure: Cervical 7-Thoracic 1 Interlaminar epidural steroid injection using fluoroscopic guidance with contrast dye, Right;  Surgeon: Tyler Bay MD;  Location:  OR     INJECT EPIDURAL TRANSFORAMINAL  10/09/2013    [facility]     INJECT JOINT SACROILIAC  3/19/14,14    [facility]     [facility] NONSPECIFIC PROCEDURE      Metatarsal phalangeal joint injection     [facility] COLONOSCOPY THRU STOMA, DIAGNOSTIC  2002    bx. benign - flex sig in 5 yrs  or colonoscopy in 10 yrs     [facility] ECHO HEART XTHORACIC, STRESS/REST  2005    WNL     [facility] NCS MOTOR W/O F-WAVE, EACH NERVE  2008    CTS      Allergies   Allergen Reactions     Penicillins Itching     Vaginal itching - Yeast infection afterward      Social History     Tobacco Use     Smoking status: Former     Packs/day: 0.50     Years: 30.00     Pack years: 15.00     Types: Cigarettes     Quit date: 3/20/1998     Years since quittin.8     Smokeless tobacco: Never     Tobacco comments:     No smokers in home   Substance Use Topics     Alcohol use: Yes     Alcohol/week: 6.7  standard drinks     Comment: 2x/week      Wt Readings from Last 1 Encounters:   12/06/22 59 kg (130 lb)        Anesthesia Evaluation   Pt has had prior anesthetic. Type: General and MAC.    History of anesthetic complications  - PONV.      ROS/MED HX  ENT/Pulmonary: Comment: H/O Epistaxis  TMJ  Diaphragmatic hernia     (+) allergic rhinitis, tobacco use, Current use, Mild Persistent, asthma Treatment: Inhaler prn,      Neurologic: Comment: Tremor      Cardiovascular: Comment: H/O Tachycardia, paroxysmal     (+) -----Previous cardiac testing   Echo: Date: 10/16/2019 Results:  Normal   Stress Test: Date: Results:    ECG Reviewed: Date: 10/16/2019 Results:  SR  Cath: Date: Results:      METS/Exercise Tolerance:     Hematologic:       Musculoskeletal: Comment: Arthralgia  Cervicalgia  DDD (degenerative disc disease), lumbar  Malaise and fatigue  Stenosis of lumbosacral spine  (+) arthritis,     GI/Hepatic: Comment: Dysphagia.    (+) GERD,     Renal/Genitourinary:     (+) renal disease, type: CRI, Pt does not require dialysis,     Endo:  - neg endo ROS     Psychiatric/Substance Use:     (+) psychiatric history depression and anxiety     Infectious Disease:  - neg infectious disease ROS     Malignancy:  - neg malignancy ROS     Other:            Physical Exam    Airway  airway exam normal      Mallampati: II   TM distance: > 3 FB   Neck ROM: full   Mouth opening: > 3 cm    Respiratory Devices and Support         Dental           Cardiovascular   cardiovascular exam normal       Rhythm and rate: regular and normal     Pulmonary   pulmonary exam normal        breath sounds clear to auscultation           OUTSIDE LABS:  CBC:   Lab Results   Component Value Date    WBC 5.0 12/06/2021    WBC 6.3 07/13/2021    HGB 13.2 12/06/2021    HGB 13.5 07/13/2021    HCT 39.4 12/06/2021    HCT 41.2 07/13/2021    PLT 90 (L) 12/06/2021     07/13/2021     BMP:   Lab Results   Component Value Date     12/06/2021      07/13/2021    POTASSIUM 3.9 12/06/2021    POTASSIUM 3.6 07/13/2021    CHLORIDE 105 12/06/2021    CHLORIDE 109 07/13/2021    CO2 26 12/06/2021    CO2 31 07/13/2021    BUN 13 12/06/2021    BUN 21 07/13/2021    CR 0.68 12/06/2021    CR 0.99 07/13/2021     (H) 12/06/2021    GLC 77 07/13/2021     COAGS:   Lab Results   Component Value Date    PTT 31 01/30/2012    INR 1.00 01/30/2012     POC: No results found for: BGM, HCG, HCGS  HEPATIC:   Lab Results   Component Value Date    ALBUMIN 2.4 (L) 12/06/2021    PROTTOTAL 6.0 (L) 12/06/2021    ALT 22 12/06/2021    AST 54 (H) 12/06/2021    ALKPHOS 33 (L) 12/06/2021    BILITOTAL 0.3 12/06/2021     OTHER:   Lab Results   Component Value Date    PH 7.46 (H) 12/06/2021    A1C 5.6 01/19/2022    TOLU 7.9 (L) 12/06/2021    MAG 1.9 12/06/2021    LIPASE 284 12/06/2021    TSH 2.29 10/14/2019    CRP 25.9 (H) 03/31/2013    SED 6 03/31/2013       Anesthesia Plan    ASA Status:  3   NPO Status:  NPO Appropriate    Anesthesia Type: MAC.     - Reason for MAC: straight local not clinically adequate   Induction: Intravenous, Propofol.   Maintenance: TIVA.        Consents    Anesthesia Plan(s) and associated risks, benefits, and realistic alternatives discussed. Questions answered and patient/representative(s) expressed understanding.    - Discussed:     - Discussed with:  Patient      - Extended Intubation/Ventilatory Support Discussed: No.      - Patient is DNR/DNI Status: No    Use of blood products discussed: No .     Postoperative Care    Pain management: Oral pain medications.   PONV prophylaxis: Ondansetron (or other 5HT-3)     Comments:                JUAN Martinez CRNA

## 2023-02-03 ENCOUNTER — MYC MEDICAL ADVICE (OUTPATIENT)
Dept: FAMILY MEDICINE | Facility: CLINIC | Age: 68
End: 2023-02-03
Payer: COMMERCIAL

## 2023-02-03 LAB — UPPER GI ENDOSCOPY: NORMAL

## 2023-02-05 ENCOUNTER — MYC MEDICAL ADVICE (OUTPATIENT)
Dept: FAMILY MEDICINE | Facility: CLINIC | Age: 68
End: 2023-02-05
Payer: COMMERCIAL

## 2023-03-01 ENCOUNTER — MYC MEDICAL ADVICE (OUTPATIENT)
Dept: FAMILY MEDICINE | Facility: CLINIC | Age: 68
End: 2023-03-01
Payer: COMMERCIAL

## 2023-03-01 DIAGNOSIS — R13.10 DYSPHAGIA, UNSPECIFIED TYPE: ICD-10-CM

## 2023-03-01 DIAGNOSIS — K21.01 GASTROESOPHAGEAL REFLUX DISEASE WITH ESOPHAGITIS AND HEMORRHAGE: ICD-10-CM

## 2023-03-01 NOTE — TELEPHONE ENCOUNTER
"Pending Prescriptions:                       Disp   Refills    omeprazole (PRILOSEC) 20 MG DR capsule     30 cap*1        Sig: Take 1 capsule (20 mg) by mouth daily    Routing refill request to provider for review/approval because:  Requested Prescriptions   Pending Prescriptions Disp Refills    omeprazole (PRILOSEC) 20 MG DR capsule 30 capsule 1     Sig: Take 1 capsule (20 mg) by mouth daily       PPI Protocol Failed - 3/1/2023 12:13 PM        Failed - No diagnosis of osteoporosis on record        Passed - Not on Clopidogrel (unless Pantoprazole ordered)        Passed - Recent (12 mo) or future (30 days) visit within the authorizing provider's specialty     Patient has had an office visit with the authorizing provider or a provider within the authorizing providers department within the previous 12 mos or has a future within next 30 days. See \"Patient Info\" tab in inbasket, or \"Choose Columns\" in Meds & Orders section of the refill encounter.              Passed - Medication is active on med list        Passed - Patient is age 18 or older        Passed - No active pregnacy on record        Passed - No positive pregnancy test in past 12 months           Appointments in Next Year      Apr 05, 2023  1:30 PM  (Arrive by 1:10 PM)  MEDICARE ANNUAL WELLNESS VISIT with Caitlin Beasley PA-C  [facility] ([facility] ) [phone]          omeprazole (PRILOSEC) 20 MG DR capsule 30 capsule 1 12/6/2022  --   Sig - Route: Take 1 capsule (20 mg) by mouth daily - Oral   Sent to pharmacy as: Omeprazole 20 MG Oral Capsule Delayed Release (priLOSEC)   Class: E-Prescribe   Order: [id number]   E-Prescribing Status: Receipt confirmed by pharmacy (12/6/2022  1:59 PM CST)     Adriana England RN on 3/1/2023 at 1:19 PM        "

## 2023-03-14 ENCOUNTER — MYC MEDICAL ADVICE (OUTPATIENT)
Dept: FAMILY MEDICINE | Facility: CLINIC | Age: 68
End: 2023-03-14
Payer: COMMERCIAL

## 2023-03-25 ENCOUNTER — HEALTH MAINTENANCE LETTER (OUTPATIENT)
Age: 68
End: 2023-03-25

## 2023-03-28 DIAGNOSIS — M81.0 AGE RELATED OSTEOPOROSIS, UNSPECIFIED PATHOLOGICAL FRACTURE PRESENCE: ICD-10-CM

## 2023-03-30 RX ORDER — RALOXIFENE HYDROCHLORIDE 60 MG/1
TABLET, FILM COATED ORAL
Qty: 90 TABLET | Refills: 0 | Status: SHIPPED | OUTPATIENT
Start: 2023-03-30 | End: 2023-04-05

## 2023-03-30 NOTE — TELEPHONE ENCOUNTER
Pending Prescriptions:                       Disp   Refills    raloxifene (EVISTA) 60 MG tablet [Pharmacy*90 tab*3        Sig: TAKE 1 TABLET DAILY    Routing refill request to provider for review/approval because:  Labs not current:  .cre

## 2023-04-04 ASSESSMENT — ASTHMA QUESTIONNAIRES
QUESTION_2 LAST FOUR WEEKS HOW OFTEN HAVE YOU HAD SHORTNESS OF BREATH: NOT AT ALL
QUESTION_4 LAST FOUR WEEKS HOW OFTEN HAVE YOU USED YOUR RESCUE INHALER OR NEBULIZER MEDICATION (SUCH AS ALBUTEROL): NOT AT ALL
QUESTION_5 LAST FOUR WEEKS HOW WOULD YOU RATE YOUR ASTHMA CONTROL: COMPLETELY CONTROLLED
QUESTION_1 LAST FOUR WEEKS HOW MUCH OF THE TIME DID YOUR ASTHMA KEEP YOU FROM GETTING AS MUCH DONE AT WORK, SCHOOL OR AT HOME: NONE OF THE TIME
ACT_TOTALSCORE: 25
QUESTION_3 LAST FOUR WEEKS HOW OFTEN DID YOUR ASTHMA SYMPTOMS (WHEEZING, COUGHING, SHORTNESS OF BREATH, CHEST TIGHTNESS OR PAIN) WAKE YOU UP AT NIGHT OR EARLIER THAN USUAL IN THE MORNING: NOT AT ALL
ACT_TOTALSCORE: 25

## 2023-04-05 ENCOUNTER — ANCILLARY PROCEDURE (OUTPATIENT)
Dept: GENERAL RADIOLOGY | Facility: CLINIC | Age: 68
End: 2023-04-05
Attending: PHYSICIAN ASSISTANT
Payer: COMMERCIAL

## 2023-04-05 ENCOUNTER — OFFICE VISIT (OUTPATIENT)
Dept: FAMILY MEDICINE | Facility: CLINIC | Age: 68
End: 2023-04-05
Attending: PHYSICIAN ASSISTANT
Payer: COMMERCIAL

## 2023-04-05 VITALS
OXYGEN SATURATION: 98 % | TEMPERATURE: 99.1 F | DIASTOLIC BLOOD PRESSURE: 70 MMHG | HEIGHT: 62 IN | SYSTOLIC BLOOD PRESSURE: 124 MMHG | HEART RATE: 87 BPM | BODY MASS INDEX: 24.55 KG/M2 | WEIGHT: 133.4 LBS
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
## 2023-04-05 DIAGNOSIS — Z00.00 ENCOUNTER FOR MEDICARE ANNUAL WELLNESS EXAM: Primary | ICD-10-CM

## 2023-04-05 DIAGNOSIS — M25.511 ACUTE PAIN OF RIGHT SHOULDER: ICD-10-CM

## 2023-04-05 DIAGNOSIS — M54.2 NECK PAIN: ICD-10-CM

## 2023-04-05 DIAGNOSIS — M79.641 PAIN OF RIGHT HAND: ICD-10-CM

## 2023-04-05 DIAGNOSIS — K21.01 GASTROESOPHAGEAL REFLUX DISEASE WITH ESOPHAGITIS AND HEMORRHAGE: ICD-10-CM

## 2023-04-05 DIAGNOSIS — F33.0 MAJOR DEPRESSIVE DISORDER, RECURRENT EPISODE, MILD (H): ICD-10-CM

## 2023-04-05 DIAGNOSIS — R13.10 DYSPHAGIA, UNSPECIFIED TYPE: ICD-10-CM

## 2023-04-05 DIAGNOSIS — M81.0 AGE RELATED OSTEOPOROSIS, UNSPECIFIED PATHOLOGICAL FRACTURE PRESENCE: ICD-10-CM

## 2023-04-05 PROCEDURE — 72040 X-RAY EXAM NECK SPINE 2-3 VW: CPT | Mod: TC | Performed by: RADIOLOGY

## 2023-04-05 PROCEDURE — 73130 X-RAY EXAM OF HAND: CPT | Mod: TC | Performed by: RADIOLOGY

## 2023-04-05 PROCEDURE — 99214 OFFICE O/P EST MOD 30 MIN: CPT | Mod: 25 | Performed by: PHYSICIAN ASSISTANT

## 2023-04-05 PROCEDURE — 73030 X-RAY EXAM OF SHOULDER: CPT | Mod: TC | Performed by: RADIOLOGY

## 2023-04-05 PROCEDURE — G0439 PPPS, SUBSEQ VISIT: HCPCS | Performed by: PHYSICIAN ASSISTANT

## 2023-04-05 RX ORDER — RALOXIFENE HYDROCHLORIDE 60 MG/1
1 TABLET, FILM COATED ORAL DAILY
Qty: 90 TABLET | Refills: 3 | Status: SHIPPED | OUTPATIENT
Start: 2023-04-05 | End: 2023-06-26 | Stop reason: ALTCHOICE

## 2023-04-05 ASSESSMENT — ENCOUNTER SYMPTOMS
ARTHRALGIAS: 1
NAUSEA: 0
SORE THROAT: 0
WEAKNESS: 0
SHORTNESS OF BREATH: 0
FEVER: 0
CONSTIPATION: 0
FREQUENCY: 0
EYE PAIN: 0
BREAST MASS: 0
DYSURIA: 0
DIZZINESS: 0
JOINT SWELLING: 1
MYALGIAS: 1
HEMATURIA: 0
ABDOMINAL PAIN: 0
PALPITATIONS: 0
HEMATOCHEZIA: 0
COUGH: 0
HEARTBURN: 1
DIARRHEA: 0
PARESTHESIAS: 0
NERVOUS/ANXIOUS: 0
HEADACHES: 1
CHILLS: 0

## 2023-04-05 ASSESSMENT — ANXIETY QUESTIONNAIRES
3. WORRYING TOO MUCH ABOUT DIFFERENT THINGS: NOT AT ALL
IF YOU CHECKED OFF ANY PROBLEMS ON THIS QUESTIONNAIRE, HOW DIFFICULT HAVE THESE PROBLEMS MADE IT FOR YOU TO DO YOUR WORK, TAKE CARE OF THINGS AT HOME, OR GET ALONG WITH OTHER PEOPLE: NOT DIFFICULT AT ALL
7. FEELING AFRAID AS IF SOMETHING AWFUL MIGHT HAPPEN: NOT AT ALL
2. NOT BEING ABLE TO STOP OR CONTROL WORRYING: NOT AT ALL
1. FEELING NERVOUS, ANXIOUS, OR ON EDGE: NOT AT ALL
7. FEELING AFRAID AS IF SOMETHING AWFUL MIGHT HAPPEN: NOT AT ALL
GAD7 TOTAL SCORE: 2
GAD7 TOTAL SCORE: 2
5. BEING SO RESTLESS THAT IT IS HARD TO SIT STILL: SEVERAL DAYS
8. IF YOU CHECKED OFF ANY PROBLEMS, HOW DIFFICULT HAVE THESE MADE IT FOR YOU TO DO YOUR WORK, TAKE CARE OF THINGS AT HOME, OR GET ALONG WITH OTHER PEOPLE?: NOT DIFFICULT AT ALL
GAD7 TOTAL SCORE: 2
4. TROUBLE RELAXING: SEVERAL DAYS
6. BECOMING EASILY ANNOYED OR IRRITABLE: NOT AT ALL

## 2023-04-05 ASSESSMENT — ACTIVITIES OF DAILY LIVING (ADL): CURRENT_FUNCTION: NO ASSISTANCE NEEDED

## 2023-04-05 ASSESSMENT — PATIENT HEALTH QUESTIONNAIRE - PHQ9
10. IF YOU CHECKED OFF ANY PROBLEMS, HOW DIFFICULT HAVE THESE PROBLEMS MADE IT FOR YOU TO DO YOUR WORK, TAKE CARE OF THINGS AT HOME, OR GET ALONG WITH OTHER PEOPLE: NOT DIFFICULT AT ALL
SUM OF ALL RESPONSES TO PHQ QUESTIONS 1-9: 5
SUM OF ALL RESPONSES TO PHQ QUESTIONS 1-9: 5

## 2023-04-05 ASSESSMENT — PAIN SCALES - GENERAL: PAINLEVEL: SEVERE PAIN (7)

## 2023-04-05 NOTE — PATIENT INSTRUCTIONS
"  Patient Education   Personalized Prevention Plan  You are due for the preventive services outlined below.  Your care team is available to assist you in scheduling these services.  If you have already completed any of these items, please share that information with your care team to update in your medical record.  Health Maintenance Due   Topic Date Due     Asthma Action Plan - yearly  10/14/2020     COVID-19 Vaccine (3 - Booster for Moderna series) 07/11/2022     Flu Vaccine (1) 09/01/2022     Annual Wellness Visit  01/19/2023       Depression and Suicide in Older Adults  Nearly 2 million older adults in the [place] have some type of depression. Some of them even take their own lives. Yet depression among older adults is often ignored. Learning about the warning signs of depression may help spare a loved one needless pain. You may also save a life.   What is depression?  Depression is a common and serious illness. It affects the way you think and feel. It is not a normal part of aging. It is not a sign of weakness, a character flaw, or something you can \"snap out of.\" Most people with depression need treatment to get better. The most common symptom is a feeling of deep sadness. People who are depressed also may seem tired and listless. And nothing seems to give them pleasure. It s normal to grieve or be sad sometimes. But sadness lessens or passes with time. Depression rarely goes away or improves on its own. Other symptoms of depression are:     Sleeping more or less than normal    Eating more or less than normal    Having headaches, stomachaches, or other pains that don t go away    Feeling nervous,  empty,  or worthless    Crying a lot    Thinking or talking about suicide or death    Loss of interest in activities previously enjoyed    Social isolation    Feeling confused or forgetful  What causes it?  The causes of depression aren t fully known. But it is thought to result from a complex blend of these factors: "     Biochemistry. Certain chemicals in the brain play a role.    Genes. Depression does run in families.    Life stress. Life stresses can also trigger depression in some people. Older adults often face many stressors. These may include isolation, the death of friends or a spouse, health problems, and financial concerns.    Chronic health conditions. This includes diabetes, heart disease, or cancer. These can cause symptoms of depression. Medicine side effects can cause changes in thoughts and behaviors.  Giving support    Depressed people often may not want to ask for help. When they do, they may be ignored. Or they may get the wrong treatment. You can help by showing parents and older friends love and support. If they seem depressed, don t lecture the person or ignore the symptoms. Don't discount the symptoms as a  normal  part of aging. They are not. Get involved, listen, and show interest and support.   Help them understand that depression is a treatable illness. Tell them you can help them find the right treatment. Offer to go to their healthcare provider's appointment with them for support when the symptoms are discussed. With their approval, contact a local mental health center, social service agency, or hospital about services.   Helping at healthcare visits  You can be an advocate for them at healthcare appointments. Many older adults have chronic illnesses. Many of these can cause symptoms of depression. Medicine side effects can change thoughts and behaviors.   You can help make sure that the healthcare provider looks at all of these factors. They should refer your family member or friend to a mental healthcare provider when needed. In some cases, untreated depression can lead to a misdiagnosis. A person may be diagnosed with a brain disorder such as dementia. If the healthcare provider does not take the issue of depression seriously, help your family member or friend find another provider.   Asking about  self-harm thoughts  If you think an older person you care about could be suicidal, ask,  Have you thought about suicide?  Most people will tell you the truth. If they say yes, they may already have a plan for how and when they will attempt it. Find out as much as you can. The more detailed the plan, and the easier it is to carry out, the more danger the person is in right now. Tell the person you are there for them and you do not want them to get harmed. Don't wait to get help for the person. Call the person's healthcare provider, local hospital, or emergency services.   Call 988 in a crisis   Never leave the person alone. A person who is actively suicidal needs crisis care right away. They need constant supervision. Never leave the person out of sight. Call 988 Tell the crisis counselor you need help for a person who is thinking about suicide. The counselor will help you get the right level of crisis help. You may be advised to take the person to the nearest emergency room.   The National Suicide Prevention Lifeline is available at 988, or 736-360-NNCQ (494-051-4880), or www.suicidepreventionlifeline.org. When you call or text 988, you will be connected to trained counselors who are part of the National Suicide Prevention Lifeline network. An online chat option is also available. Lifeline is free and available 24/7.   To learn more    National Suicide Prevention Lifeline at www.suicidepreventionlifeline.org  or 329-954-QEVU (821-175-7018)    National [place] [employer] [facility]ness at www.kristina.org  or 029-922-JZFT (776-290-4644)    [facility] at www.nmha.org  or 441-875-7577    [employer] [place] [employer] at www.nimh.nih.gov  or 679-381-4784    Rui last reviewed this educational content on 7/1/2022 2000-2022 [employer]. All rights reserved. This information is not intended as a substitute for professional medical care. Always follow your healthcare professional's  instructions.

## 2023-04-05 NOTE — PROGRESS NOTES
"Right hand  SUBJECTIVE:   Margareth is a 68 year old who presents for Preventive Visit.      4/5/2023     1:50 PM   Additional Questions   Roomed by ROCIO HADDAD   Accompanied by NONE     Are you in the first 12 months of your Medicare coverage?  No    Healthy Habits:     In general, how would you rate your overall health?  Good    Frequency of exercise:  2-3 days/week    Duration of exercise:  Less than 15 minutes    Do you usually eat at least 4 servings of fruit and vegetables a day, include whole grains    & fiber and avoid regularly eating high fat or \"junk\" foods?  Yes    Taking medications regularly:  Yes    Medication side effects:  Not applicable    Ability to successfully perform activities of daily living:  No assistance needed    Home Safety:  No safety concerns identified    Hearing Impairment:  No hearing concerns    In the past 6 months, have you been bothered by leaking of urine?  No    In general, how would you rate your overall mental or emotional health?  Good      PHQ-2 Total Score: 1    Additional concerns today:  Yes      Have you ever done Advance Care Planning? (For example, a Health Directive, POLST, or a discussion with a medical provider or your loved ones about your wishes): Yes, advance care planning is on file.         Fall risk  Fallen 2 or more times in the past year?: Yes  Any fall with injury in the past year?: Yes      Cognitive Screening   1) Repeat 3 items (Leader, Season, Table)    2) Clock draw: NORMAL  3) 3 item recall: Recalls 3 objects  Results: 3 items recalled: COGNITIVE IMPAIRMENT LESS LIKELY    Mini-CogTM Copyright BRANDON Kyle. Licensed by the author for use in [facility]; reprinted with permission (rd@.[facility]). All rights reserved.      Do you have sleep apnea, excessive snoring or daytime drowsiness?: no    Reviewed and updated as needed this visit by clinical staff   Tobacco  Allergies  Meds              Reviewed and updated as needed this visit by Provider     "             Social History     Tobacco Use     Smoking status: Former     Packs/day: 0.50     Years: 30.00     Pack years: 15.00     Types: Cigarettes     Quit date: 3/20/1998     Years since quittin.0     Smokeless tobacco: Never     Tobacco comments:     No smokers in home   Vaping Use     Vaping status: Never Used   Substance Use Topics     Alcohol use: Yes     Alcohol/week: 6.7 standard drinks of alcohol     Comment: 2x/week             2023     1:51 PM   Alcohol Use   Prescreen: >3 drinks/day or >7 drinks/week? No     Do you have a current opioid prescription? No  Do you use any other controlled substances or medications that are not prescribed by a provider? None          PT REPORTS A FALL ON Monday, this was 2 days ago.  She was leaving the airport pulling her roller bags.  One of the wheels of her roller bag sunk into a pothole which caused her to fall forward.  She landed on 2 of her other bags with her chest but hit her lip on the ground.  She did not have a loss of consciousness.  She landed with her right arm outstretched she does have some hand pain but no wrist pain and she is worried about her neck which she has chronic pain in as well as her right shoulder/upper back which she also has chronic pain in    Joint Pain    Onset: Monday     Description:   Location: RIGHT HAND, LIP has bruising, CHIN has bruising, RIGHT ARM AND NECK   Character: NUMB AND TINGLING     Intensity: 7/10    Progression of Symptoms: worse    Accompanying Signs & Symptoms:  Other symptoms: radiation of pain to NECK into right upper back and shoulder    History:   Previous similar pain: no       Precipitating factors:   Trauma or overuse: YES, fall as above    Alleviating factors:  Improved by: ice, acetaminophen and NSAID - NAPROXEN     Therapies Tried and outcome: REST       She would like to do x-rays to ensure she has not done any further damage to her chronic arthritic pain in her neck.  She has been getting massages  every 2 weeks which has been helpful but is fearful that this will set her back.  She wonders if she will need to go back to physical therapy  Current providers sharing in care for this patient include:   Patient Care Team:  Caitlin Beasley PA-C as PCP - General (Family Practice)  Enrique Goss PA-C as Assigned Musculoskeletal Provider  Caitlin Beasley PA-C as Assigned PCP    The following health maintenance items are reviewed in Epic and correct as of today:  Health Maintenance   Topic Date Due     ASTHMA ACTION PLAN  10/14/2020     COVID-19 Vaccine (3 - Booster for Moderna series) 07/11/2022     INFLUENZA VACCINE (1) 09/01/2022     MEDICARE ANNUAL WELLNESS VISIT  01/19/2023     DEXA  06/30/2023     ANNUAL REVIEW OF HM ORDERS  07/27/2023     ASTHMA CONTROL TEST  10/05/2023     PHQ-9  10/05/2023     TAMMY ASSESSMENT  04/05/2024     FALL RISK ASSESSMENT  04/05/2024     MAMMO SCREENING  09/21/2024     COLORECTAL CANCER SCREENING  04/10/2025     LIPID  11/25/2025     ADVANCE CARE PLANNING  12/06/2027     DTAP/TDAP/TD IMMUNIZATION (3 - Td or Tdap) 03/14/2032     HEPATITIS C SCREENING  Completed     DEPRESSION ACTION PLAN  Completed     Pneumococcal Vaccine: 65+ Years  Completed     ZOSTER IMMUNIZATION  Completed     IPV IMMUNIZATION  Aged Out     MENINGITIS IMMUNIZATION  Aged Out     Labs reviewed in EPIC  BP Readings from Last 3 Encounters:   04/05/23 124/70   02/01/23 105/89   12/06/22 128/64    Wt Readings from Last 3 Encounters:   04/05/23 60.5 kg (133 lb 6.4 oz)   12/06/22 59 kg (130 lb)   07/27/22 57.6 kg (127 lb)                  Patient Active Problem List   Diagnosis     Dermatophytosis of nail     Symptomatic menopausal or female climacteric states     Generalized osteoarthrosis, unspecified site     TAMMY (generalized anxiety disorder)     Insomnia     Malaise and fatigue     Mild persistent asthma     Cervicalgia     Allergic rhinitis     Irritable bowel syndrome     Arthralgia     DDD (degenerative disc  disease), lumbar     Menopausal syndrome     Osteopenia     Hammertoe     Derangement of TMJ (temporomandibular joint)     Stenosis of lumbosacral spine     Atrophic vaginitis     Osteoporosis     Mild major depression (H)     Knee pain, left     Menopausal flushing     Numbness of left jaw     Neck pain     TMJ (temporomandibular joint syndrome)     Thyroid fullness     Epistaxis     Major depressive disorder, recurrent episode, moderate (H)     Gastroesophageal reflux disease without esophagitis     Tremor     Cramp of limb     Personal history of COVID-19     T12 compression fracture, sequela     Past Surgical History:   Procedure Laterality Date     BIOPSY  4/10/2015     BL DUPLEX LO EXTREM ART UNILAT/LTD  4/3008    lower extr arterial doppler -no stenosis     C DEXA,BONE DENSITY,APPENDICULR SKELTN  12/2008    osteopenia     COLONOSCOPY  10/22/2007    bx benign     COLONOSCOPY N/A 4/10/2015    Procedure: COMBINED COLONOSCOPY, SINGLE OR MULTIPLE BIOPSY/POLYPECTOMY BY BIOPSY;  Surgeon: Cl Wagner MD;  Location:  GI     ESOPHAGOSCOPY, GASTROSCOPY, DUODENOSCOPY (EGD), COMBINED N/A 1/31/2018    Procedure: COMBINED ESOPHAGOSCOPY, GASTROSCOPY, DUODENOSCOPY (EGD);  COMBINED ESOPHAGOSCOPY, GASTROSCOPY, DUODENOSCOPY (EGD);  Surgeon: Tirso Duran MD;  Location:  GI     ESOPHAGOSCOPY, GASTROSCOPY, DUODENOSCOPY (EGD), COMBINED N/A 2/1/2023    Procedure: ESOPHAGOGASTRODUODENOSCOPY (EGD);  Surgeon: Lui Vences MD;  Location:  GI     EYE SURGERY       HC PART REMV BONE METATARSAL HEAD,EA  01/11/10    Right foot plantar plate tear. Also correct hammertoe, 2nd digit & varicose vein ligation, heel.     HC REVISE MEDIAN N/CARPAL TUNNEL SURG      Left in 1997, right in 1999     INJ, ANES/STER, FACET LUMB/SAC  2010    Left L5 nerve root injection     INJECT EPIDURAL CERVICAL Right 9/29/2022    Procedure: Cervical 7-Thoracic 1 Interlaminar epidural steroid injection using fluoroscopic guidance with contrast dye,  Right;  Surgeon: Tyler Bay MD;  Location: [facility]     INJECT EPIDURAL TRANSFORAMINAL  10/09/2013    [facility]     INJECT JOINT SACROILIAC  3/19/14,14    [facility]     [facility] NONSPECIFIC PROCEDURE      Metatarsal phalangeal joint injection     [facility] COLONOSCOPY THRU STOMA, DIAGNOSTIC  2002    bx. benign - flex sig in 5 yrs  or colonoscopy in 10 yrs     [facility] ECHO HEART XTHORACIC, STRESS/REST  2005    WNL     [facility] NCS MOTOR W/O F-WAVE, EACH NERVE  2008    CTS       Social History     Tobacco Use     Smoking status: Former     Packs/day: 0.50     Years: 30.00     Pack years: 15.00     Types: Cigarettes     Quit date: 3/20/1998     Years since quittin.0     Smokeless tobacco: Never     Tobacco comments:     No smokers in home   Vaping Use     Vaping status: Never Used   Substance Use Topics     Alcohol use: Yes     Alcohol/week: 6.7 standard drinks of alcohol     Comment: 2x/week     Family History   Problem Relation Age of Onset     Diabetes Father      Depression Father      Cerebrovascular Disease Mother         age 80     Arthritis Mother      Depression Mother         On meds     Eye Disorder Mother         Glaucoma     Osteoporosis Mother      Respiratory Mother          88 YO COPD     Chronic Obstructive Pulmonary Disease Mother      C.A.D. Brother         65 YO MI -     Chronic Obstructive Pulmonary Disease Brother      Myocardial Infarction Brother      Other Cancer Brother      Cancer Brother          Current Outpatient Medications   Medication Sig Dispense Refill     acetaminophen (TYLENOL) 650 MG CR tablet Take 650 mg by mouth every 8 hours as needed for mild pain or fever       albuterol (PROAIR HFA) 108 (90 Base) MCG/ACT inhaler Inhale 1 puff into the lungs every 6 hours as needed for shortness of breath / dyspnea or wheezing 1 g prn     albuterol (PROAIR HFA/PROVENTIL HFA/VENTOLIN HFA) 108 (90 Base) MCG/ACT inhaler Inhale 2 puffs into the lungs every 6  hours 18 g 1     ALLEGRA 180 MG OR TABS Take 180 mg by mouth daily  30 0     amitriptyline (ELAVIL) 10 MG tablet TAKE 3 TO 4 TABLETS AT BEDTIME 270 tablet 4     buPROPion (WELLBUTRIN XL) 150 MG 24 hr tablet TAKE 1 TABLET EVERY MORNING 90 tablet 2     CALCIUM /VITAMIN D TABS   OR Take 1 tablet by mouth daily        Cholecalciferol (VITAMIN D PO) Take 2 tablets by mouth daily        famotidine (PEPCID) 20 MG tablet Take 20 mg by mouth daily as needed       hydrOXYzine (ATARAX) 25 MG tablet Take 1-2 tablets (25-50 mg) by mouth every 8 hours as needed for anxiety or other (insomnia) 60 tablet 1     meclizine (ANTIVERT) 25 MG tablet Take 25 mg by mouth daily as needed for dizziness       Melatonin 10 MG TABS tablet Take 10 mg by mouth nightly as needed for sleep       mometasone-formoterol (DULERA) 100-5 MCG/ACT oral inhaler Inhale 1 puff into the lungs 2 times daily        multivitamin w/minerals (THERA-VIT-M) tablet Take 1 tablet by mouth daily RAW Brand       naproxen (NAPROSYN) 500 MG tablet Take 500 mg by mouth       omeprazole (PRILOSEC) 20 MG DR capsule Take 1 capsule (20 mg) by mouth daily 90 capsule 1     raloxifene (EVISTA) 60 MG tablet Take 1 tablet (60 mg) by mouth daily 90 tablet 3     Simethicone (GAS-X PO) Take 160 mg by mouth 4 times daily as needed for intestinal gas        triamcinolone (NASACORT) 55 MCG/ACT nasal inhaler Spray 2 sprays into both nostrils 2 times daily        UNABLE TO FIND Swish and swallow 1 capful in mouth daily MEDICATION NAME: Colloidal Silver water.  Swish for about a minute and then swallow.       VITAMIN C-MARK HIPS CR 1000 MG OR TBCR 1 TABLET DAILY       VITAMIN E PO Take 2 capsules by mouth daily        Allergies   Allergen Reactions     Penicillins Itching     Vaginal itching - Yeast infection afterward     Pneumonia Vaccine:For adults 65 years or older who do not have an immunocompromising condition, cerebrospinal fluid leak, or cochlear implant and want to receive PPSV23  ONLY: Administer 1 dose of PPSV23. Anyone who received any doses of PPSV23 before age 65 should receive 1 final dose of the vaccine at age 65 or older. Administer this last dose at least 5 years after the prior PPSV23 dose.  Mammogram Screening: Mammogram Screening: Recommended mammography every 1-2 years with patient discussion and risk factor consideration  History of abnormal Pap smear: NO - age 65 - see link Cervical Cytology Screening Guidelines        Review of Systems   Constitutional: Negative for chills and fever.   HENT: Negative for congestion, ear pain, hearing loss and sore throat.    Eyes: Negative for pain and visual disturbance.   Respiratory: Negative for cough and shortness of breath.    Cardiovascular: Negative for chest pain, palpitations and peripheral edema.   Gastrointestinal: Positive for heartburn. Negative for abdominal pain, constipation, diarrhea, hematochezia and nausea.   Breasts:  Negative for tenderness, breast mass and discharge.   Genitourinary: Negative for dysuria, frequency, genital sores, hematuria, pelvic pain, urgency, vaginal bleeding and vaginal discharge.   Musculoskeletal: Positive for arthralgias, joint swelling and myalgias.   Skin: Negative for rash.   Neurological: Positive for headaches. Negative for dizziness, weakness and paresthesias.   Psychiatric/Behavioral: Negative for mood changes. The patient is not nervous/anxious.      GERD-  STILL present, was on 1 month of omeprazole which helped but she is off it now and symptoms have returned.  She wonders if she needs to go back on it.  She recently had an upper GI endoscopy with Dr. Vences.  He told her it was safe to take this medication.  She is worried about her bones    She has been taking Evista for her osteoporosis.  Her bone density has recently been done and she continues with its use.    Joints-pain as above    HEADACHE- related to her neck pain per pt, it is always there, some days are better than others , has  "been taking naproxen or Tylenol as needed    OBJECTIVE:   /70 (BP Location: Left arm, Patient Position: Chair, Cuff Size: Adult Regular)   Pulse 87   Temp 99.1  F (37.3  C) (Temporal)   Ht 1.568 m (5' 1.73\")   Wt 60.5 kg (133 lb 6.4 oz)   LMP 02/26/2008 (Approximate)   SpO2 98%   Breastfeeding No   BMI 24.61 kg/m   Estimated body mass index is 24.61 kg/m  as calculated from the following:    Height as of this encounter: 1.568 m (5' 1.73\").    Weight as of this encounter: 60.5 kg (133 lb 6.4 oz).  Physical Exam  GENERAL APPEARANCE: healthy, alert and no distress  EYES: Eyes grossly normal to inspection, PERRL and conjunctivae and sclerae normal  HENT: ear canals and TM's normal, nose and mouth without ulcers or lesions, oropharynx clear and oral mucous membranes moist, Bruising noted on her chin and on her right lower lip  NECK: no adenopathy, no asymmetry, masses, or scars and thyroid normal to palpation  NECK: Range of motion limited by pain, she has no vertebral tenderness to palpation has more tenderness in the trapezius musculature  RESP: lungs clear to auscultation - no rales, rhonchi or wheezes  BREAST: normal without masses, tenderness or nipple discharge and no palpable axillary masses or adenopathy  CV: regular rate and rhythm, normal S1 S2, no S3 or S4, no murmur, click or rub, no peripheral edema and peripheral pulses strong  ABDOMEN: soft, nontender, no hepatosplenomegaly, no masses and bowel sounds normal  MS: She is tender to palpation over the anterior aspect of her shoulder no bony point tenderness she has tenderness over the tendons of her pectoralis muscles and subacromial bursa,   Right wrist, no tenderness to palpation over distal radius or ulna, general tenderness over all of her metacarpals and phalanges there is minimal edema noted no ecchymosis  SKIN: no suspicious lesions or rashes  NEURO: Normal strength and tone, sensory exam grossly normal, mentation intact and speech " normal  PSYCH: mentation appears normal and affect normal/bright    Diagnostic Test Results:  Labs reviewed in Epic  No results found. However, due to the size of the patient record, not all encounters were searched. Please check Results Review for a complete set of results.  SHOULDER RIGHT THREE OR MORE VIEWS  DATE/TIME: 4/5/2023 3:23 PM     INDICATION: Acute pain of right shoulder; Fall, initial encounter.  COMPARISON: 7/27/2022                                                                      IMPRESSION: No acute displaced right shoulder fracture or glenohumeral  joint dislocation. Mild acromioclavicular and severe glenohumeral  joint osteoarthritis, similar to prior. Diffuse bone demineralization.  Unchanged right clavicle. Degenerative change visualized spine.     RADHA VASQUEZ MD   INDICATION: Pain of right hand. Fall, initial encounter.     COMPARISON: 6/11/2010                                                                      IMPRESSION: Anatomic alignment right hand. No acute displaced right  hand fracture is identified. Progressive mild thumb CMC joint  osteoarthritis with marginal spurring. No localizing right hand soft  tissue swelling.     RADHA VASQUEZ MD  XR CERVICAL SPINE 2/3 VIEWS 4/5/2023 3:21 PM      COMPARISON: Cervical spine MRI 8/1/2022     HISTORY: Metastatic cervical spine injury.     FINDINGS: No plain film evidence of a fracture. Normal vertebral body  heights. 3 mm C7-T1 degenerative anterolisthesis. Diffuse severe  interbody degeneration and mild to moderate facet arthropathy.  Moderate degenerative change of the atlantoaxial joints left greater  than right. No interval change.     DARLING FORBES MD   ASSESSMENT / PLAN:   (Z00.00) Encounter for Medicare annual wellness exam  (primary encounter diagnosis)  Comment: Recommend routine annual visits  Plan: Cancer screening up-to-date, due for colonoscopy in 2025  She is due for routine labs including CBC CMP and lipid which I will  enter for a future lab appointment    (K21.01) Gastroesophageal reflux disease with esophagitis and hemorrhage  Comment: We will have her restart omeprazole 20 mg 1 pill daily we will treat for at least 30 days  Plan: omeprazole (PRILOSEC) 20 MG DR capsule        After this point she may do as needed dosing versus take it daily.  We want her symptoms to be well controlled.  (R13.10) Dysphagia, unspecified type  Comment: As above  Plan: omeprazole (PRILOSEC) 20 MG DR capsule            (M81.0) Age related osteoporosis, unspecified pathological fracture presence  Comment: DEXA scan up-to-date  Plan: raloxifene (EVISTA) 60 MG tablet        Repeat next year    (M54.2) Neck pain  Comment:   Plan: XR Cervical Spine 2/3 Views            (M79.641) Pain of right hand  Comment:   Plan: XR Hand Right G/E 3 Views            (M25.511) Acute pain of right shoulder  Comment:   Plan: XR Shoulder Right G/E 3 Views            (W19.XXXA) Fall, initial encounter  Comment:   Plan: XR Cervical Spine 2/3 Views, XR Hand Right G/E         3 Views, XR Shoulder Right G/E 3 Views            I recommend that she continue with her massage, if she has failure to improve her upper back, neck, and shoulder pain back to baseline after she has recovered from her fall she may message me and we we will refer her back to therapy    (F33.0) Major depressive disorder, recurrent episode, mild (H)  Comment: She continues to do well on bupropion we will continue current meds  Plan: Recheck 6 months    Patient has been advised of split billing requirements and indicates understanding: Yes      COUNSELING:  Reviewed preventive health counseling, as reflected in patient instructions        She reports that she quit smoking about 25 years ago. Her smoking use included cigarettes. She has a 15.00 pack-year smoking history. She has never used smokeless tobacco.      Appropriate preventive services were discussed with this patient, including applicable screening as  appropriate for cardiovascular disease, diabetes, osteopenia/osteoporosis, and glaucoma.  As appropriate for age/gender, discussed screening for colorectal cancer, prostate cancer, breast cancer, and cervical cancer. Checklist reviewing preventive services available has been given to the patient.    Reviewed patients plan of care and provided an AVS. The Basic Care Plan (routine screening as documented in Health Maintenance) for Margareth meets the Care Plan requirement. This Care Plan has been established and reviewed with the Patient.          Caitlin Beasley PA-C  [facility]    Identified Health Risks:    I have reviewed Opioid Use Disorder and Substance Use Disorder risk factors and made any needed referrals.     Answers for HPI/ROS submitted by the patient on 4/5/2023  If you checked off any problems, how difficult have these problems made it for you to do your work, take care of things at home, or get along with other people?: Not difficult at all  PHQ9 TOTAL SCORE: 5  TAMMY 7 TOTAL SCORE: 2    Conflicting answers have been found for some questions. Please document the patient's answers manually.

## 2023-04-07 ENCOUNTER — TELEPHONE (OUTPATIENT)
Dept: FAMILY MEDICINE | Facility: CLINIC | Age: 68
End: 2023-04-07

## 2023-04-07 ENCOUNTER — LAB (OUTPATIENT)
Dept: LAB | Facility: CLINIC | Age: 68
End: 2023-04-07
Payer: COMMERCIAL

## 2023-04-07 DIAGNOSIS — Z13.220 SCREENING FOR HYPERLIPIDEMIA: ICD-10-CM

## 2023-04-07 DIAGNOSIS — Z13.0 SCREENING FOR DEFICIENCY ANEMIA: ICD-10-CM

## 2023-04-07 DIAGNOSIS — Z13.1 SCREENING FOR DIABETES MELLITUS: ICD-10-CM

## 2023-04-07 DIAGNOSIS — Z13.1 SCREENING FOR DIABETES MELLITUS: Primary | ICD-10-CM

## 2023-04-07 LAB
ALBUMIN SERPL BCG-MCNC: 4.2 G/DL (ref 3.5–5.2)
ALP SERPL-CCNC: 67 U/L (ref 35–104)
ALT SERPL W P-5'-P-CCNC: 20 U/L (ref 10–35)
ANION GAP SERPL CALCULATED.3IONS-SCNC: 8 MMOL/L (ref 7–15)
AST SERPL W P-5'-P-CCNC: 32 U/L (ref 10–35)
BILIRUB SERPL-MCNC: 0.4 MG/DL
BUN SERPL-MCNC: 17.6 MG/DL (ref 8–23)
CALCIUM SERPL-MCNC: 9.3 MG/DL (ref 8.8–10.2)
CHLORIDE SERPL-SCNC: 105 MMOL/L (ref 98–107)
CHOLEST SERPL-MCNC: 162 MG/DL
CREAT SERPL-MCNC: 0.85 MG/DL (ref 0.51–0.95)
DEPRECATED HCO3 PLAS-SCNC: 28 MMOL/L (ref 22–29)
ERYTHROCYTE [DISTWIDTH] IN BLOOD BY AUTOMATED COUNT: 14 % (ref 10–15)
GFR SERPL CREATININE-BSD FRML MDRD: 73 ML/MIN/1.73M2
GLUCOSE SERPL-MCNC: 98 MG/DL (ref 70–99)
HCT VFR BLD AUTO: 40.6 % (ref 35–47)
HDLC SERPL-MCNC: 79 MG/DL
HGB BLD-MCNC: 13.3 G/DL (ref 11.7–15.7)
LDLC SERPL CALC-MCNC: 73 MG/DL
MCH RBC QN AUTO: 30.9 PG (ref 26.5–33)
MCHC RBC AUTO-ENTMCNC: 32.8 G/DL (ref 31.5–36.5)
MCV RBC AUTO: 94 FL (ref 78–100)
NONHDLC SERPL-MCNC: 83 MG/DL
PLATELET # BLD AUTO: 204 10E3/UL (ref 150–450)
POTASSIUM SERPL-SCNC: 3.8 MMOL/L (ref 3.4–5.3)
PROT SERPL-MCNC: 6.5 G/DL (ref 6.4–8.3)
RBC # BLD AUTO: 4.3 10E6/UL (ref 3.8–5.2)
SODIUM SERPL-SCNC: 141 MMOL/L (ref 136–145)
TRIGL SERPL-MCNC: 49 MG/DL
WBC # BLD AUTO: 4.8 10E3/UL (ref 4–11)

## 2023-04-07 PROCEDURE — 80053 COMPREHEN METABOLIC PANEL: CPT

## 2023-04-07 PROCEDURE — 36415 COLL VENOUS BLD VENIPUNCTURE: CPT

## 2023-04-07 PROCEDURE — 80061 LIPID PANEL: CPT

## 2023-04-07 PROCEDURE — 85027 COMPLETE CBC AUTOMATED: CPT

## 2023-04-07 NOTE — TELEPHONE ENCOUNTER
Patient is currently at [facility] to have fasting labs completed. Patient indicates she was told to have cholesterol, kidney function and other labs completed.   Had annual wellness visit on 4/5/23.     Patient is waiting at lab now.     Hellen NGUYENN, RN  [facility]

## 2023-04-28 DIAGNOSIS — J01.00 ACUTE NON-RECURRENT MAXILLARY SINUSITIS: ICD-10-CM

## 2023-04-28 RX ORDER — AZITHROMYCIN 250 MG/1
TABLET, FILM COATED ORAL
Qty: 6 TABLET | Refills: 0 | OUTPATIENT
Start: 2023-04-28 | End: 2023-05-03

## 2023-05-31 ENCOUNTER — APPOINTMENT (OUTPATIENT)
Dept: ULTRASOUND IMAGING | Facility: CLINIC | Age: 68
End: 2023-05-31
Attending: NURSE PRACTITIONER
Payer: COMMERCIAL

## 2023-05-31 ENCOUNTER — APPOINTMENT (OUTPATIENT)
Dept: GENERAL RADIOLOGY | Facility: CLINIC | Age: 68
End: 2023-05-31
Attending: NURSE PRACTITIONER
Payer: COMMERCIAL

## 2023-05-31 ENCOUNTER — APPOINTMENT (OUTPATIENT)
Dept: CT IMAGING | Facility: CLINIC | Age: 68
End: 2023-05-31
Attending: NURSE PRACTITIONER
Payer: COMMERCIAL

## 2023-05-31 ENCOUNTER — HOSPITAL ENCOUNTER (EMERGENCY)
Facility: CLINIC | Age: 68
Discharge: HOME | End: 2023-05-31
Attending: NURSE PRACTITIONER | Admitting: NURSE PRACTITIONER
Payer: COMMERCIAL

## 2023-05-31 ENCOUNTER — NURSE TRIAGE (OUTPATIENT)
Dept: NURSING | Facility: CLINIC | Age: 68
End: 2023-05-31
Payer: COMMERCIAL

## 2023-05-31 VITALS
HEART RATE: 82 BPM | WEIGHT: 132 LBS | SYSTOLIC BLOOD PRESSURE: 139 MMHG | OXYGEN SATURATION: 98 % | DIASTOLIC BLOOD PRESSURE: 73 MMHG | BODY MASS INDEX: 24.35 KG/M2 | TEMPERATURE: 98.9 F | RESPIRATION RATE: 20 BRPM

## 2023-05-31 DIAGNOSIS — R01.1 NEWLY RECOGNIZED HEART MURMUR: ICD-10-CM

## 2023-05-31 DIAGNOSIS — R06.02 EXERTIONAL SHORTNESS OF BREATH: ICD-10-CM

## 2023-05-31 DIAGNOSIS — M79.661 PAIN OF RIGHT LOWER LEG: ICD-10-CM

## 2023-05-31 LAB
ALBUMIN SERPL BCG-MCNC: 4.2 G/DL (ref 3.5–5.2)
ALP SERPL-CCNC: 73 U/L (ref 35–104)
ALT SERPL W P-5'-P-CCNC: 24 U/L (ref 10–35)
ANION GAP SERPL CALCULATED.3IONS-SCNC: 9 MMOL/L (ref 7–15)
AST SERPL W P-5'-P-CCNC: 32 U/L (ref 10–35)
BASOPHILS # BLD AUTO: 0.1 10E3/UL (ref 0–0.2)
BASOPHILS NFR BLD AUTO: 1 %
BILIRUB SERPL-MCNC: 0.3 MG/DL
BUN SERPL-MCNC: 17.6 MG/DL (ref 8–23)
CALCIUM SERPL-MCNC: 9.2 MG/DL (ref 8.8–10.2)
CHLORIDE SERPL-SCNC: 106 MMOL/L (ref 98–107)
CREAT SERPL-MCNC: 0.95 MG/DL (ref 0.51–0.95)
D DIMER PPP FEU-MCNC: 0.49 UG/ML FEU (ref 0–0.5)
DEPRECATED HCO3 PLAS-SCNC: 23 MMOL/L (ref 22–29)
EOSINOPHIL # BLD AUTO: 0.6 10E3/UL (ref 0–0.7)
EOSINOPHIL NFR BLD AUTO: 9 %
ERYTHROCYTE [DISTWIDTH] IN BLOOD BY AUTOMATED COUNT: 13.5 % (ref 10–15)
GFR SERPL CREATININE-BSD FRML MDRD: 64 ML/MIN/1.73M2
GLUCOSE SERPL-MCNC: 91 MG/DL (ref 70–99)
HCT VFR BLD AUTO: 38.5 % (ref 35–47)
HGB BLD-MCNC: 12.5 G/DL (ref 11.7–15.7)
HOLD SPECIMEN: NORMAL
IMM GRANULOCYTES # BLD: 0 10E3/UL
IMM GRANULOCYTES NFR BLD: 1 %
LYMPHOCYTES # BLD AUTO: 1.3 10E3/UL (ref 0.8–5.3)
LYMPHOCYTES NFR BLD AUTO: 20 %
MCH RBC QN AUTO: 30.7 PG (ref 26.5–33)
MCHC RBC AUTO-ENTMCNC: 32.5 G/DL (ref 31.5–36.5)
MCV RBC AUTO: 95 FL (ref 78–100)
MONOCYTES # BLD AUTO: 0.6 10E3/UL (ref 0–1.3)
MONOCYTES NFR BLD AUTO: 9 %
NEUTROPHILS # BLD AUTO: 4 10E3/UL (ref 1.6–8.3)
NEUTROPHILS NFR BLD AUTO: 60 %
NRBC # BLD AUTO: 0 10E3/UL
NRBC BLD AUTO-RTO: 0 /100
NT-PROBNP SERPL-MCNC: 645 PG/ML (ref 0–900)
PLATELET # BLD AUTO: 212 10E3/UL (ref 150–450)
POTASSIUM SERPL-SCNC: 4.1 MMOL/L (ref 3.4–5.3)
PROT SERPL-MCNC: 6.5 G/DL (ref 6.4–8.3)
RBC # BLD AUTO: 4.07 10E6/UL (ref 3.8–5.2)
SODIUM SERPL-SCNC: 138 MMOL/L (ref 136–145)
TROPONIN T SERPL HS-MCNC: 12 NG/L
WBC # BLD AUTO: 6.5 10E3/UL (ref 4–11)

## 2023-05-31 PROCEDURE — 99285 EMERGENCY DEPT VISIT HI MDM: CPT | Mod: 25

## 2023-05-31 PROCEDURE — 71275 CT ANGIOGRAPHY CHEST: CPT

## 2023-05-31 PROCEDURE — 85025 COMPLETE CBC W/AUTO DIFF WBC: CPT | Performed by: NURSE PRACTITIONER

## 2023-05-31 PROCEDURE — 80053 COMPREHEN METABOLIC PANEL: CPT | Performed by: NURSE PRACTITIONER

## 2023-05-31 PROCEDURE — 36415 COLL VENOUS BLD VENIPUNCTURE: CPT | Performed by: NURSE PRACTITIONER

## 2023-05-31 PROCEDURE — 83880 ASSAY OF NATRIURETIC PEPTIDE: CPT | Performed by: NURSE PRACTITIONER

## 2023-05-31 PROCEDURE — 71046 X-RAY EXAM CHEST 2 VIEWS: CPT

## 2023-05-31 PROCEDURE — 93971 EXTREMITY STUDY: CPT | Mod: RT

## 2023-05-31 PROCEDURE — 99284 EMERGENCY DEPT VISIT MOD MDM: CPT | Mod: 25 | Performed by: NURSE PRACTITIONER

## 2023-05-31 PROCEDURE — 93010 ELECTROCARDIOGRAM REPORT: CPT | Performed by: NURSE PRACTITIONER

## 2023-05-31 PROCEDURE — 250N000009 HC RX 250: Performed by: NURSE PRACTITIONER

## 2023-05-31 PROCEDURE — 250N000011 HC RX IP 250 OP 636: Performed by: NURSE PRACTITIONER

## 2023-05-31 PROCEDURE — 84484 ASSAY OF TROPONIN QUANT: CPT | Performed by: NURSE PRACTITIONER

## 2023-05-31 PROCEDURE — 85379 FIBRIN DEGRADATION QUANT: CPT | Performed by: NURSE PRACTITIONER

## 2023-05-31 PROCEDURE — 93005 ELECTROCARDIOGRAM TRACING: CPT

## 2023-05-31 RX ORDER — IOPAMIDOL 755 MG/ML
500 INJECTION, SOLUTION INTRAVASCULAR ONCE
Status: COMPLETED | OUTPATIENT
Start: 2023-05-31 | End: 2023-05-31

## 2023-05-31 RX ORDER — ASPIRIN 81 MG/1
324 TABLET, CHEWABLE ORAL ONCE
Status: DISCONTINUED | OUTPATIENT
Start: 2023-05-31 | End: 2023-05-31

## 2023-05-31 RX ADMIN — SODIUM CHLORIDE 64 ML: 9 INJECTION, SOLUTION INTRAVENOUS at 20:00

## 2023-05-31 RX ADMIN — IOPAMIDOL 76 ML: 755 INJECTION, SOLUTION INTRAVENOUS at 20:00

## 2023-05-31 ASSESSMENT — ACTIVITIES OF DAILY LIVING (ADL)
ADLS_ACUITY_SCORE: 35

## 2023-05-31 NOTE — ED TRIAGE NOTES
"RLE pain x 3 weeks. Pain is now radiating up and down from right shin and feels \"tight\". She also reports dyspnea on exertion for the last 2 weeks.     Triage Assessment     Row Name 05/31/23 170       Triage Assessment (Adult)    Airway WDL WDL       Respiratory WDL    Respiratory WDL WDL       Skin Circulation/Temperature WDL    Skin Circulation/Temperature WDL WDL              "

## 2023-05-31 NOTE — TELEPHONE ENCOUNTER
"Margareth calling concerned she has had right leg pain for 3 weeks that is getting worse, she rates the pain \"7\" feels tight and sometimes spasms. The pain is from the bottom of her right foot to her lower right back. She also has a dime sized lump on the back of her right calf from a puncture wound 2 months ago. She is taking Tylenol and Naproxen. Care advice is to go to the ED to rule out DVT. Pt agrees.  Marilu Stroud RN on 5/31/2023 at 4:40 PM      Reason for Disposition    Thigh or calf pain in only one leg and present > 1 hour    Additional Information    Negative: Looks like a broken bone or dislocated joint (e.g., crooked or deformed)    Negative: Sounds like a life-threatening emergency to the triager    Negative: Chest pain    Negative: Difficulty breathing    Negative: Entire foot is cool or blue in comparison to other side    Negative: Unable to walk    Negative: Fever and red area (or area very tender to touch)    Negative: Swollen joint and fever    Protocols used: LEG PAIN-A-OH      "

## 2023-06-01 ENCOUNTER — VIRTUAL VISIT (OUTPATIENT)
Dept: FAMILY MEDICINE | Facility: CLINIC | Age: 68
End: 2023-06-01
Payer: COMMERCIAL

## 2023-06-01 ENCOUNTER — TELEPHONE (OUTPATIENT)
Dept: FAMILY MEDICINE | Facility: CLINIC | Age: 68
End: 2023-06-01

## 2023-06-01 ENCOUNTER — TELEPHONE (OUTPATIENT)
Dept: PULMONOLOGY | Facility: CLINIC | Age: 68
End: 2023-06-01

## 2023-06-01 DIAGNOSIS — Z86.16 HISTORY OF COVID-19: ICD-10-CM

## 2023-06-01 DIAGNOSIS — J84.9 ILD (INTERSTITIAL LUNG DISEASE) (H): Primary | ICD-10-CM

## 2023-06-01 DIAGNOSIS — R91.8 GROUND GLASS OPACITY PRESENT ON IMAGING OF LUNG: Primary | ICD-10-CM

## 2023-06-01 DIAGNOSIS — R01.1 NEWLY RECOGNIZED HEART MURMUR: ICD-10-CM

## 2023-06-01 DIAGNOSIS — R06.02 EXERTIONAL SHORTNESS OF BREATH: ICD-10-CM

## 2023-06-01 PROCEDURE — 99442 PR PHYSICIAN TELEPHONE EVALUATION 11-20 MIN: CPT | Mod: 95 | Performed by: PHYSICIAN ASSISTANT

## 2023-06-01 NOTE — TELEPHONE ENCOUNTER
RN got a hold of the patient and scheduled her.       Next 5 appointments (look out 90 days)    Jun 01, 2023 11:00 AM  (Arrive by 10:40 AM)  Provider Visit with Caitlin Beasley PA-C  [facility] ([facility] ) [address], [address]  [place] [address]  [phone]        CHENTE Gonsalves, RN, PHN  [place]  June 1, 2023

## 2023-06-01 NOTE — TELEPHONE ENCOUNTER
----- Message from JUAN Soriano CNP sent at 5/31/2023  9:16 PM CDT -----  Regarding: Needs follow-up and echocardiogram    Patient was seen in the emergency department this evening.  See my note.  She has a new murmur that I noted on exam and she has some exertional shortness of breath.   I consulted with cardiology who recommends outpatient echocardiogram and referral to cardiology.    Can you order an outpatient echocardiogram for her and ensure she has follow-up with you?    Thanks,    JUAN Soriano CNP

## 2023-06-01 NOTE — ED PROVIDER NOTES
History     Chief Complaint   Patient presents with     Leg Pain     HPI  Margareth Green is a 68 year old female who presents for right lower extremity discomfort for the the past 3 weeks.  Pain radiates down the back of her entire leg.  Feels tight.  Additionally, she reports exertional shortness of breath for the last 2 weeks.  Denies chest pain.  Denies fever or chills.  Denies history of cardiac history, heart failure or VTE. She has history of asthma but has not noted increased wheezing and no increase use of her rescue (albuterol inhaler).  Former smoker.    Stress echocardiogram 10/14/2019:  Interpretation Summary  The patient exercised 8 minutes 27 seconds.  The patient exhibited no chest pain during exercise.  The EKG portion of this stress test was negative for inducible ischemia (see  echo results below).  Normal resting wall motion and no stress-induced wall motion abnormality.  This was a normal stress echocardiogram with no evidence of stress-induced  ischemia.    Allergies:  Allergies   Allergen Reactions     Penicillins Itching     Vaginal itching - Yeast infection afterward       Problem List:    Patient Active Problem List    Diagnosis Date Noted     Personal history of COVID-19 01/19/2022     Priority: Medium     T12 compression fracture, sequela 01/19/2022     Priority: Medium     Gastroesophageal reflux disease without esophagitis 11/30/2016     Priority: Medium     Tremor 11/30/2016     Priority: Medium     Cramp of limb 11/30/2016     Priority: Medium     Major depressive disorder, recurrent episode, moderate (H) 10/13/2015     Priority: Medium     Epistaxis 08/15/2013     Priority: Medium     Do you wish to do the replacement in the background? yes         Numbness of left jaw 08/07/2013     Priority: Medium     Neck pain 08/07/2013     Priority: Medium     TMJ (temporomandibular joint syndrome) 08/07/2013     Priority: Medium     Thyroid fullness 08/07/2013     Priority: Medium      Menopausal flushing 05/10/2012     Priority: Medium     Knee pain, left 03/22/2012     Priority: Medium     Mild major depression (H) 11/21/2011     Priority: Medium     Osteoporosis 12/30/2010     Priority: Medium     Atrophic vaginitis 12/07/2010     Priority: Medium     Stenosis of lumbosacral spine      Priority: Medium     l4-5 and l5-s1       Derangement of TMJ (temporomandibular joint) 03/04/2010     Priority: Medium     Hammertoe      Priority: Medium     Osteopenia      Priority: Medium     Menopausal syndrome 12/06/2008     Priority: Medium     DDD (degenerative disc disease), lumbar 06/24/2008     Priority: Medium     Arthralgia 06/12/2008     Priority: Medium     Irritable bowel syndrome 02/18/2008     Priority: Medium     Allergic rhinitis 11/25/2005     Priority: Medium     Problem list name updated by automated process. Provider to review       Mild persistent asthma 10/25/2005     Priority: Medium     Cervicalgia 10/25/2005     Priority: Medium     Malaise and fatigue 04/26/2005     Priority: Medium     Problem list name updated by automated process. Provider to review       Insomnia 12/09/2004     Priority: Medium     Problem list name updated by automated process. Provider to review       TAMMY (generalized anxiety disorder) 09/12/2003     Priority: Medium     Generalized osteoarthrosis, unspecified site 12/19/2002     Priority: Medium     Symptomatic menopausal or female climacteric states 12/15/2002     Priority: Medium     Dermatophytosis of nail 08/20/2002     Priority: Medium        Past Medical History:    Past Medical History:   Diagnosis Date     Abnormal Papanicolaou smear of vagina and vaginal HPV 12/2002     Allergic rhinitis, cause unspecified      Arthritis      Backache, unspecified      Carpal tunnel syndrome      Cervicalgia      Chronic kidney disease, stage 3 (H) 07/14/2021     Depressive disorder      Depressive disorder, not elsewhere classified      Derangement of TMJ  (temporomandibular joint) 03/2010     Dermatophytosis of nail      Diaphragmatic hernia without mention of obstruction or gangrene 09/2007     Diffuse cystic mastopathy 2000     Esophageal reflux 04/2005     Hammertoe 10/2009     Headache(784.0)      Intramural leiomyoma of uterus 07/2007     Irregular menstrual cycle      IRRITABLE COLON 02/18/2008     Malaise and fatigue      Menopause      Mild persistent asthma      Mole (skin) 2003     Motion sickness      Neuroma 11/2008     Osteopenia 12/2008     Other malaise and fatigue 04/2005     Personal history of tobacco use, presenting hazards to health      Solitary cyst of breast 12/2005     Stenosis of lumbosacral spine      Symptomatic menopausal or female climacteric states 12/2002     Tachycardia, paroxysmal (H) 11/24/2020       Past Surgical History:    Past Surgical History:   Procedure Laterality Date     BIOPSY  4/10/2015     BL DUPLEX LO EXTREM ART UNILAT/LTD  4/3008    lower extr arterial doppler -no stenosis     C DEXA,BONE DENSITY,APPENDICULR SKELTN  12/2008    osteopenia     COLONOSCOPY  10/22/2007    bx benign     COLONOSCOPY N/A 4/10/2015    Procedure: COMBINED COLONOSCOPY, SINGLE OR MULTIPLE BIOPSY/POLYPECTOMY BY BIOPSY;  Surgeon: Cl Wagner MD;  Location:  GI     ESOPHAGOSCOPY, GASTROSCOPY, DUODENOSCOPY (EGD), COMBINED N/A 1/31/2018    Procedure: COMBINED ESOPHAGOSCOPY, GASTROSCOPY, DUODENOSCOPY (EGD);  COMBINED ESOPHAGOSCOPY, GASTROSCOPY, DUODENOSCOPY (EGD);  Surgeon: Tirso Duran MD;  Location:  GI     ESOPHAGOSCOPY, GASTROSCOPY, DUODENOSCOPY (EGD), COMBINED N/A 2/1/2023    Procedure: ESOPHAGOGASTRODUODENOSCOPY (EGD);  Surgeon: Lui Vences MD;  Location:  GI     EYE SURGERY       HC PART REMV BONE METATARSAL HEAD,EA  01/11/10    Right foot plantar plate tear. Also correct hammertoe, 2nd digit & varicose vein ligation, heel.     HC REVISE MEDIAN N/CARPAL TUNNEL SURG      Left in 1997, right in 1999     INJ, ANES/STER, FACET  LUMB/SAC  2010    Left L5 nerve root injection     INJECT EPIDURAL CERVICAL Right 2022    Procedure: Cervical 7-Thoracic 1 Interlaminar epidural steroid injection using fluoroscopic guidance with contrast dye, Right;  Surgeon: Tyler Bay MD;  Location: [facility]     INJECT EPIDURAL TRANSFORAMINAL  10/09/2013    [facility]     INJECT JOINT SACROILIAC  3/19/14,14    [facility]     Z NONSPECIFIC PROCEDURE      Metatarsal phalangeal joint injection     [facility] COLONOSCOPY THRU STOMA, DIAGNOSTIC  2002    bx. benign - flex sig in 5 yrs  or colonoscopy in 10 yrs     [facility]REST  2005    WNL     [facility] NCS MOTOR W/O F-WAVE, EACH NERVE  2008    CTS       Family History:    Family History   Problem Relation Age of Onset     Diabetes Father      Depression Father      Cerebrovascular Disease Mother         age 80     Arthritis Mother      Depression Mother         On meds     Eye Disorder Mother         Glaucoma     Osteoporosis Mother      Respiratory Mother          86 YO COPD     Chronic Obstructive Pulmonary Disease Mother      C.A.D. Brother         65 YO MI -     Chronic Obstructive Pulmonary Disease Brother      Myocardial Infarction Brother      Other Cancer Brother      Cancer Brother        Social History:  Marital Status:   [2]  Social History     Tobacco Use     Smoking status: Former     Packs/day: 0.50     Years: 30.00     Pack years: 15.00     Types: Cigarettes     Quit date: 3/20/1998     Years since quittin.2     Smokeless tobacco: Never     Tobacco comments:     No smokers in home   Vaping Use     Vaping status: Never Used   Substance Use Topics     Alcohol use: Yes     Alcohol/week: 6.7 standard drinks of alcohol     Comment: 2x/week     Drug use: No        Medications:    acetaminophen (TYLENOL) 650 MG CR tablet  albuterol (PROAIR HFA) 108 (90 Base) MCG/ACT inhaler  albuterol (PROAIR HFA/PROVENTIL HFA/VENTOLIN HFA) 108  (90 Base) MCG/ACT inhaler  ALLEGRA 180 MG OR TABS  amitriptyline (ELAVIL) 10 MG tablet  buPROPion (WELLBUTRIN XL) 150 MG 24 hr tablet  CALCIUM /VITAMIN D TABS   OR  Cholecalciferol (VITAMIN D PO)  famotidine (PEPCID) 20 MG tablet  meclizine (ANTIVERT) 25 MG tablet  Melatonin 10 MG TABS tablet  mometasone-formoterol (DULERA) 100-5 MCG/ACT oral inhaler  multivitamin w/minerals (THERA-VIT-M) tablet  naproxen (NAPROSYN) 500 MG tablet  omeprazole (PRILOSEC) 20 MG DR capsule  raloxifene (EVISTA) 60 MG tablet  Simethicone (GAS-X PO)  triamcinolone (NASACORT) 55 MCG/ACT nasal inhaler  UNABLE TO FIND  VITAMIN C-MARK HIPS CR 1000 MG OR TBCR  VITAMIN E PO          Review of Systems  As mentioned above in the history present illness. All other systems were reviewed and are negative.    Physical Exam   BP: (!) 145/88  Pulse: 87  Temp: 98.9  F (37.2  C)  Resp: 18  Weight: 59.9 kg (132 lb)  SpO2: 96 %      Physical Exam  Constitutional:       General: She is not in acute distress.     Appearance: Normal appearance. She is well-developed. She is not ill-appearing.   HENT:      Head: Normocephalic and atraumatic.      Right Ear: External ear normal.      Left Ear: External ear normal.      Nose: Nose normal.      Mouth/Throat:      Mouth: Mucous membranes are moist.   Eyes:      Conjunctiva/sclera: Conjunctivae normal.   Cardiovascular:      Rate and Rhythm: Normal rate and regular rhythm.      Heart sounds: Murmur (loud ) heard.   Pulmonary:      Effort: Pulmonary effort is normal. No respiratory distress.      Breath sounds: Normal breath sounds.   Musculoskeletal:         General: Normal range of motion.      Right lower leg: No edema.      Left lower leg: No edema.   Skin:     General: Skin is warm and dry.      Findings: No rash.   Neurological:      General: No focal deficit present.      Mental Status: She is alert and oriented to person, place, and time.         ED Course                 Procedures              EKG  Interpretation:      Interpreted by JUAN Soriano CNP  Time reviewed:  1805  Symptoms at time of EKG: None   Rhythm: Normal sinus   Rate: Normal  Axis: Normal  Ectopy: None  Conduction: Normal  ST Segments/ T Waves: No ST-T wave changes and No acute ischemic changes  Q Waves: None  Comparison to prior: Unchanged from 10/14/2019    Clinical Impression: NSR with not acute ischemic changes.         Results for orders placed or performed during the hospital encounter of 05/31/23 (from the past 24 hour(s))   [place] Draw    Narrative    The following orders were created for panel order [place] Draw.  Procedure                               Abnormality         Status                     ---------                               -----------         ------                     Extra Blue Top Tube[[id number]]                              Final result               Extra Green Top (Lithium...[[id number]]                      Final result               Extra Purple Top Tube[[id number]]                            Final result                 Please view results for these tests on the individual orders.   Extra Blue Top Tube   Result Value Ref Range    Hold Specimen JIC    Extra Green Top (Lithium Heparin) Tube   Result Value Ref Range    Hold Specimen JIC    Extra Purple Top Tube   Result Value Ref Range    Hold Specimen JIC    CBC with platelets differential    Narrative    The following orders were created for panel order CBC with platelets differential.  Procedure                               Abnormality         Status                     ---------                               -----------         ------                     CBC with platelets and d...[[id number]]                      Final result                 Please view results for these tests on the individual orders.   Comprehensive metabolic panel   Result Value Ref Range    Sodium 138 136 - 145 mmol/L    Potassium 4.1 3.4 - 5.3 mmol/L    Chloride 106 98 - 107  mmol/L    Carbon Dioxide (CO2) 23 22 - 29 mmol/L    Anion Gap 9 7 - 15 mmol/L    Urea Nitrogen 17.6 8.0 - 23.0 mg/dL    Creatinine 0.95 0.51 - 0.95 mg/dL    Calcium 9.2 8.8 - 10.2 mg/dL    Glucose 91 70 - 99 mg/dL    Alkaline Phosphatase 73 35 - 104 U/L    AST 32 10 - 35 U/L    ALT 24 10 - 35 U/L    Protein Total 6.5 6.4 - 8.3 g/dL    Albumin 4.2 3.5 - 5.2 g/dL    Bilirubin Total 0.3 <=1.2 mg/dL    GFR Estimate 64 >60 mL/min/1.73m2   Troponin T, High Sensitivity   Result Value Ref Range    Troponin T, High Sensitivity 12 <=14 ng/L   D dimer quantitative   Result Value Ref Range    D-Dimer Quantitative 0.49 0.00 - 0.50 ug/mL FEU    Narrative    This D-dimer assay is intended for use in conjunction with a clinical pretest probability assessment model to exclude pulmonary embolism (PE) and deep venous thrombosis (DVT) in outpatients suspected of PE or DVT. The cut-off value is 0.50 ug/mL FEU.   Nt probnp inpatient (BNP)   Result Value Ref Range    N terminal Pro BNP Inpatient 645 0 - 900 pg/mL   CBC with platelets and differential   Result Value Ref Range    WBC Count 6.5 4.0 - 11.0 10e3/uL    RBC Count 4.07 3.80 - 5.20 10e6/uL    Hemoglobin 12.5 11.7 - 15.7 g/dL    Hematocrit 38.5 35.0 - 47.0 %    MCV 95 78 - 100 fL    MCH 30.7 26.5 - 33.0 pg    MCHC 32.5 31.5 - 36.5 g/dL    RDW 13.5 10.0 - 15.0 %    Platelet Count 212 150 - 450 10e3/uL    % Neutrophils 60 %    % Lymphocytes 20 %    % Monocytes 9 %    % Eosinophils 9 %    % Basophils 1 %    % Immature Granulocytes 1 %    NRBCs per 100 WBC 0 <1 /100    Absolute Neutrophils 4.0 1.6 - 8.3 10e3/uL    Absolute Lymphocytes 1.3 0.8 - 5.3 10e3/uL    Absolute Monocytes 0.6 0.0 - 1.3 10e3/uL    Absolute Eosinophils 0.6 0.0 - 0.7 10e3/uL    Absolute Basophils 0.1 0.0 - 0.2 10e3/uL    Absolute Immature Granulocytes 0.0 <=0.4 10e3/uL    Absolute NRBCs 0.0 10e3/uL   XR Chest 2 Views    Narrative    EXAM: XR CHEST 2 VIEWS  LOCATION: [facility]  CENTER  DATE/TIME: 5/31/2023 7:19 PM CDT    INDICATION: short of breath  COMPARISON: None.      Impression    IMPRESSION: Heart is normal in size. Lungs are clear.   US Lower Extremity Venous Duplex Right    Narrative    EXAM: US LOWER EXTREMITY VENOUS DUPLEX RIGHT  LOCATION: [facility]  DATE/TIME: 5/31/2023 7:19 PM CDT    INDICATION: Right leg pain, tightness.  COMPARISON: None.  TECHNIQUE: Venous Duplex ultrasound of the right lower extremity with and without compression, augmentation and duplex. Color flow and spectral Doppler with waveform analysis performed.    FINDINGS: Exam includes the common femoral, femoral, popliteal, and contralateral common femoral veins as well as segmentally visualized deep calf veins and greater saphenous vein.     RIGHT: No deep vein thrombosis. No superficial thrombophlebitis. No popliteal cyst.     The left common femoral vein was evaluated for comparison and is normal.    Pulsatile waveforms are noted in the bilateral common femoral veins, suggesting elevated right heart pressure.      Impression    IMPRESSION:  1.  No deep venous thrombosis in the right lower extremity.  2.  Pulsatile waveforms are noted in the bilateral common femoral veins, suggesting elevated right heart pressure.   CT Chest Pulmonary Embolism w Contrast    Narrative    EXAM: CT CHEST PULMONARY EMBOLISM W CONTRAST  LOCATION: [facility]  DATE/TIME: 5/31/2023 8:13 PM CDT    INDICATION: Exertional dyspnea.  COMPARISON: 12/06/2021.  TECHNIQUE: CT chest pulmonary angiogram during arterial phase injection of IV contrast. Multiplanar reformats and MIP reconstructions were performed. Dose reduction techniques were used.   CONTRAST: Isovue 370, 75mL.    FINDINGS:  ANGIOGRAM CHEST: No pulmonary embolism. Nonaneurysmal aorta without dissection.    LUNGS AND PLEURA: Mild residual opacities and reticulation in the lungs compared to December 2021. Minimal  bronchiolectasis. No pleural effusion or pneumothorax.    MEDIASTINUM/AXILLAE: Stable 11 x 17 mm anterior mediastinal nodule (series 4, image 136). Otherwise, no adenopathy. Normal heart size. No pericardial effusion. Small hiatus hernia.    CORONARY ARTERY CALCIFICATION: Motion artifact.    UPPER ABDOMEN: Nonobstructing 3 mm left renal upper pole calcification.    MUSCULOSKELETAL: Bony demineralization. No significant change of mild T12 superior endplate compression deformity.      Impression    IMPRESSION:    1.  No pulmonary embolism.     2.  Mild residual groundglass and reticulation in the same distribution as pulmonary opacities on the December 2021 exam. 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 Recommend pulmonary consultation.    3.  Minimal bronchiolectasis.    4.  Stable 11 x 17 mm anterior mediastinal nodule or lymph node (thymoma not excluded). 12 month follow-up chest CT recommended.       *Note: Due to a large number of results and/or encounters for the requested time period, some results have not been displayed. A complete set of results can be found in Results Review.       Medications   iopamidol (ISOVUE-370) solution 500 mL (76 mLs Intravenous $Given 5/31/23 2000)   sodium chloride 0.9 % bag 100mL for CT scan flush use (64 mLs Intravenous $Given 5/31/23 2000)       Assessments & Plan (with Medical Decision Making)   Work-up here today is reassuring.  Normal labs, including normal troponin and normal BNP.  Chest CT is negative for PE.  She does have some mild residual groundglass opacities in the same distribution as her imaging done in December 2021 when she had COVID-19 pneumonia.  Ultrasound of her right lower extremity is negative for DVT.  EKG is normal sinus rhythm.    She does have a history of sciatic, and I wonder if that is what is causing her right lower extremity discomfort.  She does have a loud murmur noted on her exam today.  I did not find any history of a murmur  reviewing her medical record and patient does not recall ever being told she has a murmur.  I consulted with on-call cardiologist at [facility], Dr. Mathews, regarding her murmur in the setting of her complaint of exertional shortness of breath.  Given she is stable, no hypoxia, no evidence of heart failure he recommends outpatient follow-up with cardiology and echocardiogram.      I discussed the lab and imaging findings with patient as well as my consult with cardiology.  I have placed a referral for cardiology.  I have messaged her primary care provider to ensure she has close follow-up with her PCP and to get a outpatient echocardiogram ordered.      Plan:  Your labs are all reassuring.  No DVT (blood clot) noted on your right leg ultrasound today.  Your chest CT shows no evidence of blood clot.  You do have some residual opacities in the lungs, which could be residual from COVID-19 pneumonia.  You do have a heart murmur.  I consulted with cardiology about your heart murmur and exertional shortness of breath.  They recommend follow-up with them as an outpatient and I did send a cardiology referral.  They also recommend getting an outpatient echocardiogram and I will message her primary care provider to put an order in for this.  I recommend you have recheck with your primary care provider in clinic to discuss possible need for a visit with pulmonology regarding persistent lung findings.    Discharge Medication List as of 5/31/2023  9:17 PM          Final diagnoses:   Exertional shortness of breath   Pain of right lower leg   Newly recognized heart murmur       5/31/2023   [facility] EMERGENCY DEPT     Yulia Basilio APRN CNP  06/01/23 0130

## 2023-06-01 NOTE — PROGRESS NOTES
Margareth is a 68 year old who is being evaluated via a billable telephone visit.      What phone number would you like to be contacted at? [phone]  How would you like to obtain your AVS? Laura    Distant Location (provider location):  Off-site    Assessment & Plan     Ground glass opacity present on imaging of lung  History of COVID radiology report indicates possible pulmonary fibrosis patient also has a history of asthma I will place a pulmonology referral and patient will call to schedule  - Adult Pulmonary Medicine Referral; Future    Exertional shortness of breath  Per cardiology from yesterday's ER visit we will place a referral for echocardiogram and also cardiology referral.  Cardiology referral has already been placed by ER provider yesterday though I did add the echocardiogram order  - Echocardiogram Complete; Future  - Adult Pulmonary Medicine Referral; Future    History of COVID-19  Recommend following up with pulmonology as above  - Adult Pulmonary Medicine Referral; Future    Newly recognized heart murmur  Echocardiogram ordered  - Echocardiogram Complete; Future           MED REC REQUIRED  Post Medication Reconciliation Status: discharge medications reconciled, continue medications without change      Caitlin Beasley PA-C  [facility] [place]   Margareth is a 68 year old, presenting for the following health issues:  Hospital F/U        4/5/2023     1:50 PM   Additional Questions   Roomed by ROCIO HADDAD   Accompanied by NONE     HPI     ED/UC Followup:    Facility:  [facility]  Date of visit: 5-31-23  Reason for visit: exertional SHORTNESS OF BREATH, leg pain,   Current Status: Her back pain is much improved after a good long hot bath and Epsom salts this morning.  She was told it may be related to pulled muscles or strained muscles from her gardening that she has been busy with recently.  She was evaluated for DVT and PE which all was negative thankfully.    She has a  newly diagnosed cardiac murmur.  She has not been using her albuterol recently because she has not felt like she needed it.  She is concerned though about the continued groundglass appearance to her CT scan.  She is in agreement with following up with pulmonology.  Also, she would like to pursue the echocardiogram and cardiac referral as mentioned by the ER provider yesterday.  Overall she is feeling much improved today and has no other concerns other than getting these test scheduled and referrals completed          Review of Systems   As documented above       Objective           Vitals:  No vitals were obtained today due to virtual visit.    Physical Exam   healthy, alert and no distress  PSYCH: Alert and oriented times 3; coherent speech, normal   rate and volume, able to articulate logical thoughts, able   to abstract reason, no tangential thoughts, no hallucinations   or delusions  Her affect is normal and pleasant  RESP: No cough, no audible wheezing, able to talk in full sentences  Remainder of exam unable to be completed due to telephone visits    Admission on 05/31/2023, Discharged on 05/31/2023   Component Date Value Ref Range Status     Hold Specimen 05/31/2023 [facility]   Final     Hold Specimen 05/31/2023 [facility]   Final     Hold Specimen 05/31/2023 [facility]   Final     Sodium 05/31/2023 138  136 - 145 mmol/L Final     Potassium 05/31/2023 4.1  3.4 - 5.3 mmol/L Final     Chloride 05/31/2023 106  98 - 107 mmol/L Final     Carbon Dioxide (CO2) 05/31/2023 23  22 - 29 mmol/L Final     Anion Gap 05/31/2023 9  7 - 15 mmol/L Final     Urea Nitrogen 05/31/2023 17.6  8.0 - 23.0 mg/dL Final     Creatinine 05/31/2023 0.95  0.51 - 0.95 mg/dL Final     Calcium 05/31/2023 9.2  8.8 - 10.2 mg/dL Final     Glucose 05/31/2023 91  70 - 99 mg/dL Final     Alkaline Phosphatase 05/31/2023 73  35 - 104 U/L Final     AST 05/31/2023 32  10 - 35 U/L Final     ALT 05/31/2023 24  10 - 35 U/L Final     Protein Total 05/31/2023 6.5  6.4 - 8.3  g/dL Final     Albumin 05/31/2023 4.2  3.5 - 5.2 g/dL Final     Bilirubin Total 05/31/2023 0.3  <=1.2 mg/dL Final     GFR Estimate 05/31/2023 64  >60 mL/min/1.73m2 Final    eGFR calculated using 2021 CKD-EPI equation.     Troponin T, High Sensitivity 05/31/2023 12  <=14 ng/L Final    Either a High Sensitivity Troponin T baseline (0 hours) value = 100 ng/L, or an increase in High Sensitivity Troponin T = 7 ng/L at 2 hours compared to 0 hours (2-0 hours), suggests myocardial injury, and urgent clinical attention is required.    If the 2-0 hours increase is <7 ng/L, a High Sensitivity Troponin T result above gender-specific reference ranges warrants further evaluation.   Recommendations for further evaluation include correlation with clinical decision-making tool (e.g., HEART), a 3rd High Sensitivity Troponin T test 2 hours after the 2nd (a 20% change from baseline would represent concern), admission for observation, close PCC/cardiology follow-up, or urgent outpatient provocative testing.     D-Dimer Quantitative 05/31/2023 0.49  0.00 - 0.50 ug/mL FEU Final     N terminal Pro BNP Inpatient 05/31/2023 645  0 - 900 pg/mL Final    Reference range shown and results flagged as abnormal are suggested inpatient cut points for confirming diagnosis if CHF in an acute setting. Establishing a baseline value for each individual patient is useful for follow-up. An inpatient or emergency department NT-proPBNP <300 pg/mL effectively rules out acute CHF, with 99% negative predictive value.    The outpatient non-acute reference range for ruling out CHF is:  0-125 pg/mL (age 18 to less than 75)  0-450 pg/mL (age 75 yrs and older)      WBC Count 05/31/2023 6.5  4.0 - 11.0 10e3/uL Final     RBC Count 05/31/2023 4.07  3.80 - 5.20 10e6/uL Final     Hemoglobin 05/31/2023 12.5  11.7 - 15.7 g/dL Final     Hematocrit 05/31/2023 38.5  35.0 - 47.0 % Final     MCV 05/31/2023 95  78 - 100 fL Final     MCH 05/31/2023 30.7  26.5 - 33.0 pg Final      MCHC 05/31/2023 32.5  31.5 - 36.5 g/dL Final     RDW 05/31/2023 13.5  10.0 - 15.0 % Final     Platelet Count 05/31/2023 212  150 - 450 10e3/uL Final     % Neutrophils 05/31/2023 60  % Final     % Lymphocytes 05/31/2023 20  % Final     % Monocytes 05/31/2023 9  % Final     % Eosinophils 05/31/2023 9  % Final     % Basophils 05/31/2023 1  % Final     % Immature Granulocytes 05/31/2023 1  % Final     NRBCs per 100 WBC 05/31/2023 0  <1 /100 Final     Absolute Neutrophils 05/31/2023 4.0  1.6 - 8.3 10e3/uL Final     Absolute Lymphocytes 05/31/2023 1.3  0.8 - 5.3 10e3/uL Final     Absolute Monocytes 05/31/2023 0.6  0.0 - 1.3 10e3/uL Final     Absolute Eosinophils 05/31/2023 0.6  0.0 - 0.7 10e3/uL Final     Absolute Basophils 05/31/2023 0.1  0.0 - 0.2 10e3/uL Final     Absolute Immature Granulocytes 05/31/2023 0.0  <=0.4 10e3/uL Final     Absolute NRBCs 05/31/2023 0.0  10e3/uL Final     CT Chest Pulmonary Embolism w Contrast    Result Date: 5/31/2023  EXAM: CT CHEST PULMONARY EMBOLISM W CONTRAST LOCATION: [facility] DATE/TIME: 5/31/2023 8:13 PM CDT INDICATION: Exertional dyspnea. COMPARISON: 12/06/2021. TECHNIQUE: CT chest pulmonary angiogram during arterial phase injection of IV contrast. Multiplanar reformats and MIP reconstructions were performed. Dose reduction techniques were used. CONTRAST: Isovue 370, 75mL. FINDINGS: ANGIOGRAM CHEST: No pulmonary embolism. Nonaneurysmal aorta without dissection. LUNGS AND PLEURA: Mild residual opacities and reticulation in the lungs compared to December 2021. Minimal bronchiolectasis. No pleural effusion or pneumothorax. MEDIASTINUM/AXILLAE: Stable 11 x 17 mm anterior mediastinal nodule (series 4, image 136). Otherwise, no adenopathy. Normal heart size. No pericardial effusion. Small hiatus hernia. CORONARY ARTERY CALCIFICATION: Motion artifact. UPPER ABDOMEN: Nonobstructing 3 mm left renal upper pole calcification. MUSCULOSKELETAL: Bony  demineralization. No significant change of mild T12 superior endplate compression deformity.     IMPRESSION: 1.  No pulmonary embolism. 2.  Mild residual groundglass and reticulation in the same distribution as pulmonary opacities on the December 2021 exam. 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 Recommend pulmonary consultation. 3.  Minimal bronchiolectasis. 4.  Stable 11 x 17 mm anterior mediastinal nodule or lymph node (thymoma not excluded). 12 month follow-up chest CT recommended.     US Lower Extremity Venous Duplex Right    Result Date: 5/31/2023  EXAM: US LOWER EXTREMITY VENOUS DUPLEX RIGHT LOCATION: [facility] DATE/TIME: 5/31/2023 7:19 PM CDT INDICATION: Right leg pain, tightness. COMPARISON: None. TECHNIQUE: Venous Duplex ultrasound of the right lower extremity with and without compression, augmentation and duplex. Color flow and spectral Doppler with waveform analysis performed. FINDINGS: Exam includes the common femoral, femoral, popliteal, and contralateral common femoral veins as well as segmentally visualized deep calf veins and greater saphenous vein. RIGHT: No deep vein thrombosis. No superficial thrombophlebitis. No popliteal cyst. The left common femoral vein was evaluated for comparison and is normal. Pulsatile waveforms are noted in the bilateral common femoral veins, suggesting elevated right heart pressure.     IMPRESSION: 1.  No deep venous thrombosis in the right lower extremity. 2.  Pulsatile waveforms are noted in the bilateral common femoral veins, suggesting elevated right heart pressure.    XR Chest 2 Views    Result Date: 5/31/2023  EXAM: XR CHEST 2 VIEWS LOCATION: [facility] DATE/TIME: 5/31/2023 7:19 PM CDT INDICATION: short of breath COMPARISON: None.     IMPRESSION: Heart is normal in size. Lungs are clear.            Phone call duration: 11 minutes

## 2023-06-01 NOTE — TELEPHONE ENCOUNTER
RN attempted to reach the patient but was unable to reach.  for her to return call to the clinic.   RN sent a [employer] message.     Please call pt-would she like to do an ER follow-up visit to discuss her ER visit?  It looks like we  need to do an echocardiogram and a cardiology referral for her new murmur.  I could see her in person next week or we could do a virtual visit today if she would like so that I can get this order.  TOI Hirsch BSN, RN, PHN  [place] [facility]  June 1, 2023

## 2023-06-01 NOTE — TELEPHONE ENCOUNTER
Please call pt-would she like to do an ER follow-up visit to discuss her ER visit?  It looks like we  need to do an echocardiogram and a cardiology referral for her new murmur.  I could see her in person next week or we could do a virtual visit today if she would like so that I can get this order.  Caitlin Beasley PA-C

## 2023-06-01 NOTE — TELEPHONE ENCOUNTER
RN Triage    Patient Contact    Attempt # 3    Was call answered?  No.  Left message on voicemail with information to call me back.    CHENTE Gonsalves, RN, PHN  [place] [facility]  June 1, 2023

## 2023-06-01 NOTE — TELEPHONE ENCOUNTER
[facility] Call Center    Phone Message    May a detailed message be left on voicemail: yes     Reason for Call: Appointment Intake    Referring Provider Name:  Caitlin Beasley PA-C    Diagnosis and/or Symptoms: Ground glass opacity present on imaging of lung, Exertional shortness of breath, History of COVID-19, Per Protocols Ground Glass Opacity on Images is Interstitial..  Action Taken: Other: Pulm    Travel Screening: Not Applicable

## 2023-06-01 NOTE — DISCHARGE INSTRUCTIONS
Your labs are all reassuring.  No DVT (blood clot) noted on your right leg ultrasound today.  Your chest CT shows no evidence of blood clot.  You do have some residual opacities in the lungs, which could be residual from COVID-19 pneumonia.  You do have a heart murmur.  I consulted with cardiology about your heart murmur and exertional shortness of breath.  They recommend follow-up with them as an outpatient and I did send a cardiology referral.  ---Call [phone] to schedule.  They also recommend getting an outpatient echocardiogram and I will message her primary care provider to put an order in for this.    I recommend you have recheck with your primary care provider in clinic to discuss possible need for a visit with pulmonology regarding persistent lung findings.    Turn to the emergency department for fevers, increased shortness of breath, chest pain, or worse in any way.

## 2023-06-05 NOTE — CONFIDENTIAL NOTE
ILD New Patient Referral: Pre visit communication    Patient: Margareth Green  Reason for Referral:   Ground glass opacity present on imaging of lung   Exertional shortness of breath   History of COVID-19     Referring Physician:   Caitlin Beasley PA-C   [address] [address] [facility] [address]   Phone: [phone]       Chest CT scan: YES. Date-5/31/23. Location[facility].  Biopsy: No  PFT's:YES. Date-11/2022. Location-Asthma clinic.  Additional testing: Echo: YES. Date-July 2023. Location-[facility].    Current symptoms: SOB.   Current related prescriptions: YES. Inhalers for asthma    Supplemental oxygen? No    In review of apparent records and conversation with patient; recommend first visit with ILD provider be conducted :  In person visit  Testing needed: Full PFT's and walk only    Additional notes:

## 2023-06-21 ENCOUNTER — MYC MEDICAL ADVICE (OUTPATIENT)
Dept: FAMILY MEDICINE | Facility: CLINIC | Age: 68
End: 2023-06-21
Payer: COMMERCIAL

## 2023-06-21 NOTE — TELEPHONE ENCOUNTER
She would need a face-to-face visit for an exam and to order an x-ray, I am only virtual tomorrow and that I do not work Friday.    I am not sure what she means about could this be related to her heart?  Caitlin Beasley PA-C

## 2023-06-21 NOTE — TELEPHONE ENCOUNTER
Are you able to work patient in for appointment this week. Is virtual an option or in person needed for reassessment of leg?    WENDY GillN, RN  [facility] ~ Registered Nurse  Clinic Triage ~ [place] [facility] [place]  June 21, 2023

## 2023-06-21 NOTE — TELEPHONE ENCOUNTER
Next 5 appointments (look out 90 days)    Jun 26, 2023 10:00 AM  (Arrive by 9:40 AM)  Provider Visit with Caitlin Beasley PA-C  [facility] [address], [address]  [place] [address]  [phone]        CHENTE Gonsalves, RN, PHN  [place] [facility]  June 21, 2023

## 2023-06-21 NOTE — TELEPHONE ENCOUNTER
Last OV 6/1/23 the leg pain was discussed and evaluated for DVT.    Quyen Tello RN  [facility] ~ Registered Nurse  Clinic Triage ~ [place] [facility] [place]  June 21, 2023      .

## 2023-06-26 ENCOUNTER — OFFICE VISIT (OUTPATIENT)
Dept: FAMILY MEDICINE | Facility: CLINIC | Age: 68
End: 2023-06-26
Payer: COMMERCIAL

## 2023-06-26 ENCOUNTER — ANCILLARY PROCEDURE (OUTPATIENT)
Dept: GENERAL RADIOLOGY | Facility: OTHER | Age: 68
End: 2023-06-26
Attending: PHYSICIAN ASSISTANT
Payer: COMMERCIAL

## 2023-06-26 VITALS
SYSTOLIC BLOOD PRESSURE: 114 MMHG | WEIGHT: 129.7 LBS | DIASTOLIC BLOOD PRESSURE: 62 MMHG | HEIGHT: 62 IN | BODY MASS INDEX: 23.87 KG/M2 | HEART RATE: 78 BPM | TEMPERATURE: 98.6 F | OXYGEN SATURATION: 98 % | RESPIRATION RATE: 16 BRPM

## 2023-06-26 DIAGNOSIS — R25.2 MUSCLE CRAMP: ICD-10-CM

## 2023-06-26 DIAGNOSIS — M81.0 OSTEOPOROSIS WITHOUT CURRENT PATHOLOGICAL FRACTURE, UNSPECIFIED OSTEOPOROSIS TYPE: ICD-10-CM

## 2023-06-26 DIAGNOSIS — M79.604 RIGHT LEG PAIN: Primary | ICD-10-CM

## 2023-06-26 DIAGNOSIS — M25.561 ACUTE PAIN OF RIGHT KNEE: ICD-10-CM

## 2023-06-26 PROCEDURE — 99214 OFFICE O/P EST MOD 30 MIN: CPT | Performed by: PHYSICIAN ASSISTANT

## 2023-06-26 PROCEDURE — 73560 X-RAY EXAM OF KNEE 1 OR 2: CPT | Mod: TC | Performed by: RADIOLOGY

## 2023-06-26 RX ORDER — CYCLOBENZAPRINE HCL 5 MG
5 TABLET ORAL 3 TIMES DAILY PRN
Qty: 15 TABLET | Refills: 1 | Status: SHIPPED | OUTPATIENT
Start: 2023-06-26 | End: 2023-10-17

## 2023-06-26 ASSESSMENT — ENCOUNTER SYMPTOMS: LEG PAIN: 1

## 2023-06-26 ASSESSMENT — PAIN SCALES - GENERAL: PAINLEVEL: EXTREME PAIN (8)

## 2023-06-26 NOTE — PROGRESS NOTES
Assessment & Plan     Osteoporosis  She will discontinue Evista/raloxifene and we will plan to have her continue with weightbearing exercise and her calcium and vitamin D supplement.  We will recheck her bone density in the fall or early winter.    Right leg pain  We will refer her to PT and sports medicine at this point it seems most of her pain is muscular she will stretch and drink plenty of fluids  - Orthopedic  Referral; Future  - Physical Therapy Referral; Future    Acute pain of right knee  X-ray indicating some mild to moderate degenerative joint disease over the medial compartment, we will refer to sports med for the patient's  - XR Knee Standing Right 2 Views; Future  - Orthopedic  Referral; Future  - Physical Therapy Referral; Future    Muscle cramp  Stretching, heat or ice continue with good hydration  - cyclobenzaprine (FLEXERIL) 5 MG tablet; Take 1 tablet (5 mg) by mouth 3 times daily as needed for muscle spasms  - Orthopedic  Referral; Future  - Physical Therapy Referral; Future        Caitlin Beasley PA-C  [facility] J[facility] Zuluaga is a 68 year old, presenting for the following health issues:  Leg Pain        6/26/2023     9:54 AM   Additional Questions   Roomed by Rowena HADDAD   Accompanied by None     Leg Pain    History of Present Illness       Reason for visit:  Leg pain  Symptom onset:  More than a month  Symptoms include:  Pain in right leg  Symptom intensity:  Moderate  Symptom progression:  Staying the same  Had these symptoms before:  No  What makes it worse:  Bending the leg, knealing, going up and down the stairs  What makes it better:  Tylenol arthritis and Naprosyn    She eats 0-1 servings of fruits and vegetables daily.She consumes 0 sweetened beverage(s) daily.She exercises with enough effort to increase her heart rate 10 to 19 minutes per day.  She exercises with enough effort to increase her heart rate 3 or less days per week.    She is taking medications regularly.       Pain History:  When did you first notice your pain? 1 MONTH AGO    Have you seen anyone else for your pain? Yes - ED [place], work-up was negative for D VT however she was found to have a new cardiac murmur and also some scarring on her lungs so she is scheduled to see cardiology and pulmonology.  Other than telling her she did not have a DVT they were more concerned with her lungs and with her heart and never told her what was wrong with her leg  How has your pain affected your ability to work? Not currently working - unrelated to pain  Where in your body do you have pain? Musculoskeletal problem/pain  Onset/Duration: 1 MONTH AGO, no specific injury or reason why her leg started hurting  Description  Location: leg - right  Joint Swelling: YES  Redness: No  Pain: YES  Warmth: No  Intensity:  8/10  Progression of Symptoms:  intermittent, she has pain in her inner thigh and also in her calf she does have some knee pain she is most bothered by charley horses that occur if she bends down and kneels or if they wake her from sleep.  Her pain is not worse or different than when she was in the ER at the end of May.  This could occur in either leg but more so in the right leg.  The pain can be a sharp stabbing burning pain in her medial thigh.  She denies any low back pain, she also denies any lateral hip pain or pain in the crease of her leg.    Accompanying signs and symptoms:   Fevers: No  Numbness/tingling/weakness: YES  History  Trauma to the area: No  Recent illness:  No  Previous similar problem: No  Previous evaluation:  YES, ER May 31  Precipitating or alleviating factors:  Aggravating factors include: climbing stairs  Therapies tried and outcome: massage is very painful and may help to a slight degree but not for any significant length of time.  She tries to have a massage every week.  Elevating her leg and applying ice can also be helpful.  She has been drinking  "plenty of water but does not stretch.         She has been reading some of the side effects of raloxifene, muscle cramping and blood clots are side effect.  She is concerned about this.  She has been on Evista since 2009 and wonders if she should stop this medication.  Her last bone density test was in 2021.      Review of Systems   As documented above       Objective    /62 (BP Location: Left arm, Patient Position: Chair, Cuff Size: Adult Regular)   Pulse 78   Temp 98.6  F (37  C) (Temporal)   Resp 16   Ht 1.57 m (5' 1.81\")   Wt 58.8 kg (129 lb 11.2 oz)   LMP 02/26/2008 (Approximate)   SpO2 98%   BMI 23.87 kg/m    Body mass index is 23.87 kg/m .  Physical Exam   GENERAL: healthy, alert and no distress  MS: no gross musculoskeletal defects noted, no edema  MS: She is tender to palpation over the right medial joint line otherwise nontender over the patella patellar tendon tibial tuberosity or lateral joint line she has full range of motion of her knee she does have some tenderness to palpation over the musculature of her right inner thigh it does feel more taut in comparison to the left, she does have some general tenderness throughout the entire length of her calf bilaterally no erythema or edema noted, pedal pulses are present and symmetric bilaterally  SKIN: no suspicious lesions or rashes  NEURO: Normal strength and tone, mentation intact and speech normal  PSYCH: mentation appears normal, affect normal/bright    Admission on 05/31/2023, Discharged on 05/31/2023   Component Date Value Ref Range Status     Hold Specimen 05/31/2023 [facility]   Final     Hold Specimen 05/31/2023 [facility]   Final     Hold Specimen 05/31/2023 [facility]   Final     Sodium 05/31/2023 138  136 - 145 mmol/L Final     Potassium 05/31/2023 4.1  3.4 - 5.3 mmol/L Final     Chloride 05/31/2023 106  98 - 107 mmol/L Final     Carbon Dioxide (CO2) 05/31/2023 23  22 - 29 mmol/L Final     Anion Gap 05/31/2023 9  7 - 15 mmol/L Final     Urea Nitrogen " 05/31/2023 17.6  8.0 - 23.0 mg/dL Final     Creatinine 05/31/2023 0.95  0.51 - 0.95 mg/dL Final     Calcium 05/31/2023 9.2  8.8 - 10.2 mg/dL Final     Glucose 05/31/2023 91  70 - 99 mg/dL Final     Alkaline Phosphatase 05/31/2023 73  35 - 104 U/L Final     AST 05/31/2023 32  10 - 35 U/L Final     ALT 05/31/2023 24  10 - 35 U/L Final     Protein Total 05/31/2023 6.5  6.4 - 8.3 g/dL Final     Albumin 05/31/2023 4.2  3.5 - 5.2 g/dL Final     Bilirubin Total 05/31/2023 0.3  <=1.2 mg/dL Final     GFR Estimate 05/31/2023 64  >60 mL/min/1.73m2 Final    eGFR calculated using 2021 CKD-EPI equation.     Troponin T, High Sensitivity 05/31/2023 12  <=14 ng/L Final    Either a High Sensitivity Troponin T baseline (0 hours) value = 100 ng/L, or an increase in High Sensitivity Troponin T = 7 ng/L at 2 hours compared to 0 hours (2-0 hours), suggests myocardial injury, and urgent clinical attention is required.    If the 2-0 hours increase is <7 ng/L, a High Sensitivity Troponin T result above gender-specific reference ranges warrants further evaluation.   Recommendations for further evaluation include correlation with clinical decision-making tool (e.g., HEART), a 3rd High Sensitivity Troponin T test 2 hours after the 2nd (a 20% change from baseline would represent concern), admission for observation, close PCC/cardiology follow-up, or urgent outpatient provocative testing.     D-Dimer Quantitative 05/31/2023 0.49  0.00 - 0.50 ug/mL FEU Final     N terminal Pro BNP Inpatient 05/31/2023 645  0 - 900 pg/mL Final    Reference range shown and results flagged as abnormal are suggested inpatient cut points for confirming diagnosis if CHF in an acute setting. Establishing a baseline value for each individual patient is useful for follow-up. An inpatient or emergency department NT-proPBNP <300 pg/mL effectively rules out acute CHF, with 99% negative predictive value.    The outpatient non-acute reference range for ruling out CHF is:  0-125  pg/mL (age 18 to less than 75)  0-450 pg/mL (age 75 yrs and older)      WBC Count 05/31/2023 6.5  4.0 - 11.0 10e3/uL Final     RBC Count 05/31/2023 4.07  3.80 - 5.20 10e6/uL Final     Hemoglobin 05/31/2023 12.5  11.7 - 15.7 g/dL Final     Hematocrit 05/31/2023 38.5  35.0 - 47.0 % Final     MCV 05/31/2023 95  78 - 100 fL Final     MCH 05/31/2023 30.7  26.5 - 33.0 pg Final     MCHC 05/31/2023 32.5  31.5 - 36.5 g/dL Final     RDW 05/31/2023 13.5  10.0 - 15.0 % Final     Platelet Count 05/31/2023 212  150 - 450 10e3/uL Final     % Neutrophils 05/31/2023 60  % Final     % Lymphocytes 05/31/2023 20  % Final     % Monocytes 05/31/2023 9  % Final     % Eosinophils 05/31/2023 9  % Final     % Basophils 05/31/2023 1  % Final     % Immature Granulocytes 05/31/2023 1  % Final     NRBCs per 100 WBC 05/31/2023 0  <1 /100 Final     Absolute Neutrophils 05/31/2023 4.0  1.6 - 8.3 10e3/uL Final     Absolute Lymphocytes 05/31/2023 1.3  0.8 - 5.3 10e3/uL Final     Absolute Monocytes 05/31/2023 0.6  0.0 - 1.3 10e3/uL Final     Absolute Eosinophils 05/31/2023 0.6  0.0 - 0.7 10e3/uL Final     Absolute Basophils 05/31/2023 0.1  0.0 - 0.2 10e3/uL Final     Absolute Immature Granulocytes 05/31/2023 0.0  <=0.4 10e3/uL Final     Absolute NRBCs 05/31/2023 0.0  10e3/uL Final       X-ray of her knee does reveal some mild to moderate joint space narrowing of the medial compartment. No fractures

## 2023-07-03 ENCOUNTER — THERAPY VISIT (OUTPATIENT)
Dept: PHYSICAL THERAPY | Facility: CLINIC | Age: 68
End: 2023-07-03
Attending: PHYSICIAN ASSISTANT
Payer: COMMERCIAL

## 2023-07-03 DIAGNOSIS — M25.562 ACUTE PAIN OF LEFT KNEE: Primary | ICD-10-CM

## 2023-07-03 DIAGNOSIS — R25.2 MUSCLE CRAMP: ICD-10-CM

## 2023-07-03 DIAGNOSIS — M79.604 PAIN OF RIGHT LOWER EXTREMITY: ICD-10-CM

## 2023-07-03 PROCEDURE — 97161 PT EVAL LOW COMPLEX 20 MIN: CPT | Mod: GP | Performed by: PHYSICAL THERAPIST

## 2023-07-03 PROCEDURE — 97014 ELECTRIC STIMULATION THERAPY: CPT | Mod: GP | Performed by: PHYSICAL THERAPIST

## 2023-07-03 PROCEDURE — 97110 THERAPEUTIC EXERCISES: CPT | Mod: GP | Performed by: PHYSICAL THERAPIST

## 2023-07-03 PROCEDURE — 97530 THERAPEUTIC ACTIVITIES: CPT | Mod: GP | Performed by: PHYSICAL THERAPIST

## 2023-07-03 ASSESSMENT — ACTIVITIES OF DAILY LIVING (ADL)
STIFFNESS: THE SYMPTOM AFFECTS MY ACTIVITY MODERATELY
KNEE_ACTIVITY_OF_DAILY_LIVING_SCORE: 30
PAIN: THE SYMPTOM AFFECTS MY ACTIVITY MODERATELY
WALK: ACTIVITY IS FAIRLY DIFFICULT
STAND: ACTIVITY IS VERY DIFFICULT
GO UP STAIRS: ACTIVITY IS VERY DIFFICULT
RISE FROM A CHAIR: ACTIVITY IS VERY DIFFICULT
AS_A_RESULT_OF_YOUR_KNEE_INJURY,_HOW_WOULD_YOU_RATE_YOUR_CURRENT_LEVEL_OF_DAILY_ACTIVITY?: ABNORMAL
LIMPING: THE SYMPTOM AFFECTS MY ACTIVITY MODERATELY
WEAKNESS: THE SYMPTOM AFFECTS MY ACTIVITY MODERATELY
SIT WITH YOUR KNEE BENT: I AM UNABLE TO DO THE ACTIVITY
KNEEL ON THE FRONT OF YOUR KNEE: ACTIVITY IS VERY DIFFICULT
SWELLING: THE SYMPTOM AFFECTS MY ACTIVITY MODERATELY
GIVING WAY, BUCKLING OR SHIFTING OF KNEE: THE SYMPTOM AFFECTS MY ACTIVITY SLIGHTLY
GO DOWN STAIRS: ACTIVITY IS VERY DIFFICULT
RAW_SCORE: 21
SQUAT: ACTIVITY IS VERY DIFFICULT
HOW_WOULD_YOU_RATE_THE_OVERALL_FUNCTION_OF_YOUR_KNEE_DURING_YOUR_USUAL_DAILY_ACTIVITIES?: ABNORMAL
KNEE_ACTIVITY_OF_DAILY_LIVING_SUM: 21

## 2023-07-03 NOTE — PROGRESS NOTES
PHYSICAL THERAPY EVALUATIONEvaluation  Type of Visit:     See electronic medical record for Abuse and Falls Screening details.    Subjective      Presenting condition or subjective complaint: Patient is having pain in the right LE that started in May incidious onset.  and went into the ER and they did a US negative. Pain is in the back of the right leg #7. The lowest it will get to is #2. Been taking Tylenol for this. LBP #3 right and into the right buttock. PMHX: Covid, lung issues.  Been also taking mm relaxer at night. Pain is into the foot and is a #3  Date of onset: 05/10/23      Prior diagnostic imaging/testing results: X-ray     Moderate osteo changes medial compartment  Prior therapy history for the same diagnosis, illness or injury: No        Living Environment  Social support: With a significant other or spouse   Type of home: House   Stairs to enter the home: Yes       Ramp:     Stairs inside the home:         Help at home: [employer] (home health aide/personal care attendant, family, etc)  Equipment owned:       Employment: No    Hobbies/Interests:      Patient goals for therapy: Kneel, able to garden , able to walk and able to bend with no pain         Objective   LUMBAR SPINE EVALUATION    INTEGUMENTARY (edema, incisions):   POSTURE: WNL    ROM: AROM WNL    STRENGTH: Feeling weakness in the right LE      DTR S: Patellar 2- left    FLEXIBILITY: stiffness and very hard to go sit to stand  LUMBAR/HIP Special Tests: found pain relieving position is prone flat   PELVIS/SI SPECIAL TESTS:     PALPATION: pain with PA glide at L5-S1 and L4-5    Assessment & Plan   CLINICAL IMPRESSIONS   Medical Diagnosis: Right leg pain (M79.604)  - Primary     Acute pain of right knee (M25.561)     Muscle cramp (R25.2)    Treatment Diagnosis: RLE pain, sciatica   Impression/Assessment: PT feels after assessment that pt's pain is due to sciatica and referral sx's from the LB    Clinical Decision Making (Complexity):   Clinical  Presentation: Evolving/Changing  Clinical Presentation Rationale: based on medical and personal factors listed in PT evaluation  Clinical Decision Making (Complexity): Low complexity    PLAN OF CARE  Treatment Interventions:  Modalities: E-stim  Interventions: Manual Therapy, Neuromuscular Re-education, Therapeutic Activity, Therapeutic Exercise    Long Term Goals     PT Goal 1  Goal Identifier: 1  Goal Description: Patient has full functional mobility, is able to garden and go for walks with no RLE pain and no LBP  Target Date: 09/11/23      Frequency of Treatment: 1-2 x per wk  Duration of Treatment: 10 wks    Education Assessment:   Learner/Method: Patient;Listening;Reading;Demonstration    Risks and benefits of evaluation/treatment have been explained.   Patient/Family/caregiver agrees with Plan of Care.     Evaluation Time:     PT Eval, Low Complexity Minutes (44817): 15      Signing Clinician: Leana James, UTE      [facility]                                                                                   OUTPATIENT PHYSICAL THERAPY      PLAN OF TREATMENT FOR OUTPATIENT REHABILITATION   Patient's Last Name, First Name, Margareth Gamez YOB: 1955   Provider's Name   [facility]   Medical Record No.  [id number]     Onset Date: 05/10/23  Start of Care Date: 07/03/23     Medical Diagnosis:  Right leg pain (M79.604)  - Primary     Acute pain of right knee (M25.561)     Muscle cramp (R25.2)      PT Treatment Diagnosis:  RLE pain, sciatica Plan of Treatment  Frequency/Duration: 1-2 x per wk/ 10 wks    Certification date from 07/03/23 to 09/11/23         See note for plan of treatment details and functional goals     Leana James, PT                         I CERTIFY THE NEED FOR THESE SERVICES FURNISHED UNDER        THIS PLAN OF TREATMENT AND WHILE UNDER MY CARE     (Physician attestation of this document indicates review and  certification of the therapy plan).                  Referring Provider:  No ref. provider found      Initial Assessment  See Epic Evaluation- Start of Care Date: 07/03/23

## 2023-07-07 ENCOUNTER — HOSPITAL ENCOUNTER (OUTPATIENT)
Dept: CARDIOLOGY | Facility: CLINIC | Age: 68
Discharge: HOME | End: 2023-07-07
Attending: PHYSICIAN ASSISTANT | Admitting: PHYSICIAN ASSISTANT
Payer: COMMERCIAL

## 2023-07-07 ENCOUNTER — MYC MEDICAL ADVICE (OUTPATIENT)
Dept: FAMILY MEDICINE | Facility: CLINIC | Age: 68
End: 2023-07-07

## 2023-07-07 DIAGNOSIS — R06.02 EXERTIONAL SHORTNESS OF BREATH: ICD-10-CM

## 2023-07-07 DIAGNOSIS — R01.1 NEWLY RECOGNIZED HEART MURMUR: ICD-10-CM

## 2023-07-07 LAB — LVEF ECHO: NORMAL

## 2023-07-07 PROCEDURE — 93306 TTE W/DOPPLER COMPLETE: CPT | Mod: 26 | Performed by: INTERNAL MEDICINE

## 2023-07-07 PROCEDURE — 93306 TTE W/DOPPLER COMPLETE: CPT

## 2023-07-09 DIAGNOSIS — R41.840 POOR CONCENTRATION: ICD-10-CM

## 2023-07-09 DIAGNOSIS — F33.0 MAJOR DEPRESSIVE DISORDER, RECURRENT EPISODE, MILD (H): ICD-10-CM

## 2023-07-11 RX ORDER — BUPROPION HYDROCHLORIDE 150 MG/1
TABLET ORAL
Qty: 90 TABLET | Refills: 0 | Status: SHIPPED | OUTPATIENT
Start: 2023-07-11 | End: 2023-10-10

## 2023-07-11 NOTE — TELEPHONE ENCOUNTER
"Pending Prescriptions:                       Disp   Refills    buPROPion (WELLBUTRIN XL) 150 MG 24 hr tab*90 tab*3        Sig: TAKE 1 TABLET EVERY MORNING    Routing refill request to provider for review/approval because:  Labs out of range:  PHQ    Requested Prescriptions   Pending Prescriptions Disp Refills    buPROPion (WELLBUTRIN XL) 150 MG 24 hr tablet [Pharmacy Med Name: BUPROPION HCL XL TABS 150MG] 90 tablet 3     Sig: TAKE 1 TABLET EVERY MORNING       SSRIs Protocol Failed - 7/11/2023 11:49 AM        Failed - PHQ-9 score less than 5 in past 6 months     Please review last PHQ-9 score.           Passed - Medication is Bupropion     If the medication is Bupropion (Wellbutrin), and the patient is taking for smoking cessation; OK to refill.          Passed - Medication is active on med list        Passed - Patient is age 18 or older        Passed - No active pregnancy on record        Passed - No positive pregnancy test in last 12 months        Passed - Recent (6 mo) or future (30 days) visit within the authorizing provider's specialty     Patient had office visit in the last 6 months or has a visit in the next 30 days with authorizing provider or within the authorizing provider's specialty.  See \"Patient Info\" tab in inbasket, or \"Choose Columns\" in Meds & Orders section of the refill encounter.                     "

## 2023-07-18 ENCOUNTER — OFFICE VISIT (OUTPATIENT)
Dept: ORTHOPEDICS | Facility: CLINIC | Age: 68
End: 2023-07-18
Attending: PHYSICIAN ASSISTANT
Payer: COMMERCIAL

## 2023-07-18 ENCOUNTER — OFFICE VISIT (OUTPATIENT)
Dept: CARDIOLOGY | Facility: CLINIC | Age: 68
End: 2023-07-18
Attending: NURSE PRACTITIONER
Payer: COMMERCIAL

## 2023-07-18 ENCOUNTER — THERAPY VISIT (OUTPATIENT)
Dept: PHYSICAL THERAPY | Facility: CLINIC | Age: 68
End: 2023-07-18
Attending: PHYSICIAN ASSISTANT
Payer: COMMERCIAL

## 2023-07-18 VITALS
SYSTOLIC BLOOD PRESSURE: 132 MMHG | RESPIRATION RATE: 18 BRPM | WEIGHT: 128 LBS | HEIGHT: 62 IN | BODY MASS INDEX: 23.55 KG/M2 | HEART RATE: 77 BPM | DIASTOLIC BLOOD PRESSURE: 79 MMHG

## 2023-07-18 VITALS
SYSTOLIC BLOOD PRESSURE: 132 MMHG | BODY MASS INDEX: 23.57 KG/M2 | WEIGHT: 128.1 LBS | HEART RATE: 77 BPM | DIASTOLIC BLOOD PRESSURE: 79 MMHG | HEIGHT: 62 IN | OXYGEN SATURATION: 98 %

## 2023-07-18 DIAGNOSIS — R25.2 MUSCLE CRAMP: ICD-10-CM

## 2023-07-18 DIAGNOSIS — M94.261 CHONDROMALACIA OF RIGHT KNEE: ICD-10-CM

## 2023-07-18 DIAGNOSIS — R06.02 EXERTIONAL SHORTNESS OF BREATH: ICD-10-CM

## 2023-07-18 DIAGNOSIS — I34.0 NONRHEUMATIC MITRAL VALVE REGURGITATION: Primary | ICD-10-CM

## 2023-07-18 DIAGNOSIS — M25.562 ACUTE PAIN OF LEFT KNEE: ICD-10-CM

## 2023-07-18 DIAGNOSIS — M79.604 RIGHT LEG PAIN: ICD-10-CM

## 2023-07-18 DIAGNOSIS — M79.604 PAIN OF RIGHT LOWER EXTREMITY: Primary | ICD-10-CM

## 2023-07-18 DIAGNOSIS — M25.561 ACUTE PAIN OF RIGHT KNEE: ICD-10-CM

## 2023-07-18 DIAGNOSIS — R01.1 NEWLY RECOGNIZED HEART MURMUR: ICD-10-CM

## 2023-07-18 DIAGNOSIS — M54.31 RIGHT SIDED SCIATICA: ICD-10-CM

## 2023-07-18 PROCEDURE — 97112 NEUROMUSCULAR REEDUCATION: CPT | Mod: GP | Performed by: PHYSICAL THERAPIST

## 2023-07-18 PROCEDURE — 99204 OFFICE O/P NEW MOD 45 MIN: CPT | Performed by: ORTHOPAEDIC SURGERY

## 2023-07-18 PROCEDURE — 97110 THERAPEUTIC EXERCISES: CPT | Mod: GP | Performed by: PHYSICAL THERAPIST

## 2023-07-18 PROCEDURE — 99205 OFFICE O/P NEW HI 60 MIN: CPT | Performed by: INTERNAL MEDICINE

## 2023-07-18 PROCEDURE — 97014 ELECTRIC STIMULATION THERAPY: CPT | Mod: GP | Performed by: PHYSICAL THERAPIST

## 2023-07-18 NOTE — PROGRESS NOTES
General Cardiology Clinic Progress Note  Margareth Green MRN# [id number]   YOB: 1955 Age: 68 year old       Reason for visit: Mitral valve disease    History of presenting illness:    I had the opportunity to see Margareth Green at [facility] today for evaluation of mitral valve disease identified by echocardiogram after a murmur was heard during an emergency room evaluation.    She is a 68-year-old woman with no prior cardiac history who presented to the emergency room in [place] on 5/31/2023 for evaluation of leg pains.  An ultrasound of her legs showed no evidence of DVT.  However, she also reported some shortness of breath with exertion and a cardiac murmur was heard during physical examination.  She underwent a CT scan of the chest showing no pulmonary embolism but revealing some persistent postinflammatory scar and bronchiectasis.  Her laboratory studies were essentially normal, including a basic metabolic panel, CBC, NT proBNP, and troponin level.  She was referred for an outpatient echocardiogram and primary care follow-up.    Her echocardiogram was performed on 7/7/2023.  I reviewed those images with the patient in the office today.  Her left ventricular function is hyperdynamic with an ejection fraction of 65 to 70%, and her left ventricular chamber size is normal.  She has severe posterior leaflet mitral valve prolapse and I cannot exclude a flail posterior leaflet.  There is also severe eccentric, anteriorly directed mitral regurgitation with an ERO of 0.43 cm .  Right ventricular systolic pressure is mildly elevated at 35 mmHg plus right atrial pressure.  She had no other significant valvular disease.    She tells me that she gets winded climbing a flight of stairs but can walk normal pace on level ground without shortness of breath.  She has no chest pain symptoms.    She was a smoker but quit 30 years ago.  She has excellent cholesterol numbers without medical therapy.   She has no hypertension or diabetes issues.  She does have a family history of heart disease.  Her father  of a heart attack and her brother had bypass surgery.    On examination here today her blood pressure is 132/79, heart rate 77, and weight 128 pounds.  She has a loud holosystolic apical murmur reverberating throughout the precordium.              Assessment and Plan:     ASSESSMENT:    Ms. Kana Green is a 68-year-old woman with mild exertional shortness of breath which may be due to some underlying lung disease but certainly could be due to her newly discovered mitral valve disease.  A cardiac murmur was heard in the emergency room where she went for evaluation of leg pain.  Echocardiogram subsequently revealed severe mitral valve regurgitation with normal left ventricular size and hyperdynamic function.  She has severe posterior leaflet mitral valve prolapse and possibly a flail posterior leaflet.    I recommended that we evaluate the mitral valve structure and function in more detail with a transesophageal echocardiogram.  In anticipation of requiring mitral valve repair, I have also recommended that we proceed with coronary angiography.  I have described the risks and benefits of both of those procedures with the patient and she understands and agrees to proceed.    Risks and benefits of left heart catheterization and coronary angiogram were discussed with the patient in detail. Risk estimated at 0.1-0.3% for diagnostic angio and 1-2% for PCI, including risk of stroke, MI, death, emergent bypass, contrast induced allergic reaction, renal dysfunction, and vascular complications (including bleeding and transfusion) were discussed. Patient understands and wishes to proceed.    Following her DOUG and cardiac catheterization, I will arrange follow-up in the structural heart clinic to consider the most appropriate method for mitral valve repair.    Andrey Mathews MD       Orders this Visit:  Orders Placed  "This Encounter   Procedures     Follow-Up with Cardiology Structural/Valve     Follow-Up with Cardiology     DOUG Only- Transesophageal Echocardiogram     Case Request Cath Lab: Coronary Angiogram     No orders of the defined types were placed in this encounter.    There are no discontinued medications.    Today's clinic visit entailed:  Review of the result(s) of each unique test - CT chest, ECG, basic metabolic panel, CBC, NT proBNP, troponin, echocardiogram  The following tests were independently interpreted by me as noted in my documentation: Echocardiogram images, ECG  Ordering of each unique test  Prescription drug management  50 minutes spent by me on the date of the encounter doing chart review, review of test results, interpretation of tests, patient visit and documentation   Provider  Link to [facility] Help Grid     The level of medical decision making during this visit was of high complexity.           Review of Systems:     Review of Systems:  Skin:        Eyes:  Positive for glasses  ENT:       Respiratory:  Positive for dyspnea on exertion  Cardiovascular:  Negative;chest pain;dizziness;syncope or near-syncope Positive for;palpitations;edema;lightheadedness  Gastroenterology:      Genitourinary:       Musculoskeletal:       Neurologic:  Negative numbness or tingling of hands;numbness or tingling of feet  Psychiatric:       Heme/Lymph/Imm:  Positive for allergies  Endocrine:                 Physical Exam:     Vitals: /79   Pulse 77   Ht 1.562 m (5' 1.5\")   Wt 58.1 kg (128 lb 1.6 oz)   LMP 02/26/2008 (Approximate)   SpO2 98%   BMI 23.81 kg/m    Constitutional: Well nourished and in no apparent distress.  Eyes: Pupils equal, round. Sclerae anicteric.   HEENT: Normocephalic, atraumatic.   Neck: Supple. JVD   Respiratory: Breathing non-labored. Lungs clear to auscultation bilaterally. No crackles, wheezes, rhonchi, or rales.  Cardiovascular:  Regular rate and rhythm, normal S1 and S2. 3/6 systolic " murmur, rub, or gallop.  Skin: Warm, dry. No rashes, cyanosis, or xanthelasma.  Extremities: No edema.  Neurologic: No gross motor deficits. Alert, awake, and oriented to person, place and time.  Psychiatric: Affect appropriate.             Medications:     Current Outpatient Medications   Medication Sig Dispense Refill     acetaminophen (TYLENOL) 650 MG CR tablet Take 650 mg by mouth every 8 hours as needed for mild pain or fever       albuterol (PROAIR HFA/PROVENTIL HFA/VENTOLIN HFA) 108 (90 Base) MCG/ACT inhaler Inhale 2 puffs into the lungs every 6 hours 18 g 1     ALLEGRA 180 MG OR TABS Take 180 mg by mouth daily  30 0     amitriptyline (ELAVIL) 10 MG tablet TAKE 3 TO 4 TABLETS AT BEDTIME 270 tablet 4     buPROPion (WELLBUTRIN XL) 150 MG 24 hr tablet TAKE 1 TABLET EVERY MORNING 90 tablet 0     CALCIUM /VITAMIN D TABS   OR Take 1 tablet by mouth daily        cyclobenzaprine (FLEXERIL) 5 MG tablet Take 1 tablet (5 mg) by mouth 3 times daily as needed for muscle spasms 15 tablet 1     famotidine (PEPCID) 20 MG tablet Take 20 mg by mouth daily as needed       meclizine (ANTIVERT) 25 MG tablet Take 25 mg by mouth daily as needed for dizziness       Melatonin 10 MG TABS tablet Take 10 mg by mouth nightly as needed for sleep       mometasone-formoterol (DULERA) 100-5 MCG/ACT oral inhaler Inhale 1 puff into the lungs 2 times daily        multivitamin w/minerals (THERA-VIT-M) tablet Take 1 tablet by mouth daily RAW Brand       naproxen (NAPROSYN) 500 MG tablet Take 500 mg by mouth       omeprazole (PRILOSEC) 20 MG DR capsule Take 1 capsule (20 mg) by mouth daily 90 capsule 1     Simethicone (GAS-X PO) Take 160 mg by mouth 4 times daily as needed for intestinal gas        triamcinolone (NASACORT) 55 MCG/ACT nasal inhaler Spray 2 sprays into both nostrils 2 times daily        UNABLE TO FIND Swish and swallow 1 capful in mouth daily MEDICATION NAME: Colloidal Silver water.  Swish for about a minute and then swallow.          Family History   Problem Relation Age of Onset     Diabetes Father      Depression Father      Cerebrovascular Disease Mother         age 80     Arthritis Mother      Depression Mother         On meds     Eye Disorder Mother         Glaucoma     Osteoporosis Mother      Respiratory Mother          88 YO COPD     Chronic Obstructive Pulmonary Disease Mother      C.A.D. 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     Employer: [email or web address]   Tobacco Use     Smoking status: Former     Packs/day: 0.50     Years: 30.00     Pack years: 15.00     Types: Cigarettes     Quit date: 3/20/1998     Years since quittin.3     Smokeless tobacco: Never     Tobacco comments:     No smokers in home   Vaping Use     Vaping Use: Never used   Substance and Sexual Activity     Alcohol use: Yes     Alcohol/week: 6.7 standard drinks of alcohol     Comment: 2x/week     Drug use: No     Sexual activity: Yes     Partners: Male     Birth control/protection: None     Comment: spouse -vas   Other Topics Concern      Service No     Blood Transfusions No     Caffeine Concern Yes     Comment: pop: 1c/wk     Occupational Exposure No     Hobby Hazards No     Sleep Concern Yes     Comment: On meds     Stress Concern Yes     Weight Concern No     Special Diet No     Back Care Yes     Comment: Hx back pain     Exercise Yes     Comment: stretching qd     Bike Helmet No     Seat Belt Yes     Self-Exams Yes     Parent/sibling w/ CABG, MI or angioplasty before 65F 55M? Yes     Comment: Father - Heart attack   Social History Narrative    Lives with spouse.. No domestic  violence issues.     Social Determinants of Health     Financial Resource Strain: Not on file   Food Insecurity: Not on file   Transportation Needs: Not on file   Physical Activity: Not on file   Stress: Not on file   Social Connections: Not on file   Intimate Partner Violence: Not on file   Housing Stability: Not on file            Past Medical History:     Past Medical History:   Diagnosis Date     Abnormal Papanicolaou smear of vagina and vaginal HPV 12/2002    ASCUS (HPV neg). 3/03 WNL but no transitional zome - repeat 3months     Allergic rhinitis, cause unspecified      Arthritis      Backache, unspecified      Carpal tunnel syndrome     s/p repair     Cervicalgia      Chronic kidney disease, stage 3 (H) 07/14/2021     Depressive disorder      Depressive disorder, not elsewhere classified     doing well on Wellbutrin     Derangement of TMJ (temporomandibular joint) 03/2010    internal derangement     Dermatophytosis of nail      Diaphragmatic hernia without mention of obstruction or gangrene 09/2007    small hiatal hernia     Diffuse cystic mastopathy 2000    FCBD     Esophageal reflux 04/2005     Hammertoe 10/2009    see podiatry consult     Headache(784.0)     Muscle contrature headaches     Intramural leiomyoma of uterus 07/2007    on US     Irregular menstrual cycle     better with BCP -also PMS is better with BCP     IRRITABLE COLON 02/18/2008     Malaise and fatigue      Menopause     lmp 2/2008     Mild persistent asthma      Mole (skin) 2003    9 x 6 mm rt ant chest     Motion sickness      Neuroma 11/2008    lt foot     Osteopenia 12/2008     Other malaise and fatigue 04/2005    stress     Personal history of tobacco use, presenting hazards to health     Quit in 1998     Solitary cyst of breast 12/2005    lt 6 oclock     Stenosis of lumbosacral spine     l4-5 and l5-s1     Symptomatic menopausal or female climacteric states 12/2002    perimenopausal. LMP 2/2008     Tachycardia, paroxysmal (H)  11/24/2020              Past Surgical History:     Past Surgical History:   Procedure Laterality Date     BIOPSY  4/10/2015     BL DUPLEX LO EXTREM ART UNILAT/LTD  4/3008    lower extr arterial doppler -no stenosis     C DEXA,BONE DENSITY,APPENDICULR SKELTN  12/2008    osteopenia     COLONOSCOPY  10/22/2007    bx benign     COLONOSCOPY N/A 4/10/2015    Procedure: COMBINED COLONOSCOPY, SINGLE OR MULTIPLE BIOPSY/POLYPECTOMY BY BIOPSY;  Surgeon: Cl Wagner MD;  Location:  GI     ESOPHAGOSCOPY, GASTROSCOPY, DUODENOSCOPY (EGD), COMBINED N/A 1/31/2018    Procedure: COMBINED ESOPHAGOSCOPY, GASTROSCOPY, DUODENOSCOPY (EGD);  COMBINED ESOPHAGOSCOPY, GASTROSCOPY, DUODENOSCOPY (EGD);  Surgeon: Tirso Duran MD;  Location:  GI     ESOPHAGOSCOPY, GASTROSCOPY, DUODENOSCOPY (EGD), COMBINED N/A 2/1/2023    Procedure: ESOPHAGOGASTRODUODENOSCOPY (EGD);  Surgeon: Lui Vences MD;  Location:  GI     EYE SURGERY       HC PART REMV BONE METATARSAL HEAD,EA  01/11/10    Right foot plantar plate tear. Also correct hammertoe, 2nd digit & varicose vein ligation, heel.     HC REVISE MEDIAN N/CARPAL TUNNEL SURG      Left in 1997, right in 1999     INJ, ANES/STER, FACET LUMB/SAC  2010    Left L5 nerve root injection     INJECT EPIDURAL CERVICAL Right 9/29/2022    Procedure: Cervical 7-Thoracic 1 Interlaminar epidural steroid injection using fluoroscopic guidance with contrast dye, Right;  Surgeon: Tyler Bay MD;  Location: [facility] OR     INJECT EPIDURAL TRANSFORAMINAL  10/09/2013    [facility]     INJECT JOINT SACROILIAC  3/19/14,4/21/14    [facility]     [facility] NONSPECIFIC PROCEDURE  2009    Metatarsal phalangeal joint injection     [facility]  7/[facility]    bx. benign - flex sig in 5 yrs  or colonoscopy in 10 yrs     ZZ ECHO HEART XTHORACIC, STRESS/REST  2/2005    WNL     ZZ NCS MOTOR W/O F-WAVE, EACH NERVE  7/2008    CTS              Allergies:   Penicillins       Data:    All laboratory data reviewed:    Recent Labs   Lab Test 04/07/23  1023 11/25/20  1020 11/21/19  0953 10/14/19  1541 02/14/19  1223   LDL 73 66 66  --   --    HDL 79 82 76  --   --    NHDL 83 87 79  --   --    CHOL 162 169 155  --   --    TRIG 49 105 67  --   --    TSH  --   --   --  2.29 1.50       Lab Results   Component Value Date    WBC 6.5 05/31/2023    WBC 10.8 03/16/2020    RBC 4.07 05/31/2023    RBC 4.26 03/16/2020    HGB 12.5 05/31/2023    HGB 13.2 03/16/2020    HCT 38.5 05/31/2023    HCT 40.4 03/16/2020    MCV 95 05/31/2023    MCV 95 03/16/2020    MCH 30.7 05/31/2023    MCH 31.0 03/16/2020    MCHC 32.5 05/31/2023    MCHC 32.7 03/16/2020    RDW 13.5 05/31/2023    RDW 13.1 03/16/2020     05/31/2023     03/16/2020       Lab Results   Component Value Date     05/31/2023     03/16/2020    POTASSIUM 4.1 05/31/2023    POTASSIUM 3.9 12/06/2021    POTASSIUM 3.6 03/16/2020    CHLORIDE 106 05/31/2023    CHLORIDE 105 12/06/2021    CHLORIDE 107 03/16/2020    CO2 23 05/31/2023    CO2 26 12/06/2021    CO2 28 03/16/2020    ANIONGAP 9 05/31/2023    ANIONGAP 5 12/06/2021    ANIONGAP 5 03/16/2020    GLC 91 05/31/2023     (H) 12/06/2021    GLC 92 03/16/2020    BUN 17.6 05/31/2023    BUN 13 12/06/2021    BUN 17 03/16/2020    CR 0.95 05/31/2023    CR 0.91 03/16/2020    GFRESTIMATED 64 05/31/2023    GFRESTIMATED 65 03/16/2020    GFRESTBLACK 76 03/16/2020    TOLU 9.2 05/31/2023    TOLU 8.5 03/16/2020      Lab Results   Component Value Date    AST 32 05/31/2023    AST 20 03/16/2020    ALT 24 05/31/2023    ALT 23 03/16/2020       Lab Results   Component Value Date    A1C 5.6 01/19/2022       Lab Results   Component Value Date    INR 1.00 01/30/2012         MAU TAVARES MD  [facility]

## 2023-07-18 NOTE — NURSING NOTE
Coronary angiogram/PCI/Right Heart Cath prep instructions.     Patient is scheduled for a Coronary Angiogram at [facility] - [address] [place] [address] - Main Entrance of the Hospital on TBD.  Check in time is at TBD and procedure to follow. Message sent to SD schedulers to reach out to patient to help set up both transesophageal echo and angiogram preferably with Dr. Mathews for Transesophageal echo and same day angiogram.     Patient instructed to remain NPO for solid foods 8 hours prior to arrival and may have clear liquids up to 2 hours prior to arrival.    Patient Patient does not require extra fluids prior to procedure.    Patient is not diabetic.    Patient is not on anticoagulation.    Patient is not on diuretics.     Patient is not currently taking ASA and has been advised to take one 325 mg tablet the day prior and morning of the procedure.    Pt is not on a SGLT2 inhibitor.    Patient advised to take their other daily medications the morning of the procedure with small sips of water.     Verified patient does not have a contrast allergy.    Verified patient has someone available to drive them home from the hospital and can stay with them for 24 hours after the procedure.     Patient advised to notify care team with any new COVID like symptoms prior to procedure.    Patient will check their temperature the morning of procedure and call [employer] at [phone] if temp is >100.0.    Patient is aware of visitor policy.    Patient expresses understanding of above instructions and denies further questions at this time.      WING Fernandez   [facility]       DOUG prep instructions    Patient is scheduled for a DOUG at [facility] - [address], [place] [address] - Main Entrance of the Hospital, on TBD.  Check in time is at TBD and procedure to follow. Message sent to SD schedulers to reach out to patient.     Patient instructed to  remain NPO for solid foods 8 hours prior to arrival and may have clear liquids up to 2 hours prior to arrival.     Patient is not diabetic.     Patient is not taking Digoxin.    Patient is taking not on diuretics. and has been advised to hold this the morning of the procedure.    Pt is not on a SGLT2 inhibitor.    Patient advised to take their other daily medications the morning of the procedure with small sips of water.     Verified patient has someone available to drive them home from the hospital and can stay with them for 24 hours after the procedure.     Patient advised to notify care team with any new COVID like symptoms prior to procedure.    Patient will check their temperature the morning of procedure and call [facility] at [phone] if temp is >100.0.    Patient is aware of visitor policy.    Patient expresses understanding of above instructions and denies further questions at this time.      WING Fernandez   [facility]

## 2023-07-18 NOTE — LETTER
7/18/2023         RE: Margareth Green  [address] [facility] [id number]        Dear Colleague,    Thank you for referring your patient, Margareth Green, to the [facility]. Please see a copy of my visit note below.    Margareth Green is a 68 year old female who is seen in consultation at the request of Caitlin Beasley PA-C  for right leg pain along the posterior thigh posterior knee and posterior calf.  This started in May 2023 without history of injury that she recalls.  It seems to be worse with kneeling.  Better with sitting and ice.  She has to use a knee pad when she kneels.    X-ray of the knee shows no significant arthritic changes.  Good spacing is retained.    Past Medical History:   Diagnosis Date     Abnormal Papanicolaou smear of vagina and vaginal HPV 12/2002    ASCUS (HPV neg). 3/03 WNL but no transitional zome - repeat 3months     Allergic rhinitis, cause unspecified      Arthritis      Backache, unspecified      Carpal tunnel syndrome     s/p repair     Cervicalgia      Chronic kidney disease, stage 3 (H) 07/14/2021     Depressive disorder      Depressive disorder, not elsewhere classified     doing well on Wellbutrin     Derangement of TMJ (temporomandibular joint) 03/2010    internal derangement     Dermatophytosis of nail      Diaphragmatic hernia without mention of obstruction or gangrene 09/2007    small hiatal hernia     Diffuse cystic mastopathy 2000    FCBD     Esophageal reflux 04/2005     Hammertoe 10/2009    see podiatry consult     Headache(784.0)     Muscle contrature headaches     Intramural leiomyoma of uterus 07/2007    on US     Irregular menstrual cycle     better with BCP -also PMS is better with BCP     IRRITABLE COLON 02/18/2008     Malaise and fatigue      Menopause     lmp 2/2008     Mild persistent asthma      Mole (skin) 2003    9 x 6 mm rt ant chest     Motion sickness      Neuroma 11/2008    lt foot     Osteopenia 12/2008     Other  malaise and fatigue 04/2005    stress     Personal history of tobacco use, presenting hazards to health     Quit in 1998     Solitary cyst of breast 12/2005    lt 6 oclock     Stenosis of lumbosacral spine     l4-5 and l5-s1     Symptomatic menopausal or female climacteric states 12/2002    perimenopausal. LMP 2/2008     Tachycardia, paroxysmal (H) 11/24/2020       Past Surgical History:   Procedure Laterality Date     BIOPSY  4/10/2015     BL DUPLEX LO EXTREM ART UNILAT/LTD  4/3008    lower extr arterial doppler -no stenosis     C DEXA,BONE DENSITY,APPENDICULR SKELTN  12/2008    osteopenia     COLONOSCOPY  10/22/2007    bx benign     COLONOSCOPY N/A 4/10/2015    Procedure: COMBINED COLONOSCOPY, SINGLE OR MULTIPLE BIOPSY/POLYPECTOMY BY BIOPSY;  Surgeon: Cl Wagner MD;  Location:  GI     ESOPHAGOSCOPY, GASTROSCOPY, DUODENOSCOPY (EGD), COMBINED N/A 1/31/2018    Procedure: COMBINED ESOPHAGOSCOPY, GASTROSCOPY, DUODENOSCOPY (EGD);  COMBINED ESOPHAGOSCOPY, GASTROSCOPY, DUODENOSCOPY (EGD);  Surgeon: Tirso Duran MD;  Location:  GI     ESOPHAGOSCOPY, GASTROSCOPY, DUODENOSCOPY (EGD), COMBINED N/A 2/1/2023    Procedure: ESOPHAGOGASTRODUODENOSCOPY (EGD);  Surgeon: Lui Vences MD;  Location:  GI     EYE SURGERY       HC PART REMV BONE METATARSAL HEAD,EA  01/11/10    Right foot plantar plate tear. Also correct hammertoe, 2nd digit & varicose vein ligation, heel.     HC REVISE MEDIAN N/CARPAL TUNNEL SURG      Left in 1997, right in 1999     INJ, ANES/STER, FACET LUMB/SAC  2010    Left L5 nerve root injection     INJECT EPIDURAL CERVICAL Right 9/29/2022    Procedure: Cervical 7-Thoracic 1 Interlaminar epidural steroid injection using fluoroscopic guidance with contrast dye, Right;  Surgeon: Tyler Bay MD;  Location:  OR     INJECT EPIDURAL TRANSFORAMINAL  10/09/2013    [facility]     INJECT JOINT SACROILIAC  3/19/14,4/21/14    [facility]     ZZC NONSPECIFIC PROCEDURE  2009     Metatarsal phalangeal joint injection     [facility] COLONOSCOPY THRU STOMA, DIAGNOSTIC  2002    bx. benign - flex sig in 5 yrs  or colonoscopy in 10 yrs     [facility] ECHO HEART XTHORACIC, STRESS/REST  2005    WNL     [facility] NCS MOTOR W/O F-WAVE, EACH NERVE  2008    CTS       Family History   Problem Relation Age of Onset     Diabetes Father      Depression Father      Cerebrovascular Disease Mother         age 80     Arthritis Mother      Depression Mother         On meds     Eye Disorder Mother         Glaucoma     Osteoporosis Mother      Respiratory Mother          86 YO COPD     Chronic Obstructive Pulmonary Disease Mother      C.A.D. Brother         65 YO MI -     Chronic Obstructive Pulmonary Disease Brother      Myocardial Infarction Brother      Other Cancer Brother      Cancer Brother        Social History     Socioeconomic History     Marital status:      Spouse name: Robel     Number of children: 1     Years of education: 12     Highest education level: Not on file   Occupational History     Occupation: [employer]     Employer: [employer]   Tobacco Use     Smoking status: Former     Packs/day: 0.50     Years: 30.00     Pack years: 15.00     Types: Cigarettes     Quit date: 3/20/1998     Years since quittin.3     Smokeless tobacco: Never     Tobacco comments:     No smokers in home   Vaping Use     Vaping Use: Never used   Substance and Sexual Activity     Alcohol use: Yes     Alcohol/week: 6.7 standard drinks of alcohol     Comment: 2x/week     Drug use: No     Sexual activity: Yes     Partners: Male     Birth control/protection: None     Comment: spouse -vas   Other Topics Concern      Service No     Blood Transfusions No     Caffeine Concern Yes     Comment: pop: 1c/wk     Occupational Exposure No     Hobby Hazards No     Sleep Concern Yes     Comment: On meds     Stress Concern Yes     Weight Concern No     Special Diet No     Back Care Yes     Comment: Hx back pain      Exercise Yes     Comment: stretching qd     Bike Helmet No     Seat Belt Yes     Self-Exams Yes     Parent/sibling w/ CABG, MI or angioplasty before 65F 55M? Yes     Comment: Father - Heart attack   Social History Narrative    Lives with spouse.. No domestic violence issues.     Social Determinants of Health     Financial Resource Strain: Not on file   Food Insecurity: Not on file   Transportation Needs: Not on file   Physical Activity: Not on file   Stress: Not on file   Social Connections: Not on file   Intimate Partner Violence: Not on file   Housing Stability: Not on file       Current Outpatient Medications   Medication Sig Dispense Refill     acetaminophen (TYLENOL) 650 MG CR tablet Take 650 mg by mouth every 8 hours as needed for mild pain or fever       albuterol (PROAIR HFA/PROVENTIL HFA/VENTOLIN HFA) 108 (90 Base) MCG/ACT inhaler Inhale 2 puffs into the lungs every 6 hours 18 g 1     ALLEGRA 180 MG OR TABS Take 180 mg by mouth daily  30 0     amitriptyline (ELAVIL) 10 MG tablet TAKE 3 TO 4 TABLETS AT BEDTIME 270 tablet 4     buPROPion (WELLBUTRIN XL) 150 MG 24 hr tablet TAKE 1 TABLET EVERY MORNING 90 tablet 0     CALCIUM /VITAMIN D TABS   OR Take 1 tablet by mouth daily        cyclobenzaprine (FLEXERIL) 5 MG tablet Take 1 tablet (5 mg) by mouth 3 times daily as needed for muscle spasms 15 tablet 1     famotidine (PEPCID) 20 MG tablet Take 20 mg by mouth daily as needed       meclizine (ANTIVERT) 25 MG tablet Take 25 mg by mouth daily as needed for dizziness       Melatonin 10 MG TABS tablet Take 10 mg by mouth nightly as needed for sleep       mometasone-formoterol (DULERA) 100-5 MCG/ACT oral inhaler Inhale 1 puff into the lungs 2 times daily        multivitamin w/minerals (THERA-VIT-M) tablet Take 1 tablet by mouth daily RAW Brand       naproxen (NAPROSYN) 500 MG tablet Take 500 mg by mouth       omeprazole (PRILOSEC) 20 MG DR capsule Take 1 capsule (20 mg) by mouth daily 90 capsule 1     Simethicone  "(GAS-X PO) Take 160 mg by mouth 4 times daily as needed for intestinal gas        triamcinolone (NASACORT) 55 MCG/ACT nasal inhaler Spray 2 sprays into both nostrils 2 times daily        UNABLE TO FIND Swish and swallow 1 capful in mouth daily MEDICATION NAME: Colloidal Silver water.  Swish for about a minute and then swallow.         Allergies   Allergen Reactions     Penicillins Itching     Vaginal itching - Yeast infection afterward       REVIEW OF SYSTEMS:  CONSTITUTIONAL:  NEGATIVE for fever, chills, change in weight, not feeling tired  SKIN:  NEGATIVE for worrisome rashes, no skin lumps, no skin ulcers and no non-healing wounds  EYES:  NEGATIVE for vision changes or irritation.  ENT/MOUTH:  NEGATIVE.  No hearing loss, no hoarseness, no difficulty swallowing.  RESP:  NEGATIVE. No cough or shortness of breath.  CV:  NEGATIVE for chest pain, palpitations or peripheral edema  GI:  NEGATIVE for nausea, abdominal pain, heartburn, or change in bowel habits  :  Negative. No dysuria, no hematuria  MUSCULOSKELETAL:  See HPI above  NEURO:  NEGATIVE . No headaches, no dizziness,  no numbness  ENDOCRINE:  NEGATIVE for temperature intolerance, skin/hair changes  HEME/ALLERGY/IMMUNE:  NEGATIVE for bleeding problems  PSYCHIATRIC:  NEGATIVE. no anxiety, no depression.     Exam:  Vitals: /79   Pulse 77   Resp 18   Ht 1.562 m (5' 1.5\")   Wt 58.1 kg (128 lb)   LMP 02/26/2008 (Approximate)   BMI 23.79 kg/m    BMI= Body mass index is 23.79 kg/m .  Constitutional:  healthy, alert and no distress  Neuro: Alert and Oriented x 3, no focal defects.  Psych: Affect normal   Respiratory: Breathing not labored.  Cardiovascular: normal peripheral pulses  Lymph: no adenopathy  Skin: No rashes,worrisome lesions or skin problems.  She has tenderness at the right SI joint, negative on the left.  No tenderness in the sciatic notch.  No tenderness over the greater trochanters.  She had no pain with rotation of the hips.  She " indicates pain along the posterior thigh posterior calf and posterior knee.  Despite this, she had negative straight leg raise and negative bowstring.  She had no effusions in the knees.  She has no ligamentous laxity.  She had medial joint tenderness and pain with medial Germania, negative lateral Germania.  Sensation, motor and circulation are intact.    Assessment:  Right knee chondromalacia.  2. Possible sciatica.    Plan: She should work on quad strengthening and hip abduction strengthening.  Anti-inflammatories as needed.  We can consider steroid injection to help determine if it is knee or sciatica if she desires.          Again, thank you for allowing me to participate in the care of your patient.        Sincerely,        Cl Cunningham MD

## 2023-07-18 NOTE — LETTER
7/18/2023    Caitlin Beasley PA-C  [address] [facility] [address]    RE: Margareth MOY Green       Dear Colleague,     I had the pleasure of seeing Margareth Green in the [facility].    General Cardiology Clinic Progress Note  Margareth Green MRN# [id number]   YOB: 1955 Age: 68 year old       Reason for visit: Mitral valve disease    History of presenting illness:    I had the opportunity to see Margareth Green at [facility] today for evaluation of mitral valve disease identified by echocardiogram after a murmur was heard during an emergency room evaluation.    She is a 68-year-old woman with no prior cardiac history who presented to the emergency room in [place] on 5/31/2023 for evaluation of leg pains.  An ultrasound of her legs showed no evidence of DVT.  However, she also reported some shortness of breath with exertion and a cardiac murmur was heard during physical examination.  She underwent a CT scan of the chest showing no pulmonary embolism but revealing some persistent postinflammatory scar and bronchiectasis.  Her laboratory studies were essentially normal, including a basic metabolic panel, CBC, NT proBNP, and troponin level.  She was referred for an outpatient echocardiogram and primary care follow-up.    Her echocardiogram was performed on 7/7/2023.  I reviewed those images with the patient in the office today.  Her left ventricular function is hyperdynamic with an ejection fraction of 65 to 70%, and her left ventricular chamber size is normal.  She has severe posterior leaflet mitral valve prolapse and I cannot exclude a flail posterior leaflet.  There is also severe eccentric, anteriorly directed mitral regurgitation with an ERO of 0.43 cm .  Right ventricular systolic pressure is mildly elevated at 35 mmHg plus right atrial pressure.  She had no other significant valvular disease.    She tells me that she gets winded climbing a flight of stairs but  can walk normal pace on level ground without shortness of breath.  She has no chest pain symptoms.    She was a smoker but quit 30 years ago.  She has excellent cholesterol numbers without medical therapy.  She has no hypertension or diabetes issues.  She does have a family history of heart disease.  Her father  of a heart attack and her brother had bypass surgery.    On examination here today her blood pressure is 132/79, heart rate 77, and weight 128 pounds.  She has a loud holosystolic apical murmur reverberating throughout the precordium.              Assessment and Plan:     ASSESSMENT:    Ms. Kana Green is a 68-year-old woman with mild exertional shortness of breath which may be due to some underlying lung disease but certainly could be due to her newly discovered mitral valve disease.  A cardiac murmur was heard in the emergency room where she went for evaluation of leg pain.  Echocardiogram subsequently revealed severe mitral valve regurgitation with normal left ventricular size and hyperdynamic function.  She has severe posterior leaflet mitral valve prolapse and possibly a flail posterior leaflet.    I recommended that we evaluate the mitral valve structure and function in more detail with a transesophageal echocardiogram.  In anticipation of requiring mitral valve repair, I have also recommended that we proceed with coronary angiography.  I have described the risks and benefits of both of those procedures with the patient and she understands and agrees to proceed.    Risks and benefits of left heart catheterization and coronary angiogram were discussed with the patient in detail. Risk estimated at 0.1-0.3% for diagnostic angio and 1-2% for PCI, including risk of stroke, MI, death, emergent bypass, contrast induced allergic reaction, renal dysfunction, and vascular complications (including bleeding and transfusion) were discussed. Patient understands and wishes to proceed.    Following her DOUG  "and cardiac catheterization, I will arrange follow-up in the structural heart clinic to consider the most appropriate method for mitral valve repair.    Andrey Mathews MD       Orders this Visit:  Orders Placed This Encounter   Procedures    Follow-Up with Cardiology Structural/Valve    Follow-Up with Cardiology    DOUG Only- Transesophageal Echocardiogram    Case Request Cath Lab: Coronary Angiogram     No orders of the defined types were placed in this encounter.    There are no discontinued medications.    Today's clinic visit entailed:  Review of the result(s) of each unique test - CT chest, ECG, basic metabolic panel, CBC, NT proBNP, troponin, echocardiogram  The following tests were independently interpreted by me as noted in my documentation: Echocardiogram images, ECG  Ordering of each unique test  Prescription drug management  50 minutes spent by me on the date of the encounter doing chart review, review of test results, interpretation of tests, patient visit and documentation   Provider  Link to [facility] Help Grid     The level of medical decision making during this visit was of high complexity.           Review of Systems:     Review of Systems:  Skin:        Eyes:  Positive for glasses  ENT:       Respiratory:  Positive for dyspnea on exertion  Cardiovascular:  Negative;chest pain;dizziness;syncope or near-syncope Positive for;palpitations;edema;lightheadedness  Gastroenterology:      Genitourinary:       Musculoskeletal:       Neurologic:  Negative numbness or tingling of hands;numbness or tingling of feet  Psychiatric:       Heme/Lymph/Imm:  Positive for allergies  Endocrine:                 Physical Exam:     Vitals: /79   Pulse 77   Ht 1.562 m (5' 1.5\")   Wt 58.1 kg (128 lb 1.6 oz)   LMP 02/26/2008 (Approximate)   SpO2 98%   BMI 23.81 kg/m    Constitutional: Well nourished and in no apparent distress.  Eyes: Pupils equal, round. Sclerae anicteric.   HEENT: Normocephalic, atraumatic.   Neck: " Supple. JVD   Respiratory: Breathing non-labored. Lungs clear to auscultation bilaterally. No crackles, wheezes, rhonchi, or rales.  Cardiovascular:  Regular rate and rhythm, normal S1 and S2. 3/6 systolic murmur, rub, or gallop.  Skin: Warm, dry. No rashes, cyanosis, or xanthelasma.  Extremities: No edema.  Neurologic: No gross motor deficits. Alert, awake, and oriented to person, place and time.  Psychiatric: Affect appropriate.             Medications:     Current Outpatient Medications   Medication Sig Dispense Refill    acetaminophen (TYLENOL) 650 MG CR tablet Take 650 mg by mouth every 8 hours as needed for mild pain or fever      albuterol (PROAIR HFA/PROVENTIL HFA/VENTOLIN HFA) 108 (90 Base) MCG/ACT inhaler Inhale 2 puffs into the lungs every 6 hours 18 g 1    ALLEGRA 180 MG OR TABS Take 180 mg by mouth daily  30 0    amitriptyline (ELAVIL) 10 MG tablet TAKE 3 TO 4 TABLETS AT BEDTIME 270 tablet 4    buPROPion (WELLBUTRIN XL) 150 MG 24 hr tablet TAKE 1 TABLET EVERY MORNING 90 tablet 0    CALCIUM /VITAMIN D TABS   OR Take 1 tablet by mouth daily       cyclobenzaprine (FLEXERIL) 5 MG tablet Take 1 tablet (5 mg) by mouth 3 times daily as needed for muscle spasms 15 tablet 1    famotidine (PEPCID) 20 MG tablet Take 20 mg by mouth daily as needed      meclizine (ANTIVERT) 25 MG tablet Take 25 mg by mouth daily as needed for dizziness      Melatonin 10 MG TABS tablet Take 10 mg by mouth nightly as needed for sleep      mometasone-formoterol (DULERA) 100-5 MCG/ACT oral inhaler Inhale 1 puff into the lungs 2 times daily       multivitamin w/minerals (THERA-VIT-M) tablet Take 1 tablet by mouth daily RAW Brand      naproxen (NAPROSYN) 500 MG tablet Take 500 mg by mouth      omeprazole (PRILOSEC) 20 MG DR capsule Take 1 capsule (20 mg) by mouth daily 90 capsule 1    Simethicone (GAS-X PO) Take 160 mg by mouth 4 times daily as needed for intestinal gas       triamcinolone (NASACORT) 55 MCG/ACT nasal inhaler Spray 2  sprays into both nostrils 2 times daily       UNABLE TO FIND Swish and swallow 1 capful in mouth daily MEDICATION NAME: Colloidal Silver water.  Swish for about a minute and then swallow.         Family History   Problem Relation Age of Onset    Diabetes Father     Depression Father     Cerebrovascular Disease Mother         age 80    Arthritis Mother     Depression Mother         On meds    Eye Disorder Mother         Glaucoma    Osteoporosis Mother     Respiratory Mother          86 YO COPD    Chronic Obstructive Pulmonary Disease Mother     C.A.D. Brother         67 YO MI -    Chronic Obstructive Pulmonary Disease Brother     Myocardial Infarction Brother     Other Cancer Brother     Cancer Brother        Social History     Socioeconomic History    Marital status:      Spouse name: Robel    Number of children: 1    Years of education: 12    Highest education level: Not on file   Occupational History    Occupation: HIMS clerk     Employer: [employer]   Tobacco Use    Smoking status: Former     Packs/day: 0.50     Years: 30.00     Pack years: 15.00     Types: Cigarettes     Quit date: 3/20/1998     Years since quittin.3    Smokeless tobacco: Never    Tobacco comments:     No smokers in home   Vaping Use    Vaping Use: Never used   Substance and Sexual Activity    Alcohol use: Yes     Alcohol/week: 6.7 standard drinks of alcohol     Comment: 2x/week    Drug use: No    Sexual activity: Yes     Partners: Male     Birth control/protection: None     Comment: spouse -vas   Other Topics Concern     Service No    Blood Transfusions No    Caffeine Concern Yes     Comment: pop: 1c/wk    Occupational Exposure No    Hobby Hazards No    Sleep Concern Yes     Comment: On meds    Stress Concern Yes    Weight Concern No    Special Diet No    Back Care Yes     Comment: Hx back pain    Exercise Yes     Comment: stretching qd    Bike Helmet No    Seat Belt Yes    Self-Exams Yes    Parent/sibling  w/ CABG, MI or angioplasty before 65F 55M? Yes     Comment: Father - Heart attack   Social History Narrative    Lives with spouse.. No domestic violence issues.     Social Determinants of Health     Financial Resource Strain: Not on file   Food Insecurity: Not on file   Transportation Needs: Not on file   Physical Activity: Not on file   Stress: Not on file   Social Connections: Not on file   Intimate Partner Violence: Not on file   Housing Stability: Not on file            Past Medical History:     Past Medical History:   Diagnosis Date    Abnormal Papanicolaou smear of vagina and vaginal HPV 12/2002    ASCUS (HPV neg). 3/03 WNL but no transitional zome - repeat 3months    Allergic rhinitis, cause unspecified     Arthritis     Backache, unspecified     Carpal tunnel syndrome     s/p repair    Cervicalgia     Chronic kidney disease, stage 3 (H) 07/14/2021    Depressive disorder     Depressive disorder, not elsewhere classified     doing well on Wellbutrin    Derangement of TMJ (temporomandibular joint) 03/2010    internal derangement    Dermatophytosis of nail     Diaphragmatic hernia without mention of obstruction or gangrene 09/2007    small hiatal hernia    Diffuse cystic mastopathy 2000    FCBD    Esophageal reflux 04/2005    Hammertoe 10/2009    see podiatry consult    Headache(784.0)     Muscle contrature headaches    Intramural leiomyoma of uterus 07/2007    on US    Irregular menstrual cycle     better with BCP -also PMS is better with BCP    IRRITABLE COLON 02/18/2008    Malaise and fatigue     Menopause     lmp 2/2008    Mild persistent asthma     Mole (skin) 2003    9 x 6 mm rt ant chest    Motion sickness     Neuroma 11/2008    lt foot    Osteopenia 12/2008    Other malaise and fatigue 04/2005    stress    Personal history of tobacco use, presenting hazards to health     Quit in 1998    Solitary cyst of breast 12/2005    lt 6 oclock    Stenosis of lumbosacral spine     l4-5 and l5-s1    Symptomatic  menopausal or female climacteric states 12/2002    perimenopausal. LMP 2/2008    Tachycardia, paroxysmal (H) 11/24/2020              Past Surgical History:     Past Surgical History:   Procedure Laterality Date    BIOPSY  4/10/2015    BL DUPLEX LO EXTREM ART UNILAT/LTD  4/3008    lower extr arterial doppler -no stenosis    C DEXA,BONE DENSITY,APPENDICULR SKELTN  12/2008    osteopenia    COLONOSCOPY  10/22/2007    bx benign    COLONOSCOPY N/A 4/10/2015    Procedure: COMBINED COLONOSCOPY, SINGLE OR MULTIPLE BIOPSY/POLYPECTOMY BY BIOPSY;  Surgeon: Cl Wagner MD;  Location:  GI    ESOPHAGOSCOPY, GASTROSCOPY, DUODENOSCOPY (EGD), COMBINED N/A 1/31/2018    Procedure: COMBINED ESOPHAGOSCOPY, GASTROSCOPY, DUODENOSCOPY (EGD);  COMBINED ESOPHAGOSCOPY, GASTROSCOPY, DUODENOSCOPY (EGD);  Surgeon: Tirso Duran MD;  Location:  GI    ESOPHAGOSCOPY, GASTROSCOPY, DUODENOSCOPY (EGD), COMBINED N/A 2/1/2023    Procedure: ESOPHAGOGASTRODUODENOSCOPY (EGD);  Surgeon: Lui Vences MD;  Location:  GI    EYE SURGERY      HC PART REMV BONE METATARSAL HEAD,EA  01/11/10    Right foot plantar plate tear. Also correct hammertoe, 2nd digit & varicose vein ligation, heel.    HC REVISE MEDIAN N/CARPAL TUNNEL SURG      Left in 1997, right in 1999    INJ, ANES/STER, FACET LUMB/SAC  2010    Left L5 nerve root injection    INJECT EPIDURAL CERVICAL Right 9/29/2022    Procedure: Cervical 7-Thoracic 1 Interlaminar epidural steroid injection using fluoroscopic guidance with contrast dye, Right;  Surgeon: Tyler Bay MD;  Location:  OR    INJECT EPIDURAL TRANSFORAMINAL  10/09/2013    [facility]    INJECT JOINT SACROILIAC  3/19/14,4/21/14    [facility]    Z NONSPECIFIC PROCEDURE  2009    Metatarsal phalangeal joint injection    [facility]  7/2002    bx. benign - flex sig in 5 yrs  or colonoscopy in 10 yrs    ZZ ECHO HEART XTHORACIC, STRESS/REST  2/2005    WNL    ZZHC NCS MOTOR  W/O F-WAVE, EACH NERVE  7/2008    CTS              Allergies:   Penicillins       Data:   All laboratory data reviewed:    Recent Labs   Lab Test 04/07/23  1023 11/25/20  1020 11/21/19  0953 10/14/19  1541 02/14/19  1223   LDL 73 66 66  --   --    HDL 79 82 76  --   --    NHDL 83 87 79  --   --    CHOL 162 169 155  --   --    TRIG 49 105 67  --   --    TSH  --   --   --  2.29 1.50       Lab Results   Component Value Date    WBC 6.5 05/31/2023    WBC 10.8 03/16/2020    RBC 4.07 05/31/2023    RBC 4.26 03/16/2020    HGB 12.5 05/31/2023    HGB 13.2 03/16/2020    HCT 38.5 05/31/2023    HCT 40.4 03/16/2020    MCV 95 05/31/2023    MCV 95 03/16/2020    MCH 30.7 05/31/2023    MCH 31.0 03/16/2020    MCHC 32.5 05/31/2023    MCHC 32.7 03/16/2020    RDW 13.5 05/31/2023    RDW 13.1 03/16/2020     05/31/2023     03/16/2020       Lab Results   Component Value Date     05/31/2023     03/16/2020    POTASSIUM 4.1 05/31/2023    POTASSIUM 3.9 12/06/2021    POTASSIUM 3.6 03/16/2020    CHLORIDE 106 05/31/2023    CHLORIDE 105 12/06/2021    CHLORIDE 107 03/16/2020    CO2 23 05/31/2023    CO2 26 12/06/2021    CO2 28 03/16/2020    ANIONGAP 9 05/31/2023    ANIONGAP 5 12/06/2021    ANIONGAP 5 03/16/2020    GLC 91 05/31/2023     (H) 12/06/2021    GLC 92 03/16/2020    BUN 17.6 05/31/2023    BUN 13 12/06/2021    BUN 17 03/16/2020    CR 0.95 05/31/2023    CR 0.91 03/16/2020    GFRESTIMATED 64 05/31/2023    GFRESTIMATED 65 03/16/2020    GFRESTBLACK 76 03/16/2020    TOUL 9.2 05/31/2023    TOLU 8.5 03/16/2020      Lab Results   Component Value Date    AST 32 05/31/2023    AST 20 03/16/2020    ALT 24 05/31/2023    ALT 23 03/16/2020       Lab Results   Component Value Date    A1C 5.6 01/19/2022       Lab Results   Component Value Date    INR 1.00 01/30/2012         MAU TAVARES MD  [facility]      Thank you for allowing me to participate in the care of your patient.      Sincerely,     MAU TAVARES MD     M  [facility]  cc:   JUAN Soriano CNP  [address] [place] [address]

## 2023-07-19 ENCOUNTER — TELEPHONE (OUTPATIENT)
Dept: CARDIOLOGY | Facility: CLINIC | Age: 68
End: 2023-07-19

## 2023-07-19 ENCOUNTER — TELEPHONE (OUTPATIENT)
Dept: CARDIOLOGY | Facility: CLINIC | Age: 68
End: 2023-07-19
Payer: COMMERCIAL

## 2023-07-19 PROBLEM — M94.261 CHONDROMALACIA OF RIGHT KNEE: Status: ACTIVE | Noted: 2023-07-19

## 2023-07-19 PROBLEM — M54.31 RIGHT SIDED SCIATICA: Status: ACTIVE | Noted: 2023-07-19

## 2023-07-19 NOTE — TELEPHONE ENCOUNTER
[facility] Call Center    Phone Message    May a detailed message be left on voicemail: yes     Reason for Call: Other:     Pt is requesting a call back to discuss follow up questions.  1. What caused her condition  2. Can she excersise  3. Are there food to avoid    Action Taken: Other: cardio    Travel Screening: Not Applicable     Thank you!  [facility]

## 2023-07-19 NOTE — TELEPHONE ENCOUNTER
Patient is calling and said she never got a voicemail on her home phone, please call patient back on her cell phone, thank you.  Thank you!  [facility]

## 2023-07-19 NOTE — TELEPHONE ENCOUNTER
[facility] pt who saw Dr. Mathews there yesterday. Routing call to [facility].    Lilian Davies RN

## 2023-07-19 NOTE — TELEPHONE ENCOUNTER
Tried to reach patient by phone. Had to leave a voicemail. Let her know will pass her questions onto Dr. Mathews but she can also call back to further discuss. Direct call back number given.     Patient met with Dr. Mathews yesterday for cardiology consultation for mitral valve disease. Patient calling today with a few follow up questions.     1. What caused her condition  2. Can she excersise  3. Are there food to avoid    Will ask Dr. Mathews to review patient's questions and give recommendations/answer.     As of right now patient is not yet scheduled for Transesophageal echocardiogram /angiogram and follow up with structural heart clinic. Patient does have follow up scheduled with Dr. Mathews on 10/11/23.     Dr. Mathews office note from 7/18/23:  ASSESSMENT:     Ms. Kana Green is a 68-year-old woman with mild exertional shortness of breath which may be due to some underlying lung disease but certainly could be due to her newly discovered mitral valve disease.  A cardiac murmur was heard in the emergency room where she went for evaluation of leg pain.  Echocardiogram subsequently revealed severe mitral valve regurgitation with normal left ventricular size and hyperdynamic function.  She has severe posterior leaflet mitral valve prolapse and possibly a flail posterior leaflet.     I recommended that we evaluate the mitral valve structure and function in more detail with a transesophageal echocardiogram.  In anticipation of requiring mitral valve repair, I have also recommended that we proceed with coronary angiography.  I have described the risks and benefits of both of those procedures with the patient and she understands and agrees to proceed.     Risks and benefits of left heart catheterization and coronary angiogram were discussed with the patient in detail. Risk estimated at 0.1-0.3% for diagnostic angio and 1-2% for PCI, including risk of stroke, MI, death, emergent bypass, contrast induced allergic reaction,  renal dysfunction, and vascular complications (including bleeding and transfusion) were discussed. Patient understands and wishes to proceed.     Following her DOUG and cardiac catheterization, I will arrange follow-up in the structural heart clinic to consider the most appropriate method for mitral valve repair.     Andrey Mathews MD

## 2023-07-19 NOTE — PROGRESS NOTES
Margareth Green is a 68 year old female who is seen in consultation at the request of Caitlin Beasley PA-C  for right leg pain along the posterior thigh posterior knee and posterior calf.  This started in May 2023 without history of injury that she recalls.  It seems to be worse with kneeling.  Better with sitting and ice.  She has to use a knee pad when she kneels.    X-ray of the knee shows no significant arthritic changes.  Good spacing is retained.    Past Medical History:   Diagnosis Date     Abnormal Papanicolaou smear of vagina and vaginal HPV 12/2002    ASCUS (HPV neg). 3/03 WNL but no transitional zome - repeat 3months     Allergic rhinitis, cause unspecified      Arthritis      Backache, unspecified      Carpal tunnel syndrome     s/p repair     Cervicalgia      Chronic kidney disease, stage 3 (H) 07/14/2021     Depressive disorder      Depressive disorder, not elsewhere classified     doing well on Wellbutrin     Derangement of TMJ (temporomandibular joint) 03/2010    internal derangement     Dermatophytosis of nail      Diaphragmatic hernia without mention of obstruction or gangrene 09/2007    small hiatal hernia     Diffuse cystic mastopathy 2000    FCBD     Esophageal reflux 04/2005     Hammertoe 10/2009    see podiatry consult     Headache(784.0)     Muscle contrature headaches     Intramural leiomyoma of uterus 07/2007    on US     Irregular menstrual cycle     better with BCP -also PMS is better with BCP     IRRITABLE COLON 02/18/2008     Malaise and fatigue      Menopause     lmp 2/2008     Mild persistent asthma      Mole (skin) 2003    9 x 6 mm rt ant chest     Motion sickness      Neuroma 11/2008    lt foot     Osteopenia 12/2008     Other malaise and fatigue 04/2005    stress     Personal history of tobacco use, presenting hazards to health     Quit in 1998     Solitary cyst of breast 12/2005    lt 6 oclock     Stenosis of lumbosacral spine     l4-5 and l5-s1     Symptomatic menopausal or  female climacteric states 12/2002    perimenopausal. LMP 2/2008     Tachycardia, paroxysmal (H) 11/24/2020       Past Surgical History:   Procedure Laterality Date     BIOPSY  4/10/2015     BL DUPLEX LO EXTREM ART UNILAT/LTD  4/3008    lower extr arterial doppler -no stenosis     C DEXA,BONE DENSITY,APPENDICULR SKELTN  12/2008    osteopenia     COLONOSCOPY  10/22/2007    bx benign     COLONOSCOPY N/A 4/10/2015    Procedure: COMBINED COLONOSCOPY, SINGLE OR MULTIPLE BIOPSY/POLYPECTOMY BY BIOPSY;  Surgeon: Cl Wagner MD;  Location:  GI     ESOPHAGOSCOPY, GASTROSCOPY, DUODENOSCOPY (EGD), COMBINED N/A 1/31/2018    Procedure: COMBINED ESOPHAGOSCOPY, GASTROSCOPY, DUODENOSCOPY (EGD);  COMBINED ESOPHAGOSCOPY, GASTROSCOPY, DUODENOSCOPY (EGD);  Surgeon: Tirso Duran MD;  Location:  GI     ESOPHAGOSCOPY, GASTROSCOPY, DUODENOSCOPY (EGD), COMBINED N/A 2/1/2023    Procedure: ESOPHAGOGASTRODUODENOSCOPY (EGD);  Surgeon: Lui Vences MD;  Location:  GI     EYE SURGERY       HC PART REMV BONE METATARSAL HEAD,EA  01/11/10    Right foot plantar plate tear. Also correct hammertoe, 2nd digit & varicose vein ligation, heel.     HC REVISE MEDIAN N/CARPAL TUNNEL SURG      Left in 1997, right in 1999     INJ, ANES/STER, FACET LUMB/SAC  2010    Left L5 nerve root injection     INJECT EPIDURAL CERVICAL Right 9/29/2022    Procedure: Cervical 7-Thoracic 1 Interlaminar epidural steroid injection using fluoroscopic guidance with contrast dye, Right;  Surgeon: Tyler Bay MD;  Location:  OR     INJECT EPIDURAL TRANSFORAMINAL  10/09/2013    [facility]     INJECT JOINT SACROILIAC  3/19/14,4/21/14    [facility]     [facility] NONSPECIFIC PROCEDURE  2009    Metatarsal phalangeal joint injection     [facility] COLONOSCOPY THRU STOMA, DIAGNOSTIC  7/2002    bx. benign - flex sig in 5 yrs  or colonoscopy in 10 yrs     ZZ ECHO HEART XTHORACIC, STRESS/REST  2/2005    WNL     [facility] NCS MOTOR W/O F-WAVE, EACH NERVE   2008    CTS       Family History   Problem Relation Age of Onset     Diabetes Father      Depression Father      Cerebrovascular Disease Mother         age 80     Arthritis Mother      Depression Mother         On meds     Eye Disorder Mother         Glaucoma     Osteoporosis Mother      Respiratory Mother          88 YO COPD     Chronic Obstructive Pulmonary Disease Mother      C.A.D. Brother         65 YO MI -     Chronic Obstructive Pulmonary Disease Brother      Myocardial Infarction Brother      Other Cancer Brother      Cancer Brother        Social History     Socioeconomic History     Marital status:      Spouse name: Robel     Number of children: 1     Years of education: 12     Highest education level: Not on file   Occupational History     Occupation: [employer]     Employer: [employer]   Tobacco Use     Smoking status: Former     Packs/day: 0.50     Years: 30.00     Pack years: 15.00     Types: Cigarettes     Quit date: 3/20/1998     Years since quittin.3     Smokeless tobacco: Never     Tobacco comments:     No smokers in home   Vaping Use     Vaping Use: Never used   Substance and Sexual Activity     Alcohol use: Yes     Alcohol/week: 6.7 standard drinks of alcohol     Comment: 2x/week     Drug use: No     Sexual activity: Yes     Partners: Male     Birth control/protection: None     Comment: spouse -vas   Other Topics Concern      Service No     Blood Transfusions No     Caffeine Concern Yes     Comment: pop: 1c/wk     Occupational Exposure No     Hobby Hazards No     Sleep Concern Yes     Comment: On meds     Stress Concern Yes     Weight Concern No     Special Diet No     Back Care Yes     Comment: Hx back pain     Exercise Yes     Comment: stretching qd     Bike Helmet No     Seat Belt Yes     Self-Exams Yes     Parent/sibling w/ CABG, MI or angioplasty before 65F 55M? Yes     Comment: Father - Heart attack   Social History Narrative    Lives with spouse.. No  domestic violence issues.     Social Determinants of Health     Financial Resource Strain: Not on file   Food Insecurity: Not on file   Transportation Needs: Not on file   Physical Activity: Not on file   Stress: Not on file   Social Connections: Not on file   Intimate Partner Violence: Not on file   Housing Stability: Not on file       Current Outpatient Medications   Medication Sig Dispense Refill     acetaminophen (TYLENOL) 650 MG CR tablet Take 650 mg by mouth every 8 hours as needed for mild pain or fever       albuterol (PROAIR HFA/PROVENTIL HFA/VENTOLIN HFA) 108 (90 Base) MCG/ACT inhaler Inhale 2 puffs into the lungs every 6 hours 18 g 1     ALLEGRA 180 MG OR TABS Take 180 mg by mouth daily  30 0     amitriptyline (ELAVIL) 10 MG tablet TAKE 3 TO 4 TABLETS AT BEDTIME 270 tablet 4     buPROPion (WELLBUTRIN XL) 150 MG 24 hr tablet TAKE 1 TABLET EVERY MORNING 90 tablet 0     CALCIUM /VITAMIN D TABS   OR Take 1 tablet by mouth daily        cyclobenzaprine (FLEXERIL) 5 MG tablet Take 1 tablet (5 mg) by mouth 3 times daily as needed for muscle spasms 15 tablet 1     famotidine (PEPCID) 20 MG tablet Take 20 mg by mouth daily as needed       meclizine (ANTIVERT) 25 MG tablet Take 25 mg by mouth daily as needed for dizziness       Melatonin 10 MG TABS tablet Take 10 mg by mouth nightly as needed for sleep       mometasone-formoterol (DULERA) 100-5 MCG/ACT oral inhaler Inhale 1 puff into the lungs 2 times daily        multivitamin w/minerals (THERA-VIT-M) tablet Take 1 tablet by mouth daily RAW Brand       naproxen (NAPROSYN) 500 MG tablet Take 500 mg by mouth       omeprazole (PRILOSEC) 20 MG DR capsule Take 1 capsule (20 mg) by mouth daily 90 capsule 1     Simethicone (GAS-X PO) Take 160 mg by mouth 4 times daily as needed for intestinal gas        triamcinolone (NASACORT) 55 MCG/ACT nasal inhaler Spray 2 sprays into both nostrils 2 times daily        UNABLE TO FIND Swish and swallow 1 capful in mouth daily MEDICATION  "NAME: Colloidal Silver water.  Swish for about a minute and then swallow.         Allergies   Allergen Reactions     Penicillins Itching     Vaginal itching - Yeast infection afterward       REVIEW OF SYSTEMS:  CONSTITUTIONAL:  NEGATIVE for fever, chills, change in weight, not feeling tired  SKIN:  NEGATIVE for worrisome rashes, no skin lumps, no skin ulcers and no non-healing wounds  EYES:  NEGATIVE for vision changes or irritation.  ENT/MOUTH:  NEGATIVE.  No hearing loss, no hoarseness, no difficulty swallowing.  RESP:  NEGATIVE. No cough or shortness of breath.  CV:  NEGATIVE for chest pain, palpitations or peripheral edema  GI:  NEGATIVE for nausea, abdominal pain, heartburn, or change in bowel habits  :  Negative. No dysuria, no hematuria  MUSCULOSKELETAL:  See HPI above  NEURO:  NEGATIVE . No headaches, no dizziness,  no numbness  ENDOCRINE:  NEGATIVE for temperature intolerance, skin/hair changes  HEME/ALLERGY/IMMUNE:  NEGATIVE for bleeding problems  PSYCHIATRIC:  NEGATIVE. no anxiety, no depression.     Exam:  Vitals: /79   Pulse 77   Resp 18   Ht 1.562 m (5' 1.5\")   Wt 58.1 kg (128 lb)   LMP 02/26/2008 (Approximate)   BMI 23.79 kg/m    BMI= Body mass index is 23.79 kg/m .  Constitutional:  healthy, alert and no distress  Neuro: Alert and Oriented x 3, no focal defects.  Psych: Affect normal   Respiratory: Breathing not labored.  Cardiovascular: normal peripheral pulses  Lymph: no adenopathy  Skin: No rashes,worrisome lesions or skin problems.  She has tenderness at the right SI joint, negative on the left.  No tenderness in the sciatic notch.  No tenderness over the greater trochanters.  She had no pain with rotation of the hips.  She indicates pain along the posterior thigh posterior calf and posterior knee.  Despite this, she had negative straight leg raise and negative bowstring.  She had no effusions in the knees.  She has no ligamentous laxity.  She had medial joint tenderness and pain " with medial Germania, negative lateral Germania.  Sensation, motor and circulation are intact.    Assessment:  Right knee chondromalacia.  2. Possible sciatica.    Plan: She should work on quad strengthening and hip abduction strengthening.  Anti-inflammatories as needed.  We can consider steroid injection to help determine if it is knee or sciatica if she desires.

## 2023-07-19 NOTE — TELEPHONE ENCOUNTER
[facility] Call Center    Phone Message    May a detailed message be left on voicemail: yes     Reason for Call: Other:     Pt is requesting a call back to schedule Echo DOUG and angiogram    Action Taken: Other: cardio    Travel Screening: Not Applicable   Thank you!  [facility]

## 2023-07-20 NOTE — TELEPHONE ENCOUNTER
[facility] Call Center    Phone Message    May a detailed message be left on voicemail: yes     Reason for Call: Other:     Pt is calling back this morning asking for a call back to discuss the questions from yesterday.  She voiced her frustration that she has not been called back.    Action Taken: Other: cardio    Travel Screening: Not Applicable     Thank you!  [facility]

## 2023-07-20 NOTE — TELEPHONE ENCOUNTER
Spoke with patient. Recommended following heart healthy diet such as mediterranean diet. Patient asked if she could exercise but said she hasn't done much since her leg problems. Instructed patient to not do strenuous exercise but okay to walk and listen to her body, if she is tired or in pain then stop and rest. Patient then asked how she developed her valve disease, if it was at all related to her pneumonia then COVID and then months later had two step COVID vaccines. Told Patient sometimes it is unknown why patients develop the valve diease but could be genetics, commodities, ect. Did tell patient that original telephone message was routed to Dr. Mathews to also get his recommendations on her questions. Will let patient know once Dr. Mathews has reviewed and responded to message. Patient was very appreciative of the call. Patient is set up for her transesophageal echocardiogram and angiogram on 8/17/23. She is not yet set up for the structural heart clinic. Will ask structural heart team to help with getting appointment scheduled for after her transesophageal echocardiogram and angiogram.

## 2023-07-20 NOTE — TELEPHONE ENCOUNTER
I do not know what caused her mitral valve regurgitation to become severe.  As I mentioned to her in the visit, sometimes mitral valve prolapse is a condition that people are born with and it worsens over time.  Other times, the cords that are supporting the mitral valve break causing the mitral valve to give way and open up a space between the leaflets that causes leaking/regurgitation.  She can exercise but I would have her refrain from doing any heavy lifting.  Light aerobic exercise is best.  She should avoid salt in her diet is much as possible.  I would recommend less than 2000 mg a day.  Eating salt causes fluid buildup in the body and can cause congestive heart failure episodes.    We will have more information after we do the DOUG.  Andrey Mathews MD

## 2023-07-21 NOTE — TELEPHONE ENCOUNTER
Called pt updated her w/'s comments. She was appreciative of the information. Will await further testing.  Hailey Angel RN on 7/21/2023 at 2:51 PM      Andrey Mathews MD  [facility] Heart Team 7 21 hours ago (4:59 PM)     EE  I do not know what caused her mitral valve regurgitation to become severe.  As I mentioned to her in the visit, sometimes mitral valve prolapse is a condition that people are born with and it worsens over time.  Other times, the cords that are supporting the mitral valve break causing the mitral valve to give way and open up a space between the leaflets that causes leaking/regurgitation.  She can exercise but I would have her refrain from doing any heavy lifting.  Light aerobic exercise is best.  She should avoid salt in her diet is much as possible.  I would recommend less than 2000 mg a day.  Eating salt causes fluid buildup in the body and can cause congestive heart failure episodes.     We will have more information after we do the DOUG.  Andrey Mathews MD

## 2023-07-24 ENCOUNTER — THERAPY VISIT (OUTPATIENT)
Dept: PHYSICAL THERAPY | Facility: CLINIC | Age: 68
End: 2023-07-24
Attending: PHYSICIAN ASSISTANT
Payer: COMMERCIAL

## 2023-07-24 DIAGNOSIS — M79.604 PAIN OF RIGHT LOWER EXTREMITY: Primary | ICD-10-CM

## 2023-07-24 DIAGNOSIS — R25.2 MUSCLE CRAMP: ICD-10-CM

## 2023-07-24 DIAGNOSIS — M25.562 ACUTE PAIN OF LEFT KNEE: ICD-10-CM

## 2023-07-24 PROCEDURE — 97140 MANUAL THERAPY 1/> REGIONS: CPT | Mod: GP | Performed by: PHYSICAL THERAPIST

## 2023-07-28 ENCOUNTER — THERAPY VISIT (OUTPATIENT)
Dept: PHYSICAL THERAPY | Facility: CLINIC | Age: 68
End: 2023-07-28
Attending: PHYSICIAN ASSISTANT
Payer: COMMERCIAL

## 2023-07-28 DIAGNOSIS — M25.561 ACUTE PAIN OF RIGHT KNEE: ICD-10-CM

## 2023-07-28 DIAGNOSIS — R25.2 MUSCLE CRAMP: ICD-10-CM

## 2023-07-28 DIAGNOSIS — M79.604 RIGHT LEG PAIN: ICD-10-CM

## 2023-07-28 DIAGNOSIS — M79.604 PAIN OF RIGHT LOWER EXTREMITY: Primary | ICD-10-CM

## 2023-07-28 DIAGNOSIS — M25.562 ACUTE PAIN OF LEFT KNEE: ICD-10-CM

## 2023-07-28 PROCEDURE — 97530 THERAPEUTIC ACTIVITIES: CPT | Mod: GP | Performed by: PHYSICAL THERAPIST

## 2023-07-31 ENCOUNTER — CARE COORDINATION (OUTPATIENT)
Dept: CARDIOLOGY | Facility: CLINIC | Age: 68
End: 2023-07-31
Payer: COMMERCIAL

## 2023-07-31 NOTE — PROGRESS NOTES
[facility] Team 7 (supporting Andrey Mathews MD)51 minutes ago (1:14 PM)       I m not driving. Going to [facility] 4 hours  Friday 18th to Sunday 20th.       You  Margareth Green1 hour ago (12:56 PM)     [place]     You are having the testing to assess you mitral valve and if you have coronary disease.   Traveling is dependent on what they find with the angiogram and DOUG and how you do afterward.     Normally you can drive after 24 hours once sedation wears off, but the procedure doctors would clear that. Driving long distances right after a procedure may not be advised especially if you are the one driving.     Can we get more information.   Are you traveling a long distance? Are you driving or flying? Will you be the one driving. How long will you be gone?     Let us know more specifics and the [place] nurse team can review with DR Mathews.     Ally RN  For Team 7 Nurses in [place]       [facility] Team 7 (supporting Andrey Mathews MD)1 hour ago (12:21 PM)       Reji Mathews, I have plans to travel to [place] on August 18th. My Augiogram & DOUG is scheduled for the morning of August 17th. Wanted to know how you felt about that. Is it safe for me to go out of town?  Thanks   Margareth

## 2023-08-03 ENCOUNTER — THERAPY VISIT (OUTPATIENT)
Dept: PHYSICAL THERAPY | Facility: CLINIC | Age: 68
End: 2023-08-03
Attending: PHYSICIAN ASSISTANT
Payer: COMMERCIAL

## 2023-08-03 DIAGNOSIS — M79.604 PAIN OF RIGHT LOWER EXTREMITY: Primary | ICD-10-CM

## 2023-08-03 DIAGNOSIS — R25.2 MUSCLE CRAMP: ICD-10-CM

## 2023-08-03 DIAGNOSIS — M25.562 ACUTE PAIN OF LEFT KNEE: ICD-10-CM

## 2023-08-03 PROCEDURE — 97140 MANUAL THERAPY 1/> REGIONS: CPT | Mod: GP | Performed by: PHYSICAL THERAPIST

## 2023-08-07 ENCOUNTER — MYC MEDICAL ADVICE (OUTPATIENT)
Dept: FAMILY MEDICINE | Facility: CLINIC | Age: 68
End: 2023-08-07
Payer: COMMERCIAL

## 2023-08-07 NOTE — TELEPHONE ENCOUNTER
Spoke with patient. Discussed wound care and follow up.  Appointment made for ED Follow-up next week.    WENDY PalmaN, RN, PHN  [facility] ~ Registered Nurse  Clinic Triage ~ [facility]  August 7, 2023

## 2023-08-08 ENCOUNTER — MYC MEDICAL ADVICE (OUTPATIENT)
Dept: FAMILY MEDICINE | Facility: CLINIC | Age: 68
End: 2023-08-08
Payer: COMMERCIAL

## 2023-08-08 ENCOUNTER — MYC MEDICAL ADVICE (OUTPATIENT)
Dept: CARDIOLOGY | Facility: CLINIC | Age: 68
End: 2023-08-08
Payer: COMMERCIAL

## 2023-08-08 NOTE — TELEPHONE ENCOUNTER
She may not feel like travelling on the day after her procedures due to throat discomfort from the DOUG and procedure site pain from the angiogram. In addition, in the unlikely event of a complication such as a tear in the esophagus or bleeding at the site of the angiogram, she would be better off to stay locally after her procedure where she could get attention to those issues promptly and appropriately. I would recommend delaying plans to travel for a few days, at least. Andrey Mathews MD

## 2023-08-09 NOTE — TELEPHONE ENCOUNTER
NP: would you be able to sneak her in today or have suggestions for her based on her pictures?  She is scheduled with you 08/14/2023

## 2023-08-09 NOTE — TELEPHONE ENCOUNTER
Patient sent [employer] message to Dr. Mathews regarding epistaxis, however patient is not on any anticoagulants according to her epic medication list. Will forward to patient's PCP first and if needs cardiology to weigh in after then can be routed back. Patient does have a coronary angiogram and transesophageal echo scheduled for 8/17/23 at [facility] in [place].     Patient's [employer] message:  Tapan Mathews, I not sure if I need to be concerned or not.   I get bloody noses out of the blue. It can be in the middle of the night or day. At times it can take awhile to get it to stop. It s at least once a week. I try not to blow my nose hard & I do use Nasacort twice a day. Thanks Margareth Mathews last Office visit note 7/18/23:  ASSESSMENT:     Ms. Kana Green is a 68-year-old woman with mild exertional shortness of breath which may be due to some underlying lung disease but certainly could be due to her newly discovered mitral valve disease.  A cardiac murmur was heard in the emergency room where she went for evaluation of leg pain.  Echocardiogram subsequently revealed severe mitral valve regurgitation with normal left ventricular size and hyperdynamic function.  She has severe posterior leaflet mitral valve prolapse and possibly a flail posterior leaflet.     I recommended that we evaluate the mitral valve structure and function in more detail with a transesophageal echocardiogram.  In anticipation of requiring mitral valve repair, I have also recommended that we proceed with coronary angiography.  I have described the risks and benefits of both of those procedures with the patient and she understands and agrees to proceed.     Risks and benefits of left heart catheterization and coronary angiogram were discussed with the patient in detail. Risk estimated at 0.1-0.3% for diagnostic angio and 1-2% for PCI, including risk of stroke, MI, death, emergent bypass, contrast induced allergic reaction,  renal dysfunction, and vascular complications (including bleeding and transfusion) were discussed. Patient understands and wishes to proceed.     Following her DOUG and cardiac catheterization, I will arrange follow-up in the structural heart clinic to consider the most appropriate method for mitral valve repair.     Andrey Mathews MD

## 2023-08-09 NOTE — TELEPHONE ENCOUNTER
Patient was informed. She will go to  and keep scheduled visit with NP next week.   WENDY McnultyN, RN, PHN  [place] [facility]  August 9, 2023

## 2023-08-09 NOTE — TELEPHONE ENCOUNTER
Patient was informed of message below.   WENDY McnultyN, RN, PHN  [place] [facility]  August 9, 2023

## 2023-08-09 NOTE — TELEPHONE ENCOUNTER
It sounds like she needs an appointment to evaluate for infection with increased pain, HEADACHE and chills.  Urgent care is an option if she cannot get clinic appt  Caitlin Beasley PA-C

## 2023-08-09 NOTE — TELEPHONE ENCOUNTER
SITUATION:   Epistaxis    BACKGROUND:    Symptoms started 6 months ago, intermittently.   Episodes occur once per week. Episodes occur for 20 minutes, sometimes longer. Then she spits out blood.  She usually does get it to stop.   Her last episode was yesterday (08/08/23) during the morning.   The patient woke up Sunday during the night, then she felt something in the back of her throat. Then she noticed she had a nosebleed.     HOME TREATMENTS:  The patient pinches his nose. Then leans head back. Uses kleenex to plug nose.     NURSE RECOMMENDATION:   Schedule appointment.     PLAN:   Scheduled 08/14/23.     Routed to provider    Does the patient meet one of the following criteria for ADS visit consideration? No     RECOMMENDED DISPOSITION:  Schedule visit.   Will comply with recommendation: yes   If further questions/concerns or if Sx do not improve, worsen or new Sx develop, call your PCP or [facility] as soon as possible.    NOTES:  Disposition was determined by the first positive assessment question, therefore all previous assessment questions were negative.     Guideline used:Nosebleeds  Telephone Triage Protocols for Nurses, Fifth Edition, Mitra Murillo, WENDYN, RN, PHN  [place] [facility]  August 9, 2023

## 2023-08-09 NOTE — TELEPHONE ENCOUNTER
Please call and triage her nose bleeds and see how long it takes to get to stop and how she is attempting to control the nose bleeds.  Nose bleeds are a very common side effect of the flonase nasal sprays  Caitlin Beasley PA-C

## 2023-08-09 NOTE — TELEPHONE ENCOUNTER
Spoke to the patient.   The patient describes having a bad headache, chills, and an increase pain in the leg. Denies fever. Current temp - 98.1.     The patient has been keeping the area open to air while at home. When she goes out she applies bacitracin, a pad (unsure what type), and then bandages it.     Patient is wondering if she should do anything else?   Should she be seen sooner for her symptoms? She is scheduled on 08/14/23.     CHENTE Mcnulty, RN, PHN  [place] [facility]  August 9, 2023

## 2023-08-09 NOTE — TELEPHONE ENCOUNTER
Recommend that she stop flonase to see if this resolves nosebleeds for a few weeks.  She should not tip head back but keep it upright and hold firm pressure to nose without stoppig for a full 5 min before she checks to see if it is still bleeding.  Caitlin Beasley PA-C

## 2023-08-10 ENCOUNTER — NURSE TRIAGE (OUTPATIENT)
Dept: FAMILY MEDICINE | Facility: CLINIC | Age: 68
End: 2023-08-10
Payer: COMMERCIAL

## 2023-08-10 NOTE — TELEPHONE ENCOUNTER
Patient seen in ED 8/5/23 following laceration on her leg from a hotel bed frame.   She had sent my chart messages 8/8/23 with concerns for possible infection.     Patient went to [facility] urgent care yesterday and was prescribed doxycycline 100 mg BID x 7 days.   She just took her second dose this morning and is calling to follow up on what to expect with healing.     She says skin surrounding laceration is still red, feels tight and swollen. She is wondering if she needs to be seen again if this isn't healing properly.     Explained to patient that we should see an improvement between 48-72 hours of taking antibiotics. Recommended to patient she emiliano the area on her skin where redness is present.   Instructed to call back if redness spreads beyond this marking, if pus-like drainage or foul smelling drainage occurs, she develops and nausea, vomiting, fever, chills, etc.   Recommended keeping leg elevated and could apply cool pack throughout the day to help with swelling.     Patient verbalizes understanding of this and will monitor closely. She will call back if anything changes.     Hellen NGUYENN, RN  [facility]      Reason for Disposition   Taking antibiotic < 72 hours (3 days) and infected wound doesn't look better    Additional Information   Negative: Bright red, wide-spread, sunburn-like rash   Negative: Black (necrotic) or blisters develop in wound   Negative: Looks infected (spreading redness, red streak, pus) and fever   Negative: Patient sounds very sick or weak to the triager   Negative: Severe pain in the wound   Negative: Red streak runs from the wound   Negative: Facial wound looks infected (spreading redness)   Negative: Finger wound and entire finger swollen   Negative: Skin redness around the wound larger than 2 inches (5 cm)   Negative: Pus or cloudy fluid draining from wound   Negative: Taking antibiotic > 48 hours and fever persists   Negative: Taking  antibiotic > 72 hours (3 days) and infected wound not improved (pain, pus, redness)   Negative: No prior tetanus shots (or is not fully vaccinated) and any wound (e.g., cut or scrape)   Negative: HIV positive or severe immunodeficiency (severely weak immune system) and DIRTY cut or scrape   Negative: Patient wants to be seen   Negative: Pimple where a stitch comes through the skin   Negative: Wound becomes more painful or swollen, 3 or more days after injury   Negative: Last tetanus shot > 5 years ago and wound from DIRTY cut or scrape   Negative: Last tetanus shot > 10 years ago   Negative: Wound hasn't healed within 10 days after the injury   Negative: Taking antibiotic < 48 hours for wound infection and fever persists    Protocols used: Wound Infection-A-OH

## 2023-08-10 NOTE — TELEPHONE ENCOUNTER
"Patient called in with additional questions. Patient reports that she feels like redness increased in color a little but she can't see wound and her  is looking at it and taking pictures for her to see it. RN asked if patient marked redness as suggested in note below. Patient reports they marked her leg and the redness has not spread further, \"I think it is a little darker though.\" Patient denies increased swelling, red streaking, fever, pus like or odorous drainage.     Patient temp 98.6    Patient reports she took first dose of doxycycline yesterday at 5pm and second dose this morning at 9am. Patient reports she is taking twice a day. RN recommended to try to space out medications every 12 hours.     RN advised if patient is concerned about increased red color to be seen in ED, advised patient it is reassuring that redness is not spreading, swelling is not increasing, no drainage, or fever. RN advised to continue to monitor symptoms, take antibiotics as prescribed, elevated leg, cold pack use and if patient is concerned about redness or has worsening or new symptoms to present to ED. RN reassured patient she is doing a great job monitoring her symptoms and to let us know about other concerns/changes.    RN advised there are triage nurses available 24/7 and that all will have access to notes of previous calls and pictures patient has sent in. Patient verbalized understanding and all questions answered.       Patient plans to call tomorrow with update or tonight with changes and present to ED with new or worsening symptoms as directed.    WENDY GillN, RN  [facility] ~ Registered Nurse  Clinic Triage ~ [facility]  August 10, 2023    "

## 2023-08-14 ENCOUNTER — OFFICE VISIT (OUTPATIENT)
Dept: FAMILY MEDICINE | Facility: CLINIC | Age: 68
End: 2023-08-14
Payer: COMMERCIAL

## 2023-08-14 VITALS
BODY MASS INDEX: 24.11 KG/M2 | SYSTOLIC BLOOD PRESSURE: 138 MMHG | HEART RATE: 83 BPM | RESPIRATION RATE: 16 BRPM | OXYGEN SATURATION: 100 % | TEMPERATURE: 98.7 F | WEIGHT: 131 LBS | HEIGHT: 62 IN | DIASTOLIC BLOOD PRESSURE: 86 MMHG

## 2023-08-14 DIAGNOSIS — T14.8XXA WOUND INFECTION: Primary | ICD-10-CM

## 2023-08-14 DIAGNOSIS — Z48.02 ENCOUNTER FOR REMOVAL OF SUTURES: ICD-10-CM

## 2023-08-14 DIAGNOSIS — L08.9 WOUND INFECTION: Primary | ICD-10-CM

## 2023-08-14 LAB
BASOPHILS # BLD AUTO: 0.1 10E3/UL (ref 0–0.2)
BASOPHILS NFR BLD AUTO: 1 %
EOSINOPHIL # BLD AUTO: 0.6 10E3/UL (ref 0–0.7)
EOSINOPHIL NFR BLD AUTO: 10 %
IMM GRANULOCYTES # BLD: 0 10E3/UL
IMM GRANULOCYTES NFR BLD: 0 %
LYMPHOCYTES # BLD AUTO: 1.6 10E3/UL (ref 0.8–5.3)
LYMPHOCYTES NFR BLD AUTO: 26 %
MONOCYTES # BLD AUTO: 0.6 10E3/UL (ref 0–1.3)
MONOCYTES NFR BLD AUTO: 10 %
NEUTROPHILS # BLD AUTO: 3.2 10E3/UL (ref 1.6–8.3)
NEUTROPHILS NFR BLD AUTO: 53 %
WBC # BLD AUTO: 6.1 10E3/UL (ref 4–11)

## 2023-08-14 PROCEDURE — 85048 AUTOMATED LEUKOCYTE COUNT: CPT | Performed by: PHYSICIAN ASSISTANT

## 2023-08-14 PROCEDURE — 85004 AUTOMATED DIFF WBC COUNT: CPT | Performed by: PHYSICIAN ASSISTANT

## 2023-08-14 PROCEDURE — 99214 OFFICE O/P EST MOD 30 MIN: CPT | Performed by: PHYSICIAN ASSISTANT

## 2023-08-14 PROCEDURE — 36415 COLL VENOUS BLD VENIPUNCTURE: CPT | Performed by: PHYSICIAN ASSISTANT

## 2023-08-14 ASSESSMENT — PAIN SCALES - GENERAL: PAINLEVEL: NO PAIN (0)

## 2023-08-14 NOTE — PROGRESS NOTES
Provider asked RN for assistance in removing sutures for patient seen in clinic today.     BACKGROUND:  The patient has had the sutures placed on 8/5/2023.  There has been moderate history drainage and patient was seen in urgent care for concerns of a wound infection on 8/9/23, prescribed doxycycline twice daily for 7 days.     ASSESSMENT:   Incision is well approximated at superior and inferior edges and saturated in the middle of laceration. Some serosanguineous drainage present with crusted drainage adhering to sutures.   21 sutures are seen located on the right lower leg. ED note states 22 sutures were placed. RN and provider (Caitlin Beasley) assessed laceration and were only able to locate 21 sutures. RN and provider counted removed sutures and bilateral suture holes parallel to patient wound.         RECOMMENDATION (ACTION):    Area is prepped with an Hibiclens and sterile normal saline and 21 sutures are removed from right lower leg, some requiring additional Hibiclens and sterile normal saline to loose dried drainage from sutures.   Steri strips placed as needed.   Routine wound care discussed.    The patient will follow up as needed.    CHENTE Gill, RN  [facility] ~ Registered Nurse  Clinic Triage ~ [facility]  August 14, 2023

## 2023-08-14 NOTE — PROGRESS NOTES
Assessment & Plan     Wound infection  Improving, she will complete her doxycycline.  Follow-up at once if she is having increased purulent drainage, fevers, chills or expanding erythema  - WBC with Diff; Future  - WBC with Diff  She does have coronary angiogram planned on August 17th, I called [facility] to update them on the status of her leg wound/resolving infection.  They confirmed that the patient should come as scheduled to complete the testing in 3 days.    Encounter for removal of sutures  Fariba, our clinic RN, removed her sutures.  We both verified that they were only 21 sutures as opposed to the 22 simple interrupted sutures that was documented.  Fariba  placed Steri-Strips to offer more support to the wound as she continues to heal.  She will continue to monitor for increasing signs of infection       MED REC REQUIRED  Post Medication Reconciliation Status: discharge medications reconciled, continue medications without change      TOI Hirsch [facility] JAMES Zuluaga is a 68 year old, presenting for the following health issues:  Hospital F/U        8/14/2023     9:40 AM   Additional Questions   Roomed by Rowena HADDAD   Accompanied by None         8/14/2023     9:40 AM   Patient Reported Additional Medications   Patient reports taking the following new medications NA       HPI     ED/UC Followup:    Facility:  [facility]  Date of visit: 8/5/23  Reason for visit: Laceration   Current Status: Stable     Reports bloody clear discharge  She is here today to remove the stitches.  Per documentation they were placed on August 5 after she bumped her leg on a bed frame at [facility].  He had a 10 cm laceration and 22 sutures were placed.  She then presented to urgent care on 9 August and was diagnosed with a wound infection.  She was started on doxycycline.  She is tolerating this well and will complete this prescription in 2 days.  She is not  "having any fevers or chills.  She is having headaches but that is not unusual for her related to neck tension and not sleeping well.  The pain in her leg is roughly the same.  The redness has improved significantly and she is having less purulent drainage, now it is more of a thin or bloody discharge.  She has been washing it with soap and water.  She is concerned because she has coronary angiogram planned in 3 days.  She is wondering if she should still have this done.    Also, she commented on some nosebleed she was having.  She reduced her Flonase down to once daily and the nosebleeds have improved.  She did have a CBC completed in urgent care her platelets were normal.        Review of Systems   Constitutional, HEENT, cardiovascular, pulmonary, gi and gu systems are negative, except as otherwise noted.      Objective    /86 (BP Location: Left arm, Patient Position: Chair, Cuff Size: Adult Regular)   Pulse 83   Temp 98.7  F (37.1  C) (Temporal)   Resp 16   Ht 1.57 m (5' 1.81\")   Wt 59.4 kg (131 lb)   LMP 02/26/2008 (Approximate)   SpO2 100%   BMI 24.11 kg/m    Body mass index is 24.11 kg/m .  Physical Exam   GENERAL: healthy, alert and no distress  NECK: no adenopathy, no asymmetry, masses, or scars and thyroid normal to palpation  RESP: lungs clear to auscultation - no rales, rhonchi or wheezes  CV: regular rates and rhythm, normal S1 S2, no S3 or S4, grade 3/6 systolic murmur heard best over the bilateral sternal borders, peripheral pulses strong, and no peripheral edema  MS: no gross musculoskeletal defects noted, no edema  SKIN: Right lower lateral calf has a large C shaped laceration with both sutures and Steri-Strips applied over the sutures.  She has no expanding erythema.  I do see the marker line that she radhika showing the area of redness which has since receded.  She does have some bloody discharge noted but no purulence no wound dehiscence but there is a considerable amount of of scabbing " and crusting along the wound  PSYCH: mentation appears normal, affect normal/bright    Results for orders placed or performed in visit on 08/14/23   WBC and Differential     Status: None   Result Value Ref Range    WBC Count 6.1 4.0 - 11.0 10e3/uL    % Neutrophils 53 %    % Lymphocytes 26 %    % Monocytes 10 %    % Eosinophils 10 %    % Basophils 1 %    % Immature Granulocytes 0 %    Absolute Neutrophils 3.2 1.6 - 8.3 10e3/uL    Absolute Lymphocytes 1.6 0.8 - 5.3 10e3/uL    Absolute Monocytes 0.6 0.0 - 1.3 10e3/uL    Absolute Eosinophils 0.6 0.0 - 0.7 10e3/uL    Absolute Basophils 0.1 0.0 - 0.2 10e3/uL    Absolute Immature Granulocytes 0.0 <=0.4 10e3/uL   WBC with Diff     Status: None    Narrative    The following orders were created for panel order WBC with Diff.  Procedure                               Abnormality         Status                     ---------                               -----------         ------                     WBC and Differential[[id number]]                             Final result                 Please view results for these tests on the individual orders.

## 2023-08-15 ENCOUNTER — THERAPY VISIT (OUTPATIENT)
Dept: PHYSICAL THERAPY | Facility: CLINIC | Age: 68
End: 2023-08-15
Attending: PHYSICIAN ASSISTANT
Payer: COMMERCIAL

## 2023-08-15 DIAGNOSIS — M25.562 ACUTE PAIN OF LEFT KNEE: ICD-10-CM

## 2023-08-15 DIAGNOSIS — R25.2 MUSCLE CRAMP: ICD-10-CM

## 2023-08-15 DIAGNOSIS — M79.604 PAIN OF RIGHT LOWER EXTREMITY: Primary | ICD-10-CM

## 2023-08-15 PROCEDURE — 97140 MANUAL THERAPY 1/> REGIONS: CPT | Mod: GP | Performed by: PHYSICAL THERAPIST

## 2023-08-15 NOTE — PROGRESS NOTES
[facility]                                                                                   OUTPATIENT PHYSICAL THERAPY    PLAN OF TREATMENT FOR OUTPATIENT REHABILITATION   Patient's Last Name, First Name, Margareth Gamez YOB: 1955   Provider's Name   [facility]   Medical Record No.  [id number]     Onset Date: 05/10/23  Start of Care Date: 07/03/23     Medical Diagnosis:  Right leg pain (M79.604)  - Primary     Acute pain of right knee (M25.561)     Muscle cramp (R25.2)  upper back and neck pain   08/15/23 0500   Appointment Info   Signing clinician's name / credentials Leana James PT   Total/Authorized Visits Medicare   Visits Used 6/10   Medical Diagnosis Right leg pain (M79.604)  - Primary     Acute pain of right knee (M25.561)     Muscle cramp (R25.2)   PT Tx Diagnosis RLE pain, sciatica   Quick Adds Certification   Progress Note/Certification   Start of Care Date 07/03/23   Onset of illness/injury or Date of Surgery 05/10/23   Therapy Frequency 1-2 x per wk   Predicted Duration 10 wks   Certification date from 08/15/23   Certification date to 11/12/23   Progress Note Due Date 07/03/23   GOALS   PT Goals 2   PT Goal 1   Goal Identifier 1   Goal Description Patient has full functional mobility, is able to garden and go for walks with no RLE pain and no LBP   Goal Progress 60% better, but recently been limping   Target Date 11/12/23   PT Goal 2   Goal Identifier 2   Goal Description 50% less pain in the upper back and neck so that pt can bend again and do her gardening.   Goal Progress new goal   Target Date 11/12/23   Subjective Report   Subjective Report Patient had bad laceration on the right lower leg. Patient has been limping more due to the RLE pain and right knee pain and this has caused an increase in upper back and neck pain.   Objective Measure 1   Objective Measure UMESH   Objective Measure 2   Objective Measure  Pain   Details knee pain #2, LBP #4.   Manual Therapy   Manual Therapy: Mobilization, MFR, MLD, friction massage minutes (33147) 40   Manual Therapy 1 - Details MFR upper back , UT's, cervical , LB and kinesio tape to the low back as previous.   Patient Response/Progress pt LB is better but the upper back and neck are worse sending Dr recert and progress to add upper back and neck   Education   Learner/Method Patient;Listening;Reading;Demonstration   Plan   Updates to plan of care will send provider new recert adding the upper back and neck pain that has come on with the present RLE pain and limping.   Total Session Time   Timed Code Treatment Minutes 40   Total Treatment Time (sum of timed and untimed services) 40           PT Treatment Diagnosis:  RLE pain, sciatica Plan of Treatment  Frequency/Duration: 1-2 x per wk/ 10 wks    Certification date from 08/15/23 to 11/12/23         See note for plan of treatment details and functional goals     Leana James, PT                         I CERTIFY THE NEED FOR THESE SERVICES FURNISHED UNDER        THIS PLAN OF TREATMENT AND WHILE UNDER MY CARE     (Physician attestation of this document indicates review and certification of the therapy plan).                Referring Provider:  Caitlin Beasley PA-C      Initial Assessment  See Epic Evaluation- Start of Care Date: 07/03/23            PLAN  Continue therapy per current plan of care.    Beginning/End Dates of Progress Note Reporting Period:    7/3/23-8/15/23    Referring Provider:  Caitlin Beasley PA-C

## 2023-08-16 ENCOUNTER — CARE COORDINATION (OUTPATIENT)
Dept: CARDIOLOGY | Facility: CLINIC | Age: 68
End: 2023-08-16
Payer: COMMERCIAL

## 2023-08-16 DIAGNOSIS — I34.0 NONRHEUMATIC MITRAL VALVE REGURGITATION: Primary | ICD-10-CM

## 2023-08-16 RX ORDER — POTASSIUM CHLORIDE 1500 MG/1
20 TABLET, EXTENDED RELEASE ORAL
Status: CANCELLED | OUTPATIENT
Start: 2023-08-16

## 2023-08-16 RX ORDER — LIDOCAINE 40 MG/G
CREAM TOPICAL
Status: CANCELLED | OUTPATIENT
Start: 2023-08-16

## 2023-08-16 RX ORDER — ASPIRIN 325 MG
325 TABLET ORAL ONCE
Status: CANCELLED | OUTPATIENT
Start: 2023-08-16 | End: 2023-08-16

## 2023-08-16 RX ORDER — ASPIRIN 81 MG/1
243 TABLET, CHEWABLE ORAL ONCE
Status: CANCELLED | OUTPATIENT
Start: 2023-08-16

## 2023-08-16 RX ORDER — SODIUM CHLORIDE 9 MG/ML
INJECTION, SOLUTION INTRAVENOUS CONTINUOUS
Status: CANCELLED | OUTPATIENT
Start: 2023-08-16

## 2023-08-16 NOTE — PROGRESS NOTES
Noted. Prep reviewed by WING Fernandez previously. Called pt to confirm prep. Answered pt questions. Pt states she cut her ankle on 8/5/23 was treated with stitches and doxycycline abx which she finished today. Pt states stitches have been removed now just steri strips in place and no fever or signs of infection. She reports her PCP did labs and was told no elevated WBC so no concerns with proceeding with coronary angiogram. Pt confirmed understanding of prep and confirmed she has ride arranged and someone to stay with for 24 hours. Hailey Angel RN on 8/16/2023 at 3:17 PM              ----- Message -----  From: Hailey Castro CMA  Sent: 7/19/2023   5:49 PM CDT  To: Rebecca Borges RN; [facility]; *  Subject: Musa/Angio 8/17                                  DCCV/MUSA Orders    Location: [facility]    Procedure: MUSA    Diagnosis: Nonrheumatic mitral valve regurgitation    Procedure Date: 8/17    Procedure Time: 8:30am    Patient Arrival Time: 6:30am    Ordering Cardiologist: Dr. Mathews    Performing Cardiologist: Dr. Mathews    Cardiac Assessment Completed: Yes  Date: 7/18  Provider: Dr. Mathews    Patient on Coumadin/Warfarin:  No  Patient on Pradaxa/Xarelto/Eliquis:  No  Patient on Invokana/Farxiga/Jardiance/Steglatro:  No    Previous procedure records requested by MD?  No  If yes, when was it done:  If yes, where was it done:      Additional labs needed at check in? No  If yes, what labs:    Pt advised to report any COVID like symptoms to care team prior to procedure.    Appointment was scheduled: Over the phone    Special instructions:      If pt is having a Cardioverion:  Does the pt have a device:          Angiogram Orders    Location: [facility] [address], [place] [address] - Main Entrance of the Hospital    Procedure: Coronary Angiogram    Diagnosis: Nonrheumatic mitral valve regurgitation [I34.0]    Procedure Date: 8/17    Patient Arrival Time: 6:30am for MUSA procedure following  Angio    Procedure Time: 2nd case to follow    Ordering Cardiologist: Dr. Mathews    Performing Cardiologist: Dr. Ford    Cardiac Assessment Completed: Yes  Date: 7/18  Provider: Dr. Mathews    Pre-Procedure Labs completed: on admit    Post Procedure KIRSTIE appointment scheduled: No (to be alan with structural heart team, msg sent to Laureen Barfield and Structural team to reach out to pt)  Date:  Provider:    Patient Diabetic on Meds/Insulin:  No  If on Metformin, has 2 day post procedure BMP been scheduled: No  (Thursday and Friday procedures should have Monday BMP)  Patient on Coumadin/Warfarin:  No  Patient on Pradaxa/Xarelto/Eliquis:  No  Patient on Invokana/Farxiga/Jardiance/Steglatro:  No    Does Patient have a history of bypass:  No    If yes, when was it done:  If yes, where was it done:    Pt advised to report any COVID like symptoms to care team prior to procedure.    Appointment was scheduled: Over the phone    Special instructions:          ----- Message -----  From: Hailey Castro CMA  Sent: 7/19/2023   4:32 PM CDT  To: Hailey Castro CMA  Subject: FW: pls call patient to set up angio/radha and*      ----- Message -----  From: Rebecca Borges RN  Sent: 7/18/2023   4:07 PM CDT  To: [facility] Heart Scheduling; *  Subject: pls call patient to set up angio/radha and str*    Hi there,  Please call patient to set up same day angio and transesophageal echo if possible. transesophageal echo preferably with Dr. Mathews. He gave me some dates he is doing transesophageal echo (8/3, 8/17, and 8/27). If it is not feasible for patient to do both transesophageal echo and angio on same day with Dr. Mathews doing transesophageal echo then okay to schedule with someone else. Patient needs follow up with structural heart clinic after both angio and transesophageal echo are complete. Patient saw Dr. Mathews today in clinic. I went through prep.  Thank you  WING Fernandez

## 2023-08-17 ENCOUNTER — HOSPITAL ENCOUNTER (OUTPATIENT)
Dept: CARDIOLOGY | Facility: CLINIC | Age: 68
Discharge: HOME | End: 2023-08-17
Attending: INTERNAL MEDICINE
Payer: COMMERCIAL

## 2023-08-17 ENCOUNTER — HOSPITAL ENCOUNTER (OUTPATIENT)
Facility: CLINIC | Age: 68
Discharge: HOME | End: 2023-08-17
Admitting: INTERNAL MEDICINE
Payer: COMMERCIAL

## 2023-08-17 VITALS
WEIGHT: 128 LBS | HEART RATE: 84 BPM | DIASTOLIC BLOOD PRESSURE: 58 MMHG | OXYGEN SATURATION: 96 % | RESPIRATION RATE: 18 BRPM | BODY MASS INDEX: 23.55 KG/M2 | TEMPERATURE: 98.3 F | SYSTOLIC BLOOD PRESSURE: 111 MMHG | HEIGHT: 62 IN

## 2023-08-17 DIAGNOSIS — I34.0 NONRHEUMATIC MITRAL VALVE REGURGITATION: ICD-10-CM

## 2023-08-17 LAB
ANION GAP SERPL CALCULATED.3IONS-SCNC: 13 MMOL/L (ref 7–15)
APTT PPP: 31 SECONDS (ref 22–38)
BUN SERPL-MCNC: 15.8 MG/DL (ref 8–23)
CALCIUM SERPL-MCNC: 9.4 MG/DL (ref 8.8–10.2)
CHLORIDE SERPL-SCNC: 106 MMOL/L (ref 98–107)
CREAT SERPL-MCNC: 0.9 MG/DL (ref 0.51–0.95)
DEPRECATED HCO3 PLAS-SCNC: 24 MMOL/L (ref 22–29)
ERYTHROCYTE [DISTWIDTH] IN BLOOD BY AUTOMATED COUNT: 13.3 % (ref 10–15)
GFR SERPL CREATININE-BSD FRML MDRD: 68 ML/MIN/1.73M2
GLUCOSE SERPL-MCNC: 86 MG/DL (ref 70–99)
HCT VFR BLD AUTO: 40.8 % (ref 35–47)
HGB BLD-MCNC: 13.5 G/DL (ref 11.7–15.7)
INR PPP: 1.07 (ref 0.85–1.15)
LVEF ECHO: NORMAL
MCH RBC QN AUTO: 31.6 PG (ref 26.5–33)
MCHC RBC AUTO-ENTMCNC: 33.1 G/DL (ref 31.5–36.5)
MCV RBC AUTO: 96 FL (ref 78–100)
PLATELET # BLD AUTO: 238 10E3/UL (ref 150–450)
POTASSIUM SERPL-SCNC: 3.8 MMOL/L (ref 3.4–5.3)
RBC # BLD AUTO: 4.27 10E6/UL (ref 3.8–5.2)
SODIUM SERPL-SCNC: 143 MMOL/L (ref 136–145)
WBC # BLD AUTO: 6.2 10E3/UL (ref 4–11)

## 2023-08-17 PROCEDURE — 999N000054 HC STATISTIC EKG NON-CHARGEABLE

## 2023-08-17 PROCEDURE — 93312 ECHO TRANSESOPHAGEAL: CPT

## 2023-08-17 PROCEDURE — 93325 DOPPLER ECHO COLOR FLOW MAPG: CPT

## 2023-08-17 PROCEDURE — 99152 MOD SED SAME PHYS/QHP 5/>YRS: CPT | Performed by: INTERNAL MEDICINE

## 2023-08-17 PROCEDURE — 999N000184 HC STATISTIC TELEMETRY

## 2023-08-17 PROCEDURE — 93312 ECHO TRANSESOPHAGEAL: CPT | Mod: 26 | Performed by: INTERNAL MEDICINE

## 2023-08-17 PROCEDURE — 250N000009 HC RX 250: Performed by: INTERNAL MEDICINE

## 2023-08-17 PROCEDURE — 250N000013 HC RX MED GY IP 250 OP 250 PS 637: Performed by: INTERNAL MEDICINE

## 2023-08-17 PROCEDURE — C1894 INTRO/SHEATH, NON-LASER: HCPCS | Performed by: INTERNAL MEDICINE

## 2023-08-17 PROCEDURE — 36591 DRAW BLOOD OFF VENOUS DEVICE: CPT

## 2023-08-17 PROCEDURE — 93320 DOPPLER ECHO COMPLETE: CPT | Mod: 26 | Performed by: INTERNAL MEDICINE

## 2023-08-17 PROCEDURE — 250N000011 HC RX IP 250 OP 636: Performed by: INTERNAL MEDICINE

## 2023-08-17 PROCEDURE — 82310 ASSAY OF CALCIUM: CPT | Performed by: INTERNAL MEDICINE

## 2023-08-17 PROCEDURE — 99152 MOD SED SAME PHYS/QHP 5/>YRS: CPT | Mod: GC | Performed by: INTERNAL MEDICINE

## 2023-08-17 PROCEDURE — 999N000183 HC STATISTIC TEE INCLUDES SEDATION

## 2023-08-17 PROCEDURE — 272N000001 HC OR GENERAL SUPPLY STERILE: Performed by: INTERNAL MEDICINE

## 2023-08-17 PROCEDURE — 250N000011 HC RX IP 250 OP 636: Mod: JZ | Performed by: INTERNAL MEDICINE

## 2023-08-17 PROCEDURE — 258N000003 HC RX IP 258 OP 636: Performed by: INTERNAL MEDICINE

## 2023-08-17 PROCEDURE — 93325 DOPPLER ECHO COLOR FLOW MAPG: CPT | Mod: 26 | Performed by: INTERNAL MEDICINE

## 2023-08-17 PROCEDURE — 85014 HEMATOCRIT: CPT | Performed by: INTERNAL MEDICINE

## 2023-08-17 PROCEDURE — 93005 ELECTROCARDIOGRAM TRACING: CPT

## 2023-08-17 PROCEDURE — 85610 PROTHROMBIN TIME: CPT | Performed by: INTERNAL MEDICINE

## 2023-08-17 PROCEDURE — 85730 THROMBOPLASTIN TIME PARTIAL: CPT | Performed by: INTERNAL MEDICINE

## 2023-08-17 PROCEDURE — 93454 CORONARY ARTERY ANGIO S&I: CPT | Mod: 26 | Performed by: INTERNAL MEDICINE

## 2023-08-17 PROCEDURE — 36415 COLL VENOUS BLD VENIPUNCTURE: CPT | Performed by: INTERNAL MEDICINE

## 2023-08-17 PROCEDURE — 93454 CORONARY ARTERY ANGIO S&I: CPT | Performed by: INTERNAL MEDICINE

## 2023-08-17 PROCEDURE — 999N000071 HC STATISTIC HEART CATH LAB OR EP LAB

## 2023-08-17 RX ORDER — ASPIRIN 81 MG/1
243 TABLET, CHEWABLE ORAL ONCE
Status: COMPLETED | OUTPATIENT
Start: 2023-08-17 | End: 2023-08-17

## 2023-08-17 RX ORDER — LIDOCAINE HYDROCHLORIDE 40 MG/ML
1.5 SOLUTION TOPICAL ONCE
Status: COMPLETED | OUTPATIENT
Start: 2023-08-17 | End: 2023-08-17

## 2023-08-17 RX ORDER — SODIUM CHLORIDE 9 MG/ML
INJECTION, SOLUTION INTRAVENOUS CONTINUOUS
Status: DISCONTINUED | OUTPATIENT
Start: 2023-08-17 | End: 2023-08-17 | Stop reason: HOSPADM

## 2023-08-17 RX ORDER — LIDOCAINE 40 MG/G
CREAM TOPICAL
Status: DISCONTINUED | OUTPATIENT
Start: 2023-08-17 | End: 2023-08-17 | Stop reason: HOSPADM

## 2023-08-17 RX ORDER — SODIUM CHLORIDE 9 MG/ML
INJECTION, SOLUTION INTRAVENOUS CONTINUOUS PRN
Status: DISCONTINUED | OUTPATIENT
Start: 2023-08-17 | End: 2023-08-17 | Stop reason: HOSPADM

## 2023-08-17 RX ORDER — NALOXONE HYDROCHLORIDE 0.4 MG/ML
0.2 INJECTION, SOLUTION INTRAMUSCULAR; INTRAVENOUS; SUBCUTANEOUS
Status: DISCONTINUED | OUTPATIENT
Start: 2023-08-17 | End: 2023-08-17 | Stop reason: HOSPADM

## 2023-08-17 RX ORDER — POTASSIUM CHLORIDE 1500 MG/1
20 TABLET, EXTENDED RELEASE ORAL
Status: COMPLETED | OUTPATIENT
Start: 2023-08-17 | End: 2023-08-17

## 2023-08-17 RX ORDER — LIDOCAINE 50 MG/G
OINTMENT TOPICAL ONCE
Status: COMPLETED | OUTPATIENT
Start: 2023-08-17 | End: 2023-08-17

## 2023-08-17 RX ORDER — NALOXONE HYDROCHLORIDE 0.4 MG/ML
0.4 INJECTION, SOLUTION INTRAMUSCULAR; INTRAVENOUS; SUBCUTANEOUS
Status: DISCONTINUED | OUTPATIENT
Start: 2023-08-17 | End: 2023-08-17 | Stop reason: HOSPADM

## 2023-08-17 RX ORDER — IOPAMIDOL 755 MG/ML
INJECTION, SOLUTION INTRAVASCULAR
Status: DISCONTINUED | OUTPATIENT
Start: 2023-08-17 | End: 2023-08-17 | Stop reason: HOSPADM

## 2023-08-17 RX ORDER — DEXTROSE MONOHYDRATE 25 G/50ML
9.5 INJECTION, SOLUTION INTRAVENOUS
Status: DISCONTINUED | OUTPATIENT
Start: 2023-08-17 | End: 2023-08-17 | Stop reason: HOSPADM

## 2023-08-17 RX ORDER — FENTANYL CITRATE 50 UG/ML
INJECTION, SOLUTION INTRAMUSCULAR; INTRAVENOUS
Status: DISCONTINUED | OUTPATIENT
Start: 2023-08-17 | End: 2023-08-17 | Stop reason: HOSPADM

## 2023-08-17 RX ORDER — FLUMAZENIL 0.1 MG/ML
0.2 INJECTION, SOLUTION INTRAVENOUS
Status: DISCONTINUED | OUTPATIENT
Start: 2023-08-17 | End: 2023-08-17 | Stop reason: HOSPADM

## 2023-08-17 RX ORDER — GLYCOPYRROLATE 0.2 MG/ML
0.1 INJECTION, SOLUTION INTRAMUSCULAR; INTRAVENOUS ONCE
Status: COMPLETED | OUTPATIENT
Start: 2023-08-17 | End: 2023-08-17

## 2023-08-17 RX ORDER — FENTANYL CITRATE 50 UG/ML
25 INJECTION, SOLUTION INTRAMUSCULAR; INTRAVENOUS
Status: DISCONTINUED | OUTPATIENT
Start: 2023-08-17 | End: 2023-08-17 | Stop reason: HOSPADM

## 2023-08-17 RX ORDER — ASPIRIN 325 MG
325 TABLET ORAL ONCE
Status: COMPLETED | OUTPATIENT
Start: 2023-08-17 | End: 2023-08-17

## 2023-08-17 RX ADMIN — LIDOCAINE HYDROCHLORIDE 1.5 ML: 40 SOLUTION TOPICAL at 08:17

## 2023-08-17 RX ADMIN — FENTANYL CITRATE 25 MCG: 50 INJECTION, SOLUTION INTRAMUSCULAR; INTRAVENOUS at 08:58

## 2023-08-17 RX ADMIN — MIDAZOLAM 1 MG: 1 INJECTION INTRAMUSCULAR; INTRAVENOUS at 08:46

## 2023-08-17 RX ADMIN — SODIUM CHLORIDE: 9 INJECTION, SOLUTION INTRAVENOUS at 08:28

## 2023-08-17 RX ADMIN — MIDAZOLAM 1 MG: 1 INJECTION INTRAMUSCULAR; INTRAVENOUS at 08:53

## 2023-08-17 RX ADMIN — GLYCOPYRROLATE 0.1 MG: 0.2 INJECTION, SOLUTION INTRAMUSCULAR; INTRAVENOUS at 08:19

## 2023-08-17 RX ADMIN — TOPICAL ANESTHETIC 1 ML: 200 SPRAY DENTAL; PERIODONTAL at 08:34

## 2023-08-17 RX ADMIN — FENTANYL CITRATE 50 MCG: 50 INJECTION, SOLUTION INTRAMUSCULAR; INTRAVENOUS at 08:47

## 2023-08-17 RX ADMIN — MIDAZOLAM 1 MG: 1 INJECTION INTRAMUSCULAR; INTRAVENOUS at 09:05

## 2023-08-17 RX ADMIN — MIDAZOLAM 1 MG: 1 INJECTION INTRAMUSCULAR; INTRAVENOUS at 08:56

## 2023-08-17 RX ADMIN — MIDAZOLAM 1 MG: 1 INJECTION INTRAMUSCULAR; INTRAVENOUS at 08:49

## 2023-08-17 RX ADMIN — MIDAZOLAM 0.5 MG: 1 INJECTION INTRAMUSCULAR; INTRAVENOUS at 09:13

## 2023-08-17 RX ADMIN — POTASSIUM CHLORIDE 20 MEQ: 1500 TABLET, EXTENDED RELEASE ORAL at 09:54

## 2023-08-17 ASSESSMENT — ACTIVITIES OF DAILY LIVING (ADL)
ADLS_ACUITY_SCORE: 35

## 2023-08-17 NOTE — PROGRESS NOTES
Text page sent to Dr. Ford: Please come to consent for angiogram prior to her 0830 DOUG.  She is in CS room 20.  Thank you, Karla DIMAS [phone]

## 2023-08-17 NOTE — PROGRESS NOTES
Care Suites Discharge Summary    Discharge Criteria:   Discharge Criteria met per MD orders: Yes.   Vital signs stable.     Pt demonstrates ability to ambulate safely: Yes.  (See discharge questionnaire for additional information)    Discharge instructions & education:   Discharge instructions reviewed with patient and Spouse. Patient verbalizes understanding.   Additional patient education provided: cardiac angio and DOUG     Medications:   Patient will be discharging on new medications- No. Patient verbalizes reason for use, start date, and side effects NA.    Items returned to patient:   Home and hospital acquired medications returned to patient NA   Listed belongings gathered and returned to patient: Yes    Patient discharged to home with spouse.     Fabian Conde, WING        fast

## 2023-08-17 NOTE — PROGRESS NOTES
Care Suites Admission Nursing Note    Patient Information  Name: Margareth Green  Age: 68 year old  Reason for admission: DOUG then coronary angiogram.  Care Suites arrival time: 0630    Visitor Information  Name: Maria Esther     Patient Admission/Assessment   Pre-procedure assessment complete: Yes  If abnormal assessment/labs, provider notified: N/A  NPO: Yes  Medications held per instructions/orders: N/A  Consent: deferred  If applicable, pregnancy test status: deferred  Patient oriented to room: Yes  Education/questions answered: Yes  Plan/other: Proceed as scheduled.    Discharge Planning  Discharge name/phone number: Robel -spouse [phone]  Overnight post sedation caregiver: Robel  Discharge location: home    Karla Kingsley RN

## 2023-08-17 NOTE — PROGRESS NOTES
0645 A/O. Pt denies difficulty swallowing or sleep apnea. No dentures or loose teeth. NPO x 10+ hrs. Discussed/reviewed DOUG procedure with pt and family pre procedure.  All questions and concerns addressed at this time w/ verbal understanding received.    DOUG: Pt tolerated well. VSS. Total sedation given - 5.5 mg Versed & 75 mcg Fentanyl. Dr Mathews spoke with pt & family post procedure.See DOUG Flowsheet.       1005  Handoff report given to Fabian Conde RN

## 2023-08-17 NOTE — PRE-PROCEDURE
GENERAL PRE-PROCEDURE:   Procedure:  DOUG  Date/Time:  8/17/2023 8:30 AM    Verbal consent obtained?: Yes    Written consent obtained?: Yes    Risks and benefits: Risks, benefits and alternatives were discussed    Consent given by:  Patient  Patient states understanding of procedure being performed: Yes    Patient's understanding of procedure matches consent: Yes    Procedure consent matches procedure scheduled: Yes    Expected level of sedation:  Moderate  Appropriately NPO:  Yes  ASA Class:  1  Mallampati  :  Grade 1- soft palate, uvula, tonsillar pillars, and posterior pharyngeal wall visible  Lungs:  Lungs clear with good breath sounds bilaterally  Heart:  Normal heart sounds and rate  History & Physical reviewed:  History and physical reviewed and no updates needed  Statement of review:  I have reviewed the lab findings, diagnostic data, medications, and the plan for sedation

## 2023-08-17 NOTE — DISCHARGE INSTRUCTIONS
DOUG  (Transesophageal Echocardiogram)  Discharge Instructions    After you go home:    Have an adult stay with you for 6 hours.       For 24 hours - due to the sedation you received:  Relax and take it easy.  Do NOT make any important or legal decisions.  Do NOT drive or operate machines at home or at work.  Do NOT drink alcohol.    Diet:  You may resume your normal diet, but no scratchy foods for two days.  If your throat is sore, eat cold, bland or soft foods.  You may have heartburn if the tube used in the exam entered your stomach.  If so:   - Do not eat acidic and spicy foods.   - Do not eat three hours before bedtime. Clear liquids are okay.   - When lying down, use two pillows to raise your head.    Medicines:    Take your medications, including blood thinners, unless your provider tells you not to.  If you have stopped any medicines, check with your provider about when to restart them.  You may take Tylenol (Acetaminophen) if your throat is sore.  You may take antacids if you have heartburn.      Follow Up Appointments:    Follow up with your cardiologist at [facility] of patient preference as instructed.  Follow up with your primary care provider as needed.    Call the clinic if:    You have heartburn that is severe or lasts more than 72 hours.  You have a sore throat that feels worse after 72 hours.  You have shortness of breath, neck pain, chest pain, fever, chills, coughing up blood, or other unusual signs.  Questions or concerns      [facility] at [place]:    [phone] [facility] (7 days a week)   Cardiac Angiogram Discharge Instructions - Femoral    After you go home:    Have an adult stay with you until tomorrow.  Drink extra fluids for 2 days.  You may resume your normal diet.  No smoking       For 24 hours - due to the sedation you received:  Relax and take it easy.  Do NOT make any important or legal decisions.  Do NOT drive or operate machines at home or at  work.  Do NOT drink alcohol.    Care of Groin Puncture Site:    For the first 24 hrs - check the puncture site every 1-2 hours while awake.  For 2 days, when you cough, sneeze, laugh or move your bowels, hold your hand over the puncture site and press firmly.  Remove the bandaid after 24 hours. If there is minor oozing, apply another bandaid and remove it after 12 hours.  It is normal to have a small bruise or pea size lump at the site.  You may shower tomorrow. Do NOT take a bath, or use a hot tub or pool for at least 3 days. Do NOT scrub the site. Do not use lotion or powder near the puncture site.    Activity:            For 2 days:  No stooping or squatting  Do NOT do any heavy activity such as exercise, lifting, or straining.   No housework, yard work or any activity that make you sweat  Do NOT lift more than 10 pounds    Bleeding:    If you start bleeding from the site in your groin, lie down flat and press firmly on/above the site for 10 minutes.   Once bleeding stops, lay flat for 2 hours.   Call [facility] as soon as you can.       Call 911 right away if you have heavy bleeding or bleeding that does not stop.      Medicines:    If you are taking an antiplatelet medication such as Plavix, Brilinta or Effient, do not stop taking it until you talk to your cardiologist.    If you are on Metformin (Glucophage), do not restart it until you have blood tests (within 2 to 3 days after discharge).  After you have your blood drawn, you may restart the Metformin.   Take your medications, including blood thinners, unless your provider tells you not to.    If you take Coumadin (Warfarin), have your INR checked by your provider in  3-5 days. Call your clinic to schedule this.  If you have stopped any medicines, check with your provider about when to restart them.    Follow Up Appointments:    Follow up with [facility] Nurse Practitioner at [facility] of patient preference in 7-10 days.    Call the clinic if:    You  have increased pain or a large or growing hard lump around the site.  The site is red, swollen, hot or tender.  Blood or fluid is draining from the site.  You have chills or a fever greater than 101 F (38 C).  Your leg feels numb, cool or changes color.  You have hives, a rash or unusual itching.  New pain in the back or belly that you cannot control with Tylenol.  Any questions or concerns.          [facility] at [place]:    [phone] [facility] (7 days a week)

## 2023-08-17 NOTE — PROGRESS NOTES
1150: Pt returned from cath lab. Gauze drsg CDI to right groin puncture site. No oozing or hematoma noted. Area soft & flat. Pt denies pain. Pt instructed on activity restrictions while on bedrest. Verbal understanding received from pt.

## 2023-08-18 ENCOUNTER — MYC MEDICAL ADVICE (OUTPATIENT)
Dept: CARDIOLOGY | Facility: CLINIC | Age: 68
End: 2023-08-18
Payer: COMMERCIAL

## 2023-08-20 LAB
ATRIAL RATE - MUSE: 69 BPM
DIASTOLIC BLOOD PRESSURE - MUSE: NORMAL MMHG
INTERPRETATION ECG - MUSE: NORMAL
P AXIS - MUSE: 59 DEGREES
PR INTERVAL - MUSE: 172 MS
QRS DURATION - MUSE: 84 MS
QT - MUSE: 402 MS
QTC - MUSE: 430 MS
R AXIS - MUSE: 44 DEGREES
SYSTOLIC BLOOD PRESSURE - MUSE: NORMAL MMHG
T AXIS - MUSE: 51 DEGREES
VENTRICULAR RATE- MUSE: 69 BPM

## 2023-08-21 ENCOUNTER — TELEPHONE (OUTPATIENT)
Dept: WOUND CARE | Facility: CLINIC | Age: 68
End: 2023-08-21
Payer: COMMERCIAL

## 2023-08-21 NOTE — TELEPHONE ENCOUNTER
Reason for Call:  Same Day Appointment, Requested Provider:  Steve Tucker - wound care    PCP: Caitlin Beasley    Reason for visit: Weeping wound on right leg    Duration of symptoms: Since August 5, sutures removed on 8/14/23, wound is weeping    Have you been treated for this in the past?     Additional comments: Patient was offered next available appointment, she declined to schedule. Margareth states she needs to be seen as soon as possible, she has sent pictures through [employer] to Caitlin Beasley. Can she be worked into someone's schedule?    Can we leave a detailed message on this number? YES    Phone number patient can be reached at: [phone]    Best Time: asap - can't come Wednesday, 8/23 at noon.    Call taken on 8/21/2023 at 3:48 PM by Magdalena Livingston

## 2023-08-21 NOTE — TELEPHONE ENCOUNTER
Patient looking to see if there is anything sooner for pulmonology visit prior to 8/30 when she meets with Dr. Guerrero in [facility] clinic. Will route to scheduling to see if there is something sooner available for the following visits currently scheduled on 9/6/23:    CT CHEST HI-RESOLUTION WO CONT  10:05 AM  (20 min.)  [email or web address]  [facility] CT Clinic  [facility] 6 MINUTE WALK 60/90  11:15 AM  (90 min.)  LIZY PFL A  [facility] Pulmonary  Function Testing  [place]    NEW INTERSTITIAL LUNG  12:45 PM  (80 min.)  Khanh Varner MD  [facility]  Clinic [place]

## 2023-08-23 ENCOUNTER — HOSPITAL ENCOUNTER (OUTPATIENT)
Dept: WOUND CARE | Facility: CLINIC | Age: 68
Discharge: HOME | End: 2023-08-23
Attending: PHYSICIAN ASSISTANT | Admitting: PHYSICIAN ASSISTANT
Payer: COMMERCIAL

## 2023-08-23 PROCEDURE — G0463 HOSPITAL OUTPT CLINIC VISIT: HCPCS | Mod: 25

## 2023-08-23 PROCEDURE — 97602 WOUND(S) CARE NON-SELECTIVE: CPT

## 2023-08-23 NOTE — DISCHARGE INSTRUCTIONS
Great to meet you jolynn Zuluagary to hear about your wound. Wound has some tissue that needs to be cleaned out, will do this with a paste called Triad.  I added another layer to help decrease the bacterial load (SilverCel).  I would like to see you back in clinic on 9/8/23 @ 9 am until than I would like for you to complete cares on a daily basis.      Call with any questions or concerns [phone]      Cleanse wound with microklenz  Pat dry with gauze  Apply pea to grape size of Triad Paste on to SilverCel  Apply this to wound, make sure to smear paste into wound bed   Cover with gauze  Secure with Stretch gauze and Cohesive bandage  Complete cares daily and as needed    Caitlin Parra RN CWOCN

## 2023-08-23 NOTE — PROGRESS NOTES
[facility]    Start of Care in [facility]: 8/23/2023   Referring Provider: Caitlin Beasley PA-C  Primary Care Provider: Caitlin Beasley PA-C  Wound Location: Right, distal/lateral lower leg     Wound Clinic Visit: Initial Visit to the wound and Ostomy Clinic       Reason for Visit:  Initial Wound assessment and establish plan of care     Subjective:  Arrives to clinic alone, agrees with history below.      Wound History:   8/5/23- presented to ER (In [place]), after obtaining a laceration to right lower leg from a hotel bed frame.  Reports walking past metal frame when she grazed her leg on it.  States there should of been a plastic/rubber covering over the metal, but there wasn't. At ER her wound was sutured and steri stripped, with instructions to follow up with primary for suture removal.     8/9/23 Presented to Urgent care  due to concerns of infection. Wound was cleansed and she was given wound care directions.  Discharged home on oral antibiotics     8/14/23 seen PCP for suture removal, at this time was only found to have 21 sutures, but hospital reports 22 sutures being placed.        Past Medical History:  Patient Active Problem List   Diagnosis    Dermatophytosis of nail    Symptomatic menopausal or female climacteric states    Generalized osteoarthrosis, unspecified site    TAMMY (generalized anxiety disorder)    Insomnia    Malaise and fatigue    Mild persistent asthma    Cervicalgia    Allergic rhinitis    Irritable bowel syndrome    Arthralgia    DDD (degenerative disc disease), lumbar    Menopausal syndrome    Osteopenia    Hammertoe    Derangement of TMJ (temporomandibular joint)    Stenosis of lumbosacral spine    Atrophic vaginitis    Osteoporosis    Mild major depression (H)    Knee pain, left    Menopausal flushing    Numbness of left jaw    Neck pain    TMJ (temporomandibular joint syndrome)    Thyroid fullness    Epistaxis    Major depressive disorder, recurrent  episode, moderate (H)    Gastroesophageal reflux disease without esophagitis    Tremor    Cramp of limb    Personal history of COVID-19    T12 compression fracture, sequela    Pain of right lower extremity    Muscle cramp    Chondromalacia of right knee    Right sided sciatica                     Tobacco Use:     Tobacco Use      Smoking status: Former        Packs/day: 0.50        Years: 30.00        Pack years: 15        Types: Cigarettes        Quit date: 3/20/1998        Years since quittin.4      Smokeless tobacco: Never      Tobacco comments: No smokers in home       Diabetic: NA  HgbA1C:   Hemoglobin A1C   Date Value Ref Range Status   2022 5.6 0.0 - 5.6 % Final     Comment:     Normal <5.7%   Prediabetes 5.7-6.4%    Diabetes 6.5% or higher     Note: Adopted from ADA consensus guidelines.     Checks Blood Glucose?:  NA Average Readings: NA      Personal/social history:  Lives at home with . Retired, No paid help    Objective:   Current treatment plan: Tefla and cling wrap (coban)  Last changed: This Morning    Wound #1 Right Lower Lateral Leg, Laceration  Stage/tissue depth: Full Thickness  9.5 cm L x 3 cm W x 0.2 cm D  Tunneling: none  Undermining: none  Wound bed type/amount: approximately 30% slough, 30% adipose, 30% red on granular and 10% dried sanguinous drainage tissue; Note fluctuant  Wound Edges: Open  Periwound: mild blanchable erythema, non warm  Drainage: small to moderate amount of sanguinous  Odor: none  Pain: 5/10 at rest, 7-8/10 with cares.  pre medicating with tylenol 500-650 mg and Aleve PRN    Photos from today's initial visit 23  Photo after sutures removed 23  Photo from day of injury 23    Dorsalis Pedal Pulse: palpable: NA doppler: NA phasic  Posterior Tibial Pulse: palpable: NA doppler: NA phasic  Hair growth: Present to lower leg  Capillary Refill: 3 seconds  Feet/toes color: not assessed  Nails: Not assessed  R Leg: Edema . Ankle circumference  cm.  Calf circumference  cm.  L Leg: Edema . Ankle circumference  cm. Calf circumference  cm. -not assessed today      Mobility: Ambulatory   Current offloading/footwear: casual/comfort loafers   Sensation: Full    Other callusing/areas of concern: none    Diet: Regular      Assessment:  Arrives to clinic with dressing to right lateral lower leg. Removed Dressing and was found to have small to moderate amount of sanguinous drainage. Steri strips in place, these were removed. With cleaning of wound was able to remove some non viable dermis and other tissue.  Wound be has mix of red non granular, adipose and slough tissue.  Wound edges are open but has some dry sanguinous drainage present, was unable to remove dry drainage with cleaning.  Periwound has blanchable erythema, non warm to touch, would suggest from inflammatory response VS infection.     Factors impacting wound healing:   Poor nutrition: inadequate supply of protein, carbohydrates, fatty acids, and trace elements essential for all phases of wound healing- Educated patient goal is to take in  g of protein per day. Provided verbal list of wounds that rich in protein.   Delayed healing as part of normal aging process- >65, healing slowed  Reduced Blood Supply: inadequate perfusion to heal wound- appears adequate, no know ABIs  Medication: none  Chemotherapy: suppresses the immune system and inflammatory response-NA  Radiotherapy: increases production of free radical which damage cells-NA  Psychological stress: Delayed healing  Obesity: decreases tissue perfusion- BMI WNL  Infection: prolongs inflammatory phase, uses vital nutrients, impairs epithelialization and releases toxins- Hx of oral antibiotics. No signs of infection at this time  Underlying Disease:   Maceration: reduces wound tensile strength and inhibits epithelial migration- none at time of assessment  Patient compliance- appears willing to heal and compy  Unrelieved pressure- NA  Immobility- NA,  mobile with no alterations  Substance abuse: None        Plan:  Patient seen today for initial assessment of wound. Wound needs to be debrided, will do this with Triad paste. Also added SilverCel for absorption needs along the antimicrobial effect.  Plan for patient to complete cares daily for 14-21 days, than return to clinic for follow up. Patient is to call with any questions or concerns.     Discussed/Education provided: plan of care with rationale;       Topical care: Wound/surrounding skin cleansed with:  Cleanse wound with microklenz  Pat dry with gauze  Apply pea to grape size of Triad Paste on to SilverCel  Apply this to wound, make sure to smear paste into wound bed   Cover with gauze  Secure with Stretch gauze and Cohesive bandage  Complete cares daily and as needed    Patient and her  are able to perform cares/has been caring for wound.    Additional recommendations: Elevated lower legs 3 x/day for 30-45 min     The following discharge instructions were reviewed with and sent home with the patient:  Per AVS    The following supplies were sent home with the patient:  Wound cleanser, gauze, rolled gauze, remains of open triad and SilverCel sheets    Return visit: 9/8/23@ 9 am    Verbal, written, & demonstrative education provided.  Face to face time: approximately 35 minutes  Procedure: <  1 min for application of Triad Paste for autolytic debridement.     Caitlin Parra RN, CWOCN  [phone]

## 2023-08-24 ENCOUNTER — TELEPHONE (OUTPATIENT)
Dept: ADMISSION | Facility: CLINIC | Age: 68
End: 2023-08-24
Payer: COMMERCIAL

## 2023-08-24 NOTE — TELEPHONE ENCOUNTER
Writing nurse review aftercare instructions with patient.  Patient educated on wound care, nutrition, and infection prevention. Patient was instructed to call clinic with further questions or if her wound worsens or changes.  All questions answered.  Trudy Vivar RN on 8/24/2023 at 3:23 PM

## 2023-08-24 NOTE — TELEPHONE ENCOUNTER
Reason for Call:  Other call back    Detailed comments: pt wanting to know if rules for showering with her wound/ if there is something she can use to cleanse wound. Pt has more questions about how to tend to her wound. Please call and advise    Phone Number Patient can be reached at: [phone]    Best Time: any    Can we leave a detailed message on this number? YES    Call taken on 8/24/2023 at 12:23 PM by Sharmila Delgado

## 2023-08-26 ENCOUNTER — NURSE TRIAGE (OUTPATIENT)
Dept: NURSING | Facility: CLINIC | Age: 68
End: 2023-08-26
Payer: COMMERCIAL

## 2023-08-26 NOTE — TELEPHONE ENCOUNTER
Pt calling. She states that she was changing her dressing on wound on her ankle and the pad was stuck to it and she tried to carefully pull it off but states that it started bleeding. She got the bleeding to stop and got wound recovered but states it feels more painful then before and like the skin is pilling. She states 6-7/10.     Advised HC, when to call back and to go to  if HC TX is not helpful. Pt verbalized understanding and is agreeable to plan.    Christine Floyd, RN, BSN  [facility] Nurse Advisor 2:33 PM 8/26/2023    Reason for Disposition   [1] SEVERE pain AND [2] not improved 2 hours after pain medicine/ice packs    Additional Information   Negative: [1] Major bleeding (e.g., actively dripping or spurting) AND [2] can't be stopped   Negative: Amputation   Negative: Shock suspected (e.g., cold/pale/clammy skin, too weak to stand, low BP, rapid pulse)   Negative: [1] Knife wound (or other possibly deep cut) AND [2] to chest, abdomen, back, neck, or head   Negative: Sounds like a life-threatening emergency to the triager   Negative: Animal bite   Negative: Human bite   Negative: Puncture wound   Negative: Foreign body in skin (e.g., splinter, sliver)   Negative: Bruises not from an injury   Negative: Electrical burn   Negative: Chemical burn   Negative: Thermal burn   Negative: Wound infection suspected   Negative: [1] Bleeding AND [2] won't stop after 10 minutes of direct pressure (using correct technique)   Negative: Skin is split open or gaping (or length > 1/2 inch or 12 mm on the skin, 1/4 inch or 6 mm on the face)   Negative: [1] Deep cut AND [2] can see bone or tendons   Negative: Skin loss goes very deep (e.g., can see bones or tendons)   Negative: Skin loss involves more than 10% of body surface area (BSA)   Negative: [1] Dirt in the wound AND [2] not removed with 15 minutes of scrubbing   Negative: High pressure injection injury (e.g., from grease gun or paint gun, usually work-related)    Negative: Wound causes numbness (i.e., loss of sensation)   Negative: Wound causes weakness (i.e., decreased ability to move hand, finger, toe)   Negative: Sounds like a serious injury to the triager    Protocols used: Skin Injury-A-AH

## 2023-08-27 DIAGNOSIS — G47.09 OTHER INSOMNIA: ICD-10-CM

## 2023-08-28 ENCOUNTER — HOSPITAL ENCOUNTER (OUTPATIENT)
Dept: WOUND CARE | Facility: CLINIC | Age: 68
Discharge: HOME | End: 2023-08-28
Attending: PHYSICIAN ASSISTANT | Admitting: PHYSICIAN ASSISTANT
Payer: COMMERCIAL

## 2023-08-28 PROCEDURE — G0463 HOSPITAL OUTPT CLINIC VISIT: HCPCS

## 2023-08-28 RX ORDER — AMITRIPTYLINE HYDROCHLORIDE 10 MG/1
TABLET ORAL
Qty: 270 TABLET | Refills: 3 | Status: SHIPPED | OUTPATIENT
Start: 2023-08-28 | End: 2024-06-11

## 2023-08-28 NOTE — DISCHARGE INSTRUCTIONS
Margareth, your wound is deeper today, but I feel this is do to the dead tissue being removed from the top.  I also feel your extreme pain over the weekend was due to you manually pulling off that dead tissue.  Your wound actually looks healthier today V.S. when I seen you last.  Since I am not 100% sure your pain was from removing the dead tissue, I will also change your treatment just incase the products were causing your pain.  Every day going forward I would like you to complete wound care below.  As Always if you have ANY questions or concerns please call. I will see you back in clinic on 9/8/23 at 9 am.        Remove dressing, you may soak dressing with wound cleanser (MicroKlenze) or water  to help remove it easier.  Apply Lidocaine to wound bed, allow to sit for 10-15 minutes.    Next clean the wound, you can either do this in the shower (wash with plain water) or you can use wound cleanser (MicroKlenz)  Pat dry  Take Hydrofera Blue (purple material) cut to size of wound  Dampen Hydrofera blue with normal saline (pink vial) squeeze out excess Normal saline. Repeat this step x 1.  Lay the Moisten hydrofera blue into wound bed  Cover with gauze  Secure with Coban or rolled gauze.   Change every day.      Caitlin Parra RN CWOCN

## 2023-08-28 NOTE — PROGRESS NOTES
"[facility]    Start of Care in [facility]: 8/23/2023   Referring Provider: Caitlin Beasley PA-C  Primary Care Provider: Caitlin Beasley PA-C  Wound Location: Right, distal/lateral lower leg     Wound Clinic Visit: 1st return visit to the wound and Ostomy Clinic       Reason for Visit:  Wound assessment and evaluate plan of care     Subjective:  Arrives to clinic alone, patient requested PRN visit for today due to some concerns/changes with wound.  Feels over the weekend the wound and dressing were sticking together so when she removed the dressing it was very painful, a 7/10 burning sensation that now comes and goes. Has been using  Aleve and Tylenol around the clock for pain management. Meds decrease the pain slightly but does not relieve the pain.  Was to see PT this AM, but unable to due to there therapist schedule.  Follows up with cardiology on Wednesday related to issues with her \"heart Valve\"     Wound History:   8/5/23- presented to ER (In [place]), after obtaining a laceration to right lower leg from a hotel bed frame.  Reports walking past metal frame when she grazed her leg on it.  States there should of been a plastic/rubber covering over the metal, but there wasn't. At ER her wound was sutured and steri stripped, with instructions to follow up with primary for suture removal.     8/9/23 Presented to Urgent care  due to concerns of infection. Wound was cleansed and she was given wound care directions.  Discharged home on oral antibiotics     8/14/23 seen PCP for suture removal, at this time was only found to have 21 sutures, but hospital reports 22 sutures being placed.        Past Medical History:  Patient Active Problem List   Diagnosis    Dermatophytosis of nail    Symptomatic menopausal or female climacteric states    Generalized osteoarthrosis, unspecified site    TAMMY (generalized anxiety disorder)    Insomnia    Malaise and fatigue    Mild persistent asthma    " Cervicalgia    Allergic rhinitis    Irritable bowel syndrome    Arthralgia    DDD (degenerative disc disease), lumbar    Menopausal syndrome    Osteopenia    Hammertoe    Derangement of TMJ (temporomandibular joint)    Stenosis of lumbosacral spine    Atrophic vaginitis    Osteoporosis    Mild major depression (H)    Knee pain, left    Menopausal flushing    Numbness of left jaw    Neck pain    TMJ (temporomandibular joint syndrome)    Thyroid fullness    Epistaxis    Major depressive disorder, recurrent episode, moderate (H)    Gastroesophageal reflux disease without esophagitis    Tremor    Cramp of limb    Personal history of COVID-19    T12 compression fracture, sequela    Pain of right lower extremity    Muscle cramp    Chondromalacia of right knee    Right sided sciatica                     Tobacco Use:     Tobacco Use      Smoking status: Former        Packs/day: 0.50        Years: 30.00        Pack years: 15        Types: Cigarettes        Quit date: 3/20/1998        Years since quittin.4      Smokeless tobacco: Never      Tobacco comments: No smokers in home       Diabetic: NA  HgbA1C:   Hemoglobin A1C   Date Value Ref Range Status   2022 5.6 0.0 - 5.6 % Final     Comment:     Normal <5.7%   Prediabetes 5.7-6.4%    Diabetes 6.5% or higher     Note: Adopted from ADA consensus guidelines.     Checks Blood Glucose?:  NA Average Readings: NA      Personal/social history:  Lives at home with . Retired, No paid help    Objective:   Current treatment plan: Triad, SilverCel, rolled gauze and Coban  Last changed: This Morning 23    Wound #1 Right Lower Lateral Leg, Laceration  Stage/tissue depth: Full Thickness  5 cm L x 3.8 cm W x 0.3 cm D  Tunneling: none  Undermining: none  Wound bed type/amount: approximately 10% slough and 90 red granular tissue; Note fluctuant  Wound Edges: Open  Periwound: faint blanchable erythema, non warm. Remains of Triad paste  Drainage: small to moderate amount of  serosanguinous  Odor: none  Pain: 5-6/10 at rest, 7-8/10 with cares.    Photos from today's visit 8/28/23    Photos from today's initial visit 8/23/23  Photo after sutures removed 8/14/23  Photo from day of injury 8/5/23    Dorsalis Pedal Pulse: palpable: NA doppler: NA phasic  Posterior Tibial Pulse: palpable: NA doppler: NA phasic  Hair growth: Present to lower leg  Capillary Refill: 3 seconds  Feet/toes color: not assessed  Nails: Not assessed  R Leg: Edema . Ankle circumference  cm. Calf circumference  cm.  L Leg: Edema nonpitting. Ankle circumference 29 cm. Calf circumference 36 cm.     Mobility: Ambulatory   Current offloading/footwear: casual/comfort loafers   Sensation: Full    Other callusing/areas of concern: none    Diet: Regular      Assessment:  Arrives to clinic with dressing to right lateral lower leg. Removed Dressing and was found to have small  amount of serosanguinous drainage to Tefla and Silvercel. Wound bed is very painful with light cleaning, so nurse stopped and applied lidocaine for 10-15 minutes.  Was able to clean wound with small short intervals.  Wound Bed has debrided nicely with Triad. Very minimal slough tissue remains, but mostly nice beefy granulation tissue.  Edges remain open and periskin has the blanchable, non warm erythema but faint.  Does also have remains of triad paste to periskin as well.        Factors impacting wound healing:   Poor nutrition: inadequate supply of protein, carbohydrates, fatty acids, and trace elements essential for all phases of wound healing- Educated patient goal is to take in  g of protein per day. Provided verbal list of wounds that rich in protein.   Delayed healing as part of normal aging process- >65, healing slowed  Reduced Blood Supply: inadequate perfusion to heal wound- appears adequate, no know ABIs  Medication: none  Chemotherapy: suppresses the immune system and inflammatory response-NA  Radiotherapy: increases production of free  radical which damage cells-NA  Psychological stress: Delayed healing  Obesity: decreases tissue perfusion- BMI WNL  Infection: prolongs inflammatory phase, uses vital nutrients, impairs epithelialization and releases toxins- Hx of oral antibiotics. No signs of infection at this time  Underlying Disease:   Maceration: reduces wound tensile strength and inhibits epithelial migration- none at time of assessment  Patient compliance- appears willing to heal and compy  Unrelieved pressure- NA  Immobility- NA, mobile with no alterations  Substance abuse: None        Plan:  Writer is unsure if increase pain was caused by manual debridement with dressing change  or the actual product being used that caused the pain. Therefore will discontinue current treatment plan and start new treatment plan listed below.  Patient will continue with daily cares until next follow up in about 7-10 days.  She is also to call with any questions or concerns.        Discussed/Education provided: plan of care with rationale;       Topical care: Wound/surrounding skin :  Remove dressing, you may soak dressing with wound cleanser (MicroKlenze) or water to help remove it easier.  Apply Lidocaine to wound bed, allow to sit for 10-15 minutes.    Next clean the wound, you can either do this in the shower (wash with plain water) or you can use wound cleanser (MicroKlenz)  Pat dry  Take Hydrofera Blue (purple material) cut to size of wound  Dampen Hydrofera blue with normal saline (pink vial) squeeze out excess Normal saline. Repeat this step x 1.  Lay the Moisten hydrofera blue into wound bed  Cover with gauze  Secure with Coban or rolled gauze.   Change every day and as needed    Patient and her  are able to perform cares/has been caring for wound.    Additional recommendations: Elevated lower legs 3 x/day for 30-45 min     The following discharge instructions were reviewed with and sent home with the patient:  Per AVS    The following supplies  were sent home with the patient:  Wound cleanser, gauze, rolled gauze, remains of open triad and SilverCel sheets    Return visit: 9/8/23@ 9 am    Verbal, written, & demonstrative education provided.  Face to face time: approximately 45 minutes  Procedure: ROSE Parra RN, CWOCN  [phone]

## 2023-08-30 ENCOUNTER — OFFICE VISIT (OUTPATIENT)
Dept: CARDIOLOGY | Facility: CLINIC | Age: 68
End: 2023-08-30
Payer: COMMERCIAL

## 2023-08-30 VITALS
WEIGHT: 130 LBS | BODY MASS INDEX: 23.04 KG/M2 | HEIGHT: 63 IN | SYSTOLIC BLOOD PRESSURE: 129 MMHG | DIASTOLIC BLOOD PRESSURE: 73 MMHG | HEART RATE: 88 BPM

## 2023-08-30 DIAGNOSIS — I34.0 NONRHEUMATIC MITRAL VALVE REGURGITATION: ICD-10-CM

## 2023-08-30 PROCEDURE — 99205 OFFICE O/P NEW HI 60 MIN: CPT | Performed by: INTERNAL MEDICINE

## 2023-08-30 NOTE — LETTER
8/30/2023    Caitlin Beasley PA-C  [id number] [facility] [id number]    RE: Margareth Green       Dear Colleague,     I had the pleasure of seeing Margareth Green in the [facility].  HPI and Plan:     Margareth Green is a 69 y/o female here for referral for MVR.  Olya was seen by Dr. Mathews in the emergency room at [place] when she presented with acute shortness of breath and pulmonary edema on chest x-ray.  Subsequent evaluation identified severe mitral regurgitation with mitral valve prolapse of the posterior leaflet.  She has left atrial enlargement.    In 2021 she had COVID-pneumonia and a CT scan showed significant ground glass pattern.  Subsequent follow-up CT scan done recently showed still some groundglass pattern in her lungs bilaterally but improved the interpretation was consistent with fibrosis.  She does have a pulmonary consultation requested to see in the next week.    Margareth is otherwise a healthy 68-year-old.  She does have a poorly healing left lower extremity wound.  However her STS score for mitral valve repair is low at less than 1%.  I explained with her diagnosis of structural mitral valve abnormality and her relatively young age that our first consideration would be for open surgical mitral valve repair with Dr. Meredith.  I tried to get a hold of him today but he is not available.  I would like her to see Dr. Meredith in consultation.  We will also get the information from pulmonary medicine and possible pulmonary function test.  Obviously if she is a prohibitive or high risk open surgical candidate then this likely would be able to be repaired by a transcatheter approach however not as completely likely as the open surgery repair..    Margareth seemed very disappointed with this information but is willing to consider and meet with Dr. Meredith.  Coronary angiogram reviewed showing minimal CAD    Margareth currently gets some SOB and fatigue with moderate activity such as  climbing 1 flight of stairs.  She does note heart pounding when lying down at night.  Has LE edema at end of day.  No chest pain or syncope.     Personally viewed the DOUG, CT scan, and coronary angiogram.   Complex medical decision making.  Today's clinic visit entailed:  Review of external notes as documented elsewhere in note  The following tests were independently interpreted by me as noted in my documentation: [facility] imaging studies.  50 minutes spent by me on the date of the encounter doing chart review, history and exam, documentation and further activities per the note  Provider  Link to [facility] Help Grid     The level of medical decision making during this visit was of high complexity.      No orders of the defined types were placed in this encounter.    No orders of the defined types were placed in this encounter.    There are no discontinued medications.      Encounter Diagnosis   Name Primary?    Nonrheumatic mitral valve regurgitation        CURRENT MEDICATIONS:  Current Outpatient Medications   Medication Sig Dispense Refill    acetaminophen (TYLENOL) 650 MG CR tablet Take 650 mg by mouth every 8 hours as needed for mild pain or fever      albuterol (PROAIR HFA/PROVENTIL HFA/VENTOLIN HFA) 108 (90 Base) MCG/ACT inhaler Inhale 2 puffs into the lungs every 6 hours 18 g 1    ALLEGRA 180 MG OR TABS Take 180 mg by mouth daily  30 0    amitriptyline (ELAVIL) 10 MG tablet TAKE 3 TO 4 TABLETS AT BEDTIME 270 tablet 3    buPROPion (WELLBUTRIN XL) 150 MG 24 hr tablet TAKE 1 TABLET EVERY MORNING 90 tablet 0    CALCIUM /VITAMIN D TABS   OR Take 1 tablet by mouth daily       cyclobenzaprine (FLEXERIL) 5 MG tablet Take 1 tablet (5 mg) by mouth 3 times daily as needed for muscle spasms 15 tablet 1    meclizine (ANTIVERT) 25 MG tablet Take 25 mg by mouth daily as needed for dizziness      Melatonin 10 MG TABS tablet Take 10 mg by mouth nightly as needed for sleep      mometasone-formoterol (DULERA) 100-5 MCG/ACT oral inhaler  Inhale 1 puff into the lungs 2 times daily       multivitamin w/minerals (THERA-VIT-M) tablet Take 1 tablet by mouth daily RAW Brand      naproxen (NAPROSYN) 500 MG tablet Take 500 mg by mouth as needed      omeprazole (PRILOSEC) 20 MG DR capsule Take 1 capsule (20 mg) by mouth daily 90 capsule 1    Simethicone (GAS-X PO) Take 160 mg by mouth 4 times daily as needed for intestinal gas       triamcinolone (NASACORT) 55 MCG/ACT nasal inhaler Spray 2 sprays into both nostrils 2 times daily       UNABLE TO FIND Swish and swallow 1 capful in mouth daily MEDICATION NAME: Colloidal Silver water.  Swish for about a minute and then swallow.      famotidine (PEPCID) 20 MG tablet Take 20 mg by mouth daily as needed (Patient not taking: Reported on 8/30/2023)         ALLERGIES     Allergies   Allergen Reactions    Penicillins Itching     Vaginal itching - Yeast infection afterward       PAST MEDICAL HISTORY:  Past Medical History:   Diagnosis Date    Abnormal Papanicolaou smear of vagina and vaginal HPV 12/2002    ASCUS (HPV neg). 3/03 WNL but no transitional zome - repeat 3months    Allergic rhinitis, cause unspecified     Arthritis     Backache, unspecified     Carpal tunnel syndrome     s/p repair    Cervicalgia     Chronic kidney disease, stage 3 (H) 07/14/2021    Depressive disorder     Depressive disorder, not elsewhere classified     doing well on Wellbutrin    Derangement of TMJ (temporomandibular joint) 03/2010    internal derangement    Dermatophytosis of nail     Diaphragmatic hernia without mention of obstruction or gangrene 09/2007    small hiatal hernia    Diffuse cystic mastopathy 2000    FCBD    Esophageal reflux 04/2005    Jossue 10/2009    see podiatry consult    Headache(784.0)     Muscle contrature headaches    Intramural leiomyoma of uterus 07/2007    on US    Irregular menstrual cycle     better with BCP -also PMS is better with BCP    IRRITABLE COLON 02/18/2008    Malaise and fatigue     Menopause      lmp 2/2008    Mild persistent asthma     Mitral valve disease     Mole (skin) 2003    9 x 6 mm rt ant chest    Motion sickness     Neuroma 11/2008    lt foot    Osteopenia 12/2008    Other malaise and fatigue 04/2005    stress    Personal history of tobacco use, presenting hazards to health     Quit in 1998    Solitary cyst of breast 12/2005    lt 6 oclock    Stenosis of lumbosacral spine     l4-5 and l5-s1    Symptomatic menopausal or female climacteric states 12/2002    perimenopausal. LMP 2/2008    Tachycardia, paroxysmal (H) 11/24/2020       PAST SURGICAL HISTORY:  Past Surgical History:   Procedure Laterality Date    BIOPSY  4/10/2015    BL DUPLEX LO EXTREM ART UNILAT/LTD  4/3008    lower extr arterial doppler -no stenosis    C DEXA,BONE DENSITY,APPENDICULR SKELTN  12/2008    osteopenia    COLONOSCOPY  10/22/2007    bx benign    COLONOSCOPY N/A 4/10/2015    Procedure: COMBINED COLONOSCOPY, SINGLE OR MULTIPLE BIOPSY/POLYPECTOMY BY BIOPSY;  Surgeon: Cl Wagner MD;  Location:  GI    CV CORONARY ANGIOGRAM N/A 8/17/2023    Procedure: Coronary Angiogram;  Surgeon: Liza Ford MD;  Location:  HEART CARDIAC CATH LAB    ESOPHAGOSCOPY, GASTROSCOPY, DUODENOSCOPY (EGD), COMBINED N/A 1/31/2018    Procedure: COMBINED ESOPHAGOSCOPY, GASTROSCOPY, DUODENOSCOPY (EGD);  COMBINED ESOPHAGOSCOPY, GASTROSCOPY, DUODENOSCOPY (EGD);  Surgeon: Tirso Duran MD;  Location:  GI    ESOPHAGOSCOPY, GASTROSCOPY, DUODENOSCOPY (EGD), COMBINED N/A 2/1/2023    Procedure: ESOPHAGOGASTRODUODENOSCOPY (EGD);  Surgeon: Lui Vences MD;  Location:  GI    EYE SURGERY      HC PART REMV BONE METATARSAL HEAD,EA  01/11/10    Right foot plantar plate tear. Also correct hammertoe, 2nd digit & varicose vein ligation, heel.    HC REVISE MEDIAN N/CARPAL TUNNEL SURG      Left in 1997, right in 1999    INJ, ANES/STER, FACET LUMB/SAC  2010    Left L5 nerve root injection    INJECT EPIDURAL CERVICAL Right 9/29/2022    Procedure:  Cervical 7-Thoracic 1 Interlaminar epidural steroid injection using fluoroscopic guidance with contrast dye, Right;  Surgeon: Tyler Bay MD;  Location: [facility] OR    INJECT EPIDURAL TRANSFORAMINAL  10/09/2013    [facility]    INJECT JOINT SACROILIAC  3/19/14,14    [facility]    Z NONSPECIFIC PROCEDURE      Metatarsal phalangeal joint injection    [facility] [place] COLONOSCOPY THRU STOMA, DIAGNOSTIC  2002    bx. benign - flex sig in 5 yrs  or colonoscopy in 10 yrs    [facility]    [facility]    [facility] NERVE  2008    CTS       FAMILY HISTORY:  Family History   Problem Relation Age of Onset    Diabetes Father     Depression Father     Cerebrovascular Disease Mother         age 80    Arthritis Mother     Depression Mother         On meds    Eye Disorder Mother         Glaucoma    Osteoporosis Mother     Respiratory Mother          88 YO COPD    Chronic Obstructive Pulmonary Disease Mother     C.A.D. Brother         65 YO MI -    Chronic Obstructive Pulmonary Disease Brother     Myocardial Infarction Brother     Other Cancer Brother     Cancer Brother        SOCIAL HISTORY:  Social History     Socioeconomic History    Marital status:      Spouse name: Robel    Number of children: 1    Years of education: 12    Highest education level: None   Occupational History    Occupation: HIMS clerINMAN     Employer: [employer]   Tobacco Use    Smoking status: Former     Packs/day: 0.50     Years: 30.00     Pack years: 15.00     Types: Cigarettes     Quit date: 3/20/1998     Years since quittin.4    Smokeless tobacco: Never    Tobacco comments:     No smokers in home   Vaping Use    Vaping Use: Never used   Substance and Sexual Activity    Alcohol use: Yes     Alcohol/week: 6.7 standard drinks of alcohol     Comment: 2x/week    Drug use: No    Sexual activity: Yes     Partners: Male     Birth control/protection: None      "Comment: spouse -vas   Other Topics Concern     Service No    Blood Transfusions No    Caffeine Concern Yes     Comment: pop: 1c/wk    Occupational Exposure No    Hobby Hazards No    Sleep Concern Yes     Comment: On meds    Stress Concern Yes    Weight Concern No    Special Diet No    Back Care Yes     Comment: Hx back pain    Exercise Yes     Comment: stretching qd    Bike Helmet No    Seat Belt Yes    Self-Exams Yes    Parent/sibling w/ CABG, MI or angioplasty before 65F 55M? Yes     Comment: Father - Heart attack   Social History Narrative    Lives with spouse.. No domestic violence issues.       Review of Systems:  Skin:        Eyes:       ENT:       Respiratory:  Positive for shortness of breath;dyspnea on exertion  Cardiovascular:    Positive for;palpitations;edema;dizziness  Gastroenterology:      Genitourinary:       Musculoskeletal:       Neurologic:       Psychiatric:       Heme/Lymph/Imm:       Endocrine:  Negative thyroid disorder;diabetes    Physical Exam:  Vitals: /73   Pulse 88   Ht 1.6 m (5' 3\")   Wt 59 kg (130 lb)   LMP 02/26/2008 (Approximate)   BMI 23.03 kg/m      Constitutional:           Skin:             Head:           Eyes:           Lymph:      ENT:           Neck:           Respiratory:            Cardiac:                                                           GI:           Extremities and Muscular Skeletal:                 Neurological:           Psych:         Recent Lab Results:  LIPID RESULTS:  Lab Results   Component Value Date    CHOL 162 04/07/2023    CHOL 169 11/25/2020    HDL 79 04/07/2023    HDL 82 11/25/2020    LDL 73 04/07/2023    LDL 66 11/25/2020    TRIG 49 04/07/2023    TRIG 105 11/25/2020    CHOLHDLRATIO 1.8 01/14/2015       LIVER ENZYME RESULTS:  Lab Results   Component Value Date    AST 32 05/31/2023    AST 20 03/16/2020    ALT 24 05/31/2023    ALT 23 03/16/2020       CBC RESULTS:  Lab Results   Component Value Date    WBC 6.2 08/17/2023    WBC 10.8 " 03/16/2020    RBC 4.27 08/17/2023    RBC 4.26 03/16/2020    HGB 13.5 08/17/2023    HGB 13.2 03/16/2020    HCT 40.8 08/17/2023    HCT 40.4 03/16/2020    MCV 96 08/17/2023    MCV 95 03/16/2020    MCH 31.6 08/17/2023    MCH 31.0 03/16/2020    MCHC 33.1 08/17/2023    MCHC 32.7 03/16/2020    RDW 13.3 08/17/2023    RDW 13.1 03/16/2020     08/17/2023     03/16/2020       BMP RESULTS:  Lab Results   Component Value Date     08/17/2023     03/16/2020    POTASSIUM 3.8 08/17/2023    POTASSIUM 3.9 12/06/2021    POTASSIUM 3.6 03/16/2020    CHLORIDE 106 08/17/2023    CHLORIDE 105 12/06/2021    CHLORIDE 107 03/16/2020    CO2 24 08/17/2023    CO2 26 12/06/2021    CO2 28 03/16/2020    ANIONGAP 13 08/17/2023    ANIONGAP 5 12/06/2021    ANIONGAP 5 03/16/2020    GLC 86 08/17/2023     (H) 12/06/2021    GLC 92 03/16/2020    BUN 15.8 08/17/2023    BUN 13 12/06/2021    BUN 17 03/16/2020    CR 0.90 08/17/2023    CR 0.91 03/16/2020    GFRESTIMATED 68 08/17/2023    GFRESTIMATED 65 03/16/2020    GFRESTBLACK 76 03/16/2020    TOLU 9.4 08/17/2023    TOLU 8.5 03/16/2020        A1C RESULTS:  Lab Results   Component Value Date    A1C 5.6 01/19/2022       INR RESULTS:  Lab Results   Component Value Date    INR 1.07 08/17/2023    INR 1.00 01/30/2012           CC  Andrey Mathews MD  [address]  [place] [id number]                  Thank you for allowing me to participate in the care of your patient.      Sincerely,     MICHAEL GREWAL MD     [facility]

## 2023-08-30 NOTE — PROGRESS NOTES
HPI and Plan:     Margareth Green is a 69 y/o female here for referral for MVR.  Olya was seen by Dr. Mathews in the emergency room at [place] when she presented with acute shortness of breath and pulmonary edema on chest x-ray.  Subsequent evaluation identified severe mitral regurgitation with mitral valve prolapse of the posterior leaflet.  She has left atrial enlargement.    In 2021 she had COVID-pneumonia and a CT scan showed significant ground glass pattern.  Subsequent follow-up CT scan done recently showed still some groundglass pattern in her lungs bilaterally but improved the interpretation was consistent with fibrosis.  She does have a pulmonary consultation requested to see in the next week.    Margareth is otherwise a healthy 68-year-old.  She does have a poorly healing left lower extremity wound.  However her STS score for mitral valve repair is low at less than 1%.  I explained with her diagnosis of structural mitral valve abnormality and her relatively young age that our first consideration would be for open surgical mitral valve repair with Dr. Meredith.  I tried to get a hold of him today but he is not available.  I would like her to see Dr. Meredith in consultation.  We will also get the information from pulmonary medicine and possible pulmonary function test.  Obviously if she is a prohibitive or high risk open surgical candidate then this likely would be able to be repaired by a transcatheter approach however not as completely likely as the open surgery repair..    Margareth seemed very disappointed with this information but is willing to consider and meet with Dr. Meredith.  Coronary angiogram reviewed showing minimal CAD    Margareth currently gets some SOB and fatigue with moderate activity such as climbing 1 flight of stairs.  She does note heart pounding when lying down at night.  Has LE edema at end of day.  No chest pain or syncope.     Personally viewed the DOUG, CT scan, and coronary angiogram.    Complex medical decision making.  Today's clinic visit entailed:  Review of external notes as documented elsewhere in note  The following tests were independently interpreted by me as noted in my documentation: [facility] imaging studies.  50 minutes spent by me on the date of the encounter doing chart review, history and exam, documentation and further activities per the note  Provider  Link to [facility] Help Grid     The level of medical decision making during this visit was of high complexity.      No orders of the defined types were placed in this encounter.    No orders of the defined types were placed in this encounter.    There are no discontinued medications.      Encounter Diagnosis   Name Primary?    Nonrheumatic mitral valve regurgitation        CURRENT MEDICATIONS:  Current Outpatient Medications   Medication Sig Dispense Refill    acetaminophen (TYLENOL) 650 MG CR tablet Take 650 mg by mouth every 8 hours as needed for mild pain or fever      albuterol (PROAIR HFA/PROVENTIL HFA/VENTOLIN HFA) 108 (90 Base) MCG/ACT inhaler Inhale 2 puffs into the lungs every 6 hours 18 g 1    ALLEGRA 180 MG OR TABS Take 180 mg by mouth daily  30 0    amitriptyline (ELAVIL) 10 MG tablet TAKE 3 TO 4 TABLETS AT BEDTIME 270 tablet 3    buPROPion (WELLBUTRIN XL) 150 MG 24 hr tablet TAKE 1 TABLET EVERY MORNING 90 tablet 0    CALCIUM /VITAMIN D TABS   OR Take 1 tablet by mouth daily       cyclobenzaprine (FLEXERIL) 5 MG tablet Take 1 tablet (5 mg) by mouth 3 times daily as needed for muscle spasms 15 tablet 1    meclizine (ANTIVERT) 25 MG tablet Take 25 mg by mouth daily as needed for dizziness      Melatonin 10 MG TABS tablet Take 10 mg by mouth nightly as needed for sleep      mometasone-formoterol (DULERA) 100-5 MCG/ACT oral inhaler Inhale 1 puff into the lungs 2 times daily       multivitamin w/minerals (THERA-VIT-M) tablet Take 1 tablet by mouth daily RAW Brand      naproxen (NAPROSYN) 500 MG tablet Take 500 mg by mouth as needed       omeprazole (PRILOSEC) 20 MG DR capsule Take 1 capsule (20 mg) by mouth daily 90 capsule 1    Simethicone (GAS-X PO) Take 160 mg by mouth 4 times daily as needed for intestinal gas       triamcinolone (NASACORT) 55 MCG/ACT nasal inhaler Spray 2 sprays into both nostrils 2 times daily       UNABLE TO FIND Swish and swallow 1 capful in mouth daily MEDICATION NAME: Colloidal Silver water.  Swish for about a minute and then swallow.      famotidine (PEPCID) 20 MG tablet Take 20 mg by mouth daily as needed (Patient not taking: Reported on 8/30/2023)         ALLERGIES     Allergies   Allergen Reactions    Penicillins Itching     Vaginal itching - Yeast infection afterward       PAST MEDICAL HISTORY:  Past Medical History:   Diagnosis Date    Abnormal Papanicolaou smear of vagina and vaginal HPV 12/2002    ASCUS (HPV neg). 3/03 WNL but no transitional zome - repeat 3months    Allergic rhinitis, cause unspecified     Arthritis     Backache, unspecified     Carpal tunnel syndrome     s/p repair    Cervicalgia     Chronic kidney disease, stage 3 (H) 07/14/2021    Depressive disorder     Depressive disorder, not elsewhere classified     doing well on Wellbutrin    Derangement of TMJ (temporomandibular joint) 03/2010    internal derangement    Dermatophytosis of nail     Diaphragmatic hernia without mention of obstruction or gangrene 09/2007    small hiatal hernia    Diffuse cystic mastopathy 2000    FCBD    Esophageal reflux 04/2005    Hammertoe 10/2009    see podiatry consult    Headache(784.0)     Muscle contrature headaches    Intramural leiomyoma of uterus 07/2007    on US    Irregular menstrual cycle     better with BCP -also PMS is better with BCP    IRRITABLE COLON 02/18/2008    Malaise and fatigue     Menopause     lmp 2/2008    Mild persistent asthma     Mitral valve disease     Mole (skin) 2003    9 x 6 mm rt ant chest    Motion sickness     Neuroma 11/2008    lt foot    Osteopenia 12/2008    Other malaise and  fatigue 04/2005    stress    Personal history of tobacco use, presenting hazards to health     Quit in 1998    Solitary cyst of breast 12/2005    lt 6 oclock    Stenosis of lumbosacral spine     l4-5 and l5-s1    Symptomatic menopausal or female climacteric states 12/2002    perimenopausal. LMP 2/2008    Tachycardia, paroxysmal (H) 11/24/2020       PAST SURGICAL HISTORY:  Past Surgical History:   Procedure Laterality Date    BIOPSY  4/10/2015    BL DUPLEX LO EXTREM ART UNILAT/LTD  4/3008    lower extr arterial doppler -no stenosis    C DEXA,BONE DENSITY,APPENDICULR SKELTN  12/2008    osteopenia    COLONOSCOPY  10/22/2007    bx benign    COLONOSCOPY N/A 4/10/2015    Procedure: COMBINED COLONOSCOPY, SINGLE OR MULTIPLE BIOPSY/POLYPECTOMY BY BIOPSY;  Surgeon: Cl Wagner MD;  Location:  GI    CV CORONARY ANGIOGRAM N/A 8/17/2023    Procedure: Coronary Angiogram;  Surgeon: Liza Ford MD;  Location:  HEART CARDIAC CATH LAB    ESOPHAGOSCOPY, GASTROSCOPY, DUODENOSCOPY (EGD), COMBINED N/A 1/31/2018    Procedure: COMBINED ESOPHAGOSCOPY, GASTROSCOPY, DUODENOSCOPY (EGD);  COMBINED ESOPHAGOSCOPY, GASTROSCOPY, DUODENOSCOPY (EGD);  Surgeon: Tirso Duran MD;  Location:  GI    ESOPHAGOSCOPY, GASTROSCOPY, DUODENOSCOPY (EGD), COMBINED N/A 2/1/2023    Procedure: ESOPHAGOGASTRODUODENOSCOPY (EGD);  Surgeon: Lui Vences MD;  Location:  GI    EYE SURGERY      HC PART REMV BONE METATARSAL HEAD,EA  01/11/10    Right foot plantar plate tear. Also correct hammertoe, 2nd digit & varicose vein ligation, heel.    HC REVISE MEDIAN N/CARPAL TUNNEL SURG      Left in 1997, right in 1999    INJ, ANES/STER, FACET LUMB/SAC  2010    Left L5 nerve root injection    INJECT EPIDURAL CERVICAL Right 9/29/2022    Procedure: Cervical 7-Thoracic 1 Interlaminar epidural steroid injection using fluoroscopic guidance with contrast dye, Right;  Surgeon: Tyler Bay MD;  Location:  OR    INJECT EPIDURAL TRANSFORAMINAL   10/09/2013    [facility]    INJECT JOINT SACROILIAC  3/19/14,14    [facility]    ZZC NONSPECIFIC PROCEDURE      Metatarsal phalangeal joint injection    [facility]  [facility]    bx. benign - flex sig in 5 yrs  or colonoscopy in 10 yrs    ZZ ECHO HEART XTHORACIC, STRESS/REST  2005    WNL    ZZ NCS MOTOR W/O F-WAVE, EACH NERVE  2008    CTS       FAMILY HISTORY:  Family History   Problem Relation Age of Onset    Diabetes Father     Depression Father     Cerebrovascular Disease Mother         age 80    Arthritis Mother     Depression Mother         On meds    Eye Disorder Mother         Glaucoma    Osteoporosis Mother     Respiratory Mother          88 YO COPD    Chronic Obstructive Pulmonary Disease Mother     C.A.D. Brother         65 YO MI -    Chronic Obstructive Pulmonary Disease Brother     Myocardial Infarction Brother     Other Cancer Brother     Cancer Brother        SOCIAL HISTORY:  Social History     Socioeconomic History    Marital status:      Spouse name: Robel    Number of children: 1    Years of education: 12    Highest education level: None   Occupational History    Occupation: [employer]     Employer: [employer]   Tobacco Use    Smoking status: Former     Packs/day: 0.50     Years: 30.00     Pack years: 15.00     Types: Cigarettes     Quit date: 3/20/1998     Years since quittin.4    Smokeless tobacco: Never    Tobacco comments:     No smokers in home   Vaping Use    Vaping Use: Never used   Substance and Sexual Activity    Alcohol use: Yes     Alcohol/week: 6.7 standard drinks of alcohol     Comment: 2x/week    Drug use: No    Sexual activity: Yes     Partners: Male     Birth control/protection: None     Comment: spouse -vas   Other Topics Concern     Service No    Blood Transfusions No    Caffeine Concern Yes     Comment: pop: 1c/wk    Occupational Exposure No    Hobby Hazards No    Sleep  "Concern Yes     Comment: On meds    Stress Concern Yes    Weight Concern No    Special Diet No    Back Care Yes     Comment: Hx back pain    Exercise Yes     Comment: stretching qd    Bike Helmet No    Seat Belt Yes    Self-Exams Yes    Parent/sibling w/ CABG, MI or angioplasty before 65F 55M? Yes     Comment: Father - Heart attack   Social History Narrative    Lives with spouse.. No domestic violence issues.       Review of Systems:  Skin:        Eyes:       ENT:       Respiratory:  Positive for shortness of breath;dyspnea on exertion  Cardiovascular:    Positive for;palpitations;edema;dizziness  Gastroenterology:      Genitourinary:       Musculoskeletal:       Neurologic:       Psychiatric:       Heme/Lymph/Imm:       Endocrine:  Negative thyroid disorder;diabetes    Physical Exam:  Vitals: /73   Pulse 88   Ht 1.6 m (5' 3\")   Wt 59 kg (130 lb)   LMP 02/26/2008 (Approximate)   BMI 23.03 kg/m      Constitutional:           Skin:             Head:           Eyes:           Lymph:      ENT:           Neck:           Respiratory:            Cardiac:                                                           GI:           Extremities and Muscular Skeletal:                 Neurological:           Psych:         Recent Lab Results:  LIPID RESULTS:  Lab Results   Component Value Date    CHOL 162 04/07/2023    CHOL 169 11/25/2020    HDL 79 04/07/2023    HDL 82 11/25/2020    LDL 73 04/07/2023    LDL 66 11/25/2020    TRIG 49 04/07/2023    TRIG 105 11/25/2020    CHOLHDLRATIO 1.8 01/14/2015       LIVER ENZYME RESULTS:  Lab Results   Component Value Date    AST 32 05/31/2023    AST 20 03/16/2020    ALT 24 05/31/2023    ALT 23 03/16/2020       CBC RESULTS:  Lab Results   Component Value Date    WBC 6.2 08/17/2023    WBC 10.8 03/16/2020    RBC 4.27 08/17/2023    RBC 4.26 03/16/2020    HGB 13.5 08/17/2023    HGB 13.2 03/16/2020    HCT 40.8 08/17/2023    HCT 40.4 03/16/2020    MCV 96 08/17/2023    MCV 95 03/16/2020    MCH " 31.6 08/17/2023    MCH 31.0 03/16/2020    MCHC 33.1 08/17/2023    MCHC 32.7 03/16/2020    RDW 13.3 08/17/2023    RDW 13.1 03/16/2020     08/17/2023     03/16/2020       BMP RESULTS:  Lab Results   Component Value Date     08/17/2023     03/16/2020    POTASSIUM 3.8 08/17/2023    POTASSIUM 3.9 12/06/2021    POTASSIUM 3.6 03/16/2020    CHLORIDE 106 08/17/2023    CHLORIDE 105 12/06/2021    CHLORIDE 107 03/16/2020    CO2 24 08/17/2023    CO2 26 12/06/2021    CO2 28 03/16/2020    ANIONGAP 13 08/17/2023    ANIONGAP 5 12/06/2021    ANIONGAP 5 03/16/2020    GLC 86 08/17/2023     (H) 12/06/2021    GLC 92 03/16/2020    BUN 15.8 08/17/2023    BUN 13 12/06/2021    BUN 17 03/16/2020    CR 0.90 08/17/2023    CR 0.91 03/16/2020    GFRESTIMATED 68 08/17/2023    GFRESTIMATED 65 03/16/2020    GFRESTBLACK 76 03/16/2020    TOLU 9.4 08/17/2023    TOLU 8.5 03/16/2020        A1C RESULTS:  Lab Results   Component Value Date    A1C 5.6 01/19/2022       INR RESULTS:  Lab Results   Component Value Date    INR 1.07 08/17/2023    INR 1.00 01/30/2012           CC  Andrey Mathews MD  [id number]  [place] [id number]

## 2023-08-31 ENCOUNTER — TELEPHONE (OUTPATIENT)
Dept: CARDIOLOGY | Facility: CLINIC | Age: 68
End: 2023-08-31
Payer: COMMERCIAL

## 2023-08-31 DIAGNOSIS — I34.0 NONRHEUMATIC MITRAL VALVE REGURGITATION: Primary | ICD-10-CM

## 2023-08-31 NOTE — TELEPHONE ENCOUNTER
Patient called with additional questions following her office visit with Dr. Guerrero. Reviewed patients questions regarding her mitral valve regurgitation and reviewed follow-up plan of seeing Dr. Meredith in-clinic on 09/18/2023 at 3:00pm.      Encouraged patient to call with any additional questions or concerns.

## 2023-09-01 ENCOUNTER — TELEPHONE (OUTPATIENT)
Dept: WOUND CARE | Facility: CLINIC | Age: 68
End: 2023-09-01
Payer: COMMERCIAL

## 2023-09-01 ENCOUNTER — THERAPY VISIT (OUTPATIENT)
Dept: PHYSICAL THERAPY | Facility: CLINIC | Age: 68
End: 2023-09-01
Attending: PHYSICIAN ASSISTANT
Payer: COMMERCIAL

## 2023-09-01 DIAGNOSIS — M25.562 ACUTE PAIN OF LEFT KNEE: ICD-10-CM

## 2023-09-01 DIAGNOSIS — R25.2 MUSCLE CRAMP: ICD-10-CM

## 2023-09-01 DIAGNOSIS — M79.604 PAIN OF RIGHT LOWER EXTREMITY: Primary | ICD-10-CM

## 2023-09-01 PROCEDURE — 97140 MANUAL THERAPY 1/> REGIONS: CPT | Mod: GP | Performed by: PHYSICAL THERAPIST

## 2023-09-01 NOTE — TELEPHONE ENCOUNTER
Reason for call:  Patient reporting a symptom    Symptom or request: Patient is coming in for an appt today and wanting to  more supplies for her wound and has some questions before the long weekend. She is wondering about infection in her wound.     Duration (how long have symptoms been present):     Have you been treated for this before? Yes    Additional comments:     Phone Number patient can be reached at:  Cell number on file:    Telephone Information:   Mobile [phone]       Best Time:  anytime     Can we leave a detailed message on this number:  YES    Call taken on 9/1/2023 at 8:31 AM by Negrita Castillo

## 2023-09-01 NOTE — TELEPHONE ENCOUNTER
S -returning patients phone call due to her concerns with wound.     B- Patient reports the wound edges to be red, has concerns for infection and once it addressed today since its a Holiday weekend.    She also reports the hydrofera blue drying up and sticking to wound base.  Also request more supplies.     A -Patient will send a picture of wound to Caitlin LANTIGUA (her PA), WOC nurse will look at it and assess for any changes/ concerns.  If WOC nurse has concerns will message NP, Caitlin LANTIGUA.       WOC nurse will put together a bag of supplies in leave down at PT check for her to .        R- Patient is to send a picture to Caitlin LANTIGUA PA, as she can prescribe antibiotics if needed.  WOC nurse to assess wound for changes and make supply bag for patient.

## 2023-09-01 NOTE — TELEPHONE ENCOUNTER
Patient would like to talk with Susu [facility] today. Deneis warmth or fevers. Wants to know if Susu wants to see her wound today. Patient complains hydrofera is drying up but wants Susu's throughts.    Has PT in [place] at 2:30pm. Could  more supplies at that time or stop in to see Susu.    I told her I would let Susu know and have her call patient back.    Manuela Fowler RN on 9/1/2023 at 9:24 AM

## 2023-09-05 ENCOUNTER — TELEPHONE (OUTPATIENT)
Dept: WOUND CARE | Facility: CLINIC | Age: 68
End: 2023-09-05
Payer: COMMERCIAL

## 2023-09-05 NOTE — TELEPHONE ENCOUNTER
S- Returning patients phone call     B- Patient questions if there was a medication that went on the Mesalt prior to placing in wound.    A-Educated Margareth that Normal saline should be poured on Mesalt but then squeeze out excess normal saline.  No other medication is to be applied to Mesalt, only NS.    R- Continue with treatment as she has been doing

## 2023-09-05 NOTE — TELEPHONE ENCOUNTER
Reason for Call:  Other Question re: knee salt ribbon / additional medication?    Detailed comments: Margareth would like to speak to you regarding medication that she thought she was suppose get to put on the knee salt ribbon. Please call.    Phone Number Patient can be reached at: [phone]    Best Time:     Can we leave a detailed message on this number? YES    Call taken on 9/5/2023 at 10:09 AM by Magdalena Livingston

## 2023-09-06 ENCOUNTER — LAB (OUTPATIENT)
Dept: LAB | Facility: OTHER | Age: 68
End: 2023-09-06
Payer: COMMERCIAL

## 2023-09-06 ENCOUNTER — ANCILLARY PROCEDURE (OUTPATIENT)
Dept: CT IMAGING | Facility: CLINIC | Age: 68
End: 2023-09-06
Attending: INTERNAL MEDICINE
Payer: COMMERCIAL

## 2023-09-06 ENCOUNTER — OFFICE VISIT (OUTPATIENT)
Dept: PULMONOLOGY | Facility: CLINIC | Age: 68
End: 2023-09-06
Attending: INTERNAL MEDICINE
Payer: COMMERCIAL

## 2023-09-06 VITALS
DIASTOLIC BLOOD PRESSURE: 66 MMHG | BODY MASS INDEX: 23.21 KG/M2 | WEIGHT: 131 LBS | HEART RATE: 98 BPM | SYSTOLIC BLOOD PRESSURE: 123 MMHG | HEIGHT: 63 IN | RESPIRATION RATE: 16 BRPM | OXYGEN SATURATION: 98 %

## 2023-09-06 DIAGNOSIS — J84.9 ILD (INTERSTITIAL LUNG DISEASE) (H): ICD-10-CM

## 2023-09-06 DIAGNOSIS — J84.9 ILD (INTERSTITIAL LUNG DISEASE) (H): Primary | ICD-10-CM

## 2023-09-06 DIAGNOSIS — J98.59 MEDIASTINAL MASS: ICD-10-CM

## 2023-09-06 LAB
6 MIN WALK (FT): 1425 FT
6 MIN WALK (M): 434 M
CK SERPL-CCNC: 216 U/L (ref 26–192)
CRP SERPL-MCNC: 4.19 MG/L
ERYTHROCYTE [SEDIMENTATION RATE] IN BLOOD BY WESTERGREN METHOD: 7 MM/HR (ref 0–30)

## 2023-09-06 PROCEDURE — 82787 IGG 1 2 3 OR 4 EACH: CPT | Mod: 59

## 2023-09-06 PROCEDURE — 71250 CT THORAX DX C-: CPT | Performed by: RADIOLOGY

## 2023-09-06 PROCEDURE — 86331 IMMUNODIFFUSION OUCHTERLONY: CPT | Mod: 90

## 2023-09-06 PROCEDURE — 94729 DIFFUSING CAPACITY: CPT | Performed by: INTERNAL MEDICINE

## 2023-09-06 PROCEDURE — 86431 RHEUMATOID FACTOR QUANT: CPT

## 2023-09-06 PROCEDURE — 86606 ASPERGILLUS ANTIBODY: CPT | Mod: 90

## 2023-09-06 PROCEDURE — 86037 ANCA TITER EACH ANTIBODY: CPT

## 2023-09-06 PROCEDURE — 86235 NUCLEAR ANTIGEN ANTIBODY: CPT

## 2023-09-06 PROCEDURE — 94375 RESPIRATORY FLOW VOLUME LOOP: CPT | Performed by: INTERNAL MEDICINE

## 2023-09-06 PROCEDURE — 36415 COLL VENOUS BLD VENIPUNCTURE: CPT

## 2023-09-06 PROCEDURE — G0463 HOSPITAL OUTPT CLINIC VISIT: HCPCS | Performed by: INTERNAL MEDICINE

## 2023-09-06 PROCEDURE — 86038 ANTINUCLEAR ANTIBODIES: CPT

## 2023-09-06 PROCEDURE — 82550 ASSAY OF CK (CPK): CPT

## 2023-09-06 PROCEDURE — 86039 ANTINUCLEAR ANTIBODIES (ANA): CPT

## 2023-09-06 PROCEDURE — 94726 PLETHYSMOGRAPHY LUNG VOLUMES: CPT | Performed by: INTERNAL MEDICINE

## 2023-09-06 PROCEDURE — 86235 NUCLEAR ANTIGEN ANTIBODY: CPT | Mod: 59

## 2023-09-06 PROCEDURE — 94618 PULMONARY STRESS TESTING: CPT | Performed by: INTERNAL MEDICINE

## 2023-09-06 PROCEDURE — 82085 ASSAY OF ALDOLASE: CPT | Mod: 90

## 2023-09-06 PROCEDURE — 99205 OFFICE O/P NEW HI 60 MIN: CPT | Mod: 25 | Performed by: INTERNAL MEDICINE

## 2023-09-06 PROCEDURE — 99000 SPECIMEN HANDLING OFFICE-LAB: CPT

## 2023-09-06 PROCEDURE — 86140 C-REACTIVE PROTEIN: CPT

## 2023-09-06 PROCEDURE — 82784 ASSAY IGA/IGD/IGG/IGM EACH: CPT

## 2023-09-06 PROCEDURE — 85652 RBC SED RATE AUTOMATED: CPT

## 2023-09-06 PROCEDURE — 86036 ANCA SCREEN EACH ANTIBODY: CPT

## 2023-09-06 PROCEDURE — 86200 CCP ANTIBODY: CPT

## 2023-09-06 ASSESSMENT — ENCOUNTER SYMPTOMS
PND: 0
DYSURIA: 0
EYE REDNESS: 0
HEMATURIA: 0
HEMOPTYSIS: 0
BLURRED VISION: 0
EYE DISCHARGE: 0
ORTHOPNEA: 0
COUGH: 1
SPUTUM PRODUCTION: 1
SHORTNESS OF BREATH: 1
PALPITATIONS: 1

## 2023-09-06 ASSESSMENT — PAIN SCALES - GENERAL: PAINLEVEL: NO PAIN (0)

## 2023-09-06 NOTE — PROGRESS NOTES
[facility]    Reason for Visit  New consult, post-COVID residual GGO and reticulation on CT scan.    HPI  68-year-old female patient with past medical history significant for COVID19 in December 2021, at that time she was evaluated in the emergency department after 10-day history of COVID symptoms, her chest imaging showed bilateral left more than right groundglass opacities, diffuse and patchy most consistent with COVID-19 pneumonia, she tested positive for COVID-19, she was requiring oxygen support, and she was discharged from the emergency department after 2 days on 1 L/min O2 support at rest and 3 L/min with exertion.  Patient was treated with remdesivir and steroids with dexamethasone.  Patient has been following with her primary care provider, she was complaining of dyspnea on exertion.  Repeat chest CT scan PE study was done in May 2023, this showed no PE but it showed residual groundglass with reticulation at the same distribution where she had her COVID opacities in December 2021.  Patient was referred to pulmonary ILD clinic.  On exam she was noted to have systolic murmur, echocardiogram showed normal EF at 65 to 70% but she was noted to have severe mitral valve prolapse with severe mitral regurgitation and elevated RVSP 40 to 45 mmHg.  Heart cath was done, this showed normal coronary arteries with no significant disease.    Patient reports that she has a history of asthma for years, her asthma is well controlled with ICS/LABA, she is on Dulera, no recent flareup, the last time she required systemic steroids for this was a year and a half ago.  Before that she has never had lung infection or lung disease otherwise.  After her COVID19 infection she slowly recovered and continue to work with physical therapy and rehabilitation, she was able to get off oxygen in March 2022.  Slowly after that she was having progressively worsening dyspnea on exertion, but she was  still able to continue her activities without limitation.  She has had palpitation with activities but never dizzy or lightheaded.  No syncope or near syncope.  She does not have orthopnea or PND.  No LE edema.  She has occasional cough with thick sputum that is yellow but no hemoptysis.  She has seasonal sinus congestion that improves with Allegra and intranasal steroids.  She does have occasional epistaxis.  She denied hand stiffness or swelling.  She denied pain or redness in her eyes.  No skin rash.  No hematuria or hematochezia.    Review of Systems   HENT:  Positive for congestion.    Eyes:  Negative for blurred vision, discharge and redness.   Respiratory:  Positive for cough, sputum production and shortness of breath. Negative for hemoptysis.    Cardiovascular:  Positive for palpitations. Negative for orthopnea, leg swelling and PND.   Genitourinary:  Negative for dysuria, hematuria and urgency.   Musculoskeletal:  Positive for joint pain.   Skin:  Negative for rash.        Current Outpatient Medications   Medication    acetaminophen (TYLENOL) 650 MG CR tablet    albuterol (PROAIR HFA/PROVENTIL HFA/VENTOLIN HFA) 108 (90 Base) MCG/ACT inhaler    ALLEGRA 180 MG OR TABS    amitriptyline (ELAVIL) 10 MG tablet    buPROPion (WELLBUTRIN XL) 150 MG 24 hr tablet    CALCIUM /VITAMIN D TABS   OR    cyclobenzaprine (FLEXERIL) 5 MG tablet    famotidine (PEPCID) 20 MG tablet    meclizine (ANTIVERT) 25 MG tablet    Melatonin 10 MG TABS tablet    mometasone-formoterol (DULERA) 100-5 MCG/ACT oral inhaler    multivitamin w/minerals (THERA-VIT-M) tablet    naproxen (NAPROSYN) 500 MG tablet    omeprazole (PRILOSEC) 20 MG DR capsule    Simethicone (GAS-X PO)    triamcinolone (NASACORT) 55 MCG/ACT nasal inhaler    UNABLE TO FIND     No current facility-administered medications for this visit.     Allergies   Allergen Reactions    Penicillins Itching     Vaginal itching - Yeast infection afterward     Past Medical History:    Diagnosis Date    Abnormal Papanicolaou smear of vagina and vaginal HPV 12/2002    ASCUS (HPV neg). 3/03 WNL but no transitional zome - repeat 3months    Allergic rhinitis, cause unspecified     Arthritis     Backache, unspecified     Carpal tunnel syndrome     s/p repair    Cervicalgia     Chronic kidney disease, stage 3 (H) 07/14/2021    Depressive disorder     Depressive disorder, not elsewhere classified     doing well on Wellbutrin    Derangement of TMJ (temporomandibular joint) 03/2010    internal derangement    Dermatophytosis of nail     Diaphragmatic hernia without mention of obstruction or gangrene 09/2007    small hiatal hernia    Diffuse cystic mastopathy 2000    FCBD    Esophageal reflux 04/2005    Hammertoe 10/2009    see podiatry consult    Headache(784.0)     Muscle contrature headaches    Intramural leiomyoma of uterus 07/2007    on US    Irregular menstrual cycle     better with BCP -also PMS is better with BCP    IRRITABLE COLON 02/18/2008    Malaise and fatigue     Menopause     lmp 2/2008    Mild persistent asthma     Mitral valve disease     Mole (skin) 2003    9 x 6 mm rt ant chest    Motion sickness     Neuroma 11/2008    lt foot    Osteopenia 12/2008    Other malaise and fatigue 04/2005    stress    Personal history of tobacco use, presenting hazards to health     Quit in 1998    Solitary cyst of breast 12/2005    lt 6 oclock    Stenosis of lumbosacral spine     l4-5 and l5-s1    Symptomatic menopausal or female climacteric states 12/2002    perimenopausal. LMP 2/2008    Tachycardia, paroxysmal (H) 11/24/2020       Past Surgical History:   Procedure Laterality Date    BIOPSY  4/10/2015    BL DUPLEX LO EXTREM ART UNILAT/LTD  4/3008    lower extr arterial doppler -no stenosis    C DEXA,BONE DENSITY,APPENDICULR SKELTN  12/2008    osteopenia    COLONOSCOPY  10/22/2007    bx benign    COLONOSCOPY N/A 4/10/2015    Procedure: COMBINED COLONOSCOPY, SINGLE OR MULTIPLE BIOPSY/POLYPECTOMY BY BIOPSY;   Surgeon: Cl Wagner MD;  Location:  GI    CV CORONARY ANGIOGRAM N/A 8/17/2023    Procedure: Coronary Angiogram;  Surgeon: Liza Ford MD;  Location:  HEART CARDIAC CATH LAB    ESOPHAGOSCOPY, GASTROSCOPY, DUODENOSCOPY (EGD), COMBINED N/A 1/31/2018    Procedure: COMBINED ESOPHAGOSCOPY, GASTROSCOPY, DUODENOSCOPY (EGD);  COMBINED ESOPHAGOSCOPY, GASTROSCOPY, DUODENOSCOPY (EGD);  Surgeon: Tirso Duran MD;  Location:  GI    ESOPHAGOSCOPY, GASTROSCOPY, DUODENOSCOPY (EGD), COMBINED N/A 2/1/2023    Procedure: ESOPHAGOGASTRODUODENOSCOPY (EGD);  Surgeon: Lui Vences MD;  Location:  GI    EYE SURGERY      HC PART REMV BONE METATARSAL HEAD,EA  01/11/10    Right foot plantar plate tear. Also correct hammertoe, 2nd digit & varicose vein ligation, heel.    HC REVISE MEDIAN N/CARPAL TUNNEL SURG      Left in 1997, right in 1999    INJ, ANES/STER, FACET LUMB/SAC  2010    Left L5 nerve root injection    INJECT EPIDURAL CERVICAL Right 9/29/2022    Procedure: Cervical 7-Thoracic 1 Interlaminar epidural steroid injection using fluoroscopic guidance with contrast dye, Right;  Surgeon: Tyler Bay MD;  Location: [facility] OR    INJECT EPIDURAL TRANSFORAMINAL  10/09/2013    [facility]    INJECT JOINT SACROILIAC  3/19/14,4/21/14    [facility]    Z NONSPECIFIC PROCEDURE  2009    Metatarsal phalangeal joint injection    [facility]    bx. benign - flex sig in 5 yrs  or colonoscopy in 10 yrs    ZZ ECHO HEART XTHORACIC, STRESS/REST  2/2005    WNL    [facility] NERVE  7/2008    CTS       Social History     Socioeconomic History    Marital status:      Spouse name: Robel    Number of children: 1    Years of education: 12    Highest education level: Not on file   Occupational History    Occupation: HIMS clerk     Employer: [employer]   Tobacco Use    Smoking status: Former     Packs/day: 0.50     Years: 30.00     " Pack years: 15.00     Types: Cigarettes     Quit date: 3/20/1998     Years since quittin.4    Smokeless tobacco: Never    Tobacco comments:     No smokers in home   Vaping Use    Vaping Use: Never used   Substance and Sexual Activity    Alcohol use: Yes     Alcohol/week: 6.7 standard drinks of alcohol     Comment: 2x/week    Drug use: No    Sexual activity: Yes     Partners: Male     Birth control/protection: None     Comment: spouse -vas   Other Topics Concern     Service No    Blood Transfusions No    Caffeine Concern Yes     Comment: pop: 1c/wk    Occupational Exposure No    Hobby Hazards No    Sleep Concern Yes     Comment: On meds    Stress Concern Yes    Weight Concern No    Special Diet No    Back Care Yes     Comment: Hx back pain    Exercise Yes     Comment: stretching qd    Bike Helmet No    Seat Belt Yes    Self-Exams Yes    Parent/sibling w/ CABG, MI or angioplasty before 65F 55M? Yes     Comment: Father - Heart attack   Social History Narrative    Lives with spouse.. No domestic violence issues.     Social Determinants of Health     Financial Resource Strain: Not on file   Food Insecurity: Not on file   Transportation Needs: Not on file   Physical Activity: Not on file   Stress: Not on file   Social Connections: Not on file   Intimate Partner Violence: Not on file   Housing Stability: Not on file       Family History   Problem Relation Age of Onset    Diabetes Father     Depression Father     Cerebrovascular Disease Mother         age 80    Arthritis Mother     Depression Mother         On meds    Eye Disorder Mother         Glaucoma    Osteoporosis Mother     Respiratory Mother          86 YO COPD    Chronic Obstructive Pulmonary Disease Mother     C.A.D. Brother         67 YO MI -    Chronic Obstructive Pulmonary Disease Brother     Myocardial Infarction Brother     Other Cancer Brother     Cancer Brother          Vitals: /66   Pulse 98   Resp 16   Ht 1.6 m (5' 3\")   Wt " 59.4 kg (131 lb)   LMP 02/26/2008 (Approximate)   SpO2 98%   BMI 23.21 kg/m      Exam:   GENERAL APPEARANCE: Well developed, well nourished, alert, and in no apparent distress.  LYMPHATICS: No significant cervical, or supraclavicular nodes.  HEENT: Normal oral mucus membranes, tongue and dentition. Normal nose.   RESP: good air flow throughout.  No crackles. No rhonchi. No wheezes.  CV: Normal S1, S2, regular rhythm, normal rate. Systolic murmur that heard at RUSB,LUSB,LLSB.  No LE edema.   ABD: Soft, lax, nontender.  MS: extremities normal. No clubbing. No cyanosis.  SKIN: no rash on limited exam.  NEURO: Mentation intact, speech normal, normal gait and stance.  PSYCH: mentation appears normal. and affect normal/bright.    Results:  CBC on 8/14/2023 showed WBC 6.1, absolute eosinophils 600, on 8/17/2023, hemoglobin was normal at 13.5, and platelet count was normal at 238.  PFTs:  9/6/2023: Normal spirometry, normal lung volumes, normal DLCO.  No prior PFT for comparison.  6MWT: Normal 6MWD.  Significant desaturation but with no hypoxia.      Chest imaging:  Chest CT PE study 12/6/2021  IMPRESSION:  1.  Patchy bilateral prominent irregular groundglass consolidation  most consistent with an atypical infectious etiology such as  potentially COVID-19 pneumonia.  2.  No evidence for pulmonary embolism.  3.  Pulmonary nodule incidentally seen on the right. See below for  follow up.  4.  Age indeterminant T12 compression fracture deformity. It is new  since a prior CT abdomen from 3/16/2020.      CT chest PE study 5/31/2023  IMPRESSION:  1.  No pulmonary embolism.   2.  Mild residual groundglass and reticulation in the same distribution as pulmonary opacities on the December 2021 exam. 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 Recommend pulmonary consultation.  3.  Minimal bronchiolectasis.  4.  Stable 11 x 17 mm anterior mediastinal nodule or lymph node (thymoma not excluded). 12 month follow-up  chest CT recommended.    TTE 6/1/2023  Interpretation Summary  1. Normal biventricular size and function. Left ventricular ejection fraction  of 65-70%.  2. No segmental wall motion abnormalities noted.  3. Severe mitral valve prolapse, posterior leaflet. There is severe (4+)  mitral regurgitation. EROA is 0.43 cm2 and Reg volume is 73 ml.  4. Elevated pulmonary artery systolic pressure of ~ 40-45 mmHg.  5. The left atrium is severely dilated.  Compared to the resting portion of the stress test in 2019, MR has increased  in severity and is now severe.    HRCT 9/6/2023  IMPRESSION: In this patient who had pulmonary changes of COVID 19 in  December 2021  1. The patient has interstitial changes consistent with sequela of  COVID 19. Compared to the December 6, 2021 study there has been  significant improvement.  2. Again noted the hepatic cyst and hemangioma in the liver.  3. Nonobstructing renal calculi.  4. Prominent mediastinal lymph nodes unchanged from prior studies.  5. Left atrial enlargement was present in 2021.  5. Sub-4 mm pulmonary nodules. In a low risk patient no further  follow-up recommended.    Assessment and plan:   This is a 68-year-old female patient who was referred to the interstitial lung disease clinic due to interstitial lung changes on her CT scans of the chest.  The patient has a history significant for COVID-19 infection in December 2021, this was complicated with acute hypoxemic respiratory failure requiring oxygen support with nasal cannula, did not require noninvasive mechanical ventilation or intubation.  I reviewed her imaging studies, her imaging studies showed significant improvement in the groundglass opacities compared to December 2021, she has some bibasilar and peripheral reticulation, no significant traction bronchiectasis or honeycombing, no significant air trapping on the expiratory view, nonspecific overall, but likely secondary sequelae of COVID-19, and in the context of normal  PFT, this could be ORQUIDEA (interstitial lung abnormalities) that does not require treatment with antifibrotic at this point, but carries a risk of progression and would need surveillance.  Her PFT showed no significant restriction, no significant reduction in the DLCO.  Overall these interstitial lung changes are unlikely to explain the patient symptoms of dyspnea on exertion, and palpitations.  Her symptoms are more likely explained by severe mitral regurgitation which is likely causing significant pulmonary hypertension.  She has been worked up by cardiology, and plan to do the surgical intervention of the mitral valve, minimally invasive versus sternotomy.  Regarding the risk of ILD exacerbation with surgery, acute exacerbation of ILD is reported with thoracic surgeries, more commonly with surgeries involving the lung parenchyma such as wedge resection, lobectomy or lung biopsies, but this reported with cardiac surgery such as CABG.  Some of the traditional risk factors for acute exacerbation include IPF diagnosis which is not present in this patient, significant decrease in the lung function test, DLCO, decreased 6WMD, resting hypoxemia, and baseline pulmonary hypertension.  Overall these interstitial lung abnormalities would not be a contraindication to pursue a surgical intervention if deemed necessary by cardiology and cardiovascular surgery.    Interstitial lung abnormalities    -Order ILD panel  -Follow-up in 3 months with spirometry    Hilar and mediastinal lymphadenopathy, anterior mediastinal nodule, stable from chest CT scan December 2021  Sub-4 mm pulmonary nodules    -Given her prior history of smoking and with the lymphadenopathy I will order a follow-up CT scan in a year      Chart documentation was completed, in part, with AF83 voice-recognition software. Even though reviewed, some grammatical, spelling, and word errors may remain.    I spent 60 minutes reviewing chart, reviewing test results,  talking with and examining patient, formulating plan, and documentation on the day of the encounter.    Khanh Varner MD   Pulmonary and Critical Care

## 2023-09-06 NOTE — LETTER
9/6/2023         RE: Margareth Green  [address] [facility] [id number]        Dear Colleague,    Thank you for referring your patient, Margareth Green, to the [facility] FOR LUNG SCIENCE AND [facility]. Please see a copy of my visit note below.    [facility] Lung Disease [facility]    Reason for Visit  New consult, post-COVID residual GGO and reticulation on CT scan.    HPI  68-year-old female patient with past medical history significant for COVID19 in December 2021, at that time she was evaluated in the emergency department after 10-day history of COVID symptoms, her chest imaging showed bilateral left more than right groundglass opacities, diffuse and patchy most consistent with COVID-19 pneumonia, she tested positive for COVID-19, she was requiring oxygen support, and she was discharged from the emergency department after 2 days on 1 L/min O2 support at rest and 3 L/min with exertion.  Patient was treated with remdesivir and steroids with dexamethasone.  Patient has been following with her primary care provider, she was complaining of dyspnea on exertion.  Repeat chest CT scan PE study was done in May 2023, this showed no PE but it showed residual groundglass with reticulation at the same distribution where she had her COVID opacities in December 2021.  Patient was referred to pulmonary ILD clinic.  On exam she was noted to have systolic murmur, echocardiogram showed normal EF at 65 to 70% but she was noted to have severe mitral valve prolapse with severe mitral regurgitation and elevated RVSP 40 to 45 mmHg.  Heart cath was done, this showed normal coronary arteries with no significant disease.    Patient reports that she has a history of asthma for years, her asthma is well controlled with ICS/LABA, she is on Dulera, no recent flareup, the last time she required systemic steroids for this was a year and a half ago.  Before that she has never had  lung infection or lung disease otherwise.  After her COVID19 infection she slowly recovered and continue to work with physical therapy and rehabilitation, she was able to get off oxygen in March 2022.  Slowly after that she was having progressively worsening dyspnea on exertion, but she was still able to continue her activities without limitation.  She has had palpitation with activities but never dizzy or lightheaded.  No syncope or near syncope.  She does not have orthopnea or PND.  No LE edema.  She has occasional cough with thick sputum that is yellow but no hemoptysis.  She has seasonal sinus congestion that improves with Allegra and intranasal steroids.  She does have occasional epistaxis.  She denied hand stiffness or swelling.  She denied pain or redness in her eyes.  No skin rash.  No hematuria or hematochezia.    Review of Systems   HENT:  Positive for congestion.    Eyes:  Negative for blurred vision, discharge and redness.   Respiratory:  Positive for cough, sputum production and shortness of breath. Negative for hemoptysis.    Cardiovascular:  Positive for palpitations. Negative for orthopnea, leg swelling and PND.   Genitourinary:  Negative for dysuria, hematuria and urgency.   Musculoskeletal:  Positive for joint pain.   Skin:  Negative for rash.        Current Outpatient Medications   Medication     acetaminophen (TYLENOL) 650 MG CR tablet     albuterol (PROAIR HFA/PROVENTIL HFA/VENTOLIN HFA) 108 (90 Base) MCG/ACT inhaler     ALLEGRA 180 MG OR TABS     amitriptyline (ELAVIL) 10 MG tablet     buPROPion (WELLBUTRIN XL) 150 MG 24 hr tablet     CALCIUM /VITAMIN D TABS   OR     cyclobenzaprine (FLEXERIL) 5 MG tablet     famotidine (PEPCID) 20 MG tablet     meclizine (ANTIVERT) 25 MG tablet     Melatonin 10 MG TABS tablet     mometasone-formoterol (DULERA) 100-5 MCG/ACT oral inhaler     multivitamin w/minerals (THERA-VIT-M) tablet     naproxen (NAPROSYN) 500 MG tablet     omeprazole (PRILOSEC) 20 MG   capsule     Simethicone (GAS-X PO)     triamcinolone (NASACORT) 55 MCG/ACT nasal inhaler     UNABLE TO FIND     No current facility-administered medications for this visit.     Allergies   Allergen Reactions     Penicillins Itching     Vaginal itching - Yeast infection afterward     Past Medical History:   Diagnosis Date     Abnormal Papanicolaou smear of vagina and vaginal HPV 12/2002    ASCUS (HPV neg). 3/03 WNL but no transitional zome - repeat 3months     Allergic rhinitis, cause unspecified      Arthritis      Backache, unspecified      Carpal tunnel syndrome     s/p repair     Cervicalgia      Chronic kidney disease, stage 3 (H) 07/14/2021     Depressive disorder      Depressive disorder, not elsewhere classified     doing well on Wellbutrin     Derangement of TMJ (temporomandibular joint) 03/2010    internal derangement     Dermatophytosis of nail      Diaphragmatic hernia without mention of obstruction or gangrene 09/2007    small hiatal hernia     Diffuse cystic mastopathy 2000    FCBD     Esophageal reflux 04/2005     Hammertoe 10/2009    see podiatry consult     Headache(784.0)     Muscle contrature headaches     Intramural leiomyoma of uterus 07/2007    on US     Irregular menstrual cycle     better with BCP -also PMS is better with BCP     IRRITABLE COLON 02/18/2008     Malaise and fatigue      Menopause     lmp 2/2008     Mild persistent asthma      Mitral valve disease      Mole (skin) 2003    9 x 6 mm rt ant chest     Motion sickness      Neuroma 11/2008    lt foot     Osteopenia 12/2008     Other malaise and fatigue 04/2005    stress     Personal history of tobacco use, presenting hazards to health     Quit in 1998     Solitary cyst of breast 12/2005    lt 6 oclock     Stenosis of lumbosacral spine     l4-5 and l5-s1     Symptomatic menopausal or female climacteric states 12/2002    perimenopausal. LMP 2/2008     Tachycardia, paroxysmal (H) 11/24/2020       Past Surgical History:   Procedure  Laterality Date     BIOPSY  4/10/2015     BL DUPLEX LO EXTREM ART UNILAT/LTD  4/3008    lower extr arterial doppler -no stenosis     C DEXA,BONE DENSITY,APPENDICULR SKELTN  12/2008    osteopenia     COLONOSCOPY  10/22/2007    bx benign     COLONOSCOPY N/A 4/10/2015    Procedure: COMBINED COLONOSCOPY, SINGLE OR MULTIPLE BIOPSY/POLYPECTOMY BY BIOPSY;  Surgeon: Cl Wagner MD;  Location:  GI     CV CORONARY ANGIOGRAM N/A 8/17/2023    Procedure: Coronary Angiogram;  Surgeon: Liza Ford MD;  Location: [facility] CARDIAC CATH LAB     ESOPHAGOSCOPY, GASTROSCOPY, DUODENOSCOPY (EGD), COMBINED N/A 1/31/2018    Procedure: COMBINED ESOPHAGOSCOPY, GASTROSCOPY, DUODENOSCOPY (EGD);  COMBINED ESOPHAGOSCOPY, GASTROSCOPY, DUODENOSCOPY (EGD);  Surgeon: Tirso Duran MD;  Location:  GI     ESOPHAGOSCOPY, GASTROSCOPY, DUODENOSCOPY (EGD), COMBINED N/A 2/1/2023    Procedure: ESOPHAGOGASTRODUODENOSCOPY (EGD);  Surgeon: Lui Vences MD;  Location:  GI     EYE SURGERY       HC PART REMV BONE METATARSAL HEAD,EA  01/11/10    Right foot plantar plate tear. Also correct hammertoe, 2nd digit & varicose vein ligation, heel.     HC REVISE MEDIAN N/CARPAL TUNNEL SURG      Left in 1997, right in 1999     INJ, ANES/STER, FACET LUMB/SAC  2010    Left L5 nerve root injection     INJECT EPIDURAL CERVICAL Right 9/29/2022    Procedure: Cervical 7-Thoracic 1 Interlaminar epidural steroid injection using fluoroscopic guidance with contrast dye, Right;  Surgeon: Tyler Bay MD;  Location: [facility] OR     INJECT EPIDURAL TRANSFORAMINAL  10/09/2013    [facility]     INJECT JOINT SACROILIAC  3/19/14,4/21/14    [facility]     Z NONSPECIFIC PROCEDURE  2009    Metatarsal phalangeal joint injection     [facility] COLONOSCOPY THRU STOMA, DIAGNOSTIC  7/2002    bx. benign - flex sig in 5 yrs  or colonoscopy in 10 yrs     ZZ ECHO HEART XTHORACIC, STRESS/REST  2/2005    WNL     ZZ NCS MOTOR W/O F-WAVE, EACH NERVE   2008    CTS       Social History     Socioeconomic History     Marital status:      Spouse name: Robel     Number of children: 1     Years of education: 12     Highest education level: Not on file   Occupational History     Occupation: HIMS clerk     Employer: [email or web address]   Tobacco Use     Smoking status: Former     Packs/day: 0.50     Years: 30.00     Pack years: 15.00     Types: Cigarettes     Quit date: 3/20/1998     Years since quittin.4     Smokeless tobacco: Never     Tobacco comments:     No smokers in home   Vaping Use     Vaping Use: Never used   Substance and Sexual Activity     Alcohol use: Yes     Alcohol/week: 6.7 standard drinks of alcohol     Comment: 2x/week     Drug use: No     Sexual activity: Yes     Partners: Male     Birth control/protection: None     Comment: spouse -vas   Other Topics Concern      Service No     Blood Transfusions No     Caffeine Concern Yes     Comment: pop: 1c/wk     Occupational Exposure No     Hobby Hazards No     Sleep Concern Yes     Comment: On meds     Stress Concern Yes     Weight Concern No     Special Diet No     Back Care Yes     Comment: Hx back pain     Exercise Yes     Comment: stretching qd     Bike Helmet No     Seat Belt Yes     Self-Exams Yes     Parent/sibling w/ CABG, MI or angioplasty before 65F 55M? Yes     Comment: Father - Heart attack   Social History Narrative    Lives with spouse.. No domestic violence issues.     Social Determinants of Health     Financial Resource Strain: Not on file   Food Insecurity: Not on file   Transportation Needs: Not on file   Physical Activity: Not on file   Stress: Not on file   Social Connections: Not on file   Intimate Partner Violence: Not on file   Housing Stability: Not on file       Family History   Problem Relation Age of Onset     Diabetes Father      Depression Father      Cerebrovascular Disease Mother         age 80     Arthritis Mother      Depression Mother         On meds  "    Eye Disorder Mother         Glaucoma     Osteoporosis Mother      Respiratory Mother          88 YO COPD     Chronic Obstructive Pulmonary Disease Mother      NELIDA. Brother         65 YO MI -     Chronic Obstructive Pulmonary Disease Brother      Myocardial Infarction Brother      Other Cancer Brother      Cancer Brother          Vitals: /66   Pulse 98   Resp 16   Ht 1.6 m (5' 3\")   Wt 59.4 kg (131 lb)   LMP 2008 (Approximate)   SpO2 98%   BMI 23.21 kg/m      Exam:   GENERAL APPEARANCE: Well developed, well nourished, alert, and in no apparent distress.  LYMPHATICS: No significant cervical, or supraclavicular nodes.  HEENT: Normal oral mucus membranes, tongue and dentition. Normal nose.   RESP: good air flow throughout.  No crackles. No rhonchi. No wheezes.  CV: Normal S1, S2, regular rhythm, normal rate. Systolic murmur that heard at RUSB,LUSB,LLSB.  No LE edema.   ABD: Soft, lax, nontender.  MS: extremities normal. No clubbing. No cyanosis.  SKIN: no rash on limited exam.  NEURO: Mentation intact, speech normal, normal gait and stance.  PSYCH: mentation appears normal. and affect normal/bright.    Results:  CBC on 2023 showed WBC 6.1, absolute eosinophils 600, on 2023, hemoglobin was normal at 13.5, and platelet count was normal at 238.  PFTs:  2023: Normal spirometry, normal lung volumes, normal DLCO.  No prior PFT for comparison.  6MWT: Normal 6MWD.  Significant desaturation but with no hypoxia.      Chest imaging:  Chest CT PE study 2021  IMPRESSION:  1.  Patchy bilateral prominent irregular groundglass consolidation  most consistent with an atypical infectious etiology such as  potentially COVID-19 pneumonia.  2.  No evidence for pulmonary embolism.  3.  Pulmonary nodule incidentally seen on the right. See below for  follow up.  4.  Age indeterminant T12 compression fracture deformity. It is new  since a prior CT abdomen from 3/16/2020.      CT chest PE study " 5/31/2023  IMPRESSION:  1.  No pulmonary embolism.   2.  Mild residual groundglass and reticulation in the same distribution as pulmonary opacities on the December 2021 exam. 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 Recommend pulmonary consultation.  3.  Minimal bronchiolectasis.  4.  Stable 11 x 17 mm anterior mediastinal nodule or lymph node (thymoma not excluded). 12 month follow-up chest CT recommended.    TTE 6/1/2023  Interpretation Summary  1. Normal biventricular size and function. Left ventricular ejection fraction  of 65-70%.  2. No segmental wall motion abnormalities noted.  3. Severe mitral valve prolapse, posterior leaflet. There is severe (4+)  mitral regurgitation. EROA is 0.43 cm2 and Reg volume is 73 ml.  4. Elevated pulmonary artery systolic pressure of ~ 40-45 mmHg.  5. The left atrium is severely dilated.  Compared to the resting portion of the stress test in 2019, MR has increased  in severity and is now severe.    HRCT 9/6/2023  IMPRESSION: In this patient who had pulmonary changes of COVID 19 in  December 2021  1. The patient has interstitial changes consistent with sequela of  COVID 19. Compared to the December 6, 2021 study there has been  significant improvement.  2. Again noted the hepatic cyst and hemangioma in the liver.  3. Nonobstructing renal calculi.  4. Prominent mediastinal lymph nodes unchanged from prior studies.  5. Left atrial enlargement was present in 2021.  5. Sub-4 mm pulmonary nodules. In a low risk patient no further  follow-up recommended.    Assessment and plan:   This is a 68-year-old female patient who was referred to the interstitial lung disease clinic due to interstitial lung changes on her CT scans of the chest.  The patient has a history significant for COVID-19 infection in December 2021, this was complicated with acute hypoxemic respiratory failure requiring oxygen support with nasal cannula, did not require noninvasive mechanical ventilation  or intubation.  I reviewed her imaging studies, her imaging studies showed significant improvement in the groundglass opacities compared to December 2021, she has some bibasilar and peripheral reticulation, no significant traction bronchiectasis or honeycombing, no significant air trapping on the expiratory view, nonspecific overall, but likely secondary sequelae of COVID-19, and in the context of normal PFT, this could be ORQUIDEA (interstitial lung abnormalities) that does not require treatment with antifibrotic at this point, but carries a risk of progression and would need surveillance.  Her PFT showed no significant restriction, no significant reduction in the DLCO.  Overall these interstitial lung changes are unlikely to explain the patient symptoms of dyspnea on exertion, and palpitations.  Her symptoms are more likely explained by severe mitral regurgitation which is likely causing significant pulmonary hypertension.  She has been worked up by cardiology, and plan to do the surgical intervention of the mitral valve, minimally invasive versus sternotomy.  Regarding the risk of ILD exacerbation with surgery, acute exacerbation of ILD is reported with thoracic surgeries, more commonly with surgeries involving the lung parenchyma such as wedge resection, lobectomy or lung biopsies, but this reported with cardiac surgery such as CABG.  Some of the traditional risk factors for acute exacerbation include IPF diagnosis which is not present in this patient, significant decrease in the lung function test, DLCO, decreased 6WMD, resting hypoxemia, and baseline pulmonary hypertension.  Overall these interstitial lung abnormalities would not be a contraindication to pursue a surgical intervention if deemed necessary by cardiology and cardiovascular surgery.    Interstitial lung abnormalities    -Order ILD panel  -Follow-up in 3 months with spirometry    Hilar and mediastinal lymphadenopathy, anterior mediastinal nodule, stable  from chest CT scan December 2021  Sub-4 mm pulmonary nodules    -Given her prior history of smoking and with the lymphadenopathy I will order a follow-up CT scan in a year      Chart documentation was completed, in part, with [employer] voice-recognition software. Even though reviewed, some grammatical, spelling, and word errors may remain.    I spent 60 minutes reviewing chart, reviewing test results, talking with and examining patient, formulating plan, and documentation on the day of the encounter.    Khanh Varner MD   Pulmonary and Critical Care               Again, thank you for allowing me to participate in the care of your patient.        Sincerely,        Khanh Varner MD

## 2023-09-06 NOTE — NURSING NOTE
Met with patient in clinic after provider visit to provide contact information sheet and introduce role as ILD RN Care Coordinator. Patient appreciative of conversation and direct contact information. Explained PFS and N Pulmonary Fibrosis Pt Education Day, provided brochures.     Marely Buchanan, RN, BSN  ILD Nurse   [phone]

## 2023-09-06 NOTE — NURSING NOTE
Chief Complaint   Patient presents with    Consult     New Interstitial Lung     Medications reviewed and vital signs taken.   Mati Saldaña, HANNA

## 2023-09-07 ENCOUNTER — THERAPY VISIT (OUTPATIENT)
Dept: PHYSICAL THERAPY | Facility: CLINIC | Age: 68
End: 2023-09-07
Attending: PHYSICIAN ASSISTANT
Payer: COMMERCIAL

## 2023-09-07 DIAGNOSIS — M25.562 ACUTE PAIN OF LEFT KNEE: ICD-10-CM

## 2023-09-07 DIAGNOSIS — R25.2 MUSCLE CRAMP: ICD-10-CM

## 2023-09-07 DIAGNOSIS — M79.604 PAIN OF RIGHT LOWER EXTREMITY: Primary | ICD-10-CM

## 2023-09-07 LAB
ANA PAT SER IF-IMP: ABNORMAL
ANA SER QL IF: ABNORMAL
ANA TITR SER IF: ABNORMAL {TITER}
ANCA AB PATTERN SER IF-IMP: NORMAL
C-ANCA TITR SER IF: NORMAL {TITER}

## 2023-09-07 PROCEDURE — 97140 MANUAL THERAPY 1/> REGIONS: CPT | Mod: GP | Performed by: PHYSICAL THERAPIST

## 2023-09-08 ENCOUNTER — HOSPITAL ENCOUNTER (OUTPATIENT)
Dept: WOUND CARE | Facility: CLINIC | Age: 68
Discharge: HOME | End: 2023-09-08
Attending: PHYSICIAN ASSISTANT | Admitting: PHYSICIAN ASSISTANT
Payer: COMMERCIAL

## 2023-09-08 LAB
CCP AB SER IA-ACNC: 0.8 U/ML
ENA JO1 AB SER IA-ACNC: <0.5 U/ML
ENA JO1 IGG SER-ACNC: NEGATIVE
ENA SCL70 IGG SER IA-ACNC: <0.6 U/ML
ENA SCL70 IGG SER IA-ACNC: NEGATIVE
ENA SS-A AB SER IA-ACNC: 0.5 U/ML
ENA SS-A AB SER IA-ACNC: NEGATIVE
ENA SS-B IGG SER IA-ACNC: <0.6 U/ML
ENA SS-B IGG SER IA-ACNC: NEGATIVE
IGG SERPL-MCNC: 590 MG/DL (ref 610–1616)
IGG1 SER-MCNC: 445 MG/DL (ref 382–929)
IGG2 SER-MCNC: 79 MG/DL (ref 242–700)
IGG3 SER-MCNC: 24 MG/DL (ref 22–176)
IGG4 SER-MCNC: 10 MG/DL (ref 4–86)
RHEUMATOID FACT SER NEPH-ACNC: 7 IU/ML
SUBCLASSES, PERCENT: 95 %

## 2023-09-08 PROCEDURE — G0463 HOSPITAL OUTPT CLINIC VISIT: HCPCS

## 2023-09-08 NOTE — PROGRESS NOTES
[facility]    Start of Care in [facility]: 8/23/2023   Referring Provider: Caitlin Beasley PA-C  Primary Care Provider: Caitlin Beasley PA-C  Wound Location: Right, distal/lateral lower leg     Wound Clinic Visit: 2nd return visit to the wound and Ostomy Clinic       Reason for Visit:  Wound assessment and evaluate plan of care     Subjective:  Arrives to clinic alone,  reports the following: Wednesday- see pulmonary doctor had to do 6 min walking test, had tighter pants on, worried that may have rubbed on wound.  Also seen cardiologist she will need cardiac surgery, but needing a 2nd consult to verify which type of surgery she will have (open heart or less invasive).  Wound updates: Continues with shooting/ pins and needles pain, comes and goes 6/10, also has been having pulling sensation. Draining moderate to large amount serosanguineous. Feels wound has made some improvement.      Wound History:   8/5/23- presented to ER (In [place]), after obtaining a laceration to right lower leg from a hotel bed frame.  Reports walking past metal frame when she grazed her leg on it.  States there should of been a plastic/rubber covering over the metal, but there wasn't. At ER her wound was sutured and steri stripped, with instructions to follow up with primary for suture removal.     8/9/23 Presented to Urgent care  due to concerns of infection. Wound was cleansed and she was given wound care directions.  Discharged home on oral antibiotics     8/14/23 seen PCP for suture removal, at this time was only found to have 21 sutures, but hospital reports 22 sutures being placed.        Past Medical History:  Patient Active Problem List   Diagnosis    Dermatophytosis of nail    Symptomatic menopausal or female climacteric states    Generalized osteoarthrosis, unspecified site    TAMMY (generalized anxiety disorder)    Insomnia    Malaise and fatigue    Mild persistent asthma    Cervicalgia     Allergic rhinitis    Irritable bowel syndrome    Arthralgia    DDD (degenerative disc disease), lumbar    Menopausal syndrome    Osteopenia    Hammertoe    Derangement of TMJ (temporomandibular joint)    Stenosis of lumbosacral spine    Atrophic vaginitis    Osteoporosis    Mild major depression (H)    Knee pain, left    Menopausal flushing    Numbness of left jaw    Neck pain    TMJ (temporomandibular joint syndrome)    Thyroid fullness    Epistaxis    Major depressive disorder, recurrent episode, moderate (H)    Gastroesophageal reflux disease without esophagitis    Tremor    Cramp of limb    Personal history of COVID-19    T12 compression fracture, sequela    Pain of right lower extremity    Muscle cramp    Chondromalacia of right knee    Right sided sciatica                     Tobacco Use:     Tobacco Use      Smoking status: Former        Packs/day: 0.50        Years: 30.00        Pack years: 15        Types: Cigarettes        Quit date: 3/20/1998        Years since quittin.4      Smokeless tobacco: Never      Tobacco comments: No smokers in home       Diabetic: NA  HgbA1C:   Hemoglobin A1C   Date Value Ref Range Status   2022 5.6 0.0 - 5.6 % Final     Comment:     Normal <5.7%   Prediabetes 5.7-6.4%    Diabetes 6.5% or higher     Note: Adopted from ADA consensus guidelines.     Checks Blood Glucose?:  NA Average Readings: NA      Personal/social history:  Lives at home with . Retired, No paid help    Objective:   Current treatment plan: Triad, SilverCel, rolled gauze and Coban  Last changed: This Morning 23    Wound #1 Right Lower Lateral Leg, Laceration  Stage/tissue depth: Full Thickness  4.3 cm L x 2.3 cm W x 0.4 cm D  Tunneling: none  Undermining: none  Wound bed type/amount: approximately 80% slough and 20% red granular tissue; Note fluctuant  Wound Edges: scattered closer with slough  Periwound: hyper pink scar tissue, dry serosanguinous scab distally  Drainage: Moderate  serosanguinous   Odor: none  Pain: 5-6/10 at rest, 7-8/10 with cares.    Photo from today's visit 9/8/23   Photos from  8/28/23    Photos from today's initial visit 8/23/23  Photo after sutures removed 8/14/23  Photo from day of injury 8/5/23    Dorsalis Pedal Pulse: palpable: NA doppler: NA phasic  Posterior Tibial Pulse: palpable: NA doppler: NA phasic  Hair growth: Present to lower leg  Capillary Refill: 3 seconds  Feet/toes color: not assessed  Nails: Not assessed  L Leg: Edema . Ankle circumference  cm. Calf circumference  cm.  R Leg: Edema nonpitting. Ankle circumference 29 cm. Calf circumference 36 cm. Not assessed today 9/8/23, [facility] nurse error    Mobility: Ambulatory   Current offloading/footwear: casual sneakers  Sensation: Full    Other callusing/areas of concern: none    Diet: Regular      Assessment:  Arrives to clinic with dressing in place, with removal of dressing noted 2 Mesalt ribbons running horizontal over wound bed and onto periskin, instead of lightly packed into wound only.  Cause slight moisture to wound edges.  Wound has decreased in size, but unfortunately has returned to mostly non-viable tissue. Scatted closure to edges due to increase slough.  Periwound has hyper pink scar tissue, edema remains stable.  Pain remains stable.  No signs of infection.       Factors impacting wound healing:   Poor nutrition: inadequate supply of protein, carbohydrates, fatty acids, and trace elements essential for all phases of wound healing- not addressed today  Delayed healing as part of normal aging process- >65, healing slowed  Reduced Blood Supply: inadequate perfusion to heal wound- appears adequate, no know ABIs  Medication: none  Chemotherapy: suppresses the immune system and inflammatory response-NA  Radiotherapy: increases production of free radical which damage cells-NA  Psychological stress: Delayed healing  Obesity: decreases tissue perfusion- BMI WNL  Infection: prolongs inflammatory phase, uses  vital nutrients, impairs epithelialization and releases toxins- Hx of oral antibiotics. No signs of infection at this time  Underlying Disease:   Maceration: reduces wound tensile strength and inhibits epithelial migration- none at time of assessment  Patient compliance- appears willing to heal and compy  Unrelieved pressure- NA  Immobility- NA, mobile with no alterations  Substance abuse: None      Plan:  Will return to the use of Triad paste as it previously did very good job at cleaning the wound up. Reason it was stopped patient felt dressing/paste was sticking causing increase pain.  Did add Vaseline gauze to prevent dressing from sticking to wound.  Patient is to keep wound covered at all times, not to leave open to air for 1-2+ hours like she had been doing.  Plan to have her return to clinic in about 1 week.        Discussed/Education provided: plan of care with rationale;       Topical care: Wound/surrounding skin :    Remove dressing, you may soak dressing with wound cleanser (MicroKlenze) or water to help remove it easier.  Apply Lidocaine to wound bed, allow to sit for 10-15 minutes.    Next clean the wound, you can either do this in the shower (wash with plain water) or you can use wound cleanser (MicroKlenz)  Pat dry  Apply Triad Paste to small cut non- adherent Strips (Adaptic/Vaseline gauze)  Apply this to wound with Triad touching wound bed  Cover with gauze  Secure with rolled gauze and Cohesive bandage (coban), Okay to take coban off at bed time and have rolled gauze only.   Complete treatment daily and as needed for soiled or dislodge dressing.    Patient and her  are able to perform cares/has been caring for wound.    Additional recommendations: Elevated lower legs 3 x/day for 30-40 min     The following discharge instructions were reviewed with and sent home with the patient:  Per AVS    The following supplies were sent home with the patient:  Wound cleanser, gauze, rolled gauze, remains  of open triad and SilverCel sheets    Return visit: 9/15/23@ 10 am    Verbal, written, & demonstrative education provided.  Face to face time: approximately 45 minutes  Procedure: < 1 min for application of Triad paste, for autolytic debridement     Caitlin Parra RN, CWOCN  [phone]

## 2023-09-08 NOTE — DISCHARGE INSTRUCTIONS
Margareth the wound is stable, but continues to have a lot of the non healthy tissue to the base. Will return to the Triad paste as it worked well, we added the Vaseline gauze to help the dressing not stick to the wound.  Please remember to keep the wound covered, really should only be open to the air for 10-15 minutes.  Call with any questions or concerns, see you back in clinic in a week.       Remove dressing  Apply Lidocaine, allow to sit for 5-10 min  Cleanse wound with wound cleanser (microklenz) or in the shower.  Pat dry  Apply Triad Paste to small cut non- adherent Strips (Adaptic/Vaseline gauze)  Apply this to wound with Triad touching wound bed  Cover with gauze  Secure with rolled gauze and Cohesive bandage (coban), Okay to take coban off at bed time and have rolled gauze only.   Complete treatment daily and as needed for soiled or dislodge dressing.      Next appointment 9/15/23@ 10 am  Caitlin Parra RN CWOCN

## 2023-09-09 LAB — ALDOLASE SERPL-CCNC: 5.3 U/L

## 2023-09-11 LAB
DLCOUNC-%PRED-PRE: 99 %
DLCOUNC-PRE: 17.65 ML/MIN/MMHG
DLCOUNC-PRED: 17.8 ML/MIN/MMHG
ERV-%PRED-PRE: 52 %
ERV-PRE: 0.34 L
ERV-PRED: 0.65 L
EXPTIME-PRE: 6.81 SEC
FEF2575-%PRED-PRE: 147 %
FEF2575-PRE: 2.47 L/SEC
FEF2575-PRED: 1.68 L/SEC
FEFMAX-%PRED-PRE: 137 %
FEFMAX-PRE: 7.24 L/SEC
FEFMAX-PRED: 5.27 L/SEC
FEV1-%PRED-PRE: 109 %
FEV1-PRE: 2.07 L
FEV1FEV6-PRE: 84 %
FEV1FEV6-PRED: 79 %
FEV1FVC-PRE: 84 %
FEV1FVC-PRED: 79 %
FEV1SVC-PRE: 71 %
FEV1SVC-PRED: 68 %
FIFMAX-PRE: 6.11 L/SEC
FRCPLETH-%PRED-PRE: 93 %
FRCPLETH-PRE: 2.33 L
FRCPLETH-PRED: 2.51 L
FVC-%PRED-PRE: 102 %
FVC-PRE: 2.47 L
FVC-PRED: 2.4 L
IC-%PRED-PRE: 126 %
IC-PRE: 2.58 L
IC-PRED: 2.05 L
RVPLETH-%PRED-PRE: 111 %
RVPLETH-PRE: 1.99 L
RVPLETH-PRED: 1.78 L
TLCPLETH-%PRED-PRE: 107 %
TLCPLETH-PRE: 4.92 L
TLCPLETH-PRED: 4.56 L
VA-%PRED-PRE: 103 %
VA-PRE: 4.35 L
VC-%PRED-PRE: 105 %
VC-PRE: 2.93 L
VC-PRED: 2.76 L

## 2023-09-12 ENCOUNTER — MYC MEDICAL ADVICE (OUTPATIENT)
Dept: PULMONOLOGY | Facility: CLINIC | Age: 68
End: 2023-09-12
Payer: COMMERCIAL

## 2023-09-12 ENCOUNTER — MYC MEDICAL ADVICE (OUTPATIENT)
Dept: WOUND CARE | Facility: CLINIC | Age: 68
End: 2023-09-12
Payer: COMMERCIAL

## 2023-09-13 ENCOUNTER — MYC MEDICAL ADVICE (OUTPATIENT)
Dept: FAMILY MEDICINE | Facility: CLINIC | Age: 68
End: 2023-09-13
Payer: COMMERCIAL

## 2023-09-13 LAB
A FLAVUS AB SER QL ID: NORMAL
A FUMIGATUS1 AB SER QL ID: NORMAL
A FUMIGATUS2 AB SER QL ID: NORMAL
A FUMIGATUS3 AB SER QL ID: NORMAL
A FUMIGATUS6 AB SER QL ID: NORMAL
A PULLULANS AB SER QL ID: NORMAL
PIGEON SERUM AB QL ID: NORMAL
S RECTIVIRGULA AB SER QL ID: NORMAL
S VIRIDIS AB SER QL ID: NORMAL
T CANDIDUS AB SER QL: NORMAL

## 2023-09-15 ENCOUNTER — HOSPITAL ENCOUNTER (OUTPATIENT)
Dept: WOUND CARE | Facility: CLINIC | Age: 68
Discharge: HOME | End: 2023-09-15
Attending: PHYSICIAN ASSISTANT | Admitting: PHYSICIAN ASSISTANT
Payer: COMMERCIAL

## 2023-09-15 PROCEDURE — G0463 HOSPITAL OUTPT CLINIC VISIT: HCPCS

## 2023-09-15 NOTE — PROGRESS NOTES
[facility]    Start of Care in [facility]: 8/23/2023   Referring Provider: Caitlin Beasley PA-C  Primary Care Provider: Caitlin Beasley PA-C  Wound Location: Right, distal/lateral lower leg     Wound Clinic Visit: 3rd return visit to the wound and Ostomy Clinic       Reason for Visit:  Wound assessment and evaluate plan of care     Subjective:  Arrives to clinic alone,  Continues with sharp painful jabs to wound and surrounding area, not constant and not wore than her baseline.  Margareth had concerns earlier in week due to drainage being more bloody/brown, she thought possible infection.  Feels she may have over done the standing/ambulation last Saturday due to her friends event, so took it easy this week.        Wound History:   8/5/23- presented to ER (In [place]), after obtaining a laceration to right lower leg from a hotel bed frame.  Reports walking past metal frame when she grazed her leg on it.  States there should of been a plastic/rubber covering over the metal, but there wasn't. At ER her wound was sutured and steri stripped, with instructions to follow up with primary for suture removal.     8/9/23 Presented to Urgent care  due to concerns of infection. Wound was cleansed and she was given wound care directions.  Discharged home on oral antibiotics     8/14/23 seen PCP for suture removal, at this time was only found to have 21 sutures, but hospital reports 22 sutures being placed.        Past Medical History:  Patient Active Problem List   Diagnosis    Dermatophytosis of nail    Symptomatic menopausal or female climacteric states    Generalized osteoarthrosis, unspecified site    TAMMY (generalized anxiety disorder)    Insomnia    Malaise and fatigue    Mild persistent asthma    Cervicalgia    Allergic rhinitis    Irritable bowel syndrome    Arthralgia    DDD (degenerative disc disease), lumbar    Menopausal syndrome    Osteopenia    Hammertoe    Derangement of TMJ  "(temporomandibular joint)    Stenosis of lumbosacral spine    Atrophic vaginitis    Osteoporosis    Mild major depression (H)    Knee pain, left    Menopausal flushing    Numbness of left jaw    Neck pain    TMJ (temporomandibular joint syndrome)    Thyroid fullness    Epistaxis    Major depressive disorder, recurrent episode, moderate (H)    Gastroesophageal reflux disease without esophagitis    Tremor    Cramp of limb    Personal history of COVID-19    T12 compression fracture, sequela    Pain of right lower extremity    Muscle cramp    Chondromalacia of right knee    Right sided sciatica                     Tobacco Use:     Tobacco Use      Smoking status: Former        Packs/day: 0.50        Years: 30.00        Pack years: 15        Types: Cigarettes        Quit date: 3/20/1998        Years since quittin.5      Smokeless tobacco: Never      Tobacco comments: No smokers in home       Diabetic: NA  HgbA1C:   Hemoglobin A1C   Date Value Ref Range Status   2022 5.6 0.0 - 5.6 % Final     Comment:     Normal <5.7%   Prediabetes 5.7-6.4%    Diabetes 6.5% or higher     Note: Adopted from ADA consensus guidelines.     Checks Blood Glucose?:  NA Average Readings: NA      Personal/social history:  Lives at home with . Retired, No paid help    Objective:   Current treatment plan: Triad, adaptic, gauze, rolled gauze and Coban  Last changed: Last evening     Wound #1 Right Lower Lateral Leg, Laceration  Stage/tissue depth: Full Thickness  4 cm L x 2 cm W x 0.3 cm D  Tunneling: none  Undermining: none  Wound bed type/amount: approximately 10% slough and 90% red granular tissue; Note fluctuant  Wound Edges: minor closer from 7-9 due to remaining slough   Periwound: Remains of dry triad paste, mild edema, scar tissue proximal/distal of wound bed  Drainage: Small serosanguinous   Odor: none  Pain: Continues with on/off pain \"jabbing/stabbing\" pain. Was not rated today.    Photos from today's visit 9/15/23   " Photo from today's visit 9/8/23   Photos from  8/28/23    Photos from today's initial visit 8/23/23  Photo after sutures removed 8/14/23  Photo from day of injury 8/5/23    Dorsalis Pedal Pulse: palpable: NA doppler: NA phasic  Posterior Tibial Pulse: palpable: NA doppler: NA phasic  Hair growth: Present to lower leg  Capillary Refill: 3 seconds  Feet/toes color: not assessed  Nails: Not assessed  L Leg: Edema . Ankle circumference  cm. Calf circumference  cm.  R Leg: Edema nonpitting. Ankle circumference 29 cm. Calf circumference 36 cm. Not assessed today 9/15/23, [facility] nurse error    Mobility: Ambulatory   Current offloading/footwear: casual sneakers  Sensation: Full    Other callusing/areas of concern: none    Diet: Regular      Assessment:  Arrives to clinic with dressing in place from last evening. Small amount of serosanguinous drainage noted.  Wound bed has debrided nicely with Triad paste, only about 10% slough remain, otherwise all granular.  Wound size and depth has come up a bit.  Nearly all wound edges are open with the exception of minor/scattered closer from 7-9 o clock.  Periwound remains free of Erythema, edema is stable.  No signs of infection at this time.       Factors impacting wound healing:   Poor nutrition: inadequate supply of protein, carbohydrates, fatty acids, and trace elements essential for all phases of wound healing- not addressed today  Delayed healing as part of normal aging process- >65, healing slowed  Reduced Blood Supply: inadequate perfusion to heal wound- appears adequate, no know ABIs  Medication: none  Chemotherapy: suppresses the immune system and inflammatory response-NA  Radiotherapy: increases production of free radical which damage cells-NA  Psychological stress: Delayed healing  Obesity: decreases tissue perfusion- BMI WNL  Infection: prolongs inflammatory phase, uses vital nutrients, impairs epithelialization and releases toxins- Hx of oral antibiotics. No signs of  infection at this time  Underlying Disease:   Maceration: reduces wound tensile strength and inhibits epithelial migration- none at time of assessment  Patient compliance- appears willing to heal and compy  Unrelieved pressure- NA  Immobility- NA, mobile with no alterations  Substance abuse: None      Plan:  Wound debrided nicely and decrease in size/depth, will continue with plan of care.  Patient has high concerns for infection/changes to wound would prefer to continue with weekly visits.  Plan to see her next Friday.         Discussed/Education provided: plan of care with rationale;       Topical care: Wound/surrounding skin :    Remove dressing, you may soak dressing with wound cleanser (MicroKlenze) or water to help remove it easier.  Apply Lidocaine to wound bed, allow to sit for 10-15 minutes.    Next clean the wound, you can either do this in the shower (wash with plain water) or you can use wound cleanser (MicroKlenz)  Pat dry  Apply Triad Paste to small cut non- adherent Strips (Adaptic/Vaseline gauze)  Apply this to wound with Triad touching wound bed  Cover with gauze  Secure with rolled gauze and Cohesive bandage (coban), Okay to take coban off at bed time and have rolled gauze only.   Complete treatment daily and as needed for soiled or dislodge dressing.    Patient and her  are able to perform cares/has been caring for wound.    Additional recommendations: none    The following discharge instructions were reviewed with and sent home with the patient:  Per AVS    The following supplies were sent home with the patient:  Adaptic, rolled gauze, remains of open triad    Return visit: 9/22/23@ 2 pm    Verbal, written, & demonstrative education provided.  Face to face time: approximately 35 minutes  Procedure: ORSE Parra RN, CWOCN  [phone]

## 2023-09-15 NOTE — DISCHARGE INSTRUCTIONS
Margareth wound is looking good.  Nearly all of that Yellow, unhealthy tissue has been cleaned out.  The wound is also measuring a bit smaller and not see deep.  Will continue with weekly visits, until then continue with the same treatment listed below.  As always call or message with any questions.    Remove dressing  Apply Lidocaine, allow to sit for 5-10 min  Cleanse wound with wound cleanser (microklenz) or in the shower.  Pat dry  Apply Triad Paste to small cut non- adherent Strips (Adaptic/Vaseline gauze)  Apply this to wound with Triad touching wound bed  Cover with gauze  Secure with rolled gauze and Cohesive bandage (coban), Okay to take coban off at bed time and have rolled gauze only.   Complete treatment daily and as needed for soiled or dislodge dressing.    Caitlin Parra RN CWOCN

## 2023-09-18 ENCOUNTER — OFFICE VISIT (OUTPATIENT)
Dept: CARDIOLOGY | Facility: CLINIC | Age: 68
End: 2023-09-18
Payer: COMMERCIAL

## 2023-09-18 ENCOUNTER — TELEPHONE (OUTPATIENT)
Dept: PULMONOLOGY | Facility: CLINIC | Age: 68
End: 2023-09-18

## 2023-09-18 VITALS
SYSTOLIC BLOOD PRESSURE: 139 MMHG | HEART RATE: 85 BPM | HEIGHT: 63 IN | OXYGEN SATURATION: 96 % | WEIGHT: 133 LBS | BODY MASS INDEX: 23.57 KG/M2 | DIASTOLIC BLOOD PRESSURE: 69 MMHG

## 2023-09-18 DIAGNOSIS — Z01.818 PREOPERATIVE EXAMINATION: Primary | ICD-10-CM

## 2023-09-18 DIAGNOSIS — J84.9 ILD (INTERSTITIAL LUNG DISEASE) (H): Primary | ICD-10-CM

## 2023-09-18 DIAGNOSIS — I34.0 NONRHEUMATIC MITRAL VALVE REGURGITATION: ICD-10-CM

## 2023-09-18 PROCEDURE — 99204 OFFICE O/P NEW MOD 45 MIN: CPT | Performed by: SURGERY

## 2023-09-18 NOTE — TELEPHONE ENCOUNTER
----- Message from Khanh Varner MD sent at 9/18/2023  9:58 AM CDT -----  Regarding: RE: Labs  Tapan Tucker,     Sorry but last week was terribly busy in the [facility].  Her lab are not specific and does not explain reason for her CT robins findings.  She has mild elevation of ALICIA which is non specific and at low titer is present in 10 % of normal people.  She has low IgG but very mild and she does not have recurrent infections to warrant treatment.  The same goes to regarding her mildly elevated CK just above the normal range. We should repeat total IgG and CK when she sees me in the clinic on her follow up appointment.    Thanks     Khanh    ----- Message -----  From: Caitlin Ocampo RN  Sent: 9/12/2023   1:13 PM CDT  To: Khanh Varner MD  Subject: Labs                                             Hi Dr. Varner,    Pt is inquiring about lab results. Any interpretation you have for her?    Hopefully we can get to her case for discussion on Monday in conference.    ThanksCaitlin

## 2023-09-19 ENCOUNTER — DOCUMENTATION ONLY (OUTPATIENT)
Dept: CARDIOLOGY | Facility: CLINIC | Age: 68
End: 2023-09-19
Payer: COMMERCIAL

## 2023-09-19 ENCOUNTER — PREP FOR PROCEDURE (OUTPATIENT)
Dept: CARDIOLOGY | Facility: CLINIC | Age: 68
End: 2023-09-19
Payer: COMMERCIAL

## 2023-09-19 ENCOUNTER — TELEPHONE (OUTPATIENT)
Dept: CARDIOLOGY | Facility: CLINIC | Age: 68
End: 2023-09-19
Payer: COMMERCIAL

## 2023-09-19 DIAGNOSIS — I34.1 MITRAL VALVE PROLAPSE: Primary | ICD-10-CM

## 2023-09-19 NOTE — TELEPHONE ENCOUNTER
Per task, pt needs to schedule surgery with Dr. Meredith for the end of Oct due to a wound needing time to heal. Talked with pt and scheduled surgery for 10/27. Will call if anything changes

## 2023-09-19 NOTE — NURSING NOTE
Met with patient and spouse, Jyothi in consultation with Dr. Meredith . Plan for minimally invasive mitral valve repair, possible replacement at [facility] . Preoperative preparation to include CT MICS and dental clearance. Patient also has a laceration of the lower aspect of her right lateral leg that is currently being treated in wound  clinic. We will follow healing progress and schedule surgery accordingly. All other testing complete. Will schedule labs when surgery date is selected. Teaching completed and literature/CHG given.   Patient given contact information and instructed to call with questions or concerns.     Caitlin Silver RN  Care Coordinator - [facility] CV Surgery

## 2023-09-20 ENCOUNTER — DOCUMENTATION ONLY (OUTPATIENT)
Dept: CARDIOLOGY | Facility: CLINIC | Age: 68
End: 2023-09-20
Payer: COMMERCIAL

## 2023-09-20 DIAGNOSIS — Z01.818 PREOPERATIVE EXAMINATION: Primary | ICD-10-CM

## 2023-09-20 DIAGNOSIS — I34.1 MITRAL VALVE PROLAPSE: ICD-10-CM

## 2023-09-20 RX ORDER — ASPIRIN 81 MG/1
81 TABLET, CHEWABLE ORAL
Status: CANCELLED | OUTPATIENT
Start: 2023-09-20

## 2023-09-20 RX ORDER — CHLORHEXIDINE GLUCONATE ORAL RINSE 1.2 MG/ML
10 SOLUTION DENTAL ONCE
Status: CANCELLED | OUTPATIENT
Start: 2023-09-20 | End: 2023-09-20

## 2023-09-20 RX ORDER — ASPIRIN 81 MG/1
162 TABLET, CHEWABLE ORAL
Status: CANCELLED | OUTPATIENT
Start: 2023-09-20

## 2023-09-20 RX ORDER — LIDOCAINE 40 MG/G
CREAM TOPICAL
Status: CANCELLED | OUTPATIENT
Start: 2023-09-20

## 2023-09-20 RX ORDER — FAMOTIDINE 20 MG/1
20 TABLET, FILM COATED ORAL
Status: CANCELLED | OUTPATIENT
Start: 2023-09-20

## 2023-09-22 NOTE — PROGRESS NOTES
[facility]  Cardiovascular and Thoracic Surgery Clinic Consultation    Margareth Green MRN# [id number]   YOB: 1955 Age: 68 year old        Reason for consult: I was asked by Dr. Guerrero to evaluate this patient for a minimally invasive mitral valve repair.           Assessment and Plan:   Ms. Green is an otherwise healthy 69 yo female with severe symptomatic mitral valve regurgitation (MR) from flail P2 segment. Her LV function is preserved. Her coronary angiogram was negative. My recommendation is a minimally invasive mitral valve repair. We will get a [facility] protocol CT scan to make sure we can do this safely via a R minithoracotomy approach.  I explained to her the diagnosis in detail and the pathophysiology of degenerative MR and the reason for recommending repair. I also went over the risks and benefits of the procedure, including but not strictly limited to, infection, bleeding, stroke, postop MI, postop arrhythmias, pulmonary and renal complications. I think she is an overall excellent surgical candidate and I quoted her an operative mortality risk of 1%. She understands and agrees to proceed. I also quoted a 95% repair rate, and in the unlikely event that the repair is not feasible, we will replace it with a bioprosthetic valve. She also understands this. Thank you very much for this referral.          Attestation:  Sabas Meredith MD           Chief Complaint:   Shortness of breath: with minimal exertion.               History of Present Illness:   This patient is a 68 year old female who presents with recently diagnosed severe mitral valve regurgitation from a flail P2 segment. She is quite symptomatic with significant SOB with minimal exertion. She was referred to me by Dr. Guerrero for consideration for mitral valve repair.              Past Medical History:     Past Medical History:   Diagnosis Date    Abnormal Papanicolaou smear of vagina and vaginal HPV  12/2002    ASCUS (HPV neg). 3/03 WNL but no transitional zome - repeat 3months    Allergic rhinitis, cause unspecified     Arthritis     Backache, unspecified     Carpal tunnel syndrome     s/p repair    Cervicalgia     Chronic kidney disease, stage 3 (H) 07/14/2021    Depressive disorder     Depressive disorder, not elsewhere classified     doing well on Wellbutrin    Derangement of TMJ (temporomandibular joint) 03/2010    internal derangement    Dermatophytosis of nail     Diaphragmatic hernia without mention of obstruction or gangrene 09/2007    small hiatal hernia    Diffuse cystic mastopathy 2000    FCBD    Esophageal reflux 04/2005    Hammertoe 10/2009    see podiatry consult    Headache(784.0)     Muscle contrature headaches    Intramural leiomyoma of uterus 07/2007    on US    Irregular menstrual cycle     better with BCP -also PMS is better with BCP    IRRITABLE COLON 02/18/2008    Malaise and fatigue     Menopause     lmp 2/2008    Mild persistent asthma     Mitral valve disease     Mole (skin) 2003    9 x 6 mm rt ant chest    Motion sickness     Neuroma 11/2008    lt foot    Osteopenia 12/2008    Other malaise and fatigue 04/2005    stress    Personal history of tobacco use, presenting hazards to health     Quit in 1998    Solitary cyst of breast 12/2005    lt 6 oclock    Stenosis of lumbosacral spine     l4-5 and l5-s1    Symptomatic menopausal or female climacteric states 12/2002    perimenopausal. LMP 2/2008    Tachycardia, paroxysmal (H) 11/24/2020             Past Surgical History:     Past Surgical History:   Procedure Laterality Date    BIOPSY  4/10/2015    BL DUPLEX LO EXTREM ART UNILAT/LTD  4/3008    lower extr arterial doppler -no stenosis    C DEXA,BONE DENSITY,APPENDICULR SKELTN  12/2008    osteopenia    COLONOSCOPY  10/22/2007    bx benign    COLONOSCOPY N/A 4/10/2015    Procedure: COMBINED COLONOSCOPY, SINGLE OR MULTIPLE BIOPSY/POLYPECTOMY BY BIOPSY;  Surgeon: Cl Wagner MD;  Location:  [facility] GI    CV CORONARY ANGIOGRAM N/A 2023    Procedure: Coronary Angiogram;  Surgeon: Liza Ford MD;  Location: [facility] CARDIAC CATH LAB    ESOPHAGOSCOPY, GASTROSCOPY, DUODENOSCOPY (EGD), COMBINED N/A 2018    Procedure: COMBINED ESOPHAGOSCOPY, GASTROSCOPY, DUODENOSCOPY (EGD);  COMBINED ESOPHAGOSCOPY, GASTROSCOPY, DUODENOSCOPY (EGD);  Surgeon: Tirso Duran MD;  Location:  GI    ESOPHAGOSCOPY, GASTROSCOPY, DUODENOSCOPY (EGD), COMBINED N/A 2023    Procedure: ESOPHAGOGASTRODUODENOSCOPY (EGD);  Surgeon: Lui Vences MD;  Location:  GI    EYE SURGERY      HC PART REMV BONE METATARSAL HEAD,EA  01/11/10    Right foot plantar plate tear. Also correct hammertoe, 2nd digit & varicose vein ligation, heel.    HC REVISE MEDIAN N/CARPAL TUNNEL SURG      Left in , right in     INJ, ANES/STER, FACET LUMB/SAC  2010    Left L5 nerve root injection    INJECT EPIDURAL CERVICAL Right 2022    Procedure: Cervical 7-Thoracic 1 Interlaminar epidural steroid injection using fluoroscopic guidance with contrast dye, Right;  Surgeon: Tyler Bay MD;  Location:  OR    INJECT EPIDURAL TRANSFORAMINAL  10/09/2013    [facility]    INJECT JOINT SACROILIAC  3/19/14,14    [facility]    [facility] NONSPECIFIC PROCEDURE      Metatarsal phalangeal joint injection    [facility] COLONOSCOPY THRU STOMA, DIAGNOSTIC  2002    bx. benign - flex sig in 5 yrs  or colonoscopy in 10 yrs    [facility] ECHO HEART XTHORACIC, STRESS/REST  2005    WNL    [facility] NCS MOTOR W/O F-WAVE, EACH NERVE  2008    CTS               Social History:     Social History     Tobacco Use    Smoking status: Former     Packs/day: 0.50     Years: 30.00     Pack years: 15.00     Types: Cigarettes     Quit date: 3/20/1998     Years since quittin.5    Smokeless tobacco: Never    Tobacco comments:     No smokers in home   Substance Use Topics    Alcohol use: Yes     Alcohol/week: 6.7 standard drinks of alcohol      Comment: 2x/week             Family History:     Family History   Problem Relation Age of Onset    Diabetes Father     Depression Father     Cerebrovascular Disease Mother         age 80    Arthritis Mother     Depression Mother         On meds    Eye Disorder Mother         Glaucoma    Osteoporosis Mother     Respiratory Mother          88 YO COPD    Chronic Obstructive Pulmonary Disease Mother     C.A.D. Brother         65 YO MI -    Chronic Obstructive Pulmonary Disease Brother     Myocardial Infarction Brother     Other Cancer Brother     Cancer Brother              Immunizations:     Immunization History   Administered Date(s) Administered    COVID-19 Monovalent 18+ (Moderna) 2022, 2022    Flu, Unspecified 10/18/2011, 2012, 2014, 2016, 10/23/2017    HepB 1992, 1992, 1992    Influenza (High Dose) 3 valent vaccine 10/29/2018, 2019    Influenza (IIV3) PF 10/23/1991, 10/29/1992, 10/07/1993, 10/18/1994, 10/06/1998, 10/12/1999, 2000, 10/23/2001, 10/21/2004, 2005, 2006, 2012, 10/14/2013    Influenza (intradermal) 10/18/2011    Influenza Vaccine 65+ (Fluzone HD) 10/13/2020    Influenza Vaccine, 6+MO IM (QUADRIVALENT W/PRESERVATIVES) 2014, 2016, 10/23/2017    Pneumo Conj 13-V (2010&after) 2018    Pneumococcal 23 valent 2000, 2012, 2020    TD,PF 7+ (Tenivac) 1995, 2005    TDAP (Adacel,Boostrix) 2022    TDAP Vaccine (Adacel) 2012    Varicella Pt Report Hx of Varicella/Chicken Pox 1961    Zoster recombinant adjuvanted (SHINGRIX) 2020, 2020             Allergies:     Allergies   Allergen Reactions    Penicillins Itching     Vaginal itching - Yeast infection afterward             Medications:     Current Outpatient Medications Ordered in Epic   Medication    acetaminophen (TYLENOL) 650 MG CR tablet    albuterol (PROAIR HFA/PROVENTIL HFA/VENTOLIN HFA) 108 (90 Base)  MCG/ACT inhaler    ALLEGRA 180 MG OR TABS    amitriptyline (ELAVIL) 10 MG tablet    buPROPion (WELLBUTRIN XL) 150 MG 24 hr tablet    CALCIUM /VITAMIN D TABS   OR    famotidine (PEPCID) 20 MG tablet    meclizine (ANTIVERT) 25 MG tablet    Melatonin 10 MG TABS tablet    mometasone-formoterol (DULERA) 100-5 MCG/ACT oral inhaler    multivitamin w/minerals (THERA-VIT-M) tablet    naproxen (NAPROSYN) 500 MG tablet    omeprazole (PRILOSEC) 20 MG DR capsule    Simethicone (GAS-X PO)    triamcinolone (NASACORT) 55 MCG/ACT nasal inhaler    UNABLE TO FIND    cyclobenzaprine (FLEXERIL) 5 MG tablet     No current Epic-ordered facility-administered medications on file.             Review of Systems:   The 10 point Review of Systems is negative other than noted in the HPI         Physical Exam:   Vitals were reviewed                     Gen: somewhat anxious but in no acute distress  CV: RRR, normal S1 and S2, grade 3/6 systolic murmur at the apex  Resp: CTAB  Abd: soft, NT/ND  Ext: trace LE edema  Neuro: no focal deficits           Data:     DOUG 8/17/23:  Interpretation Summary     The left ventricle is normal in size.  Left ventricular systolic function is normal.  The visual ejection fraction is 60-65%.  Severe mitral valve prolapse, posterior leaflet. A flail leaflet was not  clearly seen.  There is severe (4+) mitral regurgitation. MV ERO 0.99 cm2.  ______________________________________________________________________________  DOUG  I determined this patient to be an appropriate candidate for the planned  sedation and procedure and have reassessed the patient immediately prior to  sedation and procedure. Total sedation time: 35 minutes of continuous bedside  1:1 monitoring. Versed (5.5mg) was given intravenously. Fentanyl (75mcg) was  given intravenously. Prior to the exam, the oral cavity was checked and no  overcrowding was noted. Consent to the procedure was obtained prior to  sedation. The transesophageal probe was passed  without difficulty. There were  no complications associated with this procedure.     Left Ventricle  The left ventricle is normal in size. Left ventricular systolic function is  normal. The visual ejection fraction is 60-65%. Normal left ventricular wall  motion.     Right Ventricle  Normal right ventricle structure and size. The right ventricular systolic  function is normal.     Atria  The left atrium is severely dilated. Right atrial size is normal. Intact  atrial septum. There is no color Doppler evidence of an atrial shunt. A  contrast injection (Bubble Study) was performed that was negative for flow  across the interatrial septum.     Mitral Valve  Severe mitral valve prolapse, posterior leaflet. There is severe (4+) mitral  regurgitation. MV ERO 0.99 cm2.     Tricuspid Valve  There is mild (1+) tricuspid regurgitation. Right ventricular systolic  pressure could not be approximated due to inadequate tricuspid regurgitation.     Aortic Valve  Normal tricuspid aortic valve. No aortic regurgitation is present. No aortic  stenosis is present.     Pulmonic Valve  The pulmonic valve is not well seen, but is grossly normal. There is trace  pulmonic valvular regurgitation.     Vessels  The aortic root is normal size. Normal ascending, transverse (arch), and  descending aorta. Normal pulmonary venous drainage. Normal pulmonary vein  velocity.     Pericardial/Pleural  There is no pericardial effusion.     Rhythm  Sinus rhythm was noted.  ______________________________________________________________________________  MMode/2D Measurements & Calculations  Ao root diam: 3.3 cm  asc Aorta Diam: 3.2 cm     Doppler Measurements & Calculations  MV max P.1 mmHg  MV mean P.4 mmHg  MV V2 VTI: 31.3 cm  MR PISA: 15.7 cm2  MR ERO: 0.96 cm2  MR volume: 183.1 ml     ______________________________________________________________________________  Report approved by: Trinity Cortes 2023 11:04 AM      [facility]  8/17/23:  Conclusion         Mid LAD lesion is 10% stenosed.     Normal coronary anatomy in the right dominant system.  No significant coronary artery disease.  Usual postcardiac catheterization cares (see orders for details).            Plan     Follow bedrest per protocol   Continued medical management and lifestyle modifications for cardiovascular risk factor optimizations.     Recommendations    General Recommendations:  - Patient given specific instructions regarding care of arteriotomy site, activity restrictions, signs and symptoms of cardiac or vascular complications and to seek immediate medical evaluation should they occur.     Coronary Findings    Diagnostic  Dominance: Right  Left Main   The vessel was visualized by selective angiography and is large. There was 0% vessel disease. LEFT MAIN:  The left main originates from the aorta in the left coronary cusp. It is a normal caliber vessel. It gives rise to the LAD and left circumflex arteries. The left main has no significant disease.      Left Anterior Descending   The vessel is moderate in size.   Mid LAD lesion is 10% stenosed.      First Diagonal Branch   The vessel is moderate in size. The vessel exhibits minimal luminal irregularities.      Left Circumflex   The vessel is moderate in size.      First Obtuse Marginal Branch   The vessel is moderate in size. Moderate caliber bifurcating vessel without any significant disease.      Second Obtuse Marginal Branch   The vessel is small. OM 2 is a tiny vessel      Right Coronary Artery   The vessel is moderate in size. RIGHT CORONARY ARTERY: The right coronary artery is a normal caliber, dominant vessel that gives rise to multiple RV marginal branches, a posterior descending artery (rPDA), and a normal sized posterolateral branch system (rPLB). The RCA and it's branches are free of angiographically significant coronary artery disease.         Intervention     No interventions have been documented.

## 2023-09-25 ENCOUNTER — MYC MEDICAL ADVICE (OUTPATIENT)
Dept: PULMONOLOGY | Facility: CLINIC | Age: 68
End: 2023-09-25
Payer: COMMERCIAL

## 2023-09-25 ENCOUNTER — HOSPITAL ENCOUNTER (OUTPATIENT)
Dept: MAMMOGRAPHY | Facility: CLINIC | Age: 68
Discharge: HOME | End: 2023-09-25
Attending: PHYSICIAN ASSISTANT
Payer: COMMERCIAL

## 2023-09-25 ENCOUNTER — HOSPITAL ENCOUNTER (OUTPATIENT)
Dept: WOUND CARE | Facility: CLINIC | Age: 68
Discharge: HOME | End: 2023-09-25
Attending: PHYSICIAN ASSISTANT
Payer: COMMERCIAL

## 2023-09-25 ENCOUNTER — TEAM CONFERENCE (OUTPATIENT)
Dept: PULMONOLOGY | Facility: CLINIC | Age: 68
End: 2023-09-25
Payer: COMMERCIAL

## 2023-09-25 ENCOUNTER — MYC MEDICAL ADVICE (OUTPATIENT)
Dept: CARDIOLOGY | Facility: CLINIC | Age: 68
End: 2023-09-25
Payer: COMMERCIAL

## 2023-09-25 DIAGNOSIS — J45.30 MILD PERSISTENT ASTHMA, UNSPECIFIED WHETHER COMPLICATED: ICD-10-CM

## 2023-09-25 DIAGNOSIS — Z12.31 VISIT FOR SCREENING MAMMOGRAM: ICD-10-CM

## 2023-09-25 DIAGNOSIS — Z86.16 PERSONAL HISTORY OF COVID-19: ICD-10-CM

## 2023-09-25 PROCEDURE — G0463 HOSPITAL OUTPT CLINIC VISIT: HCPCS

## 2023-09-25 PROCEDURE — 77067 SCR MAMMO BI INCL CAD: CPT

## 2023-09-25 RX ORDER — ALBUTEROL SULFATE 90 UG/1
2 AEROSOL, METERED RESPIRATORY (INHALATION) EVERY 6 HOURS
Qty: 18 G | Refills: 3 | Status: SHIPPED | OUTPATIENT
Start: 2023-09-25 | End: 2023-10-04

## 2023-09-25 NOTE — PROCEDURES
ILD Conference      Patient Name: Margareth Green    Reason for conference discussion/Specific Question:  CT scan review    Referring Physician:  Caitlin Beasley PA-C     Radiology Interpretation: Severe Covid 12/2021. 9/2023 CT: Opacities have improved.  Some residual fibrotic like opacities. Radha Brown MD (radiology)      There was a consensus recommendation for the following actions:     Follow her with spirometry in 3 months and if stable follow up in one year with spirometry and CT     Pulmonary/ILD Provider:  Khanh Ocampo RN

## 2023-09-25 NOTE — PROGRESS NOTES
"[facility]    Start of Care in [facility]: 8/23/2023   Referring Provider: Caitlin Beasley PA-C  Primary Care Provider: Caitlin Beasley PA-C  Wound Location: Right, distal/lateral lower leg     Wound Clinic Visit: 3rd return visit to the wound and Ostomy Clinic       Reason for Visit:  Wound assessment and evaluate plan of care     Subjective:  Arrives to clinic alone,  reports the following: Wound can still be tender at times and continues with the \"pulling\" sensation on occasion. Drainage remains unchanged, \"redish, brownish and yellow\".  Thinks nurse is going to be very happy with results today, Margareth feels wound has made some positive changes.        Wound History:   8/5/23- presented to ER (In [place]), after obtaining a laceration to right lower leg from a hotel bed frame.  Reports walking past metal frame when she grazed her leg on it.  States there should of been a plastic/rubber covering over the metal, but there wasn't. At ER her wound was sutured and steri stripped, with instructions to follow up with primary for suture removal.     8/9/23 Presented to Urgent care  due to concerns of infection. Wound was cleansed and she was given wound care directions.  Discharged home on oral antibiotics     8/14/23 seen PCP for suture removal, at this time was only found to have 21 sutures, but hospital reports 22 sutures being placed.        Past Medical History:  Patient Active Problem List   Diagnosis    Dermatophytosis of nail    Symptomatic menopausal or female climacteric states    Generalized osteoarthrosis, unspecified site    TAMMY (generalized anxiety disorder)    Insomnia    Malaise and fatigue    Mild persistent asthma    Cervicalgia    Allergic rhinitis    Irritable bowel syndrome    Arthralgia    DDD (degenerative disc disease), lumbar    Menopausal syndrome    Osteopenia    Hammertoe    Derangement of TMJ (temporomandibular joint)    Stenosis of lumbosacral spine    " Atrophic vaginitis    Osteoporosis    Mild major depression (H)    Knee pain, left    Menopausal flushing    Numbness of left jaw    Neck pain    TMJ (temporomandibular joint syndrome)    Thyroid fullness    Epistaxis    Major depressive disorder, recurrent episode, moderate (H)    Gastroesophageal reflux disease without esophagitis    Tremor    Cramp of limb    Personal history of COVID-19    T12 compression fracture, sequela    Pain of right lower extremity    Muscle cramp    Chondromalacia of right knee    Right sided sciatica                     Tobacco Use:     Tobacco Use      Smoking status: Former        Packs/day: 0.50        Years: 30.00        Pack years: 15        Types: Cigarettes        Quit date: 3/20/1998        Years since quittin.5      Smokeless tobacco: Never      Tobacco comments: No smokers in home       Diabetic: NA  HgbA1C:   Hemoglobin A1C   Date Value Ref Range Status   2022 5.6 0.0 - 5.6 % Final     Comment:     Normal <5.7%   Prediabetes 5.7-6.4%    Diabetes 6.5% or higher     Note: Adopted from ADA consensus guidelines.     Checks Blood Glucose?:  NA Average Readings: NA      Personal/social history:  Lives at home with . Retired, No paid help    Objective:   Current treatment plan: Triad, adaptic, gauze, rolled gauze and Coban  Last changed: This morning    Wound #1 Right Lower Lateral Leg, Laceration  Stage/tissue depth: Full Thickness  3 cm L x 1 cm W x 0.2 cm D-   Tunneling: none  Undermining: none  Wound bed type/amount: approximately 30% intact new scar tissue, 70% red granular tissue; Note fluctuant  Wound Edges: open  Periwound: Remains of dry triad paste, localized edema  Drainage: Scant serosanguinous   Odor: none  Pain: comes and goes, but less intense    Photo from today's visit 23   Photos 9/15/23   Photo from 23   Photos from 23    Photos from today's initial visit 23  Photo after sutures removed 23  Photo from day of injury  8/5/23    Dorsalis Pedal Pulse: palpable: NA doppler: NA phasic  Posterior Tibial Pulse: palpable: NA doppler: NA phasic  Hair growth: Present to lower leg  Capillary Refill: 3 seconds  Feet/toes color: not assessed  Nails: Not assessed  L Leg: Edema . Ankle circumference  cm. Calf circumference  cm.  R Leg: Edema nonpitting. Ankle circumference 29 cm. Calf circumference 34 cm. Ankle Not assessed today, WOC nurse error    Mobility: Ambulatory   Current offloading/footwear: casual sneakers  Sensation: Full    Other callusing/areas of concern: none    Diet: Regular      Assessment:  Arrives to clinic with dressing in place, no strike through drainage, but patient had very large, excess amount of Triad paste over wound bed. This was cleaned, some remains of triad to periwound.  Wound has decreased in size and depth.  In addition middle of wound has started to fill in with scar tissue, causing the one wound to divide into two. Wound bed remains viable with granular tissue, free from all slough. Some localized edema, but stable.       Factors impacting wound healing:   Poor nutrition: inadequate supply of protein, carbohydrates, fatty acids, and trace elements essential for all phases of wound healing- not addressed today  Delayed healing as part of normal aging process- >65, healing slowed  Reduced Blood Supply: inadequate perfusion to heal wound- appears adequate, no know ABIs  Medication: none  Chemotherapy: suppresses the immune system and inflammatory response-NA  Radiotherapy: increases production of free radical which damage cells-NA  Psychological stress: Delayed healing  Obesity: decreases tissue perfusion- BMI WNL  Infection: prolongs inflammatory phase, uses vital nutrients, impairs epithelialization and releases toxins- Hx of oral antibiotics. No signs of infection at this time  Underlying Disease:   Maceration: reduces wound tensile strength and inhibits epithelial migration- none at time of  assessment  Patient compliance- appears willing to heal and compy  Unrelieved pressure- NA  Immobility- NA, mobile with no alterations  Substance abuse: None      Plan:  Continue with same plan of care as wound is progressing nicely.  Margareth has prior arrangements for next week and with wound doing so well think 2 week return to clinic is very reasonable.  She knows to call clinic with any questions or concerns.        Discussed/Education provided: plan of care with rationale;       Topical care: Wound/surrounding skin :    Remove dressing, you may soak dressing with wound cleanser (MicroKlenze) or water to help remove it easier.  Apply Lidocaine to wound bed as needed, allow to sit for 10-15 minutes.    Next clean the wound, you can either do this in the shower (wash with plain water) or you can use wound cleanser (MicroKlenz)  Pat dry  Apply Triad Paste to small cut non- adherent Strips (Adaptic/Vaseline gauze)  Apply this to wound with Triad touching wound bed  Cover with gauze  Secure with rolled gauze and Cohesive bandage (coban), Okay to take coban off at bed time and have rolled gauze only.   Complete treatment daily and as needed for soiled or dislodge dressing.    Patient and her  are able to perform cares/has been caring for wound.    Additional recommendations: none    The following discharge instructions were reviewed with and sent home with the patient:  Declined     The following supplies were sent home with the patient:  Adaptic, rolled gauze, remains of open triad    Return visit: 10/11/23 @ 10 am    Verbal, written, & demonstrative education provided.  Face to face time: approximately 35 minutes  Procedure: ROSE Parra RN, CWOCN  [phone]

## 2023-09-26 ENCOUNTER — DOCUMENTATION ONLY (OUTPATIENT)
Dept: CARDIOLOGY | Facility: CLINIC | Age: 68
End: 2023-09-26
Payer: COMMERCIAL

## 2023-09-26 NOTE — PROGRESS NOTES
Spoke with pt, she was inquiring about the Dental Clearance form.  [facility] # [phone]  Pt Also had questions with regards to her surgery. How long the drains would be in, clip replacement vs repair / medications to take/ hold etc.   I advised I would have Caitlin call her tomorrow to discuss, information would also be in the Prep for surgery instructions packet .  Pt has My Direct #

## 2023-09-27 ENCOUNTER — MYC MEDICAL ADVICE (OUTPATIENT)
Dept: PULMONOLOGY | Facility: CLINIC | Age: 68
End: 2023-09-27
Payer: COMMERCIAL

## 2023-09-27 ENCOUNTER — TELEPHONE (OUTPATIENT)
Dept: CARDIOLOGY | Facility: CLINIC | Age: 68
End: 2023-09-27
Payer: COMMERCIAL

## 2023-09-27 ENCOUNTER — THERAPY VISIT (OUTPATIENT)
Dept: PHYSICAL THERAPY | Facility: CLINIC | Age: 68
End: 2023-09-27
Attending: PHYSICIAN ASSISTANT
Payer: COMMERCIAL

## 2023-09-27 DIAGNOSIS — R25.2 MUSCLE CRAMP: ICD-10-CM

## 2023-09-27 DIAGNOSIS — M25.562 ACUTE PAIN OF LEFT KNEE: ICD-10-CM

## 2023-09-27 DIAGNOSIS — M79.604 PAIN OF RIGHT LOWER EXTREMITY: Primary | ICD-10-CM

## 2023-09-27 PROCEDURE — 97140 MANUAL THERAPY 1/> REGIONS: CPT | Mod: GP | Performed by: PHYSICAL THERAPIST

## 2023-09-27 NOTE — TELEPHONE ENCOUNTER
Called patient to answer general questions.  Discussed purpose and placement of chest tubes after surgery.  Advised that she can bring her inhalers to surgery in the morning and discuss with her anesthesiologist which to take.   Discussed valve replacement vs. repair and tissue vs,mechanical per Dr. Meredith's clinic consult recommendations.  Reassured her that her surgery instructions are in Mychart.    HAFSA SANTANA RN

## 2023-09-27 NOTE — CONFIDENTIAL NOTE
Prior Authorization Retail Medication Request    Medication/Dose:   ICD code (if different than what is on RX):  albuterol (PROAIR HFA/PROVENTIL HFA/VENTOLIN HFA) 108 (90 Base) MCG/ACT inhaler   Previously Tried and Failed:    Rationale:      Insurance Name:    Insurance ID:        Pharmacy Information (if different than what is on RX)  Name:    Phone:

## 2023-09-28 ENCOUNTER — TELEPHONE (OUTPATIENT)
Dept: PULMONOLOGY | Facility: CLINIC | Age: 68
End: 2023-09-28
Payer: COMMERCIAL

## 2023-09-28 ENCOUNTER — DOCUMENTATION ONLY (OUTPATIENT)
Dept: CARDIOLOGY | Facility: CLINIC | Age: 68
End: 2023-09-28
Payer: COMMERCIAL

## 2023-09-28 DIAGNOSIS — Z86.16 PERSONAL HISTORY OF COVID-19: ICD-10-CM

## 2023-09-28 DIAGNOSIS — J45.30 MILD PERSISTENT ASTHMA, UNSPECIFIED WHETHER COMPLICATED: ICD-10-CM

## 2023-10-02 NOTE — TELEPHONE ENCOUNTER
Central Prior Authorization Team   Phone: [phone]    PA Initiation    Medication: ALBUTEROL SULFATE  (90 BASE) MCG/ACT IN AERS  Insurance Company: [employer] Non-Specialty PA's - Phone [phone] Fax [phone]  Pharmacy Filling the Rx: [facility] - [address]  Filling Pharmacy Phone: [phone]  Filling Pharmacy Fax:    Start Date: 10/2/2023

## 2023-10-04 DIAGNOSIS — J45.30 MILD PERSISTENT ASTHMA, UNSPECIFIED WHETHER COMPLICATED: ICD-10-CM

## 2023-10-04 DIAGNOSIS — Z86.16 PERSONAL HISTORY OF COVID-19: ICD-10-CM

## 2023-10-04 RX ORDER — ALBUTEROL SULFATE 90 UG/1
2 AEROSOL, METERED RESPIRATORY (INHALATION) EVERY 6 HOURS
Qty: 18 G | Refills: 3 | Status: SHIPPED | OUTPATIENT
Start: 2023-10-04 | End: 2023-10-04

## 2023-10-04 NOTE — TELEPHONE ENCOUNTER
Prior Authorization Approval    Medication: ALBUTEROL SULFATE  (90 BASE) MCG/ACT IN AERS  Authorization Effective Date: 9/2/2023  Authorization Expiration Date: 10/1/2024  Approved Dose/Quantity:   Reference #:     Insurance Company: [employer] - Phone [phone] Fax [phone]  Expected CoPay: $    CoPay Card Available:      Financial Assistance Needed:   Which Pharmacy is filling the prescription: [facility] - [address]  Pharmacy Notified: Yes  Patient Notified: Yes    I called [employer] [facility] with approval however, rep stated the script received was cancelled by provider and will need a new one. Per pharmacy, patient will be notified when medication is ready to ship.

## 2023-10-04 NOTE — TELEPHONE ENCOUNTER
Patient called stating that the prescription for her inhaler was supposed to be for brand name ProAir, it will need a new PA once the new script is sent. Please sand a prescription for ProAir [id number], then route a PA request back to the pool.     Thank You!  Bushra Fernandes CPhT  Prior Auth Specialist

## 2023-10-08 DIAGNOSIS — K21.01 GASTROESOPHAGEAL REFLUX DISEASE WITH ESOPHAGITIS AND HEMORRHAGE: ICD-10-CM

## 2023-10-08 DIAGNOSIS — R13.10 DYSPHAGIA, UNSPECIFIED TYPE: ICD-10-CM

## 2023-10-08 DIAGNOSIS — R41.840 POOR CONCENTRATION: ICD-10-CM

## 2023-10-08 DIAGNOSIS — F33.0 MAJOR DEPRESSIVE DISORDER, RECURRENT EPISODE, MILD (H): ICD-10-CM

## 2023-10-09 ENCOUNTER — MYC MEDICAL ADVICE (OUTPATIENT)
Dept: PULMONOLOGY | Facility: CLINIC | Age: 68
End: 2023-10-09
Payer: COMMERCIAL

## 2023-10-09 DIAGNOSIS — J84.9 ILD (INTERSTITIAL LUNG DISEASE) (H): Primary | ICD-10-CM

## 2023-10-09 DIAGNOSIS — J45.30 MILD PERSISTENT ASTHMA, UNSPECIFIED WHETHER COMPLICATED: ICD-10-CM

## 2023-10-09 DIAGNOSIS — Z86.16 PERSONAL HISTORY OF COVID-19: ICD-10-CM

## 2023-10-09 RX ORDER — ALBUTEROL SULFATE 90 UG/1
2 AEROSOL, METERED RESPIRATORY (INHALATION) EVERY 6 HOURS PRN
Qty: 18 G | Refills: 11 | Status: SHIPPED | OUTPATIENT
Start: 2023-10-09 | End: 2023-10-24

## 2023-10-09 RX ORDER — ALBUTEROL SULFATE 90 UG/1
2 AEROSOL, METERED RESPIRATORY (INHALATION) EVERY 6 HOURS
Qty: 18 G | Refills: 3 | Status: SHIPPED | OUTPATIENT
Start: 2023-10-09 | End: 2023-10-09

## 2023-10-10 RX ORDER — BUPROPION HYDROCHLORIDE 150 MG/1
TABLET ORAL
Qty: 90 TABLET | Refills: 1 | Status: SHIPPED | OUTPATIENT
Start: 2023-10-10 | End: 2024-04-10 | Stop reason: ALTCHOICE

## 2023-10-11 ENCOUNTER — HOSPITAL ENCOUNTER (OUTPATIENT)
Dept: WOUND CARE | Facility: CLINIC | Age: 68
Discharge: HOME | End: 2023-10-11
Attending: PHYSICIAN ASSISTANT | Admitting: PHYSICIAN ASSISTANT
Payer: COMMERCIAL

## 2023-10-11 ENCOUNTER — THERAPY VISIT (OUTPATIENT)
Dept: PHYSICAL THERAPY | Facility: CLINIC | Age: 68
End: 2023-10-11
Attending: PHYSICIAN ASSISTANT
Payer: COMMERCIAL

## 2023-10-11 DIAGNOSIS — M25.562 ACUTE PAIN OF LEFT KNEE: ICD-10-CM

## 2023-10-11 DIAGNOSIS — M79.604 PAIN OF RIGHT LOWER EXTREMITY: Primary | ICD-10-CM

## 2023-10-11 DIAGNOSIS — R25.2 MUSCLE CRAMP: ICD-10-CM

## 2023-10-11 PROCEDURE — 97140 MANUAL THERAPY 1/> REGIONS: CPT | Mod: GP | Performed by: PHYSICAL THERAPIST

## 2023-10-11 PROCEDURE — G0463 HOSPITAL OUTPT CLINIC VISIT: HCPCS

## 2023-10-11 NOTE — PROGRESS NOTES
"[facility]    Start of Care in [facility]: 8/23/2023   Referring Provider: Caitlin Beasley PA-C  Primary Care Provider: Caitlin Beasley PA-C  Wound Location: Right, distal/lateral lower leg     Wound Clinic Visit: 3rd return visit to the wound and Ostomy Clinic       Reason for Visit:  Wound assessment and evaluate plan of care     Subjective:  Arrives to clinic alone,  reports the following: Wound can still be tender at times and continues with the \"pulling\" sensation on occasion. Drainage remains unchanged, \"redish, brownish and yellow\".  Thinks nurse is going to be very happy with results today, Margareth feels wound has made some positive changes.        Wound History:   8/5/23- presented to ER (In [place]), after obtaining a laceration to right lower leg from a hotel bed frame.  Reports walking past metal frame when she grazed her leg on it.  States there should of been a plastic/rubber covering over the metal, but there wasn't. At ER her wound was sutured and steri stripped, with instructions to follow up with primary for suture removal.     8/9/23 Presented to Urgent care  due to concerns of infection. Wound was cleansed and she was given wound care directions.  Discharged home on oral antibiotics     8/14/23 seen PCP for suture removal, at this time was only found to have 21 sutures, but hospital reports 22 sutures being placed.        Past Medical History:  Patient Active Problem List   Diagnosis    Dermatophytosis of nail    Symptomatic menopausal or female climacteric states    Generalized osteoarthrosis, unspecified site    TAMMY (generalized anxiety disorder)    Insomnia    Malaise and fatigue    Mild persistent asthma    Cervicalgia    Allergic rhinitis    Irritable bowel syndrome    Arthralgia    DDD (degenerative disc disease), lumbar    Menopausal syndrome    Osteopenia    Hammertoe    Derangement of TMJ (temporomandibular joint)    Stenosis of lumbosacral spine    " Atrophic vaginitis    Osteoporosis    Mild major depression (H)    Knee pain, left    Menopausal flushing    Numbness of left jaw    Neck pain    TMJ (temporomandibular joint syndrome)    Thyroid fullness    Epistaxis    Major depressive disorder, recurrent episode, moderate (H)    Gastroesophageal reflux disease without esophagitis    Tremor    Cramp of limb    Personal history of COVID-19    T12 compression fracture, sequela    Pain of right lower extremity    Muscle cramp    Chondromalacia of right knee    Right sided sciatica                     Tobacco Use:     Tobacco Use      Smoking status: Former        Packs/day: 0.50        Years: 30.00        Additional pack years: 0.00        Total pack years: 15.00        Types: Cigarettes        Quit date: 3/20/1998        Years since quittin.5      Smokeless tobacco: Never      Tobacco comments: No smokers in home       Diabetic: NA  HgbA1C:   Hemoglobin A1C   Date Value Ref Range Status   2022 5.6 0.0 - 5.6 % Final     Comment:     Normal <5.7%   Prediabetes 5.7-6.4%    Diabetes 6.5% or higher     Note: Adopted from ADA consensus guidelines.     Checks Blood Glucose?:  NA Average Readings: NA      Personal/social history:  Lives at home with . Retired, No paid help    Objective:   Current treatment plan: Triad, adaptic, gauze, rolled gauze and Coban  Last changed: This morning    Wound #1 Right Lower Lateral Leg, Laceration  Stage/tissue depth: Full Thickness  0 cm L x 0 cm W x 0 cm D-   Tunneling: none  Undermining: none  Wound bed type/amount: 100% scarred over; Not fluctuant  Wound Edges: NA, healed  Periwound: Localized edema  Drainage: none  Odor: none  Pain: Denies   Photos from today's visit 10/11/23   Photo from 23   Photos 9/15/23   Photo from 23   Photos from 23    Photos from initial visit 23  Photo after sutures removed 23  Photo from day of injury 23    Dorsalis Pedal Pulse: palpable: NA doppler: NA  phasic  Posterior Tibial Pulse: palpable: NA doppler: NA phasic  Hair growth: Present to lower leg  Capillary Refill: 3 seconds  Feet/toes color: not assessed  Nails: Not assessed  L Leg: Edema . Ankle circumference  cm. Calf circumference  cm.  R Leg: Edema nonpitting. Ankle circumference 29 cm. Calf circumference 34 cm. Ankle Not assessed today, WOC nurse error    Mobility: Ambulatory   Current offloading/footwear: casual sneakers  Sensation: Full    Other callusing/areas of concern: none    Diet: Regular      Assessment:  Arrives to clinic with dressing in place, this was removed, and no drainage noted.  Did have excessive Triad paste to gauze.  Had scant amount of dry serosanguinous but this was easily removed with cleaning with normal saline leaving a healed wound.  Wound bed has newly, fragile scar tissue.     Factors impacting wound healing:   Poor nutrition: inadequate supply of protein, carbohydrates, fatty acids, and trace elements essential for all phases of wound healing-   Delayed healing as part of normal aging process- >65, healing slowed  Reduced Blood Supply: inadequate perfusion to heal wound- appears adequate, no know ABIs  Medication: none  Chemotherapy: suppresses the immune system and inflammatory response-NA  Radiotherapy: increases production of free radical which damage cells-NA  Psychological stress: Delayed healing  Obesity: decreases tissue perfusion- BMI WNL  Infection: prolongs inflammatory phase, uses vital nutrients, impairs epithelialization and releases toxins- Hx of oral antibiotics. No signs of infection at this time  Underlying Disease:   Maceration: reduces wound tensile strength and inhibits epithelial migration- none at time of assessment  Patient compliance- appears willing to heal and compy  Unrelieved pressure- NA  Immobility- NA, mobile with no alterations  Substance abuse: None      Plan:  Wound has healed but covered with newly, fragile scar tissue. Recommend she cover  it with dressing if she will be wearing a thicker, more dense pant material.  Do not want the pants to cause increase friction to area and re open wound.  Patient will follow up in little less then 2 weeks to assure wound remains healed.      Discussed/Education provided: plan of care with rationale;       Topical care: Wound/surrounding skin :    Shower per your normal  Pat dry  Apply composite Dressing as needed  No moisturizer for the next 2 weeks    Patient and her  are able to perform cares/has been caring for wound.    Additional recommendations: none    The following discharge instructions were reviewed with and sent home with the patient:  Declined     The following supplies were sent home with the patient:  Few primapore dressings     Return visit: 10/24/23 @ 1 pm    Verbal, written, & demonstrative education provided.  Face to face time: approximately 35 minutes  Procedure: ROSE Parra RN, CWOCN  [phone]

## 2023-10-11 NOTE — PROGRESS NOTES
DISCHARGE  Reason for Discharge: Patient is doing a lot better and is having heart valve surgery, can cont with PT HEP on own now.         Discharge Plan: Patient to continue home program.   10/11/23 0500   Appointment Info   Signing clinician's name / credentials Leana James PT   Total/Authorized Visits Medicare   Visits Used 10/10   Medical Diagnosis Right leg pain (M79.604)  - Primary     Acute pain of right knee (M25.561)     Muscle cramp (R25.2)   PT Tx Diagnosis RLE pain, sciatica   Quick Adds Certification   Progress Note/Certification   Start of Care Date 07/03/23   Onset of illness/injury or Date of Surgery 05/10/23   Therapy Frequency 1-2 x per wk   Predicted Duration 10 wks   Certification date from 08/15/23   Certification date to 11/12/23   Progress Note Due Date 11/12/23   GOALS   PT Goals 2   PT Goal 1   Goal Identifier 1   Goal Description Patient has full functional mobility, is able to garden and go for walks with no RLE pain and no LBP   Rationale to maximize safety and independence with performance of ADLs and functional tasks   Goal Progress This is 80% better and has done some gardening this wk   Target Date 11/12/23   PT Goal 2   Goal Identifier 2   Goal Description 50% less pain in the upper back and neck so that pt can bend again and do her gardening.   Target Date 11/12/23   Date Met 10/11/23   Subjective Report   Subjective Report Patient is having heart valve surgery on 10/27. The leg is healing well. Less over all LBP.   Objective Measure 2   Objective Measure Pain   Details neck and UT #3, LBP #4   Manual Therapy   Manual Therapy: Mobilization, MFR, MLD, friction massage minutes ([id number]) 40   Manual Therapy 1 - Details MFR upper back , UT's, cervical , LB and kinesio tape to the low back as previous.   Patient Response/Progress see DC pt having heart valve surgery   Education   Learner/Method Patient;Listening;Reading;Demonstration   Plan   Home program walk and prone lying   Plan  for next session DC today   Total Session Time   Timed Code Treatment Minutes 40   Total Treatment Time (sum of timed and untimed services) 40         Referring Provider:  Caitlin Beasley

## 2023-10-16 ENCOUNTER — TELEPHONE (OUTPATIENT)
Dept: PULMONOLOGY | Facility: CLINIC | Age: 68
End: 2023-10-16
Payer: COMMERCIAL

## 2023-10-16 ASSESSMENT — PATIENT HEALTH QUESTIONNAIRE - PHQ9
SUM OF ALL RESPONSES TO PHQ QUESTIONS 1-9: 5
10. IF YOU CHECKED OFF ANY PROBLEMS, HOW DIFFICULT HAVE THESE PROBLEMS MADE IT FOR YOU TO DO YOUR WORK, TAKE CARE OF THINGS AT HOME, OR GET ALONG WITH OTHER PEOPLE: SOMEWHAT DIFFICULT
SUM OF ALL RESPONSES TO PHQ QUESTIONS 1-9: 5

## 2023-10-16 ASSESSMENT — ASTHMA QUESTIONNAIRES: ACT_TOTALSCORE: 20

## 2023-10-16 NOTE — TELEPHONE ENCOUNTER
Patient Contacted for the patient to call back and schedule the following:    Appointment type: BONIFACIO  Provider: GUS  Return date: 01/10/2024  Specialty phone number: [phone]  Additional appointment(s) needed: N/A  Additonal Notes: N/A

## 2023-10-17 ENCOUNTER — OFFICE VISIT (OUTPATIENT)
Dept: FAMILY MEDICINE | Facility: CLINIC | Age: 68
End: 2023-10-17
Payer: COMMERCIAL

## 2023-10-17 VITALS
DIASTOLIC BLOOD PRESSURE: 58 MMHG | BODY MASS INDEX: 24.29 KG/M2 | SYSTOLIC BLOOD PRESSURE: 118 MMHG | WEIGHT: 132 LBS | TEMPERATURE: 97.9 F | RESPIRATION RATE: 18 BRPM | OXYGEN SATURATION: 97 % | HEIGHT: 62 IN | HEART RATE: 93 BPM

## 2023-10-17 DIAGNOSIS — Z01.818 PREOP GENERAL PHYSICAL EXAM: Primary | ICD-10-CM

## 2023-10-17 DIAGNOSIS — J45.30 MILD PERSISTENT ASTHMA, UNSPECIFIED WHETHER COMPLICATED: ICD-10-CM

## 2023-10-17 DIAGNOSIS — F33.1 MAJOR DEPRESSIVE DISORDER, RECURRENT EPISODE, MODERATE (H): ICD-10-CM

## 2023-10-17 DIAGNOSIS — M54.12 CERVICAL RADICULOPATHY: ICD-10-CM

## 2023-10-17 DIAGNOSIS — I34.0 SEVERE MITRAL VALVE REGURGITATION: ICD-10-CM

## 2023-10-17 DIAGNOSIS — R06.02 EXERTIONAL SHORTNESS OF BREATH: ICD-10-CM

## 2023-10-17 DIAGNOSIS — F41.1 GAD (GENERALIZED ANXIETY DISORDER): ICD-10-CM

## 2023-10-17 DIAGNOSIS — M25.511 RIGHT SHOULDER PAIN, UNSPECIFIED CHRONICITY: ICD-10-CM

## 2023-10-17 DIAGNOSIS — J84.9 ILD (INTERSTITIAL LUNG DISEASE) (H): ICD-10-CM

## 2023-10-17 DIAGNOSIS — R25.1 TREMOR: ICD-10-CM

## 2023-10-17 DIAGNOSIS — S81.801D WOUND OF RIGHT LOWER EXTREMITY, SUBSEQUENT ENCOUNTER: ICD-10-CM

## 2023-10-17 LAB
ABO/RH(D): NORMAL
ANTIBODY SCREEN: NEGATIVE
SPECIMEN EXPIRATION DATE: NORMAL

## 2023-10-17 PROCEDURE — 93000 ELECTROCARDIOGRAM COMPLETE: CPT | Performed by: PHYSICIAN ASSISTANT

## 2023-10-17 PROCEDURE — 99214 OFFICE O/P EST MOD 30 MIN: CPT | Performed by: PHYSICIAN ASSISTANT

## 2023-10-17 RX ORDER — VIT A/VIT C/VIT E/ZINC/COPPER 2148-113
1 TABLET ORAL 2 TIMES DAILY
COMMUNITY
Start: 2023-10-17 | End: 2024-08-09

## 2023-10-17 RX ORDER — MULTIVIT WITH MINERALS/LUTEIN
250 TABLET ORAL DAILY
COMMUNITY
Start: 2023-10-17

## 2023-10-17 NOTE — PROGRESS NOTES
[facility]  [address], [address]  [place] [address]  Phone: [phone]  Fax: [phone]  Primary Provider: Hafsa Arroyo  Pre-op Performing Provider: HAFSA ARROYO      PREOPERATIVE EVALUATION:  Today's date: 10/17/2023    Margareth is a 68 year old female who presents for a preoperative evaluation.      10/17/2023     1:23 PM   Additional Questions   Roomed by Irena BEAUCHAMP CMA   Accompanied by self         10/17/2023     1:23 PM   Patient Reported Additional Medications   Patient reports taking the following new medications multivitamin, mag SRT, pureway-c, preservision       Surgical Information:  Surgery/Procedure: MINIMALLY INVASIVE MITRAL VALVE REPAIR POSSIBLE REPLACEMENT   Surgery Location: [facility]   Surgeon: Sabas Meredith   Surgery Date: 10/26/23  Time of Surgery: TBD   Where patient plans to recover: At home with family  Fax number for surgical facility: Note does not need to be faxed, will be available electronically in Epic.    Assessment & Plan     The proposed surgical procedure is considered INTERMEDIATE risk.    Preop general physical exam  Pt is optimized for procedure as above, she has pre-op labs and imaging CT angio MICS scheduled for tomorrow .    Severe mitral valve regurgitation  Procedure planned as above  - EKG 12-lead complete w/read - Clinics  - EKG 12-lead complete w/read - Clinics    Exertional shortness of breath  Related to Mitral valve regurg, no sign of asthma exacerbation at this time, lungs are clear    Right shoulder pain, unspecified chronicity  Pt has a history of right shoulder and neck pain, recently completed PT for this    Cervical radiculopathy  History of neck pain    ILD (interstitial lung disease) (H)  Following with pulmonology, pt reports lungs are healthy and she may proceed with surgery    Mild persistent asthma, unspecified whether complicated  Stable, well controlled     Tremor  Stable, unchanged    Major depressive  disorder, recurrent episode, moderate (H)  Stable, continue current meds    TAMMY (generalized anxiety disorder)  Stable, slightly worse with upcoming procedure, no changes to meds at this time    Wound of right lower extremity, subsequent encounter  Improving nicely, no sign of infection, pt has a final wound follow up planned       Risks and Recommendations:  The patient has the following additional risks and recommendations for perioperative complications:  Cardiovascular:   - has upcoming appointment for CT angio MICS tomorrow  Pulmonary:    - Incentive spirometry post-op    Antiplatelet or Anticoagulation Medication Instructions:   - Patient is on no antiplatelet or anticoagulation medications.    Additional Medication Instructions:  Patient is to take all scheduled medications on the day of surgery EXCEPT for modifications listed below:   - naproxen (Aleve, Naprosyn): HOLD 4 days before surgery.     RECOMMENDATION:  APPROVAL GIVEN to proceed with proposed procedure, without further diagnostic evaluation.      Subjective       HPI related to upcoming procedure: Patient with a history of new onset murmur over the summer.  Work-up indicated severe mitral valve regurgitation.  She underwent coronary angiogram which was negative for significant coronary disease August 17, 2023.  She has met with pulmonology as well due to her significant symptoms of dyspnea on exertion even with the smallest bit of exertion.  He had diagnosed her with interstitial lung disease consistent with sequelae of COVID-19 and asthma.  He comments in his documentation from September 6, 2023 her interstitial lung abnormalities would not be a contraindication to pursue a surgical intervention by cardiology or cardiovascular surgery.        10/16/2023     9:34 PM   Preop Questions   1. Have you ever had a heart attack or stroke? No   2. Have you ever had surgery on your heart or blood vessels, such as a stent placement, a coronary artery bypass,  or surgery on an artery in your head, neck, heart, or legs? No   3. Do you have chest pain with activity? No   4. Do you have a history of  heart failure? YES - found to have Mitral regurgitation with preserved EF, no current signs or symptoms of HF   5. Do you currently have a cold, bronchitis or symptoms of other infection? No   6. Do you have a cough, shortness of breath, or wheezing? No   7. Do you or anyone in your family have previous history of blood clots? No   8. Do you or does anyone in your family have a serious bleeding problem such as prolonged bleeding following surgeries or cuts? No   9. Have you ever had problems with anemia or been told to take iron pills? No   10. Have you had any abnormal blood loss such as black, tarry or bloody stools, or abnormal vaginal bleeding? No   11. Have you ever had a blood transfusion? No   12. Are you willing to have a blood transfusion if it is medically needed before, during, or after your surgery? Yes   13. Have you or any of your relatives ever had problems with anesthesia? No   14. Do you have sleep apnea, excessive snoring or daytime drowsiness? YES - daytime drowsiness she is not sleeping well due to anxiety from the upcoming procedure    14a. Do you have a CPAP machine? No   15. Do you have any artifical heart valves or other implanted medical devices like a pacemaker, defibrillator, or continuous glucose monitor? No   16. Do you have artificial joints? No   17. Are you allergic to latex? No       Health Care Directive:  Patient has a Health Care Directive on file      Preoperative Review of :   reviewed - no record of controlled substances prescribed.      Status of Chronic Conditions:  See problem list for active medical problems.  Problems all longstanding and stable, except as noted/documented.  See ROS for pertinent symptoms related to these conditions.    ASTHMA - Patient has a longstanding history of moderate-severe Asthma . Patient has been doing  well overall noting SOB and continues on medication regimen consisting of inhalers per med list without adverse reactions or side effects.     DEPRESSION - Patient has a long history of Depression of moderate severity requiring medication for control with recent symptoms being stable..Current symptoms of depression include insomnia, fatigue.     Review of Systems  CONSTITUTIONAL: NEGATIVE for fever, chills, change in weight  INTEGUMENTARY/SKIN: NEGATIVE for worrisome rashes, moles or lesions  EYES: POSITIVE for floaters, unchanged from previous but has upcoming eye exam  ENT/MOUTH: NEGATIVE for ear, mouth and throat problems  RESP:POSITIVE for dyspnea on exertion  CV: NEGATIVE for chest pain, palpitations or peripheral edema  GI: NEGATIVE for nausea, abdominal pain, heartburn, or change in bowel habits  : NEGATIVE for frequency, dysuria, or hematuria  MUSCULOSKELETAL: NEGATIVE for significant arthralgias or myalgia  NEURO: NEGATIVE for weakness, dizziness or paresthesias  ENDOCRINE: NEGATIVE for temperature intolerance, skin/hair changes  HEME: NEGATIVE for bleeding problems  PSYCHIATRIC: POSITIVE foranxiety, depressed mood, and well controlled though anxiety is elevated based on her upcoming appt    Patient Active Problem List    Diagnosis Date Noted    ILD (interstitial lung disease) (H) 10/17/2023     Priority: Medium    Chondromalacia of right knee 07/19/2023     Priority: Medium    Right sided sciatica 07/19/2023     Priority: Medium    Pain of right lower extremity 07/03/2023     Priority: Medium    Muscle cramp 07/03/2023     Priority: Medium    Personal history of COVID-19 01/19/2022     Priority: Medium    T12 compression fracture, sequela 01/19/2022     Priority: Medium    Gastroesophageal reflux disease without esophagitis 11/30/2016     Priority: Medium    Tremor 11/30/2016     Priority: Medium    Cramp of limb 11/30/2016     Priority: Medium    Major depressive disorder, recurrent episode, moderate  (H) 10/13/2015     Priority: Medium    Epistaxis 08/15/2013     Priority: Medium     Do you wish to do the replacement in the background? yes        Numbness of left jaw 08/07/2013     Priority: Medium    Neck pain 08/07/2013     Priority: Medium    TMJ (temporomandibular joint syndrome) 08/07/2013     Priority: Medium    Thyroid fullness 08/07/2013     Priority: Medium    Menopausal flushing 05/10/2012     Priority: Medium    Knee pain, left 03/22/2012     Priority: Medium    Mild major depression (H) 11/21/2011     Priority: Medium    Osteoporosis 12/30/2010     Priority: Medium    Atrophic vaginitis 12/07/2010     Priority: Medium    Stenosis of lumbosacral spine      Priority: Medium     l4-5 and l5-s1      Derangement of TMJ (temporomandibular joint) 03/04/2010     Priority: Medium    Hammertoe      Priority: Medium    Osteopenia      Priority: Medium    Menopausal syndrome 12/06/2008     Priority: Medium    DDD (degenerative disc disease), lumbar 06/24/2008     Priority: Medium    Arthralgia 06/12/2008     Priority: Medium    Irritable bowel syndrome 02/18/2008     Priority: Medium    Allergic rhinitis 11/25/2005     Priority: Medium     Problem list name updated by automated process. Provider to review      Mild persistent asthma 10/25/2005     Priority: Medium    Cervicalgia 10/25/2005     Priority: Medium    Malaise and fatigue 04/26/2005     Priority: Medium     Problem list name updated by automated process. Provider to review      Insomnia 12/09/2004     Priority: Medium     Problem list name updated by automated process. Provider to review      TAMMY (generalized anxiety disorder) 09/12/2003     Priority: Medium    Generalized osteoarthrosis, unspecified site 12/19/2002     Priority: Medium    Symptomatic menopausal or female climacteric states 12/15/2002     Priority: Medium    Dermatophytosis of nail 08/20/2002     Priority: Medium      Past Medical History:   Diagnosis Date    Abnormal Papanicolaou  smear of vagina and vaginal HPV 12/2002    ASCUS (HPV neg). 3/03 WNL but no transitional zome - repeat 3months    Allergic rhinitis, cause unspecified     Arthritis     Backache, unspecified     Carpal tunnel syndrome     s/p repair    Cervicalgia     Chronic kidney disease, stage 3 (H) 07/14/2021    Depressive disorder     Depressive disorder, not elsewhere classified     doing well on Wellbutrin    Derangement of TMJ (temporomandibular joint) 03/2010    internal derangement    Dermatophytosis of nail     Diaphragmatic hernia without mention of obstruction or gangrene 09/2007    small hiatal hernia    Diffuse cystic mastopathy 2000    FCBD    Esophageal reflux 04/2005    Hammertoe 10/2009    see podiatry consult    Headache(784.0)     Muscle contrature headaches    Intramural leiomyoma of uterus 07/2007    on US    Irregular menstrual cycle     better with BCP -also PMS is better with BCP    IRRITABLE COLON 02/18/2008    Malaise and fatigue     Menopause     lmp 2/2008    Mild persistent asthma     Mitral valve disease     Mole (skin) 2003    9 x 6 mm rt ant chest    Motion sickness     Neuroma 11/2008    lt foot    Osteopenia 12/2008    Other malaise and fatigue 04/2005    stress    Personal history of tobacco use, presenting hazards to health     Quit in 1998    Solitary cyst of breast 12/2005    lt 6 oclock    Stenosis of lumbosacral spine     l4-5 and l5-s1    Symptomatic menopausal or female climacteric states 12/2002    perimenopausal. LMP 2/2008    Tachycardia, paroxysmal (H) 11/24/2020     Past Surgical History:   Procedure Laterality Date    BIOPSY  4/10/2015    BL DUPLEX LO EXTREM ART UNILAT/LTD  4/3008    lower extr arterial doppler -no stenosis    C DEXA,BONE DENSITY,APPENDICULR SKELTN  12/2008    osteopenia    COLONOSCOPY  10/22/2007    bx benign    COLONOSCOPY N/A 4/10/2015    Procedure: COMBINED COLONOSCOPY, SINGLE OR MULTIPLE BIOPSY/POLYPECTOMY BY BIOPSY;  Surgeon: Cl Wagner MD;  Location:   GI    CV CORONARY ANGIOGRAM N/A 8/17/2023    Procedure: Coronary Angiogram;  Surgeon: Liza Ford MD;  Location: [facility] CARDIAC CATH LAB    ESOPHAGOSCOPY, GASTROSCOPY, DUODENOSCOPY (EGD), COMBINED N/A 1/31/2018    Procedure: COMBINED ESOPHAGOSCOPY, GASTROSCOPY, DUODENOSCOPY (EGD);  COMBINED ESOPHAGOSCOPY, GASTROSCOPY, DUODENOSCOPY (EGD);  Surgeon: Tirso Duran MD;  Location:  GI    ESOPHAGOSCOPY, GASTROSCOPY, DUODENOSCOPY (EGD), COMBINED N/A 2/1/2023    Procedure: ESOPHAGOGASTRODUODENOSCOPY (EGD);  Surgeon: Lui Vences MD;  Location:  GI    EYE SURGERY      HC PART REMV BONE METATARSAL HEAD,EA  01/11/10    Right foot plantar plate tear. Also correct hammertoe, 2nd digit & varicose vein ligation, heel.    HC REVISE MEDIAN N/CARPAL TUNNEL SURG      Left in 1997, right in 1999    INJ, ANES/STER, FACET LUMB/SAC  2010    Left L5 nerve root injection    INJECT EPIDURAL CERVICAL Right 9/29/2022    Procedure: Cervical 7-Thoracic 1 Interlaminar epidural steroid injection using fluoroscopic guidance with contrast dye, Right;  Surgeon: Tyler Bay MD;  Location:  OR    INJECT EPIDURAL TRANSFORAMINAL  10/09/2013    [facility]    INJECT JOINT SACROILIAC  3/19/14,4/21/14    [facility]    [facility] NONSPECIFIC PROCEDURE  2009    Metatarsal phalangeal joint injection    [facility] COLONOSCOPY THRU STOMA, DIAGNOSTIC  7/2002    bx. benign - flex sig in 5 yrs  or colonoscopy in 10 yrs    [facility] ECHO HEART XTHORACIC, STRESS/REST  2/2005    WNL    [facility] NCS MOTOR W/O F-WAVE, EACH NERVE  7/2008    CTS     Current Outpatient Medications   Medication Sig Dispense Refill    acetaminophen (TYLENOL) 650 MG CR tablet Take 650 mg by mouth every 8 hours as needed for mild pain or fever      ALLEGRA 180 MG OR TABS Take 180 mg by mouth daily  30 0    amitriptyline (ELAVIL) 10 MG tablet TAKE 3 TO 4 TABLETS AT BEDTIME 270 tablet 3    buPROPion (WELLBUTRIN XL) 150 MG 24 hr tablet TAKE 1 TABLET EVERY  MORNING 90 tablet 1    CALCIUM /VITAMIN D TABS   OR Take 1 tablet by mouth daily       famotidine (PEPCID) 20 MG tablet Take 20 mg by mouth daily as needed      magnesium aspartate (MAGINEX) 615 MG EC tablet Take 1 tablet (615 mg) by mouth daily      meclizine (ANTIVERT) 25 MG tablet Take 25 mg by mouth daily as needed for dizziness      Melatonin 10 MG TABS tablet Take 10 mg by mouth nightly as needed for sleep      mometasone-formoterol (DULERA) 100-5 MCG/ACT oral inhaler Inhale 1 puff into the lungs 2 times daily       Multiple Vitamins-Minerals (PRESERVISION AREDS) TABS Take 1 tablet by mouth daily      multivitamin w/minerals (THERA-VIT-M) tablet Take 1 tablet by mouth daily RAW Brand      naproxen (NAPROSYN) 500 MG tablet Take 500 mg by mouth as needed      omeprazole (PRILOSEC) 20 MG DR capsule TAKE 1 CAPSULE DAILY 90 capsule 3    PROAIR  (90 Base) MCG/ACT inhaler Inhale 2 puffs into the lungs every 6 hours as needed for shortness of breath, wheezing or cough 18 g 11    Simethicone (GAS-X PO) Take 160 mg by mouth 4 times daily as needed for intestinal gas       triamcinolone (NASACORT) 55 MCG/ACT nasal inhaler Spray 2 sprays into both nostrils 2 times daily       vitamin C (ASCORBIC ACID) 250 MG tablet Take 1 tablet (250 mg) by mouth daily         Allergies   Allergen Reactions    Penicillins Itching     Vaginal itching - Yeast infection afterward        Social History     Tobacco Use    Smoking status: Former     Packs/day: 0.50     Years: 30.00     Additional pack years: 0.00     Total pack years: 15.00     Types: Cigarettes     Quit date: 3/20/1998     Years since quittin.5    Smokeless tobacco: Never    Tobacco comments:     No smokers in home   Substance Use Topics    Alcohol use: Yes     Alcohol/week: 6.7 standard drinks of alcohol     Comment: 2x/week       History   Drug Use No         Objective     /58   Pulse 93   Temp 97.9  F (36.6  C) (Temporal)   Resp 18   Ht 1.562 m (5'  "1.5\")   Wt 59.9 kg (132 lb)   LMP 02/26/2008 (Approximate)   SpO2 97%   BMI 24.54 kg/m      Physical Exam    GENERAL APPEARANCE: healthy, alert and no distress     EYES: EOMI, PERRL     HENT: ear canals and TM's normal and nose and mouth without ulcers or lesions     NECK: no adenopathy, no asymmetry, masses, or scars and thyroid normal to palpation     RESP: lungs clear to auscultation - no rales, rhonchi or wheezes     CV: regular rates and rhythm, normal S1 S2, no S3 or S4 and no murmur, click or rub     ABDOMEN:  soft, nontender, no HSM or masses and bowel sounds normal     MS: extremities normal- no gross deformities noted, no evidence of inflammation in joints, FROM in all extremities.     MS: right lower leg wound dramatically improved from initial injury, appears healed over with minimal scabbing, no expanding erythema noted     SKIN: no suspicious lesions or rashes     NEURO: Normal strength and tone, sensory exam grossly normal, mentation intact and speech normal     PSYCH: mentation appears normal. and affect normal/bright     LYMPHATICS: No cervical adenopathy    Recent Labs   Lab Test 08/17/23  0711 05/31/23  1728 04/07/23  1023 01/19/22  1414   HGB 13.5 12.5   < >  --     212   < >  --    INR 1.07  --   --   --     138   < >  --    POTASSIUM 3.8 4.1   < >  --    CR 0.90 0.95   < >  --    A1C  --   --   --  5.6    < > = values in this interval not displayed.        Diagnostics:  No labs ordered today, she has lab appointment for tomorrow for pre-op labs per surgeon   EKG: appears normal, NSR, normal axis, normal intervals, no acute ST/T changes c/w ischemia, no LVH by voltage criteria, unchanged from previous tracings    Revised Cardiac Risk Index (RCRI):  The patient has the following serious cardiovascular risks for perioperative complications:   - No serious cardiac risks = 0 points     RCRI Interpretation: 0 points: Class I (very low risk - 0.4% complication rate)         Signed " Electronically by: Caitlin Beasley PA-C  Copy of this evaluation report is provided to requesting physician.

## 2023-10-17 NOTE — H&P (VIEW-ONLY)
[facility]  [address], [address]  [place] [address]  Phone: [phone]  Fax: [phone]  Primary Provider: Hafsa Arroyo  Pre-op Performing Provider: HAFSA ARROYO      PREOPERATIVE EVALUATION:  Today's date: 10/17/2023    Margareth is a 68 year old female who presents for a preoperative evaluation.      10/17/2023     1:23 PM   Additional Questions   Roomed by Irena BEAUCHAMP CMA   Accompanied by self         10/17/2023     1:23 PM   Patient Reported Additional Medications   Patient reports taking the following new medications multivitamin, mag SRT, pureway-c, preservision       Surgical Information:  Surgery/Procedure: MINIMALLY INVASIVE MITRAL VALVE REPAIR POSSIBLE REPLACEMENT   Surgery Location: [facility]   Surgeon: Sabas Meredith   Surgery Date: 10/26/23  Time of Surgery: TBD   Where patient plans to recover: At home with family  Fax number for surgical facility: Note does not need to be faxed, will be available electronically in Epic.    Assessment & Plan     The proposed surgical procedure is considered INTERMEDIATE risk.    Preop general physical exam  Pt is optimized for procedure as above, she has pre-op labs and imaging CT angio MICS scheduled for tomorrow .    Severe mitral valve regurgitation  Procedure planned as above  - EKG 12-lead complete w/read - Clinics  - EKG 12-lead complete w/read - Clinics    Exertional shortness of breath  Related to Mitral valve regurg, no sign of asthma exacerbation at this time, lungs are clear    Right shoulder pain, unspecified chronicity  Pt has a history of right shoulder and neck pain, recently completed PT for this    Cervical radiculopathy  History of neck pain    ILD (interstitial lung disease) (H)  Following with pulmonology, pt reports lungs are healthy and she may proceed with surgery    Mild persistent asthma, unspecified whether complicated  Stable, well controlled     Tremor  Stable, unchanged    Major depressive  disorder, recurrent episode, moderate (H)  Stable, continue current meds    TAMMY (generalized anxiety disorder)  Stable, slightly worse with upcoming procedure, no changes to meds at this time    Wound of right lower extremity, subsequent encounter  Improving nicely, no sign of infection, pt has a final wound follow up planned       Risks and Recommendations:  The patient has the following additional risks and recommendations for perioperative complications:  Cardiovascular:   - has upcoming appointment for CT angio MICS tomorrow  Pulmonary:    - Incentive spirometry post-op    Antiplatelet or Anticoagulation Medication Instructions:   - Patient is on no antiplatelet or anticoagulation medications.    Additional Medication Instructions:  Patient is to take all scheduled medications on the day of surgery EXCEPT for modifications listed below:   - naproxen (Aleve, Naprosyn): HOLD 4 days before surgery.     RECOMMENDATION:  APPROVAL GIVEN to proceed with proposed procedure, without further diagnostic evaluation.      Subjective       HPI related to upcoming procedure: Patient with a history of new onset murmur over the summer.  Work-up indicated severe mitral valve regurgitation.  She underwent coronary angiogram which was negative for significant coronary disease August 17, 2023.  She has met with pulmonology as well due to her significant symptoms of dyspnea on exertion even with the smallest bit of exertion.  He had diagnosed her with interstitial lung disease consistent with sequelae of COVID-19 and asthma.  He comments in his documentation from September 6, 2023 her interstitial lung abnormalities would not be a contraindication to pursue a surgical intervention by cardiology or cardiovascular surgery.        10/16/2023     9:34 PM   Preop Questions   1. Have you ever had a heart attack or stroke? No   2. Have you ever had surgery on your heart or blood vessels, such as a stent placement, a coronary artery bypass,  or surgery on an artery in your head, neck, heart, or legs? No   3. Do you have chest pain with activity? No   4. Do you have a history of  heart failure? YES - found to have Mitral regurgitation with preserved EF, no current signs or symptoms of HF   5. Do you currently have a cold, bronchitis or symptoms of other infection? No   6. Do you have a cough, shortness of breath, or wheezing? No   7. Do you or anyone in your family have previous history of blood clots? No   8. Do you or does anyone in your family have a serious bleeding problem such as prolonged bleeding following surgeries or cuts? No   9. Have you ever had problems with anemia or been told to take iron pills? No   10. Have you had any abnormal blood loss such as black, tarry or bloody stools, or abnormal vaginal bleeding? No   11. Have you ever had a blood transfusion? No   12. Are you willing to have a blood transfusion if it is medically needed before, during, or after your surgery? Yes   13. Have you or any of your relatives ever had problems with anesthesia? No   14. Do you have sleep apnea, excessive snoring or daytime drowsiness? YES - daytime drowsiness she is not sleeping well due to anxiety from the upcoming procedure    14a. Do you have a CPAP machine? No   15. Do you have any artifical heart valves or other implanted medical devices like a pacemaker, defibrillator, or continuous glucose monitor? No   16. Do you have artificial joints? No   17. Are you allergic to latex? No       Health Care Directive:  Patient has a Health Care Directive on file      Preoperative Review of :   reviewed - no record of controlled substances prescribed.      Status of Chronic Conditions:  See problem list for active medical problems.  Problems all longstanding and stable, except as noted/documented.  See ROS for pertinent symptoms related to these conditions.    ASTHMA - Patient has a longstanding history of moderate-severe Asthma . Patient has been doing  well overall noting SOB and continues on medication regimen consisting of inhalers per med list without adverse reactions or side effects.     DEPRESSION - Patient has a long history of Depression of moderate severity requiring medication for control with recent symptoms being stable..Current symptoms of depression include insomnia, fatigue.     Review of Systems  CONSTITUTIONAL: NEGATIVE for fever, chills, change in weight  INTEGUMENTARY/SKIN: NEGATIVE for worrisome rashes, moles or lesions  EYES: POSITIVE for floaters, unchanged from previous but has upcoming eye exam  ENT/MOUTH: NEGATIVE for ear, mouth and throat problems  RESP:POSITIVE for dyspnea on exertion  CV: NEGATIVE for chest pain, palpitations or peripheral edema  GI: NEGATIVE for nausea, abdominal pain, heartburn, or change in bowel habits  : NEGATIVE for frequency, dysuria, or hematuria  MUSCULOSKELETAL: NEGATIVE for significant arthralgias or myalgia  NEURO: NEGATIVE for weakness, dizziness or paresthesias  ENDOCRINE: NEGATIVE for temperature intolerance, skin/hair changes  HEME: NEGATIVE for bleeding problems  PSYCHIATRIC: POSITIVE foranxiety, depressed mood, and well controlled though anxiety is elevated based on her upcoming appt    Patient Active Problem List    Diagnosis Date Noted    ILD (interstitial lung disease) (H) 10/17/2023     Priority: Medium    Chondromalacia of right knee 07/19/2023     Priority: Medium    Right sided sciatica 07/19/2023     Priority: Medium    Pain of right lower extremity 07/03/2023     Priority: Medium    Muscle cramp 07/03/2023     Priority: Medium    Personal history of COVID-19 01/19/2022     Priority: Medium    T12 compression fracture, sequela 01/19/2022     Priority: Medium    Gastroesophageal reflux disease without esophagitis 11/30/2016     Priority: Medium    Tremor 11/30/2016     Priority: Medium    Cramp of limb 11/30/2016     Priority: Medium    Major depressive disorder, recurrent episode, moderate  (H) 10/13/2015     Priority: Medium    Epistaxis 08/15/2013     Priority: Medium     Do you wish to do the replacement in the background? yes        Numbness of left jaw 08/07/2013     Priority: Medium    Neck pain 08/07/2013     Priority: Medium    TMJ (temporomandibular joint syndrome) 08/07/2013     Priority: Medium    Thyroid fullness 08/07/2013     Priority: Medium    Menopausal flushing 05/10/2012     Priority: Medium    Knee pain, left 03/22/2012     Priority: Medium    Mild major depression (H) 11/21/2011     Priority: Medium    Osteoporosis 12/30/2010     Priority: Medium    Atrophic vaginitis 12/07/2010     Priority: Medium    Stenosis of lumbosacral spine      Priority: Medium     l4-5 and l5-s1      Derangement of TMJ (temporomandibular joint) 03/04/2010     Priority: Medium    Hammertoe      Priority: Medium    Osteopenia      Priority: Medium    Menopausal syndrome 12/06/2008     Priority: Medium    DDD (degenerative disc disease), lumbar 06/24/2008     Priority: Medium    Arthralgia 06/12/2008     Priority: Medium    Irritable bowel syndrome 02/18/2008     Priority: Medium    Allergic rhinitis 11/25/2005     Priority: Medium     Problem list name updated by automated process. Provider to review      Mild persistent asthma 10/25/2005     Priority: Medium    Cervicalgia 10/25/2005     Priority: Medium    Malaise and fatigue 04/26/2005     Priority: Medium     Problem list name updated by automated process. Provider to review      Insomnia 12/09/2004     Priority: Medium     Problem list name updated by automated process. Provider to review      TAMMY (generalized anxiety disorder) 09/12/2003     Priority: Medium    Generalized osteoarthrosis, unspecified site 12/19/2002     Priority: Medium    Symptomatic menopausal or female climacteric states 12/15/2002     Priority: Medium    Dermatophytosis of nail 08/20/2002     Priority: Medium      Past Medical History:   Diagnosis Date    Abnormal Papanicolaou  smear of vagina and vaginal HPV 12/2002    ASCUS (HPV neg). 3/03 WNL but no transitional zome - repeat 3months    Allergic rhinitis, cause unspecified     Arthritis     Backache, unspecified     Carpal tunnel syndrome     s/p repair    Cervicalgia     Chronic kidney disease, stage 3 (H) 07/14/2021    Depressive disorder     Depressive disorder, not elsewhere classified     doing well on Wellbutrin    Derangement of TMJ (temporomandibular joint) 03/2010    internal derangement    Dermatophytosis of nail     Diaphragmatic hernia without mention of obstruction or gangrene 09/2007    small hiatal hernia    Diffuse cystic mastopathy 2000    FCBD    Esophageal reflux 04/2005    Hammertoe 10/2009    see podiatry consult    Headache(784.0)     Muscle contrature headaches    Intramural leiomyoma of uterus 07/2007    on US    Irregular menstrual cycle     better with BCP -also PMS is better with BCP    IRRITABLE COLON 02/18/2008    Malaise and fatigue     Menopause     lmp 2/2008    Mild persistent asthma     Mitral valve disease     Mole (skin) 2003    9 x 6 mm rt ant chest    Motion sickness     Neuroma 11/2008    lt foot    Osteopenia 12/2008    Other malaise and fatigue 04/2005    stress    Personal history of tobacco use, presenting hazards to health     Quit in 1998    Solitary cyst of breast 12/2005    lt 6 oclock    Stenosis of lumbosacral spine     l4-5 and l5-s1    Symptomatic menopausal or female climacteric states 12/2002    perimenopausal. LMP 2/2008    Tachycardia, paroxysmal (H) 11/24/2020     Past Surgical History:   Procedure Laterality Date    BIOPSY  4/10/2015    BL DUPLEX LO EXTREM ART UNILAT/LTD  4/3008    lower extr arterial doppler -no stenosis    C DEXA,BONE DENSITY,APPENDICULR SKELTN  12/2008    osteopenia    COLONOSCOPY  10/22/2007    bx benign    COLONOSCOPY N/A 4/10/2015    Procedure: COMBINED COLONOSCOPY, SINGLE OR MULTIPLE BIOPSY/POLYPECTOMY BY BIOPSY;  Surgeon: Cl Wagner MD;  Location:   GI    CV CORONARY ANGIOGRAM N/A 8/17/2023    Procedure: Coronary Angiogram;  Surgeon: Liza Ford MD;  Location: [facility] CARDIAC CATH LAB    ESOPHAGOSCOPY, GASTROSCOPY, DUODENOSCOPY (EGD), COMBINED N/A 1/31/2018    Procedure: COMBINED ESOPHAGOSCOPY, GASTROSCOPY, DUODENOSCOPY (EGD);  COMBINED ESOPHAGOSCOPY, GASTROSCOPY, DUODENOSCOPY (EGD);  Surgeon: Tirso Duran MD;  Location:  GI    ESOPHAGOSCOPY, GASTROSCOPY, DUODENOSCOPY (EGD), COMBINED N/A 2/1/2023    Procedure: ESOPHAGOGASTRODUODENOSCOPY (EGD);  Surgeon: Lui Vences MD;  Location:  GI    EYE SURGERY      HC PART REMV BONE METATARSAL HEAD,EA  01/11/10    Right foot plantar plate tear. Also correct hammertoe, 2nd digit & varicose vein ligation, heel.    HC REVISE MEDIAN N/CARPAL TUNNEL SURG      Left in 1997, right in 1999    INJ, ANES/STER, FACET LUMB/SAC  2010    Left L5 nerve root injection    INJECT EPIDURAL CERVICAL Right 9/29/2022    Procedure: Cervical 7-Thoracic 1 Interlaminar epidural steroid injection using fluoroscopic guidance with contrast dye, Right;  Surgeon: Tyler Bay MD;  Location:  OR    INJECT EPIDURAL TRANSFORAMINAL  10/09/2013    [facility]    INJECT JOINT SACROILIAC  3/19/14,4/21/14    [facility]    [facility] NONSPECIFIC PROCEDURE  2009    Metatarsal phalangeal joint injection    [facility] COLONOSCOPY THRU STOMA, DIAGNOSTIC  7/2002    bx. benign - flex sig in 5 yrs  or colonoscopy in 10 yrs    [facility] ECHO HEART XTHORACIC, STRESS/REST  2/2005    WNL    [facility] NCS MOTOR W/O F-WAVE, EACH NERVE  7/2008    CTS     Current Outpatient Medications   Medication Sig Dispense Refill    acetaminophen (TYLENOL) 650 MG CR tablet Take 650 mg by mouth every 8 hours as needed for mild pain or fever      ALLEGRA 180 MG OR TABS Take 180 mg by mouth daily  30 0    amitriptyline (ELAVIL) 10 MG tablet TAKE 3 TO 4 TABLETS AT BEDTIME 270 tablet 3    buPROPion (WELLBUTRIN XL) 150 MG 24 hr tablet TAKE 1 TABLET EVERY  MORNING 90 tablet 1    CALCIUM /VITAMIN D TABS   OR Take 1 tablet by mouth daily       famotidine (PEPCID) 20 MG tablet Take 20 mg by mouth daily as needed      magnesium aspartate (MAGINEX) 615 MG EC tablet Take 1 tablet (615 mg) by mouth daily      meclizine (ANTIVERT) 25 MG tablet Take 25 mg by mouth daily as needed for dizziness      Melatonin 10 MG TABS tablet Take 10 mg by mouth nightly as needed for sleep      mometasone-formoterol (DULERA) 100-5 MCG/ACT oral inhaler Inhale 1 puff into the lungs 2 times daily       Multiple Vitamins-Minerals (PRESERVISION AREDS) TABS Take 1 tablet by mouth daily      multivitamin w/minerals (THERA-VIT-M) tablet Take 1 tablet by mouth daily RAW Brand      naproxen (NAPROSYN) 500 MG tablet Take 500 mg by mouth as needed      omeprazole (PRILOSEC) 20 MG DR capsule TAKE 1 CAPSULE DAILY 90 capsule 3    PROAIR  (90 Base) MCG/ACT inhaler Inhale 2 puffs into the lungs every 6 hours as needed for shortness of breath, wheezing or cough 18 g 11    Simethicone (GAS-X PO) Take 160 mg by mouth 4 times daily as needed for intestinal gas       triamcinolone (NASACORT) 55 MCG/ACT nasal inhaler Spray 2 sprays into both nostrils 2 times daily       vitamin C (ASCORBIC ACID) 250 MG tablet Take 1 tablet (250 mg) by mouth daily         Allergies   Allergen Reactions    Penicillins Itching     Vaginal itching - Yeast infection afterward        Social History     Tobacco Use    Smoking status: Former     Packs/day: 0.50     Years: 30.00     Additional pack years: 0.00     Total pack years: 15.00     Types: Cigarettes     Quit date: 3/20/1998     Years since quittin.5    Smokeless tobacco: Never    Tobacco comments:     No smokers in home   Substance Use Topics    Alcohol use: Yes     Alcohol/week: 6.7 standard drinks of alcohol     Comment: 2x/week       History   Drug Use No         Objective     /58   Pulse 93   Temp 97.9  F (36.6  C) (Temporal)   Resp 18   Ht 1.562 m (5'  "1.5\")   Wt 59.9 kg (132 lb)   LMP 02/26/2008 (Approximate)   SpO2 97%   BMI 24.54 kg/m      Physical Exam    GENERAL APPEARANCE: healthy, alert and no distress     EYES: EOMI, PERRL     HENT: ear canals and TM's normal and nose and mouth without ulcers or lesions     NECK: no adenopathy, no asymmetry, masses, or scars and thyroid normal to palpation     RESP: lungs clear to auscultation - no rales, rhonchi or wheezes     CV: regular rates and rhythm, normal S1 S2, no S3 or S4 and no murmur, click or rub     ABDOMEN:  soft, nontender, no HSM or masses and bowel sounds normal     MS: extremities normal- no gross deformities noted, no evidence of inflammation in joints, FROM in all extremities.     MS: right lower leg wound dramatically improved from initial injury, appears healed over with minimal scabbing, no expanding erythema noted     SKIN: no suspicious lesions or rashes     NEURO: Normal strength and tone, sensory exam grossly normal, mentation intact and speech normal     PSYCH: mentation appears normal. and affect normal/bright     LYMPHATICS: No cervical adenopathy    Recent Labs   Lab Test 08/17/23  0711 05/31/23  1728 04/07/23  1023 01/19/22  1414   HGB 13.5 12.5   < >  --     212   < >  --    INR 1.07  --   --   --     138   < >  --    POTASSIUM 3.8 4.1   < >  --    CR 0.90 0.95   < >  --    A1C  --   --   --  5.6    < > = values in this interval not displayed.        Diagnostics:  No labs ordered today, she has lab appointment for tomorrow for pre-op labs per surgeon   EKG: appears normal, NSR, normal axis, normal intervals, no acute ST/T changes c/w ischemia, no LVH by voltage criteria, unchanged from previous tracings    Revised Cardiac Risk Index (RCRI):  The patient has the following serious cardiovascular risks for perioperative complications:   - No serious cardiac risks = 0 points     RCRI Interpretation: 0 points: Class I (very low risk - 0.4% complication rate)         Signed " Electronically by: Caitlin Beasley PA-C  Copy of this evaluation report is provided to requesting physician.

## 2023-10-17 NOTE — TELEPHONE ENCOUNTER
Due to a change in the providers schedule , pt needs to r/s their 10/27 surgery. Talked with pt and explained the change. Offered them a new date 10/26. Will call if anything changes

## 2023-10-17 NOTE — PATIENT INSTRUCTIONS
Preparing for Your Surgery  Getting started  A nurse will call you to review your health history and instructions. They will give you an arrival time based on your scheduled surgery time. Please be ready to share:  Your doctor's clinic name and phone number  Your medical, surgical, and anesthesia history  A list of allergies and sensitivities  A list of medicines, including herbal treatments and over-the-counter drugs  Whether the patient has a legal guardian (ask how to send us the papers in advance)  Please tell us if you're pregnant--or if there's any chance you might be pregnant. Some surgeries may injure a fetus (unborn baby), so they require a pregnancy test. Surgeries that are safe for a fetus don't always need a test, and you can choose whether to have one.   If you have a child who's having surgery, please ask for a copy of Preparing for Your Child's Surgery.    Preparing for surgery  Within 10 to 30 days of surgery: Have a pre-op exam (sometimes called an H&P, or History and Physical). This can be done at a clinic or pre-operative center.  If you're having a , you may not need this exam. Talk to your care team.  At your pre-op exam, talk to your care team about all medicines you take. If you need to stop any medicines before surgery, ask when to start taking them again.  We do this for your safety. Many medicines can make you bleed too much during surgery. Some change how well surgery (anesthesia) drugs work.  Call your insurance company to let them know you're having surgery. (If you don't have insurance, call [phone].)  Call your clinic if there's any change in your health. This includes signs of a cold or flu (sore throat, runny nose, cough, rash, fever). It also includes a scrape or scratch near the surgery site.  If you have questions on the day of surgery, call your hospital or surgery center.  Eating and drinking guidelines  For your safety: Unless your surgeon tells you otherwise,  follow the guidelines below.  Eat and drink as usual until 8 hours before you arrive for surgery. After that, no food or milk.  Drink clear liquids until 2 hours before you arrive. These are liquids you can see through, like water, Gatorade, and Propel Water. They also include plain black coffee and tea (no cream or milk), candy, and breath mints. You can spit out gum when you arrive.  If you drink alcohol: Stop drinking it the night before surgery.  If your care team tells you to take medicine on the morning of surgery, it's okay to take it with a sip of water.  Preventing infection  Shower or bathe the night before and morning of your surgery. Follow the instructions your clinic gave you. (If no instructions, use regular soap.)  Don't shave or clip hair near your surgery site. We'll remove the hair if needed.  Don't smoke or vape the morning of surgery. You may chew nicotine gum up to 2 hours before surgery. A nicotine patch is okay.  Note: Some surgeries require you to completely quit smoking and nicotine. Check with your surgeon.  Your care team will make every effort to keep you safe from infection. We will:  Clean our hands often with soap and water (or an alcohol-based hand rub).  Clean the skin at your surgery site with a special soap that kills germs.  Give you a special gown to keep you warm. (Cold raises the risk of infection.)  Wear special hair covers, masks, gowns and gloves during surgery.  Give antibiotic medicine, if prescribed. Not all surgeries need antibiotics.  What to bring on the day of surgery  Photo ID and insurance card  Copy of your health care directive, if you have one  Glasses and hearing aids (bring cases)  You can't wear contacts during surgery  Inhaler and eye drops, if you use them (tell us about these when you arrive)  CPAP machine or breathing device, if you use them  A few personal items, if spending the night  If you have . . .  A pacemaker, ICD (cardiac defibrillator) or other  implant: Bring the ID card.  An implanted stimulator: Bring the remote control.  A legal guardian: Bring a copy of the certified (court-stamped) guardianship papers.  Please remove any jewelry, including body piercings. Leave jewelry and other valuables at home.  If you're going home the day of surgery  You must have a responsible adult drive you home. They should stay with you overnight as well.  If you don't have someone to stay with you, and you aren't safe to go home alone, we may keep you overnight. Insurance often won't pay for this.  After surgery  If it's hard to control your pain or you need more pain medicine, please call your surgeon's office.  Questions?   If you have any questions for your care team, list them here: _________________________________________________________________________________________________________________________________________________________________________ ____________________________________ ____________________________________ ____________________________________  For informational purposes only. Not to replace the advice of your health care provider. Copyright   2003, 2019 [facility]. All rights reserved. Clinically reviewed by Krissy Arzola MD. [employer] 471017 - REV 12/22.    How to Take Your Medication Before Surgery  - Take all of your medications before surgery except as noted below  - HOLD (do not take) naproxen for 3-5 days before your procedure   - STOP taking all vitamins and herbal supplements 14 days before surgery.

## 2023-10-18 ENCOUNTER — LAB (OUTPATIENT)
Dept: LAB | Facility: CLINIC | Age: 68
End: 2023-10-18
Attending: SURGERY
Payer: COMMERCIAL

## 2023-10-18 ENCOUNTER — TELEPHONE (OUTPATIENT)
Dept: PULMONOLOGY | Facility: CLINIC | Age: 68
End: 2023-10-18

## 2023-10-18 ENCOUNTER — HOSPITAL ENCOUNTER (OUTPATIENT)
Dept: CARDIOLOGY | Facility: CLINIC | Age: 68
Discharge: HOME | End: 2023-10-18
Attending: SURGERY
Payer: COMMERCIAL

## 2023-10-18 DIAGNOSIS — Z01.818 PREOPERATIVE EXAMINATION: ICD-10-CM

## 2023-10-18 DIAGNOSIS — I34.0 NONRHEUMATIC MITRAL VALVE REGURGITATION: ICD-10-CM

## 2023-10-18 DIAGNOSIS — I34.1 MITRAL VALVE PROLAPSE: ICD-10-CM

## 2023-10-18 LAB
ALBUMIN SERPL BCG-MCNC: 4.1 G/DL (ref 3.5–5.2)
ALBUMIN UR-MCNC: NEGATIVE MG/DL
ALP SERPL-CCNC: 66 U/L (ref 35–104)
ALT SERPL W P-5'-P-CCNC: 19 U/L (ref 0–50)
ANION GAP SERPL CALCULATED.3IONS-SCNC: 9 MMOL/L (ref 7–15)
APPEARANCE UR: CLEAR
AST SERPL W P-5'-P-CCNC: 24 U/L (ref 0–45)
BILIRUB DIRECT SERPL-MCNC: <0.2 MG/DL (ref 0–0.3)
BILIRUB SERPL-MCNC: 0.4 MG/DL
BILIRUB UR QL STRIP: NEGATIVE
BUN SERPL-MCNC: 22.1 MG/DL (ref 8–23)
CALCIUM SERPL-MCNC: 9.2 MG/DL (ref 8.8–10.2)
CHLORIDE SERPL-SCNC: 104 MMOL/L (ref 98–107)
COLOR UR AUTO: ABNORMAL
CREAT SERPL-MCNC: 0.88 MG/DL (ref 0.51–0.95)
DEPRECATED HCO3 PLAS-SCNC: 27 MMOL/L (ref 22–29)
EGFRCR SERPLBLD CKD-EPI 2021: 70 ML/MIN/1.73M2
ERYTHROCYTE [DISTWIDTH] IN BLOOD BY AUTOMATED COUNT: 13.2 % (ref 10–15)
GLUCOSE SERPL-MCNC: 87 MG/DL (ref 70–99)
GLUCOSE UR STRIP-MCNC: NEGATIVE MG/DL
HBA1C MFR BLD: 5 %
HCT VFR BLD AUTO: 38.1 % (ref 35–47)
HGB BLD-MCNC: 12.5 G/DL (ref 11.7–15.7)
HGB UR QL STRIP: NEGATIVE
KETONES UR STRIP-MCNC: NEGATIVE MG/DL
LEUKOCYTE ESTERASE UR QL STRIP: NEGATIVE
MCH RBC QN AUTO: 31 PG (ref 26.5–33)
MCHC RBC AUTO-ENTMCNC: 32.8 G/DL (ref 31.5–36.5)
MCV RBC AUTO: 95 FL (ref 78–100)
MUCOUS THREADS #/AREA URNS LPF: PRESENT /LPF
NITRATE UR QL: NEGATIVE
PH UR STRIP: 7.5 [PH] (ref 5–7)
PLATELET # BLD AUTO: 216 10E3/UL (ref 150–450)
POTASSIUM SERPL-SCNC: 4.2 MMOL/L (ref 3.4–5.3)
PROT SERPL-MCNC: 6.4 G/DL (ref 6.4–8.3)
RADIOLOGIST FLAGS: NORMAL
RBC # BLD AUTO: 4.03 10E6/UL (ref 3.8–5.2)
RBC URINE: <1 /HPF
SODIUM SERPL-SCNC: 140 MMOL/L (ref 135–145)
SP GR UR STRIP: 1 (ref 1–1.03)
UROBILINOGEN UR STRIP-MCNC: NORMAL MG/DL
WBC # BLD AUTO: 7.6 10E3/UL (ref 4–11)
WBC URINE: <1 /HPF

## 2023-10-18 PROCEDURE — 86901 BLOOD TYPING SEROLOGIC RH(D): CPT

## 2023-10-18 PROCEDURE — 36415 COLL VENOUS BLD VENIPUNCTURE: CPT

## 2023-10-18 PROCEDURE — 250N000011 HC RX IP 250 OP 636: Performed by: SURGERY

## 2023-10-18 PROCEDURE — 85027 COMPLETE CBC AUTOMATED: CPT

## 2023-10-18 PROCEDURE — 86850 RBC ANTIBODY SCREEN: CPT

## 2023-10-18 PROCEDURE — 83036 HEMOGLOBIN GLYCOSYLATED A1C: CPT

## 2023-10-18 PROCEDURE — 81001 URINALYSIS AUTO W/SCOPE: CPT

## 2023-10-18 PROCEDURE — 74174 CTA ABD&PLVS W/CONTRAST: CPT | Mod: 26 | Performed by: INTERNAL MEDICINE

## 2023-10-18 PROCEDURE — 74174 CTA ABD&PLVS W/CONTRAST: CPT

## 2023-10-18 PROCEDURE — 82248 BILIRUBIN DIRECT: CPT

## 2023-10-18 PROCEDURE — 86923 COMPATIBILITY TEST ELECTRIC: CPT | Performed by: SURGERY

## 2023-10-18 PROCEDURE — 71275 CT ANGIOGRAPHY CHEST: CPT | Mod: 26 | Performed by: INTERNAL MEDICINE

## 2023-10-18 RX ORDER — METHYLPREDNISOLONE SODIUM SUCCINATE 125 MG/2ML
125 INJECTION, POWDER, LYOPHILIZED, FOR SOLUTION INTRAMUSCULAR; INTRAVENOUS
Status: DISCONTINUED | OUTPATIENT
Start: 2023-10-18 | End: 2023-10-19 | Stop reason: HOSPADM

## 2023-10-18 RX ORDER — ACYCLOVIR 200 MG/1
0-1 CAPSULE ORAL
Status: DISCONTINUED | OUTPATIENT
Start: 2023-10-18 | End: 2023-10-19 | Stop reason: HOSPADM

## 2023-10-18 RX ORDER — ONDANSETRON 2 MG/ML
4 INJECTION INTRAMUSCULAR; INTRAVENOUS
Status: DISCONTINUED | OUTPATIENT
Start: 2023-10-18 | End: 2023-10-19 | Stop reason: HOSPADM

## 2023-10-18 RX ORDER — DIPHENHYDRAMINE HYDROCHLORIDE 50 MG/ML
25-50 INJECTION INTRAMUSCULAR; INTRAVENOUS
Status: DISCONTINUED | OUTPATIENT
Start: 2023-10-18 | End: 2023-10-19 | Stop reason: HOSPADM

## 2023-10-18 RX ORDER — IOPAMIDOL 755 MG/ML
500 INJECTION, SOLUTION INTRAVASCULAR ONCE
Status: COMPLETED | OUTPATIENT
Start: 2023-10-18 | End: 2023-10-18

## 2023-10-18 RX ORDER — DIPHENHYDRAMINE HCL 25 MG
25 CAPSULE ORAL
Status: DISCONTINUED | OUTPATIENT
Start: 2023-10-18 | End: 2023-10-19 | Stop reason: HOSPADM

## 2023-10-18 RX ADMIN — IOPAMIDOL 100 ML: 755 INJECTION, SOLUTION INTRAVENOUS at 12:36

## 2023-10-18 NOTE — TELEPHONE ENCOUNTER
Spoke to pharmacist. Proair HFA not longer manufactured. Patient's insurnace does not cover ProAir Digihaler or ProAir Respiclick. Her insurance covers Proventil or Ventolin. Left message for patient to contact.  Direct pharmacy number to call back with patient decision is [phone] ref#[id number]

## 2023-10-18 NOTE — TELEPHONE ENCOUNTER
Pharmacy faxed over paperwork that says Proair HFA INH is no longer manufactured and or discontinued. Pharmacy is suggesting Albuterol as substitute but it seems like the patient needed Proair. Thanks!

## 2023-10-20 ENCOUNTER — TELEPHONE (OUTPATIENT)
Dept: PULMONOLOGY | Facility: CLINIC | Age: 68
End: 2023-10-20
Payer: COMMERCIAL

## 2023-10-20 ENCOUNTER — TELEPHONE (OUTPATIENT)
Dept: OTHER | Facility: CLINIC | Age: 68
End: 2023-10-20
Payer: COMMERCIAL

## 2023-10-20 DIAGNOSIS — J45.30 MILD PERSISTENT ASTHMA, UNSPECIFIED WHETHER COMPLICATED: Primary | ICD-10-CM

## 2023-10-20 NOTE — TELEPHONE ENCOUNTER
I spoke with patient yesterday regarding her pre op labs and imaging results. A new cavitary lesion noted on CT. Message sent per [employer] to pulmonology Dr. Varner. Message eft with nurse line at clinic also regarding any further work up needed. Awaiting response. Will update pt.

## 2023-10-20 NOTE — TELEPHONE ENCOUNTER
[facility] Call Center    Phone Message    May a detailed message be left on voicemail: yes     Reason for Call: Other: .     Per [facility] is wanting to get a call back. [facility] states patient has a new finding on CT Scan. [facility] states they are needing Dr. Varner to eval and recommend follow up treatment. Please advise    Action Taken: Message routed to:  [facility]    Travel Screening: Not Applicable

## 2023-10-22 ENCOUNTER — MYC MEDICAL ADVICE (OUTPATIENT)
Dept: PULMONOLOGY | Facility: CLINIC | Age: 68
End: 2023-10-22
Payer: COMMERCIAL

## 2023-10-23 ENCOUNTER — DOCUMENTATION ONLY (OUTPATIENT)
Dept: CARDIOLOGY | Facility: CLINIC | Age: 68
End: 2023-10-23
Payer: COMMERCIAL

## 2023-10-23 NOTE — PROGRESS NOTES
Patient is dentally cleared per Mita at [facility] in [place] per documentation from Dr. Colon. Mita will fax completed form.    HAFSA SANTANA RN

## 2023-10-23 NOTE — PROGRESS NOTES
Message from Dr. Varner states that new cavitary lesion was previously noted as a bleb or cyst that was present on her initial CT scan studies when she had her COVID 19 infection in 2021. Plan to follow up after surgery per Dr. Varner. Appointment scheduled for 1/10/24.    Caitlin Silver RN

## 2023-10-24 ENCOUNTER — HOSPITAL ENCOUNTER (OUTPATIENT)
Dept: WOUND CARE | Facility: CLINIC | Age: 68
Discharge: HOME | End: 2023-10-24
Attending: PHYSICIAN ASSISTANT | Admitting: PHYSICIAN ASSISTANT
Payer: COMMERCIAL

## 2023-10-24 ENCOUNTER — MYC MEDICAL ADVICE (OUTPATIENT)
Dept: PULMONOLOGY | Facility: CLINIC | Age: 68
End: 2023-10-24
Payer: COMMERCIAL

## 2023-10-24 PROCEDURE — G0463 HOSPITAL OUTPT CLINIC VISIT: HCPCS

## 2023-10-24 NOTE — PROGRESS NOTES
"[facility]    Start of Care in [facility]: 8/23/2023   Referring Provider: Caitlin Beasley PA-C  Primary Care Provider: Caitlin Beasley PA-C  Wound Location: Right, distal/lateral lower leg     Wound Clinic Visit: 4th return visit to the wound and Ostomy Clinic       Reason for Visit:  Wound assessment and evaluate plan of care     Subjective:  Arrives to clinic alone on 10/24/23, and reports the following: WO nurse will be really impressed with the healing process, only has a tiny scab remaining.  Was wearing dressing for protection, but no longer feels she needs it.  Still has the random \"shooting\" pain, but tolerable and very short duration.       Wound History:   8/5/23- presented to ER (In [place]), after obtaining a laceration to right lower leg from a hotel bed frame.  Reports walking past metal frame when she grazed her leg on it.  States there should of been a plastic/rubber covering over the metal, but there wasn't. At ER her wound was sutured and steri stripped, with instructions to follow up with primary for suture removal.     8/9/23 Presented to Urgent care  due to concerns of infection. Wound was cleansed and she was given wound care directions.  Discharged home on oral antibiotics     8/14/23 seen PCP for suture removal, at this time was only found to have 21 sutures, but hospital reports 22 sutures being placed.        Past Medical History:  Patient Active Problem List   Diagnosis    Dermatophytosis of nail    Symptomatic menopausal or female climacteric states    Generalized osteoarthrosis, unspecified site    TAMMY (generalized anxiety disorder)    Insomnia    Malaise and fatigue    Mild persistent asthma    Cervicalgia    Allergic rhinitis    Irritable bowel syndrome    Arthralgia    DDD (degenerative disc disease), lumbar    Menopausal syndrome    Osteopenia    Hammertoe    Derangement of TMJ (temporomandibular joint)    Stenosis of lumbosacral spine    " Atrophic vaginitis    Osteoporosis    Mild major depression (H)    Knee pain, left    Menopausal flushing    Numbness of left jaw    Neck pain    TMJ (temporomandibular joint syndrome)    Thyroid fullness    Epistaxis    Major depressive disorder, recurrent episode, moderate (H)    Gastroesophageal reflux disease without esophagitis    Tremor    Cramp of limb    Personal history of COVID-19    T12 compression fracture, sequela    Pain of right lower extremity    Muscle cramp    Chondromalacia of right knee    Right sided sciatica    ILD (interstitial lung disease) (H)                     Tobacco Use:     Tobacco Use      Smoking status: Former        Packs/day: 0.50        Years: 30.00        Additional pack years: 0.00        Total pack years: 15.00        Types: Cigarettes        Quit date: 3/20/1998        Years since quittin.6      Smokeless tobacco: Never      Tobacco comments: No smokers in home       Diabetic: NA  HgbA1C:   Hemoglobin A1C   Date Value Ref Range Status   10/18/2023 5.0 <5.7 % Final     Comment:     Normal <5.7%   Prediabetes 5.7-6.4%    Diabetes 6.5% or higher     Note: Adopted from ADA consensus guidelines.     Checks Blood Glucose?:  NA Average Readings: NA      Personal/social history:  Lives at home with . Retired, No paid help    Objective:   Current treatment plan: Cover with composite dressing as needed  Last changed: Has not needed dressing in past 5+ days    Wound #1 Right Lower Lateral Leg, Laceration  Stage/tissue depth: Full Thickness  0 cm L x 0 cm W x 0 cm D-   Tunneling: none  Undermining: none  Wound bed type/amount: 100% scarred over; Not fluctuant  Wound Edges: NA, healed  Periwound: Localized edema  Drainage: none  Odor: none  Pain: Denies   Photos from today's visit 10/24/23   Photos from 10/11/23   Photo from 23   Photos 9/15/23   Photo from 23   Photos from 23    Photos from initial visit 23  Photo after sutures removed 23  Photo from  day of injury 8/5/23    Dorsalis Pedal Pulse: palpable: NA doppler: NA phasic  Posterior Tibial Pulse: palpable: NA doppler: NA phasic  Hair growth: Present to lower leg  Capillary Refill: 3 seconds  Feet/toes color: not assessed  Nails: Not assessed  L Leg: Edema . Ankle circumference  cm. Calf circumference  cm.  R Leg: Edema nonpitting. Ankle circumference 29 cm. Calf circumference 34 cm. Ankle Not assessed today, WOC nurse error    Mobility: Ambulatory   Current offloading/footwear: casual sneakers  Sensation: Full    Other callusing/areas of concern: none    Diet: Regular      Assessment:  Right lower leg wound open to air on arrival.  Proximal wound is scarred over, distal aspect of wound has small serosanguineous scab this was easily removed with cleaning of wound and now is 100% scarred over.  Area is intact, dry, and non-fluctuant.  Periwound has mild non-pitting edema proximal of wound. No erythema, hemosiderin staining or other complications to periwound.     Factors impacting wound healing:   Poor nutrition: inadequate supply of protein, carbohydrates, fatty acids, and trace elements essential for all phases of wound healing-   Delayed healing as part of normal aging process- >65, healing slowed  Reduced Blood Supply: inadequate perfusion to heal wound- appears adequate, no know ABIs  Medication: none  Chemotherapy: suppresses the immune system and inflammatory response-NA  Radiotherapy: increases production of free radical which damage cells-NA  Psychological stress: Delayed healing  Obesity: decreases tissue perfusion- BMI WNL  Infection: prolongs inflammatory phase, uses vital nutrients, impairs epithelialization and releases toxins- Hx of oral antibiotics. No signs of infection at this time  Underlying Disease:   Maceration: reduces wound tensile strength and inhibits epithelial migration- none at time of assessment  Patient compliance- appears willing to heal and compy  Unrelieved pressure-  NA  Immobility- NA, mobile with no alterations  Substance abuse: None      Plan:  Wound has been scarred over for nearly 2 weeks, recommend adding moisturizer to lower leg daily.  Also with localized edema recommend compression at 20-30 mmHg to be worn daily.  Educated patient that it takes about 6-12 months for scar tissue to regain strength.    Wound remains closed without opening, no longer needs to follow up in wound clinic. Did educated patient if for some reason wound would happen to reopen she has a full year to return to clinic or if other concerns/complications to right lower leg appear she is to call and schedule as needed.     Discussed/Education provided: plan of care with rationale;       Topical care: Wound/surrounding skin :    Shower per your normal  Pat dry  moisturizer lower leg with Sween 24 or similar product daily    Patient and her  are able to perform cares/has been caring for wound.    Additional recommendations: none    The following discharge instructions were reviewed with and sent home with the patient:  Declined     The following supplies were sent home with the patient:  Remains of opened Sween 24    Return visit: As needed    Verbal, written, & demonstrative education provided.  Face to face time: approximately 15 minutes  Procedure: ROSE Parra RN, CWOCN  [phone]

## 2023-10-25 ENCOUNTER — ANESTHESIA EVENT (OUTPATIENT)
Dept: SURGERY | Facility: CLINIC | Age: 68
DRG: 220 | End: 2023-10-25
Payer: COMMERCIAL

## 2023-10-25 DIAGNOSIS — R91.8 PULMONARY NODULES: Primary | ICD-10-CM

## 2023-10-25 RX ORDER — ALBUTEROL SULFATE 90 UG/1
2 AEROSOL, METERED RESPIRATORY (INHALATION) EVERY 6 HOURS PRN
COMMUNITY

## 2023-10-25 RX ORDER — ASPIRIN 81 MG/1
81 TABLET ORAL ONCE
Status: ON HOLD | COMMUNITY
End: 2023-10-30

## 2023-10-25 NOTE — PROGRESS NOTES
PTA medications updated by Medication Scribe prior to surgery via phone call with patient (last doses completed by Nurse)     Medication history sources: Patient, Surescripts, and H&P  In the past week, patient estimated taking medication this percent of the time: Greater than 90%      Significant changes made to the medication list:  Patient reports no longer taking the following meds (med scribe removed from PTA med list): Ipatropium atrovent inhaler      Additional medication history information:   Patient was advised to bring: Albuterol and Dulera inhalers, Nasacort nasal inhaler    Medication reconciliation completed by provider prior to medication history? No    Time spent in this activity: 40 minutes    The information provided in this note is only as accurate as the sources available at the time of update(s)      Prior to Admission medications    Medication Sig Last Dose Taking? Auth Provider Long Term End Date   acetaminophen (TYLENOL) 650 MG CR tablet Take 650 mg by mouth every 8 hours as needed for mild pain or fever Unknown at PRN Yes Reported, Patient     albuterol (PROAIR HFA/PROVENTIL HFA/VENTOLIN HFA) 108 (90 Base) MCG/ACT inhaler Inhale 2 puffs into the lungs every 6 hours as needed for shortness of breath, wheezing or cough Unknown at PRN Yes Reported, Patient No    ALLEGRA 180 MG OR TABS Take 180 mg by mouth daily   at AM Yes      amitriptyline (ELAVIL) 10 MG tablet TAKE 3 TO 4 TABLETS AT BEDTIME  at PM Yes Caitlin Beasley PA-C Yes    aspirin 81 MG EC tablet Take 81 mg by mouth once 10/25/2023 at AM Yes Reported, Patient     buPROPion (WELLBUTRIN XL) 150 MG 24 hr tablet TAKE 1 TABLET EVERY MORNING 10/25/2023 at AM Yes Caitlin Beasley PA-C Yes    CALCIUM /VITAMIN D TABS   OR Take 1 tablet by mouth daily  10/18/2023 at AM Yes      famotidine (PEPCID) 20 MG tablet Take 20 mg by mouth daily as needed 2weeks ago at PRN Yes Reported, Patient     magnesium aspartate (MAGINEX) 615 MG EC tablet Take 1  tablet (615 mg) by mouth daily 10/18/2023 at AM Yes Caitlin Beasley PA-C     meclizine (ANTIVERT) 25 MG tablet Take 25 mg by mouth daily as needed for dizziness Unknown at PRN Yes Reported, Patient     Melatonin 10 MG TABS tablet Take 10 mg by mouth nightly as needed for sleep Unknown at PRN Yes Reported, Patient     mometasone-formoterol (DULERA) 100-5 MCG/ACT oral inhaler Inhale 2 puffs into the lungs 2 times daily  at AM Yes Reported, Patient Yes    Multiple Vitamins-Minerals (PRESERVISION AREDS) TABS Take 1 tablet by mouth 2 times daily 10/18/2023 at AM Yes Caitlin Beasley PA-C     multivitamin w/minerals (THERA-VIT-M) tablet Take 1 tablet by mouth daily RAW Brand 10/18/2023 at AM Yes Reported, Patient     naproxen (NAPROSYN) 500 MG tablet Take 500 mg by mouth as needed 10/18/2023 at PM Yes Reported, Patient     omeprazole (PRILOSEC) 20 MG DR capsule TAKE 1 CAPSULE DAILY 10/25/2023 at AM Yes Caitlin Beasley PA-C     Simethicone (GAS-X PO) Take 160 mg by mouth 4 times daily as needed for intestinal gas  Unknown at PRN Yes Reported, Patient     triamcinolone (NASACORT) 55 MCG/ACT nasal inhaler Spray 1 spray into both nostrils daily  at PM Yes Reported, Patient     vitamin C (ASCORBIC ACID) 250 MG tablet Take 1 tablet (250 mg) by mouth daily 10/23/2023 at AM Yes Caitlin Beasley PA-C

## 2023-10-25 NOTE — TELEPHONE ENCOUNTER
----- Message from Khanh Varnre MD sent at 10/25/2023 11:01 AM CDT -----  Regarding: RE: ProAir Inhaler  We can do ipratropium inhaler instead    Thanks     Khanh   ----- Message -----  From: Caitlin Ocampo, RN  Sent: 10/24/2023   3:20 PM CDT  To: Khanh Varner MD  Subject: FW: ProAir Inhaler                               And Albuterol does not work for her.    Caitlin DIMAS  ----- Message -----  From: Caitlin Ocampo, WING  Sent: 10/24/2023   2:06 PM CDT  To: Khanh Varner MD; Pulm [facility] Nurses  Subject: ProAir Inhaler                                   Hi Dr. Varner,    Pt requested ProAir refill. We found out  d/c'd ProAir HFA. Pt's insurance does not cover ProAir Digihaler or ProAir RespiClick. Ventolin, Proventil and Xopenex have all been d/c'd by there manufactures as well, per [facility].     Do you have other recommendations for an inhaler that would relieve symptoms quickly?    ThanksCaitlin

## 2023-10-26 ENCOUNTER — APPOINTMENT (OUTPATIENT)
Dept: GENERAL RADIOLOGY | Facility: CLINIC | Age: 68
DRG: 220 | End: 2023-10-26
Attending: PHYSICIAN ASSISTANT
Payer: COMMERCIAL

## 2023-10-26 ENCOUNTER — HOSPITAL ENCOUNTER (INPATIENT)
Facility: CLINIC | Age: 68
LOS: 4 days | Discharge: HOME | DRG: 220 | End: 2023-10-30
Attending: SURGERY | Admitting: SURGERY
Payer: COMMERCIAL

## 2023-10-26 ENCOUNTER — APPOINTMENT (OUTPATIENT)
Dept: CARDIOLOGY | Facility: CLINIC | Age: 68
DRG: 220 | End: 2023-10-26
Attending: SURGERY
Payer: COMMERCIAL

## 2023-10-26 ENCOUNTER — ANESTHESIA (OUTPATIENT)
Dept: SURGERY | Facility: CLINIC | Age: 68
DRG: 220 | End: 2023-10-26
Payer: COMMERCIAL

## 2023-10-26 DIAGNOSIS — Z98.890 S/P MVR (MITRAL VALVE REPAIR): Primary | ICD-10-CM

## 2023-10-26 LAB
ABO/RH(D): NORMAL
ALBUMIN SERPL BCG-MCNC: 3.4 G/DL (ref 3.5–5.2)
ALLEN'S TEST: ABNORMAL
ALLEN'S TEST: ABNORMAL
ALP SERPL-CCNC: 49 U/L (ref 35–104)
ALT SERPL W P-5'-P-CCNC: 24 U/L (ref 0–50)
ANION GAP SERPL CALCULATED.3IONS-SCNC: 8 MMOL/L (ref 7–15)
ANION GAP SERPL CALCULATED.3IONS-SCNC: 9 MMOL/L (ref 7–15)
APTT PPP: 29 SECONDS (ref 22–38)
APTT PPP: 32 SECONDS (ref 22–38)
AST SERPL W P-5'-P-CCNC: 84 U/L (ref 0–45)
BASE EXCESS BLDA CALC-SCNC: -1.3 MMOL/L (ref -9–1.8)
BASE EXCESS BLDA CALC-SCNC: -1.5 MMOL/L (ref -9.6–2)
BASE EXCESS BLDA CALC-SCNC: 0.1 MMOL/L (ref -9–1.8)
BASE EXCESS BLDA CALC-SCNC: 0.2 MMOL/L (ref -9.6–2)
BASE EXCESS BLDA CALC-SCNC: 0.3 MMOL/L (ref -9.6–2)
BASE EXCESS BLDA CALC-SCNC: 0.3 MMOL/L (ref -9.6–2)
BASE EXCESS BLDA CALC-SCNC: 0.7 MMOL/L (ref -9.6–2)
BASE EXCESS BLDA CALC-SCNC: 1 MMOL/L (ref -9.6–2)
BASE EXCESS BLDA CALC-SCNC: 2.5 MMOL/L (ref -9.6–2)
BASE EXCESS BLDV CALC-SCNC: 0.1 MMOL/L (ref -7.7–1.9)
BASE EXCESS BLDV CALC-SCNC: 3.4 MMOL/L (ref -8.1–1.9)
BILIRUB SERPL-MCNC: 0.4 MG/DL
BLD PROD TYP BPU: NORMAL
BLOOD COMPONENT TYPE: NORMAL
BUN SERPL-MCNC: 21 MG/DL (ref 8–23)
BUN SERPL-MCNC: 21.1 MG/DL (ref 8–23)
CA-I BLD-MCNC: 3.8 MG/DL (ref 4.4–5.2)
CA-I BLD-MCNC: 3.9 MG/DL (ref 4.4–5.2)
CA-I BLD-MCNC: 3.9 MG/DL (ref 4.4–5.2)
CA-I BLD-MCNC: 4 MG/DL (ref 4.4–5.2)
CA-I BLD-MCNC: 4.1 MG/DL (ref 4.4–5.2)
CA-I BLD-MCNC: 4.1 MG/DL (ref 4.4–5.2)
CA-I BLD-MCNC: 4.6 MG/DL (ref 4.4–5.2)
CA-I BLD-MCNC: 4.8 MG/DL (ref 4.4–5.2)
CA-I BLD-MCNC: 5.4 MG/DL (ref 4.4–5.2)
CALCIUM SERPL-MCNC: 8.7 MG/DL (ref 8.8–10.2)
CALCIUM SERPL-MCNC: 9.7 MG/DL (ref 8.8–10.2)
CHLORIDE SERPL-SCNC: 107 MMOL/L (ref 98–107)
CHLORIDE SERPL-SCNC: 110 MMOL/L (ref 98–107)
CODING SYSTEM: NORMAL
CREAT SERPL-MCNC: 0.89 MG/DL (ref 0.51–0.95)
CREAT SERPL-MCNC: 0.93 MG/DL (ref 0.51–0.95)
CROSSMATCH: NORMAL
CROSSMATCH: NORMAL
DEPRECATED HCO3 PLAS-SCNC: 24 MMOL/L (ref 22–29)
DEPRECATED HCO3 PLAS-SCNC: 27 MMOL/L (ref 22–29)
EGFRCR SERPLBLD CKD-EPI 2021: 66 ML/MIN/1.73M2
EGFRCR SERPLBLD CKD-EPI 2021: 69 ML/MIN/1.73M2
ERYTHROCYTE [DISTWIDTH] IN BLOOD BY AUTOMATED COUNT: 13.2 % (ref 10–15)
ERYTHROCYTE [DISTWIDTH] IN BLOOD BY AUTOMATED COUNT: 13.3 % (ref 10–15)
FIBRINOGEN PPP-MCNC: 181 MG/DL (ref 170–490)
GLUCOSE BLD-MCNC: 108 MG/DL (ref 70–99)
GLUCOSE BLD-MCNC: 114 MG/DL (ref 70–99)
GLUCOSE BLD-MCNC: 139 MG/DL (ref 70–99)
GLUCOSE BLD-MCNC: 149 MG/DL (ref 70–99)
GLUCOSE BLD-MCNC: 150 MG/DL (ref 70–99)
GLUCOSE BLD-MCNC: 151 MG/DL (ref 70–99)
GLUCOSE BLD-MCNC: 95 MG/DL (ref 70–99)
GLUCOSE BLD-MCNC: 95 MG/DL (ref 70–99)
GLUCOSE BLDC GLUCOMTR-MCNC: 118 MG/DL (ref 70–99)
GLUCOSE BLDC GLUCOMTR-MCNC: 129 MG/DL (ref 70–99)
GLUCOSE SERPL-MCNC: 124 MG/DL (ref 70–99)
GLUCOSE SERPL-MCNC: 159 MG/DL (ref 70–99)
GLUCOSE SERPL-MCNC: 94 MG/DL (ref 70–99)
HCO3 BLD-SCNC: 25 MMOL/L (ref 21–28)
HCO3 BLD-SCNC: 25 MMOL/L (ref 21–28)
HCO3 BLDA-SCNC: 24 MMOL/L (ref 21–28)
HCO3 BLDA-SCNC: 26 MMOL/L (ref 21–28)
HCO3 BLDA-SCNC: 26 MMOL/L (ref 21–28)
HCO3 BLDA-SCNC: 27 MMOL/L (ref 21–28)
HCO3 BLDV-SCNC: 26 MMOL/L (ref 21–28)
HCO3 BLDV-SCNC: 29 MMOL/L (ref 21–28)
HCT VFR BLD AUTO: 28.9 % (ref 35–47)
HCT VFR BLD AUTO: 30.5 % (ref 35–47)
HGB BLD-MCNC: 10.2 G/DL (ref 11.7–15.7)
HGB BLD-MCNC: 10.2 G/DL (ref 11.7–15.7)
HGB BLD-MCNC: 10.3 G/DL (ref 11.7–15.7)
HGB BLD-MCNC: 10.9 G/DL (ref 11.7–15.7)
HGB BLD-MCNC: 8.8 G/DL (ref 11.7–15.7)
HGB BLD-MCNC: 8.8 G/DL (ref 11.7–15.7)
HGB BLD-MCNC: 9.2 G/DL (ref 11.7–15.7)
HGB BLD-MCNC: 9.4 G/DL (ref 11.7–15.7)
HGB BLD-MCNC: 9.8 G/DL (ref 11.7–15.7)
HGB BLD-MCNC: 9.9 G/DL (ref 11.7–15.7)
HIV 1+2 AB+HIV1P24 AG SERPLBLD IA.RAPID: NON REACTIVE
HIV 1+2 AB+HIV1P24 AG SERPLBLD IA.RAPID: NON REACTIVE
HIV INTERPRETATION: NORMAL
INR PPP: 1.27 (ref 0.85–1.15)
INR PPP: 1.37 (ref 0.85–1.15)
ISSUE DATE AND TIME: NORMAL
ISSUE DATE AND TIME: NORMAL
LACTATE BLD-SCNC: 0.7 MMOL/L
LACTATE BLD-SCNC: 0.8 MMOL/L
LACTATE BLD-SCNC: 0.9 MMOL/L
LACTATE BLD-SCNC: 0.9 MMOL/L
LACTATE BLD-SCNC: 1 MMOL/L
LACTATE BLD-SCNC: 1.4 MMOL/L
LACTATE SERPL-SCNC: 1 MMOL/L (ref 0.7–2)
LVEF ECHO: NORMAL
MAGNESIUM SERPL-MCNC: 3.4 MG/DL (ref 1.7–2.3)
MCH RBC QN AUTO: 30.7 PG (ref 26.5–33)
MCH RBC QN AUTO: 31.3 PG (ref 26.5–33)
MCHC RBC AUTO-ENTMCNC: 32.5 G/DL (ref 31.5–36.5)
MCHC RBC AUTO-ENTMCNC: 33.4 G/DL (ref 31.5–36.5)
MCV RBC AUTO: 94 FL (ref 78–100)
MCV RBC AUTO: 94 FL (ref 78–100)
O2/TOTAL GAS SETTING VFR VENT: 100 %
O2/TOTAL GAS SETTING VFR VENT: 100 %
O2/TOTAL GAS SETTING VFR VENT: 40 %
O2/TOTAL GAS SETTING VFR VENT: 60 %
O2/TOTAL GAS SETTING VFR VENT: 60 %
O2/TOTAL GAS SETTING VFR VENT: 80 %
OXYHGB MFR BLD: 95 % (ref 92–100)
OXYHGB MFR BLD: 97 % (ref 92–100)
OXYHGB MFR BLDV: 68 % (ref 70–75)
PCO2 BLD: 39 MM HG (ref 35–45)
PCO2 BLD: 46 MM HG (ref 35–45)
PCO2 BLDA: 41 MM HG (ref 35–45)
PCO2 BLDA: 41 MM HG (ref 35–45)
PCO2 BLDA: 43 MM HG (ref 35–45)
PCO2 BLDA: 46 MM HG (ref 35–45)
PCO2 BLDA: 49 MM HG (ref 35–45)
PCO2 BLDA: 50 MM HG (ref 35–45)
PCO2 BLDA: 53 MM HG (ref 35–45)
PCO2 BLDV: 44 MM HG (ref 40–50)
PCO2 BLDV: 45 MM HG (ref 40–50)
PH BLD: 7.34 [PH] (ref 7.35–7.45)
PH BLD: 7.41 [PH] (ref 7.35–7.45)
PH BLDA: 7.32 [PH] (ref 7.35–7.45)
PH BLDA: 7.33 [PH] (ref 7.35–7.45)
PH BLDA: 7.34 [PH] (ref 7.35–7.45)
PH BLDA: 7.36 [PH] (ref 7.35–7.45)
PH BLDA: 7.37 [PH] (ref 7.35–7.45)
PH BLDA: 7.39 [PH] (ref 7.35–7.45)
PH BLDA: 7.43 [PH] (ref 7.35–7.45)
PH BLDV: 7.37 [PH] (ref 7.32–7.43)
PH BLDV: 7.41 [PH] (ref 7.32–7.43)
PHOSPHATE SERPL-MCNC: 3.8 MG/DL (ref 2.5–4.5)
PLATELET # BLD AUTO: 129 10E3/UL (ref 150–450)
PLATELET # BLD AUTO: 142 10E3/UL (ref 150–450)
PO2 BLD: 125 MM HG (ref 80–105)
PO2 BLD: 92 MM HG (ref 80–105)
PO2 BLDA: 176 MM HG (ref 80–105)
PO2 BLDA: 342 MM HG (ref 80–105)
PO2 BLDA: 406 MM HG (ref 80–105)
PO2 BLDA: 407 MM HG (ref 80–105)
PO2 BLDA: 410 MM HG (ref 80–105)
PO2 BLDA: 421 MM HG (ref 80–105)
PO2 BLDA: 481 MM HG (ref 80–105)
PO2 BLDV: 40 MM HG (ref 25–47)
PO2 BLDV: 41 MM HG (ref 25–47)
POTASSIUM BLD-SCNC: 3.3 MMOL/L (ref 3.5–5)
POTASSIUM BLD-SCNC: 3.7 MMOL/L (ref 3.5–5)
POTASSIUM BLD-SCNC: 3.7 MMOL/L (ref 3.5–5)
POTASSIUM BLD-SCNC: 4.6 MMOL/L (ref 3.5–5)
POTASSIUM BLD-SCNC: 4.7 MMOL/L (ref 3.5–5)
POTASSIUM BLD-SCNC: 4.8 MMOL/L (ref 3.5–5)
POTASSIUM BLD-SCNC: 5.1 MMOL/L (ref 3.5–5)
POTASSIUM BLD-SCNC: 5.2 MMOL/L (ref 3.5–5)
POTASSIUM SERPL-SCNC: 4.5 MMOL/L (ref 3.4–5.3)
POTASSIUM SERPL-SCNC: 4.7 MMOL/L (ref 3.4–5.3)
PROT SERPL-MCNC: 5 G/DL (ref 6.4–8.3)
RBC # BLD AUTO: 3.06 10E6/UL (ref 3.8–5.2)
RBC # BLD AUTO: 3.26 10E6/UL (ref 3.8–5.2)
SODIUM BLD-SCNC: 140 MMOL/L (ref 135–145)
SODIUM BLD-SCNC: 141 MMOL/L (ref 135–145)
SODIUM BLD-SCNC: 142 MMOL/L (ref 135–145)
SODIUM SERPL-SCNC: 142 MMOL/L (ref 135–145)
SODIUM SERPL-SCNC: 143 MMOL/L (ref 135–145)
SPECIMEN EXPIRATION DATE: NORMAL
UNIT ABO/RH: NORMAL
UNIT NUMBER: NORMAL
UNIT STATUS: NORMAL
UNIT TYPE ISBT: 7300
WBC # BLD AUTO: 10.8 10E3/UL (ref 4–11)
WBC # BLD AUTO: 13.6 10E3/UL (ref 4–11)

## 2023-10-26 PROCEDURE — 02UG0JZ SUPPLEMENT MITRAL VALVE WITH SYNTHETIC SUBSTITUTE, OPEN APPROACH: ICD-10-PCS | Performed by: SURGERY

## 2023-10-26 PROCEDURE — 258N000003 HC RX IP 258 OP 636: Performed by: STUDENT IN AN ORGANIZED HEALTH CARE EDUCATION/TRAINING PROGRAM

## 2023-10-26 PROCEDURE — 999N000157 HC STATISTIC RCP TIME EA 10 MIN

## 2023-10-26 PROCEDURE — 82330 ASSAY OF CALCIUM: CPT | Performed by: PHYSICIAN ASSISTANT

## 2023-10-26 PROCEDURE — 258N000003 HC RX IP 258 OP 636: Performed by: NURSE ANESTHETIST, CERTIFIED REGISTERED

## 2023-10-26 PROCEDURE — 999N000009 HC STATISTIC AIRWAY CARE

## 2023-10-26 PROCEDURE — 250N000011 HC RX IP 250 OP 636: Performed by: SURGERY

## 2023-10-26 PROCEDURE — 272N000001 HC OR GENERAL SUPPLY STERILE: Performed by: SURGERY

## 2023-10-26 PROCEDURE — C1898 LEAD, PMKR, OTHER THAN TRANS: HCPCS | Performed by: SURGERY

## 2023-10-26 PROCEDURE — 999N000104 HEPATITIS C RNA, QUANTITATIVE BY PCR: Performed by: INTERNAL MEDICINE

## 2023-10-26 PROCEDURE — 84100 ASSAY OF PHOSPHORUS: CPT | Performed by: PHYSICIAN ASSISTANT

## 2023-10-26 PROCEDURE — 999N000253 HC STATISTIC WEANING TRIALS

## 2023-10-26 PROCEDURE — 999N000065 XR CHEST PORT 1 VIEW

## 2023-10-26 PROCEDURE — 85610 PROTHROMBIN TIME: CPT | Performed by: PHYSICIAN ASSISTANT

## 2023-10-26 PROCEDURE — 250N000009 HC RX 250: Performed by: NURSE ANESTHETIST, CERTIFIED REGISTERED

## 2023-10-26 PROCEDURE — 85610 PROTHROMBIN TIME: CPT | Performed by: SURGERY

## 2023-10-26 PROCEDURE — 33426 REPAIR OF MITRAL VALVE: CPT | Mod: GC | Performed by: SURGERY

## 2023-10-26 PROCEDURE — 250N000024 HC ISOFLURANE, PER MIN: Performed by: SURGERY

## 2023-10-26 PROCEDURE — 83735 ASSAY OF MAGNESIUM: CPT | Performed by: PHYSICIAN ASSISTANT

## 2023-10-26 PROCEDURE — 85730 THROMBOPLASTIN TIME PARTIAL: CPT | Performed by: SURGERY

## 2023-10-26 PROCEDURE — 86901 BLOOD TYPING SEROLOGIC RH(D): CPT | Performed by: STUDENT IN AN ORGANIZED HEALTH CARE EDUCATION/TRAINING PROGRAM

## 2023-10-26 PROCEDURE — 250N000011 HC RX IP 250 OP 636: Performed by: PHYSICIAN ASSISTANT

## 2023-10-26 PROCEDURE — 258N000003 HC RX IP 258 OP 636: Performed by: PHYSICIAN ASSISTANT

## 2023-10-26 PROCEDURE — 93321 DOPPLER ECHO F-UP/LMTD STD: CPT

## 2023-10-26 PROCEDURE — 370N000017 HC ANESTHESIA TECHNICAL FEE, PER MIN: Performed by: SURGERY

## 2023-10-26 PROCEDURE — 83605 ASSAY OF LACTIC ACID: CPT | Performed by: PHYSICIAN ASSISTANT

## 2023-10-26 PROCEDURE — 5A1221Z PERFORMANCE OF CARDIAC OUTPUT, CONTINUOUS: ICD-10-PCS | Performed by: SURGERY

## 2023-10-26 PROCEDURE — 360N000078 HC SURGERY LEVEL 5, PER MIN: Performed by: SURGERY

## 2023-10-26 PROCEDURE — 250N000013 HC RX MED GY IP 250 OP 250 PS 637: Performed by: SURGERY

## 2023-10-26 PROCEDURE — 85027 COMPLETE CBC AUTOMATED: CPT | Performed by: PHYSICIAN ASSISTANT

## 2023-10-26 PROCEDURE — 82805 BLOOD GASES W/O2 SATURATION: CPT | Performed by: SURGERY

## 2023-10-26 PROCEDURE — 82947 ASSAY GLUCOSE BLOOD QUANT: CPT | Performed by: STUDENT IN AN ORGANIZED HEALTH CARE EDUCATION/TRAINING PROGRAM

## 2023-10-26 PROCEDURE — 250N000009 HC RX 250: Performed by: PHYSICIAN ASSISTANT

## 2023-10-26 PROCEDURE — 82947 ASSAY GLUCOSE BLOOD QUANT: CPT | Performed by: PHYSICIAN ASSISTANT

## 2023-10-26 PROCEDURE — 87340 HEPATITIS B SURFACE AG IA: CPT | Performed by: SURGERY

## 2023-10-26 PROCEDURE — 80053 COMPREHEN METABOLIC PANEL: CPT | Performed by: STUDENT IN AN ORGANIZED HEALTH CARE EDUCATION/TRAINING PROGRAM

## 2023-10-26 PROCEDURE — 250N000009 HC RX 250: Performed by: STUDENT IN AN ORGANIZED HEALTH CARE EDUCATION/TRAINING PROGRAM

## 2023-10-26 PROCEDURE — 94003 VENT MGMT INPAT SUBQ DAY: CPT

## 2023-10-26 PROCEDURE — 85384 FIBRINOGEN ACTIVITY: CPT | Performed by: SURGERY

## 2023-10-26 PROCEDURE — 85730 THROMBOPLASTIN TIME PARTIAL: CPT | Performed by: PHYSICIAN ASSISTANT

## 2023-10-26 PROCEDURE — 999N000259 HC STATISTIC EXTUBATION

## 2023-10-26 PROCEDURE — P9016 RBC LEUKOCYTES REDUCED: HCPCS | Performed by: SURGERY

## 2023-10-26 PROCEDURE — P9045 ALBUMIN (HUMAN), 5%, 250 ML: HCPCS | Mod: JZ | Performed by: NURSE ANESTHETIST, CERTIFIED REGISTERED

## 2023-10-26 PROCEDURE — 250N000009 HC RX 250: Performed by: ANESTHESIOLOGY

## 2023-10-26 PROCEDURE — 82330 ASSAY OF CALCIUM: CPT | Performed by: SURGERY

## 2023-10-26 PROCEDURE — C1889 IMPLANT/INSERT DEVICE, NOC: HCPCS | Performed by: SURGERY

## 2023-10-26 PROCEDURE — 83605 ASSAY OF LACTIC ACID: CPT | Performed by: SURGERY

## 2023-10-26 PROCEDURE — 99291 CRITICAL CARE FIRST HOUR: CPT | Performed by: INTERNAL MEDICINE

## 2023-10-26 PROCEDURE — 999N000141 HC STATISTIC PRE-PROCEDURE NURSING ASSESSMENT: Performed by: SURGERY

## 2023-10-26 PROCEDURE — 200N000001 HC R&B ICU

## 2023-10-26 PROCEDURE — 93312 ECHO TRANSESOPHAGEAL: CPT

## 2023-10-26 PROCEDURE — 250N000011 HC RX IP 250 OP 636: Performed by: NURSE ANESTHETIST, CERTIFIED REGISTERED

## 2023-10-26 PROCEDURE — 85014 HEMATOCRIT: CPT | Performed by: SURGERY

## 2023-10-26 PROCEDURE — 82947 ASSAY GLUCOSE BLOOD QUANT: CPT | Performed by: SURGERY

## 2023-10-26 PROCEDURE — 250N000013 HC RX MED GY IP 250 OP 250 PS 637: Performed by: PHYSICIAN ASSISTANT

## 2023-10-26 PROCEDURE — 250N000009 HC RX 250: Performed by: SURGERY

## 2023-10-26 PROCEDURE — 93005 ELECTROCARDIOGRAM TRACING: CPT

## 2023-10-26 PROCEDURE — 250N000011 HC RX IP 250 OP 636: Performed by: ANESTHESIOLOGY

## 2023-10-26 PROCEDURE — 93325 DOPPLER ECHO COLOR FLOW MAPG: CPT

## 2023-10-26 PROCEDURE — 03HY32Z INSERTION OF MONITORING DEVICE INTO UPPER ARTERY, PERCUTANEOUS APPROACH: ICD-10-PCS | Performed by: SURGERY

## 2023-10-26 PROCEDURE — 36415 COLL VENOUS BLD VENIPUNCTURE: CPT | Performed by: STUDENT IN AN ORGANIZED HEALTH CARE EDUCATION/TRAINING PROGRAM

## 2023-10-26 DEVICE — IMPLANTABLE DEVICE: Type: IMPLANTABLE DEVICE | Site: CHEST | Status: FUNCTIONAL

## 2023-10-26 RX ORDER — FENTANYL CITRATE 50 UG/ML
INJECTION, SOLUTION INTRAMUSCULAR; INTRAVENOUS PRN
Status: DISCONTINUED | OUTPATIENT
Start: 2023-10-26 | End: 2023-10-26

## 2023-10-26 RX ORDER — CALCIUM GLUCONATE 20 MG/ML
1 INJECTION, SOLUTION INTRAVENOUS
Status: DISCONTINUED | OUTPATIENT
Start: 2023-10-26 | End: 2023-10-30 | Stop reason: HOSPADM

## 2023-10-26 RX ORDER — ACETAMINOPHEN 325 MG/1
975 TABLET ORAL EVERY 8 HOURS
Qty: 27 TABLET | Refills: 0 | Status: COMPLETED | OUTPATIENT
Start: 2023-10-26 | End: 2023-10-28

## 2023-10-26 RX ORDER — FAMOTIDINE 20 MG/1
20 TABLET, FILM COATED ORAL
Status: COMPLETED | OUTPATIENT
Start: 2023-10-26 | End: 2023-10-26

## 2023-10-26 RX ORDER — CLINDAMYCIN PHOSPHATE 900 MG/50ML
900 INJECTION, SOLUTION INTRAVENOUS EVERY 8 HOURS
Qty: 150 ML | Refills: 0 | Status: COMPLETED | OUTPATIENT
Start: 2023-10-26 | End: 2023-10-27

## 2023-10-26 RX ORDER — LIDOCAINE 40 MG/G
CREAM TOPICAL
Status: DISCONTINUED | OUTPATIENT
Start: 2023-10-26 | End: 2023-10-26 | Stop reason: HOSPADM

## 2023-10-26 RX ORDER — DEXTROSE MONOHYDRATE 25 G/50ML
25-50 INJECTION, SOLUTION INTRAVENOUS
Status: DISCONTINUED | OUTPATIENT
Start: 2023-10-26 | End: 2023-10-27

## 2023-10-26 RX ORDER — NALOXONE HYDROCHLORIDE 0.4 MG/ML
0.2 INJECTION, SOLUTION INTRAMUSCULAR; INTRAVENOUS; SUBCUTANEOUS
Status: DISCONTINUED | OUTPATIENT
Start: 2023-10-26 | End: 2023-10-30 | Stop reason: HOSPADM

## 2023-10-26 RX ORDER — ACETAMINOPHEN 325 MG/1
650 TABLET ORAL EVERY 4 HOURS PRN
Status: DISCONTINUED | OUTPATIENT
Start: 2023-10-29 | End: 2023-10-30 | Stop reason: HOSPADM

## 2023-10-26 RX ORDER — PROTAMINE SULFATE 10 MG/ML
INJECTION, SOLUTION INTRAVENOUS PRN
Status: DISCONTINUED | OUTPATIENT
Start: 2023-10-26 | End: 2023-10-26

## 2023-10-26 RX ORDER — ASPIRIN 81 MG/1
81 TABLET, CHEWABLE ORAL DAILY
Status: DISCONTINUED | OUTPATIENT
Start: 2023-10-27 | End: 2023-10-30 | Stop reason: HOSPADM

## 2023-10-26 RX ORDER — ASPIRIN 81 MG/1
162 TABLET, CHEWABLE ORAL
Status: DISCONTINUED | OUTPATIENT
Start: 2023-10-26 | End: 2023-10-26 | Stop reason: HOSPADM

## 2023-10-26 RX ORDER — NALOXONE HYDROCHLORIDE 0.4 MG/ML
0.4 INJECTION, SOLUTION INTRAMUSCULAR; INTRAVENOUS; SUBCUTANEOUS
Status: DISCONTINUED | OUTPATIENT
Start: 2023-10-26 | End: 2023-10-30 | Stop reason: HOSPADM

## 2023-10-26 RX ORDER — SODIUM CHLORIDE, SODIUM LACTATE, POTASSIUM CHLORIDE, CALCIUM CHLORIDE 600; 310; 30; 20 MG/100ML; MG/100ML; MG/100ML; MG/100ML
INJECTION, SOLUTION INTRAVENOUS CONTINUOUS
Status: DISCONTINUED | OUTPATIENT
Start: 2023-10-26 | End: 2023-10-26 | Stop reason: HOSPADM

## 2023-10-26 RX ORDER — VANCOMYCIN HYDROCHLORIDE 1 G/200ML
1000 INJECTION, SOLUTION INTRAVENOUS EVERY 12 HOURS
Qty: 400 ML | Refills: 0 | Status: COMPLETED | OUTPATIENT
Start: 2023-10-26 | End: 2023-10-27

## 2023-10-26 RX ORDER — GLYCOPYRROLATE 0.2 MG/ML
INJECTION, SOLUTION INTRAMUSCULAR; INTRAVENOUS PRN
Status: DISCONTINUED | OUTPATIENT
Start: 2023-10-26 | End: 2023-10-26

## 2023-10-26 RX ORDER — DEXMEDETOMIDINE HYDROCHLORIDE 4 UG/ML
.2-.7 INJECTION, SOLUTION INTRAVENOUS CONTINUOUS
Status: DISCONTINUED | OUTPATIENT
Start: 2023-10-26 | End: 2023-10-27

## 2023-10-26 RX ORDER — HEPARIN SODIUM 5000 [USP'U]/.5ML
5000 INJECTION, SOLUTION INTRAVENOUS; SUBCUTANEOUS EVERY 8 HOURS
Status: DISCONTINUED | OUTPATIENT
Start: 2023-10-27 | End: 2023-10-30 | Stop reason: HOSPADM

## 2023-10-26 RX ORDER — FUROSEMIDE 10 MG/ML
20 INJECTION INTRAMUSCULAR; INTRAVENOUS
Status: DISCONTINUED | OUTPATIENT
Start: 2023-10-26 | End: 2023-10-30 | Stop reason: HOSPADM

## 2023-10-26 RX ORDER — VANCOMYCIN HYDROCHLORIDE 1 G/200ML
1000 INJECTION, SOLUTION INTRAVENOUS
Status: COMPLETED | OUTPATIENT
Start: 2023-10-26 | End: 2023-10-26

## 2023-10-26 RX ORDER — PROPOFOL 10 MG/ML
INJECTION, EMULSION INTRAVENOUS PRN
Status: DISCONTINUED | OUTPATIENT
Start: 2023-10-26 | End: 2023-10-26

## 2023-10-26 RX ORDER — LIDOCAINE HYDROCHLORIDE 10 MG/ML
INJECTION, SOLUTION INFILTRATION; PERINEURAL
Status: COMPLETED | OUTPATIENT
Start: 2023-10-26 | End: 2023-10-26

## 2023-10-26 RX ORDER — LIDOCAINE HYDROCHLORIDE 20 MG/ML
INJECTION, SOLUTION INFILTRATION; PERINEURAL PRN
Status: DISCONTINUED | OUTPATIENT
Start: 2023-10-26 | End: 2023-10-26

## 2023-10-26 RX ORDER — VECURONIUM BROMIDE 1 MG/ML
INJECTION, POWDER, LYOPHILIZED, FOR SOLUTION INTRAVENOUS PRN
Status: DISCONTINUED | OUTPATIENT
Start: 2023-10-26 | End: 2023-10-26

## 2023-10-26 RX ORDER — POLYETHYLENE GLYCOL 3350 17 G/17G
17 POWDER, FOR SOLUTION ORAL DAILY
Status: DISCONTINUED | OUTPATIENT
Start: 2023-10-27 | End: 2023-10-28

## 2023-10-26 RX ORDER — PROCHLORPERAZINE MALEATE 5 MG
5 TABLET ORAL EVERY 6 HOURS PRN
Status: DISCONTINUED | OUTPATIENT
Start: 2023-10-26 | End: 2023-10-30 | Stop reason: HOSPADM

## 2023-10-26 RX ORDER — ONDANSETRON 2 MG/ML
4 INJECTION INTRAMUSCULAR; INTRAVENOUS EVERY 6 HOURS PRN
Status: DISCONTINUED | OUTPATIENT
Start: 2023-10-26 | End: 2023-10-30 | Stop reason: HOSPADM

## 2023-10-26 RX ORDER — FLUTICASONE FUROATE AND VILANTEROL 100; 25 UG/1; UG/1
1 POWDER RESPIRATORY (INHALATION) DAILY
Status: DISCONTINUED | OUTPATIENT
Start: 2023-10-27 | End: 2023-10-27

## 2023-10-26 RX ORDER — CALCIUM GLUCONATE 20 MG/ML
2 INJECTION, SOLUTION INTRAVENOUS
Status: DISCONTINUED | OUTPATIENT
Start: 2023-10-26 | End: 2023-10-30 | Stop reason: HOSPADM

## 2023-10-26 RX ORDER — NOREPINEPHRINE BITARTRATE 0.06 MG/ML
.01-.4 INJECTION, SOLUTION INTRAVENOUS CONTINUOUS PRN
Status: DISCONTINUED | OUTPATIENT
Start: 2023-10-26 | End: 2023-10-27

## 2023-10-26 RX ORDER — CHLORHEXIDINE GLUCONATE ORAL RINSE 1.2 MG/ML
10 SOLUTION DENTAL ONCE
Status: COMPLETED | OUTPATIENT
Start: 2023-10-26 | End: 2023-10-26

## 2023-10-26 RX ORDER — BUPROPION HYDROCHLORIDE 150 MG/1
150 TABLET ORAL DAILY
Status: DISCONTINUED | OUTPATIENT
Start: 2023-10-27 | End: 2023-10-30 | Stop reason: HOSPADM

## 2023-10-26 RX ORDER — ASPIRIN 81 MG/1
81 TABLET, CHEWABLE ORAL
Status: DISCONTINUED | OUTPATIENT
Start: 2023-10-26 | End: 2023-10-26 | Stop reason: HOSPADM

## 2023-10-26 RX ORDER — PROPOFOL 10 MG/ML
INJECTION, EMULSION INTRAVENOUS CONTINUOUS PRN
Status: DISCONTINUED | OUTPATIENT
Start: 2023-10-26 | End: 2023-10-26

## 2023-10-26 RX ORDER — ONDANSETRON 2 MG/ML
INJECTION INTRAMUSCULAR; INTRAVENOUS PRN
Status: DISCONTINUED | OUTPATIENT
Start: 2023-10-26 | End: 2023-10-26

## 2023-10-26 RX ORDER — EPINEPHRINE IN 0.9 % SOD CHLOR 5 MG/250ML
.01-.1 PLASTIC BAG, INJECTION (ML) INTRAVENOUS CONTINUOUS PRN
Status: DISCONTINUED | OUTPATIENT
Start: 2023-10-26 | End: 2023-10-27

## 2023-10-26 RX ORDER — EPHEDRINE SULFATE 50 MG/ML
INJECTION, SOLUTION INTRAMUSCULAR; INTRAVENOUS; SUBCUTANEOUS PRN
Status: DISCONTINUED | OUTPATIENT
Start: 2023-10-26 | End: 2023-10-26

## 2023-10-26 RX ORDER — PANTOPRAZOLE SODIUM 40 MG/1
40 TABLET, DELAYED RELEASE ORAL DAILY
Status: DISCONTINUED | OUTPATIENT
Start: 2023-10-26 | End: 2023-10-30 | Stop reason: HOSPADM

## 2023-10-26 RX ORDER — OXYCODONE HYDROCHLORIDE 5 MG/1
10 TABLET ORAL EVERY 4 HOURS PRN
Status: DISCONTINUED | OUTPATIENT
Start: 2023-10-26 | End: 2023-10-30 | Stop reason: HOSPADM

## 2023-10-26 RX ORDER — METHOCARBAMOL 500 MG/1
500 TABLET, FILM COATED ORAL EVERY 6 HOURS PRN
Status: DISCONTINUED | OUTPATIENT
Start: 2023-10-26 | End: 2023-10-27

## 2023-10-26 RX ORDER — SODIUM CHLORIDE, SODIUM LACTATE, POTASSIUM CHLORIDE, CALCIUM CHLORIDE 600; 310; 30; 20 MG/100ML; MG/100ML; MG/100ML; MG/100ML
INJECTION, SOLUTION INTRAVENOUS CONTINUOUS PRN
Status: DISCONTINUED | OUTPATIENT
Start: 2023-10-26 | End: 2023-10-26

## 2023-10-26 RX ORDER — AMOXICILLIN 250 MG
1 CAPSULE ORAL 2 TIMES DAILY
Status: DISCONTINUED | OUTPATIENT
Start: 2023-10-26 | End: 2023-10-28

## 2023-10-26 RX ORDER — NITROGLYCERIN 20 MG/100ML
10-200 INJECTION INTRAVENOUS CONTINUOUS PRN
Status: DISCONTINUED | OUTPATIENT
Start: 2023-10-26 | End: 2023-10-27

## 2023-10-26 RX ORDER — NICOTINE POLACRILEX 4 MG
15-30 LOZENGE BUCCAL
Status: DISCONTINUED | OUTPATIENT
Start: 2023-10-26 | End: 2023-10-27

## 2023-10-26 RX ORDER — MAGNESIUM HYDROXIDE 1200 MG/15ML
LIQUID ORAL PRN
Status: DISCONTINUED | OUTPATIENT
Start: 2023-10-26 | End: 2023-10-26 | Stop reason: HOSPADM

## 2023-10-26 RX ORDER — SODIUM CHLORIDE 9 MG/ML
INJECTION, SOLUTION INTRAVENOUS CONTINUOUS PRN
Status: DISCONTINUED | OUTPATIENT
Start: 2023-10-26 | End: 2023-10-26

## 2023-10-26 RX ORDER — HEPARIN SODIUM 1000 [USP'U]/ML
INJECTION, SOLUTION INTRAVENOUS; SUBCUTANEOUS PRN
Status: DISCONTINUED | OUTPATIENT
Start: 2023-10-26 | End: 2023-10-26

## 2023-10-26 RX ORDER — GABAPENTIN 100 MG/1
100 CAPSULE ORAL AT BEDTIME
Status: DISCONTINUED | OUTPATIENT
Start: 2023-10-26 | End: 2023-10-27

## 2023-10-26 RX ORDER — HYDROMORPHONE HCL IN WATER/PF 6 MG/30 ML
0.2 PATIENT CONTROLLED ANALGESIA SYRINGE INTRAVENOUS
Status: DISCONTINUED | OUTPATIENT
Start: 2023-10-26 | End: 2023-10-27

## 2023-10-26 RX ORDER — SODIUM CHLORIDE, SODIUM LACTATE, POTASSIUM CHLORIDE, CALCIUM CHLORIDE 600; 310; 30; 20 MG/100ML; MG/100ML; MG/100ML; MG/100ML
INJECTION, SOLUTION INTRAVENOUS CONTINUOUS
Status: DISCONTINUED | OUTPATIENT
Start: 2023-10-26 | End: 2023-10-29

## 2023-10-26 RX ORDER — OXYCODONE HYDROCHLORIDE 5 MG/1
5 TABLET ORAL EVERY 4 HOURS PRN
Status: DISCONTINUED | OUTPATIENT
Start: 2023-10-26 | End: 2023-10-30 | Stop reason: HOSPADM

## 2023-10-26 RX ORDER — ONDANSETRON 4 MG/1
4 TABLET, ORALLY DISINTEGRATING ORAL EVERY 6 HOURS PRN
Status: DISCONTINUED | OUTPATIENT
Start: 2023-10-26 | End: 2023-10-30 | Stop reason: HOSPADM

## 2023-10-26 RX ORDER — BUPIVACAINE HYDROCHLORIDE 5 MG/ML
INJECTION, SOLUTION PERINEURAL PRN
Status: DISCONTINUED | OUTPATIENT
Start: 2023-10-26 | End: 2023-10-26 | Stop reason: HOSPADM

## 2023-10-26 RX ORDER — CLINDAMYCIN PHOSPHATE 900 MG/50ML
900 INJECTION, SOLUTION INTRAVENOUS SEE ADMIN INSTRUCTIONS
Status: DISCONTINUED | OUTPATIENT
Start: 2023-10-26 | End: 2023-10-26 | Stop reason: HOSPADM

## 2023-10-26 RX ORDER — LIDOCAINE 40 MG/G
CREAM TOPICAL
Status: DISCONTINUED | OUTPATIENT
Start: 2023-10-26 | End: 2023-10-28

## 2023-10-26 RX ORDER — PHENYLEPHRINE HCL IN 0.9% NACL 50MG/250ML
.1-4 PLASTIC BAG, INJECTION (ML) INTRAVENOUS CONTINUOUS PRN
Status: DISCONTINUED | OUTPATIENT
Start: 2023-10-26 | End: 2023-10-27

## 2023-10-26 RX ORDER — HYDRALAZINE HYDROCHLORIDE 20 MG/ML
10 INJECTION INTRAMUSCULAR; INTRAVENOUS EVERY 30 MIN PRN
Status: DISCONTINUED | OUTPATIENT
Start: 2023-10-26 | End: 2023-10-30 | Stop reason: HOSPADM

## 2023-10-26 RX ORDER — BISACODYL 10 MG
10 SUPPOSITORY, RECTAL RECTAL DAILY PRN
Status: DISCONTINUED | OUTPATIENT
Start: 2023-10-26 | End: 2023-10-30 | Stop reason: HOSPADM

## 2023-10-26 RX ORDER — HYDROMORPHONE HCL IN WATER/PF 6 MG/30 ML
0.4 PATIENT CONTROLLED ANALGESIA SYRINGE INTRAVENOUS
Status: DISCONTINUED | OUTPATIENT
Start: 2023-10-26 | End: 2023-10-27

## 2023-10-26 RX ORDER — CLINDAMYCIN PHOSPHATE 900 MG/50ML
900 INJECTION, SOLUTION INTRAVENOUS
Status: COMPLETED | OUTPATIENT
Start: 2023-10-26 | End: 2023-10-26

## 2023-10-26 RX ADMIN — ACETAMINOPHEN 975 MG: 325 TABLET, FILM COATED ORAL at 16:02

## 2023-10-26 RX ADMIN — CLINDAMYCIN PHOSPHATE 900 MG: 900 INJECTION, SOLUTION INTRAVENOUS at 15:56

## 2023-10-26 RX ADMIN — VECURONIUM BROMIDE 3 MG: 1 INJECTION, POWDER, LYOPHILIZED, FOR SOLUTION INTRAVENOUS at 09:46

## 2023-10-26 RX ADMIN — FENTANYL CITRATE 100 MCG: 50 INJECTION INTRAMUSCULAR; INTRAVENOUS at 12:39

## 2023-10-26 RX ADMIN — METHOCARBAMOL 500 MG: 500 TABLET ORAL at 18:19

## 2023-10-26 RX ADMIN — HYDROMORPHONE HYDROCHLORIDE 0.2 MG: 0.2 INJECTION, SOLUTION INTRAMUSCULAR; INTRAVENOUS; SUBCUTANEOUS at 13:58

## 2023-10-26 RX ADMIN — ROCURONIUM BROMIDE 50 MG: 50 INJECTION, SOLUTION INTRAVENOUS at 07:49

## 2023-10-26 RX ADMIN — VECURONIUM BROMIDE 3 MG: 1 INJECTION, POWDER, LYOPHILIZED, FOR SOLUTION INTRAVENOUS at 08:26

## 2023-10-26 RX ADMIN — SODIUM CHLORIDE: 9 INJECTION, SOLUTION INTRAVENOUS at 08:13

## 2023-10-26 RX ADMIN — SODIUM CHLORIDE, POTASSIUM CHLORIDE, SODIUM LACTATE AND CALCIUM CHLORIDE 250 ML: 600; 310; 30; 20 INJECTION, SOLUTION INTRAVENOUS at 17:22

## 2023-10-26 RX ADMIN — VECURONIUM BROMIDE 2 MG: 1 INJECTION, POWDER, LYOPHILIZED, FOR SOLUTION INTRAVENOUS at 10:34

## 2023-10-26 RX ADMIN — SODIUM CHLORIDE: 9 INJECTION, SOLUTION INTRAVENOUS at 12:20

## 2023-10-26 RX ADMIN — NOREPINEPHRINE BITARTRATE 0.02 MCG/KG/MIN: 1 INJECTION, SOLUTION, CONCENTRATE INTRAVENOUS at 08:16

## 2023-10-26 RX ADMIN — FENTANYL CITRATE 100 MCG: 50 INJECTION INTRAMUSCULAR; INTRAVENOUS at 08:48

## 2023-10-26 RX ADMIN — VECURONIUM BROMIDE 1 MG: 1 INJECTION, POWDER, LYOPHILIZED, FOR SOLUTION INTRAVENOUS at 11:23

## 2023-10-26 RX ADMIN — OXYCODONE HYDROCHLORIDE 5 MG: 5 TABLET ORAL at 16:04

## 2023-10-26 RX ADMIN — NOREPINEPHRINE BITARTRATE 3.2 MCG: 1 INJECTION, SOLUTION, CONCENTRATE INTRAVENOUS at 11:39

## 2023-10-26 RX ADMIN — FENTANYL CITRATE 250 MCG: 50 INJECTION INTRAMUSCULAR; INTRAVENOUS at 07:48

## 2023-10-26 RX ADMIN — AMINOCAPROIC ACID 17.04 MG/KG/HR: 250 INJECTION, SOLUTION INTRAVENOUS at 09:03

## 2023-10-26 RX ADMIN — FENTANYL CITRATE 50 MCG: 50 INJECTION INTRAMUSCULAR; INTRAVENOUS at 11:43

## 2023-10-26 RX ADMIN — Medication 5 MG: at 08:31

## 2023-10-26 RX ADMIN — PHENYLEPHRINE HYDROCHLORIDE 50 MCG: 10 INJECTION INTRAVENOUS at 07:56

## 2023-10-26 RX ADMIN — CHLORHEXIDINE GLUCONATE 15 ML: 1.2 SOLUTION ORAL at 06:04

## 2023-10-26 RX ADMIN — LIDOCAINE HYDROCHLORIDE 2 ML: 10 INJECTION, SOLUTION EPIDURAL; INFILTRATION; INTRACAUDAL; PERINEURAL at 07:21

## 2023-10-26 RX ADMIN — PHENYLEPHRINE HYDROCHLORIDE 100 MCG: 10 INJECTION INTRAVENOUS at 09:07

## 2023-10-26 RX ADMIN — ALBUMIN HUMAN: 0.05 INJECTION, SOLUTION INTRAVENOUS at 08:08

## 2023-10-26 RX ADMIN — Medication 7.5 MG: at 08:01

## 2023-10-26 RX ADMIN — PROPOFOL 30 MCG/KG/MIN: 10 INJECTION, EMULSION INTRAVENOUS at 12:07

## 2023-10-26 RX ADMIN — MIDAZOLAM HYDROCHLORIDE 1 MG: 1 INJECTION, SOLUTION INTRAMUSCULAR; INTRAVENOUS at 07:45

## 2023-10-26 RX ADMIN — OXYCODONE HYDROCHLORIDE 10 MG: 5 TABLET ORAL at 22:08

## 2023-10-26 RX ADMIN — GLYCOPYRROLATE 0.2 MG: 0.2 INJECTION, SOLUTION INTRAMUSCULAR; INTRAVENOUS at 08:02

## 2023-10-26 RX ADMIN — EPINEPHRINE 0.03 MCG/KG/MIN: 1 INJECTION INTRAMUSCULAR; INTRAVENOUS; SUBCUTANEOUS at 11:29

## 2023-10-26 RX ADMIN — VECURONIUM BROMIDE 2 MG: 1 INJECTION, POWDER, LYOPHILIZED, FOR SOLUTION INTRAVENOUS at 09:12

## 2023-10-26 RX ADMIN — PROPOFOL 20 MG: 10 INJECTION, EMULSION INTRAVENOUS at 11:43

## 2023-10-26 RX ADMIN — NOREPINEPHRINE BITARTRATE 3.2 MCG: 1 INJECTION, SOLUTION, CONCENTRATE INTRAVENOUS at 08:08

## 2023-10-26 RX ADMIN — METOPROLOL TARTRATE 12.5 MG: 25 TABLET, FILM COATED ORAL at 06:03

## 2023-10-26 RX ADMIN — Medication 5 MG: at 07:57

## 2023-10-26 RX ADMIN — HYDROMORPHONE HYDROCHLORIDE 0.2 MG: 0.2 INJECTION, SOLUTION INTRAMUSCULAR; INTRAVENOUS; SUBCUTANEOUS at 15:22

## 2023-10-26 RX ADMIN — SODIUM CHLORIDE, POTASSIUM CHLORIDE, SODIUM LACTATE AND CALCIUM CHLORIDE: 600; 310; 30; 20 INJECTION, SOLUTION INTRAVENOUS at 06:32

## 2023-10-26 RX ADMIN — FENTANYL CITRATE 100 MCG: 50 INJECTION INTRAMUSCULAR; INTRAVENOUS at 11:55

## 2023-10-26 RX ADMIN — FENTANYL CITRATE 100 MCG: 50 INJECTION INTRAMUSCULAR; INTRAVENOUS at 08:45

## 2023-10-26 RX ADMIN — PROPOFOL 50 MG: 10 INJECTION, EMULSION INTRAVENOUS at 07:48

## 2023-10-26 RX ADMIN — SENNOSIDES AND DOCUSATE SODIUM 1 TABLET: 50; 8.6 TABLET ORAL at 22:07

## 2023-10-26 RX ADMIN — HYDROMORPHONE HYDROCHLORIDE 0.4 MG: 0.2 INJECTION, SOLUTION INTRAMUSCULAR; INTRAVENOUS; SUBCUTANEOUS at 19:34

## 2023-10-26 RX ADMIN — PHENYLEPHRINE HYDROCHLORIDE 100 MCG: 10 INJECTION INTRAVENOUS at 09:09

## 2023-10-26 RX ADMIN — VANCOMYCIN HYDROCHLORIDE 1000 MG: 1 INJECTION, SOLUTION INTRAVENOUS at 07:07

## 2023-10-26 RX ADMIN — NOREPINEPHRINE BITARTRATE 0.01 MCG/KG/MIN: 0.06 INJECTION, SOLUTION INTRAVENOUS at 17:23

## 2023-10-26 RX ADMIN — HYDROMORPHONE HYDROCHLORIDE 0.4 MG: 0.2 INJECTION, SOLUTION INTRAMUSCULAR; INTRAVENOUS; SUBCUTANEOUS at 17:17

## 2023-10-26 RX ADMIN — ONDANSETRON 4 MG: 2 INJECTION INTRAMUSCULAR; INTRAVENOUS at 10:35

## 2023-10-26 RX ADMIN — MIDAZOLAM HYDROCHLORIDE 2 MG: 1 INJECTION, SOLUTION INTRAMUSCULAR; INTRAVENOUS at 11:23

## 2023-10-26 RX ADMIN — MIDAZOLAM 1 MG: 1 INJECTION INTRAMUSCULAR; INTRAVENOUS at 07:21

## 2023-10-26 RX ADMIN — PHENYLEPHRINE HYDROCHLORIDE 100 MCG: 10 INJECTION INTRAVENOUS at 08:59

## 2023-10-26 RX ADMIN — GABAPENTIN 100 MG: 100 CAPSULE ORAL at 22:07

## 2023-10-26 RX ADMIN — VANCOMYCIN HYDROCHLORIDE 1000 MG: 1 INJECTION, SOLUTION INTRAVENOUS at 18:31

## 2023-10-26 RX ADMIN — MIDAZOLAM HYDROCHLORIDE 1 MG: 1 INJECTION, SOLUTION INTRAMUSCULAR; INTRAVENOUS at 07:36

## 2023-10-26 RX ADMIN — LIDOCAINE HYDROCHLORIDE 2 ML: 10 INJECTION, SOLUTION INFILTRATION; PERINEURAL at 06:50

## 2023-10-26 RX ADMIN — SUGAMMADEX 120 MG: 100 INJECTION, SOLUTION INTRAVENOUS at 13:01

## 2023-10-26 RX ADMIN — Medication 7.5 MG: at 08:08

## 2023-10-26 RX ADMIN — CLINDAMYCIN PHOSPHATE 900 MG: 900 INJECTION, SOLUTION INTRAVENOUS at 07:58

## 2023-10-26 RX ADMIN — LIDOCAINE HYDROCHLORIDE 80 MG: 20 INJECTION, SOLUTION INFILTRATION; PERINEURAL at 07:48

## 2023-10-26 RX ADMIN — MIDAZOLAM HYDROCHLORIDE 2 MG: 1 INJECTION, SOLUTION INTRAMUSCULAR; INTRAVENOUS at 09:11

## 2023-10-26 RX ADMIN — PROTAMINE SULFATE 130 MG: 10 INJECTION, SOLUTION INTRAVENOUS at 11:44

## 2023-10-26 RX ADMIN — SODIUM CHLORIDE, POTASSIUM CHLORIDE, SODIUM LACTATE AND CALCIUM CHLORIDE: 600; 310; 30; 20 INJECTION, SOLUTION INTRAVENOUS at 08:13

## 2023-10-26 RX ADMIN — HEPARIN SODIUM 20000 UNITS: 1000 INJECTION INTRAVENOUS; SUBCUTANEOUS at 08:57

## 2023-10-26 RX ADMIN — AMINOCAPROIC ACID 85.18 MG/KG/HR: 250 INJECTION, SOLUTION INTRAVENOUS at 08:13

## 2023-10-26 ASSESSMENT — ACTIVITIES OF DAILY LIVING (ADL)
ADLS_ACUITY_SCORE: 32
ADLS_ACUITY_SCORE: 20
ADLS_ACUITY_SCORE: 32
ADLS_ACUITY_SCORE: 30
ADLS_ACUITY_SCORE: 20
ADLS_ACUITY_SCORE: 29
ADLS_ACUITY_SCORE: 29
ADLS_ACUITY_SCORE: 33

## 2023-10-26 ASSESSMENT — LIFESTYLE VARIABLES: TOBACCO_USE: 1

## 2023-10-26 NOTE — BRIEF OP NOTE
[facility]    Brief Operative Note    Pre-operative diagnosis: Mitral valve prolapse [I34.1]  Post-operative diagnosis Same as pre-operative diagnosis    Procedure: MINIMALLY INVASIVE MITRAL VALVE REPAIR USING CHORD-X PRE-MEASURED LOOP SUTURE SIZE:12MM, MITRAL ANNULOPLASTY RING ERICK-COTTO PHYSIO Il SIZE: 28MM, AND ON CARDIOPULMONARY PUMP OXYGENATOR (INTRAOPERATIVE TRANSESOPHAGEAL ECHOCARDIOGRAM BY THE CARDIOLOGIST  ), N/A - Chest    Surgeon: Surgeon(s) and Role:     * Sabas Meredith MD - Primary     * Jordan Sauceda MD - Assisting    Anesthesia: General   Estimated Blood Loss: 600 ml    Drains: 1 right pleural, 1 med  Specimens:   ID Type Source Tests Collected by Time Destination   A : STAT LABS S/P CARDIAC BYPASS Blood Line, arterial CBC WITH PLATELETS, BASIC METABOLIC PANEL, FIBRINOGEN ACTIVITY, PARTIAL THROMBOPLASTIN TIME, INR BlankGrecia APRN CRNA 10/26/2023 11:52 AM    B : PT BLOOD EXPOSURE Blood Peripheral Blood EOHS & IP BBFE PANEL: EXPOSED EMPLOYEE OR PATIENT Grecia Dixon APRN CRNA 10/26/2023 12:32 PM      Findings:   Flail P2 leaflet, see op note for details .  Complications: None.  Implants:   Implant Name Type Inv. Item Serial No.  Lot No. LRB No. Used Action   ERICK-COTTO PHYSIO II ANNULOPLASTY RING SIZE 28MM Annuloplasty Ring  [id number] [employer]  N/A 1 Implanted           Jordan Sauceda MD  Cardiothoracic Surgery Fellow  Pager: [phone]

## 2023-10-26 NOTE — ANESTHESIA POSTPROCEDURE EVALUATION
sPatient: Margareth Green    Procedure: Procedure(s):  MINIMALLY INVASIVE MITRAL VALVE REPAIR USING CHORD-X PRE-MEASURED LOOP SUTURE SIZE:12MM, MITRAL ANNULOPLASTY RING ERICK-COTTO PHYSIO Il SIZE: 28MM, AND ON CARDIOPULMONARY PUMP OXYGENATOR (INTRAOPERATIVE TRANSESOPHAGEAL ECHOCARDIOGRAM BY THE CARDIOLOGIST  )       Anesthesia Type:  General    Note:  Disposition: ICU            ICU Sign Out: Unable to perform physician to physician sign out   Postop Pain Control: Uneventful            Sign Out: Well controlled pain   PONV: No   Neuro/Psych: Uneventful            Sign Out: Acceptable/Baseline neuro status   Airway/Respiratory: Uneventful            Sign Out: AIRWAY IN SITU/Resp. Support               Airway in situ/Resp. Support: ETT                 Reason: Planned Pre-op   CV/Hemodynamics: Uneventful            Sign Out: Acceptable CV status; No obvious hypovolemia; No obvious fluid overload   Other NRE: NONE   DID A NON-ROUTINE EVENT OCCUR? No    Event details/Postop Comments:  Stable transport to ICU from OR 12.  SBAR at bedside to ICU RN and team.           Last vitals:  Vitals:    10/26/23 1300 10/26/23 1315 10/26/23 1316   BP:  90/57 90/57   Pulse: 62 60 57   Resp: 16 16    Temp:  36.2  C (97.2  F)    SpO2: 100% 100% 100%       Electronically Signed By: Deven Rouse DO  October 26, 2023  1:35 PM

## 2023-10-26 NOTE — ANESTHESIA PROCEDURE NOTES
Perioperative DOUG Procedure Note    Staff -        Anesthesiologist:  Deven Rouse DO       Performed By: anesthesiologist  Preanesthesia Checklist:  Patient identified, IV assessed, risks and benefits discussed, monitors and equipment assessed, procedure being performed at surgeon's request and anesthesia consent obtained.    DOUG Probe Insertion    Probe Status PRE Insertion: NO obvious damage  Probe type:  Adult 3D  Bite block used:   Soft  Insertion Technique: Easy, no oropharyngeal manipulation  Insertion complications: None obvious  Billing Report:A DOUG report is NOT being generated.  Probe Status POST Removal: NO obvious damage

## 2023-10-26 NOTE — ANESTHESIA PROCEDURE NOTES
Central Line/PA Catheter Placement    Pre-Procedure   Staff -        Anesthesiologist:  Deven Rouse DO       Performed By: anesthesiologist       Location: OR       Pre-Anesthestic Checklist: patient identified, IV checked, site marked, risks and benefits discussed, informed consent, monitors and equipment checked, pre-op evaluation and at physician/surgeon's request  Timeout:       Correct Patient: Yes        Correct Procedure: Yes        Correct Site: Yes        Correct Position: Yes        Correct Laterality: Yes   Line Placement:   This line was placed Post Induction    Procedure   Procedure: central line       Laterality: right       Insertion Site: internal jugular, right.       Patient Position: Trendelenburg  Sterile Prep        All elements of maximal sterile barrier technique followed       Patient Prep/Sterile Barriers: draped, hand hygiene, gloves , hat , mask , draped, gown, sterile gel and probe cover       Skin prep: Chloraprep  Insertion/Injection        Technique: ultrasound guided and Seldinger Technique        1. Ultrasound was used to evaluate the access site.       2. Vein evaluated via ultrasound for patency/adequacy.       3. Using real-time ultrasound the needle/catheter was observed entering the artery/vein.       4. Permanent image was captured and entered into the patient's record.       5. The visualized structures were anatomically normal.       6. There were no apparent abnormal pathologic findings.       Introducer Type: 9 Fr, 2-lumen MAC        Type: PA/CVC with Introducer  Narrative         Secured by: suture       Tegaderm and Biopatch dressing used.       blood aspirated from all lumens,        All lumens flushed: Yes       Verification method: DOUG (J wire visible in right caval-atrial junction in the mid-esophageal bicaval view on DOUG.  Image Saved.)   Comments:  The patient was induced and put under general anesthesia. Airway was secured as documented. The DOUG probe was  lubricated and carefully inserted. The right neck was prepped with 2% chlorhexidine and the patient was draped with a full length sterile sheet in the usual fashion. The right internal jugular vein was accessed with ultrasound guidance using an 18 gauge thin wall needle, a 1.75 inch catheter was advanced over the needle and the needle was removed. A wire was then advanced through the catheter. The J wire tip was visible in right caval-atrial junction in the mid-esophageal bicaval view on DOUG.  Image was acquired. Once this was verified, the catheter was removed. After a small skin nick with scalpel, a 9 [place] introducer was advanced over the wire via the seldinger technique. Blood was withdrawn from all lumens and flushed with normal saline.  The catheter was sutured in place and a sterile dressing was applied over the site prior to removal of drapes. This was atraumatic and there were no complications.

## 2023-10-26 NOTE — PROGRESS NOTES
[facility]  Critical Care Service  Progress Note  Date of Service (when I saw the patient): 10/26/2023  Main Plans for Today  Extubate on pathway as able  Assessment & Plan   Margareth Green is a 68 year old female who was admitted on 10/26/2023. She had recent diagnosis of mitral regurgitation and workup suggesting need for MVR. A minimally invasive MVR was completed without complication today.    Neuro  Acute pain  Sedation  Plan:  -- Scheduled acetaminophen,   -- Propofol for sedation as needed until extubation  -- Delirium prevention as able    CV  S/p minimally invasive MVR  HTN  Plan:  -- Per pathway  -- Cardiac meds per CV surgery    Resp:  Post operative ventilator management  Acute respiratory failure  Plan:  -- Current settings are:    Vent Mode: CMV/AC  (Continuous Mandatory Ventilation/ Assist Control)  FiO2 (%): 60 %  Resp Rate (Set): 16 breaths/min  Tidal Volume (Set, mL): 450 mL  PEEP (cm H2O): 5 cmH2O  Resp: 21      -- PST when hemodynamically stable    GI/Nutrition  No prior hx  Plan:  -- NPO  -- PPI    Renal  No prior hx.  Baseline creatinine appears to be 0.9  Plan:  --monitor function and electrolytes as needed with replacement per ICU protocols. - generally avoid nephrotoxic agents such as NSAID, IV contrast unless specifically required  -- adjust medications as needed for renal clearance  -- follow I/O's as appropriate.  -- maintain euvolemia      Endocrine  Stress Hyperglycemia  Plan:  --  Insulin gtt per protocol if indicated  -- Keep BG  <180 for optimal healing    Heme:  Acute blood loss anemia  Coagulopathy   Plan:  -- Monitor hemoglobin.  -- Transfuse to keep > 7.0    General cares:  DVT Prophylaxis: Pneumatic Compression Devices  GI Prophylaxis: PPI  Restraints: Restraints for medical healing needed: YES  Family update by me today: Yes   Current lines are required for patient management  Access:  Lokesh Gentile MD  Time Spent on this Encounter    Billing:  I spent 35 minutes bedside and on the inpatient unit today managing the critical care of Margareth Green in relation to the issues listed in this note.  Interval History   S/p surgery today  Physical Exam   Temp: 97.3  F (36.3  C) Temp src: Temporal Temp  Min: 97  F (36.1  C)  Max: 97.8  F (36.6  C) BP: 90/57 Pulse: 65   Resp: 21 SpO2: 96 % O2 Device: Mechanical Ventilator    Vitals:    10/26/23 0545   Weight: 58.7 kg (129 lb 6.4 oz)     No intake/output data recorded.    GEN: sedated on ventilator  EYES: PERRL, Anicteric sclera.   HEENT:  Normocephalic, atraumatic, trachea midline, ETT secure  CV: RRR, no gallops, rubs, or murmurs  PULM/CHEST: Clear breath sounds bilaterally without rhonchi, crackles or wheeze, symmetric chest rise  GI: soft, non-distended  : barajas catheter in place, urine yellow and clear  EXTREMITIES: no peripheral edema, moving all extremities, peripheral pulses intact  NEURO: sedated, paralysis reversed  SKIN: No rashes, sores or ulcerations    Data   Recent Labs   Lab 10/26/23  1320 10/26/23  1319 10/26/23  1152   WBC 10.8  --  13.6*   HGB 10.2*  --  9.4*   MCV 94  --  94   *  --  142*   INR 1.27*  --  1.37*     --  142   POTASSIUM 4.5  --  4.7   CHLORIDE 110*  --  107   CO2 24  --  27   BUN 21.1  --  21.0   CR 0.93  --  0.89   ANIONGAP 9  --  8   TOLU 8.7*  --  9.7   * 118* 159*   ALBUMIN 3.4*  --   --    PROTTOTAL 5.0*  --   --    BILITOTAL 0.4  --   --    ALKPHOS 49  --   --    ALT 24  --   --    AST 84*  --   --        Lokesh Gentile MD  [facility] Intensivist Service

## 2023-10-26 NOTE — PLAN OF CARE
Goal Outcome Evaluation:         Overall Patient Progress: improvingOverall Patient Progress: improving      Pt AXOX4, c/o right shoulder, arm pain. Prn meds given with some relief. Extubated at 1513, up in the chair 1800. Pt pathway, IS encouraged. Tolerating clears. Off all drips. POC discussed with pt and family- daughter and spouse. Care ongoing

## 2023-10-26 NOTE — ANESTHESIA PROCEDURE NOTES
Airway       Patient location during procedure: OR       Procedure Start/Stop Times: 10/26/2023 7:53 AM  Staff -        Anesthesiologist:  Deven Rouse DO       CRNA: Grecia Dixon APRN CRNA       Performed By: anesthesiologist  Consent for Airway        Urgency: elective  Indications and Patient Condition       Indications for airway management: keeley-procedural       Induction type:intravenous       Mask difficulty assessment: 2 - vent by mask + OA or adjuvant +/- NMBA    Final Airway Details       Final airway type: endotracheal airway       Successful airway: ETT - single  Endotracheal Airway Details        ETT size (mm): 7.0       Cuffed: yes       Successful intubation technique: direct laryngoscopy       DL Blade Type: MAC 3       Grade View of Cords: 4       Adjucts: stylet       Position: Right       Measured from: lips       Secured at (cm): 22       Bite block used: None    Post intubation assessment        Placement verified by: capnometry, equal breath sounds and chest rise        Number of attempts at approach: 2       Number of other approaches attempted: 0       Secured with: silk tape       Ease of procedure: easy       Dentition: Intact and Unchanged    Medication(s) Administered   Medication Administration Time: 10/26/2023 7:53 AM

## 2023-10-26 NOTE — ANESTHESIA PREPROCEDURE EVALUATION
Anesthesia Pre-Procedure Evaluation    Patient: Margareth Green   MRN: [id number] : 1955        Procedure : Procedure(s):  MINIMALLY INVASIVE MITRAL VALVE REPAIR POSSIBLE REPLACEMENT          Past Medical History:   Diagnosis Date    Abnormal Papanicolaou smear of vagina and vaginal HPV 2002    ASCUS (HPV neg). 3/03 WNL but no transitional zome - repeat 3months    Allergic rhinitis, cause unspecified     Arthritis     Backache, unspecified     Carpal tunnel syndrome     s/p repair    Cervicalgia     Chronic kidney disease, stage 3 (H) 2021    Depressive disorder     Depressive disorder, not elsewhere classified     doing well on Wellbutrin    Derangement of TMJ (temporomandibular joint) 2010    internal derangement    Dermatophytosis of nail     Diaphragmatic hernia without mention of obstruction or gangrene 2007    small hiatal hernia    Diffuse cystic mastopathy     FCBD    Esophageal reflux 2005    Hammertoe 10/2009    see podiatry consult    Headache(784.0)     Muscle contrature headaches    Intramural leiomyoma of uterus 2007    on US    Irregular menstrual cycle     better with BCP -also PMS is better with BCP    IRRITABLE COLON 2008    Malaise and fatigue     Menopause     lmp 2008    Mild persistent asthma     Mitral valve disease     Mole (skin)     9 x 6 mm rt ant chest    Motion sickness     Neuroma 2008    lt foot    Osteopenia 2008    Other malaise and fatigue 2005    stress    Personal history of tobacco use, presenting hazards to health     Quit in     Solitary cyst of breast 2005    lt 6 oclock    Stenosis of lumbosacral spine     l4-5 and l5-s1    Symptomatic menopausal or female climacteric states 2002    perimenopausal. LMP 2008    Tachycardia, paroxysmal (H) 2020      Past Surgical History:   Procedure Laterality Date    BIOPSY  4/10/2015    BL DUPLEX LO EXTREM ART UNILAT/LTD  3008    lower extr arterial doppler -no  stenosis    C DEXA,BONE DENSITY,APPENDICULR SKELTN  12/2008    osteopenia    COLONOSCOPY  10/22/2007    bx benign    COLONOSCOPY N/A 4/10/2015    Procedure: COMBINED COLONOSCOPY, SINGLE OR MULTIPLE BIOPSY/POLYPECTOMY BY BIOPSY;  Surgeon: Cl Wagner MD;  Location:  GI    CV CORONARY ANGIOGRAM N/A 8/17/2023    Procedure: Coronary Angiogram;  Surgeon: Liza Ford MD;  Location: [facility] CARDIAC CATH LAB    ESOPHAGOSCOPY, GASTROSCOPY, DUODENOSCOPY (EGD), COMBINED N/A 1/31/2018    Procedure: COMBINED ESOPHAGOSCOPY, GASTROSCOPY, DUODENOSCOPY (EGD);  COMBINED ESOPHAGOSCOPY, GASTROSCOPY, DUODENOSCOPY (EGD);  Surgeon: Tirso Duran MD;  Location:  GI    ESOPHAGOSCOPY, GASTROSCOPY, DUODENOSCOPY (EGD), COMBINED N/A 2/1/2023    Procedure: ESOPHAGOGASTRODUODENOSCOPY (EGD);  Surgeon: Lui Vences MD;  Location:  GI    EYE SURGERY      HC PART REMV BONE METATARSAL HEAD,EA  01/11/10    Right foot plantar plate tear. Also correct hammertoe, 2nd digit & varicose vein ligation, heel.    HC REVISE MEDIAN N/CARPAL TUNNEL SURG      Left in 1997, right in 1999    INJ, ANES/STER, FACET LUMB/SAC  2010    Left L5 nerve root injection    INJECT EPIDURAL CERVICAL Right 9/29/2022    Procedure: Cervical 7-Thoracic 1 Interlaminar epidural steroid injection using fluoroscopic guidance with contrast dye, Right;  Surgeon: Tyler Bay MD;  Location: [facility] OR    INJECT EPIDURAL TRANSFORAMINAL  10/09/2013    [facility]    INJECT JOINT SACROILIAC  3/19/14,4/21/14    [facility]    [facility] NONSPECIFIC PROCEDURE  2009    Metatarsal phalangeal joint injection    [facility]ST[facility]  7/2002    bx. benign - flex sig in 5 yrs  or colonoscopy in 10 yrs    ZZ ECHO HEART XTHORACIC, STRESS/REST  2/2005    WNL    ZZ NCS MOTOR W/O F-WAVE, EACH NERVE  7/2008    CTS      Allergies   Allergen Reactions    Penicillins Itching     Vaginal itching - Yeast infection afterward      Social History      Tobacco Use    Smoking status: Former     Packs/day: 0.50     Years: 30.00     Additional pack years: 0.00     Total pack years: 15.00     Types: Cigarettes     Quit date: 3/20/1998     Years since quittin.6    Smokeless tobacco: Never    Tobacco comments:     No smokers in home   Substance Use Topics    Alcohol use: Yes     Alcohol/week: 6.7 standard drinks of alcohol     Comment: 2x/week      Wt Readings from Last 1 Encounters:   10/17/23 59.9 kg (132 lb)        Anesthesia Evaluation            ROS/MED HX  ENT/Pulmonary:     (+)           allergic rhinitis,     tobacco use,    Mild Persistent, asthma                  Neurologic:       Cardiovascular:     (+)  - -   -  - -                           valvular problems/murmurs type: MVP and MR        Echo: Date: Results:  Interpretation Summary     1. Normal biventricular size and function. Left ventricular ejection fraction  of 65-70%.  2. No segmental wall motion abnormalities noted.  3. Severe mitral valve prolapse, posterior leaflet. There is severe (4+)  mitral regurgitation. EROA is 0.43 cm2 and Reg volume is 73 ml.  4. Elevated pulmonary artery systolic pressure of ~ 40-45 mmHg.  5. The left atrium is severely dilated.  Compared to the resting portion of the stress test in 2019, MR has increased  in severity and is now severe.    Stress Test:  Date: Results:    ECG Reviewed:  Date: Results:    Cath:  Date: Results:      METS/Exercise Tolerance:     Hematologic:       Musculoskeletal: Comment: Arthralgia  Cervicalgia  DDD (degenerative disc disease), lumbar      GI/Hepatic:       Renal/Genitourinary:       Endo:       Psychiatric/Substance Use:     (+) psychiatric history anxiety and depression       Infectious Disease:       Malignancy:       Other:               OUTSIDE LABS:  CBC:   Lab Results   Component Value Date    WBC 7.6 10/18/2023    WBC 6.2 2023    HGB 12.5 10/18/2023    HGB 13.5 2023    HCT 38.1 10/18/2023    HCT 40.8 2023  "    10/18/2023     08/17/2023     BMP:   Lab Results   Component Value Date     10/18/2023     08/17/2023    POTASSIUM 4.2 10/18/2023    POTASSIUM 3.8 08/17/2023    CHLORIDE 104 10/18/2023    CHLORIDE 106 08/17/2023    CO2 27 10/18/2023    CO2 24 08/17/2023    BUN 22.1 10/18/2023    BUN 15.8 08/17/2023    CR 0.88 10/18/2023    CR 0.90 08/17/2023    GLC 87 10/18/2023    GLC 86 08/17/2023     COAGS:   Lab Results   Component Value Date    PTT 31 08/17/2023    INR 1.07 08/17/2023     POC: No results found for: \"BGM\", \"HCG\", \"HCGS\"  HEPATIC:   Lab Results   Component Value Date    ALBUMIN 4.1 10/18/2023    PROTTOTAL 6.4 10/18/2023    ALT 19 10/18/2023    AST 24 10/18/2023    ALKPHOS 66 10/18/2023    BILITOTAL 0.4 10/18/2023     OTHER:   Lab Results   Component Value Date    PH 7.46 (H) 12/06/2021    A1C 5.0 10/18/2023    TOLU 9.2 10/18/2023    MAG 1.9 12/06/2021    LIPASE 284 12/06/2021    TSH 2.29 10/14/2019    CRP 25.9 (H) 03/31/2013    SED 7 09/06/2023       Anesthesia Plan    ASA Status:  3    NPO Status:  NPO Appropriate    Anesthesia Type: General.     - Airway: ETT   Induction: Intravenous.   Maintenance: Balanced.   Techniques and Equipment:     - Lines/Monitors: 2nd IV, Arterial Line, Central Line, PAC, CVP, DOUG            DOUG Absolute Contra-indication: NONE            DOUG Relative Contra-indication: NONE     - Blood: Blood in Room     Consents    Anesthesia Plan(s) and associated risks, benefits, and realistic alternatives discussed. Questions answered and patient/representative(s) expressed understanding.     - Discussed:     - Discussed with:  Patient      - Extended Intubation/Ventilatory Support Discussed: No.      - Patient is DNR/DNI Status: No     Use of blood products discussed: Yes.     - Discussed with: Patient.     - Consented: consented to blood products            Reason for refusal: other.     Postoperative Care    Pain management: IV analgesics, Oral pain medications, " Multi-modal analgesia.   PONV prophylaxis: Ondansetron (or other 5HT-3), Dexamethasone or Solumedrol, Background Propofol Infusion     Comments:                Deven Rouse DO

## 2023-10-26 NOTE — ANESTHESIA PROCEDURE NOTES
Arterial Line Procedure Note    Pre-Procedure   Staff -        Anesthesiologist:  Deven Rouse DO       Performed By: anesthesiologist       Location: pre-op       Pre-Anesthestic Checklist: patient identified, IV checked, site marked, risks and benefits discussed, informed consent, monitors and equipment checked, pre-op evaluation and at physician/surgeon's request  Timeout:       Correct Patient: Yes        Correct Procedure: Yes        Correct Site: Yes        Correct Position: Yes   Line Placement:   This line was placed Pre Induction starting at 10/26/2023 6:50 AM and ending at 10/26/2023 7:00 AM  Procedure   Procedure: arterial line       Laterality: left       Insertion Site: radial.  Sterile Prep        All elements of maximal sterile barrier technique followed       Patient Prep/Sterile Barriers: hand hygiene, sterile gloves, hat, mask, draped, draped       Skin prep: Chloraprep  Insertion/Injection        Technique: Seldinger Technique and ultrasound guided        1. Ultrasound was used to evaluate the access site.       2. Artery evaluated via ultrasound for patency/adequacy.       3. Using real-time ultrasound the needle/catheter was observed entering the artery/vein.       4. Permanent image was captured and entered into the patient's record.       5. The visualized structures were anatomically normal.       6. There were no apparent abnormal pathologic findings.       Catheter Type/Size: 20 gauge, 1.75 in/4.5 cm quick cath (integral wire)  Narrative        Tegaderm dressing used.       Complications: None apparent,        Arterial waveform: Yes        IBP within 10% of NIBP: Yes

## 2023-10-26 NOTE — CONSULTS
CARDIAC SURGERY NUTRITION CONSULT    Received standing order to assess and educate patient    Patient inappropriate for instructions at this time:    Will follow and complete assessment once patient is extubated and/or transferred to medical unit    Sanjuanita Pa RD, LD, CNSC   Clinical Dietitian - [facility]

## 2023-10-26 NOTE — PROGRESS NOTES
Extubation Note    Successful completion of SBT (Yes or No):Yes  Extubation time:1513    Patient assessment:  Lung sounds:Clear and diminished  Stridor Present (Yes or No): No  Patient tolerance: Good    Oxygen device:  5L NC  SpO2:97%    Plan:Will cont to monitor.  10/26/2023  Elodia Mcelroy RT

## 2023-10-26 NOTE — ANESTHESIA CARE TRANSFER NOTE
Patient: Margareth Green    Procedure: Procedure(s):  MINIMALLY INVASIVE MITRAL VALVE REPAIR USING CHORD-X PRE-MEASURED LOOP SUTURE SIZE:12MM, MITRAL ANNULOPLASTY RING ERICK-COTTO PHYSIO Il SIZE: 28MM, AND ON CARDIOPULMONARY PUMP OXYGENATOR (INTRAOPERATIVE TRANSESOPHAGEAL ECHOCARDIOGRAM BY THE CARDIOLOGIST  )       Diagnosis: Mitral valve prolapse [I34.1]  Diagnosis Additional Information: No value filed.    Anesthesia Type:   General     Note:    Oropharynx: endotracheal tube in place and ventilatory support  Level of Consciousness: iatrogenic sedation  Patient oxygen source: manually ventilated with ambu bag.  Level of Supplemental Oxygen (L/min / FiO2): 15  Independent Airway: airway patency not satisfactory and stable  Dentition: dentition unchanged  Vital Signs Stable: post-procedure vital signs reviewed and stable  Report to RN Given: handoff report given  Patient transferred to: ICU    ICU Handoff: Call for PAUSE to initiate/utilize ICU HANDOFF, Identified Patient, Identified Responsible Provider, Reviewed the Pertinent Medical History, Discussed Surgical Course, Reviewed Intra-OP Anesthesia Management and Issues during Anesthesia, Set Expectations for Post Procedure Period and Allowed Opportunity for Questions and Acknowledgement of Understanding  Vitals:  Vitals Value Taken Time   BP     Temp 35.8  C (96.44  F) 10/26/23 1306   Pulse 57 10/26/23 1306   Resp 22 10/26/23 1306   SpO2 100 % 10/26/23 1306   Vitals shown include unfiled device data.    Electronically Signed By: JUAN Reynolds CRNA  October 26, 2023  1:07 PM

## 2023-10-26 NOTE — OP NOTE
Date of service: October 26, 2023    Referring cardiologist: Donte Guerrero MD    Preoperative diagnosis: Severe symptomatic mitral valve regurgitation    Postoperative diagnosis: Severe symptomatic mitral valve regurgitation    Surgeon: Sabas Meredith MD    Assistant: Jordan Sauceda MD    Name of operation: Right minithoracotomy, mitral valve repair with [place]-Guru neochords to the prolapsing P2 segment, posteromedial commissuroplasty, P1/P2 cleft closure, implantation of a 28 mm Candelario Physio 2 annuloplasty ring, right common femoral arterial and venous cannulation for cardiopulmonary bypass, intraoperative DOUG    Anesthesia: General orotracheal    Indications for procedure: Ms. Green is a very pleasant 68-year-old female who was recently referred to me for severe symptomatic mitral regurgitation from P2 prolapse.  Her LV function was preserved.  Preoperative coronary angiogram demonstrated no significant coronary artery disease.  She was taken to the operating room today for a minimally invasive mitral valve repair.    Operative findings: The patient had an overall normal LV systolic size and function.  She had severe left atrial enlargement.  She had a very myxomatous posterior mitral valve leaflet with significant prolapse of the posterior leaflet, but no ruptured chordae were seen. The anterior leaflet was normal.    Description of the procedure: After informed consent was obtained, the patient was brought down to the operating room and was placed on the OR table in the supine position.  Intravenous and intra-arterial lines were begun.  While monitoring his blood pressure and EKG tracing, she was anesthetized and intubated using a single-lumen endotracheal tube.  A Moca-John catheter was also placed.  Her entire chest, abdomen, both groins and legs were prepped down to the toes using multiple layers of DuraPrep.  She was draped in a sterile field.  A right minithoracotomy incision was made, and the fourth  intercostal space was entered.  An Eduardo soft tissue room retractor and the [employer] minithoracotomy rib retractor were used for exposure.  Carbon dioxide was flooded into the chest to prevent air embolism.  The patient was fully heparinized.  A small right groin incision was then made 1 cm above the inguinal crease in a parallel fashion, and the right common femoral artery and vein were dissected out.  5-0 Prolene pursestring sutures were used to cannulate the right common femoral artery and vein using a 17 Lao femoral arterial cannula and a 25 Lao multistage venous cannula, respectively.  Seldinger technique was used along with the DOUG.  After appropriate ACT level was achieved, cardiopulmonary bypass was established and the patient was kept normothermic during the entire operation.    A 10 mm 30 degree scope was introduced in the third intercostal space in the mid axillary line using a Trocar to assist in the and the rest of the operation.  The right phrenic nerve was identified and protected.  The pericardium was opened 2 cm anteriorly from the phrenic nerve and a parallel fashion.  The venous drainage of the heart was excellent.  The oblique sinus was freed up and the interatrial groove was also dissected out using electrocautery.  An antegrade needle/aortic root vent was placed in the ascending aorta.  The aorta was then crossclamped and a liter [place] antegrade cardioplegia was given to fully arrest heart.  The patient went into good diastolic arrest without any LV distention.    A standard left atriotomy was then made and the [facility] HV retractor was used to expose the mitral valve.  Our exposure was excellent.  Findings were noted above.  We decided to proceed with the mitral valve repair using a non-resectional technique with artificial cords.  We first placed the annular sutures using interrupted 2-0 Ethibond sutures with SH 2 needle.  We then brought an ARTIVION 12 mm premeasured  "ChordX loop system, and the system was anchored down to the posteromedial papillary muscle head.  The three 12 mm needle neochords were then sewn to the prolapsing leaflet edge of the P2 segment and the sutures were secured.  This eliminated the prolapse.  The annulus was then true-sized to a 28 mm Physio 2 annuloplasty ring.  The ring was brought to the field and all sutures were brought through the sewing ring and the ring was seated down without difficulty.  All sutures were tied down securely using a Cor-Knot device.  The valve was tested and there is a small regurgitant jet from the posteromedial commissure, which we closed using 2 \"magic\" stitches using interrupted CV 4 [place]-Guru suture.  There was another trace regurgitant jet from a cleft between the P1 and P2 segment, which we closed using an interrupted single CV 4 [place]-Guru suture.  Final valve testing by filling the left ventricle with cardioplegia demonstrated excellent leaflet coaptation with no residual regurgitation.  We were happy with the repair.  The left atriotomy was then closed in 2 layers of running 4-0 Prolene.  Antegrade hotshot was given and the aortic cross-clamp was removed.  Aortic cross-clamp time was 110 minutes.  The ascending aorta was continuously vented to de-air the heart.  The patient was then weaned off cardiopulmonary bypass with low-dose inotropic and vasopressor support.  Total cardiopulmonary bypass time was 150 minutes.  LV function was good.  The heart was adequately de-aired.  The mitral valve repair looked good with trivial regurgitation with a mean gradient of 4 mmHg.  Once the patient remained stable off bypass, the femoral venous cannula was removed and protamine was given.  The femoral arterial cannula was subsequently removed as well.  The groin incision was irrigated out and was closed in layers of running Vicryl suture and the skin was closed using running 3-0 Vicryl and sealed using Dermabond.    Temporary " ventricular pacing wire was placed in the RV muscle.  A 24 Welsh Welsh Tavo drain was placed in the right pleural space, and a 28 Welsh straight chest tube in the mediastinum.  These were all brought out percutaneously below the minithoracotomy incision and secured to the skin using 2-0 Ethibond.  The right lung reinflated nicely.  Mediastinal hemostasis was obtained.  The rib space was reapproximated using 2 interrupted #5 Ethibond sutures.  30 cc of quarter percent bupivacaine was injected in the third, fourth, and fifth intercostal space.  The minithoracotomy incision was finally closed in layers of running Vicryl suture.  The skin was closed using running 3-0 Vicryl and was sealed using Dermabond.    There were no intraoperative complications and the patient tolerated the operation well.  No blood products were given intraoperatively.  All sponge counts, needle counts, and instrument counts were correct x2 at the end of the operation.  Specimen removed: none.  The patient was brought to the ICU in hemodynamically stable condition.    Sabas Meredith MD

## 2023-10-27 ENCOUNTER — APPOINTMENT (OUTPATIENT)
Dept: PHYSICAL THERAPY | Facility: CLINIC | Age: 68
DRG: 220 | End: 2023-10-27
Attending: PHYSICIAN ASSISTANT
Payer: COMMERCIAL

## 2023-10-27 ENCOUNTER — APPOINTMENT (OUTPATIENT)
Dept: GENERAL RADIOLOGY | Facility: CLINIC | Age: 68
DRG: 220 | End: 2023-10-27
Attending: PHYSICIAN ASSISTANT
Payer: COMMERCIAL

## 2023-10-27 LAB
ALBUMIN SERPL BCG-MCNC: 3.7 G/DL (ref 3.5–5.2)
ALP SERPL-CCNC: 39 U/L (ref 35–104)
ALT SERPL W P-5'-P-CCNC: 24 U/L (ref 0–50)
ANION GAP SERPL CALCULATED.3IONS-SCNC: 7 MMOL/L (ref 7–15)
AST SERPL W P-5'-P-CCNC: 71 U/L (ref 0–45)
ATRIAL RATE - MUSE: 61 BPM
ATRIAL RATE - MUSE: 79 BPM
BILIRUB SERPL-MCNC: 0.4 MG/DL
BUN SERPL-MCNC: 17 MG/DL (ref 8–23)
CA-I BLD-MCNC: 4.6 MG/DL (ref 4.4–5.2)
CALCIUM SERPL-MCNC: 8.2 MG/DL (ref 8.8–10.2)
CHLORIDE SERPL-SCNC: 104 MMOL/L (ref 98–107)
CREAT SERPL-MCNC: 0.85 MG/DL (ref 0.51–0.95)
DEPRECATED HCO3 PLAS-SCNC: 26 MMOL/L (ref 22–29)
DIASTOLIC BLOOD PRESSURE - MUSE: NORMAL MMHG
DIASTOLIC BLOOD PRESSURE - MUSE: NORMAL MMHG
EGFRCR SERPLBLD CKD-EPI 2021: 73 ML/MIN/1.73M2
ERYTHROCYTE [DISTWIDTH] IN BLOOD BY AUTOMATED COUNT: 13.2 % (ref 10–15)
GLUCOSE BLDC GLUCOMTR-MCNC: 102 MG/DL (ref 70–99)
GLUCOSE BLDC GLUCOMTR-MCNC: 119 MG/DL (ref 70–99)
GLUCOSE BLDC GLUCOMTR-MCNC: 83 MG/DL (ref 70–99)
GLUCOSE BLDC GLUCOMTR-MCNC: 91 MG/DL (ref 70–99)
GLUCOSE SERPL-MCNC: 109 MG/DL (ref 70–99)
HBV SURFACE AB SERPL IA-ACNC: 7.38 M[IU]/ML
HBV SURFACE AB SERPL IA-ACNC: NONREACTIVE M[IU]/ML
HBV SURFACE AG SERPL QL IA: NONREACTIVE
HCT VFR BLD AUTO: 30 % (ref 35–47)
HCV AB SERPL QL IA: NONREACTIVE
HGB BLD-MCNC: 9.8 G/DL (ref 11.7–15.7)
HIV 1+2 AB+HIV1 P24 AG SERPL QL IA: NONREACTIVE
INTERPRETATION ECG - MUSE: NORMAL
INTERPRETATION ECG - MUSE: NORMAL
MAGNESIUM SERPL-MCNC: 2.2 MG/DL (ref 1.7–2.3)
MCH RBC QN AUTO: 30.6 PG (ref 26.5–33)
MCHC RBC AUTO-ENTMCNC: 32.7 G/DL (ref 31.5–36.5)
MCV RBC AUTO: 94 FL (ref 78–100)
P AXIS - MUSE: 18 DEGREES
P AXIS - MUSE: 40 DEGREES
PHOSPHATE SERPL-MCNC: 3.9 MG/DL (ref 2.5–4.5)
PLATELET # BLD AUTO: 139 10E3/UL (ref 150–450)
POTASSIUM SERPL-SCNC: 4.5 MMOL/L (ref 3.4–5.3)
PR INTERVAL - MUSE: 174 MS
PR INTERVAL - MUSE: 180 MS
PROT SERPL-MCNC: 5.2 G/DL (ref 6.4–8.3)
QRS DURATION - MUSE: 78 MS
QRS DURATION - MUSE: 84 MS
QT - MUSE: 404 MS
QT - MUSE: 478 MS
QTC - MUSE: 463 MS
QTC - MUSE: 481 MS
R AXIS - MUSE: 63 DEGREES
R AXIS - MUSE: 85 DEGREES
RBC # BLD AUTO: 3.2 10E6/UL (ref 3.8–5.2)
SODIUM SERPL-SCNC: 137 MMOL/L (ref 135–145)
SYSTOLIC BLOOD PRESSURE - MUSE: NORMAL MMHG
SYSTOLIC BLOOD PRESSURE - MUSE: NORMAL MMHG
T AXIS - MUSE: 46 DEGREES
T AXIS - MUSE: 71 DEGREES
VENTRICULAR RATE- MUSE: 61 BPM
VENTRICULAR RATE- MUSE: 79 BPM
WBC # BLD AUTO: 9.3 10E3/UL (ref 4–11)

## 2023-10-27 PROCEDURE — 250N000013 HC RX MED GY IP 250 OP 250 PS 637: Performed by: INTERNAL MEDICINE

## 2023-10-27 PROCEDURE — 82330 ASSAY OF CALCIUM: CPT | Performed by: PHYSICIAN ASSISTANT

## 2023-10-27 PROCEDURE — 250N000013 HC RX MED GY IP 250 OP 250 PS 637: Performed by: PHYSICIAN ASSISTANT

## 2023-10-27 PROCEDURE — 93005 ELECTROCARDIOGRAM TRACING: CPT

## 2023-10-27 PROCEDURE — 250N000011 HC RX IP 250 OP 636: Mod: JZ | Performed by: PHYSICIAN ASSISTANT

## 2023-10-27 PROCEDURE — 120N000001 HC R&B MED SURG/OB

## 2023-10-27 PROCEDURE — 97530 THERAPEUTIC ACTIVITIES: CPT | Mod: GP | Performed by: PHYSICAL THERAPIST

## 2023-10-27 PROCEDURE — 97161 PT EVAL LOW COMPLEX 20 MIN: CPT | Mod: GP | Performed by: PHYSICAL THERAPIST

## 2023-10-27 PROCEDURE — 83735 ASSAY OF MAGNESIUM: CPT | Performed by: PHYSICIAN ASSISTANT

## 2023-10-27 PROCEDURE — 80053 COMPREHEN METABOLIC PANEL: CPT | Performed by: PHYSICIAN ASSISTANT

## 2023-10-27 PROCEDURE — 71045 X-RAY EXAM CHEST 1 VIEW: CPT

## 2023-10-27 PROCEDURE — 84100 ASSAY OF PHOSPHORUS: CPT | Performed by: PHYSICIAN ASSISTANT

## 2023-10-27 PROCEDURE — 85027 COMPLETE CBC AUTOMATED: CPT | Performed by: PHYSICIAN ASSISTANT

## 2023-10-27 PROCEDURE — 250N000011 HC RX IP 250 OP 636: Performed by: PHYSICIAN ASSISTANT

## 2023-10-27 RX ORDER — NITROGLYCERIN 0.4 MG/1
0.4 TABLET SUBLINGUAL EVERY 5 MIN PRN
Status: DISCONTINUED | OUTPATIENT
Start: 2023-10-27 | End: 2023-10-27

## 2023-10-27 RX ORDER — LIDOCAINE 40 MG/G
CREAM TOPICAL
Status: DISCONTINUED | OUTPATIENT
Start: 2023-10-27 | End: 2023-10-30 | Stop reason: HOSPADM

## 2023-10-27 RX ORDER — DEXTROSE MONOHYDRATE 25 G/50ML
25-50 INJECTION, SOLUTION INTRAVENOUS
Status: DISCONTINUED | OUTPATIENT
Start: 2023-10-27 | End: 2023-10-30 | Stop reason: HOSPADM

## 2023-10-27 RX ORDER — GABAPENTIN 300 MG/1
300 CAPSULE ORAL 3 TIMES DAILY
Status: DISCONTINUED | OUTPATIENT
Start: 2023-10-27 | End: 2023-10-29

## 2023-10-27 RX ORDER — MECLIZINE HYDROCHLORIDE 25 MG/1
25 TABLET ORAL DAILY PRN
Status: DISCONTINUED | OUTPATIENT
Start: 2023-10-27 | End: 2023-10-29

## 2023-10-27 RX ORDER — METHOCARBAMOL 500 MG/1
500 TABLET, FILM COATED ORAL 3 TIMES DAILY
Status: DISCONTINUED | OUTPATIENT
Start: 2023-10-27 | End: 2023-10-30 | Stop reason: HOSPADM

## 2023-10-27 RX ORDER — NICOTINE POLACRILEX 4 MG
15-30 LOZENGE BUCCAL
Status: DISCONTINUED | OUTPATIENT
Start: 2023-10-27 | End: 2023-10-30 | Stop reason: HOSPADM

## 2023-10-27 RX ORDER — GABAPENTIN 300 MG/1
300 CAPSULE ORAL ONCE
Status: COMPLETED | OUTPATIENT
Start: 2023-10-27 | End: 2023-10-27

## 2023-10-27 RX ORDER — FUROSEMIDE 10 MG/ML
20 INJECTION INTRAMUSCULAR; INTRAVENOUS ONCE
Status: COMPLETED | OUTPATIENT
Start: 2023-10-27 | End: 2023-10-27

## 2023-10-27 RX ADMIN — POLYETHYLENE GLYCOL 3350 17 G: 17 POWDER, FOR SOLUTION ORAL at 08:22

## 2023-10-27 RX ADMIN — HEPARIN SODIUM 5000 UNITS: 5000 INJECTION, SOLUTION INTRAVENOUS; SUBCUTANEOUS at 13:48

## 2023-10-27 RX ADMIN — OXYCODONE HYDROCHLORIDE 10 MG: 5 TABLET ORAL at 06:14

## 2023-10-27 RX ADMIN — GABAPENTIN 300 MG: 300 CAPSULE ORAL at 12:33

## 2023-10-27 RX ADMIN — OXYCODONE HYDROCHLORIDE 10 MG: 5 TABLET ORAL at 11:01

## 2023-10-27 RX ADMIN — HYDROMORPHONE HYDROCHLORIDE 0.4 MG: 0.2 INJECTION, SOLUTION INTRAMUSCULAR; INTRAVENOUS; SUBCUTANEOUS at 00:02

## 2023-10-27 RX ADMIN — GABAPENTIN 300 MG: 300 CAPSULE ORAL at 21:06

## 2023-10-27 RX ADMIN — ACETAMINOPHEN 975 MG: 325 TABLET, FILM COATED ORAL at 15:57

## 2023-10-27 RX ADMIN — METHOCARBAMOL 500 MG: 500 TABLET ORAL at 06:14

## 2023-10-27 RX ADMIN — METHOCARBAMOL 500 MG: 500 TABLET ORAL at 15:57

## 2023-10-27 RX ADMIN — CLINDAMYCIN PHOSPHATE 900 MG: 900 INJECTION, SOLUTION INTRAVENOUS at 08:26

## 2023-10-27 RX ADMIN — METHOCARBAMOL 500 MG: 500 TABLET ORAL at 21:06

## 2023-10-27 RX ADMIN — ACETAMINOPHEN 975 MG: 325 TABLET, FILM COATED ORAL at 08:23

## 2023-10-27 RX ADMIN — METOPROLOL TARTRATE 12.5 MG: 25 TABLET, FILM COATED ORAL at 21:06

## 2023-10-27 RX ADMIN — Medication 1 MG: at 22:08

## 2023-10-27 RX ADMIN — SENNOSIDES AND DOCUSATE SODIUM 1 TABLET: 50; 8.6 TABLET ORAL at 21:06

## 2023-10-27 RX ADMIN — HYDROMORPHONE HYDROCHLORIDE 0.4 MG: 0.2 INJECTION, SOLUTION INTRAMUSCULAR; INTRAVENOUS; SUBCUTANEOUS at 08:16

## 2023-10-27 RX ADMIN — OXYCODONE HYDROCHLORIDE 5 MG: 5 TABLET ORAL at 19:35

## 2023-10-27 RX ADMIN — ACETAMINOPHEN 975 MG: 325 TABLET, FILM COATED ORAL at 00:02

## 2023-10-27 RX ADMIN — VANCOMYCIN HYDROCHLORIDE 1000 MG: 1 INJECTION, SOLUTION INTRAVENOUS at 06:26

## 2023-10-27 RX ADMIN — METOPROLOL TARTRATE 12.5 MG: 25 TABLET, FILM COATED ORAL at 09:27

## 2023-10-27 RX ADMIN — CLINDAMYCIN PHOSPHATE 900 MG: 900 INJECTION, SOLUTION INTRAVENOUS at 00:03

## 2023-10-27 RX ADMIN — ASPIRIN 81 MG CHEWABLE TABLET 81 MG: 81 TABLET CHEWABLE at 08:23

## 2023-10-27 RX ADMIN — BUPROPION HYDROCHLORIDE 150 MG: 150 TABLET, FILM COATED, EXTENDED RELEASE ORAL at 08:23

## 2023-10-27 RX ADMIN — HEPARIN SODIUM 5000 UNITS: 5000 INJECTION, SOLUTION INTRAVENOUS; SUBCUTANEOUS at 21:06

## 2023-10-27 RX ADMIN — SENNOSIDES AND DOCUSATE SODIUM 1 TABLET: 50; 8.6 TABLET ORAL at 08:23

## 2023-10-27 RX ADMIN — PANTOPRAZOLE SODIUM 40 MG: 40 TABLET, DELAYED RELEASE ORAL at 08:23

## 2023-10-27 RX ADMIN — GABAPENTIN 300 MG: 300 CAPSULE ORAL at 15:57

## 2023-10-27 RX ADMIN — HYDROMORPHONE HYDROCHLORIDE 0.4 MG: 0.2 INJECTION, SOLUTION INTRAMUSCULAR; INTRAVENOUS; SUBCUTANEOUS at 03:57

## 2023-10-27 RX ADMIN — ONDANSETRON 4 MG: 2 INJECTION INTRAMUSCULAR; INTRAVENOUS at 18:25

## 2023-10-27 RX ADMIN — FUROSEMIDE 20 MG: 10 INJECTION, SOLUTION INTRAMUSCULAR; INTRAVENOUS at 15:57

## 2023-10-27 ASSESSMENT — ACTIVITIES OF DAILY LIVING (ADL)
ADLS_ACUITY_SCORE: 29
DEPENDENT_IADLS:: INDEPENDENT
ADLS_ACUITY_SCORE: 29

## 2023-10-27 NOTE — PROGRESS NOTES
10/27/23 9603   Appointment Info   Signing Clinician's Name / Credentials (PT) Viv Otero, DPT   Living Environment   People in Home spouse   Current Living Arrangements house   Home Accessibility stairs to enter home   Number of Stairs, Main Entrance 3   Stair Railings, Main Entrance railings safe and in good condition   Transportation Anticipated family or friend will provide   Living Environment Comments Pt lives in house with main level bed/bathroom, 3 RADU. Does not use assistive devices at baseline.   Self-Care   Usual Activity Tolerance good   Current Activity Tolerance fair   Regular Exercise No   Equipment Currently Used at Home none   Fall history within last six months no   Activity/Exercise/Self-Care Comment Pt independent with all ADLs/IADLs. She and spouse are retired, spouse can assist at home. Pt recently had R leg laceration with stictches; prolonged recovery and impaired mobility since August (tripped and cut leg on hotel furniture)   General Information   Onset of Illness/Injury or Date of Surgery 10/26/23   Referring Physician Trudy Atkinson PA-C   Patient/Family Therapy Goals Statement (PT) none stated   Pertinent History of Current Problem (include personal factors and/or comorbidities that impact the POC) 68-year-old female who was recently referred to me for severe symptomatic mitral regurgitation from P2 prolapse.  Her LV function was preserved.  Preoperative coronary angiogram demonstrated no significant coronary artery disease.  She was taken to the operating room today for a minimally invasive mitral valve repair.   General Observations R side chest tube, right minithoracotomy incisio   Cognition   Affect/Mental Status (Cognition) WFL   Orientation Status (Cognition) oriented x 4   Follows Commands (Cognition) WFL   Pain Assessment   Patient Currently in Pain Yes, see Vital Sign flowsheet  (pain medicated prior to eval, pt continues to have R sided burning pain (location of chest  tube and incision))   Range of Motion (ROM)   Range of Motion ROM deficits secondary to surgical procedure;ROM deficits secondary to pain   Bed Mobility   Comment, (Bed Mobility) Mod A x 2 supine <> sit   Transfers   Comment, (Transfers) Min A   Gait/Stairs (Locomotion)   [place] Level (Gait) minimum assist (75% patient effort)   Distance in Feet (Gait) 5'   Balance   Balance no deficits were identified   Sensory Examination   Sensory Perception patient reports no sensory changes   Clinical Impression   Criteria for Skilled Therapeutic Intervention Yes, treatment indicated   PT Diagnosis (PT) impaired mobility   Influenced by the following impairments pain, impaired endurance   Functional limitations due to impairments decreased activity tolerance   Clinical Presentation (PT Evaluation Complexity) stable   Clinical Presentation Rationale clinical judgement   Clinical Decision Making (Complexity) low complexity   Planned Therapy Interventions (PT) bed mobility training;gait training;patient/family education;ROM (range of motion);stair training;strengthening;transfer training;progressive activity/exercise;risk factor education   Risk & Benefits of therapy have been explained evaluation/treatment results reviewed;care plan/treatment goals reviewed;current/potential barriers reviewed;risks/benefits reviewed;participants voiced agreement with care plan;participants included;patient   PT Total Evaluation Time   PT Eval, Low Complexity Minutes (12088) 10   Physical Therapy Goals   PT Frequency 2x/day   PT Predicted Duration/Target Date for Goal Attainment 11/03/23   PT Goals Bed Mobility;Transfers;Gait;Stairs   PT: Understanding of cardiac education to maximize quality of life, condition management, and health outcomes Patient;Verbalize;Demonstrate   PT: Perform aerobic activity with stable cardiovascular response continuous;10 minutes;treadmill;ambulation   PT: Functional/aerobic ambulation tolerance with stable  cardiovascular response in order to return to home and community environment Independent;100 feet   PT: Navigation of stairs simulating home set up with stable cardiovascular response in order to return to home and community environment Modified independent;3 stairs;Rail on left   Therapeutic Activity   Therapeutic Activities: dynamic activities to improve functional performance Minutes (72015) 24   Symptoms Noted During/After Treatment Increased pain   Treatment Detail/Skilled Intervention Pt having most pain on R side (site of chest tube, incision). Increased time for line mangement, room set and close vitals monitoring. BP stable pre and post activity ~ 110/60s mmhg, HR in 70s bpm, O2 stable on 3 L O2 98% sp O2. Cues for bed mobility sequencing and avoiding pulling/pushing especially with R UE, MOd A x 2. Min A for sit <> stand and cued for steps bed <> chair, CGA stand > sit. Cued for gentle ankle pumps in chair for circulation. Left with all needs in reach and R UE elevated with pillows. RN present and aware of pt location, chair alarm on.   Cardiac Education   Education Provided Precautions;Outpatient Cardiac Rehab   Education Packet Given to Patient No   All Patient Education Handouts Reviewed with Patient and/or Family No   Cardiac Rehab Phase II Plan   Phase II Order Received Yes   Phase II Appointment Status Scheduled   Date/Time 11/8 at 10am   Location   ([place])   PT Discharge Planning   PT Plan progress bed mob independence/pain management techniques, gait with chair follow   PT Discharge Recommendation (DC Rec) home with outpatient cardiac rehab   PT Rationale for DC Rec Anticipate pt will be able to return home with spouse assist upon medical stability and completing CR phase I. Pt most limited by R sided chest pain (incision and chest tube site), A x 1 with vital signs stable on 3 L O2   PT Brief overview of current status Min A, Mod A x 2 bed mob d/t pain. Recent R leg laceration (healed),  affected mobility since August   Total Session Time   Timed Code Treatment Minutes 24   Total Session Time (sum of timed and untimed services) 34

## 2023-10-27 NOTE — PROGRESS NOTES
Gregory, PARRISHS, up to the chair. Chest tubes out today. Lasix x 1 with great output. Tx with flying squad to [facility]

## 2023-10-27 NOTE — CONSULTS
Care Management Initial Consult    General Information  Assessment completed with: Margareth Jerome  Type of CM/SW Visit: Initial Assessment    Primary Care Provider verified and updated as needed: Yes   Readmission within the last 30 days: no previous admission in last 30 days      Reason for Consult: other (see comments) (elevated risk of readmission)  Advance Care Planning: Advance Care Planning Reviewed: present on chart          Communication Assessment  Patient's communication style: spoken language (English or Bilingual)    Hearing Difficulty or Deaf: no   Wear Glasses or Blind: yes    Cognitive  Cognitive/Neuro/Behavioral: .WDL except  Level of Consciousness: alert  Arousal Level: opens eyes spontaneously  Orientation: oriented x 4  Mood/Behavior: cooperative, behavior appropriate to situation, calm  Best Language: 0 - No aphasia  Speech: hoarse    Living Environment:   People in home: spouse  Jyothi  Current living Arrangements: house      Able to return to prior arrangements: yes       Family/Social Support:  Care provided by: self  Provides care for: no one  Marital Status:   Children,           Description of Support System: Supportive, Involved         Current Resources:   Patient receiving home care services: No     Community Resources: None  Equipment currently used at home: none  Supplies currently used at home: None    Employment/Financial:  Employment Status: retired        Financial Concerns:             Does the patient's insurance plan have a 3 day qualifying hospital stay waiver?  Yes     Which insurance plan 3 day waiver is available? Alternative insurance waiver    Will the waiver be used for post-acute placement? No    Lifestyle & Psychosocial Needs:  Social Determinants of Health     Food Insecurity: Low Risk  (10/16/2023)    Food Insecurity     Within the past 12 months, did you worry that your food would run out before you got money to buy more?: No     Within the past 12  months, did the food you bought just not last and you didn t have money to get more?: No   Depression: Not at risk (10/17/2023)    PHQ-2     PHQ-2 Score: 0   Housing Stability: Low Risk  (10/16/2023)    Housing Stability     Do you have housing? : Yes     Are you worried about losing your housing?: No   Tobacco Use: Medium Risk (10/27/2023)    Patient History     Smoking Tobacco Use: Former     Smokeless Tobacco Use: Never     Passive Exposure: Not on file   Financial Resource Strain: Low Risk  (10/16/2023)    Financial Resource Strain     Within the past 12 months, have you or your family members you live with been unable to get utilities (heat, electricity) when it was really needed?: No   Alcohol Use: Not on file   Transportation Needs: Low Risk  (10/16/2023)    Transportation Needs     Within the past 12 months, has lack of transportation kept you from medical appointments, getting your medicines, non-medical meetings or appointments, work, or from getting things that you need?: No   Physical Activity: Not on file   Interpersonal Safety: Not on file   Stress: Not on file   Social Connections: Not on file       Functional Status:  Prior to admission patient needed assistance:   Dependent ADLs:: Independent  Dependent IADLs:: Independent       Mental Health Status:          Chemical Dependency Status:                Values/Beliefs:  Spiritual, Cultural Beliefs, Alevism Practices, Values that affect care: yes (Gertrude)               Additional Information:  Per consult for elevated risk of readmission, met with patient to complete consult.  Per pt, prior to admit, she was independent with her ADLs & IADLs.  Discussed PT & OT recommends Outpt CR.  Patient was informed of receiving a call once home to have her outpt appts scheduled.  Patient confirmed she would have a  to her outpt therapy appts and follow-up surgery appts.     Inpatient Care Coordinator will continue to follow for discharge planning.    Cherelle Garcia, RN  Cherelle Garcia RN, BSN, OCN   Inpatient Care Coordination ICU  [facility]  Office: [phone]

## 2023-10-27 NOTE — PROGRESS NOTES
Current Oxygen Requirement: 3L NC  Current SpO2: 96%     Re-evaluation date: 10/27/2023     See 'RT Assessments' flow sheet for patient assessment scoring and Acuity Level Details.        Pulmonary Hygiene Initiated: AerobiKa QID per [facility]AT protocol.         Volume Expansion Therapy Initiated: IS QID per [facility]AT protocol. Pt is achieving 500 ml on IS with fair pt effort.

## 2023-10-27 NOTE — PLAN OF CARE
Oxygen: 2LNC  Continuous Drips: None  RASS: 0  Pain/CPOT: Managed with all available PRNs  Mobility: Up to chair x2 assist  Restraints: NA  Lines: Discontinued. X1 Peripheral IV  Rojas: Present, making urine  Skin: All sites well approximated, no drainage, dressings removed/changed, sites cleansed per protocol, ecchymotic  Diet/Bowel: Last BM PTA, not passing flatus, tolerating clear liquids, senna given as ordered  DVT/GI Prophylaxis: ASA, heparin, protonix seen in orders  Glucose: Euglycemic  Antibiotics: Vancomycin, clindamycin, given as ordered  Events: No acute events overnight, pacer capped, chest tube output sanguineous, 400 mL total, NSR  Social: Spouse Jyothi, daughter, brother-in-law in chart, did not hear from/meet overnight

## 2023-10-27 NOTE — PROGRESS NOTES
[facility]  Cardiovascular and Thoracic Surgery Daily Note      Assessment and Plan  POD # 1 s/p Right minithoracotomy, mitral valve repair with Duvall-Guru neochords to the prolapsing P2 segment, posteromedial commissuroplasty, P1/P2 cleft closure, implantation of a 28 mm Candelario Physio 2 annuloplasty ring, right common femoral arterial and venous cannulation for cardiopulmonary bypass, intraoperative DOUG on 10/26/23 with Dr. Meredith    - CVS: Pre-op TTE with 60-70%, severe MR 4+, LA severely dialated, off gtt's, ASA, BB, no satin as no CAD    - Resp: Extubated per protocol, sating well on 3L NC, encourage IS, pulmonary toilet    - Neuro: Neuro intact, pain controlled on current regimen, having pain-meds adjusted     - Renal: No history of significant renal disease. Cr wnl. Trend BMP. Diuretic as above.  Recent Labs   Lab 10/27/23  0437 10/26/23  1320 10/26/23  1152   CR 0.85 0.93 0.89       - GI: -BM, continue bowel regimen    - : +Rojas, limited mobility, accurate Is & Os    - Endo: Insulin infusion transition to sliding scale insulin    Hemoglobin A1C   Date Value Ref Range Status   10/18/2023 5.0 <5.7 % Final     Comment:     Normal <5.7%   Prediabetes 5.7-6.4%    Diabetes 6.5% or higher     Note: Adopted from ADA consensus guidelines.        - FEN: Replace electrolytes as needed. ADAT-Regular diet.     - ID: Postop leukocytosis. Afebrile. Temp (24hrs), Av.1  F (36.7  C), Min:97  F (36.1  C), Max:99.3  F (37.4  C). Periop abx completing. Trend CBC.   Recent Labs   Lab 10/27/23  0437 10/26/23  1320 10/26/23  1152   WBC 9.3 10.8 13.6*       - Heme: Acute blood loss anemia and thrombocytopenia due to surgery. Hgb and PLT wnl. Trend CBC, transfuse PRN.   Recent Labs   Lab 10/27/23  0437 10/26/23  1320 10/26/23  1202 10/26/23  1152   HGB 9.8* 10.2* 9.9* 9.4*   * 129*  --  142*       - Proph: SCD, subcutaneous heparin, PPI    - Dispo: ICU      Interval History  Sitting up in bed, having  "discomfort, breathing stable, tolerating clears, pain an issue      Medications   acetaminophen  975 mg Oral Q8H    aspirin  81 mg Oral or NG Tube Daily    buPROPion  150 mg Oral Daily    gabapentin  100 mg Oral At Bedtime    heparin ANTICOAGULANT  5,000 Units Subcutaneous Q8H    mometasone-formoterol  2 puff Inhalation BID    pantoprazole  40 mg Oral or NG Tube Daily    Or    pantoprazole  40 mg Oral Daily    polyethylene glycol  17 g Oral Daily    senna-docusate  1 tablet Oral BID    sodium chloride (PF)  3 mL Intracatheter Q8H     [START ON 10/29/2023] acetaminophen, bisacodyl, calcium gluconate, calcium gluconate, calcium gluconate, glucose **OR** dextrose **OR** glucagon, EPINEPHrine, furosemide, hydrALAZINE, HYDROmorphone **OR** HYDROmorphone, lidocaine 4%, lidocaine (buffered or not buffered), magnesium hydroxide, meclizine, methocarbamol, naloxone **OR** naloxone **OR** naloxone **OR** naloxone, nitroGLYcerin, norepinephrine, ondansetron **OR** ondansetron, oxyCODONE **OR** oxyCODONE, phenylephrine, prochlorperazine **OR** prochlorperazine, BETA BLOCKER NOT PRESCRIBED, sodium chloride (PF)      Physical Exam  Vitals were reviewed  Blood pressure 108/59, pulse 75, temperature 98  F (36.7  C), temperature source Oral, resp. rate 25, height 1.562 m (5' 1.5\"), weight 59 kg (130 lb 1.1 oz), last menstrual period 02/26/2008, SpO2 100%, not currently breastfeeding.  Rhythm: NSR    Lungs: diminished bases    Cardiovascular: rrr, no m/r/g    Abdomen: soft, NT, ND, +BS    Extremeties: warm, no LE edema    Incision: CDI    CT: serosang output 450 mL, no air leak    Weight:   Vitals:    10/26/23 0545 10/27/23 0400   Weight: 58.7 kg (129 lb 6.4 oz) 59 kg (130 lb 1.1 oz)         Data  Recent Labs   Lab 10/27/23  0834 10/27/23  0437 10/26/23  1456 10/26/23  1320 10/26/23  1319 10/26/23  1202 10/26/23  1152   WBC  --  9.3  --  10.8  --   --  13.6*   HGB  --  9.8*  --  10.2*  --  9.9* 9.4*   MCV  --  94  --  94  --   --  94 "   PLT  --  139*  --  129*  --   --  142*   INR  --   --   --  1.27*  --   --  1.37*   NA  --  137  --  143  --  141 142   POTASSIUM  --  4.5  --  4.5  --  4.6 4.7   CHLORIDE  --  104  --  110*  --   --  107   CO2  --  26  --  24  --   --  27   BUN  --  17.0  --  21.1  --   --  21.0   CR  --  0.85  --  0.93  --   --  0.89   ANIONGAP  --  7  --  9  --   --  8   TOLU  --  8.2*  --  8.7*  --   --  9.7   * 109* 129* 124*   < > 149* 159*   ALBUMIN  --  3.7  --  3.4*  --   --   --    PROTTOTAL  --  5.2*  --  5.0*  --   --   --    BILITOTAL  --  0.4  --  0.4  --   --   --    ALKPHOS  --  39  --  49  --   --   --    ALT  --  24  --  24  --   --   --    AST  --  71*  --  84*  --   --   --     < > = values in this interval not displayed.       Imaging:  Recent Results (from the past 24 hour(s))   XR Chest Port 1 View    Narrative    CHEST ONE VIEW  10/26/2023 1:39 PM     HISTORY: Postoperative CVTS Surgery.    COMPARISON: CT chest 9/6/2023.      Impression    IMPRESSION:     Lines and tubes: Endotracheal tube tip 3.5 cm above the jimmie. Right  central [place]-John catheter projects over the pulmonary artery,  possibly coursing into the left main pulmonary artery. Right chest  tube and pleural drain. Mitral valve prosthesis.    Increased pulmonary vasculature. No focal consolidation or convincing  pneumothorax. Possible right lateral pleural fluid.    AZIZA RIVAS MD         SYSTEM ID:  [id number]   XR Chest Port 1 View    Narrative    EXAM: XR CHEST PORT 1 VIEW  LOCATION: [facility]  DATE: 10/27/2023    INDICATION: Post Op CVTS Surgery  COMPARISON: 10/26/2023      Impression    IMPRESSION: A right chest tube and right chest drain again seen in stable positions. Mild streaky atelectasis seen in the right lung base. No large confluent consolidation, definite pleural effusion or pneumothorax. Heart size and pulmonary vascularity   are within normal limits. Previous right IJ [place]-John  catheter, endotracheal tube and nasogastric tube have been removed. Prosthetic mitral valve redemonstrated.         Patient seen and discussed with Dr. Vj Atkinson PA-C  Cardiothoracic Surgery  Pager [phone]

## 2023-10-28 ENCOUNTER — APPOINTMENT (OUTPATIENT)
Dept: PHYSICAL THERAPY | Facility: CLINIC | Age: 68
DRG: 220 | End: 2023-10-28
Attending: SURGERY
Payer: COMMERCIAL

## 2023-10-28 ENCOUNTER — APPOINTMENT (OUTPATIENT)
Dept: GENERAL RADIOLOGY | Facility: CLINIC | Age: 68
DRG: 220 | End: 2023-10-28
Attending: PHYSICIAN ASSISTANT
Payer: COMMERCIAL

## 2023-10-28 LAB
ANION GAP SERPL CALCULATED.3IONS-SCNC: 5 MMOL/L (ref 7–15)
BUN SERPL-MCNC: 12.5 MG/DL (ref 8–23)
CA-I BLD-MCNC: 4.8 MG/DL (ref 4.4–5.2)
CALCIUM SERPL-MCNC: 8.8 MG/DL (ref 8.8–10.2)
CHLORIDE SERPL-SCNC: 104 MMOL/L (ref 98–107)
CREAT SERPL-MCNC: 0.93 MG/DL (ref 0.51–0.95)
DEPRECATED HCO3 PLAS-SCNC: 32 MMOL/L (ref 22–29)
EGFRCR SERPLBLD CKD-EPI 2021: 66 ML/MIN/1.73M2
ERYTHROCYTE [DISTWIDTH] IN BLOOD BY AUTOMATED COUNT: 13 % (ref 10–15)
GLUCOSE BLDC GLUCOMTR-MCNC: 106 MG/DL (ref 70–99)
GLUCOSE BLDC GLUCOMTR-MCNC: 115 MG/DL (ref 70–99)
GLUCOSE BLDC GLUCOMTR-MCNC: 98 MG/DL (ref 70–99)
GLUCOSE BLDC GLUCOMTR-MCNC: 98 MG/DL (ref 70–99)
GLUCOSE SERPL-MCNC: 103 MG/DL (ref 70–99)
HCT VFR BLD AUTO: 32.8 % (ref 35–47)
HCV RNA SERPL NAA+PROBE-ACNC: NOT DETECTED IU/ML
HGB BLD-MCNC: 10.6 G/DL (ref 11.7–15.7)
MAGNESIUM SERPL-MCNC: 2 MG/DL (ref 1.7–2.3)
MCH RBC QN AUTO: 31.2 PG (ref 26.5–33)
MCHC RBC AUTO-ENTMCNC: 32.3 G/DL (ref 31.5–36.5)
MCV RBC AUTO: 97 FL (ref 78–100)
PHOSPHATE SERPL-MCNC: 3.2 MG/DL (ref 2.5–4.5)
PLATELET # BLD AUTO: 149 10E3/UL (ref 150–450)
POTASSIUM SERPL-SCNC: 4.4 MMOL/L (ref 3.4–5.3)
RBC # BLD AUTO: 3.4 10E6/UL (ref 3.8–5.2)
SODIUM SERPL-SCNC: 141 MMOL/L (ref 135–145)
WBC # BLD AUTO: 8 10E3/UL (ref 4–11)

## 2023-10-28 PROCEDURE — 80048 BASIC METABOLIC PNL TOTAL CA: CPT | Performed by: PHYSICIAN ASSISTANT

## 2023-10-28 PROCEDURE — 97110 THERAPEUTIC EXERCISES: CPT | Mod: GP

## 2023-10-28 PROCEDURE — 250N000013 HC RX MED GY IP 250 OP 250 PS 637: Performed by: PHYSICIAN ASSISTANT

## 2023-10-28 PROCEDURE — 250N000011 HC RX IP 250 OP 636: Performed by: PHYSICIAN ASSISTANT

## 2023-10-28 PROCEDURE — 36415 COLL VENOUS BLD VENIPUNCTURE: CPT | Performed by: PHYSICIAN ASSISTANT

## 2023-10-28 PROCEDURE — 85027 COMPLETE CBC AUTOMATED: CPT | Performed by: PHYSICIAN ASSISTANT

## 2023-10-28 PROCEDURE — 71046 X-RAY EXAM CHEST 2 VIEWS: CPT

## 2023-10-28 PROCEDURE — 82330 ASSAY OF CALCIUM: CPT | Performed by: PHYSICIAN ASSISTANT

## 2023-10-28 PROCEDURE — 84100 ASSAY OF PHOSPHORUS: CPT | Performed by: PHYSICIAN ASSISTANT

## 2023-10-28 PROCEDURE — 97530 THERAPEUTIC ACTIVITIES: CPT | Mod: GP

## 2023-10-28 PROCEDURE — 83735 ASSAY OF MAGNESIUM: CPT | Performed by: PHYSICIAN ASSISTANT

## 2023-10-28 PROCEDURE — 120N000001 HC R&B MED SURG/OB

## 2023-10-28 RX ORDER — POLYETHYLENE GLYCOL 3350 17 G/17G
17 POWDER, FOR SOLUTION ORAL 2 TIMES DAILY
Status: DISCONTINUED | OUTPATIENT
Start: 2023-10-28 | End: 2023-10-30 | Stop reason: HOSPADM

## 2023-10-28 RX ORDER — HYDROXYZINE HYDROCHLORIDE 25 MG/1
25 TABLET, FILM COATED ORAL 3 TIMES DAILY PRN
Status: DISCONTINUED | OUTPATIENT
Start: 2023-10-28 | End: 2023-10-29

## 2023-10-28 RX ORDER — LIDOCAINE 4 G/G
1 PATCH TOPICAL
Status: DISCONTINUED | OUTPATIENT
Start: 2023-10-28 | End: 2023-10-30 | Stop reason: HOSPADM

## 2023-10-28 RX ORDER — HYDROXYZINE HYDROCHLORIDE 25 MG/1
50 TABLET, FILM COATED ORAL EVERY 6 HOURS PRN
Status: DISCONTINUED | OUTPATIENT
Start: 2023-10-28 | End: 2023-10-29

## 2023-10-28 RX ORDER — AMOXICILLIN 250 MG
2 CAPSULE ORAL 2 TIMES DAILY
Status: DISCONTINUED | OUTPATIENT
Start: 2023-10-28 | End: 2023-10-30

## 2023-10-28 RX ADMIN — DOCUSATE SODIUM 50 MG AND SENNOSIDES 8.6 MG 2 TABLET: 8.6; 5 TABLET, FILM COATED ORAL at 08:41

## 2023-10-28 RX ADMIN — HYDROXYZINE HYDROCHLORIDE 25 MG: 25 TABLET, FILM COATED ORAL at 23:39

## 2023-10-28 RX ADMIN — PANTOPRAZOLE SODIUM 40 MG: 40 TABLET, DELAYED RELEASE ORAL at 08:40

## 2023-10-28 RX ADMIN — GABAPENTIN 300 MG: 300 CAPSULE ORAL at 22:44

## 2023-10-28 RX ADMIN — OXYCODONE HYDROCHLORIDE 10 MG: 5 TABLET ORAL at 17:18

## 2023-10-28 RX ADMIN — METOPROLOL TARTRATE 12.5 MG: 25 TABLET, FILM COATED ORAL at 20:26

## 2023-10-28 RX ADMIN — OXYCODONE HYDROCHLORIDE 10 MG: 5 TABLET ORAL at 01:11

## 2023-10-28 RX ADMIN — OXYCODONE HYDROCHLORIDE 10 MG: 5 TABLET ORAL at 23:39

## 2023-10-28 RX ADMIN — HEPARIN SODIUM 5000 UNITS: 5000 INJECTION, SOLUTION INTRAVENOUS; SUBCUTANEOUS at 22:48

## 2023-10-28 RX ADMIN — HEPARIN SODIUM 5000 UNITS: 5000 INJECTION, SOLUTION INTRAVENOUS; SUBCUTANEOUS at 05:40

## 2023-10-28 RX ADMIN — ACETAMINOPHEN 975 MG: 325 TABLET, FILM COATED ORAL at 01:11

## 2023-10-28 RX ADMIN — BUPROPION HYDROCHLORIDE 150 MG: 150 TABLET, FILM COATED, EXTENDED RELEASE ORAL at 08:40

## 2023-10-28 RX ADMIN — METHOCARBAMOL 500 MG: 500 TABLET ORAL at 22:44

## 2023-10-28 RX ADMIN — GABAPENTIN 300 MG: 300 CAPSULE ORAL at 15:02

## 2023-10-28 RX ADMIN — OXYCODONE HYDROCHLORIDE 10 MG: 5 TABLET ORAL at 05:40

## 2023-10-28 RX ADMIN — ASPIRIN 81 MG CHEWABLE TABLET 81 MG: 81 TABLET CHEWABLE at 08:41

## 2023-10-28 RX ADMIN — ACETAMINOPHEN 975 MG: 325 TABLET, FILM COATED ORAL at 22:44

## 2023-10-28 RX ADMIN — POLYETHYLENE GLYCOL 3350 17 G: 17 POWDER, FOR SOLUTION ORAL at 08:40

## 2023-10-28 RX ADMIN — GABAPENTIN 300 MG: 300 CAPSULE ORAL at 08:40

## 2023-10-28 RX ADMIN — OXYCODONE HYDROCHLORIDE 10 MG: 5 TABLET ORAL at 11:16

## 2023-10-28 RX ADMIN — HYDROXYZINE HYDROCHLORIDE 25 MG: 25 TABLET, FILM COATED ORAL at 12:33

## 2023-10-28 RX ADMIN — HEPARIN SODIUM 5000 UNITS: 5000 INJECTION, SOLUTION INTRAVENOUS; SUBCUTANEOUS at 15:00

## 2023-10-28 RX ADMIN — METOPROLOL TARTRATE 12.5 MG: 25 TABLET, FILM COATED ORAL at 08:41

## 2023-10-28 RX ADMIN — POLYETHYLENE GLYCOL 3350 17 G: 17 POWDER, FOR SOLUTION ORAL at 20:29

## 2023-10-28 RX ADMIN — DOCUSATE SODIUM 50 MG AND SENNOSIDES 8.6 MG 2 TABLET: 8.6; 5 TABLET, FILM COATED ORAL at 20:26

## 2023-10-28 RX ADMIN — METHOCARBAMOL 500 MG: 500 TABLET ORAL at 15:02

## 2023-10-28 RX ADMIN — MECLIZINE HYDROCHLORIDE 25 MG: 25 TABLET ORAL at 06:36

## 2023-10-28 RX ADMIN — LIDOCAINE 1 PATCH: 4 PATCH TOPICAL at 09:32

## 2023-10-28 RX ADMIN — ACETAMINOPHEN 975 MG: 325 TABLET, FILM COATED ORAL at 08:34

## 2023-10-28 RX ADMIN — METHOCARBAMOL 500 MG: 500 TABLET ORAL at 08:40

## 2023-10-28 RX ADMIN — ACETAMINOPHEN 975 MG: 325 TABLET, FILM COATED ORAL at 15:00

## 2023-10-28 ASSESSMENT — ACTIVITIES OF DAILY LIVING (ADL)
ADLS_ACUITY_SCORE: 28
ADLS_ACUITY_SCORE: 29
ADLS_ACUITY_SCORE: 28
ADLS_ACUITY_SCORE: 29
ADLS_ACUITY_SCORE: 29
ADLS_ACUITY_SCORE: 28
ADLS_ACUITY_SCORE: 28

## 2023-10-28 NOTE — PLAN OF CARE
A&O x 4. VSS on 1LNC. Tele: SR. Up Ax1. Advanced to regular diet, tolerating well. PIV SL. R chest incision, CT removal sites, WDL, ex bruising.  R groin site, WDL. Pain managed with PRN oxycodone and scheduled Tylenol.. Lungs clear. Rojas in place, adequate UOP. -BM. Continue plan of care.

## 2023-10-28 NOTE — PLAN OF CARE
Patient is A&O x 4, VSS, pain relief per MAR, Up to BR with SBA and walker , voiding adequately.     Time of care [id number]

## 2023-10-28 NOTE — PROGRESS NOTES
[facility]  Cardiovascular and Thoracic Surgery Daily Note      Assessment and Plan  POD # 2 s/p Right minithoracotomy, mitral valve repair with Natalia-Guru neochords to the prolapsing P2 segment, posteromedial commissuroplasty, P1/P2 cleft closure, implantation of a 28 mm Candelario Physio 2 annuloplasty ring, right common femoral arterial and venous cannulation for cardiopulmonary bypass, intraoperative DOUG on 10/26/23 with Dr. Meredith    - CVS: Pre-op TTE with 60-70%, severe MR 4+, LA severely dialated, off gtt's, ASA, BB, no satin as no CAD, echo prior to discharge    - Resp: Extubated per protocol, sating well on 1L NC-wean as able, encourage IS, pulmonary toilet    - Neuro: Neuro intact, pain controlled on current regimen, having pain-meds adjusted     - Renal: No history of significant renal disease. Cr wnl. Trend BMP. Diuretic as above.  Recent Labs   Lab 10/28/23  0530 10/27/23  0437 10/26/23  1320   CR 0.93 0.85 0.93       - GI: -BM, continue bowel regimen-inc    - : Remove Rojas    - Endo: sliding scale insulin    Hemoglobin A1C   Date Value Ref Range Status   10/18/2023 5.0 <5.7 % Final     Comment:     Normal <5.7%   Prediabetes 5.7-6.4%    Diabetes 6.5% or higher     Note: Adopted from ADA consensus guidelines.        - FEN: Replace electrolytes as needed. ADAT-Regular diet.     - ID: Postop leukocytosis. Afebrile. Temp (24hrs), Av.1  F (36.7  C), Min:97.8  F (36.6  C), Max:98.3  F (36.8  C). Periop abx completed. Trend CBC.   Recent Labs   Lab 10/28/23  0530 10/27/23  0437 10/26/23  1320   WBC 8.0 9.3 10.8       - Heme: Acute blood loss anemia and thrombocytopenia due to surgery. Hgb and PLT wnl. Trend CBC, transfuse PRN.   Recent Labs   Lab 10/28/23  0530 10/27/23  0437 10/26/23  1320   HGB 10.6* 9.8* 10.2*   * 139* 129*       - Proph: SCD, subcutaneous heparin, PPI    - Dispo: [address] [facility], continue therapies, encourage IS      Interval History  Sitting up in bed, breathing stable,  "tolerating diet, working with rehab, pain mostly controlled      Medications   acetaminophen  975 mg Oral Q8H    aspirin  81 mg Oral or NG Tube Daily    buPROPion  150 mg Oral Daily    gabapentin  300 mg Oral TID    heparin ANTICOAGULANT  5,000 Units Subcutaneous Q8H    insulin aspart  1-7 Units Subcutaneous TID AC    insulin aspart  1-5 Units Subcutaneous At Bedtime    methocarbamol  500 mg Oral TID    metoprolol tartrate  12.5 mg Oral BID    mometasone-formoterol  2 puff Inhalation BID    pantoprazole  40 mg Oral or NG Tube Daily    Or    pantoprazole  40 mg Oral Daily    polyethylene glycol  17 g Oral Daily    senna-docusate  1 tablet Oral BID    sodium chloride (PF)  3 mL Intracatheter Q8H     [START ON 10/29/2023] acetaminophen, bisacodyl, calcium gluconate, calcium gluconate, calcium gluconate, glucose **OR** dextrose **OR** glucagon, furosemide, hydrALAZINE, lidocaine 4%, lidocaine 4%, lidocaine (buffered or not buffered), lidocaine (buffered or not buffered), magnesium hydroxide, meclizine, melatonin, naloxone **OR** naloxone **OR** naloxone **OR** naloxone, ondansetron **OR** ondansetron, oxyCODONE **OR** oxyCODONE, prochlorperazine **OR** prochlorperazine, BETA BLOCKER NOT PRESCRIBED, sodium chloride (PF), sodium chloride (PF)      Physical Exam  Vitals were reviewed  Blood pressure 100/59, pulse 68, temperature 98.3  F (36.8  C), temperature source Oral, resp. rate 20, height 1.562 m (5' 1.5\"), weight 58.9 kg (129 lb 12.8 oz), last menstrual period 02/26/2008, SpO2 98%, not currently breastfeeding.  Rhythm: NSR    Lungs: diminished bases    Cardiovascular: rrr, no m/r/g    Abdomen: soft, NT, ND, +BS    Extremeties: warm, no LE edema    Incision: CDI    CT: na    Weight:   Vitals:    10/26/23 0545 10/27/23 0400 10/27/23 1800   Weight: 58.7 kg (129 lb 6.4 oz) 59 kg (130 lb 1.1 oz) 58.9 kg (129 lb 12.8 oz)         Data  Recent Labs   Lab 10/28/23  0530 10/27/23  2100 10/27/23  1651 10/27/23  0834 " 10/27/23  0437 10/26/23  1456 10/26/23  1320 10/26/23  1202 10/26/23  1152   WBC 8.0  --   --   --  9.3  --  10.8  --  13.6*   HGB 10.6*  --   --   --  9.8*  --  10.2*   < > 9.4*   MCV 97  --   --   --  94  --  94  --  94   *  --   --   --  139*  --  129*  --  142*   INR  --   --   --   --   --   --  1.27*  --  1.37*     --   --   --  137  --  143   < > 142   POTASSIUM 4.4  --   --   --  4.5  --  4.5   < > 4.7   CHLORIDE 104  --   --   --  104  --  110*  --  107   CO2 32*  --   --   --  26  --  24  --  27   BUN 12.5  --   --   --  17.0  --  21.1  --  21.0   CR 0.93  --   --   --  0.85  --  0.93  --  0.89   ANIONGAP 5*  --   --   --  7  --  9  --  8   TOLU 8.8  --   --   --  8.2*  --  8.7*  --  9.7   * 119* 91   < > 109*   < > 124*   < > 159*   ALBUMIN  --   --   --   --  3.7  --  3.4*  --   --    PROTTOTAL  --   --   --   --  5.2*  --  5.0*  --   --    BILITOTAL  --   --   --   --  0.4  --  0.4  --   --    ALKPHOS  --   --   --   --  39  --  49  --   --    ALT  --   --   --   --  24  --  24  --   --    AST  --   --   --   --  71*  --  84*  --   --     < > = values in this interval not displayed.       Imaging:  No results found for this or any previous visit (from the past 24 hour(s)).        Patient seen and discussed with Dr. Manuel Atkinson PA-C  Cardiothoracic Surgery  Pager [phone]

## 2023-10-29 ENCOUNTER — APPOINTMENT (OUTPATIENT)
Dept: PHYSICAL THERAPY | Facility: CLINIC | Age: 68
DRG: 220 | End: 2023-10-29
Attending: SURGERY
Payer: COMMERCIAL

## 2023-10-29 LAB
CA-I BLD-MCNC: 4.7 MG/DL (ref 4.4–5.2)
GLUCOSE BLDC GLUCOMTR-MCNC: 106 MG/DL (ref 70–99)
GLUCOSE BLDC GLUCOMTR-MCNC: 110 MG/DL (ref 70–99)
GLUCOSE BLDC GLUCOMTR-MCNC: 112 MG/DL (ref 70–99)
GLUCOSE BLDC GLUCOMTR-MCNC: 114 MG/DL (ref 70–99)

## 2023-10-29 PROCEDURE — 36415 COLL VENOUS BLD VENIPUNCTURE: CPT | Performed by: PHYSICIAN ASSISTANT

## 2023-10-29 PROCEDURE — 97110 THERAPEUTIC EXERCISES: CPT | Mod: GP

## 2023-10-29 PROCEDURE — 97530 THERAPEUTIC ACTIVITIES: CPT | Mod: GP

## 2023-10-29 PROCEDURE — 82330 ASSAY OF CALCIUM: CPT | Performed by: PHYSICIAN ASSISTANT

## 2023-10-29 PROCEDURE — 250N000011 HC RX IP 250 OP 636: Performed by: PHYSICIAN ASSISTANT

## 2023-10-29 PROCEDURE — 250N000013 HC RX MED GY IP 250 OP 250 PS 637: Performed by: PHYSICIAN ASSISTANT

## 2023-10-29 PROCEDURE — 120N000001 HC R&B MED SURG/OB

## 2023-10-29 RX ORDER — HYDROXYZINE HYDROCHLORIDE 25 MG/1
25 TABLET, FILM COATED ORAL 3 TIMES DAILY PRN
Status: DISCONTINUED | OUTPATIENT
Start: 2023-10-29 | End: 2023-10-30 | Stop reason: HOSPADM

## 2023-10-29 RX ORDER — ALBUTEROL SULFATE 90 UG/1
2 AEROSOL, METERED RESPIRATORY (INHALATION) EVERY 6 HOURS PRN
Status: DISCONTINUED | OUTPATIENT
Start: 2023-10-29 | End: 2023-10-30 | Stop reason: HOSPADM

## 2023-10-29 RX ORDER — FLUTICASONE FUROATE AND VILANTEROL 100; 25 UG/1; UG/1
1 POWDER RESPIRATORY (INHALATION) DAILY
Status: DISCONTINUED | OUTPATIENT
Start: 2023-10-29 | End: 2023-10-29

## 2023-10-29 RX ORDER — IBUPROFEN 600 MG/1
600 TABLET, FILM COATED ORAL EVERY 6 HOURS PRN
Status: DISCONTINUED | OUTPATIENT
Start: 2023-10-29 | End: 2023-10-30 | Stop reason: HOSPADM

## 2023-10-29 RX ORDER — KETOROLAC TROMETHAMINE 15 MG/ML
15 INJECTION, SOLUTION INTRAMUSCULAR; INTRAVENOUS ONCE
Status: COMPLETED | OUTPATIENT
Start: 2023-10-29 | End: 2023-10-29

## 2023-10-29 RX ORDER — HYDROXYZINE HYDROCHLORIDE 25 MG/1
50 TABLET, FILM COATED ORAL EVERY 4 HOURS PRN
Status: DISCONTINUED | OUTPATIENT
Start: 2023-10-29 | End: 2023-10-30 | Stop reason: HOSPADM

## 2023-10-29 RX ORDER — MECLIZINE HYDROCHLORIDE 25 MG/1
25 TABLET ORAL 3 TIMES DAILY PRN
Status: DISCONTINUED | OUTPATIENT
Start: 2023-10-29 | End: 2023-10-30 | Stop reason: HOSPADM

## 2023-10-29 RX ORDER — IBUPROFEN 600 MG/1
600 TABLET, FILM COATED ORAL EVERY 6 HOURS PRN
Status: DISCONTINUED | OUTPATIENT
Start: 2023-10-29 | End: 2023-10-29

## 2023-10-29 RX ORDER — FEXOFENADINE HCL 180 MG/1
180 TABLET ORAL DAILY
Status: DISCONTINUED | OUTPATIENT
Start: 2023-10-29 | End: 2023-10-30 | Stop reason: HOSPADM

## 2023-10-29 RX ADMIN — HEPARIN SODIUM 5000 UNITS: 5000 INJECTION, SOLUTION INTRAVENOUS; SUBCUTANEOUS at 05:42

## 2023-10-29 RX ADMIN — METOPROLOL TARTRATE 12.5 MG: 25 TABLET, FILM COATED ORAL at 20:45

## 2023-10-29 RX ADMIN — METOPROLOL TARTRATE 12.5 MG: 25 TABLET, FILM COATED ORAL at 08:22

## 2023-10-29 RX ADMIN — GABAPENTIN 400 MG: 100 CAPSULE ORAL at 20:45

## 2023-10-29 RX ADMIN — HEPARIN SODIUM 5000 UNITS: 5000 INJECTION, SOLUTION INTRAVENOUS; SUBCUTANEOUS at 13:42

## 2023-10-29 RX ADMIN — ASPIRIN 81 MG CHEWABLE TABLET 81 MG: 81 TABLET CHEWABLE at 08:21

## 2023-10-29 RX ADMIN — OXYCODONE HYDROCHLORIDE 10 MG: 5 TABLET ORAL at 09:38

## 2023-10-29 RX ADMIN — HYDROXYZINE HYDROCHLORIDE 25 MG: 25 TABLET, FILM COATED ORAL at 11:54

## 2023-10-29 RX ADMIN — OXYCODONE HYDROCHLORIDE 10 MG: 5 TABLET ORAL at 22:08

## 2023-10-29 RX ADMIN — KETOROLAC TROMETHAMINE 15 MG: 15 INJECTION, SOLUTION INTRAMUSCULAR; INTRAVENOUS at 17:07

## 2023-10-29 RX ADMIN — ACETAMINOPHEN 650 MG: 325 TABLET, FILM COATED ORAL at 11:54

## 2023-10-29 RX ADMIN — POLYETHYLENE GLYCOL 3350 17 G: 17 POWDER, FOR SOLUTION ORAL at 20:44

## 2023-10-29 RX ADMIN — MECLIZINE HYDROCHLORIDE 25 MG: 25 TABLET ORAL at 07:18

## 2023-10-29 RX ADMIN — FEXOFENADINE HCL 180 MG: 180 TABLET ORAL at 11:54

## 2023-10-29 RX ADMIN — HEPARIN SODIUM 5000 UNITS: 5000 INJECTION, SOLUTION INTRAVENOUS; SUBCUTANEOUS at 20:46

## 2023-10-29 RX ADMIN — METHOCARBAMOL 500 MG: 500 TABLET ORAL at 16:02

## 2023-10-29 RX ADMIN — OXYCODONE HYDROCHLORIDE 10 MG: 5 TABLET ORAL at 18:04

## 2023-10-29 RX ADMIN — BUPROPION HYDROCHLORIDE 150 MG: 150 TABLET, FILM COATED, EXTENDED RELEASE ORAL at 08:21

## 2023-10-29 RX ADMIN — AMITRIPTYLINE HYDROCHLORIDE 25 MG: 25 TABLET, FILM COATED ORAL at 22:08

## 2023-10-29 RX ADMIN — METHOCARBAMOL 500 MG: 500 TABLET ORAL at 20:46

## 2023-10-29 RX ADMIN — HYDROXYZINE HYDROCHLORIDE 25 MG: 25 TABLET, FILM COATED ORAL at 05:42

## 2023-10-29 RX ADMIN — METHOCARBAMOL 500 MG: 500 TABLET ORAL at 08:21

## 2023-10-29 RX ADMIN — DOCUSATE SODIUM 50 MG AND SENNOSIDES 8.6 MG 2 TABLET: 8.6; 5 TABLET, FILM COATED ORAL at 20:45

## 2023-10-29 RX ADMIN — GABAPENTIN 400 MG: 100 CAPSULE ORAL at 16:02

## 2023-10-29 RX ADMIN — OXYCODONE HYDROCHLORIDE 10 MG: 5 TABLET ORAL at 05:42

## 2023-10-29 RX ADMIN — LIDOCAINE 1 PATCH: 4 PATCH TOPICAL at 08:21

## 2023-10-29 RX ADMIN — PANTOPRAZOLE SODIUM 40 MG: 40 TABLET, DELAYED RELEASE ORAL at 08:21

## 2023-10-29 RX ADMIN — HYDROXYZINE HYDROCHLORIDE 25 MG: 25 TABLET, FILM COATED ORAL at 13:38

## 2023-10-29 RX ADMIN — ACETAMINOPHEN 650 MG: 325 TABLET, FILM COATED ORAL at 07:04

## 2023-10-29 RX ADMIN — OXYCODONE HYDROCHLORIDE 10 MG: 5 TABLET ORAL at 13:39

## 2023-10-29 RX ADMIN — GABAPENTIN 300 MG: 300 CAPSULE ORAL at 06:31

## 2023-10-29 ASSESSMENT — ACTIVITIES OF DAILY LIVING (ADL)
ADLS_ACUITY_SCORE: 28
ADLS_ACUITY_SCORE: 22
ADLS_ACUITY_SCORE: 27
ADLS_ACUITY_SCORE: 27
ADLS_ACUITY_SCORE: 22
ADLS_ACUITY_SCORE: 28
ADLS_ACUITY_SCORE: 28
ADLS_ACUITY_SCORE: 27
ADLS_ACUITY_SCORE: 27
ADLS_ACUITY_SCORE: 22
ADLS_ACUITY_SCORE: 22
ADLS_ACUITY_SCORE: 28

## 2023-10-29 NOTE — PLAN OF CARE
Pt neurologically intact, VSS on 2L nc. Pt up to chair for meals, walked in hallway with CR. Diet advanced to regular diet, tolerating well. Shower done this afternoon. Pain controled with lidocane patch, oxy, and atarax. Family updated at bedside.

## 2023-10-29 NOTE — PROGRESS NOTES
Orientation: A&O x4    Vitals/Tele: VSS on RA, Tele SR    IV Access/drains: IV SL    Pain controlled with Oxycodone, Atarax, scheduled Robaxin and Gabapentin    Diet: mod carb    Mobility: SBA w/GB    GI/: continent, had BM overnight    Wound/Skin: scattered bruising, old CT site    Consults: CV surgery following    Discharge Plan: pending      See Flow sheets for assessment

## 2023-10-29 NOTE — PROGRESS NOTES
[facility]  Cardiovascular and Thoracic Surgery Daily Note      Assessment and Plan  POD # 3 s/p Right minithoracotomy, mitral valve repair with Verner-Guru neochords to the prolapsing P2 segment, posteromedial commissuroplasty, P1/P2 cleft closure, implantation of a 28 mm Candelario Physio 2 annuloplasty ring, right common femoral arterial and venous cannulation for cardiopulmonary bypass, intraoperative DOUG on 10/26/23 with Dr. Meredith    - CVS: Pre-op TTE with 60-70%, severe MR 4+, LA severely dialated, ASA, BB, no statin as no CAD, echo ordered for tomorrow    - Resp: Extubated per protocol, sating well on RA<1L NC-wean as able, encourage IS, pulmonary toilet, PTA albuterol ordered    - Neuro: Neuro intact, pain controlled on current regimen, having pain-meds adjusted, pta melatonin, elavil ordered    - Renal: No history of significant renal disease. Cr wnl. Trend BMP. Diuretic as above.  Recent Labs   Lab 10/28/23  0530 10/27/23  0437 10/26/23  1320   CR 0.93 0.85 0.93       - GI: -BM, continue bowel regimen-inc    - : voiding on own    - Endo: sliding scale insulin    Hemoglobin A1C   Date Value Ref Range Status   10/18/2023 5.0 <5.7 % Final     Comment:     Normal <5.7%   Prediabetes 5.7-6.4%    Diabetes 6.5% or higher     Note: Adopted from ADA consensus guidelines.        - FEN: Replace electrolytes as needed. ADAT-Regular diet.     - ID: Postop leukocytosis. Afebrile. Temp (24hrs), Av.1  F (36.7  C), Min:97.8  F (36.6  C), Max:98.4  F (36.9  C). Periop abx completed. Trend CBC.   Recent Labs   Lab 10/28/23  0530 10/27/23  0437 10/26/23  1320   WBC 8.0 9.3 10.8       - Heme: Acute blood loss anemia and thrombocytopenia due to surgery. Hgb and PLT wnl. Trend CBC, transfuse PRN.   Recent Labs   Lab 10/28/23  0530 10/27/23  0437 10/26/23  1320   HGB 10.6* 9.8* 10.2*   * 139* 129*       - Proph: SCD, subcutaneous heparin, PPI    - Dispo: , continue therapies, encourage  "IS      Interval History  Sitting up in bed, already ate breakfast, breathing stable, tolerating diet, needs BM, pain controlled except for nerve pain-meds adjusted.       Medications   amitriptyline  25 mg Oral At Bedtime    aspirin  81 mg Oral or NG Tube Daily    buPROPion  150 mg Oral Daily    fexofenadine  180 mg Oral Daily    fluticasone-vilanterol  1 puff Inhalation Daily    gabapentin  400 mg Oral TID    heparin ANTICOAGULANT  5,000 Units Subcutaneous Q8H    insulin aspart  1-7 Units Subcutaneous TID AC    insulin aspart  1-5 Units Subcutaneous At Bedtime    lidocaine  1 patch Transdermal Q24H    methocarbamol  500 mg Oral TID    metoprolol tartrate  12.5 mg Oral BID    mometasone-formoterol  2 puff Inhalation BID    pantoprazole  40 mg Oral or NG Tube Daily    Or    pantoprazole  40 mg Oral Daily    polyethylene glycol  17 g Oral BID    senna-docusate  2 tablet Oral BID    sodium chloride (PF)  3 mL Intracatheter Q8H     acetaminophen, albuterol, bisacodyl, calcium gluconate, calcium gluconate, calcium gluconate, glucose **OR** dextrose **OR** glucagon, furosemide, hydrALAZINE, hydrOXYzine **OR** hydrOXYzine, lidocaine 4%, lidocaine (buffered or not buffered), magnesium hydroxide, meclizine, meclizine, melatonin, melatonin, naloxone **OR** naloxone **OR** naloxone **OR** naloxone, ondansetron **OR** ondansetron, oxyCODONE **OR** oxyCODONE, prochlorperazine **OR** prochlorperazine, BETA BLOCKER NOT PRESCRIBED, sodium chloride (PF)      Physical Exam  Vitals were reviewed  Blood pressure 118/59, pulse 82, temperature 98.1  F (36.7  C), temperature source Oral, resp. rate 16, height 1.562 m (5' 1.5\"), weight 58.2 kg (128 lb 6.4 oz), last menstrual period 02/26/2008, SpO2 93%, not currently breastfeeding.  Rhythm: NSR    Lungs: diminished bases    Cardiovascular: rrr, no m/r/g    Abdomen: soft, NT, ND, +BS    Extremeties: warm, no LE edema    Incision: CDI    CT: na    Weight:   Vitals:    10/26/23 0545 10/27/23 " 0400 10/27/23 1800 10/28/23 1026   Weight: 58.7 kg (129 lb 6.4 oz) 59 kg (130 lb 1.1 oz) 58.9 kg (129 lb 12.8 oz) 58.8 kg (129 lb 9.6 oz)    10/29/23 0543   Weight: 58.2 kg (128 lb 6.4 oz)         Data  Recent Labs   Lab 10/29/23  0143 10/28/23  2119 10/28/23  1751 10/28/23  0846 10/28/23  0530 10/27/23  0834 10/27/23  0437 10/26/23  1456 10/26/23  1320 10/26/23  1202 10/26/23  1152   WBC  --   --   --   --  8.0  --  9.3  --  10.8  --  13.6*   HGB  --   --   --   --  10.6*  --  9.8*  --  10.2*   < > 9.4*   MCV  --   --   --   --  97  --  94  --  94  --  94   PLT  --   --   --   --  149*  --  139*  --  129*  --  142*   INR  --   --   --   --   --   --   --   --  1.27*  --  1.37*   NA  --   --   --   --  141  --  137  --  143   < > 142   POTASSIUM  --   --   --   --  4.4  --  4.5  --  4.5   < > 4.7   CHLORIDE  --   --   --   --  104  --  104  --  110*  --  107   CO2  --   --   --   --  32*  --  26  --  24  --  27   BUN  --   --   --   --  12.5  --  17.0  --  21.1  --  21.0   CR  --   --   --   --  0.93  --  0.85  --  0.93  --  0.89   ANIONGAP  --   --   --   --  5*  --  7  --  9  --  8   TOLU  --   --   --   --  8.8  --  8.2*  --  8.7*  --  9.7   * 106* 115*   < > 103*   < > 109*   < > 124*   < > 159*   ALBUMIN  --   --   --   --   --   --  3.7  --  3.4*  --   --    PROTTOTAL  --   --   --   --   --   --  5.2*  --  5.0*  --   --    BILITOTAL  --   --   --   --   --   --  0.4  --  0.4  --   --    ALKPHOS  --   --   --   --   --   --  39  --  49  --   --    ALT  --   --   --   --   --   --  24  --  24  --   --    AST  --   --   --   --   --   --  71*  --  84*  --   --     < > = values in this interval not displayed.       Imaging:  Recent Results (from the past 24 hour(s))   XR Chest 2 Views    Narrative    EXAM: XR CHEST 2 VIEWS  LOCATION: [facility]  DATE: 10/28/2023    INDICATION: s p chest tube removal, mv repair  COMPARISON: 10/27/2023      Impression    IMPRESSION: Postop mitral  valve replacement. Right chest tubes have been removed. Some mild opacity right lung base is new likely some increasing atelectasis post chest tube removal. No pneumothorax. Some minimal scattered fibrosis in both lungs   unchanged.           Patient seen and discussed with Dr. Manuel Atkinson PA-C  Cardiothoracic Surgery  Pager [phone]

## 2023-10-29 NOTE — CONSULTS
"NUTRITION ASSESSMENT      REASON FOR ASSESSMENT:  Cardiac Surgery Nutrition Consult    CURRENT DIET / INTAKE:  Diet: Advance Diet as Tolerated: Regular Diet Adult      Pt was eating lunch during RD visit. Stated she is eating well. Finding things to eat on menu. Having meats w/ meals. Wondering if it's ok to have collagen protein supplements. RD encouraged protein intake for post-op healing. Suggested to have protein w/ meals and can have protein supplements such as collagen. Pt willing to try Ensure Enlive, chocolate, as well. Would like to order PRN. RD left note for pt w/ instructions on how to order.     ANTHROPOMETRICS:   Ht: 5'1.5\"  Wt: 58.7 kg  BMI: 24.05  IBW: 48.9 kg  Weight Status: Normal BMI  %IBW: 120%    MALNUTRITION:  Patient does not meet two of the following criteria necessary for diagnosing malnutrition:  significant weight loss, reduced intake, subcutaneous fat loss, muscle loss or fluid retention. Nutrition Focused Physical Assessment (NFPA) not appropriate at this time.    NUTRITION DIAGNOSIS:   Increased nutrition needs (protein) r/t recent open heart surgery AEB need for oral nutrition supplements    INTERVENTIONS:    Nutrition Prescription:  Regular diet    Implementation:  Nutrition Education (Content):  Discussed the importance of adequate nutrition post-op for healing and energy, emphasizing protein foods, and encouraged patient to order a protein food at each meal.    Goals:  Patient to consume ~75% at meals in the next 3 - 5 days    Follow Up/Monitoring (InPatient):  Food and Fluid intake -  Monitor for adequacy    Follow Up/Monitoring (OutPatient):  Patient will participate in out-patient cardiac rehab and attend nutrition classes during the program    Sujatha Mathis RD, LD    "

## 2023-10-30 ENCOUNTER — APPOINTMENT (OUTPATIENT)
Dept: CARDIOLOGY | Facility: CLINIC | Age: 68
DRG: 220 | End: 2023-10-30
Attending: PHYSICIAN ASSISTANT
Payer: COMMERCIAL

## 2023-10-30 ENCOUNTER — APPOINTMENT (OUTPATIENT)
Dept: PHYSICAL THERAPY | Facility: CLINIC | Age: 68
DRG: 220 | End: 2023-10-30
Attending: SURGERY
Payer: COMMERCIAL

## 2023-10-30 VITALS
BODY MASS INDEX: 23.63 KG/M2 | TEMPERATURE: 97.8 F | HEART RATE: 81 BPM | HEIGHT: 62 IN | RESPIRATION RATE: 20 BRPM | SYSTOLIC BLOOD PRESSURE: 118 MMHG | WEIGHT: 128.4 LBS | OXYGEN SATURATION: 95 % | DIASTOLIC BLOOD PRESSURE: 76 MMHG

## 2023-10-30 LAB
ANION GAP SERPL CALCULATED.3IONS-SCNC: 8 MMOL/L (ref 7–15)
BUN SERPL-MCNC: 14.1 MG/DL (ref 8–23)
CALCIUM SERPL-MCNC: 9.2 MG/DL (ref 8.8–10.2)
CHLORIDE SERPL-SCNC: 104 MMOL/L (ref 98–107)
CREAT SERPL-MCNC: 0.86 MG/DL (ref 0.51–0.95)
DEPRECATED HCO3 PLAS-SCNC: 29 MMOL/L (ref 22–29)
EGFRCR SERPLBLD CKD-EPI 2021: 72 ML/MIN/1.73M2
ERYTHROCYTE [DISTWIDTH] IN BLOOD BY AUTOMATED COUNT: 12.9 % (ref 10–15)
GLUCOSE BLDC GLUCOMTR-MCNC: 100 MG/DL (ref 70–99)
GLUCOSE SERPL-MCNC: 114 MG/DL (ref 70–99)
HCT VFR BLD AUTO: 33.5 % (ref 35–47)
HGB BLD-MCNC: 10.9 G/DL (ref 11.7–15.7)
LVEF ECHO: NORMAL
MAGNESIUM SERPL-MCNC: 1.9 MG/DL (ref 1.7–2.3)
MCH RBC QN AUTO: 31.1 PG (ref 26.5–33)
MCHC RBC AUTO-ENTMCNC: 32.5 G/DL (ref 31.5–36.5)
MCV RBC AUTO: 95 FL (ref 78–100)
PHOSPHATE SERPL-MCNC: 3.3 MG/DL (ref 2.5–4.5)
PLATELET # BLD AUTO: 234 10E3/UL (ref 150–450)
POTASSIUM SERPL-SCNC: 4.2 MMOL/L (ref 3.4–5.3)
RBC # BLD AUTO: 3.51 10E6/UL (ref 3.8–5.2)
SODIUM SERPL-SCNC: 141 MMOL/L (ref 135–145)
WBC # BLD AUTO: 7 10E3/UL (ref 4–11)

## 2023-10-30 PROCEDURE — 97110 THERAPEUTIC EXERCISES: CPT | Mod: GP

## 2023-10-30 PROCEDURE — 250N000013 HC RX MED GY IP 250 OP 250 PS 637: Performed by: PHYSICIAN ASSISTANT

## 2023-10-30 PROCEDURE — 84100 ASSAY OF PHOSPHORUS: CPT | Performed by: SURGERY

## 2023-10-30 PROCEDURE — 255N000002 HC RX 255 OP 636: Performed by: SURGERY

## 2023-10-30 PROCEDURE — 36415 COLL VENOUS BLD VENIPUNCTURE: CPT | Performed by: PHYSICIAN ASSISTANT

## 2023-10-30 PROCEDURE — 999N000208 ECHOCARDIOGRAM COMPLETE

## 2023-10-30 PROCEDURE — 93306 TTE W/DOPPLER COMPLETE: CPT | Mod: 26 | Performed by: INTERNAL MEDICINE

## 2023-10-30 PROCEDURE — 80048 BASIC METABOLIC PNL TOTAL CA: CPT | Performed by: PHYSICIAN ASSISTANT

## 2023-10-30 PROCEDURE — 250N000011 HC RX IP 250 OP 636: Performed by: PHYSICIAN ASSISTANT

## 2023-10-30 PROCEDURE — 83735 ASSAY OF MAGNESIUM: CPT | Performed by: SURGERY

## 2023-10-30 PROCEDURE — 85014 HEMATOCRIT: CPT | Performed by: PHYSICIAN ASSISTANT

## 2023-10-30 PROCEDURE — 250N000013 HC RX MED GY IP 250 OP 250 PS 637: Performed by: SURGERY

## 2023-10-30 RX ORDER — POLYETHYLENE GLYCOL 3350 17 G/17G
17 POWDER, FOR SOLUTION ORAL 2 TIMES DAILY PRN
Qty: 510 G | Refills: 0 | Status: SHIPPED | OUTPATIENT
Start: 2023-10-30 | End: 2024-01-16

## 2023-10-30 RX ORDER — OXYCODONE HYDROCHLORIDE 5 MG/1
5 TABLET ORAL EVERY 6 HOURS PRN
Qty: 15 TABLET | Refills: 0 | Status: SHIPPED | OUTPATIENT
Start: 2023-10-30 | End: 2023-11-13

## 2023-10-30 RX ORDER — MAGNESIUM OXIDE 400 MG/1
400 TABLET ORAL EVERY 4 HOURS
Status: COMPLETED | OUTPATIENT
Start: 2023-10-30 | End: 2023-10-30

## 2023-10-30 RX ORDER — METOPROLOL SUCCINATE 25 MG/1
25 TABLET, EXTENDED RELEASE ORAL DAILY
Status: DISCONTINUED | OUTPATIENT
Start: 2023-10-31 | End: 2023-10-30 | Stop reason: HOSPADM

## 2023-10-30 RX ORDER — AMOXICILLIN 250 MG
3 CAPSULE ORAL 2 TIMES DAILY
Status: DISCONTINUED | OUTPATIENT
Start: 2023-10-30 | End: 2023-10-30 | Stop reason: HOSPADM

## 2023-10-30 RX ORDER — METHOCARBAMOL 500 MG/1
500 TABLET, FILM COATED ORAL EVERY 6 HOURS PRN
Qty: 60 TABLET | Refills: 0 | Status: SHIPPED | OUTPATIENT
Start: 2023-10-30 | End: 2024-01-16

## 2023-10-30 RX ORDER — ASPIRIN 81 MG/1
81 TABLET, CHEWABLE ORAL DAILY
Qty: 90 TABLET | Refills: 3 | Status: SHIPPED | OUTPATIENT
Start: 2023-10-31

## 2023-10-30 RX ORDER — METOPROLOL SUCCINATE 25 MG/1
25 TABLET, EXTENDED RELEASE ORAL DAILY
Qty: 30 TABLET | Refills: 3 | Status: SHIPPED | OUTPATIENT
Start: 2023-10-31 | End: 2024-02-29

## 2023-10-30 RX ORDER — ACETAMINOPHEN 500 MG
650 TABLET ORAL EVERY 6 HOURS PRN
COMMUNITY
Start: 2023-10-30 | End: 2024-02-21

## 2023-10-30 RX ORDER — HYDROXYZINE HYDROCHLORIDE 25 MG/1
25 TABLET, FILM COATED ORAL 3 TIMES DAILY PRN
Qty: 42 TABLET | Refills: 0 | Status: SHIPPED | OUTPATIENT
Start: 2023-10-30 | End: 2023-11-13 | Stop reason: SINTOL

## 2023-10-30 RX ORDER — AMOXICILLIN 250 MG
3 CAPSULE ORAL 2 TIMES DAILY PRN
Qty: 60 TABLET | Refills: 0 | Status: SHIPPED | OUTPATIENT
Start: 2023-10-30 | End: 2024-01-16

## 2023-10-30 RX ORDER — GABAPENTIN 400 MG/1
400 CAPSULE ORAL 3 TIMES DAILY
Qty: 42 CAPSULE | Refills: 0 | Status: SHIPPED | OUTPATIENT
Start: 2023-10-30 | End: 2024-01-16

## 2023-10-30 RX ADMIN — ASPIRIN 81 MG CHEWABLE TABLET 81 MG: 81 TABLET CHEWABLE at 08:49

## 2023-10-30 RX ADMIN — BUPROPION HYDROCHLORIDE 150 MG: 150 TABLET, FILM COATED, EXTENDED RELEASE ORAL at 08:50

## 2023-10-30 RX ADMIN — POLYETHYLENE GLYCOL 3350 17 G: 17 POWDER, FOR SOLUTION ORAL at 08:49

## 2023-10-30 RX ADMIN — DOCUSATE SODIUM 50 MG AND SENNOSIDES 8.6 MG 3 TABLET: 8.6; 5 TABLET, FILM COATED ORAL at 08:50

## 2023-10-30 RX ADMIN — OXYCODONE HYDROCHLORIDE 10 MG: 5 TABLET ORAL at 02:05

## 2023-10-30 RX ADMIN — Medication 400 MG: at 08:50

## 2023-10-30 RX ADMIN — MECLIZINE HYDROCHLORIDE 25 MG: 25 TABLET ORAL at 05:35

## 2023-10-30 RX ADMIN — LIDOCAINE 1 PATCH: 4 PATCH TOPICAL at 08:48

## 2023-10-30 RX ADMIN — IBUPROFEN 600 MG: 600 TABLET ORAL at 05:32

## 2023-10-30 RX ADMIN — HEPARIN SODIUM 5000 UNITS: 5000 INJECTION, SOLUTION INTRAVENOUS; SUBCUTANEOUS at 05:33

## 2023-10-30 RX ADMIN — OXYCODONE HYDROCHLORIDE 10 MG: 5 TABLET ORAL at 11:25

## 2023-10-30 RX ADMIN — METOPROLOL TARTRATE 12.5 MG: 25 TABLET, FILM COATED ORAL at 08:49

## 2023-10-30 RX ADMIN — METHOCARBAMOL 500 MG: 500 TABLET ORAL at 08:50

## 2023-10-30 RX ADMIN — GABAPENTIN 400 MG: 100 CAPSULE ORAL at 08:50

## 2023-10-30 RX ADMIN — OXYCODONE HYDROCHLORIDE 10 MG: 5 TABLET ORAL at 06:45

## 2023-10-30 RX ADMIN — PANTOPRAZOLE SODIUM 40 MG: 40 TABLET, DELAYED RELEASE ORAL at 08:50

## 2023-10-30 RX ADMIN — HUMAN ALBUMIN MICROSPHERES AND PERFLUTREN 9 ML: 10; .22 INJECTION, SOLUTION INTRAVENOUS at 11:02

## 2023-10-30 RX ADMIN — FEXOFENADINE HCL 180 MG: 180 TABLET ORAL at 08:51

## 2023-10-30 RX ADMIN — HYDROXYZINE HYDROCHLORIDE 50 MG: 25 TABLET, FILM COATED ORAL at 05:32

## 2023-10-30 ASSESSMENT — ACTIVITIES OF DAILY LIVING (ADL)
ADLS_ACUITY_SCORE: 22
ADLS_ACUITY_SCORE: 22
ADLS_ACUITY_SCORE: 23
ADLS_ACUITY_SCORE: 23
ADLS_ACUITY_SCORE: 22

## 2023-10-30 NOTE — PROGRESS NOTES
[id number]  Orientations: A&O x4  Vitals/Pain: VSS on ra. Pain controlled with scheduled and PRN meds  Tele: NSR  Lines/Drains: L PIV removed  Skin/Wounds: R thora incision, R groin site  GI/: voiding adequately, frequent small loose Bms. No N/V with regular diet  Labs: Abnormal/Trends, Electrolyte Replacement- Mag replaced per protocol  Ambulation/Assist: SBA- ind  Plan: Discharge home. Sent with printed AVS and pharmacy filled medications. Questions encouraged and answered. Patient transported off unit per staff with all belongings and discharge instructions.

## 2023-10-30 NOTE — DISCHARGE SUMMARY
[facility]  Cardiothoracic Surgery Hospital Discharge Summary     Margareth Green MRN# [id number]   Age: 68 year old YOB: 1955     Admitting Physician:  Sabas Meredith MD  Discharge Physician:  Angela Persaud NP  Primary Care Physician:        Caitlin Beasley     DATE OF ADMISSION: 10/26/2023      DATE OF DISCHARGE: October 30, 2023     Admit Wt: 129 lbs  Discharge Wt: 128 lbs          Primary Diagnoses:   Severe symptomatic mitral valve regurgitation s/p right mini thoracotomy mitral valve repair          Secondary Diagnoses:   Acute postoperative pain, improving  Stress induced hyperglycemia, resolved  Stress induced leukocytosis, resolved  Acute blood loss anemia, stable  Acute blood loss thrombocytopenia, resolved  Hypervolemia, resolved    PROCEDURES PERFORMED:   Date: 10/26/23.  Surgeon: Dr. Sabas Meredith  Right minithoracotomy, mitral valve repair with Ulman-Guru neochords to the prolapsing P2 segment, posteromedial commissuroplasty, P1/P2 cleft closure, implantation of a 28 mm Candelario Physio 2 annuloplasty ring, right common femoral arterial and venous cannulation for cardiopulmonary bypass, intraoperative DOUG     INTRAOPERATIVE FINDINGS:    The patient had an overall normal LV systolic size and function.  She had severe left atrial enlargement.  She had a very myxomatous posterior mitral valve leaflet with significant prolapse of the posterior leaflet, but no ruptured chordae were seen. The anterior leaflet was normal.     PATHOLOGY RESULTS:    none     CULTURE RESULTS:    none    CONSULTS:    PT/OT  Intensivist    BRIEF HISTORY OF ILLNESS:  Ms. Green is a very pleasant 68-year-old female who was recently referred to me for severe symptomatic mitral regurgitation from P2 prolapse.  Her LV function was preserved.  Preoperative coronary angiogram demonstrated no significant coronary artery disease.  She was taken to the operating room for a minimally invasive mitral  valve repair.     HOSPITAL COURSE: Margareth Green is a 68 year old female who on 10/26/2023 underwent the above-named procedures and tolerated the operation well.     Postoperatively was admitted to the ICU.  Patient was extubated within protocol on POD #0.  Blood pressure and cardiac index were managed with vasopressors and inotropic agents which were continuously weaned until no longer needed.  Patient was subsequently  transferred to the surgical telemetry floor.    While on the surgical unit, the patient continued to progress well. Chest tubes and temporary pacemaker wires were removed when deemed appropriate.     Patient was fluid overloaded and treated with diuretics. She is below her preoperative weight and will not discharge with diuretic therapy; will re-evaluate need in clinic follow-up.      Patient was transiently hyperglycemic and treated with insulin infusion then transitioned to sliding scale insulin per protocol. Blood sugars remained stable. No further glycemic control agents needed at this time.    Margareth suffered from neuropathic pain in her postoperative course thus was discharged on a two week course of gabapentin and PRN atarax. This should be reviewed at her postoperative appointment.     Prior to discharge, her pain was controlled well, she was working well with therapies, able to perform most ADLs, ambulate without difficulty, and had full return of bowel and bladder function.  On October 30, 2023, she was discharged to home with the help of family in stable condition. Follow up with cardiology and cardiac surgery have been arranged. Pt encouraged to follow up with PCP and cardiac rehab upon discharge.    Patient discharged on aspirin:  Yes 81 mg  Patient discharged on beta blocker: yes    Patient discharged on ACE Inhibitor/ARB: no      Patient discharged on statin: yes          Discharge Disposition:     Discharged to home            Condition on Discharge:     Discharge condition:  "Stable   Discharge vitals: Blood pressure 122/70, pulse 80, temperature 97.8  F (36.6  C), temperature source Oral, resp. rate 20, height 1.562 m (5' 1.5\"), weight 58.2 kg (128 lb 6.4 oz), last menstrual period 02/26/2008, SpO2 95%, not currently breastfeeding.   Code status on discharge: Full Code     Vitals:    10/27/23 1800 10/28/23 1026 10/29/23 0543   Weight: 58.9 kg (129 lb 12.8 oz) 58.8 kg (129 lb 9.6 oz) 58.2 kg (128 lb 6.4 oz)       DAY OF DISCHARGE PHYSICAL EXAM:    Gen: A&Ox4, NAD  Neuro: Intact with no focal deficits   CV: RRR, normal S1 S2, no murmurs, rubs or gallops. no JVD  Pulm: CTA, no wheezing or rhonchi, normal breathing on RA  Abd: nondistended, normal BS, soft, nontender  Ext: no peripheral edema, no pitting  Incision: right thoracotomy and right femoral incision sites clean, dry, intact, no erythema  Tubes/drain sites: dressing clean and dry    LABS  Most Recent 3 CBC's:  Recent Labs   Lab Test 10/30/23  0543 10/28/23  0530 10/27/23  0437   WBC 7.0 8.0 9.3   HGB 10.9* 10.6* 9.8*   MCV 95 97 94    149* 139*      Most Recent 3 BMP's:  Recent Labs   Lab Test 10/30/23  0803 10/30/23  0543 10/29/23  2159 10/28/23  0846 10/28/23  0530 10/27/23  0834 10/27/23  0437   NA  --  141  --   --  141  --  137   POTASSIUM  --  4.2  --   --  4.4  --  4.5   CHLORIDE  --  104  --   --  104  --  104   CO2  --  29  --   --  32*  --  26   BUN  --  14.1  --   --  12.5  --  17.0   CR  --  0.86  --   --  0.93  --  0.85   ANIONGAP  --  8  --   --  5*  --  7   TOLU  --  9.2  --   --  8.8  --  8.2*   * 114* 106*   < > 103*   < > 109*    < > = values in this interval not displayed.     Most Recent 2 LFT's:  Recent Labs   Lab Test 10/27/23  0437 10/26/23  1320   AST 71* 84*   ALT 24 24   ALKPHOS 39 49   BILITOTAL 0.4 0.4     Most Recent INR's and Anticoagulation Dosing History:  Anticoagulation Dose History          Latest Ref Rng & Units 1/30/2012 8/17/2023 10/26/2023   Recent Dosing and Labs   INR 0.85 - " 1.15 1.00  1.07  1.27  1.37      Most Recent 3 Troponin's:No lab results found.  Most Recent Cholesterol Panel:  Recent Labs   Lab Test 04/07/23  1023   CHOL 162   LDL 73   HDL 79   TRIG 49     Most Recent 6 Bacteria Isolates From Any Culture (See EPIC Reports for Culture Details):  Recent Labs   Lab Test 01/02/19  1337   CULT >100,000 colonies/mL  Escherichia coli  *     Most Recent TSH, T4 and A1c Labs:  Recent Labs   Lab Test 10/18/23  1215 01/19/22  1414 10/14/19  1541   TSH  --   --  2.29   A1C 5.0   < >  --     < > = values in this interval not displayed.      Recent Labs   Lab 10/30/23  0803 10/30/23  0543 10/29/23  2159 10/29/23  1720 10/29/23  1243 10/29/23  0143   * 114* 106* 110* 114* 112*       Imaging:  10/28/23 CXR:  IMPRESSION: Postop mitral valve replacement. Right chest tubes have been removed. Some mild opacity right lung base is new likely some increasing atelectasis post chest tube removal. No pneumothorax. Some minimal scattered fibrosis in both lungs   unchanged.    10/30/23 Echocardiogram  Pending      PRE-ADMISSION MEDICATIONS:  Medications Prior to Admission   Medication Sig Dispense Refill Last Dose    albuterol (PROAIR HFA/PROVENTIL HFA/VENTOLIN HFA) 108 (90 Base) MCG/ACT inhaler Inhale 2 puffs into the lungs every 6 hours as needed for shortness of breath, wheezing or cough   Past Week at PRN    ALLEGRA 180 MG OR TABS Take 180 mg by mouth daily  30 0 10/26/2023 at AM    amitriptyline (ELAVIL) 10 MG tablet TAKE 3 TO 4 TABLETS AT BEDTIME 270 tablet 3  at PM    buPROPion (WELLBUTRIN XL) 150 MG 24 hr tablet TAKE 1 TABLET EVERY MORNING 90 tablet 1 10/25/2023 at AM    CALCIUM /VITAMIN D TABS   OR Take 1 tablet by mouth daily    10/18/2023 at AM    famotidine (PEPCID) 20 MG tablet Take 20 mg by mouth daily as needed   2weeks ago at PRN    magnesium aspartate (MAGINEX) 615 MG EC tablet Take 1 tablet (615 mg) by mouth daily   10/18/2023 at AM    meclizine (ANTIVERT) 25 MG tablet Take 25 mg  by mouth daily as needed for dizziness   Unknown at PRN    Melatonin 10 MG TABS tablet Take 10 mg by mouth nightly as needed for sleep   Unknown at PRN    mometasone-formoterol (DULERA) 100-5 MCG/ACT oral inhaler Inhale 2 puffs into the lungs 2 times daily   10/26/2023 at AM    Multiple Vitamins-Minerals (PRESERVISION AREDS) TABS Take 1 tablet by mouth 2 times daily   10/18/2023 at AM    multivitamin w/minerals (THERA-VIT-M) tablet Take 1 tablet by mouth daily RAW Brand   10/18/2023 at AM    omeprazole (PRILOSEC) 20 MG DR capsule TAKE 1 CAPSULE DAILY 90 capsule 3 10/26/2023 at AM    Simethicone (GAS-X PO) Take 160 mg by mouth 4 times daily as needed for intestinal gas    Unknown at PRN    triamcinolone (NASACORT) 55 MCG/ACT nasal inhaler Spray 1 spray into both nostrils daily    at PM    vitamin C (ASCORBIC ACID) 250 MG tablet Take 1 tablet (250 mg) by mouth daily   10/23/2023 at AM    [DISCONTINUED] acetaminophen (TYLENOL) 650 MG CR tablet Take 650 mg by mouth every 8 hours as needed for mild pain or fever   Unknown at PRN    [DISCONTINUED] aspirin 81 MG EC tablet Take 81 mg by mouth once   10/25/2023 at AM    [DISCONTINUED] naproxen (NAPROSYN) 500 MG tablet Take 500 mg by mouth as needed   10/18/2023 at PM       DISCHARGE MEDICATIONS:   Current Discharge Medication List        START taking these medications    Details   acetaminophen (TYLENOL) 500 MG tablet Take 1.5 tablets (750 mg) by mouth every 6 hours as needed for other or mild pain (For optimal non-opioid multimodal pain management to improve pain control.)    Associated Diagnoses: S/P MVR (mitral valve repair)      aspirin (ASA) 81 MG chewable tablet Take 1 tablet (81 mg) by mouth daily  Qty: 90 tablet, Refills: 3    Associated Diagnoses: S/P MVR (mitral valve repair)      gabapentin (NEURONTIN) 400 MG capsule Take 1 capsule (400 mg) by mouth 3 times daily for 14 days  Qty: 42 capsule, Refills: 0    Associated Diagnoses: S/P MVR (mitral valve repair)       hydrOXYzine (ATARAX) 25 MG tablet Take 1 tablet (25 mg) by mouth 3 times daily as needed for other or anxiety (adjuvant pain)  Qty: 42 tablet, Refills: 0    Associated Diagnoses: S/P MVR (mitral valve repair)      methocarbamol (ROBAXIN) 500 MG tablet Take 1 tablet (500 mg) by mouth every 6 hours as needed for muscle spasms or other (pain)  Qty: 60 tablet, Refills: 0    Associated Diagnoses: S/P MVR (mitral valve repair)      metoprolol succinate ER (TOPROL XL) 25 MG 24 hr tablet Take 1 tablet (25 mg) by mouth daily  Qty: 30 tablet, Refills: 3    Associated Diagnoses: S/P MVR (mitral valve repair)      oxyCODONE (ROXICODONE) 5 MG tablet Take 1 tablet (5 mg) by mouth every 6 hours as needed for severe pain  Qty: 15 tablet, Refills: 0    Associated Diagnoses: S/P MVR (mitral valve repair)      polyethylene glycol (MIRALAX) 17 GM/Dose powder Take 17 g by mouth 2 times daily as needed for constipation  Qty: 510 g, Refills: 0    Associated Diagnoses: S/P MVR (mitral valve repair)      senna-docusate (SENOKOT-S/PERICOLACE) 8.6-50 MG tablet Take 3 tablets by mouth 2 times daily as needed for constipation  Qty: 60 tablet, Refills: 0    Associated Diagnoses: S/P MVR (mitral valve repair)           CONTINUE these medications which have NOT CHANGED    Details   albuterol (PROAIR HFA/PROVENTIL HFA/VENTOLIN HFA) 108 (90 Base) MCG/ACT inhaler Inhale 2 puffs into the lungs every 6 hours as needed for shortness of breath, wheezing or cough      ALLEGRA 180 MG OR TABS Take 180 mg by mouth daily   Qty: 30, Refills: 0      amitriptyline (ELAVIL) 10 MG tablet TAKE 3 TO 4 TABLETS AT BEDTIME  Qty: 270 tablet, Refills: 3    Associated Diagnoses: Other insomnia      buPROPion (WELLBUTRIN XL) 150 MG 24 hr tablet TAKE 1 TABLET EVERY MORNING  Qty: 90 tablet, Refills: 1    Associated Diagnoses: Poor concentration; Major depressive disorder, recurrent episode, mild (H24)      CALCIUM /VITAMIN D TABS   OR Take 1 tablet by mouth daily        famotidine (PEPCID) 20 MG tablet Take 20 mg by mouth daily as needed      magnesium aspartate (MAGINEX) 615 MG EC tablet Take 1 tablet (615 mg) by mouth daily      meclizine (ANTIVERT) 25 MG tablet Take 25 mg by mouth daily as needed for dizziness      Melatonin 10 MG TABS tablet Take 10 mg by mouth nightly as needed for sleep      mometasone-formoterol (DULERA) 100-5 MCG/ACT oral inhaler Inhale 2 puffs into the lungs 2 times daily      !! Multiple Vitamins-Minerals (PRESERVISION AREDS) TABS Take 1 tablet by mouth 2 times daily      !! multivitamin w/minerals (THERA-VIT-M) tablet Take 1 tablet by mouth daily RAW Brand      omeprazole (PRILOSEC) 20 MG DR capsule TAKE 1 CAPSULE DAILY  Qty: 90 capsule, Refills: 3    Associated Diagnoses: Gastroesophageal reflux disease with esophagitis and hemorrhage; Dysphagia, unspecified type      Simethicone (GAS-X PO) Take 160 mg by mouth 4 times daily as needed for intestinal gas       triamcinolone (NASACORT) 55 MCG/ACT nasal inhaler Spray 1 spray into both nostrils daily      vitamin C (ASCORBIC ACID) 250 MG tablet Take 1 tablet (250 mg) by mouth daily       !! - Potential duplicate medications found. Please discuss with provider.        STOP taking these medications       acetaminophen (TYLENOL) 650 MG CR tablet Comments:   Reason for Stopping:         aspirin 81 MG EC tablet Comments:   Reason for Stopping:         naproxen (NAPROSYN) 500 MG tablet Comments:   Reason for Stopping:                CC:Caitlin Alford      [facility]   Cardiothoracic Surgery  Office phone: [phone]  Office fax: [phone]

## 2023-10-30 NOTE — DISCHARGE INSTRUCTIONS
AFTER YOU GO HOME FROM YOUR HEART SURGERY  (Minimally invasive mitral valve repair with Dr. Meredith)    You had a mini thoracotomy, avoid lifting, pushing, or pulling anything greater than ten pounds for 2 weeks after surgery and then progress as tolerated. Do not reach backwards or use arms to push out of chair. You should avoid activity and movement that is strenuous and increases your pain.     No driving for 2 weeks after surgery or while on pain medication.    Shower or wash your incisions daily with antibacterial soap and water (or as instructed), pat dry. Keep wound clean and dry, showers are okay after discharge, but don't let spray hit directly on incision. No baths or swimming for 6 weeks and/or until incisions are completely healed over. Cover chest tube sites with gauze until they stop draining, then leave open to air. It is normal for chest tube sites to drain fluid for up to 2-3 weeks after surgery. It is not normal to have drainage from other incisions.   Watch for signs of infection: increased redness, tenderness, warmth or any drainage from incision.  Also a temperature > 100.5 F or chills. Call your surgeon or primary care provider's office immediately.     Exercise is very important in your recovery. Please follow the guidelines set up for you in your cardiac rehab classes at the hospital. If outpatient cardiac rehab was ordered for you, we highly recommend you participate. If you have problems arranging your cardiac rehab, please call [phone] for all locations, with the exception of [place], please call [phone] and [place], please call [phone].    Avoid sitting for prolonged periods of time, try to walk every hour during the day. If you have a leg incision, elevate your leg often when you are not walking.    Check your weight when you get home from the hospital and continue to check it daily through your recovery for at least a month. If you notice a weight gain of 2-3  "pounds in a week, notify your primary care physician, cardiologist or surgeon.    Bowel activity may be slow after surgery. If necessary, you may take an over the counter laxative such as Milk of Magnesia or Miralax. You may have stool softeners prescribed (docusate sodium, Senokot). We recommend using stool softeners while using narcotics for pain (oxycodone/percocet, hydrocodone/vicodin, hydromorphone/dilaudid).      Wean OFF of narcotics (oxycodone, dilaudid, hydrocodone) as soon as possible. You should continue taking acetaminophen as long as you have any surgical pain as the first choice for pain control and add narcotics as necessary for pain to be tolerable.      DENTAL VISITS AFTER SURGERY  If you have had your heart valve repaired or replaced, we do not recommend having any dental work done for 6 months and you will need to take an antibiotic prior to dental visits from now on.  Please notify your dentist before any procedure for the proper treatment needed. The antibiotic is taken by mouth one hour prior to visit. This includes routine cleanings.***  You can sometimes hear a mechanical valve \"clicking,\" this is normal and not a sign of something wrong.***    DO NOT SMOKE.  IF YOU NEED HELP QUITTING, PLEASE TALK WITH YOUR CARDIOLOGIST OR PRIMARY DOCTOR.      REGARDING PRESCRIPTION REFILLS.  If you need a refill on your pain medication contact us to discuss your pain and a possible one time refill.   All other medications will be adjusted, discontinued and re-filled by your primary care physician and/or your cardiologist as they were prior to your surgery. We have given you enough for one to three month with possibly one refill. You may have refills available to you for some of your medications through the [facility]. If you would like your refills sent to a different a different pharmacy, please contact your preferred pharmacy and let them know that you have prescriptions at " [place] that you would like transferred.    POST-OPERATIVE CLINIC VISITS  You have a follow up visit with CV Surgery Advance Care Practitioners at the Heart Clinic at [facility] in [place].    If you need to cancel or reschedule your cardiology appointments please call [phone].     SURGICAL QUESTIONS  Please call our nurse coordinators, Milana and Caitlin, at [phone] with questions or concerns related to surgery. For other non-urgent questions and requests, our nurse coordinators can also be reached via email; Milana at [email or web address] and Caitlin at [email or web address]    On weekends or after hours, please call [phone] and ask the  to page the Cardiothoracic Surgeon on-call.      Thank you,    Your Cardiothoracic Surgery Team

## 2023-10-30 NOTE — PLAN OF CARE
Pt alert and oriented x4.Pt ambulates with sba. Pain controlled with gabapentin, robaxin, tylenol, ibuprofen, atarax, and oxycodone. Breakthrough pain x1 hour was observed twice today. Pt tolerating reg diet, sinus rhythm, denies dizziness or nausea. Pt resting well in between cares. Pt was small amt stool. Pt voiding adequate amts.

## 2023-10-30 NOTE — PLAN OF CARE
A+Ox4 VSS on room air. Lung sounds clear. Tele NSR. R mini thoracotomy bruised. CMS/neuros intact. Bowels active, flatus+, no BM overnight. Voiding adequately. Ibuprofen, atarax, and oxycodone given for pain. Up SBA. Tolerating regular diet.

## 2023-10-30 NOTE — PLAN OF CARE
Physical Therapy Discharge Summary    Reason for therapy discharge:    Discharged to home with outpatient therapy.    Progress towards therapy goal(s). See goals on Care Plan in [facility] electronic health record for goal details.  Goals partially met.  Barriers to achieving goals:   discharge on same date as initial evaluation.    Therapy recommendation(s):    Continued therapy is recommended.  Rationale/Recommendations:  For further cardiac education and endurance training.

## 2023-10-31 ENCOUNTER — PATIENT OUTREACH (OUTPATIENT)
Dept: CARE COORDINATION | Facility: CLINIC | Age: 68
End: 2023-10-31
Payer: COMMERCIAL

## 2023-10-31 NOTE — PROGRESS NOTES
"Clinic Care Coordination Contact  [facility]: Post-Discharge Note  SITUATION                                                      Admission:    Admission Date: 09/26/23   Reason for Admission: mini thoracotomy mitral valve repair  Discharge:   Discharge Date: 09/30/23  Discharge Diagnosis: Severe symptomatic mitral valve regurgitation s/p right mini thoracotomy mitral valve repair    BACKGROUND                                                      Per hospital discharge summary and inpatient provider notes:Margareth Green is a 68 year old female who on 10/26/2023 underwent the above-named procedures and tolerated the operation well.      Postoperatively was admitted to the ICU.  Patient was extubated within protocol on POD #0.  Blood pressure and cardiac index were managed with vasopressors and inotropic agents which were continuously weaned until no longer needed.  Patient was subsequently  transferred to the surgical telemetry floor      ASSESSMENT           Discharge Assessment  How are you doing now that you are home?: \" I am doing ok \"  How are your symptoms? (Red Flag symptoms escalate to triage hotline per guidelines): Improved  Do you feel your condition is stable enough to be safe at home until your provider visit?: Yes  Does the patient have questions regarding their discharge instructions? : No  Were you started on any new medications or were there changes to any of your previous medications? : Yes  Does the patient have all of their medications?: Yes  Do you have questions regarding any of your medications? : No  Do you have all of your needed medical supplies or equipment (DME)?  (i.e. oxygen tank, CPAP, cane, etc.): Yes  Discharge follow-up appointment scheduled within 14 calendar days? : Yes  Discharge Follow Up Appointment Date: 11/13/23  Discharge Follow Up Appointment Scheduled with?: Primary Care Provider    Post-op ([facility] CTA Only)  If the patient had a surgery or procedure, do they have any " questions for a nurse?: No             PLAN                                                      Outpatient Plan:Follow up with primary care provider, Caitlin Beasley, within 7 days to evaluate after surgery and for hospital follow- up.  No follow up labs or test are needed.  Appointments on [place] and/or [facility] (with [facility] or [facility] provider or service). Call [phone] if  you haven't heard regarding these appointments within 7 days of discharge.    Future Appointments   Date Time Provider Department Center   11/8/2023 10:00 AM PH CR 2 [facility] YI [facility]   11/13/2023 11:00 AM Caitlin Beasley PA-C [facility] JAMES CLINI   11/15/2023  1:15 PM [facility] CARDIOTHORACIC SURGERY, JEZ R[place] [facility]   1/10/2024 11:15 AM Grazyna Gill APRN CNP [facility]   1/10/2024  1:00 PM UC PFL 6 MINUTE WALK 1 [facility]   1/10/2024  1:30 PM UC PFL A [facility]   1/10/2024  2:30 PM UC LAB UCLABR [facility]   1/10/2024  3:00 PM Khanh Varner MD [facility]   3/4/2024 11:00 AM Andrey Mathews MD [facility]   [facility]20[facility]         For any urgent concerns, please contact our 24 hour nurse triage line: [phone] ([phone])         Brenda Pinto

## 2023-11-02 ENCOUNTER — DOCUMENTATION ONLY (OUTPATIENT)
Dept: CARDIOLOGY | Facility: CLINIC | Age: 68
End: 2023-11-02
Payer: COMMERCIAL

## 2023-11-02 ENCOUNTER — TELEPHONE (OUTPATIENT)
Dept: CARDIOLOGY | Facility: CLINIC | Age: 68
End: 2023-11-02
Payer: COMMERCIAL

## 2023-11-02 NOTE — TELEPHONE ENCOUNTER
"Cardiovascular Surgery  [facility]    DISCHARGE FOLLOW UP PHONE CALL      POST OP MONITORING  How is your pain on a 0-10 scale, how are you managing your pain? Patient states she is sore  and that she doesn't like the way the medications make her feel. Discussed regimen. She seemed confused about medications indications. Discussed several of the medications she is prescribed and what they are for. Discussed staggering pain medications in the order of tylenol, methocarbamol, gabapentin and then oxycodone for adequate pain control and to minimize \"goofy\" feeling they give her.  Instructed to follow reverse order for weaning.   It does seem she is having issues with adequate pain control as her activity, IS use, and sleep all seem to be affected by pain. We discussed this several times throughout the call while we covered other topics.     ACTIVITY  How is your activity tolerance? Only getting 10 minutes. Discussed having a goal of 30 minutes total daily and building from there.  Are you using your incentive spirometer? Up to 750ml. Encouraged her to increase frequency of use.  Are you still doing sternal precautions? yes  Do you hear any clicking when you are moving or taking a deep breath? No, had one time issue of very bad pain while sneezing- discussed use of heart pillow for splinting in additional to the above pain regimen.     Are you weighing yourself daily? Today was 127.4lbs, steadily decreasing since discharge. Advised when to call.  Are you monitoring your blood pressure and heart rate? HR 73 Has wrist cuff that is not     SIGNS AND SYMPTOMS OF INFECTION        ASSISTANCE  Do you have someone at home to assist you with your daily activities? Spouse    MEDICATIONS  Is someone helping you to set up your medications? No  Do you have any questions about your medications? Discussed medication indication, answered all questions    Are you on a blood thinner? no      FOLLOW UP  You are scheduled " to see your primary care physician on 11/13.  You are scheduled to see our surgery advanced practive provider for post operative follow up on 11/15.  You are scheduled for cardiac rehab on 11/8.  You are scheduled to see your cardiologist on 1/10 with KIRSTIE- patient wants to reschedule due to conflicting appointment with pulmonology- request sent to scheduling to call her. Follow up with Reid in March 2024.      CONTACT INFORMATION  Please feel free to call us with any questions or symptoms that are concerning for you at [phone]. If it is after 4:00 in the afternoon, or a weekend please call [phone] and ask for the on call specialist. We want to do everything we can to help prevent you needing to return to the hospital, so please do not hesitate to call us.      HAFSA SANTANA, RN  Care Coordinator - [facility] CV Surgery   [facility]  [phone]

## 2023-11-07 ENCOUNTER — TELEPHONE (OUTPATIENT)
Dept: CARDIOLOGY | Facility: CLINIC | Age: 68
End: 2023-11-07
Payer: COMMERCIAL

## 2023-11-07 NOTE — TELEPHONE ENCOUNTER
Patient called regarding right side of her breast feeling a combination of pain and numbnes near surgical incision and breast. Discussed pain management. She states she is taking tylenol and methocarbamol but stopped gabapentin because it made her feel funny. Discussed that it the best pain management for the nerve discomfort she is feeling.   Patient verbalizes understanding and plans to reincorporate it into regimen.     HAFSA SANTANA RN

## 2023-11-08 ENCOUNTER — HOSPITAL ENCOUNTER (OUTPATIENT)
Dept: CARDIAC REHAB | Facility: CLINIC | Age: 68
Discharge: HOME | End: 2023-11-08
Attending: PHYSICIAN ASSISTANT
Payer: COMMERCIAL

## 2023-11-08 DIAGNOSIS — Z98.890 S/P MVR (MITRAL VALVE REPAIR): ICD-10-CM

## 2023-11-08 PROCEDURE — 93798 PHYS/QHP OP CAR RHAB W/ECG: CPT

## 2023-11-08 PROCEDURE — 93797 PHYS/QHP OP CAR RHAB WO ECG: CPT | Mod: 59

## 2023-11-09 ENCOUNTER — DOCUMENTATION ONLY (OUTPATIENT)
Dept: CARDIOLOGY | Facility: CLINIC | Age: 68
End: 2023-11-09
Payer: COMMERCIAL

## 2023-11-13 ENCOUNTER — MYC MEDICAL ADVICE (OUTPATIENT)
Dept: FAMILY MEDICINE | Facility: CLINIC | Age: 68
End: 2023-11-13

## 2023-11-13 ENCOUNTER — OFFICE VISIT (OUTPATIENT)
Dept: FAMILY MEDICINE | Facility: CLINIC | Age: 68
End: 2023-11-13
Payer: COMMERCIAL

## 2023-11-13 VITALS
DIASTOLIC BLOOD PRESSURE: 74 MMHG | RESPIRATION RATE: 20 BRPM | SYSTOLIC BLOOD PRESSURE: 124 MMHG | HEART RATE: 77 BPM | HEIGHT: 62 IN | OXYGEN SATURATION: 99 % | BODY MASS INDEX: 24 KG/M2 | TEMPERATURE: 98.7 F | WEIGHT: 130.4 LBS

## 2023-11-13 DIAGNOSIS — I34.0 SEVERE MITRAL VALVE REGURGITATION: Primary | ICD-10-CM

## 2023-11-13 DIAGNOSIS — Z98.890 S/P MITRAL VALVE REPAIR: ICD-10-CM

## 2023-11-13 PROCEDURE — 99213 OFFICE O/P EST LOW 20 MIN: CPT | Performed by: PHYSICIAN ASSISTANT

## 2023-11-13 RX ORDER — RESPIRATORY SYNCYTIAL VIRUS VACCINE 120MCG/0.5
0.5 KIT INTRAMUSCULAR ONCE
Qty: 1 EACH | Refills: 0 | Status: CANCELLED | OUTPATIENT
Start: 2023-11-13 | End: 2023-11-13

## 2023-11-13 ASSESSMENT — PAIN SCALES - GENERAL: PAINLEVEL: MODERATE PAIN (5)

## 2023-11-13 ASSESSMENT — ASTHMA QUESTIONNAIRES: ACT_TOTALSCORE: 23

## 2023-11-13 NOTE — PROGRESS NOTES
"  Assessment & Plan     Severe mitral valve regurgitation  S/P mitral valve repair--    No sign of any incision infections or dehiscence, no signs of fluid overload on her exam today.  She does have a subcutaneous mass near the incision of her right groin, she will follow-up with her surgeon next week in regards to this.  She will continue to monitor, she will contact us at once if this is rapidly enlarging or she has any drainage to her groin incision.  She will continue to use methocarbamol as needed and I did encourage her to use gabapentin at least at bedtime to continue to improve her neuropathic pain though this is improving well on its own.         MED REC REQUIRED  Post Medication Reconciliation Status: discharge medications reconciled, continue medications without change  This chart documentation was completed in part with Dragon voice recognition software.  Documentation is reviewed after completion, however, some words and grammatical errors may remain.  TOI Hirsch PA-C  [facility] JAMES Zuluaga is a 68 year old, presenting for the following health issues:  Hospital F/U        11/13/2023    10:41 AM   Additional Questions   Roomed by Rowena HADDAD   Accompanied by None         11/13/2023    10:41 AM   Patient Reported Additional Medications   Patient reports taking the following new medications NA       HPI         10/31/2023    12:19 PM   Post Discharge Outreach   Admission Date 9/26/2023   Reason for Admission mini thoracotomy mitral valve repair   Discharge Date 9/30/2023   Discharge Diagnosis Severe symptomatic mitral valve regurgitation s/p right mini thoracotomy mitral valve repair   How are you doing now that you are home? \" I am doing ok \"   How are your symptoms? (Red Flag symptoms escalate to triage hotline per guidelines) Improved   Do you feel your condition is stable enough to be safe at home until your provider visit? Yes   Does the " patient have questions regarding their discharge instructions?  No   Were you started on any new medications or were there changes to any of your previous medications?  Yes   Does the patient have all of their medications? Yes   Do you have questions regarding any of your medications?  No   Do you have all of your needed medical supplies or equipment (DME)?  (i.e. oxygen tank, CPAP, cane, etc.) Yes   Discharge follow-up appointment scheduled within 14 calendar days?  Yes   Discharge Follow Up Appointment Date 11/13/2023   Discharge Follow Up Appointment Scheduled with? Primary Care Provider     Hospital Follow-up Visit:    Hospital/Nursing Home/IP Rehab Facility: [facility]  Date of Admission: 10/26/23  Date of Discharge: 10/30/23  Reason(s) for Admission: S/P MVR (mitral valve repair)      Was your hospitalization related to COVID-19? No   Problems taking medications regularly:  None  Medication changes since discharge: changes are documented in med list   Problems adhering to non-medication therapy:  None    Summary of hospitalization:  [facility] discharge summary reviewed  Diagnostic Tests/Treatments reviewed.  Follow up needed: Assess for fluid overload and reassess her pain  Other Healthcare Providers Involved in Patient s Care:         Specialist appointment - cardiology and Surgical follow-up appointment - this appointment is next week  Update since discharge: improved.     She does notice that she has  less pounding in her chest and also she feels less winded.  She still has some numbness on the right side of her chest but thankfully her neuropathic pain is about 70% better.  She has not been tolerating the gabapentin well.  She takes it on occasion at night.  She discontinued the hydroxyzine because it made her foggy dizzy and made her hallucinate.  She thought she was seeing a haze or smoke though nothing was present.  She is completely off oxycodone and uses  "methocarbamol as needed for the spasming type pain    She is eating normally taking fluids normally.  No trouble with any bowel movements or urinary symptoms.  She has not had any swelling of her legs or calf pain.  She is doing cardiac rehab.  She did notice a lump at her right groin site.  It is staying the same size not getting smaller or larger.  She has had no drainage from the wound.  She did call in about this and they will review it when they see her again next week since its not changing or progressively worsening at this point    Plan of care communicated with patient                 Review of Systems   Constitutional, HEENT, cardiovascular, pulmonary, gi and gu systems are negative, except as otherwise noted.      Objective    /74 (BP Location: Left arm, Patient Position: Chair, Cuff Size: Adult Small)   Pulse 77   Temp 98.7  F (37.1  C) (Temporal)   Resp 20   Ht 1.57 m (5' 1.81\")   Wt 59.1 kg (130 lb 6.4 oz)   LMP 02/26/2008 (Approximate)   SpO2 99%   BMI 24.00 kg/m    Body mass index is 24 kg/m .  Physical Exam   GENERAL: healthy, alert and no distress  NECK: no adenopathy, no asymmetry, masses, or scars and thyroid normal to palpation  RESP: lungs clear to auscultation - no rales, rhonchi or wheezes  CV: regular rate and rhythm, normal S1 S2, no S3 or S4, no murmur, click or rub, no peripheral edema and peripheral pulses strong  ABDOMEN: soft, nontender, no hepatosplenomegaly, no masses and bowel sounds normal  MS: no gross musculoskeletal defects noted, no edema  SKIN: Incision sites inspected at her right trunk-all still well approximated no drainage or signs of expanding erythema, her right groin site is without drainage and is well approximated but there is an area of induration is approximately 3 to 4 cm x 2 to 3 cm just medial and superior to the groin lesion,  her leg wound in the right lower leg has healed over well.  PSYCH: mentation appears normal, affect normal/bright           "

## 2023-11-15 ENCOUNTER — OFFICE VISIT (OUTPATIENT)
Dept: CARDIOLOGY | Facility: CLINIC | Age: 68
End: 2023-11-15
Payer: COMMERCIAL

## 2023-11-15 ENCOUNTER — HOSPITAL ENCOUNTER (OUTPATIENT)
Dept: ULTRASOUND IMAGING | Facility: CLINIC | Age: 68
Discharge: HOME | End: 2023-11-15
Attending: PHYSICIAN ASSISTANT | Admitting: PHYSICIAN ASSISTANT
Payer: COMMERCIAL

## 2023-11-15 VITALS
HEART RATE: 84 BPM | HEIGHT: 62 IN | WEIGHT: 130 LBS | DIASTOLIC BLOOD PRESSURE: 76 MMHG | OXYGEN SATURATION: 99 % | BODY MASS INDEX: 23.92 KG/M2 | SYSTOLIC BLOOD PRESSURE: 136 MMHG

## 2023-11-15 DIAGNOSIS — M79.651 PAIN IN RIGHT THIGH: ICD-10-CM

## 2023-11-15 DIAGNOSIS — T79.2XXA SEROMA DUE TO TRAUMA (H): Primary | ICD-10-CM

## 2023-11-15 DIAGNOSIS — I97.641 POSTOPERATIVE SEROMA INVOLVING CIRCULATORY SYSTEM AFTER CARDIAC BYPASS: ICD-10-CM

## 2023-11-15 DIAGNOSIS — Z98.890 S/P MITRAL VALVE REPAIR: ICD-10-CM

## 2023-11-15 DIAGNOSIS — Z98.890 S/P MITRAL VALVE REPAIR: Primary | ICD-10-CM

## 2023-11-15 PROCEDURE — 99024 POSTOP FOLLOW-UP VISIT: CPT | Performed by: PHYSICIAN ASSISTANT

## 2023-11-15 PROCEDURE — 76882 US LMTD JT/FCL EVL NVASC XTR: CPT | Mod: RT

## 2023-11-15 NOTE — PATIENT INSTRUCTIONS
Aspirin for 3 months  Grazyna Gill 1/16/24/23 at 1:15 pm in [place]-talk about stopping metoprolol  Bathing-12/7/23  Driving now as long as no narcotic pain medications for 6 hrs  Can take Aleve but only as needed-not every day as can be hard on the kidneys. Full glass of water.

## 2023-11-15 NOTE — PROGRESS NOTES
CARDIOTHORACIC SURGERY FOLLOW-UP VISIT     Margareth Green   1955   [id number]      Reason for visit: Post-Op right minithoracotomy, mitral valve repair with [place] neochords to the prolapsing P2 segment, posteromedial commissuroplasty, P1/P2 cleft closure, implantation of a 28 mm Candelario Physio 2 annuloplasty ring, right common femoral arterial and venous cannulation for cardiopulmonary bypass  with Dr. Sabas Meredith on 10/26/2023.    HPI:   Margareth Green is a 68 year old year old female seen in clinic for follow-up appointment after surgery. Patient has past medical history of asthma, TAMMY, and progressive symptomatic severe mitral regurgitation who was recently referred for mitral valve repair. Underwent elective mini-mitral valve repair as above on 10/26 with Dr. Meredith. Postop course was relatively unremarkable. Patient was discharged to home 10/30/2023.    Patient reports incisions are well healing, no erythema tenderness, or drainage. Her groin incision is noticeable elevated most likely consistent with a seroma. She reports some pain with walking due to this. US was ordered today and vascular will be contacted if needed.   Denies fevers or chills. No chest pain, SOB, LE edema, orthopnea, or PND. No palpitations, dizziness, or lightheadedness.       PAST MEDICAL HISTORY:  Past Medical History:   Diagnosis Date    Abnormal Papanicolaou smear of vagina and vaginal HPV 12/2002    ASCUS (HPV neg). 3/03 WNL but no transitional zome - repeat 3months    Allergic rhinitis, cause unspecified     Arthritis     Backache, unspecified     Carpal tunnel syndrome     s/p repair    Cervicalgia     Chronic kidney disease, stage 3 (H) 07/14/2021    Depressive disorder     Depressive disorder, not elsewhere classified     doing well on Wellbutrin    Derangement of TMJ (temporomandibular joint) 03/2010    internal derangement    Dermatophytosis of nail     Diaphragmatic hernia without mention of obstruction or  gangrene 09/2007    small hiatal hernia    Diffuse cystic mastopathy 2000    FCBD    Esophageal reflux 04/2005    Hammertoe 10/2009    see podiatry consult    Headache(784.0)     Muscle contrature headaches    Intramural leiomyoma of uterus 07/2007    on US    Irregular menstrual cycle     better with BCP -also PMS is better with BCP    IRRITABLE COLON 02/18/2008    Malaise and fatigue     Menopause     lmp 2/2008    Mild persistent asthma     Mitral valve disease     Mole (skin) 2003    9 x 6 mm rt ant chest    Motion sickness     Neuroma 11/2008    lt foot    Osteopenia 12/2008    Other malaise and fatigue 04/2005    stress    Personal history of tobacco use, presenting hazards to health     Quit in 1998    Solitary cyst of breast 12/2005    lt 6 oclock    Stenosis of lumbosacral spine     l4-5 and l5-s1    Symptomatic menopausal or female climacteric states 12/2002    perimenopausal. LMP 2/2008    Tachycardia, paroxysmal (H) 11/24/2020       PAST SURGICAL HISTORY:  Past Surgical History:   Procedure Laterality Date    BIOPSY  4/10/2015    BL DUPLEX LO EXTREM ART UNILAT/LTD  4/3008    lower extr arterial doppler -no stenosis    C DEXA,BONE DENSITY,APPENDICULR SKELTN  12/2008    osteopenia    COLONOSCOPY  10/22/2007    bx benign    COLONOSCOPY N/A 4/10/2015    Procedure: COMBINED COLONOSCOPY, SINGLE OR MULTIPLE BIOPSY/POLYPECTOMY BY BIOPSY;  Surgeon: Cl Wagner MD;  Location: [facility]    CV CORONARY ANGIOGRAM N/A 8/17/2023    Procedure: Coronary Angiogram;  Surgeon: Liza Ford MD;  Location:  [facility] CATH LAB    ESOPHAGOSCOPY, GASTROSCOPY, DUODENOSCOPY (EGD), COMBINED N/A 1/31/2018    Procedure: COMBINED ESOPHAGOSCOPY, GASTROSCOPY, DUODENOSCOPY (EGD);  COMBINED ESOPHAGOSCOPY, GASTROSCOPY, DUODENOSCOPY (EGD);  Surgeon: Tirso Duran MD;  Location: [facility]    ESOPHAGOSCOPY, GASTROSCOPY, DUODENOSCOPY (EGD), COMBINED N/A 2/1/2023    Procedure: ESOPHAGOGASTRODUODENOSCOPY (EGD);  Surgeon: Ru  Lui BROWN MD;  Location: [facility]    EYE SURGERY      HC PART REMV BONE METATARSAL HEAD,EA  01/11/10    Right foot plantar plate tear. Also correct hammertoe, 2nd digit & varicose vein ligation, heel.    HC REVISE MEDIAN N/CARPAL TUNNEL SURG      Left in 1997, right in 1999    INJ, ANES/STER, FACET LUMB/SAC  2010    Left L5 nerve root injection    INJECT EPIDURAL CERVICAL Right 9/29/2022    Procedure: Cervical 7-Thoracic 1 Interlaminar epidural steroid injection using fluoroscopic guidance with contrast dye, Right;  Surgeon: Tyler Bay MD;  Location:  OR    INJECT EPIDURAL TRANSFORAMINAL  10/09/2013    [facility]    INJECT JOINT SACROILIAC  3/19/14,4/21/14    [facility]    REPAIR VALVE MITRAL MINIMALLY INVASIVE N/A 10/26/2023    Procedure: MINIMALLY INVASIVE MITRAL VALVE REPAIR USING CHORD-X PRE-MEASURED LOOP SUTURE SIZE:12MM, MITRAL ANNULOPLASTY RING ERICK-COTTO PHYSIO Il SIZE: 28MM, AND ON CARDIOPULMONARY PUMP OXYGENATOR (INTRAOPERATIVE TRANSESOPHAGEAL ECHOCARDIOGRAM BY THE CARDIOLOGIST  );  Surgeon: Sabas Meredith MD;  Location:  OR    [facility] NONSPECIFIC PROCEDURE  2009    Metatarsal phalangeal joint injection    [facility] COLONOSCOPY THRU STOMA, DIAGNOSTIC  7/2002    bx. benign - flex sig in 5 yrs  or colonoscopy in 10 yrs    [facility] ECHO HEART XTHORACIC, STRESS/REST  2/2005    WNL    [facility] NCS MOTOR W/O F-WAVE, EACH NERVE  7/2008    CTS       CURRENT MEDICATIONS:   Current Outpatient Medications   Medication    acetaminophen (TYLENOL) 500 MG tablet    albuterol (PROAIR HFA/PROVENTIL HFA/VENTOLIN HFA) 108 (90 Base) MCG/ACT inhaler    ALLEGRA 180 MG OR TABS    amitriptyline (ELAVIL) 10 MG tablet    aspirin (ASA) 81 MG chewable tablet    buPROPion (WELLBUTRIN XL) 150 MG 24 hr tablet    CALCIUM /VITAMIN D TABS   OR    famotidine (PEPCID) 20 MG tablet    gabapentin (NEURONTIN) 400 MG capsule    magnesium aspartate (MAGINEX) 615 MG EC tablet    meclizine (ANTIVERT) 25 MG  tablet    Melatonin 10 MG TABS tablet    methocarbamol (ROBAXIN) 500 MG tablet    metoprolol succinate ER (TOPROL XL) 25 MG 24 hr tablet    mometasone-formoterol (DULERA) 100-5 MCG/ACT oral inhaler    Multiple Vitamins-Minerals (PRESERVISION AREDS) TABS    multivitamin w/minerals (THERA-VIT-M) tablet    omeprazole (PRILOSEC) 20 MG DR capsule    polyethylene glycol (MIRALAX) 17 GM/Dose powder    senna-docusate (SENOKOT-S/PERICOLACE) 8.6-50 MG tablet    Simethicone (GAS-X PO)    triamcinolone (NASACORT) 55 MCG/ACT nasal inhaler    vitamin C (ASCORBIC ACID) 250 MG tablet     No current facility-administered medications for this visit.       ALLERGIES:      Allergies   Allergen Reactions    Penicillins Itching     Vaginal itching - Yeast infection afterward         ROS:  Review of symptoms otherwise negative unless commented about in HPI.     LABS:  Last Basic Metabolic Panel:  Lab Results   Component Value Date     10/30/2023     03/16/2020      Lab Results   Component Value Date    POTASSIUM 4.2 10/30/2023    POTASSIUM 4.6 10/26/2023    POTASSIUM 3.9 12/06/2021    POTASSIUM 3.6 03/16/2020     Lab Results   Component Value Date    CHLORIDE 104 10/30/2023    CHLORIDE 105 12/06/2021    CHLORIDE 107 03/16/2020     Lab Results   Component Value Date    TOLU 9.2 10/30/2023    TOLU 8.5 03/16/2020     Lab Results   Component Value Date    CO2 29 10/30/2023    CO2 26 12/06/2021    CO2 28 03/16/2020     Lab Results   Component Value Date    BUN 14.1 10/30/2023    BUN 13 12/06/2021    BUN 17 03/16/2020     Lab Results   Component Value Date    CR 0.86 10/30/2023    CR 0.91 03/16/2020     Lab Results   Component Value Date     10/30/2023     10/30/2023     12/06/2021    GLC 92 03/16/2020       Last CBC:   Lab Results   Component Value Date    WBC 7.0 10/30/2023    WBC 10.8 03/16/2020     Lab Results   Component Value Date    RBC 3.51 10/30/2023    RBC 4.26 03/16/2020     Lab Results   Component  "Value Date    HGB 10.9 10/30/2023    HGB 13.2 03/16/2020     Lab Results   Component Value Date    HCT 33.5 10/30/2023    HCT 40.4 03/16/2020     No components found for: \"MCT\"  Lab Results   Component Value Date    MCV 95 10/30/2023    MCV 95 03/16/2020     Lab Results   Component Value Date    MCH 31.1 10/30/2023    MCH 31.0 03/16/2020     Lab Results   Component Value Date    MCHC 32.5 10/30/2023    MCHC 32.7 03/16/2020     Lab Results   Component Value Date    RDW 12.9 10/30/2023    RDW 13.1 03/16/2020     Lab Results   Component Value Date     10/30/2023     03/16/2020       INR:  Lab Results   Component Value Date    INR 1.27 10/26/2023    INR 1.37 10/26/2023    INR 1.07 08/17/2023    INR 1.00 01/30/2012         IMAGING: None    PHYSICAL EXAM:   [facility] 02/26/2008 (Approximate)       General: alert and oriented x 3, pleasant, no acute distress, normal mood and affect  Neuro: no focal deficits   CV: S1 S2, no murmurs, rubs or gallops, regular rate and rhythm, no peripheral edema  Pulm: bilateral breath sounds, clear to auscultation, easy work of breathing  Incision: incisions clean dry and intact without erythema, drainage, swelling of R groin incision-US ordered to further eval.        ASSESSMENT/PLAN:  Margareth Green is a 68 year old year old female status post minimally invasive mitral valve repair who returns to clinic for postop visit.     Surgically doing well overall.  Incisions are healing well with no signs of infection. Increasing activity and strength overall.   Hemodynamics are stable. No medication changes were needed today.  Follow up with cardiology as scheduled.  Follow up with PCP as scheduled.  Continue Outpatient Cardiac Rehab until completed.   No further activity restrictions from surgical perspective  Ok to drive as long as no longer taking narcotic pain medication or muscle relaxer.   US groin to r/o seroma. Will refer to vascular surgery if needed.     Trudy Atkinson, " TOI  Cardiothoracic Surgery  Pager [phone]      CC  Caitlin Beasley

## 2023-11-16 ENCOUNTER — TELEPHONE (OUTPATIENT)
Dept: CARDIOLOGY | Facility: CLINIC | Age: 68
End: 2023-11-16
Payer: COMMERCIAL

## 2023-11-16 ENCOUNTER — TELEPHONE (OUTPATIENT)
Dept: OTHER | Facility: CLINIC | Age: 68
End: 2023-11-16
Payer: COMMERCIAL

## 2023-11-16 NOTE — TELEPHONE ENCOUNTER
Pt needs to call the central scheduling for Vascular surgery at [phone]. Pt notified and given number. Margo Mckeon RN Cardiology November 16, 2023, 10:44 AM

## 2023-11-16 NOTE — TELEPHONE ENCOUNTER
Spoke to Margareth trying to set her up for new pt with one of our surgeons- she lives in [place] and states that she will call there to see what she can do re: scheduling because she lives to far to travel to [place]-

## 2023-11-16 NOTE — TELEPHONE ENCOUNTER
[facility] Call Center    Phone Message    May a detailed message be left on voicemail: yes     Reason for Call: Other: PT needs to see a CVT for Seroma due to trauma.  She requested for Judy and Dr. Mcmillan stated she has no availability for appts, but Dr. Mcmillan is not listed as a CVT.   is aware [place] does not have CVT appts, but pt was told that she could see someone there.  Did inform pt of the 3 locations our CVT MD's see pt.  Please call pt back and schedule in [place] if there are CVT's that go there or advise where she can see a CVT.  Thank you    Action Taken: Message routed to:  [facility] cardio    Travel Screening: Not Applicable    Thank you!  [facility]

## 2023-11-16 NOTE — TELEPHONE ENCOUNTER
Future Appointments   Date Time Provider Department Center   11/28/2023 11:00 AM Marylin Sims MD [facility]

## 2023-11-16 NOTE — TELEPHONE ENCOUNTER
Left voicemail with instructions for patient to call back to schedule their appointment(s)    November 16, 2023 , 9:07 AM

## 2023-11-16 NOTE — TELEPHONE ENCOUNTER
Pt referred to [facility] by Trudy Atkinson PA-C for seroma s/p MVR.    Pt needs to be scheduled for NEW VASCULAR PATIENT consult with Vascular Surgery.  Will route to scheduling to coordinate an appointment within 1-2 weeks.    Appt note: Ref by Trudy Atkinson PA-C for seroma s/p MVR on 10/26/23; US in Epic noted 7.4 x 7.0 x 4.7 cm seroma.    WENDY RobbN, RN, -[facility] [place]

## 2023-11-20 ENCOUNTER — TELEPHONE (OUTPATIENT)
Dept: CARDIOLOGY | Facility: CLINIC | Age: 68
End: 2023-11-20
Payer: COMMERCIAL

## 2023-11-28 ENCOUNTER — OFFICE VISIT (OUTPATIENT)
Dept: OTHER | Facility: CLINIC | Age: 68
End: 2023-11-28
Attending: SURGERY
Payer: COMMERCIAL

## 2023-11-28 VITALS
WEIGHT: 131.8 LBS | DIASTOLIC BLOOD PRESSURE: 76 MMHG | BODY MASS INDEX: 24.25 KG/M2 | HEIGHT: 62 IN | SYSTOLIC BLOOD PRESSURE: 148 MMHG | HEART RATE: 76 BPM

## 2023-11-28 DIAGNOSIS — T79.2XXA SEROMA DUE TO TRAUMA (H): ICD-10-CM

## 2023-11-28 PROCEDURE — 99202 OFFICE O/P NEW SF 15 MIN: CPT | Performed by: SURGERY

## 2023-11-28 PROCEDURE — G0463 HOSPITAL OUTPT CLINIC VISIT: HCPCS | Performed by: SURGERY

## 2023-11-28 NOTE — PROGRESS NOTES
"[facility]        Patient is here for a consult.    Pt is currently taking Aspirin.    BP (!) 148/76 (BP Location: Right arm, Patient Position: Chair, Cuff Size: Adult Regular)   Pulse 76   Ht 5' 2\" (1.575 m)   Wt 131 lb 12.8 oz (59.8 kg)   LMP 02/26/2008 (Approximate)   BMI 24.11 kg/m      The provider has been notified that the patient has no concerns.     Questions patient would like addressed today are: N/A.    Refills are needed: N/A    Has homecare services and agency name:  Rebecca Hines MA            "

## 2023-11-28 NOTE — PROGRESS NOTES
Vascular Surgery Clinic     Margareth Green MRN# [id number]   YOB: 1955 Age: 68 year old        Reason for Clinic Visit: Seroma               History of Present Illness:   Margareth Green is a 68 year old female who presents for evaluation of a seroma.     She was admitted 10/26/23 - 10/30/23 to undergo mitral valve repair via thoracotomy, with right common femoral arterial + venous cannulation for cardiopulmonary bypass done at the time of surgery. In the course of her post-operative follow-up, she was found to have swelling of the right inguinal incision and imaging demonstrated seroma formation.     Ms. Green relates that she noticed the swelling almost immediately post-operatively in her right groin. This persisted and seemed to worsen over the post-op interval while she was home. She has some irritation or discomfort from the swelling, and has radiation of discomfort into the right thigh, but does not have inhibition to her walking. She has not had overt pain. She has not had fevers or chills. No spontaneous discharge from the area. She was told to put on compression stockings and has been using these; she feels as though her right ankle is swollen without the stockings, and when she does wear them, the swelling is focal around her thigh, above the stocking.               Past Medical History:   I have personally reviewed the following:   Past Medical History:   Diagnosis Date    Abnormal Papanicolaou smear of vagina and vaginal HPV 12/2002    ASCUS (HPV neg). 3/03 WNL but no transitional zome - repeat 3months    Allergic rhinitis, cause unspecified     Arthritis     Backache, unspecified     Carpal tunnel syndrome     s/p repair    Cervicalgia     Chronic kidney disease, stage 3 (H) 07/14/2021    Depressive disorder     Depressive disorder, not elsewhere classified     doing well on Wellbutrin    Derangement of TMJ (temporomandibular joint) 03/2010    internal derangement    Dermatophytosis  of nail     Diaphragmatic hernia without mention of obstruction or gangrene 09/2007    small hiatal hernia    Diffuse cystic mastopathy 2000    FCBD    Esophageal reflux 04/2005    Hammertoe 10/2009    see podiatry consult    Headache(784.0)     Muscle contrature headaches    Intramural leiomyoma of uterus 07/2007    on US    Irregular menstrual cycle     better with BCP -also PMS is better with BCP    IRRITABLE COLON 02/18/2008    Malaise and fatigue     Menopause     lmp 2/2008    Mild persistent asthma     Mitral valve disease     Mole (skin) 2003    9 x 6 mm rt ant chest    Motion sickness     Neuroma 11/2008    lt foot    Osteopenia 12/2008    Other malaise and fatigue 04/2005    stress    Personal history of tobacco use, presenting hazards to health     Quit in 1998    Solitary cyst of breast 12/2005    lt 6 oclock    Stenosis of lumbosacral spine     l4-5 and l5-s1    Symptomatic menopausal or female climacteric states 12/2002    perimenopausal. LMP 2/2008    Tachycardia, paroxysmal (H) 11/24/2020             Past Surgical History:   I have personally reviewed the following:   Past Surgical History:   Procedure Laterality Date    BIOPSY  4/10/2015    BL DUPLEX LO EXTREM ART UNILAT/LTD  4/3008    lower extr arterial doppler -no stenosis    C DEXA,BONE DENSITY,APPENDICULR SKELTN  12/2008    osteopenia    COLONOSCOPY  10/22/2007    bx benign    COLONOSCOPY N/A 4/10/2015    Procedure: COMBINED COLONOSCOPY, SINGLE OR MULTIPLE BIOPSY/POLYPECTOMY BY BIOPSY;  Surgeon: Cl Wagner MD;  Location:  GI    CV CORONARY ANGIOGRAM N/A 8/17/2023    Procedure: Coronary Angiogram;  Surgeon: Liza Ford MD;  Location:  HEART CARDIAC CATH LAB    ESOPHAGOSCOPY, GASTROSCOPY, DUODENOSCOPY (EGD), COMBINED N/A 1/31/2018    Procedure: COMBINED ESOPHAGOSCOPY, GASTROSCOPY, DUODENOSCOPY (EGD);  COMBINED ESOPHAGOSCOPY, GASTROSCOPY, DUODENOSCOPY (EGD);  Surgeon: Tirso Duran MD;  Location:  GI    ESOPHAGOSCOPY,  GASTROSCOPY, DUODENOSCOPY (EGD), COMBINED N/A 2023    Procedure: ESOPHAGOGASTRODUODENOSCOPY (EGD);  Surgeon: Lui Vences MD;  Location: [facility]    EYE SURGERY      HC PART REMV BONE METATARSAL HEAD,EA  01/11/10    Right foot plantar plate tear. Also correct hammertoe, 2nd digit & varicose vein ligation, heel.    HC REVISE MEDIAN N/CARPAL TUNNEL SURG      Left in , right in     INJ, ANES/STER, FACET LUMB/SAC      Left L5 nerve root injection    INJECT EPIDURAL CERVICAL Right 2022    Procedure: Cervical 7-Thoracic 1 Interlaminar epidural steroid injection using fluoroscopic guidance with contrast dye, Right;  Surgeon: Tyler Bay MD;  Location:  OR    INJECT EPIDURAL TRANSFORAMINAL  10/09/2013    [facility]    INJECT JOINT SACROILIAC  3/19/14,14    [facility]    REPAIR VALVE MITRAL MINIMALLY INVASIVE N/A 10/26/2023    Procedure: MINIMALLY INVASIVE MITRAL VALVE REPAIR USING CHORD-X PRE-MEASURED LOOP SUTURE SIZE:12MM, MITRAL ANNULOPLASTY RING ERICK-COTTO PHYSIO Il SIZE: 28MM, AND ON CARDIOPULMONARY PUMP OXYGENATOR (INTRAOPERATIVE TRANSESOPHAGEAL ECHOCARDIOGRAM BY THE CARDIOLOGIST  );  Surgeon: Sabas Meredith MD;  Location:  OR    [facility] NONSPECIFIC PROCEDURE      Metatarsal phalangeal joint injection    [facility] COLONOSCOPY THRU STOMA, DIAGNOSTIC  2002    bx. benign - flex sig in 5 yrs  or colonoscopy in 10 yrs    [facility] ECHO HEART XTHORACIC, STRESS/REST  2005    WNL    [facility] NCS MOTOR W/O F-WAVE, EACH NERVE  2008    CTS            Social History:   I have personally reviewed the following:   Social History     Tobacco Use    Smoking status: Former     Packs/day: 0.50     Years: 30.00     Additional pack years: 0.00     Total pack years: 15.00     Types: Cigarettes     Quit date: 3/20/1998     Years since quittin.7    Smokeless tobacco: Never    Tobacco comments:     No smokers in home   Substance Use Topics    Alcohol use: Yes      Alcohol/week: 6.7 standard drinks of alcohol     Comment: 2x/week             Family History:     Family History   Problem Relation Age of Onset    Diabetes Father     Depression Father     Cerebrovascular Disease Mother         age 80    Arthritis Mother     Depression Mother         On meds    Eye Disorder Mother         Glaucoma    Osteoporosis Mother     Respiratory Mother          86 YO COPD    Chronic Obstructive Pulmonary Disease Mother     C.A.D. Brother         67 YO MI -    Chronic Obstructive Pulmonary Disease Brother     Myocardial Infarction Brother     Other Cancer Brother     Cancer Brother              Allergies:     Allergies   Allergen Reactions    Penicillins Itching     Vaginal itching - Yeast infection afterward             Medications:     Current Outpatient Medications Ordered in Epic   Medication    acetaminophen (TYLENOL) 500 MG tablet    albuterol (PROAIR HFA/PROVENTIL HFA/VENTOLIN HFA) 108 (90 Base) MCG/ACT inhaler    ALLEGRA 180 MG OR TABS    amitriptyline (ELAVIL) 10 MG tablet    aspirin (ASA) 81 MG chewable tablet    buPROPion (WELLBUTRIN XL) 150 MG 24 hr tablet    CALCIUM /VITAMIN D TABS   OR    famotidine (PEPCID) 20 MG tablet    gabapentin (NEURONTIN) 400 MG capsule    magnesium aspartate (MAGINEX) 615 MG EC tablet    meclizine (ANTIVERT) 25 MG tablet    Melatonin 10 MG TABS tablet    methocarbamol (ROBAXIN) 500 MG tablet    metoprolol succinate ER (TOPROL XL) 25 MG 24 hr tablet    mometasone-formoterol (DULERA) 100-5 MCG/ACT oral inhaler    Multiple Vitamins-Minerals (PRESERVISION AREDS) TABS    multivitamin w/minerals (THERA-VIT-M) tablet    omeprazole (PRILOSEC) 20 MG DR capsule    polyethylene glycol (MIRALAX) 17 GM/Dose powder    senna-docusate (SENOKOT-S/PERICOLACE) 8.6-50 MG tablet    Simethicone (GAS-X PO)    triamcinolone (NASACORT) 55 MCG/ACT nasal inhaler    vitamin C (ASCORBIC ACID) 250 MG tablet     No current Epic-ordered facility-administered medications on file.  "            Review of Systems:   The 10 point Review of Systems is negative other than noted in the HPI          Physical Exam:   Vitals were reviewed      BP: (!) 148/76 Pulse: 76              General: sitting comfortably on exam table, NAD.   Neuro/Psych: A&O x 4, pleasantly conversant   HENT: EOMI and conjugate. Moist mucous membranes.   Cardiac: Regular rate and rhythm, no m/g appreciated.   Pulm: Lungs CTAB, no w/r/r.  Extrem: Grossly normal and symmetric ROM in all four extremities. No pitting edema appreciated. Well-healed incision in right inguinal crease with soft ballotable swelling present, no overlying skin maceration, erythema, ecchymosis.   Skin: Clean, dry, no rashes or wounds. Well-healed scar on right anterior calf.  Vasc: Strong bilateral PT pulses.           Data:   Labs:       Lab Results   Component Value Date     10/30/2023     03/16/2020    Lab Results   Component Value Date    CHLORIDE 104 10/30/2023    CHLORIDE 105 12/06/2021    CHLORIDE 107 03/16/2020    Lab Results   Component Value Date    BUN 14.1 10/30/2023    BUN 13 12/06/2021    BUN 17 03/16/2020      Lab Results   Component Value Date    POTASSIUM 4.2 10/30/2023    POTASSIUM 4.6 10/26/2023    POTASSIUM 3.9 12/06/2021    POTASSIUM 3.6 03/16/2020    Lab Results   Component Value Date    CO2 29 10/30/2023    CO2 26 12/06/2021    CO2 28 03/16/2020    Lab Results   Component Value Date    CR 0.86 10/30/2023    CR 0.91 03/16/2020        Lab Results   Component Value Date    WBC 7.0 10/30/2023    HGB 10.9 (L) 10/30/2023    HCT 33.5 (L) 10/30/2023    MCV 95 10/30/2023     10/30/2023     Echocardiogram (10/29/23 - post op): \"Left ventricular systolic function is normal.The visual ejection fraction is 55-60%. The right ventricular systolic function is normal. The posterior mitral leaflet is thickened and fixed consistent with prior mitral repair surgery.There is trace mitral regurgitation. Mean gradients 3 mmHg. The inferior " "vena cava was normal in size with preserved respiratory variability. Compared to echo dated 07/07/2023 there is interval repair of mitral valve.\"    I have reviewed the following images:  Duplex Right Femoral Vessels (11/15/23): \"There is a septated fluid collection in the right groin that measures 7.4 x 7.0 x 4.7 cm in size consistent with a seroma. No communication with the adjacent femoral vasculature.\"           Assessment and Plan:   Ms. Green is a 68 year old female with history of severe mitral prolapse, now s/p mitral valve repair; asthma; depression; GERD; CKD stage 3; who presents with seroma formation over her right common femoral vessels after cannulation for cardiopulmonary bypass in the course of her MVR.     Reviewed the images and explained that there is no vascular involvement and this appears to be a benign post-operative seroma  Explained that these do often resolve on their own, but may take a long period of time  If she continues to notice unilateral right leg swelling after the seroma has resorbed completely, she may benefit from future venous competency evaluation (as she says she has varicose veins as well)  Will defer to her surgical team for longitudinal management of the seroma and ongoing post-operative care    Marylin Sims MD    Total time spent on the date of this encounter doing: chart review, review of test results, patient visit, physical exam, education, counseling, developing plan of care, and documenting = 20 minutes    "

## 2023-12-01 ENCOUNTER — HOSPITAL ENCOUNTER (OUTPATIENT)
Dept: CARDIAC REHAB | Facility: CLINIC | Age: 68
Discharge: HOME | End: 2023-12-01
Attending: SURGERY
Payer: COMMERCIAL

## 2023-12-04 ENCOUNTER — HOSPITAL ENCOUNTER (OUTPATIENT)
Dept: CARDIAC REHAB | Facility: CLINIC | Age: 68
Discharge: HOME | End: 2023-12-04
Attending: SURGERY
Payer: COMMERCIAL

## 2023-12-04 PROCEDURE — 93798 PHYS/QHP OP CAR RHAB W/ECG: CPT

## 2023-12-14 ENCOUNTER — MYC MEDICAL ADVICE (OUTPATIENT)
Dept: PULMONOLOGY | Facility: CLINIC | Age: 68
End: 2023-12-14
Payer: COMMERCIAL

## 2023-12-14 DIAGNOSIS — J45.30 MILD PERSISTENT ASTHMA, UNSPECIFIED WHETHER COMPLICATED: Primary | ICD-10-CM

## 2023-12-15 ENCOUNTER — HOSPITAL ENCOUNTER (OUTPATIENT)
Dept: CARDIAC REHAB | Facility: CLINIC | Age: 68
Discharge: HOME | End: 2023-12-15
Attending: SURGERY
Payer: COMMERCIAL

## 2023-12-15 DIAGNOSIS — R91.8 PULMONARY NODULES: Primary | ICD-10-CM

## 2023-12-15 PROCEDURE — 93798 PHYS/QHP OP CAR RHAB W/ECG: CPT

## 2023-12-18 ENCOUNTER — HOSPITAL ENCOUNTER (OUTPATIENT)
Dept: CARDIAC REHAB | Facility: CLINIC | Age: 68
Discharge: HOME | End: 2023-12-18
Attending: SURGERY
Payer: COMMERCIAL

## 2023-12-18 PROCEDURE — 93798 PHYS/QHP OP CAR RHAB W/ECG: CPT

## 2023-12-19 ENCOUNTER — OFFICE VISIT (OUTPATIENT)
Dept: FAMILY MEDICINE | Facility: CLINIC | Age: 68
End: 2023-12-19
Payer: COMMERCIAL

## 2023-12-19 ENCOUNTER — DOCUMENTATION ONLY (OUTPATIENT)
Dept: PULMONOLOGY | Facility: CLINIC | Age: 68
End: 2023-12-19

## 2023-12-19 VITALS
BODY MASS INDEX: 24.66 KG/M2 | DIASTOLIC BLOOD PRESSURE: 60 MMHG | WEIGHT: 134 LBS | TEMPERATURE: 98.8 F | SYSTOLIC BLOOD PRESSURE: 120 MMHG | RESPIRATION RATE: 20 BRPM | OXYGEN SATURATION: 99 % | HEIGHT: 62 IN | HEART RATE: 78 BPM

## 2023-12-19 DIAGNOSIS — J06.9 UPPER RESPIRATORY TRACT INFECTION, UNSPECIFIED TYPE: Primary | ICD-10-CM

## 2023-12-19 DIAGNOSIS — J45.30 MILD PERSISTENT ASTHMA, UNSPECIFIED WHETHER COMPLICATED: Primary | ICD-10-CM

## 2023-12-19 DIAGNOSIS — J98.01 ACUTE BRONCHOSPASM: ICD-10-CM

## 2023-12-19 LAB
FLUAV AG SPEC QL IA: NEGATIVE
FLUBV AG SPEC QL IA: NEGATIVE

## 2023-12-19 PROCEDURE — 87804 INFLUENZA ASSAY W/OPTIC: CPT | Performed by: PHYSICIAN ASSISTANT

## 2023-12-19 PROCEDURE — 99213 OFFICE O/P EST LOW 20 MIN: CPT | Performed by: PHYSICIAN ASSISTANT

## 2023-12-19 RX ORDER — PREDNISONE 20 MG/1
40 TABLET ORAL DAILY
Qty: 10 TABLET | Refills: 0 | Status: SHIPPED | OUTPATIENT
Start: 2023-12-19 | End: 2023-12-24

## 2023-12-19 RX ORDER — AZITHROMYCIN 250 MG/1
TABLET, FILM COATED ORAL
Qty: 6 TABLET | Refills: 0 | Status: SHIPPED | OUTPATIENT
Start: 2023-12-19 | End: 2023-12-24

## 2023-12-19 NOTE — PROGRESS NOTES
Assessment & Plan     Upper respiratory tract infection, unspecified type  Likely viral at this time, her COVID test was negative but we have added a flu swab today, today would be the last day she could be treated for influenza.  We discussed the signs and symptoms of a bacterial sinus infection and the fact that it is too early to treat at this time.  Due to the upcoming holiday season, I did give her prescription to use as needed.  - Influenza A & B Antigen - Clinic Collect  - azithromycin (ZITHROMAX) 250 MG tablet; Take 2 tablets (500 mg) by mouth daily for 1 day, THEN 1 tablet (250 mg) daily for 4 days.    Acute bronchospasm  No acute signs of bronchospasm at this time however she does have a history of asthma and would like to have prednisone on hand to take if her chest becomes tight and wheezy and is not well treated with the albuterol.  - predniSONE (DELTASONE) 20 MG tablet; Take 2 tablets (40 mg) by mouth daily for 5 days      Caitlin Beasley PA-C  [facility] JAMES Zuluaga is a 68 year old, presenting for the following health issues:  Sinus Problem and Surgical Followup      12/19/2023     9:44 AM   Additional Questions   Roomed by Irena HARDING   Accompanied by self         12/19/2023     9:44 AM   Patient Reported Additional Medications   Patient reports taking the following new medications n/a       Sinus Problem     History of Present Illness       Back Pain:  She presents for follow up of back pain. Patient's back pain is a recurring problem.  Location of back pain:  Right lower back, right side of neck and right shoulder  Description of back pain: dull ache  Back pain spreads: right shoulder, left shoulder, right side of neck and left side of neck    Since patient first noticed back pain, pain is: always present, but gets better and worse  Does back pain interfere with her job:  Yes       Headaches:   Since the patient's last clinic visit, headaches are: worsened  The  "patient is getting headaches:  A few times a week  She is able to do normal daily activities when she has a migraine.  The patient is taking the following rescue/relief medications:  Naproxyn (Aleve) and Tylenol   Patient states \"I get some relief\" from the rescue/relief medications.   The patient is taking the following medications to prevent migraines:  No medications to prevent migraines  In the past 4 weeks, the patient has gone to an Urgent Care or Emergency Room 0 times times due to headaches.    Reason for visit:  Sinus infection?  Symptom onset:  1-3 days ago  Symptoms include:  Stuffy nose sneezing temp headaches  Symptom intensity:  Moderate  Symptom progression:  Worsening  Had these symptoms before:  Yes  Has tried/received treatment for these symptoms:  Yes  Previous treatment was successful:  Yes  Prior treatment description:  Antibiotics prednisone  What makes it worse:  Trying to get rid it on my own  What makes it better:  Meds    She eats 0-1 servings of fruits and vegetables daily.She consumes 0 sweetened beverage(s) daily.She exercises with enough effort to increase her heart rate 20 to 29 minutes per day.  She exercises with enough effort to increase her heart rate 3 or less days per week.   She is taking medications regularly.           - lump in groin at surgical site, from October 26th, lump is not going away or getting better.  She did see a surgeon for it they told her it would go away, she thinks it is about the same perhaps slightly larger.  There is no redness or increased tenderness.    - Patient did check for covid at home yesterday and was negative.  She is concerned she may have a sinus infection 2 nights ago she started with a frontal headache and a low-grade temp of 99.9.  Her rhinorrhea has become more purulent now at a yellowish color and she is having some postnasal drip that is causing a sore throat.  She is coughing some but not significantly.  She has not used her albuterol " "for the slight shortness of breath she has had because it makes it difficult for her to sleep.  It is usually very helpful though when she must use it.  She is also wondering if she could have some prednisone on hand since the holidays are coming in the event she needs more control of her asthma due to this current illness.          Review of Systems   As documented above       Objective    /60   Pulse 78   Temp 98.8  F (37.1  C) (Temporal)   Resp 20   Ht 1.575 m (5' 2\")   Wt 60.8 kg (134 lb)   LMP 02/26/2008 (Approximate)   SpO2 99%   BMI 24.51 kg/m    Body mass index is 24.51 kg/m .  Physical Exam   GENERAL: healthy, alert and no distress  EYES: Eyes grossly normal to inspection, PERRL and conjunctivae and sclerae normal  HENT: ear canals and TM's normal, nose and mouth without ulcers or lesions  HENT: normal cephalic/atraumatic, ear canals and TM's normal, nose and mouth without ulcers or lesions, nasal mucosa edematous , oropharynx clear, oral mucous membranes moist, and sinuses: maxillary, frontal tenderness on bilaterally  NECK: no adenopathy, no asymmetry, masses, or scars and thyroid normal to palpation  RESP: lungs clear to auscultation - no rales, rhonchi or wheezes  CV: regular rate and rhythm, normal S1 S2, no S3 or S4, no murmur, click or rub, no peripheral edema and peripheral pulses strong  ABDOMEN: soft, nontender, no hepatosplenomegaly, no masses and bowel sounds normal  Right inguinal region she does have a collection of fluid around the incision site overlying skin is normal and is nontender  PSYCH: mentation appears normal, affect normal/bright    Results for orders placed or performed in visit on 12/19/23   Influenza A & B Antigen - Clinic Collect     Status: Normal    Specimen: Nose; Swab   Result Value Ref Range    Influenza A antigen Negative Negative    Influenza B antigen Negative Negative    Narrative    Test results must be correlated with clinical data. If necessary, " results should be confirmed by a molecular assay or viral culture.

## 2023-12-19 NOTE — PROGRESS NOTES
The total Ige blood test that was ordered is an invalid test code. Not sure if just an IGE will work. Please place new lab orders

## 2023-12-21 ENCOUNTER — NURSE TRIAGE (OUTPATIENT)
Dept: FAMILY MEDICINE | Facility: CLINIC | Age: 68
End: 2023-12-21

## 2023-12-21 ENCOUNTER — MYC MEDICAL ADVICE (OUTPATIENT)
Dept: FAMILY MEDICINE | Facility: CLINIC | Age: 68
End: 2023-12-21
Payer: COMMERCIAL

## 2023-12-21 ENCOUNTER — VIRTUAL VISIT (OUTPATIENT)
Dept: FAMILY MEDICINE | Facility: CLINIC | Age: 68
End: 2023-12-21
Payer: COMMERCIAL

## 2023-12-21 DIAGNOSIS — Z98.890 S/P MITRAL VALVE REPAIR: ICD-10-CM

## 2023-12-21 DIAGNOSIS — U07.1 INFECTION DUE TO 2019 NOVEL CORONAVIRUS: Primary | ICD-10-CM

## 2023-12-21 PROCEDURE — G2012 BRIEF CHECK IN BY MD/QHP: HCPCS | Mod: 24 | Performed by: NURSE PRACTITIONER

## 2023-12-21 NOTE — TELEPHONE ENCOUNTER
"Okay to send to RN for Paxlovid treatment? Recommend in person assessment for symptoms? If in person needed UC recommended or able to work in?    Nurse Triage SBAR    Is this a 2nd Level Triage? YES, LICENSED PRACTITIONER REVIEW IS REQUIRED    Situation: Patient seen Tuesday for Upper Respiratory tract infection. Tested Covid positive last night. Symptoms on set Monday. Requesting Paxlovid treatment.     Background:   Started Monday -congestion, sneezing, \"nasally\"  Tuesday seen in clinic- cough, congestion, sneezing  Sore throat due to post drip    Most symptoms have improved since Tuesday when patient was seen but patient reports a moderate headache today.     Oxygen level - 99%   Temp- 98.1  HR- 72    Cough improved- comes and goes  Patient noted wheezing this morning upon waking prior to Pro-Air use but no wheezing since  Patient reports her chest was sore yesterday- no soreness today- \"It was from the coughing, since that has gotten better I am not sore- I woke up today and it was better.\"    No SOB with rest  Mild SOB with activity   Some nasal drainage present but sore throat improved  Denies dizziness presently- dizziness comes and goes- \"Mild\" - \"none since early this morning.\"   Some muscle aches    History of Mitral valve repaired- October 2023   Asthma    Assessment:  Per protocol follow up with PCP (covering provider) and call back by nurse within an hour. RN advised patient follow up with provider needed due to symptoms of SOB with activity. RN advised if symptoms appropriate RN will route case to Paxlovid RN team. If assessment in person needed will work to help schedule patient or UC recommend if unable to work into clinic. COVID precautions reviewed, home care reviewed, when to seek higher level of care for new or worsening symptoms reviewed. Patient verbalized understanding and all questions answered.     Protocol Recommended Disposition:   Discuss With PCP And Callback By Nurse Within 1 " Hour    Recommendation:   Okay to send to RN for Paxlovid treatment? Recommend in person assessment for symptoms? If in person needed UC recommended or able to work in?    Routed to provider    Does the patient meet one of the following criteria for ADS visit consideration? 16+ years old, with an [facility] PCP     CHENTE Gill, RN  [facility] ~ Registered Nurse  Clinic Triage ~ [facility]  December 21, 2023    Reason for Disposition   MILD difficulty breathing (e.g., minimal/no SOB at rest, SOB with walking, pulse <100)    Additional Information   Negative: SEVERE difficulty breathing (e.g., struggling for each breath, speaks in single words)   Negative: Difficult to awaken or acting confused (e.g., disoriented, slurred speech)   Negative: Bluish (or gray) lips or face now   Negative: Shock suspected (e.g., cold/pale/clammy skin, too weak to stand, low BP, rapid pulse)   Negative: Sounds like a life-threatening emergency to the triager   Negative: Diagnosed or suspected COVID-19 and symptoms lasting 3 or more weeks   Negative: COVID-19 exposure and no symptoms   Negative: COVID-19 vaccine reaction suspected (e.g., fever, headache, muscle aches) occurring 1 to 3 days after getting vaccine   Negative: COVID-19 vaccine, questions about   Negative: Lives with someone known to have influenza (flu test positive) and flu-like symptoms (e.g., cough, runny nose, sore throat, SOB; with or without fever)   Negative: Possible COVID-19 symptoms and triager concerned about severity of symptoms or other causes   Negative: COVID-19 and breastfeeding, questions about   Negative: SEVERE or constant chest pain or pressure  (Exception: Mild central chest pain, present only when coughing.)   Negative: MODERATE difficulty breathing (e.g., speaks in phrases, SOB even at rest, pulse 100-120)   Negative: Headache and stiff neck (can't touch chin to chest)   Negative: Oxygen level (e.g., pulse oximetry) 90% or lower    "Negative: Chest pain or pressure  (Exception: MILD central chest pain, present only when coughing.)   Negative: Drinking very little and dehydration suspected (e.g., no urine > 12 hours, very dry mouth, very lightheaded)   Negative: Patient sounds very sick or weak to the triager    Answer Assessment - Initial Assessment Questions  1. COVID-19 DIAGNOSIS: \"How do you know that you have COVID?\" (e.g., positive lab test or self-test, diagnosed by doctor or NP/PA, symptoms after exposure).      At home test    2. COVID-19 EXPOSURE: \"Was there any known exposure to COVID before the symptoms began?\" [facility] Definition of close contact: within 6 feet (2 meters) for a total of 15 minutes or more over a 24-hour period.       No known exposures    3. ONSET: \"When did the COVID-19 symptoms start?\"       Monday 12/18/23    4. WORST SYMPTOM: \"What is your worst symptom?\" (e.g., cough, fever, shortness of breath, muscle aches)      Headache    5. COUGH: \"Do you have a cough?\" If Yes, ask: \"How bad is the cough?\"    \"Little cough\"       Cough improved from yesterday and Monday  Used pro-air this morning, used more yesterday and less today    6. FEVER: \"Do you have a fever?\" If Yes, ask: \"What is your temperature, how was it measured, and when did it start?\"      98.1    7. RESPIRATORY STATUS: \"Describe your breathing?\" (e.g., normal; shortness of breath, wheezing, unable to speak)       No SOB with rest  Mild SOB with activity   No wheezing presently- Used pro-air this morning no wheezing now, used Pro-Air more yesterday and less today    8. BETTER-SAME-WORSE: \"Are you getting better, staying the same or getting worse compared to yesterday?\"  If getting worse, ask, \"In what way?\"      About the same as yesterday  Headache worse than Tuesday other symptoms improved.     9. OTHER SYMPTOMS: \"Do you have any other symptoms?\"  (e.g., chills, fatigue, headache, loss of smell or taste, muscle pain, sore throat)      Headache  Muscle aches in " "back  Sore chest yesterday- thinks due to deep coughing- No chest pain today     10. HIGH RISK DISEASE: \"Do you have any chronic medical problems?\" (e.g., asthma, heart or lung disease, weak immune system, obesity, etc.)        Mitral valve repaired- October 2023   Asthma    11. VACCINE: \"Have you had the COVID-19 vaccine?\" If Yes, ask: \"Which one, how many shots, when did you get it?\"        Initial Covid vaccines- no recent boosters  April and May of 2022    12. PREGNANCY: \"Is there any chance you are pregnant?\" \"When was your last menstrual period?\"        No    13. O2 SATURATION MONITOR:  \"Do you use an oxygen saturation monitor (pulse oximeter) at home?\" If Yes, ask \"What is your reading (oxygen level) today?\" \"What is your usual oxygen saturation reading?\" (e.g., 95%)        99%    Protocols used: Coronavirus (COVID-19) Diagnosed or [id number]    "

## 2023-12-21 NOTE — TELEPHONE ENCOUNTER
Please call to triage, I can order a covid test for her to be completed however if she is much worse would need a face to face visit  let me know if ok to not be seen and I will order swab  Caitlin Beasley PA-C

## 2023-12-21 NOTE — TELEPHONE ENCOUNTER
Spoke with provider. Provider advised she could add patient on for a telephone visit now if patient is available.     Team to call and schedule patient if available.     Patient is open to visit now. Appointment scheduled.     CHENTE Gill, RN  [facility] ~ Registered Nurse  Clinic Triage ~ [facility]  December 21, 2023

## 2023-12-21 NOTE — PROGRESS NOTES
Margareth is a 68 year old who is being evaluated via a billable video visit.      How would you like to obtain your AVS? MyChart  Will anyone else be joining your video visit? No          Assessment & Plan     Infection due to 2019 novel coronavirus  Patient is stable for home plan.  Quarantine advised until 5 days through AND symptoms have improved. Then 5 days with strict masking in public with good handwashing.   Home cares discussed.   Warning signs of Covid reviewed- if any breathing difficulty to call RN nurseline.   Discussed Covid treatments, risks, benefits and appropriate medication interactions if necessary.  Return/call to clinic with any new or worsening symptoms, and as needed. - nirmatrelvir and ritonavir (PAXLOVID) 300 mg/100 mg therapy pack; Take 3 tablets by mouth 2 times daily for 5 days (Take 2 Nirmatrelvir tablets and 1 Ritonavir tablet twice daily for 5 days)    S/P mitral valve repair   Healing well- warning signs reviewed.                  [facility] JAMES Zuluaga is a 68 year old, presenting for the following health issues:  Suspected Covid        12/21/2023     5:48 PM   Additional Questions   Roomed by Rowena HADDAD   Accompanied by None         12/21/2023     5:48 PM   Patient Reported Additional Medications   Patient reports taking the following new medications NA       HPI       COVID-19 Symptom Review  How many days ago did these symptoms start? 3 days ago     Are any of the following symptoms significant for you?  New or worsening difficulty breathing? No  Worsening cough? No  Fever or chills? Yes, the highest temperature was 100.0  Headache: YES  Sore throat: YES  Chest pain: No  Diarrhea: No  Body aches? No    What treatments has patient tried? Acetaminophen and azithromycin    Does patient live in a nursing home, group home, or shelter? No  Does patient have a way to get food/medications during quarantined? Yes, I have a friend or  family member who can help me.        S/p MV repair 10/26- progressing well through healing.   + asthma  A bit difficult cardiac rehab 3 days ago.  Using inhaler- sparingly- does not like to take it.   Uses when needed, generally takes care of her cough/difficulty breathings.   No CP  No edema      No diarrhea.   Able to walk around her home without issue.           Review of Systems   Constitutional, HEENT, cardiovascular, pulmonary, gi and gu systems are negative, except as otherwise noted.      Objective           Vitals:  No vitals were obtained today due to virtual visit.    Physical Exam   healthy, alert, and no distress  PSYCH: Alert and oriented times 3; coherent speech, normal   rate and volume, able to articulate logical thoughts, able   to abstract reason, no tangential thoughts, no hallucinations   or delusions  Her affect is normal and pleasant  RESP: No cough, no audible wheezing, able to talk in full sentences  Remainder of exam unable to be completed due to telephone visits                 Video-Visit Details    Type of service:  telephone Visit     Telephone visit > 11 minutes.       Originating Location (pt. Location): Home    Distant Location (provider location):  On-site  Platform used for Video Visit: Telephone

## 2023-12-21 NOTE — TELEPHONE ENCOUNTER
See triage encounter 12/21/23.    WENDY GillN, RN  [facility] ~ Registered Nurse  Clinic Triage ~ [facility]  December 21, 2023

## 2023-12-22 NOTE — PATIENT INSTRUCTIONS
For informational purposes only. Not to replace the advice of your health care provider. Copyright   2022 [facility]. All rights reserved. Clinically reviewed by Myla Valencia, PharmD, BCACP. [employer] [id number] - REV 06/23.  COVID-19 Outpatient Treatments  Your care team can help you find the best treatments for COVID-19. Talk to a health care provider or refer to the FDA medicine fact sheets below.  Paxlovid (nirmatrelvir and ritonavir): https://www.paxlovid.[employer]SMaterials/resources  Molnupiravir (Lagevrio): https://www.fda.gov/media/842071/download  Important: We can only prescribe Paxlovid or Molnupiravir when it can be started within 5 days of first having symptoms.  Paxlovid (nimatrelvir and ritonavir)  How it works  Two medicines (nirmatrelvir and ritonavir) are taken together. They stop the virus from growing. Less amount of virus is easier for your body to fight.  Benefits  Lowers risk of a hospital stay or death from COVID-19.  How to take  Medicine comes in a daily container with both medicine tablets. Take by mouth twice daily (once in the morning, once at night) for 5 days.  The number of tablets to take varies by patient.  Don't chew or break capsules. Swallow whole.  When to take  Take it as soon as possible and within 5 days of your first symptoms.  Who can take it  Patients must be 12 years or older weigh at least 88 pounds. Paxlovid is the preferred treatment for pregnant patients.  Possible side effects  Can cause altered sense of taste, diarrhea (loose, watery stools), high blood pressure, muscle aches.  Medicine conflicts  Some medicines may conflict with Paxlovid and may cause serious side effects.  Tell your care team about all the medicines you take, including prescription and over-the-counter medicines, vitamins, and herbal supplements.  Your care team will review your medicines to make sure that you can safely take Paxlovid.  Cautions  Paxlovid is not advised for patients with severe  kidney or liver disease. If you have kidney or liver problems, the dose may need to be changed.  If you're pregnant or breastfeeding, talk to your care team about your options.  If you take hormonal birth control (such as the Pill), then you or your partner should also use a non-hormonal form of birth control (such as a condom). Keep doing this for 1 menstrual cycle after your last dose of Paxlovid.  Molnupiravir (lagevrio)  How it works  Stops the virus from growing. Less amount of virus is easier for your body to fight.  Benefits  Lowers risk of a hospital stay or death from COVID-19.  How to take  Take 4 capsules by mouth every 12 hours (4 in the morning and 4 at night) for 5 days. Don't chew or break capsules. Swallow whole.  When to take  Take as soon as possible and within 5 days of your first symptoms.  Who can take it  Patients must be 18 years or older.   Possible side effects  Diarrhea (loose, watery stools), nausea (feeling sick to your stomach), dizziness, headaches.  Medicine conflicts  Right now, there are no known conflicts with other drugs. But tell your care team about all medicines you take.  Cautions  This medicine is not advised for patients who are pregnant.  If you are someone who could become pregnant, use trusted birth control until 4 days after your last dose of molnupiravir.  If your partner could become pregnant, you should use trusted birth control until 3 months after your last dose of molnupiravir.

## 2023-12-24 ENCOUNTER — MYC MEDICAL ADVICE (OUTPATIENT)
Dept: FAMILY MEDICINE | Facility: CLINIC | Age: 68
End: 2023-12-24
Payer: COMMERCIAL

## 2024-01-05 ENCOUNTER — HOSPITAL ENCOUNTER (OUTPATIENT)
Dept: CARDIAC REHAB | Facility: CLINIC | Age: 69
Discharge: HOME | End: 2024-01-05
Attending: SURGERY
Payer: COMMERCIAL

## 2024-01-05 ENCOUNTER — HOSPITAL ENCOUNTER (OUTPATIENT)
Dept: CT IMAGING | Facility: CLINIC | Age: 69
Discharge: HOME | End: 2024-01-05
Attending: INTERNAL MEDICINE | Admitting: INTERNAL MEDICINE
Payer: COMMERCIAL

## 2024-01-05 DIAGNOSIS — R91.8 PULMONARY NODULES: ICD-10-CM

## 2024-01-05 PROCEDURE — 71250 CT THORAX DX C-: CPT

## 2024-01-05 PROCEDURE — 93798 PHYS/QHP OP CAR RHAB W/ECG: CPT

## 2024-01-08 ENCOUNTER — HOSPITAL ENCOUNTER (OUTPATIENT)
Dept: CARDIAC REHAB | Facility: CLINIC | Age: 69
Discharge: HOME | End: 2024-01-08
Attending: SURGERY
Payer: COMMERCIAL

## 2024-01-08 PROCEDURE — 93798 PHYS/QHP OP CAR RHAB W/ECG: CPT

## 2024-01-10 ENCOUNTER — OFFICE VISIT (OUTPATIENT)
Dept: PULMONOLOGY | Facility: CLINIC | Age: 69
End: 2024-01-10
Attending: INTERNAL MEDICINE
Payer: COMMERCIAL

## 2024-01-10 ENCOUNTER — LAB (OUTPATIENT)
Dept: LAB | Facility: CLINIC | Age: 69
End: 2024-01-10
Attending: INTERNAL MEDICINE
Payer: COMMERCIAL

## 2024-01-10 VITALS
OXYGEN SATURATION: 98 % | HEART RATE: 79 BPM | WEIGHT: 137 LBS | HEIGHT: 62 IN | BODY MASS INDEX: 25.21 KG/M2 | SYSTOLIC BLOOD PRESSURE: 137 MMHG | DIASTOLIC BLOOD PRESSURE: 61 MMHG

## 2024-01-10 DIAGNOSIS — J84.9 ILD (INTERSTITIAL LUNG DISEASE) (H): ICD-10-CM

## 2024-01-10 DIAGNOSIS — J45.30 MILD PERSISTENT ASTHMA, UNSPECIFIED WHETHER COMPLICATED: ICD-10-CM

## 2024-01-10 DIAGNOSIS — J45.30 MILD PERSISTENT ASTHMA, UNSPECIFIED WHETHER COMPLICATED: Primary | ICD-10-CM

## 2024-01-10 LAB
6 MIN WALK (FT): 1365 FT
6 MIN WALK (M): 416 M

## 2024-01-10 PROCEDURE — G0463 HOSPITAL OUTPT CLINIC VISIT: HCPCS | Performed by: INTERNAL MEDICINE

## 2024-01-10 PROCEDURE — 99214 OFFICE O/P EST MOD 30 MIN: CPT | Mod: 25 | Performed by: INTERNAL MEDICINE

## 2024-01-10 PROCEDURE — 82785 ASSAY OF IGE: CPT | Performed by: INTERNAL MEDICINE

## 2024-01-10 PROCEDURE — 36415 COLL VENOUS BLD VENIPUNCTURE: CPT | Performed by: PATHOLOGY

## 2024-01-10 PROCEDURE — 99000 SPECIMEN HANDLING OFFICE-LAB: CPT | Performed by: PATHOLOGY

## 2024-01-10 RX ORDER — PREDNISONE 20 MG/1
20 TABLET ORAL DAILY
Qty: 5 TABLET | Refills: 0 | Status: SHIPPED | OUTPATIENT
Start: 2024-01-10 | End: 2024-01-15

## 2024-01-10 RX ORDER — MONTELUKAST SODIUM 10 MG/1
10 TABLET ORAL AT BEDTIME
Qty: 30 TABLET | Refills: 4 | Status: SHIPPED | OUTPATIENT
Start: 2024-01-10 | End: 2024-02-13 | Stop reason: SINTOL

## 2024-01-10 ASSESSMENT — PAIN SCALES - GENERAL: PAINLEVEL: NO PAIN (0)

## 2024-01-10 NOTE — NURSING NOTE
Chief Complaint   Patient presents with    Interstitial Lung Disease (ILD)     4 month follow up      Vitals were taken and medications were reconciled.     Rosalba Davalos RMA  2:53 PM

## 2024-01-10 NOTE — LETTER
1/10/2024         RE: Margareth Green  [address] [facility] [address]        Dear Colleague,    Thank you for referring your patient, Margareth Green, to the [facility] LUNG SCIENCE AND [facility]. Please see a copy of my visit note below.    [facility]    Reason for Visit  Follow up visit post-COVID residual GGO and reticulation on CT scan.    HPI  68-year-old female patient with past medical history significant for COVID19 in December 2021, at that time she was evaluated in the emergency department after 10-day history of COVID symptoms, her chest imaging showed bilateral left more than right groundglass opacities, diffuse and patchy most consistent with COVID-19 pneumonia, she tested positive for COVID-19, she was requiring oxygen support, and she was discharged from the emergency department after 2 days on 1 L/min O2 support at rest and 3 L/min with exertion.  Patient was treated with remdesivir and steroids with dexamethasone.  Patient has been following with her primary care provider, she was complaining of dyspnea on exertion.  Repeat chest CT scan PE study was done in May 2023, this showed no PE but it showed residual groundglass with reticulation at the same distribution where she had her COVID opacities in December 2021.  Patient was referred to pulmonary ILD clinic.  On exam she was noted to have systolic murmur, echocardiogram showed normal EF at 65 to 70% but she was noted to have severe mitral valve prolapse with severe mitral regurgitation and elevated RVSP 40 to 45 mmHg.  Heart cath was done, this showed normal coronary arteries with no significant disease.    In the interim, the patient had a right mini thoracotomy with mitral valve repair.  The patient recovered well from the surgery, she has been working with rehab, and she has improved with the breathing.  Preoperatively she had chest CT scan which  showed new left upper lobe nodule 11 mm, on follow-up CT scan this has significantly improved.  The patient had COVID recently, and she did have significant upper respiratory symptoms, she does have significant postnasal drip and sinus congestion, she was treated with a short burst of steroids which improved her symptoms.  Since then she has been using her rescue albuterol inhaler once daily.  She continues to use Dulera.       Current Outpatient Medications   Medication    acetaminophen (TYLENOL) 500 MG tablet    albuterol (PROAIR HFA/PROVENTIL HFA/VENTOLIN HFA) 108 (90 Base) MCG/ACT inhaler    ALLEGRA 180 MG OR TABS    amitriptyline (ELAVIL) 10 MG tablet    aspirin (ASA) 81 MG chewable tablet    buPROPion (WELLBUTRIN XL) 150 MG 24 hr tablet    CALCIUM /VITAMIN D TABS   OR    famotidine (PEPCID) 20 MG tablet    magnesium aspartate (MAGINEX) 615 MG EC tablet    meclizine (ANTIVERT) 25 MG tablet    Melatonin 10 MG TABS tablet    metoprolol succinate ER (TOPROL XL) 25 MG 24 hr tablet    mometasone-formoterol (DULERA) 100-5 MCG/ACT inhaler    Multiple Vitamins-Minerals (PRESERVISION AREDS) TABS    multivitamin w/minerals (THERA-VIT-M) tablet    omeprazole (PRILOSEC) 20 MG DR capsule    Simethicone (GAS-X PO)    triamcinolone (NASACORT) 55 MCG/ACT nasal inhaler    vitamin C (ASCORBIC ACID) 250 MG tablet    gabapentin (NEURONTIN) 400 MG capsule    methocarbamol (ROBAXIN) 500 MG tablet    polyethylene glycol (MIRALAX) 17 GM/Dose powder    senna-docusate (SENOKOT-S/PERICOLACE) 8.6-50 MG tablet     No current facility-administered medications for this visit.     Allergies   Allergen Reactions    Penicillins Itching     Vaginal itching - Yeast infection afterward     Past Medical History:   Diagnosis Date    Abnormal Papanicolaou smear of vagina and vaginal HPV 12/2002    ASCUS (HPV neg). 3/03 WNL but no transitional zome - repeat 3months    Allergic rhinitis, cause unspecified     Arthritis     Backache, unspecified      Carpal tunnel syndrome     s/p repair    Cervicalgia     Chronic kidney disease, stage 3 (H) 07/14/2021    Depressive disorder     Depressive disorder, not elsewhere classified     doing well on Wellbutrin    Derangement of TMJ (temporomandibular joint) 03/2010    internal derangement    Dermatophytosis of nail     Diaphragmatic hernia without mention of obstruction or gangrene 09/2007    small hiatal hernia    Diffuse cystic mastopathy 2000    FCBD    Esophageal reflux 04/2005    Hammertoe 10/2009    see podiatry consult    Headache(784.0)     Muscle contrature headaches    Intramural leiomyoma of uterus 07/2007    on US    Irregular menstrual cycle     better with BCP -also PMS is better with BCP    IRRITABLE COLON 02/18/2008    Malaise and fatigue     Menopause     lmp 2/2008    Mild persistent asthma     Mitral valve disease     Mole (skin) 2003    9 x 6 mm rt ant chest    Motion sickness     Neuroma 11/2008    lt foot    Osteopenia 12/2008    Other malaise and fatigue 04/2005    stress    Personal history of tobacco use, presenting hazards to health     Quit in 1998    Solitary cyst of breast 12/2005    lt 6 oclock    Stenosis of lumbosacral spine     l4-5 and l5-s1    Symptomatic menopausal or female climacteric states 12/2002    perimenopausal. LMP 2/2008    Tachycardia, paroxysmal (H) 11/24/2020    Thyroid fullness 08/07/2013       Past Surgical History:   Procedure Laterality Date    BIOPSY  4/10/2015    BL DUPLEX LO EXTREM ART UNILAT/LTD  4/3008    lower extr arterial doppler -no stenosis    C DEXA,BONE DENSITY,APPENDICULR SKELTN  12/2008    osteopenia    COLONOSCOPY  10/22/2007    bx benign    COLONOSCOPY N/A 4/10/2015    Procedure: COMBINED COLONOSCOPY, SINGLE OR MULTIPLE BIOPSY/POLYPECTOMY BY BIOPSY;  Surgeon: Cl Wagner MD;  Location:  GI    CV CORONARY ANGIOGRAM N/A 8/17/2023    Procedure: Coronary Angiogram;  Surgeon: Liza Ford MD;  Location:  HEART CARDIAC CATH LAB     ESOPHAGOSCOPY, GASTROSCOPY, DUODENOSCOPY (EGD), COMBINED N/A 1/31/2018    Procedure: COMBINED ESOPHAGOSCOPY, GASTROSCOPY, DUODENOSCOPY (EGD);  COMBINED ESOPHAGOSCOPY, GASTROSCOPY, DUODENOSCOPY (EGD);  Surgeon: Tirso Duran MD;  Location:  GI    ESOPHAGOSCOPY, GASTROSCOPY, DUODENOSCOPY (EGD), COMBINED N/A 2/1/2023    Procedure: ESOPHAGOGASTRODUODENOSCOPY (EGD);  Surgeon: Lui Vences MD;  Location:  GI    EYE SURGERY      HC PART REMV BONE METATARSAL HEAD,EA  01/11/10    Right foot plantar plate tear. Also correct hammertoe, 2nd digit & varicose vein ligation, heel.    HC REVISE MEDIAN N/CARPAL TUNNEL SURG      Left in 1997, right in 1999    INJ, ANES/STER, FACET LUMB/SAC  2010    Left L5 nerve root injection    INJECT EPIDURAL CERVICAL Right 9/29/2022    Procedure: Cervical 7-Thoracic 1 Interlaminar epidural steroid injection using fluoroscopic guidance with contrast dye, Right;  Surgeon: Tyler Bay MD;  Location: [facility] OR    INJECT EPIDURAL TRANSFORAMINAL  10/09/2013    [facility]    INJECT JOINT SACROILIAC  3/19/14,4/21/14    [facility]    REPAIR VALVE MITRAL MINIMALLY INVASIVE N/A 10/26/2023    Procedure: MINIMALLY INVASIVE MITRAL VALVE REPAIR USING CHORD-X PRE-MEASURED LOOP SUTURE SIZE:12MM, MITRAL ANNULOPLASTY RING ERICK-COTTO PHYSIO Il SIZE: 28MM, AND ON CARDIOPULMONARY PUMP OXYGENATOR (INTRAOPERATIVE TRANSESOPHAGEAL ECHOCARDIOGRAM BY THE CARDIOLOGIST  );  Surgeon: Sabas Meredith MD;  Location:  OR    [facility] NONSPECIFIC PROCEDURE  2009    Metatarsal phalangeal joint injection    [facility] COLONOSCOPY THRU STOMA, DIAGNOSTIC  7/2002    bx. 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   Tobacco Use    Smoking status: Former     Packs/day: 0.50     Years: 30.00     Additional pack years: 0.00     Total pack years: 15.00     Types: Cigarettes     Quit date: 3/20/1998     Years since quittin.8    Smokeless tobacco: Never    Tobacco comments:     No smokers in home   Vaping Use    Vaping Use: Never used   Substance and Sexual Activity    Alcohol use: Yes     Alcohol/week: 6.7 standard drinks of alcohol     Comment: 2x/week    Drug use: No    Sexual activity: Yes     Partners: Male     Birth control/protection: None     Comment: spouse -vas   Other Topics Concern     Service No    Blood Transfusions No    Caffeine Concern Yes     Comment: pop: 1c/wk    Occupational Exposure No    Hobby Hazards No    Sleep Concern Yes     Comment: On meds    Stress Concern Yes    Weight Concern No    Special Diet No    Back Care Yes     Comment: Hx back pain    Exercise Yes     Comment: stretching qd    Bike Helmet No    Seat Belt Yes    Self-Exams Yes    Parent/sibling w/ CABG, MI or angioplasty before 65F 55M? Yes     Comment: Father & Brother.   Social History Narrative    Lives with spouse.. No domestic violence issues.     Social Determinants of Health     Financial Resource Strain: Unknown (2023)    Financial Resource Strain     Within the past 12 months, have you or your family members you live with been unable to get utilities (heat, electricity) when it was really needed?: Patient declined   Food Insecurity: Low Risk  (2023)    Food Insecurity     Within the past 12 months, did you worry that your food would run out before you got money to buy more?: No     Within the past 12 months, did the food you bought just not last and you didn t have money to get more?: Patient declined   Transportation Needs: Unknown (2023)    Transportation Needs     Within the  "past 12 months, has lack of transportation kept you from medical appointments, getting your medicines, non-medical meetings or appointments, work, or from getting things that you need?: Patient declined   Physical Activity: Not on file   Stress: Not on file   Social Connections: Not on file   Interpersonal Safety: Low Risk  (2023)    Interpersonal Safety     Do you feel physically and emotionally safe where you currently live?: Yes     Within the past 12 months, have you been hit, slapped, kicked or otherwise physically hurt by someone?: No     Within the past 12 months, have you been humiliated or emotionally abused in other ways by your partner or ex-partner?: No   Housing Stability: Unknown (2023)    Housing Stability     Do you have housing? : Patient declined     Are you worried about losing your housing?: Patient declined       Family History   Problem Relation Age of Onset    Diabetes Father     Depression Father     Cerebrovascular Disease Mother         age 80    Arthritis Mother     Depression Mother         On meds    Eye Disorder Mother         Glaucoma    Osteoporosis Mother     Respiratory Mother          88 YO COPD    Chronic Obstructive Pulmonary Disease Mother     C.A.D. Brother         65 YO MI -    Chronic Obstructive Pulmonary Disease Brother     Myocardial Infarction Brother     Other Cancer Brother     Cancer Brother          Vitals: /61 (BP Location: Right arm, Patient Position: Sitting, Cuff Size: Adult Regular)   Pulse 79   Ht 1.562 m (5' 1.5\")   Wt 62.1 kg (137 lb)   LMP 2008 (Approximate)   SpO2 98%   BMI 25.47 kg/m      Exam:   GENERAL APPEARANCE: Well developed, well nourished, alert, and in no apparent distress.  HEENT: Normal oral mucus membranes, tongue and dentition. Normal nose.   RESP: good air flow throughout.  No crackles. No rhonchi. No wheezes.  CV: Normal S1, S2, regular rhythm, normal rate.  No murmur.  ABD: Round.  MS: extremities normal. No " clubbing. No cyanosis.  SKIN: no rash on limited exam.  NEURO: Mentation intact, speech normal, normal gait and stance.  PSYCH: mentation appears normal. and affect normal/bright.    Results:  CBC on 8/14/2023 showed WBC 6.1, absolute eosinophils 600, on 8/17/2023, hemoglobin was normal at 13.5, and platelet count was normal at 238.  PFTs:  1/10/2024: Normal spirometry, normal lung volumes, normal DLCO.  Compared to 9/6/2023, there is significant increase in FEV1 and FVC.  6MWT: Normal 6MWD.  Significant desaturation but with no hypoxia.        Chest imaging:  Chest CT PE study 12/6/2021  IMPRESSION:  1.  Patchy bilateral prominent irregular groundglass consolidation  most consistent with an atypical infectious etiology such as  potentially COVID-19 pneumonia.  2.  No evidence for pulmonary embolism.  3.  Pulmonary nodule incidentally seen on the right. See below for  follow up.  4.  Age indeterminant T12 compression fracture deformity. It is new  since a prior CT abdomen from 3/16/2020.      CT chest PE study 5/31/2023  IMPRESSION:  1.  No pulmonary embolism.   2.  Mild residual groundglass and reticulation in the same distribution as pulmonary opacities on the December 2021 exam. 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 Recommend pulmonary consultation.  3.  Minimal bronchiolectasis.  4.  Stable 11 x 17 mm anterior mediastinal nodule or lymph node (thymoma not excluded). 12 month follow-up chest CT recommended.    TTE 6/1/2023  Interpretation Summary  1. Normal biventricular size and function. Left ventricular ejection fraction  of 65-70%.  2. No segmental wall motion abnormalities noted.  3. Severe mitral valve prolapse, posterior leaflet. There is severe (4+)  mitral regurgitation. EROA is 0.43 cm2 and Reg volume is 73 ml.  4. Elevated pulmonary artery systolic pressure of ~ 40-45 mmHg.  5. The left atrium is severely dilated.  Compared to the resting portion of the stress test in 2019, MR has  increased  in severity and is now severe.    HRCT 9/6/2023  IMPRESSION: In this patient who had pulmonary changes of COVID 19 in  December 2021  1. The patient has interstitial changes consistent with sequela of  COVID 19. Compared to the December 6, 2021 study there has been  significant improvement.  2. Again noted the hepatic cyst and hemangioma in the liver.  3. Nonobstructing renal calculi.  4. Prominent mediastinal lymph nodes unchanged from prior studies.  5. Left atrial enlargement was present in 2021.  5. Sub-4 mm pulmonary nodules. In a low risk patient no further  follow-up recommended.    Chest CT scan without contrast on 1/5/2024  IMPRESSION:   1.  Left upper lobe nodules measuring the 0.3 cm. The repeat CT is  recommended in 6 months.  2.  Unchanged to improved additional pulmonary nodules seen  previously.  3.  Mild emphysema.  4.  Unchanged lower thoracic compression deformities.    Assessment and plan:   This is a 68-year-old female patient who was referred to the interstitial lung disease clinic due to interstitial lung changes on her CT scans of the chest.  The patient has a history significant for COVID-19 infection in December 2021, this was complicated with acute hypoxemic respiratory failure requiring oxygen support with nasal cannula, did not require noninvasive mechanical ventilation or intubation.  ALICIA was borderline positive, CK was mildly elevated at 216, and total immunoglobulin G was mildly elevated at 590, unclear significance.  I reviewed with ILD MDD conference.  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    -I will continue to monitor clinically, with the interstitial abnormalities improving, I will monitor with symptoms only now, if she has worsening dyspnea on exertion again then will work her up with repeat PFT and high-resolution CT  scan.    Hilar and mediastinal lymphadenopathy, anterior mediastinal nodule, stable from chest CT scan December 2021  Left upper lobe nodule 11 mm, improving, with multiple bilateral sub-4 mm lung nodules    -Follow-up with chest CT scan without contrast in 9 months.      Low level of IgG, unclear significance    -Monitor clinically, if she has recurrent upper respiratory tract or lower respiratory tract infections will consider repeating the immunoglobulin IgE level and consider IVIG.    Asthma    -Continue Dulera, continue as needed TOPHER  -Add Singulair given the recent COVID-19 infection and continuing sinus congestion and postnasal drip which potentially can make her asthma control more difficult  -I will give her a prescription for a supply of prednisone 20 mg pills 10 tablets to use (as a short burst of 40 mg once daily for 5-day course) if she has a flareup of her asthma symptoms and exacerbation, and in such case I would like to be notified to adjust her other controller medications.      Chart documentation was completed, in part, with Voice Assist voice-recognition software. Even though reviewed, some grammatical, spelling, and word errors may remain.    I spent 30 minutes reviewing chart, reviewing test results, talking with and examining patient, formulating plan, and documentation on the day of the encounter.    Khanh Varner MD   Pulmonary and Critical Care

## 2024-01-11 ENCOUNTER — TELEPHONE (OUTPATIENT)
Dept: PULMONOLOGY | Facility: CLINIC | Age: 69
End: 2024-01-11
Payer: COMMERCIAL

## 2024-01-11 NOTE — TELEPHONE ENCOUNTER
Patient Contacted for the patient to call back and schedule the following:    Appointment type: BONIFACIO  Provider: GUS  Return date: 9/2024  Specialty phone number: [phone]  Additional appointment(s) needed: NA  Additonal Notes: NA

## 2024-01-12 ENCOUNTER — HOSPITAL ENCOUNTER (OUTPATIENT)
Dept: CARDIAC REHAB | Facility: CLINIC | Age: 69
Discharge: HOME | End: 2024-01-12
Attending: SURGERY
Payer: COMMERCIAL

## 2024-01-12 DIAGNOSIS — J30.89 NON-SEASONAL ALLERGIC RHINITIS, UNSPECIFIED TRIGGER: Primary | ICD-10-CM

## 2024-01-12 LAB — IGE SERPL-ACNC: 72 KU/L (ref 0–114)

## 2024-01-12 PROCEDURE — 93798 PHYS/QHP OP CAR RHAB W/ECG: CPT

## 2024-01-12 RX ORDER — FLUTICASONE PROPIONATE 50 MCG
1 SPRAY, SUSPENSION (ML) NASAL DAILY
Qty: 1 ML | Refills: 4 | Status: SHIPPED | OUTPATIENT
Start: 2024-01-12 | End: 2024-04-21

## 2024-01-15 ENCOUNTER — HOSPITAL ENCOUNTER (OUTPATIENT)
Dept: CARDIAC REHAB | Facility: CLINIC | Age: 69
Discharge: HOME | End: 2024-01-15
Attending: SURGERY
Payer: COMMERCIAL

## 2024-01-15 LAB
DLCOUNC-%PRED-PRE: 95 %
DLCOUNC-PRE: 16.89 ML/MIN/MMHG
DLCOUNC-PRED: 17.73 ML/MIN/MMHG
ERV-%PRED-PRE: 151 %
ERV-PRE: 1.33 L
ERV-PRED: 0.88 L
EXPTIME-PRE: 5.8 SEC
FEF2575-%PRED-PRE: 177 %
FEF2575-PRE: 2.92 L/SEC
FEF2575-PRED: 1.65 L/SEC
FEFMAX-%PRED-PRE: 133 %
FEFMAX-PRE: 6.96 L/SEC
FEFMAX-PRED: 5.2 L/SEC
FEV1-%PRED-PRE: 126 %
FEV1-PRE: 2.37 L
FEV1FEV6-PRE: 85 %
FEV1FEV6-PRED: 79 %
FEV1FVC-PRE: 85 %
FEV1FVC-PRED: 79 %
FEV1SVC-PRE: 77 %
FEV1SVC-PRED: 66 %
FIFMAX-PRE: 5.51 L/SEC
FRCPLETH-%PRED-PRE: 100 %
FRCPLETH-PRE: 2.58 L
FRCPLETH-PRED: 2.57 L
FVC-%PRED-PRE: 117 %
FVC-PRE: 2.78 L
FVC-PRED: 2.37 L
IC-%PRED-PRE: 98 %
IC-PRE: 1.73 L
IC-PRED: 1.76 L
RVPLETH-%PRED-PRE: 63 %
RVPLETH-PRE: 1.24 L
RVPLETH-PRED: 1.96 L
TLCPLETH-%PRED-PRE: 95 %
TLCPLETH-PRE: 4.31 L
TLCPLETH-PRED: 4.52 L
VA-%PRED-PRE: 105 %
VA-PRE: 4.39 L
VC-%PRED-PRE: 108 %
VC-PRE: 3.07 L
VC-PRED: 2.82 L

## 2024-01-15 PROCEDURE — 93798 PHYS/QHP OP CAR RHAB W/ECG: CPT

## 2024-01-16 ENCOUNTER — OFFICE VISIT (OUTPATIENT)
Dept: CARDIOLOGY | Facility: CLINIC | Age: 69
End: 2024-01-16
Payer: COMMERCIAL

## 2024-01-16 VITALS
HEART RATE: 60 BPM | DIASTOLIC BLOOD PRESSURE: 74 MMHG | SYSTOLIC BLOOD PRESSURE: 128 MMHG | OXYGEN SATURATION: 98 % | WEIGHT: 137 LBS | HEIGHT: 62 IN | RESPIRATION RATE: 14 BRPM | BODY MASS INDEX: 25.21 KG/M2

## 2024-01-16 DIAGNOSIS — M79.651 PAIN IN RIGHT THIGH: ICD-10-CM

## 2024-01-16 DIAGNOSIS — Z98.890 S/P MITRAL VALVE REPAIR: ICD-10-CM

## 2024-01-16 DIAGNOSIS — R09.89 CORONA PHLEBECTATICA PARAPLANTARIS: ICD-10-CM

## 2024-01-16 DIAGNOSIS — I87.2 VENOUS (PERIPHERAL) INSUFFICIENCY: ICD-10-CM

## 2024-01-16 DIAGNOSIS — I97.641 POSTOPERATIVE SEROMA INVOLVING CIRCULATORY SYSTEM AFTER CARDIAC BYPASS: ICD-10-CM

## 2024-01-16 DIAGNOSIS — T79.2XXA SEROMA DUE TO TRAUMA (H): Primary | ICD-10-CM

## 2024-01-16 PROCEDURE — 99214 OFFICE O/P EST MOD 30 MIN: CPT | Performed by: NURSE PRACTITIONER

## 2024-01-16 ASSESSMENT — PAIN SCALES - GENERAL: PAINLEVEL: NO PAIN (0)

## 2024-01-16 NOTE — PROGRESS NOTES
General Cardiology Clinic Progress Note  Margareth Green MRN# [id number]   YOB: 1955 Age: 68 year old     Primary cardiologist: Dr. Mathews    Reason for visit: Follow up MV repair    History of presenting illness:    Margareth Green is a pleasant 68 year old patient with past medical history significant for:    Severe symptomatic mitral valve regurgitation from P2 prolapse status post mitral valve repair in 10/2023 by Dr. Meredith  Postoperative right groin seroma over her right common femoral vessels after cannulation postcardiopulmonary bypass in the setting of the MV repair  Preserved LVEF  Venous insufficiency: With previous nonhealing wound.  Historically has worn compression stockings for several months  Stage III CKD  Asthma  History of COVID in 2021: Following with pulmonary due to concerns for pulmonary fibrosis  General anxiety disorder    The patient was initially evaluated by Dr. Mathews in 7/2023 after she presented to the emergency department with acute shortness of breath and pulmonary edema.  She was found to have severe mitral valve regurgitation with mitral valve prolapse of the posterior leaflet as well as left atrial enlargement.    She was initially referred to Dr. Guerrero with structural heart as well as Dr. Meredith with CV surgery to discuss transcatheter versus surgical repair of her mitral valve.  Ultimately, it was decided that she would undergo surgical repair and had a mitral valve repair via a right minithoracotomy on 10/26/2023 with Dr. Meredith.     She was seen in clinic by CV surgery postoperatively on 11/15/2023.  A groin ultrasound was completed and she was evaluated by Dr. Sims and a vascular surgery for a seroma secondary to right common femoral artery plus venous cannulation for cardiopulmonary bypass.  At that time it was felt that the seroma was benign postoperative seroma without vascular involvement.  No intervention was completed at that time.    Today she  "reports that she has ongoing swelling and seroma at the right groin site.  She states she feels like it is enlarged since the initial evaluation.  Also she has ongoing right-sided chest and breast numbness without significant pain that has persisted but is improving since surgery.  She continues to have right greater than left bilateral lower extremity edema and has evidence of a healed wound on the left lateral side of her right calf.  She also has skin discoloration suspicious of venous incompetency.    Diagnotic studies:  Duplex of right lower extremity femoral vessels (11/15/2023): There is a septated fluid collection in the right groin that measures 7.4 x 7.0 x 4.7 cm in size consistent with a seroma. No communication with the adjacent femoral vasculature.\"   Echocardiogram (10/2023): LVEF of 55 to 60%.The posterior mitral leaflet is thickened and fixed consistent with prior  mitral repair surgery.There is trace mitral regurgitation. Mean gradients 3 millimeters mercury.  Coronary angiogram (8/2023): Normal coronary anatomy with no significant CAD.            Assessment and Plan:     ASSESSMENT:    Postoperative right groin seroma  Initially noted in the weeks postoperatively and was seen by vascular surgery who recommended ongoing conservative management as there is no evidence of vascular involvement by ultrasound.    Patient states that she feels the seroma has enlarged and has ongoing discomfort at the site    Severe mitral valve regurgitation due to mitral valve prolapse  Status post mitral valve repair with Dr. Meredith in 10/2023  No coronary artery disease noted on preprocedural angiogram    Concern for chronic venous insufficiency, right greater than left  History of slow healing wound on the left lateral side of her right calf with hemosiderin deposits and corona phlebectasia  Has been wearing compression stockings religiously for the last several months with ongoing symptoms    PLAN:     Refer to " "vascular surgery for an assessment of likely venous insufficiency  Update right groin ultrasound to assess seroma  Return to clinic in follow-up as scheduled Dr. Mathews in March.  If patient is doing well this could be deferred for approximately 6-9 months and would update an updated echocardiogram at that time       Orders this Visit:  Orders Placed This Encounter   Procedures    US Lower Extremity Non Vascular Right    Vascular Surgery Referral     No orders of the defined types were placed in this encounter.    Medications Discontinued During This Encounter   Medication Reason    gabapentin (NEURONTIN) 400 MG capsule     methocarbamol (ROBAXIN) 500 MG tablet     polyethylene glycol (MIRALAX) 17 GM/Dose powder     senna-docusate (SENOKOT-S/PERICOLACE) 8.6-50 MG tablet        Today's clinic visit entailed:  Review of the result(s) of each unique test - echo, coronary angiogram, vascular ultrasound, EKG  Ordering of each unique test  35 minutes spent by me on the date of the encounter doing chart review, history and exam, documentation and further activities per the note  Provider  Link to [facility] Help Grid     The level of medical decision making during this visit was of moderate complexity.           Review of Systems:     Review of Systems:  Skin:        Eyes:       ENT:       Respiratory:       Cardiovascular:       Gastroenterology:      Genitourinary:       Musculoskeletal:       Neurologic:       Psychiatric:       Heme/Lymph/Imm:       Endocrine:                 Physical Exam:     Vitals: /74 (BP Location: Right arm, Patient Position: Sitting, Cuff Size: Adult Regular)   Pulse 60   Resp 14   Ht 1.562 m (5' 1.5\")   Wt 62.1 kg (137 lb)   LMP 02/26/2008 (Approximate)   SpO2 98%   BMI 25.47 kg/m    Constitutional: Well nourished and in no apparent distress.  Eyes: Pupils equal, round. Sclerae anicteric.   HEENT: Normocephalic, atraumatic.   Neck: Supple. JVD   Respiratory: Breathing non-labored. Lungs " clear to auscultation bilaterally. No crackles, wheezes, rhonchi, or rales.  Cardiovascular:  Regular rate and rhythm, normal S1 and S2. No murmur, rub, or gallop.  Skin: Warm, dry. No rashes, cyanosis, or xanthelasma.  Extremities: Mild right greater than left bilateral lower extremity edema.  Large hematoma at right groin site  Neurologic: No gross motor deficits. Alert, awake, and oriented to person, place and time.  Psychiatric: Affect appropriate.        CURRENT MEDICATIONS:  Current Outpatient Medications   Medication Sig Dispense Refill    acetaminophen (TYLENOL) 500 MG tablet Take 1.5 tablets (750 mg) by mouth every 6 hours as needed for other or mild pain (For optimal non-opioid multimodal pain management to improve pain control.)      albuterol (PROAIR HFA/PROVENTIL HFA/VENTOLIN HFA) 108 (90 Base) MCG/ACT inhaler Inhale 2 puffs into the lungs every 6 hours as needed for shortness of breath, wheezing or cough      ALLEGRA 180 MG OR TABS Take 180 mg by mouth daily  30 0    amitriptyline (ELAVIL) 10 MG tablet TAKE 3 TO 4 TABLETS AT BEDTIME 270 tablet 3    aspirin (ASA) 81 MG chewable tablet Take 1 tablet (81 mg) by mouth daily 90 tablet 3    buPROPion (WELLBUTRIN XL) 150 MG 24 hr tablet TAKE 1 TABLET EVERY MORNING 90 tablet 1    CALCIUM /VITAMIN D TABS   OR Take 1 tablet by mouth daily      famotidine (PEPCID) 20 MG tablet Take 20 mg by mouth daily as needed      fluticasone (FLONASE) 50 MCG/ACT nasal spray Spray 1 spray into both nostrils daily for 100 days 1 mL 4    magnesium aspartate (MAGINEX) 615 MG EC tablet Take 1 tablet (615 mg) by mouth daily      meclizine (ANTIVERT) 25 MG tablet Take 25 mg by mouth daily as needed for dizziness      Melatonin 10 MG TABS tablet Take 10 mg by mouth nightly as needed for sleep      metoprolol succinate ER (TOPROL XL) 25 MG 24 hr tablet Take 1 tablet (25 mg) by mouth daily 30 tablet 3    mometasone-formoterol (DULERA) 100-5 MCG/ACT inhaler Inhale 2 puffs into the lungs  2 times daily 39 g 3    montelukast (SINGULAIR) 10 MG tablet Take 1 tablet (10 mg) by mouth at bedtime for 150 days 30 tablet 4    Multiple Vitamins-Minerals (PRESERVISION AREDS) TABS Take 1 tablet by mouth 2 times daily      multivitamin w/minerals (THERA-VIT-M) tablet Take 1 tablet by mouth daily RAW Brand      omeprazole (PRILOSEC) 20 MG DR capsule TAKE 1 CAPSULE DAILY 90 capsule 3    Simethicone (GAS-X PO) Take 160 mg by mouth 4 times daily as needed for intestinal gas       triamcinolone (NASACORT) 55 MCG/ACT nasal inhaler Spray 1 spray into both nostrils daily      vitamin C (ASCORBIC ACID) 250 MG tablet Take 250 mg by mouth daily         ALLERGIES  Allergies   Allergen Reactions    Penicillins Itching     Vaginal itching - Yeast infection afterward         PAST MEDICAL HISTORY:  Past Medical History:   Diagnosis Date    Abnormal Papanicolaou smear of vagina and vaginal HPV 12/2002    ASCUS (HPV neg). 3/03 WNL but no transitional zome - repeat 3months    Allergic rhinitis, cause unspecified     Arthritis     Backache, unspecified     Carpal tunnel syndrome     s/p repair    Cervicalgia     Chronic kidney disease, stage 3 (H) 07/14/2021    Depressive disorder     Depressive disorder, not elsewhere classified     doing well on Wellbutrin    Derangement of TMJ (temporomandibular joint) 03/2010    internal derangement    Dermatophytosis of nail     Diaphragmatic hernia without mention of obstruction or gangrene 09/2007    small hiatal hernia    Diffuse cystic mastopathy 2000    FCBD    Esophageal reflux 04/2005    Hammertoe 10/2009    see podiatry consult    Headache(784.0)     Muscle contrature headaches    Intramural leiomyoma of uterus 07/2007    on US    Irregular menstrual cycle     better with BCP -also PMS is better with BCP    IRRITABLE COLON 02/18/2008    Malaise and fatigue     Menopause     lmp 2/2008    Mild persistent asthma     Mitral valve disease     Mole (skin) 2003    9 x 6 mm rt ant chest     Motion sickness     Neuroma 11/2008    lt foot    Osteopenia 12/2008    Other malaise and fatigue 04/2005    stress    Personal history of tobacco use, presenting hazards to health     Quit in 1998    Solitary cyst of breast 12/2005    lt 6 oclock    Stenosis of lumbosacral spine     l4-5 and l5-s1    Symptomatic menopausal or female climacteric states 12/2002    perimenopausal. LMP 2/2008    Tachycardia, paroxysmal (H) 11/24/2020    Thyroid fullness 08/07/2013       PAST SURGICAL HISTORY:  Past Surgical History:   Procedure Laterality Date    BIOPSY  4/10/2015    BL DUPLEX LO EXTREM ART UNILAT/LTD  4/3008    lower extr arterial doppler -no stenosis    C DEXA,BONE DENSITY,APPENDICULR SKELTN  12/2008    osteopenia    COLONOSCOPY  10/22/2007    bx benign    COLONOSCOPY N/A 4/10/2015    Procedure: COMBINED COLONOSCOPY, SINGLE OR MULTIPLE BIOPSY/POLYPECTOMY BY BIOPSY;  Surgeon: Cl Wagner MD;  Location: [facility]    CV CORONARY ANGIOGRAM N/A 8/17/2023    Procedure: Coronary Angiogram;  Surgeon: Liza Ford MD;  Location:  [facility] CATH LAB    ESOPHAGOSCOPY, GASTROSCOPY, DUODENOSCOPY (EGD), COMBINED N/A 1/31/2018    Procedure: COMBINED ESOPHAGOSCOPY, GASTROSCOPY, DUODENOSCOPY (EGD);  COMBINED ESOPHAGOSCOPY, GASTROSCOPY, DUODENOSCOPY (EGD);  Surgeon: Tirso Duran MD;  Location:  GI    ESOPHAGOSCOPY, GASTROSCOPY, DUODENOSCOPY (EGD), COMBINED N/A 2/1/2023    Procedure: ESOPHAGOGASTRODUODENOSCOPY (EGD);  Surgeon: Lui Vences MD;  Location:  GI    EYE SURGERY      HC PART REMV BONE METATARSAL HEAD,EA  01/11/10    Right foot plantar plate tear. Also correct hammertoe, 2nd digit & varicose vein ligation, heel.    HC REVISE MEDIAN N/CARPAL TUNNEL SURG      Left in 1997, right in 1999    INJ, ANES/STER, FACET LUMB/SAC  2010    Left L5 nerve root injection    INJECT EPIDURAL CERVICAL Right 9/29/2022    Procedure: Cervical 7-Thoracic 1 Interlaminar epidural steroid injection using fluoroscopic  guidance with contrast dye, Right;  Surgeon: Tyler Bay MD;  Location: [facility]    INJECT EPIDURAL TRANSFORAMINAL  10/09/2013    [facility]    INJECT JOINT SACROILIAC  3/19/14,14    [facility]    REPAIR VALVE MITRAL MINIMALLY INVASIVE N/A 10/26/2023    Procedure: MINIMALLY INVASIVE MITRAL VALVE REPAIR USING CHORD-X PRE-MEASURED LOOP SUTURE SIZE:12MM, MITRAL ANNULOPLASTY RING ERICK-COTTO PHYSIO Il SIZE: 28MM, AND ON CARDIOPULMONARY PUMP OXYGENATOR (INTRAOPERATIVE TRANSESOPHAGEAL ECHOCARDIOGRAM BY THE CARDIOLOGIST  );  Surgeon: Sabas Meredith MD;  Location: [facility] OR    ZZC NONSPECIFIC PROCEDURE      Metatarsal phalangeal joint injection    ZZ COLONOSCOPY THRU STOMA, DIAGNOSTIC  2002    bx. benign - flex sig in 5 yrs  or colonoscopy in 10 yrs    ZZHC ECHO HEART XTHORACIC, STRESS/REST  2005    WNL    ZZHC NCS MOTOR W/O F-WAVE, EACH NERVE  2008    CTS       FAMILY HISTORY:  Family History   Problem Relation Age of Onset    Diabetes Father     Depression Father     Cerebrovascular Disease Mother         age 80    Arthritis Mother     Depression Mother         On meds    Eye Disorder Mother         Glaucoma    Osteoporosis Mother     Respiratory Mother          88 YO COPD    Chronic Obstructive Pulmonary Disease Mother     C.A.D. Brother         65 YO MI -    Chronic Obstructive Pulmonary Disease Brother     Myocardial Infarction Brother     Other Cancer Brother     Cancer Brother        SOCIAL HISTORY:  Social History     Socioeconomic History    Marital status:      Spouse name: Robel    Number of children: 1    Years of education: 12    Highest education level: None   Occupational History    Occupation: HIMS clerk     Employer: [employer]   Tobacco Use    Smoking status: Former     Packs/day: 0.50     Years: 30.00     Additional pack years: 0.00     Total pack years: 15.00     Types: Cigarettes     Quit date: 3/20/1998     Years since  quittin.8    Smokeless tobacco: Never    Tobacco comments:     No smokers in home   Vaping Use    Vaping Use: Never used   Substance and Sexual Activity    Alcohol use: Yes     Alcohol/week: 6.7 standard drinks of alcohol     Comment: 2x/week    Drug use: No    Sexual activity: Yes     Partners: Male     Birth control/protection: None     Comment: spouse -vas   Other Topics Concern     Service No    Blood Transfusions No    Caffeine Concern Yes     Comment: pop: 1c/wk    Occupational Exposure No    Hobby Hazards No    Sleep Concern Yes     Comment: On meds    Stress Concern Yes    Weight Concern No    Special Diet No    Back Care Yes     Comment: Hx back pain    Exercise Yes     Comment: stretching qd    Bike Helmet No    Seat Belt Yes    Self-Exams Yes    Parent/sibling w/ CABG, MI or angioplasty before 65F 55M? Yes     Comment: Father & Brother.   Social History Narrative    Lives with spouse.. No domestic violence issues.     Social Determinants of Health     Financial Resource Strain: Unknown (2023)    Financial Resource Strain     Within the past 12 months, have you or your family members you live with been unable to get utilities (heat, electricity) when it was really needed?: Patient declined   Food Insecurity: Low Risk  (2023)    Food Insecurity     Within the past 12 months, did you worry that your food would run out before you got money to buy more?: No     Within the past 12 months, did the food you bought just not last and you didn t have money to get more?: Patient declined   Transportation Needs: Unknown (2023)    Transportation Needs     Within the past 12 months, has lack of transportation kept you from medical appointments, getting your medicines, non-medical meetings or appointments, work, or from getting things that you need?: Patient declined   Interpersonal Safety: Low Risk  (2023)    Interpersonal Safety     Do you feel physically and emotionally safe where  you currently live?: Yes     Within the past 12 months, have you been hit, slapped, kicked or otherwise physically hurt by someone?: No     Within the past 12 months, have you been humiliated or emotionally abused in other ways by your partner or ex-partner?: No   Housing Stability: Unknown (12/21/2023)    Housing Stability     Do you have housing? : Patient declined     Are you worried about losing your housing?: Patient declined

## 2024-01-16 NOTE — PATIENT INSTRUCTIONS
TODAY'S RECOMMENDATIONS:    Referral to vein clinic in [place]  Ultrasound of groin  Continue all other medications without changes.  Please follow up with Dr. Mathews in March as scheduled.    If you have questions or concerns please call clinic at [phone].    Please call [phone] for scheduling.      It was a pleasure seeing you today!

## 2024-01-16 NOTE — LETTER
1/16/2024    Caitlin Beasley PA-C  [address] [facility] [address]    RE: Margareth Green       Dear Colleague,     I had the pleasure of seeing Margareth Green in the [facility].    General Cardiology Clinic Progress Note  Margareth Green MRN# [id number]   YOB: 1955 Age: 68 year old     Primary cardiologist: Dr. Mathews    Reason for visit: Follow up MV repair    History of presenting illness:    Margareth Green is a pleasant 68 year old patient with past medical history significant for:    Severe symptomatic mitral valve regurgitation from P2 prolapse status post mitral valve repair in 10/2023 by Dr. Meredith  Postoperative right groin seroma over her right common femoral vessels after cannulation postcardiopulmonary bypass in the setting of the MV repair  Preserved LVEF  Venous insufficiency: With previous nonhealing wound.  Historically has worn compression stockings for several months  Stage III CKD  Asthma  History of COVID in 2021: Following with pulmonary due to concerns for pulmonary fibrosis  General anxiety disorder    The patient was initially evaluated by Dr. Mathews in 7/2023 after she presented to the emergency department with acute shortness of breath and pulmonary edema.  She was found to have severe mitral valve regurgitation with mitral valve prolapse of the posterior leaflet as well as left atrial enlargement.    She was initially referred to Dr. Guerrero with structural heart as well as Dr. Meredith with CV surgery to discuss transcatheter versus surgical repair of her mitral valve.  Ultimately, it was decided that she would undergo surgical repair and had a mitral valve repair via a right minithoracotomy on 10/26/2023 with Dr. Meredith.     She was seen in clinic by CV surgery postoperatively on 11/15/2023.  A groin ultrasound was completed and she was evaluated by Dr. Sims and a vascular surgery for a seroma secondary to right common femoral artery plus  "venous cannulation for cardiopulmonary bypass.  At that time it was felt that the seroma was benign postoperative seroma without vascular involvement.  No intervention was completed at that time.    Today she reports that she has ongoing swelling and seroma at the right groin site.  She states she feels like it is enlarged since the initial evaluation.  Also she has ongoing right-sided chest and breast numbness without significant pain that has persisted but is improving since surgery.  She continues to have right greater than left bilateral lower extremity edema and has evidence of a healed wound on the left lateral side of her right calf.  She also has skin discoloration suspicious of venous incompetency.    Diagnotic studies:  Duplex of right lower extremity femoral vessels (11/15/2023): There is a septated fluid collection in the right groin that measures 7.4 x 7.0 x 4.7 cm in size consistent with a seroma. No communication with the adjacent femoral vasculature.\"   Echocardiogram (10/2023): LVEF of 55 to 60%.The posterior mitral leaflet is thickened and fixed consistent with prior  mitral repair surgery.There is trace mitral regurgitation. Mean gradients 3 millimeters mercury.  Coronary angiogram (8/2023): Normal coronary anatomy with no significant CAD.            Assessment and Plan:     ASSESSMENT:    Postoperative right groin seroma  Initially noted in the weeks postoperatively and was seen by vascular surgery who recommended ongoing conservative management as there is no evidence of vascular involvement by ultrasound.    Patient states that she feels the seroma has enlarged and has ongoing discomfort at the site    Severe mitral valve regurgitation due to mitral valve prolapse  Status post mitral valve repair with Dr. Meredith in 10/2023  No coronary artery disease noted on preprocedural angiogram    Concern for chronic venous insufficiency, right greater than left  History of slow healing wound on the left " "lateral side of her right calf with hemosiderin deposits and corona phlebectasia  Has been wearing compression stockings religiously for the last several months with ongoing symptoms    PLAN:     Refer to vascular surgery for an assessment of likely venous insufficiency  Update right groin ultrasound to assess seroma  Return to clinic in follow-up as scheduled Dr. Mathews in March.  If patient is doing well this could be deferred for approximately 6-9 months and would update an updated echocardiogram at that time       Orders this Visit:  Orders Placed This Encounter   Procedures    US Lower Extremity Non Vascular Right    Vascular Surgery Referral     No orders of the defined types were placed in this encounter.    Medications Discontinued During This Encounter   Medication Reason    gabapentin (NEURONTIN) 400 MG capsule     methocarbamol (ROBAXIN) 500 MG tablet     polyethylene glycol (MIRALAX) 17 GM/Dose powder     senna-docusate (SENOKOT-S/PERICOLACE) 8.6-50 MG tablet        Today's clinic visit entailed:  Review of the result(s) of each unique test - echo, coronary angiogram, vascular ultrasound, EKG  Ordering of each unique test  35 minutes spent by me on the date of the encounter doing chart review, history and exam, documentation and further activities per the note  Provider  Link to [facility] Help Grid     The level of medical decision making during this visit was of moderate complexity.           Review of Systems:     Review of Systems:  Skin:        Eyes:       ENT:       Respiratory:       Cardiovascular:       Gastroenterology:      Genitourinary:       Musculoskeletal:       Neurologic:       Psychiatric:       Heme/Lymph/Imm:       Endocrine:                 Physical Exam:     Vitals: /74 (BP Location: Right arm, Patient Position: Sitting, Cuff Size: Adult Regular)   Pulse 60   Resp 14   Ht 1.562 m (5' 1.5\")   Wt 62.1 kg (137 lb)   LMP 02/26/2008 (Approximate)   SpO2 98%   BMI 25.47 kg/m  "   Constitutional: Well nourished and in no apparent distress.  Eyes: Pupils equal, round. Sclerae anicteric.   HEENT: Normocephalic, atraumatic.   Neck: Supple. JVD   Respiratory: Breathing non-labored. Lungs clear to auscultation bilaterally. No crackles, wheezes, rhonchi, or rales.  Cardiovascular:  Regular rate and rhythm, normal S1 and S2. No murmur, rub, or gallop.  Skin: Warm, dry. No rashes, cyanosis, or xanthelasma.  Extremities: Mild right greater than left bilateral lower extremity edema.  Large hematoma at right groin site  Neurologic: No gross motor deficits. Alert, awake, and oriented to person, place and time.  Psychiatric: Affect appropriate.        CURRENT MEDICATIONS:  Current Outpatient Medications   Medication Sig Dispense Refill    acetaminophen (TYLENOL) 500 MG tablet Take 1.5 tablets (750 mg) by mouth every 6 hours as needed for other or mild pain (For optimal non-opioid multimodal pain management to improve pain control.)      albuterol (PROAIR HFA/PROVENTIL HFA/VENTOLIN HFA) 108 (90 Base) MCG/ACT inhaler Inhale 2 puffs into the lungs every 6 hours as needed for shortness of breath, wheezing or cough      ALLEGRA 180 MG OR TABS Take 180 mg by mouth daily  30 0    amitriptyline (ELAVIL) 10 MG tablet TAKE 3 TO 4 TABLETS AT BEDTIME 270 tablet 3    aspirin (ASA) 81 MG chewable tablet Take 1 tablet (81 mg) by mouth daily 90 tablet 3    buPROPion (WELLBUTRIN XL) 150 MG 24 hr tablet TAKE 1 TABLET EVERY MORNING 90 tablet 1    CALCIUM /VITAMIN D TABS   OR Take 1 tablet by mouth daily      famotidine (PEPCID) 20 MG tablet Take 20 mg by mouth daily as needed      fluticasone (FLONASE) 50 MCG/ACT nasal spray Spray 1 spray into both nostrils daily for 100 days 1 mL 4    magnesium aspartate (MAGINEX) 615 MG EC tablet Take 1 tablet (615 mg) by mouth daily      meclizine (ANTIVERT) 25 MG tablet Take 25 mg by mouth daily as needed for dizziness      Melatonin 10 MG TABS tablet Take 10 mg by mouth nightly as  needed for sleep      metoprolol succinate ER (TOPROL XL) 25 MG 24 hr tablet Take 1 tablet (25 mg) by mouth daily 30 tablet 3    mometasone-formoterol (DULERA) 100-5 MCG/ACT inhaler Inhale 2 puffs into the lungs 2 times daily 39 g 3    montelukast (SINGULAIR) 10 MG tablet Take 1 tablet (10 mg) by mouth at bedtime for 150 days 30 tablet 4    Multiple Vitamins-Minerals (PRESERVISION AREDS) TABS Take 1 tablet by mouth 2 times daily      multivitamin w/minerals (THERA-VIT-M) tablet Take 1 tablet by mouth daily RAW Brand      omeprazole (PRILOSEC) 20 MG DR capsule TAKE 1 CAPSULE DAILY 90 capsule 3    Simethicone (GAS-X PO) Take 160 mg by mouth 4 times daily as needed for intestinal gas       triamcinolone (NASACORT) 55 MCG/ACT nasal inhaler Spray 1 spray into both nostrils daily      vitamin C (ASCORBIC ACID) 250 MG tablet Take 250 mg by mouth daily         ALLERGIES  Allergies   Allergen Reactions    Penicillins Itching     Vaginal itching - Yeast infection afterward         PAST MEDICAL HISTORY:  Past Medical History:   Diagnosis Date    Abnormal Papanicolaou smear of vagina and vaginal HPV 12/2002    ASCUS (HPV neg). 3/03 WNL but no transitional zome - repeat 3months    Allergic rhinitis, cause unspecified     Arthritis     Backache, unspecified     Carpal tunnel syndrome     s/p repair    Cervicalgia     Chronic kidney disease, stage 3 (H) 07/14/2021    Depressive disorder     Depressive disorder, not elsewhere classified     doing well on Wellbutrin    Derangement of TMJ (temporomandibular joint) 03/2010    internal derangement    Dermatophytosis of nail     Diaphragmatic hernia without mention of obstruction or gangrene 09/2007    small hiatal hernia    Diffuse cystic mastopathy 2000    FCBD    Esophageal reflux 04/2005    Jossue 10/2009    see podiatry consult    Headache(784.0)     Muscle contrature headaches    Intramural leiomyoma of uterus 07/2007    on US    Irregular menstrual cycle     better with BCP  -also PMS is better with BCP    IRRITABLE COLON 02/18/2008    Malaise and fatigue     Menopause     lmp 2/2008    Mild persistent asthma     Mitral valve disease     Mole (skin) 2003    9 x 6 mm rt ant chest    Motion sickness     Neuroma 11/2008    lt foot    Osteopenia 12/2008    Other malaise and fatigue 04/2005    stress    Personal history of tobacco use, presenting hazards to health     Quit in 1998    Solitary cyst of breast 12/2005    lt 6 oclock    Stenosis of lumbosacral spine     l4-5 and l5-s1    Symptomatic menopausal or female climacteric states 12/2002    perimenopausal. LMP 2/2008    Tachycardia, paroxysmal (H) 11/24/2020    Thyroid fullness 08/07/2013       PAST SURGICAL HISTORY:  Past Surgical History:   Procedure Laterality Date    BIOPSY  4/10/2015    BL DUPLEX LO EXTREM ART UNILAT/LTD  4/3008    lower extr arterial doppler -no stenosis    C DEXA,BONE DENSITY,APPENDICULR SKELTN  12/2008    osteopenia    COLONOSCOPY  10/22/2007    bx benign    COLONOSCOPY N/A 4/10/2015    Procedure: COMBINED COLONOSCOPY, SINGLE OR MULTIPLE BIOPSY/POLYPECTOMY BY BIOPSY;  Surgeon: Cl Wagner MD;  Location:  GI    CV CORONARY ANGIOGRAM N/A 8/17/2023    Procedure: Coronary Angiogram;  Surgeon: Liza Ford MD;  Location:  HEART CARDIAC CATH LAB    ESOPHAGOSCOPY, GASTROSCOPY, DUODENOSCOPY (EGD), COMBINED N/A 1/31/2018    Procedure: COMBINED ESOPHAGOSCOPY, GASTROSCOPY, DUODENOSCOPY (EGD);  COMBINED ESOPHAGOSCOPY, GASTROSCOPY, DUODENOSCOPY (EGD);  Surgeon: Tirso Duran MD;  Location:  GI    ESOPHAGOSCOPY, GASTROSCOPY, DUODENOSCOPY (EGD), COMBINED N/A 2/1/2023    Procedure: ESOPHAGOGASTRODUODENOSCOPY (EGD);  Surgeon: Lui Vences MD;  Location:  GI    EYE SURGERY      HC PART REMV BONE METATARSAL HEAD,EA  01/11/10    Right foot plantar plate tear. Also correct hammertoe, 2nd digit & varicose vein ligation, heel.    HC REVISE MEDIAN N/CARPAL TUNNEL SURG      Left in 1997, right in 1999     INJ, ANES/STER, FACET LUMB/SAC      Left L5 nerve root injection    INJECT EPIDURAL CERVICAL Right 2022    Procedure: Cervical 7-Thoracic 1 Interlaminar epidural steroid injection using fluoroscopic guidance with contrast dye, Right;  Surgeon: Tyler Bay MD;  Location: [facility] OR    INJECT EPIDURAL TRANSFORAMINAL  10/09/2013    [facility]    INJECT JOINT SACROILIAC  3/19/14,14    [facility]    REPAIR VALVE MITRAL MINIMALLY INVASIVE N/A 10/26/2023    Procedure: MINIMALLY INVASIVE MITRAL VALVE REPAIR USING CHORD-X PRE-MEASURED LOOP SUTURE SIZE:12MM, MITRAL ANNULOPLASTY RING ERICK-COTTO PHYSIO Il SIZE: 28MM, AND ON CARDIOPULMONARY PUMP OXYGENATOR (INTRAOPERATIVE TRANSESOPHAGEAL ECHOCARDIOGRAM BY THE CARDIOLOGIST  );  Surgeon: Sabas Meredith MD;  Location: [facility] OR    ZZC NONSPECIFIC PROCEDURE      Metatarsal phalangeal joint injection    ZZ COLONOSCOPY THRU STOMA, DIAGNOSTIC  2002    bx. benign - flex sig in 5 yrs  or colonoscopy in 10 yrs    ZZ ECHO HEART XTHORACIC, STRESS/REST  2005    WNL    ZZ NCS MOTOR W/O F-WAVE, EACH NERVE  2008    CTS       FAMILY HISTORY:  Family History   Problem Relation Age of Onset    Diabetes Father     Depression Father     Cerebrovascular Disease Mother         age 80    Arthritis Mother     Depression Mother         On meds    Eye Disorder Mother         Glaucoma    Osteoporosis Mother     Respiratory Mother          86 YO COPD    Chronic Obstructive Pulmonary Disease Mother     C.A.D. Brother         65 YO MI -    Chronic Obstructive Pulmonary Disease Brother     Myocardial Infarction Brother     Other Cancer Brother     Cancer Brother        SOCIAL HISTORY:  Social History     Socioeconomic History    Marital status:      Spouse name: Robel    Number of children: 1    Years of education: 12    Highest education level: None   Occupational History    Occupation: HIMS clerk     Employer: [employer]   Tobacco Use    Smoking status: Former     Packs/day: 0.50     Years: 30.00     Additional pack years: 0.00     Total pack years: 15.00     Types: Cigarettes     Quit date: 3/20/1998     Years since quittin.8    Smokeless tobacco: Never    Tobacco comments:     No smokers in home   Vaping Use    Vaping Use: Never used   Substance and Sexual Activity    Alcohol use: Yes     Alcohol/week: 6.7 standard drinks of alcohol     Comment: 2x/week    Drug use: No    Sexual activity: Yes     Partners: Male     Birth control/protection: None     Comment: spouse -vas   Other Topics Concern     Service No    Blood Transfusions No    Caffeine Concern Yes     Comment: pop: 1c/wk    Occupational Exposure No    Hobby Hazards No    Sleep Concern Yes     Comment: On meds    Stress Concern Yes    Weight Concern No    Special Diet No    Back Care Yes     Comment: Hx back pain    Exercise Yes     Comment: stretching qd    Bike Helmet No    Seat Belt Yes    Self-Exams Yes    Parent/sibling w/ CABG, MI or angioplasty before 65F 55M? Yes     Comment: Father & Brother.   Social History Narrative    Lives with spouse.. No domestic violence issues.     Social Determinants of Health     Financial Resource Strain: Unknown (2023)    Financial Resource Strain     Within the past 12 months, have you or your family members you live with been unable to get utilities (heat, electricity) when it was really needed?: Patient declined   Food Insecurity: Low Risk  (2023)    Food Insecurity     Within the past 12 months, did you worry that your food would run out before you got money to buy more?: No     Within the past 12 months, did the food you bought just not last and you didn t have money to get more?: Patient declined   Transportation Needs: Unknown (2023)    Transportation Needs     Within the past 12 months, has lack of transportation kept you from medical appointments, getting your medicines, non-medical  meetings or appointments, work, or from getting things that you need?: Patient declined   Interpersonal Safety: Low Risk  (11/13/2023)    Interpersonal Safety     Do you feel physically and emotionally safe where you currently live?: Yes     Within the past 12 months, have you been hit, slapped, kicked or otherwise physically hurt by someone?: No     Within the past 12 months, have you been humiliated or emotionally abused in other ways by your partner or ex-partner?: No   Housing Stability: Unknown (12/21/2023)    Housing Stability     Do you have housing? : Patient declined     Are you worried about losing your housing?: Patient declined               Thank you for allowing me to participate in the care of your patient.      Sincerely,     JUAN [facility]ucker [facility]  cc:   Sabas Meredith MD  [address]  [place] [address]

## 2024-01-17 ENCOUNTER — HOSPITAL ENCOUNTER (OUTPATIENT)
Dept: ULTRASOUND IMAGING | Facility: CLINIC | Age: 69
Discharge: HOME | End: 2024-01-17
Attending: NURSE PRACTITIONER | Admitting: NURSE PRACTITIONER
Payer: COMMERCIAL

## 2024-01-17 DIAGNOSIS — I97.641 POSTOPERATIVE SEROMA INVOLVING CIRCULATORY SYSTEM AFTER CARDIAC BYPASS: ICD-10-CM

## 2024-01-17 DIAGNOSIS — T79.2XXA SEROMA DUE TO TRAUMA (H): ICD-10-CM

## 2024-01-17 DIAGNOSIS — M79.651 PAIN IN RIGHT THIGH: ICD-10-CM

## 2024-01-17 DIAGNOSIS — Z98.890 S/P MITRAL VALVE REPAIR: ICD-10-CM

## 2024-01-17 PROCEDURE — 76882 US LMTD JT/FCL EVL NVASC XTR: CPT | Mod: RT

## 2024-01-19 ENCOUNTER — HOSPITAL ENCOUNTER (OUTPATIENT)
Dept: CARDIAC REHAB | Facility: CLINIC | Age: 69
Discharge: HOME | End: 2024-01-19
Attending: SURGERY
Payer: COMMERCIAL

## 2024-01-19 PROCEDURE — 93798 PHYS/QHP OP CAR RHAB W/ECG: CPT

## 2024-01-22 ENCOUNTER — TELEPHONE (OUTPATIENT)
Dept: OTHER | Facility: CLINIC | Age: 69
End: 2024-01-22

## 2024-01-22 ENCOUNTER — OFFICE VISIT (OUTPATIENT)
Dept: CARDIOLOGY | Facility: CLINIC | Age: 69
End: 2024-01-22
Payer: COMMERCIAL

## 2024-01-22 VITALS
DIASTOLIC BLOOD PRESSURE: 78 MMHG | OXYGEN SATURATION: 100 % | HEART RATE: 95 BPM | WEIGHT: 138.4 LBS | HEIGHT: 62 IN | BODY MASS INDEX: 25.47 KG/M2 | SYSTOLIC BLOOD PRESSURE: 118 MMHG

## 2024-01-22 DIAGNOSIS — Z98.890 S/P MITRAL VALVE REPAIR: Primary | ICD-10-CM

## 2024-01-22 PROCEDURE — 99024 POSTOP FOLLOW-UP VISIT: CPT | Performed by: PHYSICIAN ASSISTANT

## 2024-01-22 RX ORDER — NAPROXEN SODIUM 220 MG
220 TABLET ORAL PRN
COMMUNITY
End: 2024-07-08

## 2024-01-22 NOTE — TELEPHONE ENCOUNTER
Left voicemail with instructions for patient to call back to schedule their appointment(s)    January 22, 2024 , 4:09 PM

## 2024-01-22 NOTE — TELEPHONE ENCOUNTER
Referral received via [employer] on 01/22/24.    Pt referred to [facility] by Trudy Atkinson PA-C  for seroma s/p MINI mitral repair, seroma in R groin, needs Geoffrey Monet or Suzanna; US 1/17/23 in Epic.    Pt needs to be scheduled for NEW VASCULAR PATIENT consult with Vascular Surgery.  Will route to scheduling to coordinate an appointment ASAP.    Appt note:    Ref by Trudy Atkinson PA-C  for seroma s/p MINI mitral repair, seroma in R groin, needs Geoffrey Monet or Suzanna; US 1/17/23 in Epic.

## 2024-01-23 NOTE — PROGRESS NOTES
CV Surgery    Patient seen together with Dr. Meredith. Seroma has inc in size compared to last November (US show inc in size as well). Patient bothered by it with activity and walking. Placed new referral to vascular clinic, appreciate assistance with this.     Trudy Atkinson PA-C

## 2024-01-25 ENCOUNTER — OFFICE VISIT (OUTPATIENT)
Dept: VASCULAR SURGERY | Facility: CLINIC | Age: 69
End: 2024-01-25
Attending: NURSE PRACTITIONER
Payer: COMMERCIAL

## 2024-01-25 DIAGNOSIS — R09.89 CORONA PHLEBECTATICA PARAPLANTARIS: ICD-10-CM

## 2024-01-25 DIAGNOSIS — I87.2 VENOUS (PERIPHERAL) INSUFFICIENCY: ICD-10-CM

## 2024-01-25 PROCEDURE — 99213 OFFICE O/P EST LOW 20 MIN: CPT | Performed by: SURGERY

## 2024-01-25 NOTE — PROGRESS NOTES
"  VEINSOLUTIONS CONSULTATION    HPI:    Margareth Green is a pleasant 69 year old female referred by Grazyna HILL for evaluation of recurrent, bilateral lower extremity veins, right ankle swelling and bilateral skin discoloration.  Margareth had vein surgery at least 10 years ago where she has had multiple, small \"cuts\" were made in her legs and veins removed.  Her symptoms seem to have improved after the treatment.  He states she had no recurrent pain or swelling until she struck her right lateral leg on a bed frame opening a wound.  The wound became infected and took multiple weeks to heal with the help of a wound clinic.    She now has swelling of her right ankle intermittently and has been wearing compression hose on a regular basis.  She had no right ankle swelling prior to the wound on her right leg.  She recently saw her cardiologist who noted the purple discoloration of her medial ankles in the area of spider telangiectasias and thought she should have this evaluated by a vein specialist.    On 10/26/2023 she had minimally invasive mitral valve repair which required a right groin cutdown with cannulation of the right common femoral artery and vein.  She developed a seroma in the right groin which has not yet resolved.  I believe this has been aspirated on 1 occasion but recurred.    Prior to her right leg traumatic injury, she was not having any significant leg pain and had no concerns regarding her veins.  She has been wearing compression on a regular basis to control the edema, however, since she injured her right leg.    She has no history of deep vein thrombosis.    Family history is significant for varicose veins.    PAST MEDICAL HISTORY:   Past Medical History:   Diagnosis Date    Abnormal Papanicolaou smear of vagina and vaginal HPV 12/2002    ASCUS (HPV neg). 3/03 WNL but no transitional zome - repeat 3months    Allergic rhinitis, cause unspecified     Arthritis     Backache, unspecified     " Carpal tunnel syndrome     s/p repair    Cervicalgia     Chronic kidney disease, stage 3 (H) 07/14/2021    Depressive disorder     Depressive disorder, not elsewhere classified     doing well on Wellbutrin    Derangement of TMJ (temporomandibular joint) 03/2010    internal derangement    Dermatophytosis of nail     Diaphragmatic hernia without mention of obstruction or gangrene 09/2007    small hiatal hernia    Diffuse cystic mastopathy 2000    FCBD    Esophageal reflux 04/2005    Hammertoe 10/2009    see podiatry consult    Headache(784.0)     Muscle contrature headaches    Intramural leiomyoma of uterus 07/2007    on US    Irregular menstrual cycle     better with BCP -also PMS is better with BCP    IRRITABLE COLON 02/18/2008    Malaise and fatigue     Menopause     lmp 2/2008    Mild persistent asthma     Mitral valve disease     Mole (skin) 2003    9 x 6 mm rt ant chest    Motion sickness     Neuroma 11/2008    lt foot    Osteopenia 12/2008    Other malaise and fatigue 04/2005    stress    Personal history of tobacco use, presenting hazards to health     Quit in 1998    Solitary cyst of breast 12/2005    lt 6 oclock    Stenosis of lumbosacral spine     l4-5 and l5-s1    Symptomatic menopausal or female climacteric states 12/2002    perimenopausal. LMP 2/2008    Tachycardia, paroxysmal (H) 11/24/2020    Thyroid fullness 08/07/2013       PAST SURGICAL HISTORY:   Past Surgical History:   Procedure Laterality Date    BIOPSY  4/10/2015    BL DUPLEX LO EXTREM ART UNILAT/LTD  4/3008    lower extr arterial doppler -no stenosis    C DEXA,BONE DENSITY,APPENDICULR SKELTN  12/2008    osteopenia    COLONOSCOPY  10/22/2007    bx benign    COLONOSCOPY N/A 4/10/2015    Procedure: COMBINED COLONOSCOPY, SINGLE OR MULTIPLE BIOPSY/POLYPECTOMY BY BIOPSY;  Surgeon: Cl Wagner MD;  Location:  GI    CV CORONARY ANGIOGRAM N/A 8/17/2023    Procedure: Coronary Angiogram;  Surgeon: Liza Ford MD;  Location: [facility]  CARDIAC CATH LAB    ESOPHAGOSCOPY, GASTROSCOPY, DUODENOSCOPY (EGD), COMBINED N/A 1/31/2018    Procedure: COMBINED ESOPHAGOSCOPY, GASTROSCOPY, DUODENOSCOPY (EGD);  COMBINED ESOPHAGOSCOPY, GASTROSCOPY, DUODENOSCOPY (EGD);  Surgeon: Tirso Duran MD;  Location:  GI    ESOPHAGOSCOPY, GASTROSCOPY, DUODENOSCOPY (EGD), COMBINED N/A 2/1/2023    Procedure: ESOPHAGOGASTRODUODENOSCOPY (EGD);  Surgeon: Lui Vences MD;  Location:  GI    EYE SURGERY      HC PART REMV BONE METATARSAL HEAD,EA  01/11/10    Right foot plantar plate tear. Also correct hammertoe, 2nd digit & varicose vein ligation, heel.    HC REVISE MEDIAN N/CARPAL TUNNEL SURG      Left in 1997, right in 1999    INJ, ANES/STER, FACET LUMB/SAC  2010    Left L5 nerve root injection    INJECT EPIDURAL CERVICAL Right 9/29/2022    Procedure: Cervical 7-Thoracic 1 Interlaminar epidural steroid injection using fluoroscopic guidance with contrast dye, Right;  Surgeon: Tyler Bay MD;  Location: [facility] OR    INJECT EPIDURAL TRANSFORAMINAL  10/09/2013    [facility]    INJECT JOINT SACROILIAC  3/19/14,4/21/14    [facility]    REPAIR VALVE MITRAL MINIMALLY INVASIVE N/A 10/26/2023    Procedure: MINIMALLY INVASIVE MITRAL VALVE REPAIR USING CHORD-X PRE-MEASURED LOOP SUTURE SIZE:12MM, MITRAL ANNULOPLASTY RING ERICK-COTTO PHYSIO Il SIZE: 28MM, AND ON CARDIOPULMONARY PUMP OXYGENATOR (INTRAOPERATIVE TRANSESOPHAGEAL ECHOCARDIOGRAM BY THE CARDIOLOGIST  );  Surgeon: Sabas Meredith MD;  Location:  OR    [facility] NONSPECIFIC PROCEDURE  2009    Metatarsal phalangeal joint injection    [facility] COLONOSCOPY THRU STOMA, DIAGNOSTIC  7/2002    bx. benign - flex sig in 5 yrs  or colonoscopy in 10 yrs    [facility] ECHO HEART XTHORACIC, STRESS/REST  2/2005    WNL    [facility] NCS MOTOR W/O F-WAVE, EACH NERVE  7/2008    CTS       FAMILY HISTORY:   Family History   Problem Relation Age of Onset    Diabetes Father     Depression Father     Cerebrovascular  Disease Mother         age 80    Arthritis Mother     Depression Mother         On meds    Eye Disorder Mother         Glaucoma    Osteoporosis Mother     Respiratory Mother          88 YO COPD    Chronic Obstructive Pulmonary Disease Mother     C.A.D. Brother         67 YO MI -    Chronic Obstructive Pulmonary Disease Brother     Myocardial Infarction Brother     Other Cancer Brother     Cancer Brother        SOCIAL HISTORY:   Social History     Tobacco Use    Smoking status: Former     Packs/day: 0.50     Years: 30.00     Additional pack years: 0.00     Total pack years: 15.00     Types: Cigarettes     Quit date: 3/20/1998     Years since quittin.8    Smokeless tobacco: Never    Tobacco comments:     No smokers in home   Substance Use Topics    Alcohol use: Yes     Alcohol/week: 6.7 standard drinks of alcohol     Comment: 2x/week       REVIEW OF SYSTEMS: Review Of Systems  Skin: Nail problems  Eyes: Glasses  Ears/Nose/Throat: Dizziness  Respiratory: Shortness of breath and lying flat, shortness of breath at rest, wheezing, productive cough  Cardiovascular: Irregular heartbeat  Gastrointestinal: Heartburn  Genitourinary: negative  Musculoskeletal: Arthritis, back pain, neck pain, leg swelling  Neurologic: Headaches  Psychiatric: Depression, anxiety  Hematologic/Lymphatic/Immunologic: Easy bruising, bloody nose  Endocrine: negative      Vital signs:  LMP 2008 (Approximate)     Current Outpatient Medications   Medication Sig Dispense Refill    acetaminophen (TYLENOL) 500 MG tablet Take 1.5 tablets (750 mg) by mouth every 6 hours as needed for other or mild pain (For optimal non-opioid multimodal pain management to improve pain control.)      albuterol (PROAIR HFA/PROVENTIL HFA/VENTOLIN HFA) 108 (90 Base) MCG/ACT inhaler Inhale 2 puffs into the lungs every 6 hours as needed for shortness of breath, wheezing or cough      ALLEGRA 180 MG OR TABS Take 180 mg by mouth daily  30 0    amitriptyline (ELAVIL)  10 MG tablet TAKE 3 TO 4 TABLETS AT BEDTIME 270 tablet 3    aspirin (ASA) 81 MG chewable tablet Take 1 tablet (81 mg) by mouth daily 90 tablet 3    buPROPion (WELLBUTRIN XL) 150 MG 24 hr tablet TAKE 1 TABLET EVERY MORNING 90 tablet 1    CALCIUM /VITAMIN D TABS   OR Take 1 tablet by mouth daily      famotidine (PEPCID) 20 MG tablet Take 20 mg by mouth daily as needed      fluticasone (FLONASE) 50 MCG/ACT nasal spray Spray 1 spray into both nostrils daily for 100 days 1 mL 4    magnesium aspartate (MAGINEX) 615 MG EC tablet Take 1 tablet (615 mg) by mouth daily      meclizine (ANTIVERT) 25 MG tablet Take 25 mg by mouth daily as needed for dizziness      Melatonin 10 MG TABS tablet Take 10 mg by mouth nightly as needed for sleep      metoprolol succinate ER (TOPROL XL) 25 MG 24 hr tablet Take 1 tablet (25 mg) by mouth daily 30 tablet 3    mometasone-formoterol (DULERA) 100-5 MCG/ACT inhaler Inhale 2 puffs into the lungs 2 times daily 39 g 3    montelukast (SINGULAIR) 10 MG tablet Take 1 tablet (10 mg) by mouth at bedtime for 150 days (Patient not taking: Reported on 1/22/2024) 30 tablet 4    Multiple Vitamins-Minerals (PRESERVISION AREDS) TABS Take 1 tablet by mouth 2 times daily      multivitamin w/minerals (THERA-VIT-M) tablet Take 1 tablet by mouth daily RAW Brand      naproxen sodium (ANAPROX) 220 MG tablet Take 220 mg by mouth as needed for moderate pain      omeprazole (PRILOSEC) 20 MG DR capsule TAKE 1 CAPSULE DAILY 90 capsule 3    Simethicone (GAS-X PO) Take 160 mg by mouth 4 times daily as needed for intestinal gas       triamcinolone (NASACORT) 55 MCG/ACT nasal inhaler Spray 1 spray into both nostrils daily      vitamin C (ASCORBIC ACID) 250 MG tablet Take 250 mg by mouth daily         PHYSICAL EXAM:  General: Pleasant, NAD.   HEENT: Normocephalic, atraumatic, external ears and nose normal.   Respiratory: Normal respiratory effort.   Cardiovascular: Pulse is regular.   Musculoskeletal: Gait and station  normal.  The joints of her fingers and toes without deformity.  There is no cyanosis of her nailbeds.   EXTREMITIES: Right lower extremity: A few, scattered reticular veins and telangiectasias over the anterolateral right leg.  There are few scattered, small veins in the posterior posterolateral right thigh.  Telangiectasias about the right medial ankle.  No significant edema today.  Sizable scar over the right distal lateral leg with a halo of hyperpigmentation surrounding it.    Left lower extremity: Small varicosity coursing down the posteromedial left thigh and onto the posteromedial left calf.  Small varicosity visible on the distal third of the posterior left leg.  Telangiectasias about the left medial ankle.  No edema.  PULSES: R/L (3=normal pulse, 0=no palpable pulse) dorsalis pedis: 1/0; posterior tibial: 3/3.      Neurologic: Grossly normal  Psychiatric: Mood, affect, judgment and insight are normal     ASSESSMENT:  A few, small, asymptomatic, recurrent left greater than right lower extremity veins.  She is having some swelling of her right ankle related to a right lateral leg wound which healed leaving a large scar.  She had no swelling prior to the wound.    A few scattered veins she has are asymptomatic and do not need to be addressed.  She does have some telangiectasias about the medial ankles but these should not be of significant clinical consequence.    Controlling the edema is important.  She does have a large seroma in her right groin from her heart surgery which could be contributing to the swelling somewhat.  She is being seen regarding having this definitively managed.  Was quite honest that the swelling of the right ankle may worsen for period of time if the seroma is surgically repaired/excised.  She voiced understanding and her questions were answered.    PLAN:  1.  Fit with appropriately sized knee-high compression hose  2.  Edema wear to be worn at night     Stanley Brown MD,  FACS    Dictated using Dragon voice recognition software which may result in transcription errors        VEIN CLINIC LEG DRAWING:

## 2024-01-25 NOTE — NURSING NOTE
Patient Reported symptoms:    Right leg   Heaviness None of the time   Achiness Some of the time   Swelling None of the time   Throbbing None of the time   Itching None of the time   Appearance Extremely noticeable   Impact on work/activities Symptoms but full able to participate    Left Leg   Heaviness None of the time   Achiness Some of the time   Swelling None of the time   Throbbing None of the time   Itching None of the time   Appearance Extremely noticeable   Impact on work/activities Symptoms but full able to participate

## 2024-01-25 NOTE — LETTER
"    1/25/2024         RE: Margareth Green  [address] [facility] [address]        Dear Colleague,    Thank you for referring your patient, Margareth Green, to the [facility] [place]. Please see a copy of my visit note below.      VEINSOLUTIONS CONSULTATION    HPI:    Margareth Green is a pleasant 69 year old female referred by Grazyna HILL for evaluation of recurrent, bilateral lower extremity veins, right ankle swelling and bilateral skin discoloration.  Margareth had vein surgery at least 10 years ago where she has had multiple, small \"cuts\" were made in her legs and veins removed.  Her symptoms seem to have improved after the treatment.  He states she had no recurrent pain or swelling until she struck her right lateral leg on a bed frame opening a wound.  The wound became infected and took multiple weeks to heal with the help of a wound clinic.    She now has swelling of her right ankle intermittently and has been wearing compression hose on a regular basis.  She had no right ankle swelling prior to the wound on her right leg.  She recently saw her cardiologist who noted the purple discoloration of her medial ankles in the area of spider telangiectasias and thought she should have this evaluated by a vein specialist.    On 10/26/2023 she had minimally invasive mitral valve repair which required a right groin cutdown with cannulation of the right common femoral artery and vein.  She developed a seroma in the right groin which has not yet resolved.  I believe this has been aspirated on 1 occasion but recurred.    Prior to her right leg traumatic injury, she was not having any significant leg pain and had no concerns regarding her veins.  She has been wearing compression on a regular basis to control the edema, however, since she injured her right leg.    She has no history of deep vein thrombosis.    Family history is significant for varicose veins.    PAST MEDICAL HISTORY: "   Past Medical History:   Diagnosis Date     Abnormal Papanicolaou smear of vagina and vaginal HPV 12/2002    ASCUS (HPV neg). 3/03 WNL but no transitional zome - repeat 3months     Allergic rhinitis, cause unspecified      Arthritis      Backache, unspecified      Carpal tunnel syndrome     s/p repair     Cervicalgia      Chronic kidney disease, stage 3 (H) 07/14/2021     Depressive disorder      Depressive disorder, not elsewhere classified     doing well on Wellbutrin     Derangement of TMJ (temporomandibular joint) 03/2010    internal derangement     Dermatophytosis of nail      Diaphragmatic hernia without mention of obstruction or gangrene 09/2007    small hiatal hernia     Diffuse cystic mastopathy 2000    FCBD     Esophageal reflux 04/2005     Hammertoe 10/2009    see podiatry consult     Headache(784.0)     Muscle contrature headaches     Intramural leiomyoma of uterus 07/2007    on US     Irregular menstrual cycle     better with BCP -also PMS is better with BCP     IRRITABLE COLON 02/18/2008     Malaise and fatigue      Menopause     lmp 2/2008     Mild persistent asthma      Mitral valve disease      Mole (skin) 2003    9 x 6 mm rt ant chest     Motion sickness      Neuroma 11/2008    lt foot     Osteopenia 12/2008     Other malaise and fatigue 04/2005    stress     Personal history of tobacco use, presenting hazards to health     Quit in 1998     Solitary cyst of breast 12/2005    lt 6 oclock     Stenosis of lumbosacral spine     l4-5 and l5-s1     Symptomatic menopausal or female climacteric states 12/2002    perimenopausal. LMP 2/2008     Tachycardia, paroxysmal (H) 11/24/2020     Thyroid fullness 08/07/2013       PAST SURGICAL HISTORY:   Past Surgical History:   Procedure Laterality Date     BIOPSY  4/10/2015     BL DUPLEX LO EXTREM ART UNILAT/LTD  4/3008    lower extr arterial doppler -no stenosis     C DEXA,BONE DENSITY,APPENDICULR SKELTN  12/2008    osteopenia     COLONOSCOPY  10/22/2007    bx  benign     COLONOSCOPY N/A 4/10/2015    Procedure: COMBINED COLONOSCOPY, SINGLE OR MULTIPLE BIOPSY/POLYPECTOMY BY BIOPSY;  Surgeon: Cl Wagner MD;  Location:  GI     CV CORONARY ANGIOGRAM N/A 8/17/2023    Procedure: Coronary Angiogram;  Surgeon: Liza Ford MD;  Location:  HEART CARDIAC CATH LAB     ESOPHAGOSCOPY, GASTROSCOPY, DUODENOSCOPY (EGD), COMBINED N/A 1/31/2018    Procedure: COMBINED ESOPHAGOSCOPY, GASTROSCOPY, DUODENOSCOPY (EGD);  COMBINED ESOPHAGOSCOPY, GASTROSCOPY, DUODENOSCOPY (EGD);  Surgeon: Tirso Duran MD;  Location:  GI     ESOPHAGOSCOPY, GASTROSCOPY, DUODENOSCOPY (EGD), COMBINED N/A 2/1/2023    Procedure: ESOPHAGOGASTRODUODENOSCOPY (EGD);  Surgeon: Lui Vences MD;  Location:  GI     EYE SURGERY       HC PART REMV BONE METATARSAL HEAD,EA  01/11/10    Right foot plantar plate tear. Also correct hammertoe, 2nd digit & varicose vein ligation, heel.     HC REVISE MEDIAN N/CARPAL TUNNEL SURG      Left in 1997, right in 1999     INJ, ANES/STER, FACET LUMB/SAC  2010    Left L5 nerve root injection     INJECT EPIDURAL CERVICAL Right 9/29/2022    Procedure: Cervical 7-Thoracic 1 Interlaminar epidural steroid injection using fluoroscopic guidance with contrast dye, Right;  Surgeon: Tyler Bay MD;  Location:  OR     INJECT EPIDURAL TRANSFORAMINAL  10/09/2013    [facility]     INJECT JOINT SACROILIAC  3/19/14,4/21/14    [facility]     REPAIR VALVE MITRAL MINIMALLY INVASIVE N/A 10/26/2023    Procedure: MINIMALLY INVASIVE MITRAL VALVE REPAIR USING CHORD-X PRE-MEASURED LOOP SUTURE SIZE:12MM, MITRAL ANNULOPLASTY RING ERICK-COTTO PHYSIO Il SIZE: 28MM, AND ON CARDIOPULMONARY PUMP OXYGENATOR (INTRAOPERATIVE TRANSESOPHAGEAL ECHOCARDIOGRAM BY THE CARDIOLOGIST  );  Surgeon: Sabas Meredith MD;  Location:  OR     [facility] NONSPECIFIC PROCEDURE  2009    Metatarsal phalangeal joint injection     [facility]   2002    bx. benign - flex sig in 5 yrs  or colonoscopy in 10 yrs     Z ECHO HEART XTHORACIC, STRESS/REST  2005    WNL     [facility] NCS MOTOR W/O F-WAVE, EACH NERVE  2008    CTS       FAMILY HISTORY:   Family History   Problem Relation Age of Onset     Diabetes Father      Depression Father      Cerebrovascular Disease Mother         age 80     Arthritis Mother      Depression Mother         On meds     Eye Disorder Mother         Glaucoma     Osteoporosis Mother      Respiratory Mother          86 YO COPD     Chronic Obstructive Pulmonary Disease Mother      C.A.D. Brother         67 YO MI -     Chronic Obstructive Pulmonary Disease Brother      Myocardial Infarction Brother      Other Cancer Brother      Cancer Brother        SOCIAL HISTORY:   Social History     Tobacco Use     Smoking status: Former     Packs/day: 0.50     Years: 30.00     Additional pack years: 0.00     Total pack years: 15.00     Types: Cigarettes     Quit date: 3/20/1998     Years since quittin.8     Smokeless tobacco: Never     Tobacco comments:     No smokers in home   Substance Use Topics     Alcohol use: Yes     Alcohol/week: 6.7 standard drinks of alcohol     Comment: 2x/week       REVIEW OF SYSTEMS: Review Of Systems  Skin: Nail problems  Eyes: Glasses  Ears/Nose/Throat: Dizziness  Respiratory: Shortness of breath and lying flat, shortness of breath at rest, wheezing, productive cough  Cardiovascular: Irregular heartbeat  Gastrointestinal: Heartburn  Genitourinary: negative  Musculoskeletal: Arthritis, back pain, neck pain, leg swelling  Neurologic: Headaches  Psychiatric: Depression, anxiety  Hematologic/Lymphatic/Immunologic: Easy bruising, bloody nose  Endocrine: negative      Vital signs:  LMP 2008 (Approximate)     Current Outpatient Medications   Medication Sig Dispense Refill     acetaminophen (TYLENOL) 500 MG tablet Take 1.5 tablets (750 mg) by mouth every 6 hours as needed for other or mild pain (For optimal  non-opioid multimodal pain management to improve pain control.)       albuterol (PROAIR HFA/PROVENTIL HFA/VENTOLIN HFA) 108 (90 Base) MCG/ACT inhaler Inhale 2 puffs into the lungs every 6 hours as needed for shortness of breath, wheezing or cough       ALLEGRA 180 MG OR TABS Take 180 mg by mouth daily  30 0     amitriptyline (ELAVIL) 10 MG tablet TAKE 3 TO 4 TABLETS AT BEDTIME 270 tablet 3     aspirin (ASA) 81 MG chewable tablet Take 1 tablet (81 mg) by mouth daily 90 tablet 3     buPROPion (WELLBUTRIN XL) 150 MG 24 hr tablet TAKE 1 TABLET EVERY MORNING 90 tablet 1     CALCIUM /VITAMIN D TABS   OR Take 1 tablet by mouth daily       famotidine (PEPCID) 20 MG tablet Take 20 mg by mouth daily as needed       fluticasone (FLONASE) 50 MCG/ACT nasal spray Spray 1 spray into both nostrils daily for 100 days 1 mL 4     magnesium aspartate (MAGINEX) 615 MG EC tablet Take 1 tablet (615 mg) by mouth daily       meclizine (ANTIVERT) 25 MG tablet Take 25 mg by mouth daily as needed for dizziness       Melatonin 10 MG TABS tablet Take 10 mg by mouth nightly as needed for sleep       metoprolol succinate ER (TOPROL XL) 25 MG 24 hr tablet Take 1 tablet (25 mg) by mouth daily 30 tablet 3     mometasone-formoterol (DULERA) 100-5 MCG/ACT inhaler Inhale 2 puffs into the lungs 2 times daily 39 g 3     montelukast (SINGULAIR) 10 MG tablet Take 1 tablet (10 mg) by mouth at bedtime for 150 days (Patient not taking: Reported on 1/22/2024) 30 tablet 4     Multiple Vitamins-Minerals (PRESERVISION AREDS) TABS Take 1 tablet by mouth 2 times daily       multivitamin w/minerals (THERA-VIT-M) tablet Take 1 tablet by mouth daily RAW Brand       naproxen sodium (ANAPROX) 220 MG tablet Take 220 mg by mouth as needed for moderate pain       omeprazole (PRILOSEC) 20 MG DR capsule TAKE 1 CAPSULE DAILY 90 capsule 3     Simethicone (GAS-X PO) Take 160 mg by mouth 4 times daily as needed for intestinal gas        triamcinolone (NASACORT) 55 MCG/ACT nasal  inhaler Spray 1 spray into both nostrils daily       vitamin C (ASCORBIC ACID) 250 MG tablet Take 250 mg by mouth daily         PHYSICAL EXAM:  General: Pleasant, NAD.   HEENT: Normocephalic, atraumatic, external ears and nose normal.   Respiratory: Normal respiratory effort.   Cardiovascular: Pulse is regular.   Musculoskeletal: Gait and station normal.  The joints of her fingers and toes without deformity.  There is no cyanosis of her nailbeds.   EXTREMITIES: Right lower extremity: A few, scattered reticular veins and telangiectasias over the anterolateral right leg.  There are few scattered, small veins in the posterior posterolateral right thigh.  Telangiectasias about the right medial ankle.  No significant edema today.  Sizable scar over the right distal lateral leg with a halo of hyperpigmentation surrounding it.    Left lower extremity: Small varicosity coursing down the posteromedial left thigh and onto the posteromedial left calf.  Small varicosity visible on the distal third of the posterior left leg.  Telangiectasias about the left medial ankle.  No edema.  PULSES: R/L (3=normal pulse, 0=no palpable pulse) dorsalis pedis: 1/0; posterior tibial: 3/3.      Neurologic: Grossly normal  Psychiatric: Mood, affect, judgment and insight are normal     ASSESSMENT:  A few, small, asymptomatic, recurrent left greater than right lower extremity veins.  She is having some swelling of her right ankle related to a right lateral leg wound which healed leaving a large scar.  She had no swelling prior to the wound.    A few scattered veins she has are asymptomatic and do not need to be addressed.  She does have some telangiectasias about the medial ankles but these should not be of significant clinical consequence.    Controlling the edema is important.  She does have a large seroma in her right groin from her heart surgery which could be contributing to the swelling somewhat.  She is being seen regarding having this  definitively managed.  Was quite honest that the swelling of the right ankle may worsen for period of time if the seroma is surgically repaired/excised.  She voiced understanding and her questions were answered.    PLAN:  1.  Fit with appropriately sized knee-high compression hose  2.  Edema wear to be worn at night     Stanley Brown MD, FACS    Dictated using Dragon voice recognition software which may result in transcription errors        VEIN CLINIC LEG DRAWING:                Again, thank you for allowing me to participate in the care of your patient.        Sincerely,        Stanley Brown MD

## 2024-01-25 NOTE — PATIENT INSTRUCTIONS
You have been recommended to wear Edemawear.    How to Apply EdemaWear   - Apply EdemaWear starting from toes pulling up to knees with a cuff at the top of the stockings.  - DO NOT CUT OR TRIM STOCKINGS, fold over the stocking until the top of it lays just BELOW your knee crease  - If wearing socks, wear them over top of EdemaWear. EdemaWear should be in direct contact with the skin.  - Apply a fresh pair daily    Care of EdemaWear  - DO NOT THROW AWAY THE OLD PAIR OF EDEMAWEAR, WASH IT.       o  Hand or machine wash in cold water and hang dry    EdemaWear Sizes  Size  Calf circumference  Stripe color   Small  up to 45cm  Navy_______   Medium up to 75cm  Yellow______   Large  up to 115cm  Red________   X-Large up to 150cm  Aqua_______    Options for purchasing  - [employer] [facility] ([facility])    [address] [place] [address]    Ph: [phone]    - [employer] ([facility])    Ph: [phone]  Email: [email or web address]     Visit [email or web address] for more information

## 2024-01-26 ENCOUNTER — HOSPITAL ENCOUNTER (OUTPATIENT)
Dept: CARDIAC REHAB | Facility: CLINIC | Age: 69
Discharge: HOME | End: 2024-01-26
Attending: SURGERY
Payer: COMMERCIAL

## 2024-01-26 PROCEDURE — 93798 PHYS/QHP OP CAR RHAB W/ECG: CPT

## 2024-02-02 ENCOUNTER — TELEPHONE (OUTPATIENT)
Dept: OTHER | Facility: CLINIC | Age: 69
End: 2024-02-02

## 2024-02-02 ENCOUNTER — OFFICE VISIT (OUTPATIENT)
Dept: OTHER | Facility: CLINIC | Age: 69
End: 2024-02-02
Attending: SURGERY
Payer: COMMERCIAL

## 2024-02-02 VITALS
HEART RATE: 88 BPM | WEIGHT: 130 LBS | BODY MASS INDEX: 23.04 KG/M2 | HEIGHT: 63 IN | DIASTOLIC BLOOD PRESSURE: 76 MMHG | OXYGEN SATURATION: 98 % | SYSTOLIC BLOOD PRESSURE: 128 MMHG

## 2024-02-02 DIAGNOSIS — I97.641 POSTOPERATIVE SEROMA INVOLVING CIRCULATORY SYSTEM AFTER CARDIAC BYPASS: Primary | ICD-10-CM

## 2024-02-02 DIAGNOSIS — Z98.890 S/P MITRAL VALVE REPAIR: ICD-10-CM

## 2024-02-02 PROCEDURE — G0463 HOSPITAL OUTPT CLINIC VISIT: HCPCS | Performed by: SURGERY

## 2024-02-02 PROCEDURE — 99215 OFFICE O/P EST HI 40 MIN: CPT | Performed by: SURGERY

## 2024-02-02 ASSESSMENT — ENCOUNTER SYMPTOMS
PSYCHIATRIC NEGATIVE: 1
NEUROLOGICAL NEGATIVE: 1
GASTROINTESTINAL NEGATIVE: 1
CONSTITUTIONAL NEGATIVE: 1
WHEEZING: 1
ENDOCRINE NEGATIVE: 1
EYES NEGATIVE: 1
MUSCULOSKELETAL NEGATIVE: 1
HEMATOLOGIC/LYMPHATIC NEGATIVE: 1
ALLERGIC/IMMUNOLOGIC NEGATIVE: 1
COLOR CHANGE: 1

## 2024-02-02 NOTE — NURSING NOTE
"[facility]        Patient is here for a consult to discuss seroma s/p MINI mitral repair, seroma in R groin. Previously saw Dr. Sims.     Pt is currently taking Aspirin.    /76 (BP Location: Right arm, Patient Position: Sitting, Cuff Size: Adult Regular)   Pulse 88   Ht 5' 3\" (1.6 m)   Wt 130 lb (59 kg)   LMP 02/26/2008 (Approximate)   SpO2 98%   BMI 23.03 kg/m      The provider has been notified that the patient right groin seroma, right leg fell and cut in August 2023, swelling since then.  Wears compression sock.     Questions patient would like addressed today are: see above.    Refills are needed: No    Has homecare services and agency name:  CHENTE Levy, RN  [facility]  Office:  [phone] Fax: [phone]          "

## 2024-02-02 NOTE — TELEPHONE ENCOUNTER
[facility]    Who is the name of the provider?:  RODRIGUEZ HER   What is the location you see this provider at/preferred location?: [place]  Person calling / Facility: Margareth Green  Phone number:  [phone]   Nurse call back needed:  YES     Reason for call:  Patient requesting a call back to discuss some questions about surgery that she didn't ask during OV on  2/2. Requested to speak to a nurse specifically.     Pharmacy location:     [facility] [address]   [facility] [id number] [place] [address]   [facility] - A MAIL ORDER [facility] - [facility] [place] - [address] [facility] [place] - [facility] [address] [facility] [address] [place] [address] AVE [facility]-  Outside Imaging: n/a   Can we leave a detailed message on this number?  YES     2/2/2024, 2:31 PM

## 2024-02-02 NOTE — TELEPHONE ENCOUNTER
Returned call to patient.  Inquired if there would be a second incision a few weeks after the initial surgery.  Discussed that there would only be one incision.  Once this incision gets small enough where we are unable to fit a sponge in the opening, would switch to an AquaCel type dressing.    Patient verbalized understanding.    Alejandra Durán, WENDYN, RN, [facility] [place]

## 2024-02-02 NOTE — PATIENT INSTRUCTIONS
A surgery scheduler will call you next week to discuss dates/times for the procedure.    Patient Education    Negative Pressure Wound Therapy (01:57)  Your health professional recommends that you watch this short online health video.  Learn how a negative pressure device helps heal your wound and what to expect when you take the device home.  Purpose:  Explains how negative pressure wound healing works, how to use the device safely at home, and when to call for help.  Goal:  Users will learn how a negative pressure device can help heal a wound and will gain confidence that they can use the device safely at home.     How to watch the video    Scan the QR code   OR Visit the website    [email or web address]   Current as of: March 21, 2023               Content Version: 13.8    [id number] [employer].   Care instructions adapted under license by your healthcare professional. If you have questions about a medical condition or this instruction, always ask your healthcare professional. [employer] disclaims any warranty or liability for your use of this information.

## 2024-02-02 NOTE — PROGRESS NOTES
[facility] INITIAL VASCULAR SURGERY CONSULT    Impression:   1.  Right groin seroma (7.6 x 6.9 x 8.0 cm) related to a minimally invasive mitral valve repair on 10/26/2023.  A right femoral cutdown was performed at that procedure with cannulation of the right femoral artery and vein for cardiopulmonary bypass.  This fluid collection was 7.4 x 7.0 x 4.7 cm on an ultrasound from 11/15/2023.    2.  Unilateral right ankle swelling related to a right lateral leg wound following trauma to her lateral right calf several months ago.    3.  History of unknown bilateral lower extremity venous procedures 10 years ago at an outside institution.  By history it sounds like she underwent stab phlebectomies.    4.  Status post minimally invasive mitral valve repair 10/26/2023.    Plan:   I had a detailed conversation with Margareth reviewing all the above.  Her right groin fluid collection has enlarged slightly over the last 4 months.  It is unlikely that this will reabsorb on its own.  I suspect an underlying lymphatic leak.  Compression in the groin may be contributing to the right ankle edema.  She has been seen by Dr. Brown at our vein office and she has been prescribed knee-high compression stockings of 20-30 mmHg pressure and edema wear to wear at night.    From the standpoint of her slowly enlarging right groin seroma I would recommend a right groin exploration with attempts at ligation of her lymphatic leak utilizing isosulfan blue.  We would perform a multilayered closure and likely utilize a wound VAC for the superficial and skin layers.  I discussed the specifics of this including the need for long-term wound VAC and wound care.  I discussed the rationale for this treatment and the fact that it may worsen right ankle edema initially but hopefully with decompression of the groin fluid collection eventually venous and lymphatic drainage of the right leg will be improved.    All of her  questions were answered.  She verbalizes full understanding to the above.  She plans to go home and discuss things further with her family but will likely schedule the above-noted surgery.    Total length of this encounter was 40 minutes with time spent reviewing studies, interviewing and examining the patient, answering questions, and coordinating a treatment plan.      HPI:   Margareth Green is a 69-year-old female who underwent a minimally invasive mitral valve repair which included a right groin cutdown for cannulation of the right common femoral artery and vein all performed on 10/26/2023.  Shortly thereafter she noted a right groin fluid collection or bulge which has persisted.  This area is largely asymptomatic.  She is status post bilateral lower extremity venous procedures 10 years ago which by description sound like phlebectomies.  She has a history of a traumatic laceration to the lateral right calf requiring extensive suturing.  That wound became infected and took multiple weeks to heal with the help of a wound clinic.  She now complains of intermittent right ankle edema and has been wearing knee-high compression stockings on a regular basis.  She was seen by my partner, Dr. Brown for a venous evaluation on 1/25/2023.  He has prescribed knee-high compression stockings of 20-30 mmHg pressure and edema wear to wear at night.  He does not believe that further venous intervention is required.  He discussed the potential for the seroma to be exacerbating her right ankle edema.  She has therefore been referred to me to discuss potential treatment for her persistent right groin seroma.      CURRENT MEDICATIONS  acetaminophen (TYLENOL) 500 MG tablet, Take 1.5 tablets (750 mg) by mouth every 6 hours as needed for other or mild pain (For optimal non-opioid multimodal pain management to improve pain control.)  albuterol (PROAIR HFA/PROVENTIL HFA/VENTOLIN HFA) 108 (90 Base) MCG/ACT inhaler, Inhale 2 puffs into  the lungs every 6 hours as needed for shortness of breath, wheezing or cough  ALLEGRA 180 MG OR TABS, Take 180 mg by mouth daily   amitriptyline (ELAVIL) 10 MG tablet, TAKE 3 TO 4 TABLETS AT BEDTIME  aspirin (ASA) 81 MG chewable tablet, Take 1 tablet (81 mg) by mouth daily  buPROPion (WELLBUTRIN XL) 150 MG 24 hr tablet, TAKE 1 TABLET EVERY MORNING  CALCIUM /VITAMIN D TABS   OR, Take 1 tablet by mouth daily  famotidine (PEPCID) 20 MG tablet, Take 20 mg by mouth daily as needed  fluticasone (FLONASE) 50 MCG/ACT nasal spray, Spray 1 spray into both nostrils daily for 100 days  magnesium aspartate (MAGINEX) 615 MG EC tablet, Take 1 tablet (615 mg) by mouth daily  meclizine (ANTIVERT) 25 MG tablet, Take 25 mg by mouth daily as needed for dizziness  Melatonin 10 MG TABS tablet, Take 10 mg by mouth nightly as needed for sleep  metoprolol succinate ER (TOPROL XL) 25 MG 24 hr tablet, Take 1 tablet (25 mg) by mouth daily  mometasone-formoterol (DULERA) 100-5 MCG/ACT inhaler, Inhale 2 puffs into the lungs 2 times daily  Multiple Vitamins-Minerals (PRESERVISION AREDS) TABS, Take 1 tablet by mouth 2 times daily  multivitamin w/minerals (THERA-VIT-M) tablet, Take 1 tablet by mouth daily RAW Brand  naproxen sodium (ANAPROX) 220 MG tablet, Take 220 mg by mouth as needed for moderate pain  omeprazole (PRILOSEC) 20 MG DR capsule, TAKE 1 CAPSULE DAILY  Simethicone (GAS-X PO), Take 160 mg by mouth 4 times daily as needed for intestinal gas   vitamin C (ASCORBIC ACID) 250 MG tablet, Take 250 mg by mouth daily  montelukast (SINGULAIR) 10 MG tablet, Take 1 tablet (10 mg) by mouth at bedtime for 150 days (Patient not taking: Reported on 2/2/2024)  triamcinolone (NASACORT) 55 MCG/ACT nasal inhaler, Spray 1 spray into both nostrils daily (Patient not taking: Reported on 2/2/2024)    No current facility-administered medications on file prior to visit.        PAST MEDICAL HISTORY  Past Medical History:   Diagnosis Date    Abnormal Papanicolaou  smear of vagina and vaginal HPV 12/2002    ASCUS (HPV neg). 3/03 WNL but no transitional zome - repeat 3months    Allergic rhinitis, cause unspecified     Arthritis     Backache, unspecified     Carpal tunnel syndrome     s/p repair    Cervicalgia     Chronic kidney disease, stage 3 (H) 07/14/2021    Depressive disorder     Depressive disorder, not elsewhere classified     doing well on Wellbutrin    Derangement of TMJ (temporomandibular joint) 03/2010    internal derangement    Dermatophytosis of nail     Diaphragmatic hernia without mention of obstruction or gangrene 09/2007    small hiatal hernia    Diffuse cystic mastopathy 2000    FCBD    Esophageal reflux 04/2005    Hammertoe 10/2009    see podiatry consult    Headache(784.0)     Muscle contrature headaches    Intramural leiomyoma of uterus 07/2007    on US    Irregular menstrual cycle     better with BCP -also PMS is better with BCP    IRRITABLE COLON 02/18/2008    Malaise and fatigue     Menopause     lmp 2/2008    Mild persistent asthma     Mitral valve disease     Mole (skin) 2003    9 x 6 mm rt ant chest    Motion sickness     Neuroma 11/2008    lt foot    Osteopenia 12/2008    Other malaise and fatigue 04/2005    stress    Personal history of tobacco use, presenting hazards to health     Quit in 1998    Solitary cyst of breast 12/2005    lt 6 oclock    Stenosis of lumbosacral spine     l4-5 and l5-s1    Symptomatic menopausal or female climacteric states 12/2002    perimenopausal. LMP 2/2008    Tachycardia, paroxysmal (H) 11/24/2020    Thyroid fullness 08/07/2013         PAST SURGICAL HISTORY:  Past Surgical History:   Procedure Laterality Date    BIOPSY  4/10/2015    BL DUPLEX LO EXTREM ART UNILAT/LTD  4/3008    lower extr arterial doppler -no stenosis    C DEXA,BONE DENSITY,APPENDICULR SKELTN  12/2008    osteopenia    COLONOSCOPY  10/22/2007    bx benign    COLONOSCOPY N/A 4/10/2015    Procedure: COMBINED COLONOSCOPY, SINGLE OR MULTIPLE  BIOPSY/POLYPECTOMY BY BIOPSY;  Surgeon: Cl Wagner MD;  Location:  GI    CV CORONARY ANGIOGRAM N/A 8/17/2023    Procedure: Coronary Angiogram;  Surgeon: Liza Ford MD;  Location: [facility] CARDIAC CATH LAB    ESOPHAGOSCOPY, GASTROSCOPY, DUODENOSCOPY (EGD), COMBINED N/A 1/31/2018    Procedure: COMBINED ESOPHAGOSCOPY, GASTROSCOPY, DUODENOSCOPY (EGD);  COMBINED ESOPHAGOSCOPY, GASTROSCOPY, DUODENOSCOPY (EGD);  Surgeon: Tirso Duran MD;  Location:  GI    ESOPHAGOSCOPY, GASTROSCOPY, DUODENOSCOPY (EGD), COMBINED N/A 2/1/2023    Procedure: ESOPHAGOGASTRODUODENOSCOPY (EGD);  Surgeon: Lui Vences MD;  Location:  GI    EYE SURGERY      HC PART REMV BONE METATARSAL HEAD,EA  01/11/10    Right foot plantar plate tear. Also correct hammertoe, 2nd digit & varicose vein ligation, heel.    HC REVISE MEDIAN N/CARPAL TUNNEL SURG      Left in 1997, right in 1999    INJ, ANES/STER, FACET LUMB/SAC  2010    Left L5 nerve root injection    INJECT EPIDURAL CERVICAL Right 9/29/2022    Procedure: Cervical 7-Thoracic 1 Interlaminar epidural steroid injection using fluoroscopic guidance with contrast dye, Right;  Surgeon: Tyler Bay MD;  Location:  OR    INJECT EPIDURAL TRANSFORAMINAL  10/09/2013    [facility]    INJECT JOINT SACROILIAC  3/19/14,4/21/14    [facility]    REPAIR VALVE MITRAL MINIMALLY INVASIVE N/A 10/26/2023    Procedure: MINIMALLY INVASIVE MITRAL VALVE REPAIR USING CHORD-X PRE-MEASURED LOOP SUTURE SIZE:12MM, MITRAL ANNULOPLASTY RING ERICK-COTTO PHYSIO Il SIZE: 28MM, AND ON CARDIOPULMONARY PUMP OXYGENATOR (INTRAOPERATIVE TRANSESOPHAGEAL ECHOCARDIOGRAM BY THE CARDIOLOGIST  );  Surgeon: Sabas Meredith MD;  Location:  OR    [facility] NONSPECIFIC PROCEDURE  2009    Metatarsal phalangeal joint injection    [facility] COLONOSCOPY THRU STOMA, DIAGNOSTIC  7/2002    bx. benign - flex sig in 5 yrs  or colonoscopy in 10 yrs    [facility] ECHO HEART XTHORACIC, STRESS/REST   "2005    WN    ZZHC NCS MOTOR W/O F-WAVE, EACH NERVE  2008    CTS       ALLERGIES     Allergies   Allergen Reactions    Penicillins Itching     Vaginal itching - Yeast infection afterward       FAMILY HISTORY  Family History   Problem Relation Age of Onset    Diabetes Father     Depression Father     Cerebrovascular Disease Mother         age 80    Arthritis Mother     Depression Mother         On meds    Eye Disorder Mother         Glaucoma    Osteoporosis Mother     Respiratory Mother          86 YO COPD    Chronic Obstructive Pulmonary Disease Mother     C.A.D. Brother         67 YO MI -    Chronic Obstructive Pulmonary Disease Brother     Myocardial Infarction Brother     Other Cancer Brother     Cancer Brother        SOCIAL HISTORY  Social History     Tobacco Use    Smoking status: Former     Packs/day: 0.50     Years: 30.00     Additional pack years: 0.00     Total pack years: 15.00     Types: Cigarettes     Quit date: 3/20/1998     Years since quittin.8    Smokeless tobacco: Never    Tobacco comments:     No smokers in home   Vaping Use    Vaping Use: Never used   Substance Use Topics    Alcohol use: Yes     Alcohol/week: 6.7 standard drinks of alcohol     Comment: 2x/week    Drug use: No       ROS:   Review of Systems   Constitutional: Negative.   HENT: Negative.     Eyes: Negative.    Cardiovascular:  Positive for leg swelling.   Respiratory:  Positive for wheezing.    Endocrine: Negative.    Hematologic/Lymphatic: Negative.    Skin:  Positive for color change.   Musculoskeletal: Negative.    Gastrointestinal: Negative.    Genitourinary: Negative.    Neurological: Negative.    Psychiatric/Behavioral: Negative.     Allergic/Immunologic: Negative.          EXAM:  /76 (BP Location: Right arm, Patient Position: Sitting, Cuff Size: Adult Regular)   Pulse 88   Ht 5' 3\" (1.6 m)   Wt 130 lb (59 kg)   LMP 2008 (Approximate)   SpO2 98%   BMI 23.03 kg/m    Physical Exam  Constitutional:  "      Appearance: Normal appearance.   HENT:      Head: Normocephalic and atraumatic.   Eyes:      General: No scleral icterus.     Extraocular Movements: Extraocular movements intact.      Pupils: Pupils are equal, round, and reactive to light.   Cardiovascular:      Pulses: Normal pulses.           Posterior tibial pulses are 2+ on the right side and 2+ on the left side.   Musculoskeletal:         General: Normal range of motion.      Cervical back: Normal range of motion.   Skin:     General: Skin is warm and dry.      Comments: Moderate sized fluid collection at the right groin crease consistent with the previously noted seroma.  Nonpulsatile.  Overlying skin intact.  No erythema.   Neurological:      General: No focal deficit present.      Mental Status: She is alert and oriented to person, place, and time. Mental status is at baseline.   Psychiatric:         Mood and Affect: Mood normal.         Behavior: Behavior normal.         Thought Content: Thought content normal.         Judgment: Judgment normal.             Right groin seroma today.        Labs:  LIPID RESULTS:  Lab Results   Component Value Date    CHOL 162 04/07/2023    CHOL 169 11/25/2020    HDL 79 04/07/2023    HDL 82 11/25/2020    LDL 73 04/07/2023    LDL 66 11/25/2020    TRIG 49 04/07/2023    TRIG 105 11/25/2020    CHOLHDLRATIO 1.8 01/14/2015       CBC RESULTS:  Lab Results   Component Value Date    WBC 7.0 10/30/2023    WBC 10.8 03/16/2020    RBC 3.51 (L) 10/30/2023    RBC 4.26 03/16/2020    HGB 10.9 (L) 10/30/2023    HGB 13.2 03/16/2020    HCT 33.5 (L) 10/30/2023    HCT 40.4 03/16/2020    MCV 95 10/30/2023    MCV 95 03/16/2020    MCH 31.1 10/30/2023    MCH 31.0 03/16/2020    MCHC 32.5 10/30/2023    MCHC 32.7 03/16/2020    RDW 12.9 10/30/2023    RDW 13.1 03/16/2020     10/30/2023     03/16/2020       BMP RESULTS:  Lab Results   Component Value Date     10/30/2023     03/16/2020    POTASSIUM 4.2 10/30/2023    POTASSIUM  4.6 10/26/2023    POTASSIUM 3.9 12/06/2021    POTASSIUM 3.6 03/16/2020    CHLORIDE 104 10/30/2023    CHLORIDE 105 12/06/2021    CHLORIDE 107 03/16/2020    CO2 29 10/30/2023    CO2 26 12/06/2021    CO2 28 03/16/2020    ANIONGAP 8 10/30/2023    ANIONGAP 5 12/06/2021    ANIONGAP 5 03/16/2020     (H) 10/30/2023     (H) 10/30/2023     (H) 12/06/2021    GLC 92 03/16/2020    BUN 14.1 10/30/2023    BUN 13 12/06/2021    BUN 17 03/16/2020    CR 0.86 10/30/2023    CR 0.91 03/16/2020    GFRESTIMATED 72 10/30/2023    GFRESTIMATED 65 03/16/2020    GFRESTBLACK 76 03/16/2020    TOLU 9.2 10/30/2023    TOLU 8.5 03/16/2020        A1C RESULTS:  Lab Results   Component Value Date    A1C 5.0 10/18/2023         Imaging:  I reviewed a right groin ultrasound from 11/15/2023 and a repeat right groin ultrasound from 1/17/2024.        Brandon Hale MD

## 2024-02-02 NOTE — TELEPHONE ENCOUNTER
REQUEST RECEIVED ON 2/2/24 FOR: INCISION AND DRAINAGE OF RIGHT GROIN SEROMA WITH PREOPERATIVE LYMPHAZURIN BLUE INJECTION; PLACEMENT OF RIGHT GROIN WOUND VAC    CASE ID: [id number]    Spoke with patient and she would like to be scheduled for surgery anytime 2/12/24 or after, but would like to get it done in February.    Informed her we will work on getting her scheduled and will get back to her.

## 2024-02-02 NOTE — NURSING NOTE
Patient Education    Procedure: Incision and drainage of right groin seroma with preoperative lymphazurin blue injection; placement of right groin wound vac  Diagnosis: Right groin seroma  Anticoagulation Instruction: continue ASA  GLP-1 Agonists Instruction: n/a  Pre-Operative Physical Exam: You need to have a pre-op physical exam within 30 days of your procedure. Your procedure may be cancelled if you do not have a current History and Physical. Call your PCP's office to schedule.  Allergies:  Updated in Epic  Bowel Prep: n/a  NPO for solid 8 hours prior to arrival time.   NPO for clear liquids 2 hours prior to arrival time.   Post Procedure Education: [facility] post-procedure fact sheet reviewed with patient.    Showering instructions reviewed: Yes    Learner(s):patient  Method: Listening, Reading  Barriers to Learning:No Barrier  Outcome: Patient did verbalize understanding of above education.    Alejandra Durán, WENDYN, RN, [facility] [place]

## 2024-02-05 NOTE — TELEPHONE ENCOUNTER
[facility]    Who is the name of the provider?:  RODRIGUEZ HALE   What is the location you see this provider at/preferred location?: [place]  Person calling / Facility: Margareth Green  Phone number:  [phone] (home)  Nurse call back needed:  yes     Reason for call:    Patient is scheduled for surgery with Dr. Hale on 2/22/24 INCISION AND DRAINAGE OF RIGHT GROIN SEROMA WITH LYMPHAZURIN BLUE INJECTION; PLACEMENT OF RIGHT GROIN WOUND VAC     Patient has questions about the surgery and what will be done while she is in the hospital, which she understands will be 3-4 days.  She has questions about the injection of dye and also about home care.      Pharmacy location:     [facility] [address]   [facility] [id number] [place] [address]   [facility] - A MAIL ORDER [employer] [facility] - [address] [facility] [place] - [address] [facility] [place]  [place] - [address] [place] [address] AVE [facility]-  Outside Imaging: n/a   Can we leave a detailed message on this number?  YES     2/5/2024, 9:50 AM

## 2024-02-05 NOTE — TELEPHONE ENCOUNTER
Called pt back, left vm explaining Dr. Hale and his nurse will discuss tomorrow (2/6/24) and follow up with pt to address her questions as noted below.     CHENTE Oneal, RN  [facility]  Office:  [phone] Fax: [phone]

## 2024-02-06 NOTE — TELEPHONE ENCOUNTER
Attempted to call back.  Patient's spouse answered phone and stated she was not home.  Spouse stated patient would call back upon her return.    Alejandra Durán, WENDYN, RN, -[facility] [place]

## 2024-02-07 ENCOUNTER — TELEPHONE (OUTPATIENT)
Dept: OTHER | Facility: CLINIC | Age: 69
End: 2024-02-07
Payer: COMMERCIAL

## 2024-02-07 NOTE — TELEPHONE ENCOUNTER
Returned call to patient.  We discussed multiple questions:  Post operative care  Home care vs wound clinic for vac changes  Reason for Lymphazurin injection  Activity after surgery  Post operative visits    I answered these to the best of my ability.  Patient stated all of her questions were answered.      Alejandra Durán, WENDYN, RN, [facility] [place]

## 2024-02-07 NOTE — TELEPHONE ENCOUNTER
[facility]    Who is the name of the provider?:  RODRIGUEZ HER   What is the location you see this provider at/preferred location?: [place]  Person calling / Facility: Margareth Green  Phone number:  [phone] cell  Nurse call back needed:  Yes     Reason for call:  Please call in re: to surgery scheduled 2/22/24 - she has lots of questions - home care - blue injection - recovery time - why inpatient stay is so long -     Pharmacy location:     Outside Imaging: n/a   Can we leave a detailed message on this number?  YES     2/7/2024, 11:22 AM

## 2024-02-13 ENCOUNTER — OFFICE VISIT (OUTPATIENT)
Dept: FAMILY MEDICINE | Facility: CLINIC | Age: 69
End: 2024-02-13
Payer: COMMERCIAL

## 2024-02-13 VITALS
BODY MASS INDEX: 24.89 KG/M2 | SYSTOLIC BLOOD PRESSURE: 130 MMHG | DIASTOLIC BLOOD PRESSURE: 80 MMHG | WEIGHT: 140.5 LBS | HEART RATE: 80 BPM | OXYGEN SATURATION: 94 % | RESPIRATION RATE: 14 BRPM | TEMPERATURE: 98.8 F

## 2024-02-13 DIAGNOSIS — M26.609 TMJ (TEMPOROMANDIBULAR JOINT SYNDROME): ICD-10-CM

## 2024-02-13 DIAGNOSIS — I97.641 POSTOPERATIVE SEROMA INVOLVING CIRCULATORY SYSTEM AFTER CARDIAC BYPASS: ICD-10-CM

## 2024-02-13 DIAGNOSIS — F33.1 MAJOR DEPRESSIVE DISORDER, RECURRENT EPISODE, MODERATE (H): ICD-10-CM

## 2024-02-13 DIAGNOSIS — J84.9 ILD (INTERSTITIAL LUNG DISEASE) (H): ICD-10-CM

## 2024-02-13 DIAGNOSIS — J45.30 MILD PERSISTENT ASTHMA, UNSPECIFIED WHETHER COMPLICATED: ICD-10-CM

## 2024-02-13 DIAGNOSIS — Z98.890 S/P MVR (MITRAL VALVE REPAIR): ICD-10-CM

## 2024-02-13 DIAGNOSIS — Z01.818 PREOP GENERAL PHYSICAL EXAM: Primary | ICD-10-CM

## 2024-02-13 DIAGNOSIS — F41.1 GAD (GENERALIZED ANXIETY DISORDER): ICD-10-CM

## 2024-02-13 LAB
ALBUMIN SERPL BCG-MCNC: 4.1 G/DL (ref 3.5–5.2)
ALP SERPL-CCNC: 80 U/L (ref 40–150)
ALT SERPL W P-5'-P-CCNC: 18 U/L (ref 0–50)
ANION GAP SERPL CALCULATED.3IONS-SCNC: 10 MMOL/L (ref 7–15)
AST SERPL W P-5'-P-CCNC: 33 U/L (ref 0–45)
BILIRUB SERPL-MCNC: <0.2 MG/DL
BUN SERPL-MCNC: 24.8 MG/DL (ref 8–23)
CALCIUM SERPL-MCNC: 9.2 MG/DL (ref 8.8–10.2)
CHLORIDE SERPL-SCNC: 104 MMOL/L (ref 98–107)
CREAT SERPL-MCNC: 0.93 MG/DL (ref 0.51–0.95)
DEPRECATED HCO3 PLAS-SCNC: 26 MMOL/L (ref 22–29)
EGFRCR SERPLBLD CKD-EPI 2021: 65 ML/MIN/1.73M2
ERYTHROCYTE [DISTWIDTH] IN BLOOD BY AUTOMATED COUNT: 14.3 % (ref 10–15)
GLUCOSE SERPL-MCNC: 88 MG/DL (ref 70–99)
HCT VFR BLD AUTO: 41.2 % (ref 35–47)
HGB BLD-MCNC: 13.2 G/DL (ref 11.7–15.7)
MCH RBC QN AUTO: 29.1 PG (ref 26.5–33)
MCHC RBC AUTO-ENTMCNC: 32 G/DL (ref 31.5–36.5)
MCV RBC AUTO: 91 FL (ref 78–100)
PLATELET # BLD AUTO: 219 10E3/UL (ref 150–450)
POTASSIUM SERPL-SCNC: 4.4 MMOL/L (ref 3.4–5.3)
PROT SERPL-MCNC: 6.4 G/DL (ref 6.4–8.3)
RBC # BLD AUTO: 4.53 10E6/UL (ref 3.8–5.2)
SODIUM SERPL-SCNC: 140 MMOL/L (ref 135–145)
WBC # BLD AUTO: 8 10E3/UL (ref 4–11)

## 2024-02-13 PROCEDURE — 80053 COMPREHEN METABOLIC PANEL: CPT | Performed by: PHYSICIAN ASSISTANT

## 2024-02-13 PROCEDURE — 85027 COMPLETE CBC AUTOMATED: CPT | Performed by: PHYSICIAN ASSISTANT

## 2024-02-13 PROCEDURE — 99417 PROLNG OP E/M EACH 15 MIN: CPT | Performed by: PHYSICIAN ASSISTANT

## 2024-02-13 PROCEDURE — 99215 OFFICE O/P EST HI 40 MIN: CPT | Performed by: PHYSICIAN ASSISTANT

## 2024-02-13 PROCEDURE — 36415 COLL VENOUS BLD VENIPUNCTURE: CPT | Performed by: PHYSICIAN ASSISTANT

## 2024-02-13 NOTE — PROGRESS NOTES
Preoperative Evaluation  [facility]  [address], [address]  [place] [address]  Phone: [phone]  Fax: [phone]  Primary Provider: Hafsa Beasley  Pre-op Performing Provider: HAFSA BEASLEY  Feb 13, 2024       Margareth is a 69 year old, presenting for the following:  Pre-Op Exam        2/13/2024     1:54 PM   Additional Questions   Roomed by Margo hernandez     Surgical Information  Surgery/Procedure: Incision and drainage   Surgery Location: [facility]   Surgeon: Dr Hale   Surgery Date: 2/22/2024  Time of Surgery: 7:30 am  Where patient plans to recover: At home with family  Fax number for surgical facility: Note does not need to be faxed, will be available electronically in Epic.    Assessment & Plan     The proposed surgical procedure is considered INTERMEDIATE risk.    Preop general physical exam  Patient scheduled for procedure as above,  - CBC with platelets; Future  - Comprehensive metabolic panel; Future  - CBC with platelets  - Comprehensive metabolic panel    Postoperative seroma involving circulatory system after cardiac bypass  Scheduled for procedure as above    S/P MVR (mitral valve repair)  Stable, doing cardiac rehab with good improvement    ILD (interstitial lung disease) (H)  Stable, follows with pulmonology no progressive shortness of breath    Mild persistent asthma, unspecified whether complicated  No current symptoms of asthma exacerbation, well-controlled on current inhalers as prescribed by pulmonology    Major depressive disorder, recurrent episode, moderate (H)  Well-controlled, continue current dosage of bupropion    TAMMY (generalized anxiety disorder)  Well-controlled, continue current dosage of medications    TMJ (temporomandibular joint syndrome)  Continue to wear mouthguard          Risks and Recommendations  The patient has the following additional risks and recommendations for perioperative complications:  Pulmonary:    - Incentive spirometry  post-op    Antiplatelet or Anticoagulation Medication Instructions   - aspirin: Patient reports she was told by her surgeon to continue to take her aspirin.    Additional Medication Instructions  Patient is to take all scheduled medications on the day of surgery EXCEPT for modifications listed below:   - Calcium Channel Blockers: May be continued on the day of surgery.   - naproxen (Aleve, Naprosyn): HOLD 4 days before surgery.    - Herbal medications and vitamins: HOLD 14 days prior to surgery.    Recommendation  APPROVAL GIVEN to proceed with proposed procedure, without further diagnostic evaluation.      60 minutes spent by me on the date of the encounter doing chart review, history and exam, documentation and further activities per the note    Subjective       HPI related to upcoming procedure: She underwent mitral valve repair on October 26, 2023.  She developed a seroma at that time.  They have been doing watchful waiting to see if it will self resolve however it is getting larger.  It still does not cause her any pain or discomfort but they are going to drain this.  Also she has been having some right lower leg edema which may be related to several factors including seroma, varicosities, and a previous leg infection on her right lower leg.  She has seen a vein specialist as well.  She has been wearing her compression stockings however she has not seen improvement yet.        2/13/2024     1:45 PM   Preop Questions   1. Have you ever had a heart attack or stroke? No   2. Have you ever had surgery on your heart or blood vessels, such as a stent placement, a coronary artery bypass, or surgery on an artery in your head, neck, heart, or legs? YES -minimally invasive mitral valve repair 10-   3. Do you have chest pain with activity? No   4. Do you have a history of  heart failure? No   5. Do you currently have a cold, bronchitis or symptoms of other infection? No   6. Do you have a cough, shortness of  breath, or wheezing? No   7. Do you or anyone in your family have previous history of blood clots? No   8. Do you or does anyone in your family have a serious bleeding problem such as prolonged bleeding following surgeries or cuts? No   9. Have you ever had problems with anemia or been told to take iron pills? No   10. Have you had any abnormal blood loss such as black, tarry or bloody stools, or abnormal vaginal bleeding? No   11. Have you ever had a blood transfusion? No   12. Are you willing to have a blood transfusion if it is medically needed before, during, or after your surgery? Yes   13. Have you or any of your relatives ever had problems with anesthesia? No   14. Do you have sleep apnea, excessive snoring or daytime drowsiness? No   15. Do you have any artifical heart valves or other implanted medical devices like a pacemaker, defibrillator, or continuous glucose monitor? No   16. Do you have artificial joints? No   17. Are you allergic to latex? No       Health Care Directive  Patient has a Health Care Directive on file      Preoperative Review of    reviewed - controlled substances provided after her most recent procedure in Oct. no other controlled substances noted      Status of Chronic Conditions:  See problem list for active medical problems.  Problems all longstanding and stable, except as noted/documented.  See ROS for pertinent symptoms related to these conditions.    ASTHMA - Patient has a longstanding history of moderate-severe Asthma . Patient has been doing well overall noting PHILIP which is stable and unchanged and continues on medication regimen consisting of albuterol and dulera without adverse reactions or side effects.     DEPRESSION - Patient has a long history of Depression of moderate severity requiring medication for control with recent symptoms being stable..Current symptoms of depression include decreased appetite, insomnia, fatigue, difficulty with concentration.     Patient  Active Problem List    Diagnosis Date Noted    S/P MVR (mitral valve repair) 10/26/2023     Priority: Medium    ILD (interstitial lung disease) (H) 10/17/2023     Priority: Medium    Chondromalacia of right knee 07/19/2023     Priority: Medium    Right sided sciatica 07/19/2023     Priority: Medium    Pain of right lower extremity 07/03/2023     Priority: Medium    Muscle cramp 07/03/2023     Priority: Medium    Personal history of COVID-19 01/19/2022     Priority: Medium    T12 compression fracture, sequela 01/19/2022     Priority: Medium    Gastroesophageal reflux disease without esophagitis 11/30/2016     Priority: Medium    Tremor 11/30/2016     Priority: Medium    Cramp of limb 11/30/2016     Priority: Medium    Major depressive disorder, recurrent episode, moderate (H) 10/13/2015     Priority: Medium    Epistaxis 08/15/2013     Priority: Medium     Do you wish to do the replacement in the background? yes        Numbness of left jaw 08/07/2013     Priority: Medium    Neck pain 08/07/2013     Priority: Medium    TMJ (temporomandibular joint syndrome) 08/07/2013     Priority: Medium    Thyroid fullness 08/07/2013     Priority: Medium    Menopausal flushing 05/10/2012     Priority: Medium    Knee pain, left 03/22/2012     Priority: Medium    Mild major depression (H) 11/21/2011     Priority: Medium    Osteoporosis 12/30/2010     Priority: Medium    Atrophic vaginitis 12/07/2010     Priority: Medium    Stenosis of lumbosacral spine      Priority: Medium     l4-5 and l5-s1      Derangement of TMJ (temporomandibular joint) 03/04/2010     Priority: Medium    Hammertoe      Priority: Medium    Osteopenia      Priority: Medium    Menopausal syndrome 12/06/2008     Priority: Medium    DDD (degenerative disc disease), lumbar 06/24/2008     Priority: Medium    Arthralgia 06/12/2008     Priority: Medium    Irritable bowel syndrome 02/18/2008     Priority: Medium    Allergic rhinitis 11/25/2005     Priority: Medium      Problem list name updated by automated process. Provider to review      Mild persistent asthma 10/25/2005     Priority: Medium    Cervicalgia 10/25/2005     Priority: Medium    Malaise and fatigue 04/26/2005     Priority: Medium     Problem list name updated by automated process. Provider to review      Insomnia 12/09/2004     Priority: Medium     Problem list name updated by automated process. Provider to review      TAMMY (generalized anxiety disorder) 09/12/2003     Priority: Medium    Generalized osteoarthrosis, unspecified site 12/19/2002     Priority: Medium    Symptomatic menopausal or female climacteric states 12/15/2002     Priority: Medium    Dermatophytosis of nail 08/20/2002     Priority: Medium      Past Medical History:   Diagnosis Date    Abnormal Papanicolaou smear of vagina and vaginal HPV 12/2002    ASCUS (HPV neg). 3/03 WNL but no transitional zome - repeat 3months    Allergic rhinitis, cause unspecified     Arthritis     Backache, unspecified     Carpal tunnel syndrome     s/p repair    Cervicalgia     Chronic kidney disease, stage 3 (H) 07/14/2021    Depressive disorder     Depressive disorder, not elsewhere classified     doing well on Wellbutrin    Derangement of TMJ (temporomandibular joint) 03/2010    internal derangement    Dermatophytosis of nail     Diaphragmatic hernia without mention of obstruction or gangrene 09/2007    small hiatal hernia    Diffuse cystic mastopathy 2000    FCBD    Esophageal reflux 04/2005    Hammertoe 10/2009    see podiatry consult    Headache(784.0)     Muscle contrature headaches    Intramural leiomyoma of uterus 07/2007    on US    Irregular menstrual cycle     better with BCP -also PMS is better with BCP    IRRITABLE COLON 02/18/2008    Malaise and fatigue     Menopause     lmp 2/2008    Mild persistent asthma     Mitral valve disease     Mole (skin) 2003    9 x 6 mm rt ant chest    Motion sickness     Neuroma 11/2008    lt foot    Osteopenia 12/2008    Other  malaise and fatigue 04/2005    stress    Personal history of tobacco use, presenting hazards to health     Quit in 1998    Solitary cyst of breast 12/2005    lt 6 oclock    Stenosis of lumbosacral spine     l4-5 and l5-s1    Symptomatic menopausal or female climacteric states 12/2002    perimenopausal. LMP 2/2008    Tachycardia, paroxysmal (H) 11/24/2020    Thyroid fullness 08/07/2013     Past Surgical History:   Procedure Laterality Date    BIOPSY  4/10/2015    BL DUPLEX LO EXTREM ART UNILAT/LTD  4/3008    lower extr arterial doppler -no stenosis    C DEXA,BONE DENSITY,APPENDICULR SKELTN  12/2008    osteopenia    COLONOSCOPY  10/22/2007    bx benign    COLONOSCOPY N/A 4/10/2015    Procedure: COMBINED COLONOSCOPY, SINGLE OR MULTIPLE BIOPSY/POLYPECTOMY BY BIOPSY;  Surgeon: Cl Wagner MD;  Location:  GI    CV CORONARY ANGIOGRAM N/A 8/17/2023    Procedure: Coronary Angiogram;  Surgeon: Liza Ford MD;  Location:  HEART CARDIAC CATH LAB    ESOPHAGOSCOPY, GASTROSCOPY, DUODENOSCOPY (EGD), COMBINED N/A 1/31/2018    Procedure: COMBINED ESOPHAGOSCOPY, GASTROSCOPY, DUODENOSCOPY (EGD);  COMBINED ESOPHAGOSCOPY, GASTROSCOPY, DUODENOSCOPY (EGD);  Surgeon: Tirso Duran MD;  Location:  GI    ESOPHAGOSCOPY, GASTROSCOPY, DUODENOSCOPY (EGD), COMBINED N/A 2/1/2023    Procedure: ESOPHAGOGASTRODUODENOSCOPY (EGD);  Surgeon: Lui Vences MD;  Location:  GI    EYE SURGERY      HC PART REMV BONE METATARSAL HEAD,EA  01/11/10    Right foot plantar plate tear. Also correct hammertoe, 2nd digit & varicose vein ligation, heel.    HC REVISE MEDIAN N/CARPAL TUNNEL SURG      Left in 1997, right in 1999    INJ, ANES/STER, FACET LUMB/SAC  2010    Left L5 nerve root injection    INJECT EPIDURAL CERVICAL Right 9/29/2022    Procedure: Cervical 7-Thoracic 1 Interlaminar epidural steroid injection using fluoroscopic guidance with contrast dye, Right;  Surgeon: Tyler Bay MD;  Location:  OR    INJECT EPIDURAL  TRANSFORAMINAL  10/09/2013    [facility]    INJECT JOINT SACROILIAC  3/19/14,4/21/14    [facility]    REPAIR VALVE MITRAL MINIMALLY INVASIVE N/A 10/26/2023    Procedure: MINIMALLY INVASIVE MITRAL VALVE REPAIR USING CHORD-X PRE-MEASURED LOOP SUTURE SIZE:12MM, MITRAL ANNULOPLASTY RING ERICK-COTTO PHYSIO Il SIZE: 28MM, AND ON CARDIOPULMONARY PUMP OXYGENATOR (INTRAOPERATIVE TRANSESOPHAGEAL ECHOCARDIOGRAM BY THE CARDIOLOGIST  );  Surgeon: Sabas Meredith MD;  Location:  OR    ZC NONSPECIFIC PROCEDURE  2009    Metatarsal phalangeal joint injection    [facility] COLONOSCOPY THRU STOMA, DIAGNOSTIC  7/2002    bx. benign - flex sig in 5 yrs  or colonoscopy in 10 yrs    [facility] ECHO HEART XTHORACIC, STRESS/REST  2/2005    WNL    [facility] NCS MOTOR W/O F-WAVE, EACH NERVE  7/2008    CTS     Current Outpatient Medications   Medication Sig Dispense Refill    acetaminophen (TYLENOL) 500 MG tablet Take 1.5 tablets (750 mg) by mouth every 6 hours as needed for other or mild pain (For optimal non-opioid multimodal pain management to improve pain control.)      albuterol (PROAIR HFA/PROVENTIL HFA/VENTOLIN HFA) 108 (90 Base) MCG/ACT inhaler Inhale 2 puffs into the lungs every 6 hours as needed for shortness of breath, wheezing or cough      ALLEGRA 180 MG OR TABS Take 180 mg by mouth daily  30 0    amitriptyline (ELAVIL) 10 MG tablet TAKE 3 TO 4 TABLETS AT BEDTIME 270 tablet 3    aspirin (ASA) 81 MG chewable tablet Take 1 tablet (81 mg) by mouth daily 90 tablet 3    buPROPion (WELLBUTRIN XL) 150 MG 24 hr tablet TAKE 1 TABLET EVERY MORNING 90 tablet 1    CALCIUM /VITAMIN D TABS   OR Take 1 tablet by mouth daily      famotidine (PEPCID) 20 MG tablet Take 20 mg by mouth daily as needed      fluticasone (FLONASE) 50 MCG/ACT nasal spray Spray 1 spray into both nostrils daily for 100 days 1 mL 4    magnesium aspartate (MAGINEX) 615 MG EC tablet Take 1 tablet (615 mg) by mouth daily      meclizine (ANTIVERT)  25 MG tablet Take 25 mg by mouth daily as needed for dizziness      Melatonin 10 MG TABS tablet Take 10 mg by mouth nightly as needed for sleep      metoprolol succinate ER (TOPROL XL) 25 MG 24 hr tablet Take 1 tablet (25 mg) by mouth daily 30 tablet 3    mometasone-formoterol (DULERA) 100-5 MCG/ACT inhaler Inhale 2 puffs into the lungs 2 times daily 39 g 3    Multiple Vitamins-Minerals (PRESERVISION AREDS) TABS Take 1 tablet by mouth 2 times daily      multivitamin w/minerals (THERA-VIT-M) tablet Take 1 tablet by mouth daily RAW Brand      naproxen sodium (ANAPROX) 220 MG tablet Take 220 mg by mouth as needed for moderate pain      omeprazole (PRILOSEC) 20 MG DR capsule TAKE 1 CAPSULE DAILY 90 capsule 3    Simethicone (GAS-X PO) Take 160 mg by mouth 4 times daily as needed for intestinal gas       vitamin C (ASCORBIC ACID) 250 MG tablet Take 250 mg by mouth daily      triamcinolone (NASACORT) 55 MCG/ACT nasal inhaler Spray 1 spray into both nostrils daily (Patient not taking: Reported on 2024)         Allergies   Allergen Reactions    Penicillins Itching     Vaginal itching - Yeast infection afterward        Social History     Tobacco Use    Smoking status: Former     Packs/day: 0.50     Years: 30.00     Additional pack years: 0.00     Total pack years: 15.00     Types: Cigarettes     Quit date: 3/20/1998     Years since quittin.9    Smokeless tobacco: Never    Tobacco comments:     No smokers in home   Substance Use Topics    Alcohol use: Yes     Alcohol/week: 6.7 standard drinks of alcohol     Comment: 2x/week       History   Drug Use No         Review of Systems    Review of Systems  CONSTITUTIONAL: NEGATIVE for fever, chills, change in weight  INTEGUMENTARY/SKIN: NEGATIVE for worrisome rashes, moles or lesions  EYES: NEGATIVE for vision changes or irritation  ENT/MOUTH: NEGATIVE for ear, mouth and throat problems  RESP: NEGATIVE for significant cough or SOB  CV: NEGATIVE for chest pain, palpitations or  "peripheral edema  GI: NEGATIVE for nausea, abdominal pain, heartburn, or change in bowel habits  : NEGATIVE for frequency, dysuria, or hematuria  MUSCULOSKELETAL:NEGATIVE for significant arthralgias or myalgia  NEURO: NEGATIVE for weakness, dizziness or paresthesias  ENDOCRINE: NEGATIVE for temperature intolerance, skin/hair changes  HEME: NEGATIVE for bleeding problems  PSYCHIATRIC: NEGATIVE for changes in mood or affect  Objective    /80   Pulse 80   Temp 98.8  F (37.1  C)   Resp 14   Wt 63.7 kg (140 lb 8 oz)   LMP 02/26/2008 (Approximate)   SpO2 94%   BMI 24.89 kg/m     Estimated body mass index is 24.89 kg/m  as calculated from the following:    Height as of 2/2/24: 1.6 m (5' 3\").    Weight as of this encounter: 63.7 kg (140 lb 8 oz).  Physical Exam  GENERAL: alert and no distress  EYES: Eyes grossly normal to inspection, PERRL and conjunctivae and sclerae normal  HENT: ear canals and TM's normal, nose and mouth without ulcers or lesions  NECK: no adenopathy, no asymmetry, masses, or scars  RESP: lungs clear to auscultation - no rales, rhonchi or wheezes  CV: regular rate and rhythm, normal S1 S2, no S3 or S4, no murmur, click or rub, no peripheral edema  ABDOMEN: soft, nontender, no hepatosplenomegaly, no masses and bowel sounds normal  ABDOMEN: Fluid-filled mass right inguinal region overlying skin is normal in color  MS: no gross musculoskeletal defects noted, no edema  SKIN: no suspicious lesions or rashes  NEURO: Normal strength and tone, mentation intact and speech normal  PSYCH: mentation appears normal, affect normal/bright    Recent Labs   Lab Test 10/30/23  0543 10/28/23  0530 10/27/23  0437 10/26/23  1320 10/26/23  1202 10/26/23  1152 10/26/23  0829 10/18/23  1215   HGB 10.9* 10.6*   < > 10.2*   < > 9.4*   < > 12.5    149*   < > 129*  --  142*  --  216   INR  --   --   --  1.27*  --  1.37*  --   --     141   < > 143   < > 142   < > 140   POTASSIUM 4.2 4.4   < > 4.5   < > " 4.7   < > 4.2   CR 0.86 0.93   < > 0.93  --  0.89  --  0.88   A1C  --   --   --   --   --   --   --  5.0    < > = values in this interval not displayed.        Diagnostics  Labs pending at this time.  Results will be reviewed when available.  Recent Results (from the past 24 hour(s))   CBC with platelets    Collection Time: 02/13/24  2:49 PM   Result Value Ref Range    WBC Count 8.0 4.0 - 11.0 10e3/uL    RBC Count 4.53 3.80 - 5.20 10e6/uL    Hemoglobin 13.2 11.7 - 15.7 g/dL    Hematocrit 41.2 35.0 - 47.0 %    MCV 91 78 - 100 fL    MCH 29.1 26.5 - 33.0 pg    MCHC 32.0 31.5 - 36.5 g/dL    RDW 14.3 10.0 - 15.0 %    Platelet Count 219 150 - 450 10e3/uL      No EKG this visit, completed in the last 90 days.  She had an echocardiogram done October 26, 2023 ejection fraction was 55 to 60% with interval repair noted of her mitral valve trace regurgitation.  She also had a coronary angiogram August 18th, 2023 which showed no coronary artery disease.    Revised Cardiac Risk Index (RCRI)  The patient has the following serious cardiovascular risks for perioperative complications:   - No serious cardiac risks = 0 points     RCRI Interpretation: 0 points: Class I (very low risk - 0.4% complication rate)         Signed Electronically by: Caitlin Beasley PA-C  Copy of this evaluation report is provided to requesting physician.

## 2024-02-19 ENCOUNTER — TELEPHONE (OUTPATIENT)
Dept: OTHER | Facility: CLINIC | Age: 69
End: 2024-02-19
Payer: COMMERCIAL

## 2024-02-19 NOTE — TELEPHONE ENCOUNTER
Returned Margareth's call.    Patient wants to know how she is supposed to get around, how she will shower?  Will she be able to sit?  How long will she be in the hospital?  How long will the recovery be?    I explained that she should be fairly mobile with a wound vac, and that the part attached to the body is pretty airtight so she should be able to carefully shower.    She did ask that Dr. Hale's nurse call her back tomorrow when she is in the clinic.    Margareth will be home tomorrow in the morning and then again after 3:30.

## 2024-02-19 NOTE — TELEPHONE ENCOUNTER
[facility]    Who is the name of the provider?:  LUDIN BADILLO   What is the location you see this provider at/preferred location?: [place]  Person calling / Facility: Margareth [place]  Phone number:  [phone]   Nurse call back needed:  YES     Reason for call:  FYI procedure on 2/22 with Dr Hale.    Patient is asking for advice on inpatient duration, bathing, home health care visits, post procedure wound care. Asked for a callback from a nurse.    Pharmacy location:     [facility] [address] [place] [address]   [facility] [address]   [facility] - A MAIL ORDER [facility] - [facility], [place] - [address] [place] [address] [place] [address] [facility] [facility] [address] [facility]-  Outside Imaging: n/a   Can we leave a detailed message on this number?  YES     2/19/2024, 12:38 PM

## 2024-02-20 NOTE — TELEPHONE ENCOUNTER
Carmen from [facility] called to speak to the surgery scheduler for Dr Hale regarding this patient. [phone]

## 2024-02-21 ENCOUNTER — ANESTHESIA EVENT (OUTPATIENT)
Dept: SURGERY | Facility: CLINIC | Age: 69
End: 2024-02-21
Payer: COMMERCIAL

## 2024-02-21 RX ORDER — VIT E ACET/GLY/DIMETH/WATER
LOTION (ML) TOPICAL PRN
COMMUNITY
End: 2024-07-30

## 2024-02-21 RX ORDER — SENNOSIDES 8.6 MG
650 CAPSULE ORAL EVERY 8 HOURS PRN
COMMUNITY
End: 2024-07-08

## 2024-02-21 ASSESSMENT — LIFESTYLE VARIABLES: TOBACCO_USE: 1

## 2024-02-21 NOTE — PROGRESS NOTES
PTA medications updated by Medication Scribe prior to surgery via phone call with patient (last doses completed by Nurse)     Medication history sources: Patient, Surescripts, and H&P  In the past week, patient estimated taking medication this percent of the time: Greater than 90%      Significant changes made to the medication list:  Patient reports no longer taking the following meds (med scribe removed from PTA med list): famotidine and nasacort      Additional medication history information:    Patient bringing ventolin,dulera and flonase inhaler/sprays    Medication reconciliation completed by provider prior to medication history? No    Time spent in this activity: 40 minutes    The information provided in this note is only as accurate as the sources available at the time of update(s)      Prior to Admission medications    Medication Sig Last Dose Taking? Auth Provider Long Term End Date   acetaminophen (ARTHRITIS PAIN APAP) 650 MG CR tablet Take 650 mg by mouth every 8 hours as needed for mild pain or fever 02/21/2024 at pm Yes Reported, Patient No    albuterol (PROAIR HFA/PROVENTIL HFA/VENTOLIN HFA) 108 (90 Base) MCG/ACT inhaler Inhale 2 puffs into the lungs every 6 hours as needed for shortness of breath, wheezing or cough prn at prn Yes Reported, Patient No    ALLEGRA 180 MG OR TABS Take 180 mg by mouth daily  02/21/2022a at am Yes      amitriptyline (ELAVIL) 10 MG tablet TAKE 3 TO 4 TABLETS AT BEDTIME  Patient taking differently: Take 4 tablets by mouth at bedtime TAKE 3 TO 4 TABLETS AT BEDTIME 02/21/2022 at pm Yes Caitlin Beasley PA-C Yes    aspirin (ASA) 81 MG chewable tablet Take 1 tablet (81 mg) by mouth daily 02/22/2024 at am Yes Angela Persaud NP No    buPROPion (WELLBUTRIN XL) 150 MG 24 hr tablet TAKE 1 TABLET EVERY MORNING 02/21/2024 at am Yes Caitlin Beasley PA-C Yes    CALCIUM /VITAMIN D TABS   OR Take 1 tablet by mouth daily 02/18/2024 at am Yes      Cetaphil Moisturizing (CETAPHIL) external  lotion Apply topically as needed prn at prn Yes Reported, Patient     fluticasone (FLONASE) 50 MCG/ACT nasal spray Spray 1 spray into both nostrils daily for 100 days 02/21/2024 at am Yes Khanh Varner MD  4/21/24   magnesium aspartate (MAGINEX) 615 MG EC tablet Take 1 tablet (615 mg) by mouth daily 02/21/2024 at am Yes Catilin Beasley PA-C     meclizine (ANTIVERT) 25 MG tablet Take 25 mg by mouth daily as needed for dizziness prn at prn Yes Reported, Patient     Melatonin 10 MG TABS tablet Take 10 mg by mouth nightly as needed for sleep prn at prn Yes Reported, Patient     metoprolol succinate ER (TOPROL XL) 25 MG 24 hr tablet Take 1 tablet (25 mg) by mouth daily 02/21/2024 at pm Yes Angela Persaud, NP Yes    mometasone-formoterol (DULERA) 100-5 MCG/ACT inhaler Inhale 2 puffs into the lungs 2 times daily 02/22/2024 at am Yes Khanh Varner MD Yes    Multiple Vitamins-Minerals (PRESERVISION AREDS) TABS Take 1 tablet by mouth 2 times daily 02/18/2024 at am Yes Caitlin Beasley PA-C     multivitamin w/minerals (THERA-VIT-M) tablet Take 1 tablet by mouth daily RAW Brand 02/18/2024 at am Yes Reported, Patient     naproxen sodium (ANAPROX) 220 MG tablet Take 220 mg by mouth as needed for moderate pain 02/18/2024 at am Yes Reported, Patient     NONFORMULARY Colopaste wound care cream prn at prm Yes Reported, Patient     omeprazole (PRILOSEC) 20 MG DR capsule TAKE 1 CAPSULE DAILY 02/21/2024 at am Yes Caitlin Beasley PA-C     vitamin C (ASCORBIC ACID) 250 MG tablet Take 250 mg by mouth daily 02/21/2024 at am Yes Caitlin Beasley PA-C     Simethicone (GAS-X PO) Take 160 mg by mouth 4 times daily as needed for intestinal gas    Reported, Patient         Medication history completed by: Mitra Nieto

## 2024-02-21 NOTE — TELEPHONE ENCOUNTER
Returned call to patient.  Multiple questions answered.  She mentioned she had a small cut on her right knee, but it has healed.  No further questions.    Alejandra Durán, WENDYN, RN, [facility]

## 2024-02-21 NOTE — ANESTHESIA PREPROCEDURE EVALUATION
Anesthesia Pre-Procedure Evaluation    Patient: Margareth Green   MRN: [id number] : 1955        Procedure : Procedure(s):  INCISION AND DRAINAGE OF RIGHT GROIN SEROMA WITH LYMPHAZURIN BLUE INJECTION  PLACEMENT OF RIGHT GROIN WOUND VAC          Past Medical History:   Diagnosis Date    Abnormal Papanicolaou smear of vagina and vaginal HPV 2002    ASCUS (HPV neg). 3 WNL but no transitional zome - repeat 3months    Allergic rhinitis, cause unspecified     Arthritis     Backache, unspecified     Carpal tunnel syndrome     s/p repair    Cervicalgia     Chronic kidney disease, stage 3 (H) 2021    Depressive disorder     Depressive disorder, not elsewhere classified     doing well on Wellbutrin    Derangement of TMJ (temporomandibular joint) 2010    internal derangement    Dermatophytosis of nail     Diaphragmatic hernia without mention of obstruction or gangrene 2007    small hiatal hernia    Diffuse cystic mastopathy 2000    FCBD    Esophageal reflux 2005    Hammertoe 10/2009    see podiatry consult    Headache(784.0)     Muscle contrature headaches    Intramural leiomyoma of uterus 2007    on US    Irregular menstrual cycle     better with BCP -also PMS is better with BCP    IRRITABLE COLON 2008    Malaise and fatigue     Menopause     lmp 2008    Mild persistent asthma     Mitral valve disease     Mole (skin)     9 x 6 mm rt ant chest    Motion sickness     Neuroma 2008    lt foot    Osteopenia 2008    Other malaise and fatigue 2005    stress    Personal history of tobacco use, presenting hazards to health     Quit in     Solitary cyst of breast 2005    lt 6 oclock    Stenosis of lumbosacral spine     l4-5 and l5-s1    Symptomatic menopausal or female climacteric states 2002    perimenopausal. LMP 2008    Tachycardia, paroxysmal (H) 2020    Thyroid fullness 2013      Past Surgical History:   Procedure Laterality Date    BIOPSY  4/10/2015     BL DUPLEX LO EXTREM ART UNILAT/LTD  4/3008    lower extr arterial doppler -no stenosis    C DEXA,BONE DENSITY,APPENDICULR ARIADNAN  12/2008    osteopenia    COLONOSCOPY  10/22/2007    bx benign    COLONOSCOPY N/A 4/10/2015    Procedure: COMBINED COLONOSCOPY, SINGLE OR MULTIPLE BIOPSY/POLYPECTOMY BY BIOPSY;  Surgeon: Cl Wagner MD;  Location:  GI    CV CORONARY ANGIOGRAM N/A 8/17/2023    Procedure: Coronary Angiogram;  Surgeon: Liza Ford MD;  Location:  HEART CARDIAC CATH LAB    ESOPHAGOSCOPY, GASTROSCOPY, DUODENOSCOPY (EGD), COMBINED N/A 1/31/2018    Procedure: COMBINED ESOPHAGOSCOPY, GASTROSCOPY, DUODENOSCOPY (EGD);  COMBINED ESOPHAGOSCOPY, GASTROSCOPY, DUODENOSCOPY (EGD);  Surgeon: Tirso Duran MD;  Location:  GI    ESOPHAGOSCOPY, GASTROSCOPY, DUODENOSCOPY (EGD), COMBINED N/A 2/1/2023    Procedure: ESOPHAGOGASTRODUODENOSCOPY (EGD);  Surgeon: Lui Vences MD;  Location:  GI    EYE SURGERY      HC PART REMV BONE METATARSAL HEAD,EA  01/11/10    Right foot plantar plate tear. Also correct hammertoe, 2nd digit & varicose vein ligation, heel.    HC REVISE MEDIAN N/CARPAL TUNNEL SURG      Left in 1997, right in 1999    INJ, ANES/STER, FACET LUMB/SAC  2010    Left L5 nerve root injection    INJECT EPIDURAL CERVICAL Right 9/29/2022    Procedure: Cervical 7-Thoracic 1 Interlaminar epidural steroid injection using fluoroscopic guidance with contrast dye, Right;  Surgeon: Tyler Bay MD;  Location:  OR    INJECT EPIDURAL TRANSFORAMINAL  10/09/2013    [facility]    INJECT JOINT SACROILIAC  3/19/14,4/21/14    [facility]    REPAIR VALVE MITRAL MINIMALLY INVASIVE N/A 10/26/2023    Procedure: MINIMALLY INVASIVE MITRAL VALVE REPAIR USING CHORD-X PRE-MEASURED LOOP SUTURE SIZE:12MM, MITRAL ANNULOPLASTY RING ERICK-COTTO PHYSIO Il SIZE: 28MM, AND ON CARDIOPULMONARY PUMP OXYGENATOR (INTRAOPERATIVE TRANSESOPHAGEAL ECHOCARDIOGRAM BY THE CARDIOLOGIST  );  Surgeon:  Sabas Meredith MD;  Location:  OR    [facility] NONSPECIFIC PROCEDURE      Metatarsal phalangeal joint injection    [facility] COLONOSCOPY THRU STOMA, DIAGNOSTIC  2002    bx. benign - flex sig in 5 yrs  or colonoscopy in 10 yrs    [facility] ECHO HEART XTHORACIC, STRESS/REST  2005    WNL    [facility] NCS MOTOR W/O F-WAVE, EACH NERVE  2008    CTS      Allergies   Allergen Reactions    Penicillins Itching     Vaginal itching - Yeast infection afterward      Social History     Tobacco Use    Smoking status: Former     Packs/day: 0.50     Years: 30.00     Additional pack years: 0.00     Total pack years: 15.00     Types: Cigarettes     Quit date: 3/20/1998     Years since quittin.9    Smokeless tobacco: Never    Tobacco comments:     No smokers in home   Substance Use Topics    Alcohol use: Yes     Alcohol/week: 6.7 standard drinks of alcohol     Comment: 2x/week      Wt Readings from Last 1 Encounters:   24 63.7 kg (140 lb 8 oz)        Anesthesia Evaluation   Pt has had prior anesthetic. Type: General.    History of anesthetic complications  - motion sickness.      ROS/MED HX  ENT/Pulmonary: Comment: TMJ arthralgia    (+)           allergic rhinitis,     tobacco use (Quit in ), Past use,  15  Pack-Year Hx,   Mild Persistent, asthma                  Neurologic: Comment: Essential tremor      Cardiovascular:     (+)  - -   -  - -                           valvular problems/murmurs type: MVP and MR s/p MVR.   pulmonary hypertension, Previous cardiac testing   Echo: Date: 10/30/23 Results:  Interpretation Summary  Left ventricular systolic function is normal.The visual ejection fraction is 55-60%. The right ventricular systolic function is normal. The posterior mitral leaflet is thickened and fixed consistent with prior mitral repair surgery.There is trace mitral regurgitation. Mean gradients 3 mm hg. The inferior vena cava was normal in size with preserved respiratory variability.      Left Ventricle  The left  ventricle is normal in size. There is normal left ventricular wall thickness. Left ventricular systolic function is normal. The visual ejection fraction is 55-60%. No regional wall motion abnormalities noted.     Right Ventricle  The right ventricle is normal size. The right ventricular systolic function is normal.     Atria  The left atrium is severely dilated. Right atrial size is normal. There is no color Doppler evidence of an atrial shunt.     Mitral Valve  The posterior mitral leaflet is thickened and fixed consistent with prior mitral repair surgery. There is trace mitral regurgitation. The mean mitral valve gradient is 3.0 mmHg.     Tricuspid Valve  There is mild (1+) tricuspid regurgitation. The right ventricular systolic pressure is approximated at 41.5 mmHg plus the right atrial pressure. Pulmonary hypertension.     Aortic Valve  The aortic valve is trileaflet. No aortic regurgitation is present. No aortic stenosis is present.     Pulmonic Valve  There is no pulmonic valvular stenosis.     Vessels  The aortic root is normal size. Normal size ascending aorta. The inferior vena cava was normal in size with preserved respiratory variability.     Pericardium  There is no pericardial effusion.     Rhythm  Sinus rhythm was noted.  Stress Test:  Date: Results:    ECG Reviewed:  Date: Results:    Cath:  Date: Results:      METS/Exercise Tolerance:     Hematologic:       Musculoskeletal: Comment: Arthralgia  Cervicalgia  DDD (degenerative disc disease), lumbar      GI/Hepatic:       Renal/Genitourinary:       Endo:       Psychiatric/Substance Use:     (+) psychiatric history anxiety and depression       Infectious Disease:       Malignancy:       Other:            Physical Exam    Airway        Mallampati: II   TM distance: > 3 FB   Neck ROM: full   Mouth opening: > 3 cm    Respiratory Devices and Support         Dental           Cardiovascular          Rhythm and rate: regular and normal     Pulmonary            "breath sounds clear to auscultation           OUTSIDE LABS:  CBC:   Lab Results   Component Value Date    WBC 8.0 02/13/2024    WBC 7.0 10/30/2023    HGB 13.2 02/13/2024    HGB 10.9 (L) 10/30/2023    HCT 41.2 02/13/2024    HCT 33.5 (L) 10/30/2023     02/13/2024     10/30/2023     BMP:   Lab Results   Component Value Date     02/13/2024     10/30/2023    POTASSIUM 4.4 02/13/2024    POTASSIUM 4.2 10/30/2023    CHLORIDE 104 02/13/2024    CHLORIDE 104 10/30/2023    CO2 26 02/13/2024    CO2 29 10/30/2023    BUN 24.8 (H) 02/13/2024    BUN 14.1 10/30/2023    CR 0.93 02/13/2024    CR 0.86 10/30/2023    GLC 88 02/13/2024     (H) 10/30/2023     COAGS:   Lab Results   Component Value Date    PTT 32 10/26/2023    INR 1.27 (H) 10/26/2023    FIBR 181 10/26/2023     POC: No results found for: \"BGM\", \"HCG\", \"HCGS\"  HEPATIC:   Lab Results   Component Value Date    ALBUMIN 4.1 02/13/2024    PROTTOTAL 6.4 02/13/2024    ALT 18 02/13/2024    AST 33 02/13/2024    ALKPHOS 80 02/13/2024    BILITOTAL <0.2 02/13/2024     OTHER:   Lab Results   Component Value Date    PH 7.34 (L) 10/26/2023    LACT 1.0 10/26/2023    A1C 5.0 10/18/2023    TOLU 9.2 02/13/2024    PHOS 3.3 10/30/2023    MAG 1.9 10/30/2023    LIPASE 284 12/06/2021    TSH 2.29 10/14/2019    CRP 25.9 (H) 03/31/2013    SED 7 09/06/2023       Anesthesia Plan    ASA Status:  3    NPO Status:  NPO Appropriate    Anesthesia Type: General.     - Airway: LMA   Induction: Intravenous.   Maintenance: Balanced.        Consents    Anesthesia Plan(s) and associated risks, benefits, and realistic alternatives discussed. Questions answered and patient/representative(s) expressed understanding.     - Discussed:     - Discussed with:  Patient            Postoperative Care    Pain management: IV analgesics, Oral pain medications, Multi-modal analgesia.   PONV prophylaxis: Ondansetron (or other 5HT-3), Dexamethasone or Solumedrol, Background Propofol Infusion "     Comments:               Brandon Egan MD    I have reviewed the pertinent notes and labs in the chart from the past 30 days and (re)examined the patient.  Any updates or changes from those notes are reflected in this note.

## 2024-02-22 ENCOUNTER — ANESTHESIA (OUTPATIENT)
Dept: SURGERY | Facility: CLINIC | Age: 69
End: 2024-02-22
Payer: COMMERCIAL

## 2024-02-22 ENCOUNTER — HOSPITAL ENCOUNTER (OUTPATIENT)
Facility: CLINIC | Age: 69
Discharge: HOME | End: 2024-02-22
Attending: SURGERY | Admitting: SURGERY
Payer: COMMERCIAL

## 2024-02-22 ENCOUNTER — APPOINTMENT (OUTPATIENT)
Dept: SURGERY | Facility: PHYSICIAN GROUP | Age: 69
End: 2024-02-22
Payer: COMMERCIAL

## 2024-02-22 VITALS
DIASTOLIC BLOOD PRESSURE: 74 MMHG | SYSTOLIC BLOOD PRESSURE: 131 MMHG | BODY MASS INDEX: 24.31 KG/M2 | HEIGHT: 63 IN | WEIGHT: 137.2 LBS | TEMPERATURE: 98 F | OXYGEN SATURATION: 97 % | HEART RATE: 84 BPM | RESPIRATION RATE: 16 BRPM

## 2024-02-22 DIAGNOSIS — Z79.2 NEED FOR PROPHYLACTIC ANTIBIOTIC: Primary | ICD-10-CM

## 2024-02-22 DIAGNOSIS — Z98.890 S/P MVR (MITRAL VALVE REPAIR): ICD-10-CM

## 2024-02-22 DIAGNOSIS — G89.18 POST-OP PAIN: ICD-10-CM

## 2024-02-22 LAB
ANION GAP SERPL CALCULATED.3IONS-SCNC: 9 MMOL/L (ref 7–15)
BUN SERPL-MCNC: 29.6 MG/DL (ref 8–23)
CALCIUM SERPL-MCNC: 9.6 MG/DL (ref 8.8–10.2)
CHLORIDE SERPL-SCNC: 107 MMOL/L (ref 98–107)
CREAT SERPL-MCNC: 0.97 MG/DL (ref 0.51–0.95)
DEPRECATED HCO3 PLAS-SCNC: 25 MMOL/L (ref 22–29)
EGFRCR SERPLBLD CKD-EPI 2021: 62 ML/MIN/1.73M2
GLUCOSE SERPL-MCNC: 96 MG/DL (ref 70–99)
HBA1C MFR BLD: 5.9 %
POTASSIUM SERPL-SCNC: 4 MMOL/L (ref 3.4–5.3)
SODIUM SERPL-SCNC: 141 MMOL/L (ref 135–145)

## 2024-02-22 PROCEDURE — 88305 TISSUE EXAM BY PATHOLOGIST: CPT | Mod: TC | Performed by: SURGERY

## 2024-02-22 PROCEDURE — 360N000075 HC SURGERY LEVEL 2, PER MIN: Performed by: SURGERY

## 2024-02-22 PROCEDURE — 258N000003 HC RX IP 258 OP 636: Performed by: ANESTHESIOLOGY

## 2024-02-22 PROCEDURE — 80048 BASIC METABOLIC PNL TOTAL CA: CPT | Performed by: SURGERY

## 2024-02-22 PROCEDURE — 258N000003 HC RX IP 258 OP 636: Performed by: NURSE ANESTHETIST, CERTIFIED REGISTERED

## 2024-02-22 PROCEDURE — 710N000009 HC RECOVERY PHASE 1, LEVEL 1, PER MIN: Performed by: SURGERY

## 2024-02-22 PROCEDURE — 999N000141 HC STATISTIC PRE-PROCEDURE NURSING ASSESSMENT: Performed by: SURGERY

## 2024-02-22 PROCEDURE — 250N000009 HC RX 250: Performed by: NURSE ANESTHETIST, CERTIFIED REGISTERED

## 2024-02-22 PROCEDURE — 710N000012 HC RECOVERY PHASE 2, PER MINUTE: Performed by: SURGERY

## 2024-02-22 PROCEDURE — 250N000025 HC SEVOFLURANE, PER MIN: Performed by: SURGERY

## 2024-02-22 PROCEDURE — 36415 COLL VENOUS BLD VENIPUNCTURE: CPT | Performed by: SURGERY

## 2024-02-22 PROCEDURE — 250N000011 HC RX IP 250 OP 636: Performed by: NURSE ANESTHETIST, CERTIFIED REGISTERED

## 2024-02-22 PROCEDURE — 10060 I&D ABSCESS SIMPLE/SINGLE: CPT | Performed by: NURSE ANESTHETIST, CERTIFIED REGISTERED

## 2024-02-22 PROCEDURE — 10060 I&D ABSCESS SIMPLE/SINGLE: CPT | Performed by: ANESTHESIOLOGY

## 2024-02-22 PROCEDURE — 999N000054 HC STATISTIC EKG NON-CHARGEABLE

## 2024-02-22 PROCEDURE — 38999 UNLISTD PX HEMIC/LYMPHTC SYS: CPT | Performed by: SURGERY

## 2024-02-22 PROCEDURE — 250N000011 HC RX IP 250 OP 636: Performed by: SURGERY

## 2024-02-22 PROCEDURE — 83036 HEMOGLOBIN GLYCOSYLATED A1C: CPT | Performed by: SURGERY

## 2024-02-22 PROCEDURE — 250N000009 HC RX 250: Performed by: SURGERY

## 2024-02-22 PROCEDURE — 93005 ELECTROCARDIOGRAM TRACING: CPT

## 2024-02-22 PROCEDURE — 99100 ANES PT EXTEME AGE<1 YR&>70: CPT | Performed by: NURSE ANESTHETIST, CERTIFIED REGISTERED

## 2024-02-22 PROCEDURE — 272N000001 HC OR GENERAL SUPPLY STERILE: Performed by: SURGERY

## 2024-02-22 PROCEDURE — 370N000017 HC ANESTHESIA TECHNICAL FEE, PER MIN: Performed by: SURGERY

## 2024-02-22 RX ORDER — HYDROMORPHONE HCL IN WATER/PF 6 MG/30 ML
0.4 PATIENT CONTROLLED ANALGESIA SYRINGE INTRAVENOUS EVERY 5 MIN PRN
Status: DISCONTINUED | OUTPATIENT
Start: 2024-02-22 | End: 2024-02-22 | Stop reason: HOSPADM

## 2024-02-22 RX ORDER — LIDOCAINE HYDROCHLORIDE 20 MG/ML
INJECTION, SOLUTION INFILTRATION; PERINEURAL PRN
Status: DISCONTINUED | OUTPATIENT
Start: 2024-02-22 | End: 2024-02-22

## 2024-02-22 RX ORDER — HYDRALAZINE HYDROCHLORIDE 20 MG/ML
2.5-5 INJECTION INTRAMUSCULAR; INTRAVENOUS EVERY 10 MIN PRN
Status: DISCONTINUED | OUTPATIENT
Start: 2024-02-22 | End: 2024-02-22 | Stop reason: HOSPADM

## 2024-02-22 RX ORDER — ONDANSETRON 2 MG/ML
INJECTION INTRAMUSCULAR; INTRAVENOUS PRN
Status: DISCONTINUED | OUTPATIENT
Start: 2024-02-22 | End: 2024-02-22

## 2024-02-22 RX ORDER — KETOROLAC TROMETHAMINE 30 MG/ML
INJECTION, SOLUTION INTRAMUSCULAR; INTRAVENOUS PRN
Status: DISCONTINUED | OUTPATIENT
Start: 2024-02-22 | End: 2024-02-22

## 2024-02-22 RX ORDER — BUPIVACAINE HYDROCHLORIDE 5 MG/ML
INJECTION, SOLUTION EPIDURAL; INTRACAUDAL
Status: DISCONTINUED
Start: 2024-02-22 | End: 2024-02-22 | Stop reason: HOSPADM

## 2024-02-22 RX ORDER — LIDOCAINE HYDROCHLORIDE AND EPINEPHRINE 10; 10 MG/ML; UG/ML
INJECTION, SOLUTION INFILTRATION; PERINEURAL
Status: DISCONTINUED
Start: 2024-02-22 | End: 2024-02-22 | Stop reason: HOSPADM

## 2024-02-22 RX ORDER — FENTANYL CITRATE 50 UG/ML
INJECTION, SOLUTION INTRAMUSCULAR; INTRAVENOUS PRN
Status: DISCONTINUED | OUTPATIENT
Start: 2024-02-22 | End: 2024-02-22

## 2024-02-22 RX ORDER — EPINEPHRINE 1 MG/ML
INJECTION, SOLUTION INTRAMUSCULAR; SUBCUTANEOUS
Status: DISCONTINUED
Start: 2024-02-22 | End: 2024-02-22 | Stop reason: HOSPADM

## 2024-02-22 RX ORDER — FENTANYL CITRATE 0.05 MG/ML
25 INJECTION, SOLUTION INTRAMUSCULAR; INTRAVENOUS EVERY 5 MIN PRN
Status: DISCONTINUED | OUTPATIENT
Start: 2024-02-22 | End: 2024-02-22 | Stop reason: HOSPADM

## 2024-02-22 RX ORDER — OXYCODONE HYDROCHLORIDE 5 MG/1
5 TABLET ORAL
Status: DISCONTINUED | OUTPATIENT
Start: 2024-02-22 | End: 2024-02-22 | Stop reason: HOSPADM

## 2024-02-22 RX ORDER — AMOXICILLIN 250 MG
1-2 CAPSULE ORAL 2 TIMES DAILY PRN
Qty: 10 TABLET | Refills: 0 | Status: SHIPPED | OUTPATIENT
Start: 2024-02-22 | End: 2024-07-08

## 2024-02-22 RX ORDER — PROPOFOL 10 MG/ML
INJECTION, EMULSION INTRAVENOUS CONTINUOUS PRN
Status: DISCONTINUED | OUTPATIENT
Start: 2024-02-22 | End: 2024-02-22

## 2024-02-22 RX ORDER — ISOSULFAN BLUE 50 MG/5ML
INJECTION, SOLUTION SUBCUTANEOUS
Status: DISCONTINUED
Start: 2024-02-22 | End: 2024-02-22 | Stop reason: HOSPADM

## 2024-02-22 RX ORDER — NALOXONE HYDROCHLORIDE 0.4 MG/ML
0.1 INJECTION, SOLUTION INTRAMUSCULAR; INTRAVENOUS; SUBCUTANEOUS
Status: DISCONTINUED | OUTPATIENT
Start: 2024-02-22 | End: 2024-02-22 | Stop reason: HOSPADM

## 2024-02-22 RX ORDER — OXYCODONE HYDROCHLORIDE 5 MG/1
5-10 TABLET ORAL EVERY 4 HOURS PRN
Qty: 10 TABLET | Refills: 0 | Status: SHIPPED | OUTPATIENT
Start: 2024-02-22 | End: 2024-06-11

## 2024-02-22 RX ORDER — PROPOFOL 10 MG/ML
INJECTION, EMULSION INTRAVENOUS PRN
Status: DISCONTINUED | OUTPATIENT
Start: 2024-02-22 | End: 2024-02-22

## 2024-02-22 RX ORDER — DIMENHYDRINATE 50 MG/ML
25 INJECTION, SOLUTION INTRAMUSCULAR; INTRAVENOUS
Status: DISCONTINUED | OUTPATIENT
Start: 2024-02-22 | End: 2024-02-22 | Stop reason: HOSPADM

## 2024-02-22 RX ORDER — CLINDAMYCIN PHOSPHATE 900 MG/50ML
900 INJECTION, SOLUTION INTRAVENOUS
Status: COMPLETED | OUTPATIENT
Start: 2024-02-22 | End: 2024-02-22

## 2024-02-22 RX ORDER — FENTANYL CITRATE 0.05 MG/ML
50 INJECTION, SOLUTION INTRAMUSCULAR; INTRAVENOUS EVERY 5 MIN PRN
Status: DISCONTINUED | OUTPATIENT
Start: 2024-02-22 | End: 2024-02-22 | Stop reason: HOSPADM

## 2024-02-22 RX ORDER — HYDROMORPHONE HCL IN WATER/PF 6 MG/30 ML
0.2 PATIENT CONTROLLED ANALGESIA SYRINGE INTRAVENOUS EVERY 5 MIN PRN
Status: DISCONTINUED | OUTPATIENT
Start: 2024-02-22 | End: 2024-02-22 | Stop reason: HOSPADM

## 2024-02-22 RX ORDER — LABETALOL HYDROCHLORIDE 5 MG/ML
10 INJECTION, SOLUTION INTRAVENOUS
Status: DISCONTINUED | OUTPATIENT
Start: 2024-02-22 | End: 2024-02-22 | Stop reason: HOSPADM

## 2024-02-22 RX ORDER — ISOSULFAN BLUE 50 MG/5ML
INJECTION, SOLUTION SUBCUTANEOUS PRN
Status: DISCONTINUED | OUTPATIENT
Start: 2024-02-22 | End: 2024-02-22 | Stop reason: HOSPADM

## 2024-02-22 RX ORDER — HYDROMORPHONE HYDROCHLORIDE 1 MG/ML
INJECTION, SOLUTION INTRAMUSCULAR; INTRAVENOUS; SUBCUTANEOUS PRN
Status: DISCONTINUED | OUTPATIENT
Start: 2024-02-22 | End: 2024-02-22

## 2024-02-22 RX ORDER — ACETAMINOPHEN 325 MG/1
975 TABLET ORAL ONCE
Status: DISCONTINUED | OUTPATIENT
Start: 2024-02-22 | End: 2024-02-22 | Stop reason: HOSPADM

## 2024-02-22 RX ORDER — ONDANSETRON 4 MG/1
4 TABLET, ORALLY DISINTEGRATING ORAL EVERY 30 MIN PRN
Status: DISCONTINUED | OUTPATIENT
Start: 2024-02-22 | End: 2024-02-22 | Stop reason: HOSPADM

## 2024-02-22 RX ORDER — MAGNESIUM HYDROXIDE 1200 MG/15ML
LIQUID ORAL PRN
Status: DISCONTINUED | OUTPATIENT
Start: 2024-02-22 | End: 2024-02-22 | Stop reason: HOSPADM

## 2024-02-22 RX ORDER — DEXAMETHASONE SODIUM PHOSPHATE 4 MG/ML
INJECTION, SOLUTION INTRA-ARTICULAR; INTRALESIONAL; INTRAMUSCULAR; INTRAVENOUS; SOFT TISSUE PRN
Status: DISCONTINUED | OUTPATIENT
Start: 2024-02-22 | End: 2024-02-22

## 2024-02-22 RX ORDER — SODIUM CHLORIDE, SODIUM LACTATE, POTASSIUM CHLORIDE, CALCIUM CHLORIDE 600; 310; 30; 20 MG/100ML; MG/100ML; MG/100ML; MG/100ML
INJECTION, SOLUTION INTRAVENOUS CONTINUOUS
Status: DISCONTINUED | OUTPATIENT
Start: 2024-02-22 | End: 2024-02-22 | Stop reason: HOSPADM

## 2024-02-22 RX ORDER — ONDANSETRON 2 MG/ML
4 INJECTION INTRAMUSCULAR; INTRAVENOUS EVERY 30 MIN PRN
Status: DISCONTINUED | OUTPATIENT
Start: 2024-02-22 | End: 2024-02-22 | Stop reason: HOSPADM

## 2024-02-22 RX ORDER — INDOCYANINE GREEN AND WATER 25 MG
2.5 KIT INJECTION ONCE
Status: COMPLETED | OUTPATIENT
Start: 2024-02-22 | End: 2024-02-22

## 2024-02-22 RX ORDER — LIDOCAINE 40 MG/G
CREAM TOPICAL
Status: DISCONTINUED | OUTPATIENT
Start: 2024-02-22 | End: 2024-02-22 | Stop reason: HOSPADM

## 2024-02-22 RX ADMIN — PHENYLEPHRINE HYDROCHLORIDE 100 MCG: 10 INJECTION INTRAVENOUS at 07:51

## 2024-02-22 RX ADMIN — INDOCYANINE GREEN AND WATER 2.5 MG: KIT at 06:42

## 2024-02-22 RX ADMIN — MIDAZOLAM 1 MG: 1 INJECTION INTRAMUSCULAR; INTRAVENOUS at 07:40

## 2024-02-22 RX ADMIN — SODIUM CHLORIDE, POTASSIUM CHLORIDE, SODIUM LACTATE AND CALCIUM CHLORIDE: 600; 310; 30; 20 INJECTION, SOLUTION INTRAVENOUS at 06:46

## 2024-02-22 RX ADMIN — FENTANYL CITRATE 25 MCG: 50 INJECTION INTRAMUSCULAR; INTRAVENOUS at 08:19

## 2024-02-22 RX ADMIN — LIDOCAINE HYDROCHLORIDE 80 MG: 20 INJECTION, SOLUTION INFILTRATION; PERINEURAL at 07:43

## 2024-02-22 RX ADMIN — KETOROLAC TROMETHAMINE 15 MG: 30 INJECTION, SOLUTION INTRAMUSCULAR at 09:47

## 2024-02-22 RX ADMIN — PHENYLEPHRINE HYDROCHLORIDE 100 MCG: 10 INJECTION INTRAVENOUS at 07:43

## 2024-02-22 RX ADMIN — PROPOFOL 100 MCG/KG/MIN: 10 INJECTION, EMULSION INTRAVENOUS at 07:44

## 2024-02-22 RX ADMIN — PROPOFOL 150 MG: 10 INJECTION, EMULSION INTRAVENOUS at 07:43

## 2024-02-22 RX ADMIN — HYDROMORPHONE HYDROCHLORIDE 0.3 MG: 1 INJECTION, SOLUTION INTRAMUSCULAR; INTRAVENOUS; SUBCUTANEOUS at 08:57

## 2024-02-22 RX ADMIN — DEXAMETHASONE SODIUM PHOSPHATE 4 MG: 4 INJECTION, SOLUTION INTRA-ARTICULAR; INTRALESIONAL; INTRAMUSCULAR; INTRAVENOUS; SOFT TISSUE at 07:50

## 2024-02-22 RX ADMIN — MIDAZOLAM 1 MG: 1 INJECTION INTRAMUSCULAR; INTRAVENOUS at 07:36

## 2024-02-22 RX ADMIN — SODIUM CHLORIDE, POTASSIUM CHLORIDE, SODIUM LACTATE AND CALCIUM CHLORIDE: 600; 310; 30; 20 INJECTION, SOLUTION INTRAVENOUS at 09:43

## 2024-02-22 RX ADMIN — PHENYLEPHRINE HYDROCHLORIDE 100 MCG: 10 INJECTION INTRAVENOUS at 07:59

## 2024-02-22 RX ADMIN — FENTANYL CITRATE 25 MCG: 50 INJECTION INTRAMUSCULAR; INTRAVENOUS at 08:16

## 2024-02-22 RX ADMIN — PROPOFOL 30 MG: 10 INJECTION, EMULSION INTRAVENOUS at 07:47

## 2024-02-22 RX ADMIN — ONDANSETRON 4 MG: 2 INJECTION INTRAMUSCULAR; INTRAVENOUS at 09:40

## 2024-02-22 RX ADMIN — CLINDAMYCIN PHOSPHATE 900 MG: 900 INJECTION, SOLUTION INTRAVENOUS at 06:44

## 2024-02-22 RX ADMIN — PHENYLEPHRINE HYDROCHLORIDE 0.3 MCG/KG/MIN: 10 INJECTION INTRAVENOUS at 07:52

## 2024-02-22 RX ADMIN — FENTANYL CITRATE 50 MCG: 50 INJECTION INTRAMUSCULAR; INTRAVENOUS at 07:42

## 2024-02-22 RX ADMIN — HYDROMORPHONE HYDROCHLORIDE 0.2 MG: 1 INJECTION, SOLUTION INTRAMUSCULAR; INTRAVENOUS; SUBCUTANEOUS at 09:20

## 2024-02-22 ASSESSMENT — ACTIVITIES OF DAILY LIVING (ADL)
ADLS_ACUITY_SCORE: 23
ADLS_ACUITY_SCORE: 23
ADLS_ACUITY_SCORE: 38
ADLS_ACUITY_SCORE: 23

## 2024-02-22 NOTE — ANESTHESIA PROCEDURE NOTES
Airway       Patient location during procedure: OR  Staff -        Anesthesiologist:  Brandon Egan MD       CRNA: Cristina Souza APRN CRNA       Performed By: CRNA  Consent for Airway        Urgency: elective  Indications and Patient Condition       Indications for airway management: keeley-procedural       Induction type:intravenous       Mask difficulty assessment: 0 - not attempted    Final Airway Details       Final airway type: supraglottic airway    Supraglottic Airway Details        Type: LMA       Brand: I-Gel    Post intubation assessment        Placement verified by: capnometry and chest rise        Number of attempts at approach: 1       Secured with: commercial tube puckett and tape       Ease of procedure: easy       Dentition: Intact and Unchanged

## 2024-02-22 NOTE — DISCHARGE INSTRUCTIONS
Today you received Toradol, an antiinflammatory medication similar to Ibuprofen.  You should not take other antiinflammatory medication, such as Ibuprofen, Motrin, Advil, Aleve, Naprosyn, etc until 3:45pm.     Same Day Surgery Discharge Instructions for  Sedation and General Anesthesia     It's not unusual to feel dizzy, light-headed or faint for up to 24 hours after surgery or while taking pain medication.  If you have these symptoms: sit for a few minutes before standing and have someone assist you when you get up to walk or use the bathroom.    You should rest and relax for the next 24 hours. We recommend you make arrangements to have an adult stay with you for at least 24 hours after your discharge.  Avoid hazardous and strenuous activity.    DO NOT DRIVE any vehicle or operate mechanical equipment for 24 hours following the end of your surgery.  Even though you may feel normal, your reactions may be affected by the medication you have received.    Do not drink alcoholic beverages for 24 hours following surgery.     Slowly progress to your regular diet as you feel able. It's not unusual to feel nauseated and/or vomit after receiving anesthesia.  If you develop these symptoms, drink clear liquids (apple juice, ginger ale, broth, 7-up, etc. ) until you feel better.  If your nausea and vomiting persists for 24 hours, please notify your surgeon.      All narcotic pain medications, along with inactivity and anesthesia, can cause constipation. Drinking plenty of liquids and increasing fiber intake will help.    For any questions of a medical nature, call your surgeon.    Do not make important decisions for 24 hours.    If you had general anesthesia, you may have a sore throat for a couple of days related to the breathing tube used during surgery.  You may use Cepacol lozenges to help with this discomfort.  If it worsens or if you develop a fever, contact your surgeon.     If you feel your pain is not well managed with  the pain medications prescribed by your surgeon, please contact your surgeon's office to let them know so they can address your concerns.       CARING FOR YOUR PREVENA  THERAPY UNIT System:   Keep your therapy unit in a safe place where:   Tubing will not become kinked or pinched   It cannot be pulled off a table or dropped onto the floor  Do not attempt to remove or peak under the       dressing while therapy is being applied.     Showering:   If cleared by your doctor, a quick, light shower is ok. Keep the therapy unit away from direct water spry   Do not submerge dressing in water. Before showering,                        disconnect the dressing from the therapy unit.  1. Hold the ON/OFF button down until therapy stops  2. Close the clamp located on the connection tubing  3. Disconnect the tubing from the therapy unit   When towel drying, be careful not to disrupt                the dressing.    Batteries:    This therapy unit has a rechargeable battery and comes with a .   Bring your  with you if you are gone        for extended periods of time.   For convenience, consider charging your                 therapy unit battery while you are sleeping.      System specialists are here to support you. Call [facility] at      [phone] with questions or to speak to a clinician             Or visit [email or web address]                  **If you have questions or concerns about your procedure,   call Dr. Hale at [phone]**

## 2024-02-22 NOTE — ANESTHESIA POSTPROCEDURE EVALUATION
Patient: Margareth Green    Procedure: Procedure(s):  Excision OF RIGHT GROIN SEROMA WITH LYMPHAZURIN BLUE INJECTION, Right groin exploration  PLACEMENT OF RIGHT GROIN PREVENA WOUND VAC       Anesthesia Type:  General    Note:  Disposition: Outpatient   Postop Pain Control: Uneventful            Sign Out: Well controlled pain   PONV: No   Neuro/Psych: Uneventful            Sign Out: Acceptable/Baseline neuro status   Airway/Respiratory: Uneventful            Sign Out: Acceptable/Baseline resp. status   CV/Hemodynamics: Uneventful            Sign Out: Acceptable CV status; No obvious hypovolemia; No obvious fluid overload   Other NRE:    DID A NON-ROUTINE EVENT OCCUR?            Last vitals:  Vitals Value Taken Time   /79 02/22/24 1130   Temp 36.3  C (97.3  F) 02/22/24 1130   Pulse 84 02/22/24 1130   Resp 18 02/22/24 1130   SpO2 96 % 02/22/24 1130       Electronically Signed By: Kyra Braun MD  February 22, 2024  1:04 PM

## 2024-02-22 NOTE — ANESTHESIA CARE TRANSFER NOTE
Patient: Margareth Green    Procedure: Procedure(s):  Excision OF RIGHT GROIN SEROMA WITH LYMPHAZURIN BLUE INJECTION, Right groin exploration  PLACEMENT OF RIGHT GROIN PREVENA WOUND VAC       Diagnosis: Postoperative seroma involving circulatory system after cardiac bypass [I97.641]  Diagnosis Additional Information: No value filed.    Anesthesia Type:   General     Note:    Oropharynx: oropharynx clear of all foreign objects and spontaneously breathing  Level of Consciousness: drowsy and awake  Oxygen Supplementation: face mask    Independent Airway: airway patency satisfactory and stable  Dentition: dentition unchanged  Vital Signs Stable: post-procedure vital signs reviewed and stable  Report to RN Given: handoff report given  Patient transferred to: PACU    Handoff Report: Identifed the Patient, Identified the Reponsible Provider, Reviewed the pertinent medical history, Discussed the surgical course, Reviewed Intra-OP anesthesia mangement and issues during anesthesia, Set expectations for post-procedure period and Allowed opportunity for questions and acknowledgement of understanding      Vitals:  Vitals Value Taken Time   /72 02/22/24 1037   Temp     Pulse 90 02/22/24 1044   Resp 18 02/22/24 1044   SpO2 98 % 02/22/24 1044   Vitals shown include unfiled device data.    Electronically Signed By: JUAN Ruggiero CRNA  February 22, 2024  10:45 AM

## 2024-02-23 LAB
ATRIAL RATE - MUSE: 67 BPM
DIASTOLIC BLOOD PRESSURE - MUSE: NORMAL MMHG
INTERPRETATION ECG - MUSE: NORMAL
P AXIS - MUSE: 44 DEGREES
PR INTERVAL - MUSE: 170 MS
QRS DURATION - MUSE: 86 MS
QT - MUSE: 416 MS
QTC - MUSE: 439 MS
R AXIS - MUSE: 19 DEGREES
SYSTOLIC BLOOD PRESSURE - MUSE: NORMAL MMHG
T AXIS - MUSE: 79 DEGREES
VENTRICULAR RATE- MUSE: 67 BPM

## 2024-02-23 NOTE — TELEPHONE ENCOUNTER
"Procedure date: 2/22/24 with Dr. Hale  1.  Injection of 1% isosulfan blue in the first and second webspace of the right foot.  2.  Right groin exploration with excision of lymphocele.  3.  Complex multilayered right groin closure.  4.  Application of 13 cm Prevena wound VAC to right groin incision.    Local injection:  50 cc of a 50-50 mixture of 1% lidocaine with epinephrine and 0.25% Marcaine with epinephrine     Future Appointments   Date Time Provider Department Center   2/28/2024 12:30 PM Selam Barfield, [facility]   3/6/2024  1:30 PM Selam Barfield, [facility]     Returned call to patient.  She reports the thigh and down her leg and \"all around is red\".  Upon further discussion and additional questions, the \"red\" the patient is seeing is most likely due to the chloraprep and will slowly come off of the skin.      Patient is afebrile.    States it hurts to sit. Discussed this is to be expected, as the wound vac is in the bend of the leg. States the machine is making \"little noises here and there\".  Discussed this is to be expected, wound vac will alarm if there are any issues.    Requested patient send a picture of area of concern to [facility].  Patient was in agreement with this plan and writer will respond to patient's message upon receipt and review.    Alejandra Durán, WENDYN, RN, [facility] [place]    "

## 2024-02-23 NOTE — OP NOTE
DATE OF PROCEDURE: February 22, 2024    PREOPERATIVE DIAGNOSIS: Right groin lymphocele.    POSTOPERATIVE DIAGNOSIS: Right groin lymphocele.    PROCEDURES PERFORMED:  1.  Injection of 1% isosulfan blue in the first and second webspace of the right foot.  2.  Right groin exploration with excision of lymphocele.  3.  Complex multilayered right groin closure.  4.  Application of 13 cm Prevena wound VAC to right groin incision.    SURGEON: Jonah Hale MD    ASSISTANT: Tyra Romero CST    ANESTHESIA: GETA    EBL: 25 cc    OPERATIVE INDICATIONS: This patient underwent a minimally invasive mitral valve repair in October 2023.  For that procedure she had a right groin cutdown with cannulation of the right common femoral artery and vein.  Postoperatively she has developed an enlarging fluid collection which now measures 8 x 7 x 8 cm and is consistent with a lymphocele.  She has opted for surgical excision.    OPERATIVE FINDINGS: 1.5 cc of 1% isosulfan blue was injected in the first and second webspace of the right foot before we began the procedure.  After incising skin I immediately entered the lymphocele cavity and evacuated a large amount of clear straw-colored fluid consistent with lymph.  After examining the evacuated lymphocele cavity we could not appreciate any evidence of the isosulfan blue leaking into the lymphocele.  I proceeded to completely excise the lymphocele cavity essentially shelling it off of the common femoral artery and vein.  Those vessels were intact and uninjured.  I ligated several potential feeding lymphatics communicating with the lymphocele.  We then proceeded with a meticulous multilayered closure trying to eliminate all dead space.  At completion she had easily palpable posterior tibial pulses bilaterally as was her preoperative baseline.    OPERATIVE DESCRIPTION: After informed consent was obtained the patient was brought to the operating room and placed on the table in a supine position.   General endotracheal anesthesia was achieved without incident.  Her right forefoot was prepped with ChloraPrep and I injected the first and second webspace subdermally with 1.5 cc of 1% isosulfan blue.  A Rojas catheter was placed.  Her right groin was then prepped and draped in the usual sterile fashion.  Timeout was called and I verified the patient's identity, the operative site, and the proposed procedure.  I reincised the prior oblique right groin skin incision.  With the skin incision I entered the lymphocele cavity and we immediately evacuated a large volume of length.  I then inspected the cavity for a minute or 2 and could find no evidence of the isosulfan blue leaking into the lymphocele.  I began a meticulous dissection excising this lymphocele from the surrounding subcutaneous tissue.  This was performed for 360 degrees shelling the lymphocele cavity off of the anterior surface of the right common femoral artery.  Great care was taken in an attempt to avoid any injury to the more lateral nerve structures.  During this dissection I ligated and divided anything that remotely appeared like a possible feeding lymphatic vessel.  The lymphocele was totally excised and sent for permanent specimen.    The wound was irrigated with 1 L of warm saline.  The wound was injected with 50 cc of a 50-50 mixture of 1% lidocaine with epinephrine and 0.25% Marcaine with epinephrine.  We had excellent hemostasis.  I proceeded with a meticulous multilayered closure using interrupted absorbable suture.  Great efforts were made to obliterate any dead space.  Skin was closed with surgical clips.  A 13 cm Prevena wound management system was applied over the groin incision and connected to suction.  We had excellent sponge contracture and no leak.  Final sponge and needle count were reported as correct.  The patient tolerated the procedure without incident.  She was returned extubated and hemodynamically stable to the outpatient  recovery area.  She has easily 2+ palpable posterior tibial pulses bilaterally.    Jonah Hale MD

## 2024-02-23 NOTE — TELEPHONE ENCOUNTER
Patient discharged on 02/22/24 from [facility] patient states there is swelling, redness and warm to touch on her thigh. Patient also wanting to know if there was some sort of numbing done as she feels like it is a bit numb.   Patient also wanting to know if wound vac should be making a noise that hers is making.    Routing to RN to review. Patient is requesting a call by end of the day 02/23/24.

## 2024-02-25 ENCOUNTER — MYC MEDICAL ADVICE (OUTPATIENT)
Dept: OTHER | Facility: CLINIC | Age: 69
End: 2024-02-25
Payer: COMMERCIAL

## 2024-02-26 LAB
PATH REPORT.COMMENTS IMP SPEC: NORMAL
PATH REPORT.COMMENTS IMP SPEC: NORMAL
PATH REPORT.FINAL DX SPEC: NORMAL
PATH REPORT.GROSS SPEC: NORMAL
PATH REPORT.MICROSCOPIC SPEC OTHER STN: NORMAL
PATH REPORT.RELEVANT HX SPEC: NORMAL
PHOTO IMAGE: NORMAL

## 2024-02-26 PROCEDURE — 88305 TISSUE EXAM BY PATHOLOGIST: CPT | Mod: 26 | Performed by: PATHOLOGY

## 2024-02-28 ENCOUNTER — OFFICE VISIT (OUTPATIENT)
Dept: OTHER | Facility: CLINIC | Age: 69
End: 2024-02-28
Attending: SURGERY
Payer: COMMERCIAL

## 2024-02-28 VITALS — HEART RATE: 82 BPM | OXYGEN SATURATION: 96 % | SYSTOLIC BLOOD PRESSURE: 142 MMHG | DIASTOLIC BLOOD PRESSURE: 92 MMHG

## 2024-02-28 DIAGNOSIS — I97.641 POSTOPERATIVE SEROMA INVOLVING CIRCULATORY SYSTEM AFTER CARDIAC BYPASS: Primary | ICD-10-CM

## 2024-02-28 PROCEDURE — 99203 OFFICE O/P NEW LOW 30 MIN: CPT

## 2024-02-28 PROCEDURE — G0463 HOSPITAL OUTPT CLINIC VISIT: HCPCS

## 2024-02-28 NOTE — PROGRESS NOTES
Patient is here to discuss follow up    BP (!) 142/92 (BP Location: Right arm, Patient Position: Chair, Cuff Size: Adult Regular)   Pulse 82   LMP 02/26/2008 (Approximate)   SpO2 96%     Questions patient would like addressed today are: N/A.    Refills are needed: No    Has homecare services and agency name:  [facility]

## 2024-02-29 ENCOUNTER — MYC MEDICAL ADVICE (OUTPATIENT)
Dept: CARDIOLOGY | Facility: CLINIC | Age: 69
End: 2024-02-29
Payer: COMMERCIAL

## 2024-02-29 DIAGNOSIS — Z98.890 S/P MITRAL VALVE REPAIR: Primary | ICD-10-CM

## 2024-02-29 DIAGNOSIS — Z98.890 S/P MVR (MITRAL VALVE REPAIR): ICD-10-CM

## 2024-02-29 RX ORDER — METOPROLOL SUCCINATE 25 MG/1
25 TABLET, EXTENDED RELEASE ORAL DAILY
Qty: 90 TABLET | Refills: 1 | Status: SHIPPED | OUTPATIENT
Start: 2024-02-29 | End: 2024-07-08

## 2024-03-01 NOTE — TELEPHONE ENCOUNTER
Patient called the clinic and I read her Grazyna Herrera notes from the last visit in January. She is feeling fine so is going to reschedule out for June and knows that we will be reaching out to her to schedule an echo. If we can place orders for the echo?

## 2024-03-01 NOTE — PROGRESS NOTES
Vascular Surgery Clinic Note    Margareth Green is a 69 year old female who underwent excision and right groin seroma with placement of Prevena on 2/22/2024 with Dr. Hale.     She comes into clinic for removal of the prevena.     She also has concerns about numbness and pain extending from her groin to knee which is continuous and started immediately post-op. Discussed with Margareth that the nerves are all irritated and this is a fairly common phenomenon, this will improve, it might take several weeks.     The pain and numbness has not worsened since surgery and per Margareth, marginally improved.     Took off Prevena, staples remain in place with an intact incision. No drainage noted. No skin breakdown noted.    She has appointment next week for staple removal. Keep area clean and dry, can place gauze over to help with irritation. She is able to shower, pat incision dry afterwards.     Selam Barfield CNP  Total time: 30 minutes

## 2024-03-04 NOTE — TELEPHONE ENCOUNTER
Echo order was placed. Routing to scheduling to please help schedule milan Alvarado on 3/4/2024 at 8:25 AM

## 2024-03-06 ENCOUNTER — OFFICE VISIT (OUTPATIENT)
Dept: OTHER | Facility: CLINIC | Age: 69
End: 2024-03-06
Payer: COMMERCIAL

## 2024-03-06 ENCOUNTER — PATIENT OUTREACH (OUTPATIENT)
Dept: CARE COORDINATION | Facility: CLINIC | Age: 69
End: 2024-03-06
Payer: COMMERCIAL

## 2024-03-06 VITALS — HEART RATE: 77 BPM | DIASTOLIC BLOOD PRESSURE: 82 MMHG | SYSTOLIC BLOOD PRESSURE: 124 MMHG

## 2024-03-06 DIAGNOSIS — I97.641 POSTOPERATIVE SEROMA INVOLVING CIRCULATORY SYSTEM AFTER CARDIAC BYPASS: Primary | ICD-10-CM

## 2024-03-06 PROCEDURE — 99213 OFFICE O/P EST LOW 20 MIN: CPT

## 2024-03-06 PROCEDURE — G0463 HOSPITAL OUTPT CLINIC VISIT: HCPCS

## 2024-03-20 ENCOUNTER — PATIENT OUTREACH (OUTPATIENT)
Dept: CARE COORDINATION | Facility: CLINIC | Age: 69
End: 2024-03-20
Payer: COMMERCIAL

## 2024-03-26 ENCOUNTER — MYC MEDICAL ADVICE (OUTPATIENT)
Dept: FAMILY MEDICINE | Facility: CLINIC | Age: 69
End: 2024-03-26
Payer: COMMERCIAL

## 2024-04-10 ENCOUNTER — ANCILLARY PROCEDURE (OUTPATIENT)
Dept: GENERAL RADIOLOGY | Facility: CLINIC | Age: 69
End: 2024-04-10
Attending: PHYSICIAN ASSISTANT
Payer: COMMERCIAL

## 2024-04-10 ENCOUNTER — OFFICE VISIT (OUTPATIENT)
Dept: FAMILY MEDICINE | Facility: CLINIC | Age: 69
End: 2024-04-10
Attending: PHYSICIAN ASSISTANT
Payer: COMMERCIAL

## 2024-04-10 VITALS
OXYGEN SATURATION: 95 % | HEART RATE: 92 BPM | RESPIRATION RATE: 16 BRPM | DIASTOLIC BLOOD PRESSURE: 70 MMHG | TEMPERATURE: 97.5 F | SYSTOLIC BLOOD PRESSURE: 132 MMHG | WEIGHT: 138.4 LBS | BODY MASS INDEX: 25.47 KG/M2 | HEIGHT: 62 IN

## 2024-04-10 DIAGNOSIS — G47.09 OTHER INSOMNIA: ICD-10-CM

## 2024-04-10 DIAGNOSIS — Z00.00 ENCOUNTER FOR ANNUAL WELLNESS VISIT (AWV) IN MEDICARE PATIENT: Primary | ICD-10-CM

## 2024-04-10 DIAGNOSIS — G89.29 CHRONIC RIGHT SHOULDER PAIN: ICD-10-CM

## 2024-04-10 DIAGNOSIS — M54.12 CERVICAL RADICULOPATHY: ICD-10-CM

## 2024-04-10 DIAGNOSIS — F41.1 GAD (GENERALIZED ANXIETY DISORDER): ICD-10-CM

## 2024-04-10 DIAGNOSIS — F33.1 MAJOR DEPRESSIVE DISORDER, RECURRENT EPISODE, MODERATE (H): ICD-10-CM

## 2024-04-10 DIAGNOSIS — M25.511 CHRONIC RIGHT SHOULDER PAIN: ICD-10-CM

## 2024-04-10 PROCEDURE — 73030 X-RAY EXAM OF SHOULDER: CPT | Mod: TC | Performed by: RADIOLOGY

## 2024-04-10 PROCEDURE — 99214 OFFICE O/P EST MOD 30 MIN: CPT | Performed by: PHYSICIAN ASSISTANT

## 2024-04-10 PROCEDURE — 96127 BRIEF EMOTIONAL/BEHAV ASSMT: CPT | Performed by: PHYSICIAN ASSISTANT

## 2024-04-10 RX ORDER — BUPROPION HYDROCHLORIDE 75 MG/1
75 TABLET ORAL 2 TIMES DAILY
Qty: 60 TABLET | Refills: 2 | Status: SHIPPED | OUTPATIENT
Start: 2024-04-10 | End: 2024-07-09 | Stop reason: DRUGHIGH

## 2024-04-10 RX ORDER — PREDNISONE 20 MG/1
40 TABLET ORAL DAILY
Qty: 10 TABLET | Refills: 0 | Status: SHIPPED | OUTPATIENT
Start: 2024-04-10 | End: 2024-04-15

## 2024-04-10 ASSESSMENT — ANXIETY QUESTIONNAIRES
5. BEING SO RESTLESS THAT IT IS HARD TO SIT STILL: SEVERAL DAYS
4. TROUBLE RELAXING: SEVERAL DAYS
GAD7 TOTAL SCORE: 6
3. WORRYING TOO MUCH ABOUT DIFFERENT THINGS: SEVERAL DAYS
GAD7 TOTAL SCORE: 6
2. NOT BEING ABLE TO STOP OR CONTROL WORRYING: SEVERAL DAYS
1. FEELING NERVOUS, ANXIOUS, OR ON EDGE: SEVERAL DAYS
8. IF YOU CHECKED OFF ANY PROBLEMS, HOW DIFFICULT HAVE THESE MADE IT FOR YOU TO DO YOUR WORK, TAKE CARE OF THINGS AT HOME, OR GET ALONG WITH OTHER PEOPLE?: SOMEWHAT DIFFICULT
GAD7 TOTAL SCORE: 6
IF YOU CHECKED OFF ANY PROBLEMS ON THIS QUESTIONNAIRE, HOW DIFFICULT HAVE THESE PROBLEMS MADE IT FOR YOU TO DO YOUR WORK, TAKE CARE OF THINGS AT HOME, OR GET ALONG WITH OTHER PEOPLE: SOMEWHAT DIFFICULT
7. FEELING AFRAID AS IF SOMETHING AWFUL MIGHT HAPPEN: NOT AT ALL
7. FEELING AFRAID AS IF SOMETHING AWFUL MIGHT HAPPEN: NOT AT ALL
6. BECOMING EASILY ANNOYED OR IRRITABLE: SEVERAL DAYS

## 2024-04-10 ASSESSMENT — PATIENT HEALTH QUESTIONNAIRE - PHQ9
10. IF YOU CHECKED OFF ANY PROBLEMS, HOW DIFFICULT HAVE THESE PROBLEMS MADE IT FOR YOU TO DO YOUR WORK, TAKE CARE OF THINGS AT HOME, OR GET ALONG WITH OTHER PEOPLE: NOT DIFFICULT AT ALL
SUM OF ALL RESPONSES TO PHQ QUESTIONS 1-9: 7
SUM OF ALL RESPONSES TO PHQ QUESTIONS 1-9: 7

## 2024-04-10 ASSESSMENT — PAIN SCALES - GENERAL: PAINLEVEL: NO PAIN (0)

## 2024-04-10 NOTE — PATIENT INSTRUCTIONS
Take 1 less amitriptyline at bedtime each week, once you are down to 10 mg let me know and I will send in hydroxyzine (check to see if you have this at home)       Wellbutrin 75 mg twice daily, you may take your 2nd pill over 8 hours after your initial dose

## 2024-04-10 NOTE — PROGRESS NOTES
"Preventive Care Visit  [facility] JAMES Beasley PA-C, Family Medicine  Apr 10, 2024      Assessment & Plan     Encounter for annual wellness visit (AWV) in Medicare patient  Encouraged annual visits, up-to-date on cancer screening    Major depressive disorder, recurrent episode, moderate (H)  Will try a different formulation of bupropion, 75 mg in the morning and then may take the second dose after 8 hours  - buPROPion (WELLBUTRIN) 75 MG tablet; Take 1 tablet (75 mg) by mouth 2 times daily    TAMMY (generalized anxiety disorder)  As above  - buPROPion (WELLBUTRIN) 75 MG tablet; Take 1 tablet (75 mg) by mouth 2 times daily    Chronic right shoulder pain  Will refer to orthopedics, we will also do prednisone as she requested this may certainly be related to cervical radiculopathy but according to her x-rays she definitely has some arthritic component to her pain  - XR Shoulder Right 2 Views; Future  - predniSONE (DELTASONE) 20 MG tablet; Take 2 tablets (40 mg) by mouth daily for 5 days  - Orthopedic  Referral; Future    Cervical radiculopathy  Consider physical therapy  - predniSONE (DELTASONE) 20 MG tablet; Take 2 tablets (40 mg) by mouth daily for 5 days    Other insomnia  Taper down and off amitriptyline consider switching over to hydroxyzine      This chart documentation was completed in part with Dragon voice recognition software.  Documentation is reviewed after completion, however, some words and grammatical errors may remain.  Caitlin Beasley PA-C        BMI  Estimated body mass index is 25.63 kg/m  as calculated from the following:    Height as of this encounter: 1.565 m (5' 1.61\").    Weight as of this encounter: 62.8 kg (138 lb 6.4 oz).             Basilio Zuluaga is a 69 year old, presenting for the following:  Physical        4/10/2024     1:16 PM   Additional Questions   Roomed by Rowena HADDAD   Accompanied by None         4/10/2024     1:16 PM   Patient Reported Additional " Medications   Patient reports taking the following new medications Preservision, Areds-2, fish oil, Ivizia eye drops, collagen powder, creatine          Pt would like liver and sodium labs done today, upon reviewing her results they have been done recently and were normal, she no longer wants to do so.    Pt reports nerve pain in legs. Reports that it started after heart surgery.  She developed a large seroma at the puncture site in her lower abdomen which has since been surgically drained, this unfortunately did not do anything to improve her thigh sensation.  The seroma is doing better.  Reports pain 4/10, swelling, and tingling over the medial aspect of her thigh.  She does get some swelling in this region and also as a result of her right lower leg laceration.  She did see a vein specialist who recommended she wear compression stockings.  She has done so but sometimes it makes the swelling more in her thigh.  She did have a massage on her thigh the other day which in the moment felt good but then was a little more uncomfortable the next day.    Reports chronic shoulder pain which she has discussed with PCP previously.  Approximately 1 year ago we did an x-ray which showed significant arthritis in the shoulder joint and also mild arthritis at the AC joint.  Shoulder continues to be painful.  She does have some numbness and tingling that may be a contributing factor from her neck.  She does have chronic neck pain and radiculopathy as well.  She would be willing to see orthopedics but also wonders if a prednisone would be helpful for her shoulder pain.  She is used this in the past, she does not like Medrol Dosepak because it is harder to sleep but if she takes a burst of prednisone right away in the morning she does okay.    She is concerned about how easily she bruises, she is on aspirin.    Her amitriptyline does not seem to be as helpful as it once was also something is making her mouth very dry she now has dry  "eyes that she is using moisturizing drops from and wonders if some of her medications could be to blame.  She has used amitriptyline for many years for insomnia.  She wonders what else she can use.  Also she is interested in switching the formulation of her bupropion.  She is taking 150 mg of the extended release version,    Reports  \"red fingers\".  She never noticed this that her massage therapist noticed it yesterday.  She has not used any new products, lotions, she has no redness elsewhere.  She does not have a rash and they do not itch.  She denies history of Raynaud's phenomenon.    She does have some eye issues she is having increased floaters for which she is seeing a retinal specialist for.  She also has had episcleritis and now will be evaluated for cataracts.    She discontinued her metoprolol with permission by cardiology and she is feeling better less dizziness and fatigue.  She also is noticing out-of-control eating which is also better.    Health Care Directive  Patient has a Health Care Directive on file  Advance care planning document is on file and is current.    HPI  As above        4/5/2023   General Health   How would you rate your overall physical health? Good         4/5/2023   Nutrition   At least 4 servings of fruits and vegetables/day Yes         4/5/2023   Exercise   Frequency of exercise: 2-3 days/week         12/21/2023   Social Factors   Worry food won't last until get money to buy more Patient declined   Food not last or not have enough money for food? No   Do you have housing?  Patient declined   Are you worried about losing your housing? Patient declined   Lack of transportation? Patient declined   Unable to get utilities (heat,electricity)? Patient declined         4/5/2023   Activities of Daily Living- Home Safety   Needs help with the following daily activites NO assistance is needed   Safety concerns in the home None of the above          No data to display                  " 2023   Hearing Screening   Hearing concerns? No concerns            No data to display                     Today's PHQ-9 Score:       4/10/2024     1:19 PM   PHQ-9 SCORE   PHQ-9 Total Score MyChart 7 (Mild depression)   PHQ-9 Total Score 7           2023   Substance Use   Alcohol more than 3/day or more than 7/wk No     Social History     Tobacco Use    Smoking status: Former     Current packs/day: 0.00     Average packs/day: 0.5 packs/day for 30.0 years (15.0 ttl pk-yrs)     Types: Cigarettes     Start date: 3/20/1968     Quit date: 3/20/1998     Years since quittin.0    Smokeless tobacco: Never    Tobacco comments:     No smokers in home   Vaping Use    Vaping status: Never Used   Substance Use Topics    Alcohol use: Not Currently     Alcohol/week: 6.7 standard drinks of alcohol     Types: 7 Standard drinks or equivalent per week     Comment: 2x/week    Drug use: No           2023   LAST FHS-7 RESULTS   1st degree relative breast or ovarian cancer No   Any relative bilateral breast cancer No   Any male have breast cancer No   Any ONE woman have BOTH breast AND ovarian cancer No   Any woman with breast cancer before 50yrs No   2 or more relatives with breast AND/OR ovarian cancer No   2 or more relatives with breast AND/OR bowel cancer No        Mammogram Screening - Mammogram every 1-2 years updated in Health Maintenance based on mutual decision making    ASCVD Risk   The 10-year ASCVD risk score (Mir GIBBS, et al., 2019) is: 8.9%    Values used to calculate the score:      Age: 69 years      Sex: Female      Is Non- : No      Diabetic: No      Tobacco smoker: No      Systolic Blood Pressure: 124 mmHg      Is BP treated: No      HDL Cholesterol: 79 mg/dL      Total Cholesterol: 162 mg/dL            Reviewed and updated as needed this visit by Provider                    Lab work is in process  Labs reviewed in EPIC  BP Readings from Last 3 Encounters:   04/10/24  132/70   03/06/24 124/82   02/28/24 (!) 142/92    Wt Readings from Last 3 Encounters:   04/10/24 62.8 kg (138 lb 6.4 oz)   02/22/24 62.2 kg (137 lb 3.2 oz)   02/13/24 63.7 kg (140 lb 8 oz)                  Patient Active Problem List   Diagnosis    Dermatophytosis of nail    Symptomatic menopausal or female climacteric states    Generalized osteoarthrosis, unspecified site    TAMMY (generalized anxiety disorder)    Insomnia    Malaise and fatigue    Mild persistent asthma    Cervicalgia    Allergic rhinitis    Irritable bowel syndrome    Arthralgia    DDD (degenerative disc disease), lumbar    Menopausal syndrome    Osteopenia    Hammertoe    Derangement of TMJ (temporomandibular joint)    Stenosis of lumbosacral spine    Atrophic vaginitis    Osteoporosis    Mild major depression (H)    Knee pain, left    Menopausal flushing    Numbness of left jaw    Neck pain    TMJ (temporomandibular joint syndrome)    Thyroid fullness    Epistaxis    Major depressive disorder, recurrent episode, moderate (H)    Gastroesophageal reflux disease without esophagitis    Tremor    Cramp of limb    Personal history of COVID-19    T12 compression fracture, sequela    Pain of right lower extremity    Muscle cramp    Chondromalacia of right knee    Right sided sciatica    ILD (interstitial lung disease) (H)    S/P MVR (mitral valve repair)     Past Surgical History:   Procedure Laterality Date    APPLY WOUND VAC Right 2/22/2024    Procedure: PLACEMENT OF RIGHT GROIN PREVENA WOUND VAC;  Surgeon: Brandon Hale MD;  Location: [facility] OR    BIOPSY  4/10/2015    BL DUPLEX LO EXTREM ART UNILAT/LTD  4/3008    lower extr arterial doppler -no stenosis    C DEXA,BONE DENSITY,APPENDICULR SKELTN  12/2008    osteopenia    COLONOSCOPY  10/22/2007    bx benign    COLONOSCOPY N/A 4/10/2015    Procedure: COMBINED COLONOSCOPY, SINGLE OR MULTIPLE BIOPSY/POLYPECTOMY BY BIOPSY;  Surgeon: Cl Wagner MD;  Location: [facility] GI    CV CORONARY ANGIOGRAM N/A  8/17/2023    Procedure: Coronary Angiogram;  Surgeon: Liza Ford MD;  Location:  HEART CARDIAC CATH LAB    ESOPHAGOSCOPY, GASTROSCOPY, DUODENOSCOPY (EGD), COMBINED N/A 1/31/2018    Procedure: COMBINED ESOPHAGOSCOPY, GASTROSCOPY, DUODENOSCOPY (EGD);  COMBINED ESOPHAGOSCOPY, GASTROSCOPY, DUODENOSCOPY (EGD);  Surgeon: Tirso Duran MD;  Location:  GI    ESOPHAGOSCOPY, GASTROSCOPY, DUODENOSCOPY (EGD), COMBINED N/A 2/1/2023    Procedure: ESOPHAGOGASTRODUODENOSCOPY (EGD);  Surgeon: Lui Vences MD;  Location:  GI    EYE SURGERY      HC PART REMV BONE METATARSAL HEAD,EA  01/11/10    Right foot plantar plate tear. Also correct hammertoe, 2nd digit & varicose vein ligation, heel.    HC REVISE MEDIAN N/CARPAL TUNNEL SURG      Left in 1997, right in 1999    INCISION AND DRAINAGE LOWER EXTREMITY, COMBINED Right 2/22/2024    Procedure: Excision OF RIGHT GROIN SEROMA WITH LYMPHAZURIN BLUE INJECTION, Right groin exploration;  Surgeon: Brandon Hale MD;  Location:  OR    INJ, ANES/STER, FACET LUMB/SAC  2010    Left L5 nerve root injection    INJECT EPIDURAL CERVICAL Right 9/29/2022    Procedure: Cervical 7-Thoracic 1 Interlaminar epidural steroid injection using fluoroscopic guidance with contrast dye, Right;  Surgeon: Tyler Bay MD;  Location:  OR    INJECT EPIDURAL TRANSFORAMINAL  10/09/2013    [facility]    INJECT JOINT SACROILIAC  3/19/14,4/21/14    [facility]    REPAIR VALVE MITRAL MINIMALLY INVASIVE N/A 10/26/2023    Procedure: MINIMALLY INVASIVE MITRAL VALVE REPAIR USING CHORD-X PRE-MEASURED LOOP SUTURE SIZE:12MM, MITRAL ANNULOPLASTY RING ERICK-COTTO PHYSIO Il SIZE: 28MM, AND ON CARDIOPULMONARY PUMP OXYGENATOR (INTRAOPERATIVE TRANSESOPHAGEAL ECHOCARDIOGRAM BY THE CARDIOLOGIST  );  Surgeon: Sabas Meredith MD;  Location:  OR    ZZC NONSPECIFIC PROCEDURE  2009    Metatarsal phalangeal joint injection    ZZHC COLONOSCOPY THRU STOMA,  DIAGNOSTIC  2002    bx. benign - flex sig in 5 yrs  or colonoscopy in 10 yrs    [facility] ECHO HEART XTHORACIC, STRESS/REST  2005    WNL    [facility] NCS MOTOR W/O F-WAVE, EACH NERVE  2008    CTS       Social History     Tobacco Use    Smoking status: Former     Current packs/day: 0.00     Average packs/day: 0.5 packs/day for 30.0 years (15.0 ttl pk-yrs)     Types: Cigarettes     Start date: 3/20/1968     Quit date: 3/20/1998     Years since quittin.0    Smokeless tobacco: Never    Tobacco comments:     No smokers in home   Substance Use Topics    Alcohol use: Not Currently     Alcohol/week: 6.7 standard drinks of alcohol     Types: 7 Standard drinks or equivalent per week     Comment: 2x/week     Family History   Problem Relation Age of Onset    Diabetes Father     Depression Father     Cerebrovascular Disease Mother         age 80    Arthritis Mother     Depression Mother         On meds    Eye Disorder Mother         Glaucoma    Osteoporosis Mother     Respiratory Mother          88 YO COPD    Chronic Obstructive Pulmonary Disease Mother     C.A.D. Brother         67 YO MI -    Chronic Obstructive Pulmonary Disease Brother     Myocardial Infarction Brother     Other Cancer Brother     Cancer Brother          Current Outpatient Medications   Medication Sig Dispense Refill    acetaminophen (ARTHRITIS PAIN APAP) 650 MG CR tablet Take 650 mg by mouth every 8 hours as needed for mild pain or fever      albuterol (PROAIR HFA/PROVENTIL HFA/VENTOLIN HFA) 108 (90 Base) MCG/ACT inhaler Inhale 2 puffs into the lungs every 6 hours as needed for shortness of breath, wheezing or cough      ALLEGRA 180 MG OR TABS Take 180 mg by mouth daily  30 0    amitriptyline (ELAVIL) 10 MG tablet TAKE 3 TO 4 TABLETS AT BEDTIME (Patient taking differently: Take 4 tablets by mouth at bedtime TAKE 3 TO 4 TABLETS AT BEDTIME) 270 tablet 3    aspirin (ASA) 81 MG chewable tablet Take 1 tablet (81 mg) by mouth daily 90 tablet 3    buPROPion  (WELLBUTRIN) 75 MG tablet Take 1 tablet (75 mg) by mouth 2 times daily 60 tablet 2    CALCIUM /VITAMIN D TABS   OR Take 1 tablet by mouth daily      Cetaphil Moisturizing (CETAPHIL) external lotion Apply topically as needed      fluticasone (FLONASE) 50 MCG/ACT nasal spray Spray 1 spray into both nostrils daily for 100 days 1 mL 4    magnesium aspartate (MAGINEX) 615 MG EC tablet Take 1 tablet (615 mg) by mouth daily      meclizine (ANTIVERT) 25 MG tablet Take 25 mg by mouth daily as needed for dizziness      Melatonin 10 MG TABS tablet Take 10 mg by mouth nightly as needed for sleep      mometasone-formoterol (DULERA) 100-5 MCG/ACT inhaler Inhale 2 puffs into the lungs 2 times daily 39 g 3    Multiple Vitamins-Minerals (PRESERVISION AREDS) TABS Take 1 tablet by mouth 2 times daily      multivitamin w/minerals (THERA-VIT-M) tablet Take 1 tablet by mouth daily RAW Brand      naproxen sodium (ANAPROX) 220 MG tablet Take 220 mg by mouth as needed for moderate pain      NONFORMULARY Colopaste wound care cream      omeprazole (PRILOSEC) 20 MG DR capsule TAKE 1 CAPSULE DAILY 90 capsule 3    oxyCODONE (ROXICODONE) 5 MG tablet Take 1-2 tablets (5-10 mg) by mouth every 4 hours as needed for moderate to severe pain 10 tablet 0    predniSONE (DELTASONE) 20 MG tablet Take 2 tablets (40 mg) by mouth daily for 5 days 10 tablet 0    senna-docusate (SENOKOT-S/PERICOLACE) 8.6-50 MG tablet Take 1-2 tablets by mouth 2 times daily as needed for constipation (While on oral opioids.) 10 tablet 0    Simethicone (GAS-X PO) Take 160 mg by mouth 4 times daily as needed for intestinal gas       vitamin C (ASCORBIC ACID) 250 MG tablet Take 250 mg by mouth daily      metoprolol succinate ER (TOPROL XL) 25 MG 24 hr tablet Take 1 tablet (25 mg) by mouth daily (Patient not taking: Reported on 4/10/2024) 90 tablet 1     Allergies   Allergen Reactions    Penicillins Itching     Vaginal itching - Yeast infection afterward     Current providers  sharing in care for this patient include:  Patient Care Team:  Caitlin Beasley PA-C as PCP - General (Family Practice)  Caitlin Beasley PA-C as Assigned PCP  Andrey Mathews MD as MD (Cardiovascular Disease)  Cl Cunningham MD as Assigned Musculoskeletal Provider  Marylin Perea, RN as Specialty Care Coordinator (Pulmonary Disease)  Caitlin Ocampo, RN as Specialty Care Coordinator (Pulmonary Disease)  Khanh Varner MD as Assigned Pulmonology Provider  Grazyna Gill APRN CNP as Nurse Practitioner (Cardiovascular Disease)  Selam Barfield NP as Assigned Heart and Vascular Provider    The following health maintenance items are reviewed in Epic and correct as of today:  Health Maintenance   Topic Date Due    RSV VACCINE (Pregnancy & 60+) (1 - 1-dose 60+ series) Never done    ASTHMA ACTION PLAN  10/14/2020    COVID-19 Vaccine (3 - Moderna risk series) 06/13/2022    DEXA  06/30/2023    INFLUENZA VACCINE (1) 09/01/2023    MEDICARE ANNUAL WELLNESS VISIT  04/05/2024    TAMMY ASSESSMENT  04/05/2024    PHQ-9  04/17/2024    ASTHMA CONTROL TEST  05/13/2024    ANNUAL REVIEW OF HM ORDERS  10/17/2024    FALL RISK ASSESSMENT  12/21/2024    COLORECTAL CANCER SCREENING  04/10/2025    MAMMO SCREENING  09/25/2025    GLUCOSE  02/22/2027    ADVANCE CARE PLANNING  04/05/2028    LIPID  04/07/2028    DTAP/TDAP/TD IMMUNIZATION (3 - Td or Tdap) 03/14/2032    HEPATITIS C SCREENING  Completed    DEPRESSION ACTION PLAN  Completed    Pneumococcal Vaccine: 65+ Years  Completed    ZOSTER IMMUNIZATION  Completed    IPV IMMUNIZATION  Aged Out    HPV IMMUNIZATION  Aged Out    MENINGITIS IMMUNIZATION  Aged Out    RSV MONOCLONAL ANTIBODY  Aged Out         Review of Systems  Constitutional, HEENT, cardiovascular, pulmonary, gi and gu systems are negative, except as otherwise noted.     Objective    Exam  LMP 02/26/2008 (Approximate)    Estimated body mass index is 24.3 kg/m  as calculated from the following:    Height as of 2/22/24: 1.6 m  "(5' 3\").    Weight as of 2/22/24: 62.2 kg (137 lb 3.2 oz).    Physical Exam  GENERAL: alert and no distress  EYES: Eyes grossly normal to inspection, PERRL and conjunctivae and sclerae normal  HENT: ear canals and TM's normal, nose and mouth without ulcers or lesions  NECK: no adenopathy, no asymmetry, masses, or scars  RESP: lungs clear to auscultation - no rales, rhonchi or wheezes  CV: regular rate and rhythm, normal S1 S2, no S3 or S4, no murmur, click or rub, no peripheral edema  MS: no gross musculoskeletal defects noted, no edema  SKIN: no suspicious lesions or rashes  SKIN: Her hands from the mid hand down including her fingers are erythematous, there is no signs of excoriations, eruptions, or flakiness.  They are not warm to touch  NEURO: Normal strength and tone, mentation intact and speech normal  PSYCH: mentation appears normal, affect normal/bright         No data to display                       Signed Electronically by: Caitlin Beasley PA-C    "

## 2024-04-11 ENCOUNTER — MYC MEDICAL ADVICE (OUTPATIENT)
Dept: FAMILY MEDICINE | Facility: CLINIC | Age: 69
End: 2024-04-11
Payer: COMMERCIAL

## 2024-04-12 ENCOUNTER — NURSE TRIAGE (OUTPATIENT)
Dept: FAMILY MEDICINE | Facility: CLINIC | Age: 69
End: 2024-04-12
Payer: COMMERCIAL

## 2024-04-12 NOTE — TELEPHONE ENCOUNTER
RN followed up with covering provider Dr. Hung. UC/ED recommended as unable to work into clinic day and F2F visit needed to rule out cardiac concern.     RN placed call to patient and advised UC/ED recommended to rule out cardiac concern. RN advised to call back with any other questions or concerns or if follow up is needed after assessment. Patient verbalized understanding and all questions answered.     CHENTE Gill, RN  [facility] Registered Nurse  Clinic Triage  April 12, 2024

## 2024-04-12 NOTE — TELEPHONE ENCOUNTER
See Triage encounter 4/12/2024.    WENDY GillN, RN  [facility] ~ Registered Nurse  Clinic Triage  April 12, 2024

## 2024-04-12 NOTE — TELEPHONE ENCOUNTER
"Are you able to offer recommendations on if Prednisone could be causing mild SOB? Would a lower dose be recommended? IN person assessment needed? If F2F needed are you able to work in? UC recommended? Upon Callback please call mobile #.    Nurse Triage SBAR    Is this a 2nd Level Triage? YES, LICENSED PRACTITIONER REVIEW IS REQUIRED    Situation:   RN called out to patient regarding P[employer] message noting increased SOB after starting Prednisone.    \"Tapan Tucker, I have been experiencing more shortness of breath this afternoon & evening. Can t remember taking a strong dose like this before & I m wondering if it has something to do with the Prednisone. Can I try 20 mg instead of 40mg? Margareth\"    Background:   Patient saw PCP in clinic for shoulder pain, was prescribed Prednisone and is now having mild SOB with activity, wondering if SOB could be related to prednisone? Started Prednisone Thursday morning, SOB noted with increased activity (stairs and going for a walk) Thursday afternoon.    Heart surgery in October- s/p MVR  Groin surgery in Feb.    Patient notes she had similar SOB prior to he MVR surgery but none since until taking prednisone.     Patient notes she has some groin swelling in right thigh, unchanged since visit with Caitlin Beasley on Wednesday. See notes     No congestion   No runny nose  No cough  No dizziness or lightheadedness  No chest pain  No SOB at rest- only with activity.   No fever    Assessment:   Per protocol patient should be seen in clinic today for assessment of SOB. Patient would like covering provider follow up if this could be related to Prednisone. RN to follow up with in clinic providers. RN advised if F2F appointment needed and unable to work in UC recommended. Patient verbalized understanding and all questions answered.     Protocol Recommended Disposition:   Go To Office Now    Recommendation:  Are you able to offer recommendations on if Prednisone could be causing mild SOB? Would " a lower dose be recommended? IN person assessment needed? If F2F needed are you able to work in? UC recommended? Upon Callback please call mobile #.    Routed to provider    Does the patient meet one of the following criteria for ADS visit consideration? 16+ years old, with an [facility] PCP     CHENTE Gill, RN  [facility] Registered Nurse  Clinic Triage  April 12, 2024    Reason for Disposition   MILD difficulty breathing (e.g., minimal/no SOB at rest, SOB with walking, pulse < 100) of new-onset or worse than normal    Additional Information   Negative: SEVERE difficulty breathing (e.g., struggling for each breath, speaks in single words, pulse > 120)   Negative: Breathing stopped and hasn't returned   Negative: Choking on something   Negative: Bluish (or gray) lips or face   Negative: Difficult to awaken or acting confused (e.g., disoriented, slurred speech)   Negative: Passed out (i.e., fainted, collapsed and was not responding)   Negative: Wheezing started suddenly after medicine, an allergic food, or bee sting   Negative: Stridor (harsh sound while breathing in)   Negative: Slow, shallow and weak breathing   Negative: Sounds like a life-threatening emergency to the triager   Negative: Chest pain   Negative: Wheezing (high pitched whistling sound) and previous asthma attacks or use of asthma medicines   Negative: Difficulty breathing and within 14 days of COVID-19 Exposure   Negative: Difficulty breathing and only present when coughing   Negative: Difficulty breathing and only from stuffy nose   Negative: Difficulty breathing and only from stuffy nose or runny nose from common cold   Negative: MODERATE difficulty breathing (e.g., speaks in phrases, SOB even at rest, pulse 100-120) of new-onset or worse than normal   Negative: Oxygen level (e.g., pulse oximetry) 90% or lower   Negative: Wheezing can be heard across the room   Negative: Drooling or spitting out saliva (because can't swallow)   Negative:  "Any history of prior \"blood clot\" in leg or lungs   Negative: Illness requiring prolonged bedrest in past month (e.g., immobilization, long hospital stay)   Negative: Hip or leg fracture (broken bone) in past month (or had cast on leg or ankle in past month)   Negative: Major surgery in the past month   Negative: Long-distance travel in past month (e.g., car, bus, train, plane; with trip lasting 6 or more hours)   Negative: Cancer treatment in past six months (or has cancer now)   Negative: Extra heartbeats, irregular heart beating, or heart is beating very fast (i.e., 'palpitations')   Negative: Fever > 103 F (39.4 C)   Negative: Fever > 101 F (38.3 C) and over 60 years of age   Negative: Fever > 100.0 F (37.8 C) and bedridden (e.g., nursing home patient, stroke, chronic illness, recovering from surgery)   Negative: Fever > 100.0 F (37.8 C) and diabetes mellitus or weak immune system (e.g., HIV positive, cancer chemo, splenectomy, organ transplant, chronic steroids)   Negative: Periods where breathing stops and then resumes normally and bedridden (e.g., nursing home patient, CVA)   Negative: Pregnant or postpartum (from 0 to 6 weeks after delivery)   Negative: Patient sounds very sick or weak to the triager    Answer Assessment - Initial Assessment Questions  1. RESPIRATORY STATUS: \"Describe your breathing?\" (e.g., wheezing, shortness of breath, unable to speak, severe coughing)       No SOB at rest  SOB with stairs and taking a walk   No wheezing     2. ONSET: \"When did this breathing problem begin?\"       Started Thursday afternoon after taking prednisone Thursday morning    3. PATTERN \"Does the difficult breathing come and go, or has it been constant since it started?\"       Comes and goes with activity    4. SEVERITY: \"How bad is your breathing?\" (e.g., mild, moderate, severe)     - MILD: No SOB at rest, mild SOB with walking, speaks normally in sentences, can lie down, no retractions, pulse < 100.     - " "MODERATE: SOB at rest, SOB with minimal exertion and prefers to sit, cannot lie down flat, speaks in phrases, mild retractions, audible wheezing, pulse 100-120.     - SEVERE: Very SOB at rest, speaks in single words, struggling to breathe, sitting hunched forward, retractions, pulse > 120       Mild    5. RECURRENT SYMPTOM: \"Have you had difficulty breathing before?\" If Yes, ask: \"When was the last time?\" and \"What happened that time?\"       Yes, before Mitral Valve Repair    6. CARDIAC HISTORY: \"Do you have any history of heart disease?\" (e.g., heart attack, angina, bypass surgery, angioplasty)       Mitral Value Repair     7. LUNG HISTORY: \"Do you have any history of lung disease?\"  (e.g., pulmonary embolus, asthma, emphysema)      Asthma    8. CAUSE: \"What do you think is causing the breathing problem?\"       Related to prednisone?    9. OTHER SYMPTOMS: \"Do you have any other symptoms? (e.g., dizziness, runny nose, cough, chest pain, fever)  No congestion   No runny nose  No cough  No dizziness or lightheadedness  No chest pain    10. O2 SATURATION MONITOR:  \"Do you use an oxygen saturation monitor (pulse oximeter) at home?\" If Yes, ask: \"What is your reading (oxygen level) today?\" \"What is your usual oxygen saturation reading?\" (e.g., 95%)        97%  HR-85  11. PREGNANCY: \"Is there any chance you are pregnant?\" \"When was your last menstrual period?\"        No     12. TRAVEL: \"Have you traveled out of the country in the last month?\" (e.g., travel history, exposures)        No    Protocols used: Breathing Difficulty-A-[place]    "

## 2024-04-16 ENCOUNTER — TRANSFERRED RECORDS (OUTPATIENT)
Dept: HEALTH INFORMATION MANAGEMENT | Facility: CLINIC | Age: 69
End: 2024-04-16
Payer: COMMERCIAL

## 2024-04-18 NOTE — PROGRESS NOTES
Margareth Green  :  1955  DOS: 2024  MRN: [id number]  PCP: Caitlin Beasley    Sports Medicine Clinic Visit    HPI  Margareth Green is a 69 year old female who is seen in consultation at the request of  Caitlin Beasley PA-C presenting with right shoulder pain.    - Mechanism of Injury:    - No inciting injury  - Pertinent history and prior evaluations:    - 4/10/2024 with Caitlin Beasley PA-C: right shoulder and cervical radiculopathy noted at C8 with multilevel DDD and central and foraminal stenosis on cervical MRI in  and has had success with a C7-T1 ILESI. Prednisone prescription given.   - Right shoulder xray IMPRESSION: 1. Severe osteoarthrosis of the glenohumeral joint again noted. 2. Calcification in the region of the supraspinatus/infraspinatus tendon raises the question of calcific tendinosis. This has progressed. 3. No other change.    - Pain Character:    - Location:  anterior, lateral, posterior right shoulder  - Character:  aching shoulder pain, more sharp with movements  - Duration: One year  - Course:  worsening  - Endorses:    - Anterior, lateral, posterior right shoulder pain with achy characteristic present at rest and worse with shoulder abduction and rotation.  -Separate and distinct numbness and tingling into the right digits 4-5, chronic neck pain and radicular pain down the right arm intermittently.  - Denies:    - swelling, clicking/popping, grinding, mechanical locking symptoms, neurogenic weakness  - Alleviating factors:    - Naproxen, Tylenol arthritis, activity modification, rest  - Aggravating factors:    - repetitive movements with gardening, grabbing, grasping, abduction of shoulder, above head movments  - Other treatments tried:    - nothing    - Patient Goals:    - discuss treatment options  - Social History:   - Retired.  Previous work:  Medical records for [employer]      Review of Systems  Musculoskeletal: as above  Remainder of review of systems is negative  including constitutional, CV, pulmonary, GI, Skin and Neurologic except as noted in HPI or medical history.    Past Medical History:   Diagnosis Date    Abnormal Papanicolaou smear of vagina and vaginal HPV 12/2002    ASCUS (HPV neg). 3/03 WNL but no transitional zome - repeat 3months    Allergic rhinitis, cause unspecified     Arthritis     Backache, unspecified     Carpal tunnel syndrome     s/p repair    Cervicalgia     Chronic kidney disease, stage 3 (H) 07/14/2021    Depressive disorder     Depressive disorder, not elsewhere classified     doing well on Wellbutrin    Derangement of TMJ (temporomandibular joint) 03/2010    internal derangement    Dermatophytosis of nail     Diaphragmatic hernia without mention of obstruction or gangrene 09/2007    small hiatal hernia    Diffuse cystic mastopathy 2000    FCBD    Esophageal reflux 04/2005    Hammertoe 10/2009    see podiatry consult    Headache(784.0)     Muscle contrature headaches    Intramural leiomyoma of uterus 07/2007    on US    Irregular menstrual cycle     better with BCP -also PMS is better with BCP    IRRITABLE COLON 02/18/2008    Malaise and fatigue     Menopause     lmp 2/2008    Mild persistent asthma     Mitral valve disease     Mole (skin) 2003    9 x 6 mm rt ant chest    Motion sickness     Neuroma 11/2008    lt foot    Osteopenia 12/2008    Other malaise and fatigue 04/2005    stress    Personal history of tobacco use, presenting hazards to health     Quit in 1998    Solitary cyst of breast 12/2005    lt 6 oclock    Stenosis of lumbosacral spine     l4-5 and l5-s1    Symptomatic menopausal or female climacteric states 12/2002    perimenopausal. LMP 2/2008    Tachycardia, paroxysmal (H) 11/24/2020    Thyroid fullness 08/07/2013     Past Surgical History:   Procedure Laterality Date    APPLY WOUND VAC Right 2/22/2024    Procedure: PLACEMENT OF RIGHT GROIN PREVENA WOUND VAC;  Surgeon: Brandon Hale MD;  Location: [facility] OR    BIOPSY  4/10/2015     BL DUPLEX LO EXTREM ART UNILAT/LTD  4/3008    lower extr arterial doppler -no stenosis    C DEXA,BONE DENSITY,APPENDICULR ARIADNAN  12/2008    osteopenia    COLONOSCOPY  10/22/2007    bx benign    COLONOSCOPY N/A 4/10/2015    Procedure: COMBINED COLONOSCOPY, SINGLE OR MULTIPLE BIOPSY/POLYPECTOMY BY BIOPSY;  Surgeon: Cl Wagner MD;  Location:  GI    CV CORONARY ANGIOGRAM N/A 8/17/2023    Procedure: Coronary Angiogram;  Surgeon: Liza Ford MD;  Location:  HEART CARDIAC CATH LAB    ESOPHAGOSCOPY, GASTROSCOPY, DUODENOSCOPY (EGD), COMBINED N/A 1/31/2018    Procedure: COMBINED ESOPHAGOSCOPY, GASTROSCOPY, DUODENOSCOPY (EGD);  COMBINED ESOPHAGOSCOPY, GASTROSCOPY, DUODENOSCOPY (EGD);  Surgeon: Tirso Duran MD;  Location:  GI    ESOPHAGOSCOPY, GASTROSCOPY, DUODENOSCOPY (EGD), COMBINED N/A 2/1/2023    Procedure: ESOPHAGOGASTRODUODENOSCOPY (EGD);  Surgeon: Lui Vences MD;  Location:  GI    EYE SURGERY      HC PART REMV BONE METATARSAL HEAD,EA  01/11/10    Right foot plantar plate tear. Also correct hammertoe, 2nd digit & varicose vein ligation, heel.    HC REVISE MEDIAN N/CARPAL TUNNEL SURG      Left in 1997, right in 1999    INCISION AND DRAINAGE LOWER EXTREMITY, COMBINED Right 2/22/2024    Procedure: Excision OF RIGHT GROIN SEROMA WITH LYMPHAZURIN BLUE INJECTION, Right groin exploration;  Surgeon: Brandon Hale MD;  Location:  OR    INJ, ANES/STER, FACET LUMB/SAC  2010    Left L5 nerve root injection    INJECT EPIDURAL CERVICAL Right 9/29/2022    Procedure: Cervical 7-Thoracic 1 Interlaminar epidural steroid injection using fluoroscopic guidance with contrast dye, Right;  Surgeon: Tyler Bay MD;  Location:  OR    INJECT EPIDURAL TRANSFORAMINAL  10/09/2013    [facility]    INJECT JOINT SACROILIAC  3/19/14,4/21/14    [facility]    REPAIR VALVE MITRAL MINIMALLY INVASIVE N/A 10/26/2023    Procedure: MINIMALLY INVASIVE MITRAL VALVE REPAIR USING CHORD-X  "PRE-MEASURED LOOP SUTURE SIZE:12MM, MITRAL ANNULOPLASTY RING ERICK-COTTO PHYSIO Il SIZE: 28MM, AND ON CARDIOPULMONARY PUMP OXYGENATOR (INTRAOPERATIVE TRANSESOPHAGEAL ECHOCARDIOGRAM BY THE CARDIOLOGIST  );  Surgeon: Sabas Meredith MD;  Location:  OR    [facility] NONSPECIFIC PROCEDURE      Metatarsal phalangeal joint injection    ZZ COLONOSCOPY THRU STOMA, DIAGNOSTIC  2002    bx. benign - flex sig in 5 yrs  or colonoscopy in 10 yrs    [facility]  [facility]    WNL    [facility] NCS MOTOR W/O F-WAVE, EACH NERVE  2008    CTS     Family History   Problem Relation Age of Onset    Diabetes Father     Depression Father     Cerebrovascular Disease Mother         age 80    Arthritis Mother     Depression Mother         On meds    Eye Disorder Mother         Glaucoma    Osteoporosis Mother     Respiratory Mother          88 YO COPD    Chronic Obstructive Pulmonary Disease Mother     C.A.D. Brother         65 YO MI -    Chronic Obstructive Pulmonary Disease Brother     Myocardial Infarction Brother     Other Cancer Brother     Cancer Brother          Objective  Ht 1.565 m (5' 1.61\")   Wt 62.8 kg (138 lb 6.4 oz)   LMP 2008 (Approximate)   BMI 25.64 kg/m      General: healthy, alert and in no acute distress.    HEENT: no scleral icterus or conjunctival erythema.   Skin: no suspicious lesions or rash. No jaundice.   CV: regular rhythm by palpation, 2+ distal pulses.  Resp: normal respiratory effort without conversational dyspnea.   Psych: normal mood and affect.    Gait: nonantalgic, appropriate coordination and balance.     Neuro:        - Sensation to light touch:    - Intact throughout the BUE including all peripheral nerve distributions.        - MSR:       RUE  LUE  - Biceps  2+ 2+  - Brachioradialis 2+ 2+  - Triceps  2+ 2+       - Special tests:   - Spurling's: Positive for some mild increase in numbness/tingling in digits 4-5 on the right    MSK - Shoulder:       - " Inspection:    - No significant swelling, erythema, warmth, ecchymosis, lesion, or atrophy noted.        - ROM:    - Near-full AROM/PROM with anterolateral shoulder pain with shoulder abduction, flexion, IR/ER.       - Palpation:    - TTP at the anterior shoulder joint.   - NTTP elsewhere including the subacromial space, proximal biceps tendon, right-sided cervical paraspinals.        - Strength:  (*antalgic)  - Shoulder Abduction   5-*    - Shoulder Flexion   5-*    - Shoulder Internal Rotation  5-*    - Shoulder External Rotation  5-*   - Elbow Flexion   5   - Elbow Extension   5   - Forearm Pronation   5   - Forearm Supination   5   - Wrist Extension   5   - Wrist Flexion    5   - FDI     5   - ADM     5   - FPL     5   - APB     5   - EIP     5   - EDC     5   - APL/EPB    5            - Special tests:        - Sims: Positive   - Neers: Positive   - Empty can: Positive for pain and mild weakness    - [place]:  Neg    - Scarf:  Neg    - Speeds:  Neg    - Yergason:  Neg    - Apprehension/Relocation:  Neg    - Crank:  Neg      Radiology  I independently reviewed the available relevant imaging in the chart with my interpretations as above in HPI.       Procedure  Large Joint Injection/Arthocentesis: R glenohumeral joint    Date/Time: 4/24/2024 11:50 AM    Performed by: Octavio Carrera DO  Authorized by: Octavio Carrera DO    Indications:  Pain  Needle Size:  22 G  Guidance: ultrasound    Approach:  Posterolateral  Location:  Shoulder      Site:  R glenohumeral joint  Medications:  40 mg triamcinolone 40 MG/ML; 2 mL lidocaine 1 %; 2 mL BUPivacaine 0.5 %  Outcome:  Tolerated well, no immediate complications  Procedure discussed: discussed risks, benefits, and alternatives    Consent Given by:  Patient  Prep: patient was prepped and draped in usual sterile fashion     Ultrasound images of procedure were permanently stored.      Glenohumeral Injection - Ultrasound Guided  The patient was informed of the risks and the  benefits of the procedure and a written consent was signed. The patient s shoulder was prepped with chlorhexidine in sterile fashion. 40 mg of kenalog suspension was drawn up into a 5 mL syringe with 2 mL of 1% lidocaine and 2 ml of 0.5% bupivacaine.  Injection was performed using sterile technique.  Under ultrasound guidance a 3.5-inch 22-gauge needle was used to enter the glenohumeral joint.  Posterolateral approach was used with the patient in lateral recumbent position, arm in neutral position at the side.  Needle placement was visualized and documented with ultrasound.  Ultrasound visualization was necessary due to the small joint space entered.  Injection performed long axis to the probe.  Injection solution visualized within the joint space.  Images were permanently stored for the patient's record. There were no complications. The patient tolerated the procedure well. There was negligible bleeding.       Assessment  1. Primary osteoarthritis, right shoulder    2. Tendinopathy of right rotator cuff    3. Cervical radiculopathy        Reynold Green is a pleasant 69 year old female that presents with chronic right shoulder pain that is multifactorial in nature.  She does have radiographic and clinical evidence of severe osteoarthritis of the glenohumeral joint with some deep achy pain within the joint that hurts worse with movements and can bother her at night and at rest.  This appears to be her most bothersome pain.  She also has pain in the anterolateral shoulder with shoulder abduction, flexion, IR/ER with positive impingement testing on exam that is consistent with tendinopathy of the rotator cuff tendons.  She also has known cervical stenosis with right-sided radiculopathy that is responded well to C7-T1 ILESI in the past.  Current symptoms include numbness/tingling and occasional radicular shooting pain down to digits 4-5 on the right consistent with a likely C8 nerve root involvement.     At  this time, her organic shoulder pathology seems to be the most symptomatic and radicular symptoms do not seem to be as bothersome.    We discussed the nature of the condition and available treatment options, and mutually agreed upon the following plan:    - Imaging:          - Reviewed and independently interpreted the relevant imaging in the chart, including any imaging ordered for today's clinic.  - Reviewed results and images with patient.   - Medications:          - Discussed pharmacologic options for pain relief.   - May use NSAIDs (Ibuprofen, Naproxen) or Acetaminophen (Tylenol) as needed for pain control.   - Do not take these if previously advised to avoid them for other medical conditions.  - May also use topical medications such as lidocaine, IcyHot, BioFreeze, or Voltaren gel as needed for pain control.    - Voltaren gel is an anti-inflammatory cream that may be used up to 4 times per day over the painful area.   -Prednisone was very helpful for her, and could be considered in the future if other conservative treatment is not managing the pain well  - Injections:          - Discussed possible injection options and alternatives.    - Injection options include: Corticosteroid injection of the glenohumeral joint, subacromial bursa. Also may consider cervical epidural steroid injection in the future if needed.  Based on her history and physical exam at this time, I would prioritize a glenohumeral joint injection first, could consider a subacromial injection in 2 weeks.  Hold off on AIRAM unless radicular symptoms become more bothersome.  - Performed a corticosteroid injection of the right glenohumeral joint under ultrasound guidance today in clinic. Patient tolerated the procedure well without complications.     - Post-procedure instructions:    - Keep the injection site clean and dry.   - Do not submerge the injection site for 24 hours (no baths, pools). Showers are ok.   - Rest the area for 24-48 hours before  resuming normal activities. Avoid overexerting the area for the first few weeks.   - It may take 2-3 days to start noticing the effects of the injection and up to 3-4 weeks to feel significant benefits.   - Therapy:          - Discussed the benefits of therapy vs home exercise program for optimization of range of motion, flexibility, strength, stability and function.   - She has previously attended physical therapy and has a quality home exercise program at home.  Encouraged to continue HEP as instructed.  - Modalities:          - May use ice, heat, massage or other modalities as needed.  - Surgery:          - Discussed non-operative and operative treatment options for the patient's condition.   - Goal is to continue conservative care for as long as possible before surgical intervention would need to be considered.  - Activity:          - Encouraged to remain active and participate in regular activities as symptoms allow.   Avoid or modify exacerbating activities as needed.  - Follow up:          - In 2 weeks for reevaluation and update to treatment plan.  We may consider an ultrasound-guided subacromial bursa injection at that time if needed.  - May follow up sooner for new/worsening symptoms.  - May contact clinic by phone or MyChart for questions or concerns.       Octavio Carrera DO, JOHANNM  [facility] - Sports Medicine  [facility] - Department of Orthopedic Surgery       Disclaimer:  This note was prepared and written using Dragon Medical dictation software. As a result, there may be errors in the script that have gone undetected. Please consider this when interpreting the information in this note.

## 2024-04-24 ENCOUNTER — OFFICE VISIT (OUTPATIENT)
Dept: ORTHOPEDICS | Facility: CLINIC | Age: 69
End: 2024-04-24
Attending: PHYSICIAN ASSISTANT
Payer: COMMERCIAL

## 2024-04-24 VITALS — HEIGHT: 62 IN | BODY MASS INDEX: 25.47 KG/M2 | WEIGHT: 138.4 LBS

## 2024-04-24 DIAGNOSIS — M54.12 CERVICAL RADICULOPATHY: ICD-10-CM

## 2024-04-24 DIAGNOSIS — M19.011 PRIMARY OSTEOARTHRITIS, RIGHT SHOULDER: Primary | ICD-10-CM

## 2024-04-24 DIAGNOSIS — M67.911 TENDINOPATHY OF RIGHT ROTATOR CUFF: ICD-10-CM

## 2024-04-24 PROCEDURE — 20611 DRAIN/INJ JOINT/BURSA W/US: CPT | Mod: RT | Performed by: STUDENT IN AN ORGANIZED HEALTH CARE EDUCATION/TRAINING PROGRAM

## 2024-04-24 PROCEDURE — 99204 OFFICE O/P NEW MOD 45 MIN: CPT | Mod: 25 | Performed by: STUDENT IN AN ORGANIZED HEALTH CARE EDUCATION/TRAINING PROGRAM

## 2024-04-24 RX ORDER — TRIAMCINOLONE ACETONIDE 40 MG/ML
40 INJECTION, SUSPENSION INTRA-ARTICULAR; INTRAMUSCULAR
Status: SHIPPED | OUTPATIENT
Start: 2024-04-24

## 2024-04-24 RX ORDER — BUPIVACAINE HYDROCHLORIDE 5 MG/ML
2 INJECTION, SOLUTION PERINEURAL
Status: SHIPPED | OUTPATIENT
Start: 2024-04-24

## 2024-04-24 RX ORDER — LIDOCAINE HYDROCHLORIDE 10 MG/ML
2 INJECTION, SOLUTION INFILTRATION; PERINEURAL
Status: SHIPPED | OUTPATIENT
Start: 2024-04-24

## 2024-04-24 RX ADMIN — LIDOCAINE HYDROCHLORIDE 2 ML: 10 INJECTION, SOLUTION INFILTRATION; PERINEURAL at 11:50

## 2024-04-24 RX ADMIN — TRIAMCINOLONE ACETONIDE 40 MG: 40 INJECTION, SUSPENSION INTRA-ARTICULAR; INTRAMUSCULAR at 11:50

## 2024-04-24 RX ADMIN — BUPIVACAINE HYDROCHLORIDE 2 ML: 5 INJECTION, SOLUTION PERINEURAL at 11:50

## 2024-04-24 ASSESSMENT — PAIN SCALES - GENERAL: PAINLEVEL: MODERATE PAIN (4)

## 2024-04-24 NOTE — LETTER
2024         RE: Margareth Green  [address] [facility] [address]        Dear Colleague,    Thank you for referring your patient, Margareth Green, to the [facility] [place]. Please see a copy of my visit note below.    Margareth Green  :  1955  DOS: 2024  MRN: [id number]  PCP: Caitlin Beasley    Sports Medicine Clinic Visit    HPI  Margareth Green is a 69 year old female who is seen in consultation at the request of  Caitlin Beasley PA-C presenting with right shoulder pain.    - Mechanism of Injury:    - No inciting injury  - Pertinent history and prior evaluations:    - 4/10/2024 with Caitlin Beasley PA-C: right shoulder and cervical radiculopathy noted at C8 with multilevel DDD and central and foraminal stenosis on cervical MRI in  and has had success with a C7-T1 ILESI. Prednisone prescription given.   - Right shoulder xray IMPRESSION: 1. Severe osteoarthrosis of the glenohumeral joint again noted. 2. Calcification in the region of the supraspinatus/infraspinatus tendon raises the question of calcific tendinosis. This has progressed. 3. No other change.    - Pain Character:    - Location:  anterior, lateral, posterior right shoulder  - Character:  aching shoulder pain, more sharp with movements  - Duration: One year  - Course:  worsening  - Endorses:    - Anterior, lateral, posterior right shoulder pain with achy characteristic present at rest and worse with shoulder abduction and rotation.  -Separate and distinct numbness and tingling into the right digits 4-5, chronic neck pain and radicular pain down the right arm intermittently.  - Denies:    - swelling, clicking/popping, grinding, mechanical locking symptoms, neurogenic weakness  - Alleviating factors:    - Naproxen, Tylenol arthritis, activity modification, rest  - Aggravating factors:    - repetitive movements with gardening, grabbing, grasping, abduction of shoulder, above head  movments  - Other treatments tried:    - nothing    - Patient Goals:    - discuss treatment options  - Social History:   - Retired.  Previous work:  Medical records for [employer]      Review of Systems  Musculoskeletal: as above  Remainder of review of systems is negative including constitutional, CV, pulmonary, GI, Skin and Neurologic except as noted in HPI or medical history.    Past Medical History:   Diagnosis Date     Abnormal Papanicolaou smear of vagina and vaginal HPV 12/2002    ASCUS (HPV neg). 3/03 WNL but no transitional zome - repeat 3months     Allergic rhinitis, cause unspecified      Arthritis      Backache, unspecified      Carpal tunnel syndrome     s/p repair     Cervicalgia      Chronic kidney disease, stage 3 (H) 07/14/2021     Depressive disorder      Depressive disorder, not elsewhere classified     doing well on Wellbutrin     Derangement of TMJ (temporomandibular joint) 03/2010    internal derangement     Dermatophytosis of nail      Diaphragmatic hernia without mention of obstruction or gangrene 09/2007    small hiatal hernia     Diffuse cystic mastopathy 2000    FCBD     Esophageal reflux 04/2005     Hammertoe 10/2009    see podiatry consult     Headache(784.0)     Muscle contrature headaches     Intramural leiomyoma of uterus 07/2007    on US     Irregular menstrual cycle     better with BCP -also PMS is better with BCP     IRRITABLE COLON 02/18/2008     Malaise and fatigue      Menopause     lmp 2/2008     Mild persistent asthma      Mitral valve disease      Mole (skin) 2003    9 x 6 mm rt ant chest     Motion sickness      Neuroma 11/2008    lt foot     Osteopenia 12/2008     Other malaise and fatigue 04/2005    stress     Personal history of tobacco use, presenting hazards to health     Quit in 1998     Solitary cyst of breast 12/2005    lt 6 oclock     Stenosis of lumbosacral spine     l4-5 and l5-s1     Symptomatic menopausal or female climacteric states 12/2002    perimenopausal. LMP  2/2008     Tachycardia, paroxysmal (H) 11/24/2020     Thyroid fullness 08/07/2013     Past Surgical History:   Procedure Laterality Date     APPLY WOUND VAC Right 2/22/2024    Procedure: PLACEMENT OF RIGHT GROIN PREVENA WOUND VAC;  Surgeon: Brandon Hale MD;  Location:  OR     BIOPSY  4/10/2015     BL DUPLEX LO EXTREM ART UNILAT/LTD  4/3008    lower extr arterial doppler -no stenosis     C DEXA,BONE DENSITY,APPENDICULR SKELTN  12/2008    osteopenia     COLONOSCOPY  10/22/2007    bx benign     COLONOSCOPY N/A 4/10/2015    Procedure: COMBINED COLONOSCOPY, SINGLE OR MULTIPLE BIOPSY/POLYPECTOMY BY BIOPSY;  Surgeon: Cl Wagner MD;  Location:  GI     CV CORONARY ANGIOGRAM N/A 8/17/2023    Procedure: Coronary Angiogram;  Surgeon: Liza Ford MD;  Location:  HEART CARDIAC CATH LAB     ESOPHAGOSCOPY, GASTROSCOPY, DUODENOSCOPY (EGD), COMBINED N/A 1/31/2018    Procedure: COMBINED ESOPHAGOSCOPY, GASTROSCOPY, DUODENOSCOPY (EGD);  COMBINED ESOPHAGOSCOPY, GASTROSCOPY, DUODENOSCOPY (EGD);  Surgeon: Tirso Duran MD;  Location:  GI     ESOPHAGOSCOPY, GASTROSCOPY, DUODENOSCOPY (EGD), COMBINED N/A 2/1/2023    Procedure: ESOPHAGOGASTRODUODENOSCOPY (EGD);  Surgeon: Lui Vences MD;  Location:  GI     EYE SURGERY       HC PART REMV BONE METATARSAL HEAD,EA  01/11/10    Right foot plantar plate tear. Also correct hammertoe, 2nd digit & varicose vein ligation, heel.     HC REVISE MEDIAN N/CARPAL TUNNEL SURG      Left in 1997, right in 1999     INCISION AND DRAINAGE LOWER EXTREMITY, COMBINED Right 2/22/2024    Procedure: Excision OF RIGHT GROIN SEROMA WITH LYMPHAZURIN BLUE INJECTION, Right groin exploration;  Surgeon: Brandon Hale MD;  Location:  OR     INJ, ANES/STER, FACET LUMB/SAC  2010    Left L5 nerve root injection     INJECT EPIDURAL CERVICAL Right 9/29/2022    Procedure: Cervical 7-Thoracic 1 Interlaminar epidural steroid injection using fluoroscopic guidance with contrast dye,  "Right;  Surgeon: Tyler Bay MD;  Location: [facility] OR     INJECT EPIDURAL TRANSFORAMINAL  10/09/2013    [facility]     INJECT JOINT SACROILIAC  3/19/14,14    [facility]     REPAIR VALVE MITRAL MINIMALLY INVASIVE N/A 10/26/2023    Procedure: MINIMALLY INVASIVE MITRAL VALVE REPAIR USING CHORD-X PRE-MEASURED LOOP SUTURE SIZE:12MM, MITRAL ANNULOPLASTY RING ERICK-COTTO PHYSIO Il SIZE: 28MM, AND ON CARDIOPULMONARY PUMP OXYGENATOR (INTRAOPERATIVE TRANSESOPHAGEAL ECHOCARDIOGRAM BY THE CARDIOLOGIST  );  Surgeon: Sabas Meredith MD;  Location: [facility] OR     ZZC NONSPECIFIC PROCEDURE      Metatarsal phalangeal joint injection     ZZ COLONOSCOPY THRU STOMA, DIAGNOSTIC  2002    bx. benign - flex sig in 5 yrs  or colonoscopy in 10 yrs     ZZHC ECHO HEART XTHORACIC, STRESS/REST  2005    WNL     ZZHC NCS MOTOR W/O F-WAVE, EACH NERVE  2008    CTS     Family History   Problem Relation Age of Onset     Diabetes Father      Depression Father      Cerebrovascular Disease Mother         age 80     Arthritis Mother      Depression Mother         On meds     Eye Disorder Mother         Glaucoma     Osteoporosis Mother      Respiratory Mother          86 YO COPD     Chronic Obstructive Pulmonary Disease Mother      C.A.D. Brother         67 YO MI -     Chronic Obstructive Pulmonary Disease Brother      Myocardial Infarction Brother      Other Cancer Brother      Cancer Brother          Objective  Ht 1.565 m (5' 1.61\")   Wt 62.8 kg (138 lb 6.4 oz)   LMP 2008 (Approximate)   BMI 25.64 kg/m      General: healthy, alert and in no acute distress.    HEENT: no scleral icterus or conjunctival erythema.   Skin: no suspicious lesions or rash. No jaundice.   CV: regular rhythm by palpation, 2+ distal pulses.  Resp: normal respiratory effort without conversational dyspnea.   Psych: normal mood and affect.    Gait: nonantalgic, appropriate coordination and balance.     Neuro:        " - Sensation to light touch:    - Intact throughout the BUE including all peripheral nerve distributions.        - MSR:       RUE  LUE  - Biceps  2+ 2+  - Brachioradialis 2+ 2+  - Triceps  2+ 2+       - Special tests:   - Spurling's: Positive for some mild increase in numbness/tingling in digits 4-5 on the right    MSK - Shoulder:       - Inspection:    - No significant swelling, erythema, warmth, ecchymosis, lesion, or atrophy noted.        - ROM:    - Near-full AROM/PROM with anterolateral shoulder pain with shoulder abduction, flexion, IR/ER.       - Palpation:    - TTP at the anterior shoulder joint.   - NTTP elsewhere including the subacromial space, proximal biceps tendon, right-sided cervical paraspinals.        - Strength:  (*antalgic)  - Shoulder Abduction   5-*    - Shoulder Flexion   5-*    - Shoulder Internal Rotation  5-*    - Shoulder External Rotation  5-*   - Elbow Flexion   5   - Elbow Extension   5   - Forearm Pronation   5   - Forearm Supination   5   - Wrist Extension   5   - Wrist Flexion    5   - FDI     5   - ADM     5   - FPL     5   - APB     5   - EIP     5   - EDC     5   - APL/EPB    5            - Special tests:        - Sims: Positive   - Neers: Positive   - Empty can: Positive for pain and mild weakness    - [place]:  Neg    - Scarf:  Neg    - Speeds:  Neg    - Yergason:  Neg    - Apprehension/Relocation:  Neg    - Crank:  Neg      Radiology  I independently reviewed the available relevant imaging in the chart with my interpretations as above in HPI.       Procedure  Large Joint Injection/Arthocentesis: R glenohumeral joint    Date/Time: 4/24/2024 11:50 AM    Performed by: Octavio Carrera DO  Authorized by: Octavio Carrera DO    Indications:  Pain  Needle Size:  22 G  Guidance: ultrasound    Approach:  Posterolateral  Location:  Shoulder      Site:  R glenohumeral joint  Medications:  40 mg triamcinolone 40 MG/ML; 2 mL lidocaine 1 %; 2 mL BUPivacaine 0.5 %  Outcome:  Tolerated well,  no immediate complications  Procedure discussed: discussed risks, benefits, and alternatives    Consent Given by:  Patient  Prep: patient was prepped and draped in usual sterile fashion     Ultrasound images of procedure were permanently stored.      Glenohumeral Injection - Ultrasound Guided  The patient was informed of the risks and the benefits of the procedure and a written consent was signed. The patient s shoulder was prepped with chlorhexidine in sterile fashion. 40 mg of kenalog suspension was drawn up into a 5 mL syringe with 2 mL of 1% lidocaine and 2 ml of 0.5% bupivacaine.  Injection was performed using sterile technique.  Under ultrasound guidance a 3.5-inch 22-gauge needle was used to enter the glenohumeral joint.  Posterolateral approach was used with the patient in lateral recumbent position, arm in neutral position at the side.  Needle placement was visualized and documented with ultrasound.  Ultrasound visualization was necessary due to the small joint space entered.  Injection performed long axis to the probe.  Injection solution visualized within the joint space.  Images were permanently stored for the patient's record. There were no complications. The patient tolerated the procedure well. There was negligible bleeding.       Assessment  1. Primary osteoarthritis, right shoulder    2. Tendinopathy of right rotator cuff    3. Cervical radiculopathy        Plan  Margareth Green is a pleasant 69 year old female that presents with chronic right shoulder pain that is multifactorial in nature.  She does have radiographic and clinical evidence of severe osteoarthritis of the glenohumeral joint with some deep achy pain within the joint that hurts worse with movements and can bother her at night and at rest.  This appears to be her most bothersome pain.  She also has pain in the anterolateral shoulder with shoulder abduction, flexion, IR/ER with positive impingement testing on exam that is consistent  with tendinopathy of the rotator cuff tendons.  She also has known cervical stenosis with right-sided radiculopathy that is responded well to C7-T1 ILESI in the past.  Current symptoms include numbness/tingling and occasional radicular shooting pain down to digits 4-5 on the right consistent with a likely C8 nerve root involvement.     At this time, her organic shoulder pathology seems to be the most symptomatic and radicular symptoms do not seem to be as bothersome.    We discussed the nature of the condition and available treatment options, and mutually agreed upon the following plan:    - Imaging:          - Reviewed and independently interpreted the relevant imaging in the chart, including any imaging ordered for today's clinic.  - Reviewed results and images with patient.   - Medications:          - Discussed pharmacologic options for pain relief.   - May use NSAIDs (Ibuprofen, Naproxen) or Acetaminophen (Tylenol) as needed for pain control.   - Do not take these if previously advised to avoid them for other medical conditions.  - May also use topical medications such as lidocaine, IcyHot, BioFreeze, or Voltaren gel as needed for pain control.    - Voltaren gel is an anti-inflammatory cream that may be used up to 4 times per day over the painful area.   -Prednisone was very helpful for her, and could be considered in the future if other conservative treatment is not managing the pain well  - Injections:          - Discussed possible injection options and alternatives.    - Injection options include: Corticosteroid injection of the glenohumeral joint, subacromial bursa. Also may consider cervical epidural steroid injection in the future if needed.  Based on her history and physical exam at this time, I would prioritize a glenohumeral joint injection first, could consider a subacromial injection in 2 weeks.  Hold off on AIRAM unless radicular symptoms become more bothersome.  - Performed a corticosteroid injection  of the right glenohumeral joint under ultrasound guidance today in clinic. Patient tolerated the procedure well without complications.     - Post-procedure instructions:    - Keep the injection site clean and dry.   - Do not submerge the injection site for 24 hours (no baths, pools). Showers are ok.   - Rest the area for 24-48 hours before resuming normal activities. Avoid overexerting the area for the first few weeks.   - It may take 2-3 days to start noticing the effects of the injection and up to 3-4 weeks to feel significant benefits.   - Therapy:          - Discussed the benefits of therapy vs home exercise program for optimization of range of motion, flexibility, strength, stability and function.   - She has previously attended physical therapy and has a quality home exercise program at home.  Encouraged to continue HEP as instructed.  - Modalities:          - May use ice, heat, massage or other modalities as needed.  - Surgery:          - Discussed non-operative and operative treatment options for the patient's condition.   - Goal is to continue conservative care for as long as possible before surgical intervention would need to be considered.  - Activity:          - Encouraged to remain active and participate in regular activities as symptoms allow.   Avoid or modify exacerbating activities as needed.  - Follow up:          - In 2 weeks for reevaluation and update to treatment plan.  We may consider an ultrasound-guided subacromial bursa injection at that time if needed.  - May follow up sooner for new/worsening symptoms.  - May contact clinic by phone or MyChart for questions or concerns.       Octavio Carrera DO, JOHANNM  [facility] - Sports Medicine  [facility] - Department of Orthopedic Surgery       Disclaimer:  This note was prepared and written using Dragon Medical dictation software. As a result, there may be errors in the script that have gone undetected. Please consider  this when interpreting the information in this note.       Again, thank you for allowing me to participate in the care of your patient.        Sincerely,        Octavio Carrera, DO

## 2024-04-30 ENCOUNTER — TELEPHONE (OUTPATIENT)
Dept: FAMILY MEDICINE | Facility: CLINIC | Age: 69
End: 2024-04-30
Payer: COMMERCIAL

## 2024-04-30 NOTE — TELEPHONE ENCOUNTER
Krysta Turner1 hour ago (12:54 PM)     TZ     Forms/Letter Request     Type of form/letter: OTHER: Wellness Rebate [place]     Do we have the form/letter: Yes:       Who is the form from? Insurance comp     Where did/will the form come from? form was mailed in     When is form/letter needed by: complete @ your earliest convenience     How would you like the form/letter returned: Mail **Envelope enclosed**  Is this the correct address?: Yes  [facility]   Attn:  [employer]  [address]  [place] [address]     Patient Notified form requests are processed in 5-7 business days:No     Could we send this information to you in [employer] or would you prefer to receive a phone call?:   Patient would like to be contacted via [employer]

## 2024-04-30 NOTE — CONFIDENTIAL NOTE
Forms/Letter Request    Type of form/letter: OTHER: Wellness Rebate [place]       Do we have the form/letter: Yes:      Who is the form from? Insurance comp    Where did/will the form come from? form was mailed in    When is form/letter needed by: complete @ your earliest convenience    How would you like the form/letter returned: Mail **Envelope enclosed**  Is this the correct address?: Yes  [facility]   Attn:  [employer]  [address]  [place] [address]    Patient Notified form requests are processed in 5-7 business days:No    Could we send this information to you in [employer] or would you prefer to receive a phone call?:   Patient would like to be contacted via [employer]

## 2024-05-03 NOTE — PROGRESS NOTES
Margareth Green  :  1955  DOS: May 8, 2024  MRN: [id number]    Sports Medicine Clinic Procedure    Ultrasound Guided Right Subacromial Injection    Clinical History: Right glenohumeral corticosteroid injection performed on 2024 allowed for moderate relief in pain. She notes that all prior movements that were painful remain troublesome but to a lesser degree. Taking Naproxen, on occasion still, for pain. Is interested in a subacromial injection today .    Diagnosis: Primary osteoarthritis of the right shoulder, right rotator cuff tendinopathy    Large Joint Injection/Arthocentesis: R subacromial bursa    Date/Time: 2024 12:31 PM    Performed by: Octavio Carrera DO  Authorized by: Octavio Carrera DO    Indications:  Pain  Needle Size:  22 G  Guidance: ultrasound    Approach:  Anterolateral  Location:  Shoulder      Site:  R subacromial bursa  Medications:  40 mg triamcinolone 40 MG/ML; 2 mL lidocaine 1 %; 2 mL BUPivacaine 0.5 %  Outcome:  Tolerated well, no immediate complications  Procedure discussed: discussed risks, benefits, and alternatives    Consent Given by:  Patient  Prep: patient was prepped and draped in usual sterile fashion     Ultrasound images of procedure were permanently stored.     Subacromial Bursa - Ultrasound Guided  The patient was informed of the risks and the benefits of the procedure and a written consent was signed.  The patient s shoulder was prepped with chlorhexidine in sterile fashion.   An injectate solution containing 2 mL of 1% lidocaine, 2 mL of 0.5% Bupivacaine, and 1 mL of Kenalog (40 mg/mL) was drawn up into a 5 mL syringe.  Injection was performed using sterile technique.  Under ultrasound guidance a 1.5-inch 22-gauge needle was used to enter the subacromial bursa.  An anterolateral approach was used with arm held in Crass position.  Needle placement was visualized and documented with ultrasound.  Ultrasound visualization was necessary to ensure placement in to  the bursa and not the rotator cuff tendon which could potentially cause further tendon damage.  Injection performed in-plane.  Injection solution visualized within the joint space.  Images were permanently stored for the patient's record.  There were no complications. The patient tolerated the procedure well. There was negligible bleeding.     Impression:  Successful ultrasound-guided right subacromial bursa injection.    Plan:  - Injection:    - Expectations and goals of the injection were discussed and verbal and written consent was obtained.  - Performed a corticosteroid injection of the right subacromial bursa today in clinic. Patient tolerated the procedure well without complications.    - Post-procedure instructions:    - Keep the injection site clean and dry.   - Do not submerge the injection site for 24 hours (no baths, pools). Showers are ok.   - Rest the area for 24-48 hours before resuming normal activities. Avoid overexerting the area for the first few weeks.   - It may take 2-3 days to start noticing the effects of the injection and up to 3-4 weeks to feel significant benefits.   - Follow up:          - In 3+ months as needed for updates to treatment plan, or sooner for new/worsening symptoms.  - Patient has clinic contact information for questions or concerns.      Octavio Carrera DO, BRIAN  [facility] - Sports Medicine  [facility] - Department of Orthopedic Surgery     Disclaimer:  This note was prepared and written using Dragon Medical dictation software. As a result, there may be errors in the script that have gone undetected. Please consider this when interpreting the information in this note.

## 2024-05-08 ENCOUNTER — OFFICE VISIT (OUTPATIENT)
Dept: ORTHOPEDICS | Facility: CLINIC | Age: 69
End: 2024-05-08
Payer: COMMERCIAL

## 2024-05-08 DIAGNOSIS — M67.911 TENDINOPATHY OF RIGHT ROTATOR CUFF: Primary | ICD-10-CM

## 2024-05-08 PROCEDURE — 20611 DRAIN/INJ JOINT/BURSA W/US: CPT | Mod: RT | Performed by: STUDENT IN AN ORGANIZED HEALTH CARE EDUCATION/TRAINING PROGRAM

## 2024-05-08 RX ORDER — BUPIVACAINE HYDROCHLORIDE 5 MG/ML
2 INJECTION, SOLUTION PERINEURAL
Status: SHIPPED | OUTPATIENT
Start: 2024-05-08

## 2024-05-08 RX ORDER — LIDOCAINE HYDROCHLORIDE 10 MG/ML
2 INJECTION, SOLUTION INFILTRATION; PERINEURAL
Status: SHIPPED | OUTPATIENT
Start: 2024-05-08

## 2024-05-08 RX ORDER — TRIAMCINOLONE ACETONIDE 40 MG/ML
40 INJECTION, SUSPENSION INTRA-ARTICULAR; INTRAMUSCULAR
Status: SHIPPED | OUTPATIENT
Start: 2024-05-08

## 2024-05-08 RX ADMIN — TRIAMCINOLONE ACETONIDE 40 MG: 40 INJECTION, SUSPENSION INTRA-ARTICULAR; INTRAMUSCULAR at 12:31

## 2024-05-08 RX ADMIN — LIDOCAINE HYDROCHLORIDE 2 ML: 10 INJECTION, SOLUTION INFILTRATION; PERINEURAL at 12:31

## 2024-05-08 RX ADMIN — BUPIVACAINE HYDROCHLORIDE 2 ML: 5 INJECTION, SOLUTION PERINEURAL at 12:31

## 2024-05-08 NOTE — LETTER
2024         RE: Margareth Green  [address] [facility] [id number]        Dear Colleague,    Thank you for referring your patient, Margareth Green, to the [facility] [place]. Please see a copy of my visit note below.    Margareth Green  :  1955  DOS: May 8, 2024  MRN: [id number]    Sports Medicine Clinic Procedure    Ultrasound Guided Right Subacromial Injection    Clinical History: Right glenohumeral corticosteroid injection performed on 2024 allowed for moderate relief in pain. She notes that all prior movements that were painful remain troublesome but to a lesser degree. Taking Naproxen, on occasion still, for pain. Is interested in a subacromial injection today .    Diagnosis: Primary osteoarthritis of the right shoulder, right rotator cuff tendinopathy    Large Joint Injection/Arthocentesis: R subacromial bursa    Date/Time: 2024 12:31 PM    Performed by: Octavio Carrera DO  Authorized by: Octavio Carrera DO    Indications:  Pain  Needle Size:  22 G  Guidance: ultrasound    Approach:  Anterolateral  Location:  Shoulder      Site:  R subacromial bursa  Medications:  40 mg triamcinolone 40 MG/ML; 2 mL lidocaine 1 %; 2 mL BUPivacaine 0.5 %  Outcome:  Tolerated well, no immediate complications  Procedure discussed: discussed risks, benefits, and alternatives    Consent Given by:  Patient  Prep: patient was prepped and draped in usual sterile fashion     Ultrasound images of procedure were permanently stored.     Subacromial Bursa - Ultrasound Guided  The patient was informed of the risks and the benefits of the procedure and a written consent was signed.  The patient s shoulder was prepped with chlorhexidine in sterile fashion.   An injectate solution containing 2 mL of 1% lidocaine, 2 mL of 0.5% Bupivacaine, and 1 mL of Kenalog (40 mg/mL) was drawn up into a 5 mL syringe.  Injection was performed using sterile technique.  Under ultrasound  guidance a 1.5-inch 22-gauge needle was used to enter the subacromial bursa.  An anterolateral approach was used with arm held in Crass position.  Needle placement was visualized and documented with ultrasound.  Ultrasound visualization was necessary to ensure placement in to the bursa and not the rotator cuff tendon which could potentially cause further tendon damage.  Injection performed in-plane.  Injection solution visualized within the joint space.  Images were permanently stored for the patient's record.  There were no complications. The patient tolerated the procedure well. There was negligible bleeding.     Impression:  Successful ultrasound-guided right subacromial bursa injection.    Plan:  - Injection:    - Expectations and goals of the injection were discussed and verbal and written consent was obtained.  - Performed a corticosteroid injection of the right subacromial bursa today in clinic. Patient tolerated the procedure well without complications.    - Post-procedure instructions:    - Keep the injection site clean and dry.   - Do not submerge the injection site for 24 hours (no baths, pools). Showers are ok.   - Rest the area for 24-48 hours before resuming normal activities. Avoid overexerting the area for the first few weeks.   - It may take 2-3 days to start noticing the effects of the injection and up to 3-4 weeks to feel significant benefits.   - Follow up:          - In 3+ months as needed for updates to treatment plan, or sooner for new/worsening symptoms.  - Patient has clinic contact information for questions or concerns.      Octavio Carrera DO, CAM  [facility]  [facility] - Department of Orthopedic Surgery     Disclaimer:  This note was prepared and written using Dragon Medical dictation software. As a result, there may be errors in the script that have gone undetected. Please consider this when interpreting the information in this note.          Again, thank you for allowing me to participate in the care of your patient.        Sincerely,        Octavio Carrera, DO

## 2024-05-13 ENCOUNTER — MYC MEDICAL ADVICE (OUTPATIENT)
Dept: FAMILY MEDICINE | Facility: CLINIC | Age: 69
End: 2024-05-13
Payer: COMMERCIAL

## 2024-05-25 ENCOUNTER — HEALTH MAINTENANCE LETTER (OUTPATIENT)
Age: 69
End: 2024-05-25

## 2024-05-29 ENCOUNTER — HOSPITAL ENCOUNTER (OUTPATIENT)
Dept: CARDIOLOGY | Facility: CLINIC | Age: 69
Discharge: HOME | End: 2024-05-29
Attending: NURSE PRACTITIONER | Admitting: NURSE PRACTITIONER
Payer: COMMERCIAL

## 2024-05-29 ENCOUNTER — TELEPHONE (OUTPATIENT)
Dept: ORTHOPEDICS | Facility: CLINIC | Age: 69
End: 2024-05-29

## 2024-05-29 DIAGNOSIS — M67.911 TENDINOPATHY OF RIGHT ROTATOR CUFF: Primary | ICD-10-CM

## 2024-05-29 DIAGNOSIS — Z98.890 S/P MITRAL VALVE REPAIR: ICD-10-CM

## 2024-05-29 DIAGNOSIS — M19.011 PRIMARY OSTEOARTHRITIS, RIGHT SHOULDER: ICD-10-CM

## 2024-05-29 LAB — LVEF ECHO: NORMAL

## 2024-05-29 PROCEDURE — 93306 TTE W/DOPPLER COMPLETE: CPT | Mod: 26 | Performed by: INTERNAL MEDICINE

## 2024-05-29 PROCEDURE — 93306 TTE W/DOPPLER COMPLETE: CPT

## 2024-05-29 RX ORDER — PREDNISONE 20 MG/1
40 TABLET ORAL DAILY
Qty: 10 TABLET | Refills: 0 | Status: SHIPPED | OUTPATIENT
Start: 2024-05-29 | End: 2024-06-03

## 2024-05-29 NOTE — TELEPHONE ENCOUNTER
Ok to trial another round of prednisone since it has been helpful for her in the past. Rx written and sent to preferred pharmacy.     Octavio Carrera DO, CAQSM  [facility] - Sports Medicine  [facility] - Department of Orthopedic Surgery

## 2024-05-29 NOTE — TELEPHONE ENCOUNTER
Spoke with patient today. She notes that she has had great relief in shoulder pain with the subacromial and glenohumeral injections but is still noticing numbness in her right palm. Is wondering about next steps. Verbally discussed that Prednisone, physical therapy and/or AIRAM were discussed at 4/24/2024 visit. If prednisone given, she is hoping for the procedure to be sent to the  pharmacy at [facility] and that the prescription be written the way it has been in the past from other providers. Patient verbalizes dose is highest in the morning. Merced Lew, ATC

## 2024-06-11 ENCOUNTER — OFFICE VISIT (OUTPATIENT)
Dept: CARDIOLOGY | Facility: CLINIC | Age: 69
End: 2024-06-11
Attending: INTERNAL MEDICINE
Payer: COMMERCIAL

## 2024-06-11 ENCOUNTER — ANCILLARY PROCEDURE (OUTPATIENT)
Dept: GENERAL RADIOLOGY | Facility: OTHER | Age: 69
End: 2024-06-11
Attending: PHYSICIAN ASSISTANT
Payer: COMMERCIAL

## 2024-06-11 ENCOUNTER — OFFICE VISIT (OUTPATIENT)
Dept: FAMILY MEDICINE | Facility: OTHER | Age: 69
End: 2024-06-11
Payer: COMMERCIAL

## 2024-06-11 ENCOUNTER — OFFICE VISIT (OUTPATIENT)
Dept: ORTHOPEDICS | Facility: CLINIC | Age: 69
End: 2024-06-11
Payer: COMMERCIAL

## 2024-06-11 ENCOUNTER — MYC MEDICAL ADVICE (OUTPATIENT)
Dept: ORTHOPEDICS | Facility: CLINIC | Age: 69
End: 2024-06-11

## 2024-06-11 VITALS
BODY MASS INDEX: 25.4 KG/M2 | RESPIRATION RATE: 16 BRPM | TEMPERATURE: 99 F | DIASTOLIC BLOOD PRESSURE: 72 MMHG | HEART RATE: 78 BPM | SYSTOLIC BLOOD PRESSURE: 130 MMHG | OXYGEN SATURATION: 98 % | WEIGHT: 138 LBS | HEIGHT: 62 IN

## 2024-06-11 VITALS
SYSTOLIC BLOOD PRESSURE: 118 MMHG | DIASTOLIC BLOOD PRESSURE: 70 MMHG | BODY MASS INDEX: 23.69 KG/M2 | HEART RATE: 82 BPM | HEIGHT: 64 IN | OXYGEN SATURATION: 98 %

## 2024-06-11 VITALS
TEMPERATURE: 97.9 F | DIASTOLIC BLOOD PRESSURE: 70 MMHG | WEIGHT: 135 LBS | BODY MASS INDEX: 23.05 KG/M2 | SYSTOLIC BLOOD PRESSURE: 118 MMHG | HEIGHT: 64 IN

## 2024-06-11 DIAGNOSIS — M11.262 CHONDROCALCINOSIS OF LEFT KNEE: ICD-10-CM

## 2024-06-11 DIAGNOSIS — M17.12 PRIMARY LOCALIZED OSTEOARTHRITIS OF LEFT KNEE: ICD-10-CM
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
## 2024-06-11 DIAGNOSIS — S82.132A CLOSED FRACTURE OF MEDIAL PORTION OF LEFT TIBIAL PLATEAU, INITIAL ENCOUNTER: ICD-10-CM

## 2024-06-11 DIAGNOSIS — G89.29 CHRONIC RIGHT SHOULDER PAIN: ICD-10-CM
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
## 2024-06-11 DIAGNOSIS — M25.00 LIPOHEMARTHROSIS: ICD-10-CM

## 2024-06-11 DIAGNOSIS — Z98.890 S/P MITRAL VALVE REPAIR: Primary | ICD-10-CM

## 2024-06-11 DIAGNOSIS — M81.0 OSTEOPOROSIS WITHOUT CURRENT PATHOLOGICAL FRACTURE, UNSPECIFIED OSTEOPOROSIS TYPE: ICD-10-CM

## 2024-06-11 DIAGNOSIS — S83.8X2A SPRAIN OF OTHER LIGAMENT OF LEFT KNEE, INITIAL ENCOUNTER: Primary | ICD-10-CM

## 2024-06-11 DIAGNOSIS — M54.12 CERVICAL RADICULOPATHY: ICD-10-CM

## 2024-06-11 DIAGNOSIS — I34.0 NONRHEUMATIC MITRAL VALVE REGURGITATION: ICD-10-CM

## 2024-06-11 DIAGNOSIS — M25.511 CHRONIC RIGHT SHOULDER PAIN: ICD-10-CM

## 2024-06-11 DIAGNOSIS — M25.562 ACUTE PAIN OF LEFT KNEE: ICD-10-CM

## 2024-06-11 LAB — RADIOLOGIST FLAGS: ABNORMAL

## 2024-06-11 PROCEDURE — 99213 OFFICE O/P EST LOW 20 MIN: CPT | Performed by: PHYSICIAN ASSISTANT

## 2024-06-11 PROCEDURE — 73562 X-RAY EXAM OF KNEE 3: CPT | Mod: TC | Performed by: RADIOLOGY

## 2024-06-11 PROCEDURE — 73502 X-RAY EXAM HIP UNI 2-3 VIEWS: CPT | Mod: TC | Performed by: RADIOLOGY

## 2024-06-11 PROCEDURE — 99214 OFFICE O/P EST MOD 30 MIN: CPT | Performed by: PHYSICIAN ASSISTANT

## 2024-06-11 PROCEDURE — 72100 X-RAY EXAM L-S SPINE 2/3 VWS: CPT | Mod: TC | Performed by: RADIOLOGY

## 2024-06-11 PROCEDURE — 99214 OFFICE O/P EST MOD 30 MIN: CPT | Performed by: INTERNAL MEDICINE

## 2024-06-11 RX ORDER — FLUTICASONE PROPIONATE 50 MCG
SPRAY, SUSPENSION (ML) NASAL
COMMUNITY
Start: 2024-05-14

## 2024-06-11 RX ORDER — DULOXETIN HYDROCHLORIDE 30 MG/1
30 CAPSULE, DELAYED RELEASE ORAL DAILY
Qty: 30 CAPSULE | Refills: 1 | Status: SHIPPED | OUTPATIENT
Start: 2024-06-11 | End: 2024-07-30

## 2024-06-11 RX ORDER — CYCLOBENZAPRINE HCL 5 MG
5 TABLET ORAL 3 TIMES DAILY PRN
Qty: 90 TABLET | Refills: 0 | Status: SHIPPED | OUTPATIENT
Start: 2024-06-11 | End: 2024-09-12

## 2024-06-11 RX ORDER — MELOXICAM 7.5 MG/1
7.5-15 TABLET ORAL DAILY
Qty: 60 TABLET | Refills: 0 | Status: SHIPPED | OUTPATIENT
Start: 2024-06-11 | End: 2024-07-22

## 2024-06-11 RX ORDER — RESPIRATORY SYNCYTIAL VIRUS VACCINE 120MCG/0.5
0.5 KIT INTRAMUSCULAR ONCE
Qty: 1 EACH | Refills: 0 | Status: CANCELLED | OUTPATIENT
Start: 2024-06-11 | End: 2024-06-11

## 2024-06-11 ASSESSMENT — PATIENT HEALTH QUESTIONNAIRE - PHQ9
SUM OF ALL RESPONSES TO PHQ QUESTIONS 1-9: 6
SUM OF ALL RESPONSES TO PHQ QUESTIONS 1-9: 6
10. IF YOU CHECKED OFF ANY PROBLEMS, HOW DIFFICULT HAVE THESE PROBLEMS MADE IT FOR YOU TO DO YOUR WORK, TAKE CARE OF THINGS AT HOME, OR GET ALONG WITH OTHER PEOPLE: NOT DIFFICULT AT ALL

## 2024-06-11 ASSESSMENT — ASTHMA QUESTIONNAIRES
QUESTION_1 LAST FOUR WEEKS HOW MUCH OF THE TIME DID YOUR ASTHMA KEEP YOU FROM GETTING AS MUCH DONE AT WORK, SCHOOL OR AT HOME: NONE OF THE TIME
ACT_TOTALSCORE: 23
QUESTION_5 LAST FOUR WEEKS HOW WOULD YOU RATE YOUR ASTHMA CONTROL: WELL CONTROLLED
QUESTION_4 LAST FOUR WEEKS HOW OFTEN HAVE YOU USED YOUR RESCUE INHALER OR NEBULIZER MEDICATION (SUCH AS ALBUTEROL): ONCE A WEEK OR LESS
QUESTION_3 LAST FOUR WEEKS HOW OFTEN DID YOUR ASTHMA SYMPTOMS (WHEEZING, COUGHING, SHORTNESS OF BREATH, CHEST TIGHTNESS OR PAIN) WAKE YOU UP AT NIGHT OR EARLIER THAN USUAL IN THE MORNING: NOT AT ALL
ACT_TOTALSCORE: 23
QUESTION_2 LAST FOUR WEEKS HOW OFTEN HAVE YOU HAD SHORTNESS OF BREATH: NOT AT ALL

## 2024-06-11 ASSESSMENT — PAIN SCALES - GENERAL
PAINLEVEL: WORST PAIN (10)
PAINLEVEL: WORST PAIN (10)

## 2024-06-11 NOTE — PROGRESS NOTES
General Cardiology Clinic Progress Note  Margareth Green MRN# [id number]   YOB: 1955 Age: 69 year old       Reason for visit: Mitral valve disease    History of presenting illness:    I had the opportunity to see Margareth Green at [facility] today for reevaluation of mitral valve disease following her discovery last year of severe mitral valve prolapse associated with severe mitral valve regurgitation.  I saw her for consultation on 7/18/2023 after she had been in the emergency room with leg pains.  During that evaluation, she was found to have a cardiac murmur and referred for an echocardiogram which revealed the mitral regurgitation.  I referred her for a DOUG which confirmed severe mitral valve prolapse.  She had a coronary angiogram which revealed no significant coronary artery disease.  She then went on to have mitral valve repair in October 2023.    She is now doing well.  Her echocardiogram demonstrates an excellent mitral valve repair with no mitral regurgitation and normal mean mitral valve gradient of 5 mmHg.  Her left ventricular size and function is normal.    She still has some mild shortness of breath at times but I reassured her that her cardiac structure and function is now normal and I do not think this would be caused by her mitral valve issues.  She has some residual pains in her right lateral chest, likely from the right thoracotomy performed for her mitral valve surgery.  She did not have a sternotomy.  She also has some pains in her right thigh which she attributes to nerve pain from the procedure.    Unfortunately, she slipped and fell recently and now has a left leg fracture at the knee.    Her examination is unremarkable.              Assessment and Plan:     ASSESSMENT:    Ms. Margareth Green is a 69-year-old woman with a history of mitral valve prolapse and severe mitral regurgitation requiring mitral valve repair in October 2023.  Her echocardiogram now shows an  "excellent mitral valve repair with no residual regurgitation and normal mean mitral valve gradient of 5 mmHg.    I do not think her shortness of breath with exertion is related to her heart at this point.    I encouraged her to continue taking the low-dose aspirin 81 mg daily but she needs no other medications from a cardiac standpoint.    I will have her follow-up with an echocardiogram again in 1 year for reevaluation of her mitral valve function.    Andrey Mathews MD           Orders this Visit:  Orders Placed This Encounter   Procedures    Follow-Up with Cardiology    Echocardiogram Complete     No orders of the defined types were placed in this encounter.    There are no discontinued medications.    Today's clinic visit entailed:    30 minutes spent by me on the date of the encounter doing chart review, history and exam, documentation and further activities per the note  Provider  Link to [facility] Help Grid     The level of medical decision making during this visit was of high complexity.           Review of Systems:     Review of Systems:  Skin:        Eyes:       ENT:       Respiratory:  Positive for dyspnea on exertion  Cardiovascular:  Negative for;palpitations;chest pain;edema;syncope or near-syncope;cyanosis;exercise intolerance;fatigue;lightheadedness Positive for;dizziness  Gastroenterology:      Genitourinary:       Musculoskeletal:       Neurologic:       Psychiatric:       Heme/Lymph/Imm:       Endocrine:                 Physical Exam:     Vitals: /70 (BP Location: Left arm, Patient Position: Sitting, Cuff Size: Adult Regular)   Pulse 82   Ht 1.626 m (5' 4\")   LMP 02/26/2008 (Approximate)   SpO2 98%   BMI 23.69 kg/m    Constitutional: Well nourished and in no apparent distress.  Eyes: Pupils equal, round. Sclerae anicteric.   HEENT: Normocephalic, atraumatic.   Neck: Supple. JVD   Respiratory: Breathing non-labored. Lungs clear to auscultation bilaterally. No crackles, wheezes, rhonchi, or " rales.  Cardiovascular:  Regular rate and rhythm, normal S1 and S2. No murmur, rub, or gallop.  Skin: Warm, dry. No rashes, cyanosis, or xanthelasma.  Extremities: No edema.  Neurologic: No gross motor deficits. Alert, awake, and oriented to person, place and time.  Psychiatric: Affect appropriate.             Medications:     Current Outpatient Medications   Medication Sig Dispense Refill    albuterol (PROAIR HFA/PROVENTIL HFA/VENTOLIN HFA) 108 (90 Base) MCG/ACT inhaler Inhale 2 puffs into the lungs every 6 hours as needed for shortness of breath, wheezing or cough      ALLEGRA 180 MG OR TABS Take 180 mg by mouth daily  30 0    aspirin (ASA) 81 MG chewable tablet Take 1 tablet (81 mg) by mouth daily 90 tablet 3    buPROPion (WELLBUTRIN) 75 MG tablet Take 1 tablet (75 mg) by mouth 2 times daily 60 tablet 2    CALCIUM /VITAMIN D TABS   OR Take 1 tablet by mouth daily      Cetaphil Moisturizing (CETAPHIL) external lotion Apply topically as needed      fluticasone (FLONASE) 50 MCG/ACT nasal spray       magnesium aspartate (MAGINEX) 615 MG EC tablet Take 1 tablet (615 mg) by mouth daily      Melatonin 10 MG TABS tablet Take 10 mg by mouth nightly as needed for sleep      mometasone-formoterol (DULERA) 100-5 MCG/ACT inhaler Inhale 2 puffs into the lungs 2 times daily 39 g 3    Multiple Vitamins-Minerals (PRESERVISION AREDS) TABS Take 1 tablet by mouth 2 times daily      multivitamin w/minerals (THERA-VIT-M) tablet Take 1 tablet by mouth daily RAW Brand      omeprazole (PRILOSEC) 20 MG DR capsule TAKE 1 CAPSULE DAILY 90 capsule 3    Simethicone (GAS-X PO) Take 160 mg by mouth 4 times daily as needed for intestinal gas       vitamin C (ASCORBIC ACID) 250 MG tablet Take 250 mg by mouth daily      acetaminophen (ARTHRITIS PAIN APAP) 650 MG CR tablet Take 650 mg by mouth every 8 hours as needed for mild pain or fever (Patient not taking: Reported on 6/11/2024)      amoxicillin (AMOXIL) 500 MG capsule Take 2,000 mg (4  capsules) by mouth 30-60 minutes prior to dental appointment. (Patient not taking: Reported on 2024) 4 capsule 0    cyclobenzaprine (FLEXERIL) 5 MG tablet Take 1 tablet (5 mg) by mouth 3 times daily as needed for muscle spasms (Patient not taking: Reported on 2024) 90 tablet 0    DULoxetine (CYMBALTA) 30 MG capsule Take 1 capsule (30 mg) by mouth daily (Patient not taking: Reported on 2024) 30 capsule 1    meclizine (ANTIVERT) 25 MG tablet Take 25 mg by mouth daily as needed for dizziness (Patient not taking: Reported on 2024)      meloxicam (MOBIC) 7.5 MG tablet Take 1-2 tablets (7.5-15 mg) by mouth daily (Patient not taking: Reported on 2024) 60 tablet 0    metoprolol succinate ER (TOPROL XL) 25 MG 24 hr tablet Take 1 tablet (25 mg) by mouth daily (Patient not taking: Reported on 2024) 90 tablet 1    naproxen sodium (ANAPROX) 220 MG tablet Take 220 mg by mouth as needed for moderate pain (Patient not taking: Reported on 2024)      senna-docusate (SENOKOT-S/PERICOLACE) 8.6-50 MG tablet Take 1-2 tablets by mouth 2 times daily as needed for constipation (While on oral opioids.) (Patient not taking: Reported on 2024) 10 tablet 0       Family History   Problem Relation Age of Onset    Diabetes Father     Depression Father     Cerebrovascular Disease Mother         age 80    Arthritis Mother     Depression Mother         On meds    Eye Disorder Mother         Glaucoma    Osteoporosis Mother     Respiratory Mother          86 YO COPD    Chronic Obstructive Pulmonary Disease Mother     C.A.D. Brother         67 YO MI -    Chronic Obstructive Pulmonary Disease Brother     Myocardial Infarction Brother     Other Cancer Brother     Cancer Brother        Social History     Socioeconomic History    Marital status:      Spouse name: Robel    Number of children: 1    Years of education: 12    Highest education level: Not on file   Occupational History    Occupation: HIMS clerk      Employer: [employer]   Tobacco Use    Smoking status: Former     Current packs/day: 0.00     Average packs/day: 0.5 packs/day for 30.0 years (15.0 ttl pk-yrs)     Types: Cigarettes     Start date: 3/20/1968     Quit date: 3/20/1998     Years since quittin.2    Smokeless tobacco: Never    Tobacco comments:     No smokers in home   Vaping Use    Vaping status: Never Used   Substance and Sexual Activity    Alcohol use: Not Currently     Alcohol/week: 6.7 standard drinks of alcohol     Types: 7 Standard drinks or equivalent per week     Comment: 2x/week    Drug use: No    Sexual activity: Yes     Partners: Male     Birth control/protection: None     Comment: spouse -vas   Other Topics Concern     Service No    Blood Transfusions No    Caffeine Concern Yes     Comment: pop: 1c/wk    Occupational Exposure No    Hobby Hazards No    Sleep Concern Yes     Comment: On meds    Stress Concern Yes    Weight Concern No    Special Diet No    Back Care Yes     Comment: Hx back pain    Exercise Yes     Comment: stretching qd    Bike Helmet No    Seat Belt Yes    Self-Exams Yes    Parent/sibling w/ CABG, MI or angioplasty before 65F 55M? Yes     Comment: Father & Brother.   Social History Narrative    Lives with spouse.. No domestic violence issues.     Social Determinants of Health     Financial Resource Strain: Unknown (2023)    Financial Resource Strain     Within the past 12 months, have you or your family members you live with been unable to get utilities (heat, electricity) when it was really needed?: Patient declined   Food Insecurity: Low Risk  (2023)    Food Insecurity     Within the past 12 months, did you worry that your food would run out before you got money to buy more?: No     Within the past 12 months, did the food you bought just not last and you didn t have money to get more?: Patient declined   Transportation Needs: Unknown (2023)    Transportation Needs     Within  the past 12 months, has lack of transportation kept you from medical appointments, getting your medicines, non-medical meetings or appointments, work, or from getting things that you need?: Patient declined   Physical Activity: Not on file   Stress: Not on file   Social Connections: Not on file   Interpersonal Safety: Low Risk  (4/10/2024)    Interpersonal Safety     Do you feel physically and emotionally safe where you currently live?: Yes     Within the past 12 months, have you been hit, slapped, kicked or otherwise physically hurt by someone?: No     Within the past 12 months, have you been humiliated or emotionally abused in other ways by your partner or ex-partner?: No   Housing Stability: Unknown (12/21/2023)    Housing Stability     Do you have housing? : Patient declined     Are you worried about losing your housing?: Patient declined            Past Medical History:     Past Medical History:   Diagnosis Date    Abnormal Papanicolaou smear of vagina and vaginal HPV 12/2002    ASCUS (HPV neg). 3/03 WNL but no transitional zome - repeat 3months    Allergic rhinitis, cause unspecified     Arthritis     Backache, unspecified     Carpal tunnel syndrome     s/p repair    Cervicalgia     Chronic kidney disease, stage 3 (H) 07/14/2021    Depressive disorder     Depressive disorder, not elsewhere classified     doing well on Wellbutrin    Derangement of TMJ (temporomandibular joint) 03/2010    internal derangement    Dermatophytosis of nail     Diaphragmatic hernia without mention of obstruction or gangrene 09/2007    small hiatal hernia    Diffuse cystic mastopathy 2000    FCBD    Esophageal reflux 04/2005    Hammertoe 10/2009    see podiatry consult    Headache(784.0)     Muscle contrature headaches    Intramural leiomyoma of uterus 07/2007    on US    Irregular menstrual cycle     better with BCP -also PMS is better with BCP    IRRITABLE COLON 02/18/2008    Malaise and fatigue     Menopause     lmp 2/2008    Mild  persistent asthma     Mitral valve disease     Mole (skin) 2003    9 x 6 mm rt ant chest    Motion sickness     Neuroma 11/2008    lt foot    Osteopenia 12/2008    Other malaise and fatigue 04/2005    stress    Personal history of tobacco use, presenting hazards to health     Quit in 1998    Solitary cyst of breast 12/2005    lt 6 oclock    Stenosis of lumbosacral spine     l4-5 and l5-s1    Symptomatic menopausal or female climacteric states 12/2002    perimenopausal. LMP 2/2008    Tachycardia, paroxysmal (H) 11/24/2020    Thyroid fullness 08/07/2013              Past Surgical History:     Past Surgical History:   Procedure Laterality Date    APPLY WOUND VAC Right 2/22/2024    Procedure: PLACEMENT OF RIGHT GROIN PREVENA WOUND VAC;  Surgeon: Brandon Hale MD;  Location:  OR    BIOPSY  4/10/2015    BL DUPLEX LO EXTREM ART UNILAT/LTD  4/3008    lower extr arterial doppler -no stenosis    C DEXA,BONE DENSITY,APPENDICULR SKELTN  12/2008    osteopenia    COLONOSCOPY  10/22/2007    bx benign    COLONOSCOPY N/A 4/10/2015    Procedure: COMBINED COLONOSCOPY, SINGLE OR MULTIPLE BIOPSY/POLYPECTOMY BY BIOPSY;  Surgeon: Cl Wagner MD;  Location: [facility]    CV CORONARY ANGIOGRAM N/A 8/17/2023    Procedure: Coronary Angiogram;  Surgeon: Liza Ford MD;  Location:  HEART CARDIAC CATH LAB    ESOPHAGOSCOPY, GASTROSCOPY, DUODENOSCOPY (EGD), COMBINED N/A 1/31/2018    Procedure: COMBINED ESOPHAGOSCOPY, GASTROSCOPY, DUODENOSCOPY (EGD);  COMBINED ESOPHAGOSCOPY, GASTROSCOPY, DUODENOSCOPY (EGD);  Surgeon: Tirso Duran MD;  Location:  GI    ESOPHAGOSCOPY, GASTROSCOPY, DUODENOSCOPY (EGD), COMBINED N/A 2/1/2023    Procedure: ESOPHAGOGASTRODUODENOSCOPY (EGD);  Surgeon: Lui Vences MD;  Location:  GI    EYE SURGERY      HC PART REMV BONE METATARSAL HEAD,EA  01/11/10    Right foot plantar plate tear. Also correct hammertoe, 2nd digit & varicose vein ligation, heel.    HC REVISE MEDIAN N/CARPAL TUNNEL SURG       Left in 1997, right in 1999    INCISION AND DRAINAGE LOWER EXTREMITY, COMBINED Right 2/22/2024    Procedure: Excision OF RIGHT GROIN SEROMA WITH LYMPHAZURIN BLUE INJECTION, Right groin exploration;  Surgeon: Brandon Hale MD;  Location: [facility] OR    INJ, ANES/STER, FACET LUMB/SAC  2010    Left L5 nerve root injection    INJECT EPIDURAL CERVICAL Right 9/29/2022    Procedure: Cervical 7-Thoracic 1 Interlaminar epidural steroid injection using fluoroscopic guidance with contrast dye, Right;  Surgeon: Tyler Bay MD;  Location: [facility] OR    INJECT EPIDURAL TRANSFORAMINAL  10/09/2013    [facility]    INJECT JOINT SACROILIAC  3/19/14,4/21/14    [facility]    REPAIR VALVE MITRAL MINIMALLY INVASIVE N/A 10/26/2023    Procedure: MINIMALLY INVASIVE MITRAL VALVE REPAIR USING CHORD-X PRE-MEASURED LOOP SUTURE SIZE:12MM, MITRAL ANNULOPLASTY RING ERICK-COTTO PHYSIO Il SIZE: 28MM, AND ON CARDIOPULMONARY PUMP OXYGENATOR (INTRAOPERATIVE TRANSESOPHAGEAL ECHOCARDIOGRAM BY THE CARDIOLOGIST  );  Surgeon: Sabas Meredith MD;  Location:  OR    ZZC NONSPECIFIC PROCEDURE  2009    Metatarsal phalangeal joint injection    ZZ COLONOSCOPY THRU STOMA, DIAGNOSTIC  7/2002    bx. benign - flex sig in 5 yrs  or colonoscopy in 10 yrs    ZZ ECHO HEART XTHORACIC, STRESS/REST  2/2005    WNL    ZZ NCS MOTOR W/O F-WAVE, EACH NERVE  7/2008    CTS              Allergies:   Penicillins       Data:   All laboratory data reviewed:    Recent Labs   Lab Test 04/07/23  1023 11/25/20  1020 11/21/19  0953 10/14/19  1541 02/14/19  1223   LDL 73 66 66  --   --    HDL 79 82 76  --   --    NHDL 83 87 79  --   --    CHOL 162 169 155  --   --    TRIG 49 105 67  --   --    TSH  --   --   --  2.29 1.50       Lab Results   Component Value Date    WBC 8.0 02/13/2024    WBC 10.8 03/16/2020    RBC 4.53 02/13/2024    RBC 4.26 03/16/2020    HGB 13.2 02/13/2024    HGB 13.2 03/16/2020    HCT 41.2 02/13/2024    HCT 40.4  03/16/2020    MCV 91 02/13/2024    MCV 95 03/16/2020    MCH 29.1 02/13/2024    MCH 31.0 03/16/2020    MCHC 32.0 02/13/2024    MCHC 32.7 03/16/2020    RDW 14.3 02/13/2024    RDW 13.1 03/16/2020     02/13/2024     03/16/2020       Lab Results   Component Value Date     02/22/2024     03/16/2020    POTASSIUM 4.0 02/22/2024    POTASSIUM 4.6 10/26/2023    POTASSIUM 3.9 12/06/2021    POTASSIUM 3.6 03/16/2020    CHLORIDE 107 02/22/2024    CHLORIDE 105 12/06/2021    CHLORIDE 107 03/16/2020    CO2 25 02/22/2024    CO2 26 12/06/2021    CO2 28 03/16/2020    ANIONGAP 9 02/22/2024    ANIONGAP 5 12/06/2021    ANIONGAP 5 03/16/2020    GLC 96 02/22/2024     (H) 10/30/2023     (H) 12/06/2021    GLC 92 03/16/2020    BUN 29.6 (H) 02/22/2024    BUN 13 12/06/2021    BUN 17 03/16/2020    CR 0.97 (H) 02/22/2024    CR 0.91 03/16/2020    GFRESTIMATED 62 02/22/2024    GFRESTIMATED 65 03/16/2020    GFRESTBLACK 76 03/16/2020    TOLU 9.6 02/22/2024    TOLU 8.5 03/16/2020      Lab Results   Component Value Date    AST 33 02/13/2024    AST 20 03/16/2020    ALT 18 02/13/2024    ALT 23 03/16/2020       Lab Results   Component Value Date    A1C 5.9 (H) 02/22/2024       Lab Results   Component Value Date    INR 1.27 (H) 10/26/2023    INR 1.37 (H) 10/26/2023    INR 1.00 01/30/2012         MAU TAVARES MD  [facility]

## 2024-06-11 NOTE — PATIENT INSTRUCTIONS
Plan for recovery:    Duloxetine 30mg at bedtime - this is for general chronic pains  Mobic 7.5mg-15mg once daily for inflammation reduction  Do not take with other NSAIDs (ibuprofen, advil, motrin, aleve, naproxen, etc)  Cyclobenzaprine 5mg to be used up to three times daily as needed  Can cause drowsiness  Tylenol/acetaminophen 325mg-650mg every 4-6 hours.  May benefit pain to do this consistently for the next 2-3 days

## 2024-06-11 NOTE — NURSING NOTE
Pt unable to stand for a weight due to leg injury and in a wheelchair.    Ivis Em, [facility]

## 2024-06-11 NOTE — PROGRESS NOTES
Assessment & Plan     1. Fall, initial encounter    2. Osteoporosis without current pathological fracture, unspecified osteoporosis type    3. Chronic right shoulder pain    4. Cervical radiculopathy    5. Lipohemarthrosis    6. Acute pain of left knee      Patient is a 69 year old female with history of osteopenia who presents with pain in the left posterolateral hip, left posterolateral knee and lumbar spine. Pain is due to a fall which occurred yesterday, June 10th. Patient was cleaning up her beach when she lost her footing and fell onto her left side. She recalls that her legs became twisted during the fall and that one of her shoes fell off. The fall was such that she ended up in the water. She recalls pain in the left hip vs other joints, but the knee and low back worsened over the past 24 hours. Patient was brought in by wheel chair today as ambulating causes pain in the hip and knee. On exam tenderness noted along the ischial rami, but minimal pain to palpation over the left hip or knee joint/fossa. Initial impression of imaging was negative for fracture. Radiologist has since reviewed the imaging and identified what appears to be a lipohemarthrosis and moderate effusion concerning for intra-articular fracture. There is no apparent fracture noted on imaging however. Patient had departed clinic by the time this finding was made. She has since been informed of the finding, advised to remain non-weight bearing as best she is able and to wear supportive brace. Order for CT scan of the left knee has been placed and patient given number to call to schedule this. New T12 compression fracture noted on xray imaging, but the age of this is indeterminate.    Patient was informed that she is due to repeat DEXA scan. Last scan was in 06/2021.    Starting duloxetine to help with chronic pain. Reviewed possible adverse effects and advised patient on need for follow up in 2-3 months after starting.     She will also have  mobic to use once daily. Instructed not to use other NSAIDs while taking this.     Flexeril to help with pain. Cautioned about sedative effects of this medication.     Can use OTC tylenol every 4-6 hours as needed for pain as well     - XR Hip Left 2-3 Views; Future  - XR Knee Left 3 Views; Future  - XR Lumbar Spine 2/3 Views; Future  - cyclobenzaprine (FLEXERIL) 5 MG tablet; Take 1 tablet (5 mg) by mouth 3 times daily as needed for muscle spasms  - meloxicam (MOBIC) 7.5 MG tablet; Take 1-2 tablets (7.5-15 mg) by mouth daily  - CT Knee Left w/o Contrast; Future  - DULoxetine (CYMBALTA) 30 MG capsule; Take 1 capsule (30 mg) by mouth daily  - CT Knee Left w/o Contrast; Future      Subjective   Margareth is a 69 year old, presenting for the following health issues:  Fall (Injury-in pain today)      6/11/2024     9:58 AM   Additional Questions   Roomed by Trudy HADDAD   Accompanied by self     Fall    History of Present Illness       Reason for visit:  Fall  Symptom onset:  1-3 days ago  Symptom intensity:  Severe  Symptom progression:  Worsening  Had these symptoms before:  No  What makes it worse:  Walking bending leg hip pain  What makes it better:  Napronxen    She eats 2-3 servings of fruits and vegetables daily.She consumes 1 sweetened beverage(s) daily.She exercises with enough effort to increase her heart rate 10 to 19 minutes per day.  She exercises with enough effort to increase her heart rate 3 or less days per week.   She is taking medications regularly.   [place] and shoe fell off in the water and she turned and fell right into the lake and immediately felt pain in left leg and reports she feels pain everywhere on upper body and leg/back. Both legs were swollen last night.      Review of Systems  Constitutional, HEENT, cardiovascular, pulmonary, gi and gu systems are negative, except as otherwise noted.      Objective    /72   Pulse 78   Temp 99  F (37.2  C)  "(Temporal)   Resp 16   Ht 1.565 m (5' 1.6\")   Wt 62.6 kg (138 lb)   LMP 02/26/2008 (Approximate)   SpO2 98%   BMI 25.57 kg/m    Body mass index is 25.57 kg/m .  Physical Exam   GENERAL: alert and no distress  RESP: lungs clear to auscultation - no rales, rhonchi or wheezes  CV: regular rate and rhythm, normal S1 S2, no S3 or S4, no murmur, click or rub, no peripheral edema  MS: Patient in wheel chair. Pain noted along the left ischial rami, L grtr trochanter and L popliteal fossa during attempted AROM. No visible edema or erythema. No bony abnormalities felt during palpation of the left hip/knee/ankle.   NEURO: Normal strength and tone, mentation intact and speech normal  PSYCH: mentation appears normal, affect normal/bright    XR Hip Left 2-3 Views    Result Date: 6/11/2024  HIP LEFT 2-3 VIEWS   6/11/2024 10:48 AM HISTORY: Fall 06/10 slipped on sand on beach. Immediate pains long the posterolateral left hip. Pain with ambulation; Fall, initial encounter. COMPARISON: Radiographs from 11/21/2005.     IMPRESSION: Mild osteoarthrosis of the left hip and SI joint, progressed. Degenerative changes in the spine, progressed. There is no evidence of fracture or osteonecrosis. NARESH PERKINS MD   SYSTEM ID:  [id number]    XR Knee Left 3 Views    Result Date: 6/11/2024  KNEE LEFT 3 VIEWS   6/11/2024 10:48 AM HISTORY: Fall 06/10 slipped on sand on beach. Pain in the posterior and lateral left knee; Fall, initial encounter. COMPARISON: Left knee radiographs from 5/10/2012.     IMPRESSION: 1. Moderate effusion and there appears to be a lipohemarthrosis. This suggests that there is an intra-articular fracture although there is no convincing fracture evident on radiographs. Therefore, MRI or CT may be warranted to better evaluate. 2. Chondrocalcinosis in all 3 compartments, progressed. Mild to moderate tricompartmental osteoarthrosis, progressed. Calcification posterior to the knee joint, likely an intra-articular body in a " popliteal cyst. [Access Center: Probable lipohemarthrosis] This report will be copied to the [facility] to ensure a provider acknowledges the finding. Access Center is available Monday through Friday 8am-3:30 pm. NARESH PERKINS MD   SYSTEM ID:  [id number]    XR Lumbar Spine 2/3 Views    Result Date: 6/11/2024  LUMBAR SPINE 2/3 VIEWS  6/11/2024 10:49 AM HISTORY: Fall 06/10 slipped on sand on beach. Since injury low back pain. Radiating along the lateral LLE; Fall, initial encounter. COMPARISON: Lumbar spine radiographs 1/2/2019. CT of the chest dated 1/5/2024.     IMPRESSION: Nomenclature is based on 5 lumbar vertebral bodies. There is a new age-indeterminate mild T12 compression deformity. No other gross vertebral body height loss is identified. Minimal/mild retrolisthesis of L2 on L3, L3 on L4, and L5 on S1 and minimal/mild anterolisthesis of L4 on L5, as before. Minimal/mild right lateral listhesis of L1 on L2 and L2 on L3 and left lateral lithiasis of L4 on L5. Again seen is levoconvex curvature of the lumbar spine with apex at L3 and L4 measuring approximately 30 degrees from the superior endplate of L2 to the inferior endplate of L4. Moderate to severe disc space narrowing at each level from L1-L2 through L5-S1. Marginal endplate osteophytes. Multilevel degenerative facet disease. Rounded peripherally calcified lesions projecting over the left upper quadrant of the abdomen, as seen on prior CT. Mild atherosclerotic calcification of the aorta.         Signed Electronically by: Octavio Mills PA-C

## 2024-06-11 NOTE — LETTER
6/11/2024      Margareth Green  [address] [facility] [id number]      Dear Colleague,    Thank you for referring your patient, Margareth Green, to the [facility]. Please see a copy of my visit note below.    1.  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  She really enjoys blues music and blues festivals.  She has grandkids she likes to spend time with her and lives [place] [address] in [place].  Her 's name is Jyothi    She is being seen for   Chief Complaint   Patient presents with     Knee Pain     Left     Consult         History of Present Illness:   Mechanism of Injury: Fell on the beach on 6/10/2024.  Patient was working on an incline and slid down and then lost her shoe and was in the water when she twisted her knee twisted and she fell backwards.  Location: Left lateral posterior knee  Duration of Pain: Since date of injury which was yesterday  Rating of Pain: Moderate  Pain Quality: Achy  Pain is better with: Nonweightbearing  Pain is worse with: Weightbearing  Treatment so far consists of: Patient was seen today by Shandra CM and the x-rays showed lipohemarthrosis which could be indicative of intra-articular fracture although it is not apparent any fracture lines on x-ray.  Patient has not been able to put full weight on the left lower extremity because of pain.  Patient has been utilizing soft knee brace.  Patient was prescribed Flexeril and Mobic but has not started taking them yet.  Associated Features: Denies numbness or tingling shooting burning electric pain.  Denies significant swelling but there has been some swelling.  Prior history of related problems: No previous surgery or trauma or fractures to the left knee  Pain is Limiting: Weightbearing  Here to: Orthopedic consultation  The Pain Has: About the same  Additional  History: Patient unfortunately cannot have a CT scan until 6/17/2024 but wanted some direction on what to do in the meantime.  Patient is not diabetic and not a smoker and also not taking any steroids.      Patient's past medical, surgical, social and family histories reviewed.     Past Medical History:   Diagnosis Date     Abnormal Papanicolaou smear of vagina and vaginal HPV 12/2002    ASCUS (HPV neg). 3/03 WNL but no transitional zome - repeat 3months     Allergic rhinitis, cause unspecified      Arthritis      Backache, unspecified      Carpal tunnel syndrome     s/p repair     Cervicalgia      Chronic kidney disease, stage 3 (H) 07/14/2021     Depressive disorder      Depressive disorder, not elsewhere classified     doing well on Wellbutrin     Derangement of TMJ (temporomandibular joint) 03/2010    internal derangement     Dermatophytosis of nail      Diaphragmatic hernia without mention of obstruction or gangrene 09/2007    small hiatal hernia     Diffuse cystic mastopathy 2000    FCBD     Esophageal reflux 04/2005     Hammertoe 10/2009    see podiatry consult     Headache(784.0)     Muscle contrature headaches     Intramural leiomyoma of uterus 07/2007    on US     Irregular menstrual cycle     better with BCP -also PMS is better with BCP     IRRITABLE COLON 02/18/2008     Malaise and fatigue      Menopause     lmp 2/2008     Mild persistent asthma      Mitral valve disease      Mole (skin) 2003    9 x 6 mm rt ant chest     Motion sickness      Neuroma 11/2008    lt foot     Osteopenia 12/2008     Other malaise and fatigue 04/2005    stress     Personal history of tobacco use, presenting hazards to health     Quit in 1998     Solitary cyst of breast 12/2005    lt 6 oclock     Stenosis of lumbosacral spine     l4-5 and l5-s1     Symptomatic menopausal or female climacteric states 12/2002    perimenopausal. LMP 2/2008     Tachycardia, paroxysmal (H) 11/24/2020     Thyroid fullness 08/07/2013        Past  Surgical History:   Procedure Laterality Date     APPLY WOUND VAC Right 2/22/2024    Procedure: PLACEMENT OF RIGHT GROIN PREVENA WOUND VAC;  Surgeon: Brandon Hale MD;  Location:  OR     BIOPSY  4/10/2015     BL DUPLEX LO [facility] ART UNILAT/LTD  4/3008    lower extr arterial doppler -no stenosis     C DEXA,BONE DENSITY,APPENDICULR SKELTN  12/2008    osteopenia     COLONOSCOPY  10/22/2007    bx benign     COLONOSCOPY N/A 4/10/2015    Procedure: COMBINED COLONOSCOPY, SINGLE OR MULTIPLE BIOPSY/POLYPECTOMY BY BIOPSY;  Surgeon: Cl Wagner MD;  Location:  GI     CV CORONARY ANGIOGRAM N/A 8/17/2023    Procedure: Coronary Angiogram;  Surgeon: Liza Ford MD;  Location:  HEART CARDIAC CATH LAB     ESOPHAGOSCOPY, GASTROSCOPY, DUODENOSCOPY (EGD), COMBINED N/A 1/31/2018    Procedure: COMBINED ESOPHAGOSCOPY, GASTROSCOPY, DUODENOSCOPY (EGD);  COMBINED ESOPHAGOSCOPY, GASTROSCOPY, DUODENOSCOPY (EGD);  Surgeon: Tirso Duran MD;  Location:  GI     ESOPHAGOSCOPY, GASTROSCOPY, DUODENOSCOPY (EGD), COMBINED N/A 2/1/2023    Procedure: ESOPHAGOGASTRODUODENOSCOPY (EGD);  Surgeon: Lui Vences MD;  Location:  GI     EYE SURGERY       HC PART REMV BONE METATARSAL HEAD,EA  01/11/10    Right foot plantar plate tear. Also correct hammertoe, 2nd digit & varicose vein ligation, heel.     HC REVISE MEDIAN N/CARPAL TUNNEL SURG      Left in 1997, right in 1999     INCISION AND DRAINAGE LOWER EXTREMITY, COMBINED Right 2/22/2024    Procedure: Excision OF RIGHT GROIN SEROMA WITH LYMPHAZURIN BLUE INJECTION, Right groin exploration;  Surgeon: Brandon Hale MD;  Location:  OR     INJ, ANES/STER, FACET LUMB/SAC  2010    Left L5 nerve root injection     INJECT EPIDURAL CERVICAL Right 9/29/2022    Procedure: Cervical 7-Thoracic 1 Interlaminar epidural steroid injection using fluoroscopic guidance with contrast dye, Right;  Surgeon: Tyler Bay MD;  Location: [facility]     INJECT EPIDURAL TRANSFORAMINAL   10/09/2013    [facility]     INJECT JOINT SACROILIAC  3/19/14,4/21/14    [facility]     REPAIR VALVE MITRAL MINIMALLY INVASIVE N/A 10/26/2023    Procedure: MINIMALLY INVASIVE MITRAL VALVE REPAIR USING CHORD-X PRE-MEASURED LOOP SUTURE SIZE:12MM, MITRAL ANNULOPLASTY RING ERICK-COTTO PHYSIO Il SIZE: 28MM, AND ON CARDIOPULMONARY PUMP OXYGENATOR (INTRAOPERATIVE TRANSESOPHAGEAL ECHOCARDIOGRAM BY THE CARDIOLOGIST  );  Surgeon: Sabas Meredith MD;  Location:  OR     C NONSPECIFIC PROCEDURE  2009    Metatarsal phalangeal joint injection     [facility] COLONOSCOPY THRU STOMA, DIAGNOSTIC  7/2002    bx. benign - flex sig in 5 yrs  or colonoscopy in 10 yrs     [facility] ECHO HEART XTHORACIC, STRESS/REST  2/2005    WNL     [facility] NCS MOTOR W/O F-WAVE, EACH NERVE  7/2008    CTS       Medications:  Current Outpatient Medications   Medication Sig Dispense Refill     ALLEGRA 180 MG OR TABS Take 180 mg by mouth daily  30 0     aspirin (ASA) 81 MG chewable tablet Take 1 tablet (81 mg) by mouth daily 90 tablet 3     buPROPion (WELLBUTRIN) 75 MG tablet Take 1 tablet (75 mg) by mouth 2 times daily 60 tablet 2     CALCIUM /VITAMIN D TABS   OR Take 1 tablet by mouth daily       Cetaphil Moisturizing (CETAPHIL) external lotion Apply topically as needed       fluticasone (FLONASE) 50 MCG/ACT nasal spray        magnesium aspartate (MAGINEX) 615 MG EC tablet Take 1 tablet (615 mg) by mouth daily       mometasone-formoterol (DULERA) 100-5 MCG/ACT inhaler Inhale 2 puffs into the lungs 2 times daily 39 g 3     Multiple Vitamins-Minerals (PRESERVISION AREDS) TABS Take 1 tablet by mouth 2 times daily       multivitamin w/minerals (THERA-VIT-M) tablet Take 1 tablet by mouth daily RAW Brand       naproxen sodium (ANAPROX) 220 MG tablet Take 220 mg by mouth as needed for moderate pain       omeprazole (PRILOSEC) 20 MG DR capsule TAKE 1 CAPSULE DAILY 90 capsule 3     vitamin C (ASCORBIC ACID) 250 MG tablet Take 250 mg  by mouth daily       acetaminophen (ARTHRITIS PAIN APAP) 650 MG CR tablet Take 650 mg by mouth every 8 hours as needed for mild pain or fever (Patient not taking: Reported on 6/11/2024)       albuterol (PROAIR HFA/PROVENTIL HFA/VENTOLIN HFA) 108 (90 Base) MCG/ACT inhaler Inhale 2 puffs into the lungs every 6 hours as needed for shortness of breath, wheezing or cough (Patient not taking: Reported on 6/11/2024)       amoxicillin (AMOXIL) 500 MG capsule Take 2,000 mg (4 capsules) by mouth 30-60 minutes prior to dental appointment. (Patient not taking: Reported on 6/11/2024) 4 capsule 0     cyclobenzaprine (FLEXERIL) 5 MG tablet Take 1 tablet (5 mg) by mouth 3 times daily as needed for muscle spasms (Patient not taking: Reported on 6/11/2024) 90 tablet 0     DULoxetine (CYMBALTA) 30 MG capsule Take 1 capsule (30 mg) by mouth daily (Patient not taking: Reported on 6/11/2024) 30 capsule 1     meclizine (ANTIVERT) 25 MG tablet Take 25 mg by mouth daily as needed for dizziness (Patient not taking: Reported on 6/11/2024)       Melatonin 10 MG TABS tablet Take 10 mg by mouth nightly as needed for sleep (Patient not taking: Reported on 6/11/2024)       meloxicam (MOBIC) 7.5 MG tablet Take 1-2 tablets (7.5-15 mg) by mouth daily (Patient not taking: Reported on 6/11/2024) 60 tablet 0     metoprolol succinate ER (TOPROL XL) 25 MG 24 hr tablet Take 1 tablet (25 mg) by mouth daily (Patient not taking: Reported on 6/11/2024) 90 tablet 1     senna-docusate (SENOKOT-S/PERICOLACE) 8.6-50 MG tablet Take 1-2 tablets by mouth 2 times daily as needed for constipation (While on oral opioids.) (Patient not taking: Reported on 6/11/2024) 10 tablet 0     Simethicone (GAS-X PO) Take 160 mg by mouth 4 times daily as needed for intestinal gas  (Patient not taking: Reported on 6/11/2024)       Current Facility-Administered Medications   Medication Dose Route Frequency Provider Last Rate Last Admin     2.0 mL bupivacaine (MARCAINE) 0.5% injection  (50 mL vial)  2 mL      2 mL at 24 1231     2.0 mL bupivacaine (MARCAINE) 0.5% injection (50 mL vial)  2 mL      2 mL at 24 1150     lidocaine 1 % injection 2 mL  2 mL      2 mL at 24 1231     lidocaine 1 % injection 2 mL  2 mL      2 mL at 24 1150     triamcinolone (KENALOG-40) injection 40 mg  40 mg      40 mg at 24 1231     triamcinolone (KENALOG-40) injection 40 mg  40 mg      40 mg at 24 1150       Allergies   Allergen Reactions     Penicillins Itching     Vaginal itching - Yeast infection afterward       Social History     Occupational History     Occupation: [employer]     Employer: [employer]   Tobacco Use     Smoking status: Former     Current packs/day: 0.00     Average packs/day: 0.5 packs/day for 30.0 years (15.0 ttl pk-yrs)     Types: Cigarettes     Start date: 3/20/1968     Quit date: 3/20/1998     Years since quittin.2     Smokeless tobacco: Never     Tobacco comments:     No smokers in home   Vaping Use     Vaping status: Never Used   Substance and Sexual Activity     Alcohol use: Not Currently     Alcohol/week: 6.7 standard drinks of alcohol     Types: 7 Standard drinks or equivalent per week     Comment: 2x/week     Drug use: No     Sexual activity: Yes     Partners: Male     Birth control/protection: None     Comment: spouse -vas       Family History   Problem Relation Age of Onset     Diabetes Father      Depression Father      Cerebrovascular Disease Mother         age 80     Arthritis Mother      Depression Mother         On meds     Eye Disorder Mother         Glaucoma     Osteoporosis Mother      Respiratory Mother          86 YO COPD     Chronic Obstructive Pulmonary Disease Mother      C.A.D. Brother         67 YO MI -     Chronic Obstructive Pulmonary Disease Brother      Myocardial Infarction Brother      Other Cancer Brother      Cancer Brother        REVIEW OF SYSTEMS  10 point review systems performed otherwise negative as  "noted as per history of present illness.    Physical Exam:  Vitals: /70   Temp 97.9  F (36.6  C) (Temporal)   Ht 1.626 m (5' 4\")   Wt 61.2 kg (135 lb)   LMP 02/26/2008 (Approximate)   BMI 23.17 kg/m    BMI= Body mass index is 23.17 kg/m .    Constitutional: healthy, alert and no acute distress   Psychiatric: mentation appears normal and affect normal/bright  NEURO: no focal deficits, CMS intact left lower extremity   RESP: Normal with easy respirations and no use of accessory muscles to breathe, no audible wheezing or retractions  CV: Calf soft and nontender to palpation, leg warm   SKIN: No erythema, rashes, excoriation, or breakdown. No evidence of infection.   MUSCULOSKELETAL:  INSPECTION of left knee: No gross deformities, erythema, edema, ecchymosis, atrophy or fasciculations.   PALPATION: Tenderness to palpation on the posterior lateral portion of the tibial plateau area.  No tenderness medial, lateral, anterior and medial posterior portion of the knee. No specific joint line tenderness.  No prepatella bursa tenderness or pes bursa tenderness. No increased warmth.  Slight swelling slight effusion but this is the same as the contralateral side.  ROM: Passive: Extension full, flexion to 105 . All range of motion without catching, locking but there is pain mostly with flexion/maximal flexion.     STRENGTH: 5 out of 5 quad and hamstring without pain.   SPECIAL TEST: Patient has a negative Lachman's negative drawer sign.  That was compared to the contralateral side also.  Patient's knee is stable to varus and valgus stress at 30  of flexion. Patient has a negative Germania's.   GAIT: Not observed  Lymph: no palpable lymph nodes    Diagnostic Modalities:  XR Hip Left 2-3 Views    Narrative    HIP LEFT 2-3 VIEWS   6/11/2024 10:48 AM     HISTORY: Fall 06/10 slipped on sand on beach. Immediate pains long the  posterolateral left hip. Pain with ambulation; Fall, initial  encounter.    COMPARISON: Radiographs " from 11/21/2005.      Impression    IMPRESSION: Mild osteoarthrosis of the left hip and SI joint,  progressed. Degenerative changes in the spine, progressed. There is no  evidence of fracture or osteonecrosis.    NARESH PERKINS MD         SYSTEM ID:  [id number]   XR Knee Left 3 Views   Result Value    Radiologist flags Probable lipohemarthrosis (Urgent)    Narrative    KNEE LEFT 3 VIEWS   6/11/2024 10:48 AM     HISTORY: Fall 06/10 slipped on sand on beach. Pain in the posterior  and lateral left knee; Fall, initial encounter.    COMPARISON: Left knee radiographs from 5/10/2012.      Impression    IMPRESSION:  1. Moderate effusion and there appears to be a lipohemarthrosis. This  suggests that there is an intra-articular fracture although there is  no convincing fracture evident on radiographs. Therefore, MRI or CT  may be warranted to better evaluate.  2. Chondrocalcinosis in all 3 compartments, progressed. Mild to  moderate tricompartmental osteoarthrosis, progressed. Calcification  posterior to the knee joint, likely an intra-articular body in a  popliteal cyst.    [Access Center: Probable lipohemarthrosis]    This report will be copied to the [facility] to ensure a  provider acknowledges the finding. Access Center is available Monday  through Friday 8am-3:30 pm.     NARESH PERKINS MD         SYSTEM ID:  [id number]                        I agree with the above readings.  I do see a in the lipohemarthrosis area they are speaking of but do not see a distinct fracture on the x-rays.    Independent visualization of the images was performed.    Impression: 1.  Possible left posterior lateral tibial plateau intra-articular fracture versus knee sprain  2.  Left knee primary osteoarthritis, moderate patellofemoral and medial.  3.  Left knee chondrocalcinosis, tricompartmental    Plan:  All of the above pertinent physical exam and imaging modalities findings was reviewed with Margareth and her   Jyothi.    FOCUSED PLAN:  Discussed with patient possibility of fracture with x-ray findings and inability to weight-bear normally.  Patient is to continue nonweightbearing status utilizing wheelchair or crutches until CT scan results are back.  CT scan scheduled for 6/17/2024.  Patient educated to do gentle range of motion of the knee so it does not get stiff.  Agree with prescriptions of Flexeril and Mobic to help with pain.  Discussed  elevation above heart level and icing 15 minutes on 15 minutes off.  Discussed possibility of nonweightbearing for 6 to 8 weeks if there is a fracture.  Follow-up with myself for virtual results visit on 6/17/2024.    Re-x-ray on return: No      This note was dictated with [employer].    Lui Brown PA-C        Again, thank you for allowing me to participate in the care of your patient.        Sincerely,        Lui Brown PA-C

## 2024-06-11 NOTE — PROGRESS NOTES
1.  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  She really enjoys blues music and blues festivals.  She has grandkids she likes to spend time with her and lives on [address] in [place].  Her 's name is Jyothi    She is being seen for   Chief Complaint   Patient presents with    Knee Pain     Left    Consult         History of Present Illness:   Mechanism of Injury: [place] beach on 6[place]10/2024.  Patient was working on an incline and slid down and then lost her shoe and was in the water when she twisted her knee twisted and she fell backwards.  Location: Left lateral posterior knee  Duration of Pain: Since date of injury which was yesterday  Rating of Pain: Moderate  Pain Quality: Achy  Pain is better with: Nonweightbearing  Pain is worse with: Weightbearing  Treatment so far consists of: Patient was seen today by Shandra CM and the x-rays showed lipohemarthrosis which could be indicative of intra-articular fracture although it is not apparent any fracture lines on x-ray.  Patient has not been able to put full weight on the left lower extremity because of pain.  Patient has been utilizing soft knee brace.  Patient was prescribed Flexeril and Mobic but has not started taking them yet.  Associated Features: Denies numbness or tingling shooting burning electric pain.  Denies significant swelling but there has been some swelling.  Prior history of related problems: No previous surgery or trauma or fractures to the left knee  Pain is Limiting: Weightbearing  Here to: Orthopedic consultation  The Pain Has: About the same  Additional History: Patient unfortunately cannot have a CT scan until 6/17/2024 but wanted some direction on what to do in the meantime.  Patient is not diabetic and not a smoker and also not taking any steroids.      Patient's past medical, surgical, social and  family histories reviewed.     Past Medical History:   Diagnosis Date    Abnormal Papanicolaou smear of vagina and vaginal HPV 12/2002    ASCUS (HPV neg). 3/03 WNL but no transitional zome - repeat 3months    Allergic rhinitis, cause unspecified     Arthritis     Backache, unspecified     Carpal tunnel syndrome     s/p repair    Cervicalgia     Chronic kidney disease, stage 3 (H) 07/14/2021    Depressive disorder     Depressive disorder, not elsewhere classified     doing well on Wellbutrin    Derangement of TMJ (temporomandibular joint) 03/2010    internal derangement    Dermatophytosis of nail     Diaphragmatic hernia without mention of obstruction or gangrene 09/2007    small hiatal hernia    Diffuse cystic mastopathy 2000    FCBD    Esophageal reflux 04/2005    Hammertoe 10/2009    see podiatry consult    Headache(784.0)     Muscle contrature headaches    Intramural leiomyoma of uterus 07/2007    on US    Irregular menstrual cycle     better with BCP -also PMS is better with BCP    IRRITABLE COLON 02/18/2008    Malaise and fatigue     Menopause     lmp 2/2008    Mild persistent asthma     Mitral valve disease     Mole (skin) 2003    9 x 6 mm rt ant chest    Motion sickness     Neuroma 11/2008    lt foot    Osteopenia 12/2008    Other malaise and fatigue 04/2005    stress    Personal history of tobacco use, presenting hazards to health     Quit in 1998    Solitary cyst of breast 12/2005    lt 6 oclock    Stenosis of lumbosacral spine     l4-5 and l5-s1    Symptomatic menopausal or female climacteric states 12/2002    perimenopausal. LMP 2/2008    Tachycardia, paroxysmal (H) 11/24/2020    Thyroid fullness 08/07/2013        Past Surgical History:   Procedure Laterality Date    APPLY WOUND VAC Right 2/22/2024    Procedure: PLACEMENT OF RIGHT GROIN PREVENA WOUND VAC;  Surgeon: Brandon Hale MD;  Location: [facility] OR    BIOPSY  4/10/2015    BL DUPLEX LO EXTREM ART UNILAT/LTD  4/3008    lower extr arterial doppler -no  stenosis    C DEXA,BONE DENSITY,APPENDICULR SKELTN  12/2008    osteopenia    COLONOSCOPY  10/22/2007    bx benign    COLONOSCOPY N/A 4/10/2015    Procedure: COMBINED COLONOSCOPY, SINGLE OR MULTIPLE BIOPSY/POLYPECTOMY BY BIOPSY;  Surgeon: Cl Wagner MD;  Location:  GI    CV CORONARY ANGIOGRAM N/A 8/17/2023    Procedure: Coronary Angiogram;  Surgeon: Liza Ford MD;  Location:  [facility]    ESOPHAGOSCOPY, GASTROSCOPY, DUODENOSCOPY (EGD), COMBINED N/A 1/31/2018    Procedure: COMBINED ESOPHAGOSCOPY, GASTROSCOPY, DUODENOSCOPY (EGD);  COMBINED ESOPHAGOSCOPY, GASTROSCOPY, DUODENOSCOPY (EGD);  Surgeon: Tirso Duran MD;  Location:  GI    ESOPHAGOSCOPY, GASTROSCOPY, DUODENOSCOPY (EGD), COMBINED N/A 2/1/2023    Procedure: ESOPHAGOGASTRODUODENOSCOPY (EGD);  Surgeon: Lui Vences MD;  Location:  GI    EYE SURGERY      HC PART REMV BONE METATARSAL HEAD,EA  01/11/10    Right foot plantar plate tear. Also correct hammertoe, 2nd digit & varicose vein ligation, heel.    HC REVISE MEDIAN N/CARPAL TUNNEL SURG      Left in 1997, right in 1999    INCISION AND DRAINAGE LOWER EXTREMITY, COMBINED Right 2/22/2024    Procedure: Excision OF RIGHT GROIN SEROMA WITH LYMPHAZURIN BLUE INJECTION, Right groin exploration;  Surgeon: Brandon Hale MD;  Location:  OR    INJ, ANES/STER, FACET LUMB/SAC  2010    Left L5 nerve root injection    INJECT EPIDURAL CERVICAL Right 9/29/2022    Procedure: Cervical 7-Thoracic 1 Interlaminar epidural steroid injection using fluoroscopic guidance with contrast dye, Right;  Surgeon: Tyler Bay MD;  Location:  OR    INJECT EPIDURAL TRANSFORAMINAL  10/09/2013    [facility]    INJECT JOINT SACROILIAC  3/19/14,4/21/14    [facility]    REPAIR VALVE MITRAL MINIMALLY INVASIVE N/A 10/26/2023    Procedure: MINIMALLY INVASIVE MITRAL VALVE REPAIR USING CHORD-X PRE-MEASURED LOOP SUTURE SIZE:12MM, MITRAL ANNULOPLASTY RING ERICK-COTTO  PHYSIO Il SIZE: 28MM, AND ON CARDIOPULMONARY PUMP OXYGENATOR (INTRAOPERATIVE TRANSESOPHAGEAL ECHOCARDIOGRAM BY THE CARDIOLOGIST  );  Surgeon: Sabas Meredith MD;  Location:  OR    [facility] NONSPECIFIC PROCEDURE  2009    Metatarsal phalangeal joint injection    [facility] COLONOSCOPY THRU STOMA, DIAGNOSTIC  7/2002    bx. benign - flex sig in 5 yrs  or colonoscopy in 10 yrs    [facility] ECHO HEART XTHORACIC, STRESS/REST  2/2005    WNL    [facility] NCS MOTOR W/O F-WAVE, EACH NERVE  7/2008    CTS       Medications:  Current Outpatient Medications   Medication Sig Dispense Refill    ALLEGRA 180 MG OR TABS Take 180 mg by mouth daily  30 0    aspirin (ASA) 81 MG chewable tablet Take 1 tablet (81 mg) by mouth daily 90 tablet 3    buPROPion (WELLBUTRIN) 75 MG tablet Take 1 tablet (75 mg) by mouth 2 times daily 60 tablet 2    CALCIUM /VITAMIN D TABS   OR Take 1 tablet by mouth daily      Cetaphil Moisturizing (CETAPHIL) external lotion Apply topically as needed      fluticasone (FLONASE) 50 MCG/ACT nasal spray       magnesium aspartate (MAGINEX) 615 MG EC tablet Take 1 tablet (615 mg) by mouth daily      mometasone-formoterol (DULERA) 100-5 MCG/ACT inhaler Inhale 2 puffs into the lungs 2 times daily 39 g 3    Multiple Vitamins-Minerals (PRESERVISION AREDS) TABS Take 1 tablet by mouth 2 times daily      multivitamin w/minerals (THERA-VIT-M) tablet Take 1 tablet by mouth daily RAW Brand      naproxen sodium (ANAPROX) 220 MG tablet Take 220 mg by mouth as needed for moderate pain      omeprazole (PRILOSEC) 20 MG DR capsule TAKE 1 CAPSULE DAILY 90 capsule 3    vitamin C (ASCORBIC ACID) 250 MG tablet Take 250 mg by mouth daily      acetaminophen (ARTHRITIS PAIN APAP) 650 MG CR tablet Take 650 mg by mouth every 8 hours as needed for mild pain or fever (Patient not taking: Reported on 6/11/2024)      albuterol (PROAIR HFA/PROVENTIL HFA/VENTOLIN HFA) 108 (90 Base) MCG/ACT inhaler Inhale 2 puffs into the lungs every 6 hours as needed for  shortness of breath, wheezing or cough (Patient not taking: Reported on 6/11/2024)      amoxicillin (AMOXIL) 500 MG capsule Take 2,000 mg (4 capsules) by mouth 30-60 minutes prior to dental appointment. (Patient not taking: Reported on 6/11/2024) 4 capsule 0    cyclobenzaprine (FLEXERIL) 5 MG tablet Take 1 tablet (5 mg) by mouth 3 times daily as needed for muscle spasms (Patient not taking: Reported on 6/11/2024) 90 tablet 0    DULoxetine (CYMBALTA) 30 MG capsule Take 1 capsule (30 mg) by mouth daily (Patient not taking: Reported on 6/11/2024) 30 capsule 1    meclizine (ANTIVERT) 25 MG tablet Take 25 mg by mouth daily as needed for dizziness (Patient not taking: Reported on 6/11/2024)      Melatonin 10 MG TABS tablet Take 10 mg by mouth nightly as needed for sleep (Patient not taking: Reported on 6/11/2024)      meloxicam (MOBIC) 7.5 MG tablet Take 1-2 tablets (7.5-15 mg) by mouth daily (Patient not taking: Reported on 6/11/2024) 60 tablet 0    metoprolol succinate ER (TOPROL XL) 25 MG 24 hr tablet Take 1 tablet (25 mg) by mouth daily (Patient not taking: Reported on 6/11/2024) 90 tablet 1    senna-docusate (SENOKOT-S/PERICOLACE) 8.6-50 MG tablet Take 1-2 tablets by mouth 2 times daily as needed for constipation (While on oral opioids.) (Patient not taking: Reported on 6/11/2024) 10 tablet 0    Simethicone (GAS-X PO) Take 160 mg by mouth 4 times daily as needed for intestinal gas  (Patient not taking: Reported on 6/11/2024)       Current Facility-Administered Medications   Medication Dose Route Frequency Provider Last Rate Last Admin    2.0 mL bupivacaine (MARCAINE) 0.5% injection (50 mL vial)  2 mL      2 mL at 05/08/24 1231    2.0 mL bupivacaine (MARCAINE) 0.5% injection (50 mL vial)  2 mL      2 mL at 04/24/24 1150    lidocaine 1 % injection 2 mL  2 mL      2 mL at 05/08/24 1231    lidocaine 1 % injection 2 mL  2 mL      2 mL at 04/24/24 1150    triamcinolone (KENALOG-40) injection 40 mg  40 mg      40 mg at  "24 1231    triamcinolone (KENALOG-40) injection 40 mg  40 mg      40 mg at 24 1150       Allergies   Allergen Reactions    Penicillins Itching     Vaginal itching - Yeast infection afterward       Social History     Occupational History    Occupation: HIMS clerk     Employer: [employer]   Tobacco Use    Smoking status: Former     Current packs/day: 0.00     Average packs/day: 0.5 packs/day for 30.0 years (15.0 ttl pk-yrs)     Types: Cigarettes     Start date: 3/20/1968     Quit date: 3/20/1998     Years since quittin.2    Smokeless tobacco: Never    Tobacco comments:     No smokers in home   Vaping Use    Vaping status: Never Used   Substance and Sexual Activity    Alcohol use: Not Currently     Alcohol/week: 6.7 standard drinks of alcohol     Types: 7 Standard drinks or equivalent per week     Comment: 2x/week    Drug use: No    Sexual activity: Yes     Partners: Male     Birth control/protection: None     Comment: spouse -vas       Family History   Problem Relation Age of Onset    Diabetes Father     Depression Father     Cerebrovascular Disease Mother         age 80    Arthritis Mother     Depression Mother         On meds    Eye Disorder Mother         Glaucoma    Osteoporosis Mother     Respiratory Mother          88 YO COPD    Chronic Obstructive Pulmonary Disease Mother     C.A.D. Brother         67 YO MI -    Chronic Obstructive Pulmonary Disease Brother     Myocardial Infarction Brother     Other Cancer Brother     Cancer Brother        REVIEW OF SYSTEMS  10 point review systems performed otherwise negative as noted as per history of present illness.    Physical Exam:  Vitals: /70   Temp 97.9  F (36.6  C) (Temporal)   Ht 1.626 m (5' 4\")   Wt 61.2 kg (135 lb)   LMP 2008 (Approximate)   BMI 23.17 kg/m    BMI= Body mass index is 23.17 kg/m .    Constitutional: healthy, alert and no acute distress   Psychiatric: mentation appears normal and affect " normal/bright  NEURO: no focal deficits, CMS intact left lower extremity   RESP: Normal with easy respirations and no use of accessory muscles to breathe, no audible wheezing or retractions  CV: Calf soft and nontender to palpation, leg warm   SKIN: No erythema, rashes, excoriation, or breakdown. No evidence of infection.   MUSCULOSKELETAL:  INSPECTION of left knee: No gross deformities, erythema, edema, ecchymosis, atrophy or fasciculations.   PALPATION: Tenderness to palpation on the posterior lateral portion of the tibial plateau area.  No tenderness medial, lateral, anterior and medial posterior portion of the knee. No specific joint line tenderness.  No prepatella bursa tenderness or pes bursa tenderness. No increased warmth.  Slight swelling slight effusion but this is the same as the contralateral side.  ROM: Passive: Extension full, flexion to 105 . All range of motion without catching, locking but there is pain mostly with flexion/maximal flexion.     STRENGTH: 5 out of 5 quad and hamstring without pain.   SPECIAL TEST: Patient has a negative Lachman's negative drawer sign.  That was compared to the contralateral side also.  Patient's knee is stable to varus and valgus stress at 30  of flexion. Patient has a negative Germania's.   GAIT: Not observed  Lymph: no palpable lymph nodes    Diagnostic Modalities:  XR Hip Left 2-3 Views    Narrative    HIP LEFT 2-3 VIEWS   6/11/2024 10:48 AM     HISTORY: Fall 06/10 slipped on sand on beach. Immediate pains long the  posterolateral left hip. Pain with ambulation; Fall, initial  encounter.    COMPARISON: Radiographs from 11/21/2005.      Impression    IMPRESSION: Mild osteoarthrosis of the left hip and SI joint,  progressed. Degenerative changes in the spine, progressed. There is no  evidence of fracture or osteonecrosis.    NARESH PERKINS MD         SYSTEM ID:  [id number]   XR Knee Left 3 Views   Result Value    Radiologist flags Probable lipohemarthrosis (Urgent)     Narrative    KNEE LEFT 3 VIEWS   6/11/2024 10:48 AM     HISTORY: Fall 06/10 slipped on sand on beach. Pain in the posterior  and lateral left knee; Fall, initial encounter.    COMPARISON: Left knee radiographs from 5/10/2012.      Impression    IMPRESSION:  1. Moderate effusion and there appears to be a lipohemarthrosis. This  suggests that there is an intra-articular fracture although there is  no convincing fracture evident on radiographs. Therefore, MRI or CT  may be warranted to better evaluate.  2. Chondrocalcinosis in all 3 compartments, progressed. Mild to  moderate tricompartmental osteoarthrosis, progressed. Calcification  posterior to the knee joint, likely an intra-articular body in a  popliteal cyst.    [Access Center: Probable lipohemarthrosis]    This report will be copied to the [facility] to ensure a  provider acknowledges the finding. [facility] Center is available Monday  through Friday 8am-3:30 pm.     NARESH PERKINS MD         SYSTEM ID:  [id number]                        I agree with the above readings.  I do see a in the lipohemarthrosis area they are speaking of but do not see a distinct fracture on the x-rays.    Independent visualization of the images was performed.    Impression: 1.  Possible left posterior lateral tibial plateau intra-articular fracture versus knee sprain  2.  Left knee primary osteoarthritis, moderate patellofemoral and medial.  3.  Left knee chondrocalcinosis, tricompartmental    Plan:  All of the above pertinent physical exam and imaging modalities findings was reviewed with Margareth and her  Jyothi.    FOCUSED PLAN:  Discussed with patient possibility of fracture with x-ray findings and inability to weight-bear normally.  Patient is to continue nonweightbearing status utilizing wheelchair or crutches until CT scan results are back.  CT scan scheduled for 6/17/2024.  Patient educated to do gentle range of motion of the knee so it does not get stiff.   Agree with prescriptions of Flexeril and Mobic to help with pain.  Discussed  elevation above heart level and icing 15 minutes on 15 minutes off.  Discussed possibility of nonweightbearing for 6 to 8 weeks if there is a fracture.  Follow-up with myself for virtual results visit on 6/17/2024.    Re-x-ray on return: No    ADDENDUM:  Patient later called and and was needing a wheelchair.    Order has been placed for patient to obtain a wheelchair.   Patient has mobility limitation that significantly impairs her ability to participate in mobility-related activities of daily living (MRADL's) due to non-weightbearing status of left knee   Mobility limitation cannot be sufficiently and safely resolved by the use of a cane, walker, or crutches due to non-weightbearing status and limited upper body strenght   Patient or caregiver are able to safely use the manual wheelchair   Patient's functional mobility deficit can be sufficiently resolved by the use of a manual wheelchair   Patient has not expressed an unwillingness to use the manual wheelchair that is provided in the home   Patient's home provides adequate access between rooms and maneuvering space and surfaces for use of the manual wheelchair     This note was dictated with Spectafy.    Lui Brown PA-C

## 2024-06-12 NOTE — TELEPHONE ENCOUNTER
Please respond to this patient and let her know what she needs to do to get a wheelchair.  If there needs to be a DME for wheelchair please radha when up and I will sign it or you can take a verbal order for me to just sign it.  Thank you so much.

## 2024-06-14 NOTE — TELEPHONE ENCOUNTER
Discussed case with CC, RN and addended my note with all the information that Medicare would need to support a wheelchair.

## 2024-06-17 ENCOUNTER — VIRTUAL VISIT (OUTPATIENT)
Dept: ORTHOPEDICS | Facility: CLINIC | Age: 69
End: 2024-06-17
Payer: COMMERCIAL

## 2024-06-17 ENCOUNTER — HOSPITAL ENCOUNTER (OUTPATIENT)
Dept: CT IMAGING | Facility: CLINIC | Age: 69
Discharge: HOME | End: 2024-06-17
Attending: PHYSICIAN ASSISTANT | Admitting: PHYSICIAN ASSISTANT
Payer: COMMERCIAL

## 2024-06-17 DIAGNOSIS — M25.562 ACUTE PAIN OF LEFT KNEE: ICD-10-CM

## 2024-06-17 DIAGNOSIS — S82.122D CLOSED FRACTURE OF LATERAL PORTION OF LEFT TIBIAL PLATEAU, WITH ROUTINE HEALING, SUBSEQUENT ENCOUNTER: Primary | ICD-10-CM
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
## 2024-06-17 DIAGNOSIS — M25.00 LIPOHEMARTHROSIS: ICD-10-CM

## 2024-06-17 PROCEDURE — 99207 PR FRACTURE CARE IN GLOBAL PERIOD: CPT | Mod: 95 | Performed by: PHYSICIAN ASSISTANT

## 2024-06-17 PROCEDURE — 73700 CT LOWER EXTREMITY W/O DYE: CPT | Mod: LT
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
## 2024-06-17 NOTE — PROGRESS NOTES
Margareth is a 69 year old who is being evaluated via a billable video visit.    How would you like to obtain your AVS? MyChart  If the video visit is dropped, the invitation should be resent by: Text to cell phone: [phone]  Will anyone else be joining your video visit? No  Distant Location (provider location):  On-site  Platform used for Video Visit: Nikko  Signed Electronically by: Lui Brown PA-C

## 2024-06-18 ENCOUNTER — MYC MEDICAL ADVICE (OUTPATIENT)
Dept: FAMILY MEDICINE | Facility: OTHER | Age: 69
End: 2024-06-18
Payer: COMMERCIAL
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
## 2024-06-18 DIAGNOSIS — M25.562 ACUTE PAIN OF LEFT KNEE: ICD-10-CM

## 2024-07-01 ENCOUNTER — MYC MEDICAL ADVICE (OUTPATIENT)
Dept: FAMILY MEDICINE | Facility: CLINIC | Age: 69
End: 2024-07-01
Payer: COMMERCIAL

## 2024-07-01 DIAGNOSIS — R41.840 POOR CONCENTRATION: ICD-10-CM

## 2024-07-01 DIAGNOSIS — F41.1 GAD (GENERALIZED ANXIETY DISORDER): ICD-10-CM

## 2024-07-01 DIAGNOSIS — F33.0 MAJOR DEPRESSIVE DISORDER, RECURRENT EPISODE, MILD (H): ICD-10-CM

## 2024-07-01 DIAGNOSIS — F33.1 MAJOR DEPRESSIVE DISORDER, RECURRENT EPISODE, MODERATE (H): ICD-10-CM

## 2024-07-08 RX ORDER — BUPROPION HYDROCHLORIDE 75 MG/1
75 TABLET ORAL 2 TIMES DAILY
Qty: 60 TABLET | Refills: 2 | Status: CANCELLED | OUTPATIENT
Start: 2024-07-08

## 2024-07-08 NOTE — TELEPHONE ENCOUNTER
Requesting increase and refill    See Tanja 7/8/24    Adelaide Delong RN  [facility] - Registered Nurse  Clinic Triage Dk   July 8, 2024

## 2024-07-09 RX ORDER — BUPROPION HYDROCHLORIDE 150 MG/1
TABLET ORAL
Qty: 90 TABLET | Refills: 1 | Status: SHIPPED | OUTPATIENT
Start: 2024-07-09

## 2024-07-16 ENCOUNTER — MYC MEDICAL ADVICE (OUTPATIENT)
Dept: FAMILY MEDICINE | Facility: CLINIC | Age: 69
End: 2024-07-16
Payer: COMMERCIAL

## 2024-07-22 ENCOUNTER — ANCILLARY PROCEDURE (OUTPATIENT)
Dept: GENERAL RADIOLOGY | Facility: CLINIC | Age: 69
End: 2024-07-22
Attending: PHYSICIAN ASSISTANT
Payer: COMMERCIAL

## 2024-07-22 ENCOUNTER — OFFICE VISIT (OUTPATIENT)
Dept: ORTHOPEDICS | Facility: CLINIC | Age: 69
End: 2024-07-22
Payer: COMMERCIAL

## 2024-07-22 VITALS
DIASTOLIC BLOOD PRESSURE: 94 MMHG | HEIGHT: 64 IN | SYSTOLIC BLOOD PRESSURE: 156 MMHG | BODY MASS INDEX: 23.17 KG/M2 | TEMPERATURE: 98.9 F

## 2024-07-22 DIAGNOSIS — S83.8X2A SPRAIN OF OTHER LIGAMENT OF LEFT KNEE, INITIAL ENCOUNTER: ICD-10-CM

## 2024-07-22 DIAGNOSIS — S82.122D CLOSED FRACTURE OF LATERAL PORTION OF LEFT TIBIAL PLATEAU, WITH ROUTINE HEALING, SUBSEQUENT ENCOUNTER: Primary | ICD-10-CM

## 2024-07-22 PROCEDURE — 73560 X-RAY EXAM OF KNEE 1 OR 2: CPT | Mod: TC | Performed by: RADIOLOGY

## 2024-07-22 PROCEDURE — 99213 OFFICE O/P EST LOW 20 MIN: CPT | Performed by: PHYSICIAN ASSISTANT

## 2024-07-22 RX ORDER — MELOXICAM 7.5 MG/1
7.5-15 TABLET ORAL DAILY
Qty: 60 TABLET | Refills: 0 | Status: SHIPPED | OUTPATIENT
Start: 2024-07-22 | End: 2024-07-24

## 2024-07-22 ASSESSMENT — PAIN SCALES - GENERAL: PAINLEVEL: MILD PAIN (2)
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
## 2024-07-22 NOTE — PROGRESS NOTES
Office Visit-Fracture Follow up    Chief Complaint: Margareth Green is a 69 year old female who is being seen for   Chief Complaint   Patient presents with    RECHECK     1.  Possible left posterior lateral tibial plateau intra-articular fracture versus knee sprain  2.  Left knee primary osteoarthritis, moderate patellofemoral and medial.  3.  Left knee chondrocalcinosis, tricompartmental            History of Present Illness:   Location: Left posterior lateral tibial plateau   Duration of Pain: Since date of injury so about 6 weeks  Rating of Pain: 2 out of 10  Pain Quality: Achy hamstring  Pain is better with: Rest  Pain is worse with: Unsure as she has been maintaining her nonweightbearing status  Treatment so far consists of: Patient was originally seen on 6/11/2024 and because of suspicion of tibial plateau fracture patient was to be nonweightbearing until CT scan was done which was scheduled for 6/17/2024.  Patient was seen virtually on that day and nondisplaced posterior lateral tibial plateau fracture was confirmed and patient was to be strict nonweightbearing for the next 5 weeks.  Patient was given Flexeril and Mobic to help with the pain.  Patient was instructed on the virtual appointment to work on gentle range of motion of the knee and reiterated that the patient needs to be completely nonweightbearing.  Patient was to also discontinue soft knee brace she was wearing.  Patient states she has been doing well with her nonweightbearing status and using a 4-prong cane and techniques to try not put much weight on it.  She has difficulty when getting up from the toilet and has put weight on it there but it has not given her any pain.  Associated Features: Denies numbness or tingling shooting burning or electric pain.  Pain is Limiting: nothing at this point  Here to: Orthopedic follow-up  Additional History: Patient has put full weight on the knee and it did not hurt her.  Patient has been trying to work on  "range of motion and got into the water and did some bending of her knee.    REVIEW OF SYSTEMS  Review of systems negative other than positive findings in HPI.    Physical Exam:  Vitals: BP (!) 156/94 (BP Location: Right arm, Cuff Size: Adult Regular)   Temp 98.9  F (37.2  C) (Temporal)   Ht 1.626 m (5' 4\")   LMP 02/26/2008 (Approximate)   BMI 23.17 kg/m    BMI= Body mass index is 23.17 kg/m .  Constitutional: healthy, alert and no acute distress   Psychiatric: mentation appears normal and affect normal/bright  NEURO: no focal deficits, CMS intact left lower extremity   RESP: Normal with easy respirations and no use of accessory muscles to breathe, no audible wheezing or retractions  CV: Calf soft and nontender to palpation, leg warm   SKIN: No erythema, rashes, excoriation, or breakdown. No evidence of infection.   MUSCULOSKELETAL:  INSPECTION of left knee: No gross deformities, erythema, edema, ecchymosis, atrophy or fasciculations.   PALPATION: Specifically in the posterior lateral tibial plateau area.  No tenderness medial, lateral, anterior and posterior portion of the knee. No specific joint line tenderness.  No prepatella bursa tenderness or pes bursa tenderness. No increased warmth.  No effusion.  No tenderness to aggressive palpation  ROM: Passive: Extension full, flexion to 125 . All range of motion without catching, locking or pain.  Range of motion appears to be symmetrical   STRENGTH: 5 out of 5 quad and hamstring without pain.   SPECIAL TEST: Patient has a negative Lachman's negative drawer sign. Patient's knee is stable to varus and valgus stress at 30  of flexion. Patient has a negative Germania's.   GAIT: non-antalgic.  Patient able to put full weight on the left lower extremity and does not have pain today.  Lymph: no palpable lymph nodes      Diagnostic Modalities:  X-rays done today showing no displaced fracture or recessed lateral plateau.  No dislocation or tumor.  Appropriate joint spacing " and appropriate alignment.    Independent visualization of the images was performed.      Impression: 1.  1 month 13 days status post nondisplaced fracture of left posterior lateral tibial plateau     Plan:  All of the above pertinent physical exam and imaging modalities findings was reviewed with Margareth.    FOCUSED PLAN:   Patient has been doing a very good job of keeping her weight off of her leg.  She  is able to put full weight on it today without pain.  Patient is not tender to palpation either.  With the nonweightbearing status at this point I feel the fracture is healed.  X-rays confirming that there is no displacement of the fracture.  Patient is offered formal physical therapy to work on range of motion and strengthening and gait training however she wants to hold off and try and do it on her own and let me know via [employer] if she needs formal therapy.  I am okay with this.  We refilled her Mobic.  She did not need her Flexeril.  Follow-up as needed      No follow-up x-rays    This note was dictated with Lenda.    Lui Brown PA-C

## 2024-07-23 ENCOUNTER — HOSPITAL ENCOUNTER (EMERGENCY)
Facility: CLINIC | Age: 69
Discharge: HOME | End: 2024-07-24
Attending: FAMILY MEDICINE | Admitting: FAMILY MEDICINE
Payer: COMMERCIAL

## 2024-07-23 DIAGNOSIS — M79.605 PAIN OF LEFT LOWER EXTREMITY: ICD-10-CM

## 2024-07-23 DIAGNOSIS — G47.9 DIFFICULTY SLEEPING: ICD-10-CM

## 2024-07-23 DIAGNOSIS — M25.511 BILATERAL SHOULDER PAIN, UNSPECIFIED CHRONICITY: ICD-10-CM

## 2024-07-23 DIAGNOSIS — R68.84 JAW PAIN: ICD-10-CM

## 2024-07-23 DIAGNOSIS — Z98.890 S/P MVR (MITRAL VALVE REPAIR): ICD-10-CM

## 2024-07-23 DIAGNOSIS — R03.0 ELEVATED BP WITHOUT DIAGNOSIS OF HYPERTENSION: ICD-10-CM

## 2024-07-23 DIAGNOSIS — M25.512 BILATERAL SHOULDER PAIN, UNSPECIFIED CHRONICITY: ICD-10-CM

## 2024-07-23 PROCEDURE — 85025 COMPLETE CBC W/AUTO DIFF WBC: CPT | Performed by: FAMILY MEDICINE

## 2024-07-23 PROCEDURE — 93005 ELECTROCARDIOGRAM TRACING: CPT | Performed by: FAMILY MEDICINE

## 2024-07-23 PROCEDURE — 80053 COMPREHEN METABOLIC PANEL: CPT | Performed by: FAMILY MEDICINE

## 2024-07-23 PROCEDURE — 85379 FIBRIN DEGRADATION QUANT: CPT | Performed by: FAMILY MEDICINE

## 2024-07-23 PROCEDURE — 99284 EMERGENCY DEPT VISIT MOD MDM: CPT | Performed by: FAMILY MEDICINE

## 2024-07-23 PROCEDURE — 93010 ELECTROCARDIOGRAM REPORT: CPT | Performed by: FAMILY MEDICINE

## 2024-07-23 PROCEDURE — 36415 COLL VENOUS BLD VENIPUNCTURE: CPT | Performed by: FAMILY MEDICINE

## 2024-07-23 PROCEDURE — 84484 ASSAY OF TROPONIN QUANT: CPT | Performed by: FAMILY MEDICINE

## 2024-07-23 PROCEDURE — 99285 EMERGENCY DEPT VISIT HI MDM: CPT | Mod: 25 | Performed by: FAMILY MEDICINE

## 2024-07-23 ASSESSMENT — COLUMBIA-SUICIDE SEVERITY RATING SCALE - C-SSRS
6. HAVE YOU EVER DONE ANYTHING, STARTED TO DO ANYTHING, OR PREPARED TO DO ANYTHING TO END YOUR LIFE?: NO
2. HAVE YOU ACTUALLY HAD ANY THOUGHTS OF KILLING YOURSELF IN THE PAST MONTH?: NO
1. IN THE PAST MONTH, HAVE YOU WISHED YOU WERE DEAD OR WISHED YOU COULD GO TO SLEEP AND NOT WAKE UP?: NO

## 2024-07-24 ENCOUNTER — MYC MEDICAL ADVICE (OUTPATIENT)
Dept: FAMILY MEDICINE | Facility: CLINIC | Age: 69
End: 2024-07-24
Payer: COMMERCIAL

## 2024-07-24 ENCOUNTER — APPOINTMENT (OUTPATIENT)
Dept: ULTRASOUND IMAGING | Facility: CLINIC | Age: 69
End: 2024-07-24
Attending: FAMILY MEDICINE
Payer: COMMERCIAL

## 2024-07-24 ENCOUNTER — APPOINTMENT (OUTPATIENT)
Dept: GENERAL RADIOLOGY | Facility: CLINIC | Age: 69
End: 2024-07-24
Attending: FAMILY MEDICINE
Payer: COMMERCIAL

## 2024-07-24 ENCOUNTER — TELEPHONE (OUTPATIENT)
Dept: FAMILY MEDICINE | Facility: CLINIC | Age: 69
End: 2024-07-24
Payer: COMMERCIAL

## 2024-07-24 VITALS
WEIGHT: 135 LBS | RESPIRATION RATE: 19 BRPM | DIASTOLIC BLOOD PRESSURE: 86 MMHG | HEIGHT: 64 IN | BODY MASS INDEX: 23.05 KG/M2 | HEART RATE: 77 BPM | SYSTOLIC BLOOD PRESSURE: 131 MMHG | TEMPERATURE: 97.6 F | OXYGEN SATURATION: 98 %

## 2024-07-24 LAB
ALBUMIN SERPL BCG-MCNC: 4.3 G/DL (ref 3.5–5.2)
ALBUMIN UR-MCNC: NEGATIVE MG/DL
ALP SERPL-CCNC: 96 U/L (ref 40–150)
ALT SERPL W P-5'-P-CCNC: 26 U/L (ref 0–50)
ANION GAP SERPL CALCULATED.3IONS-SCNC: 11 MMOL/L (ref 7–15)
APPEARANCE UR: CLEAR
AST SERPL W P-5'-P-CCNC: 32 U/L (ref 0–45)
BASOPHILS # BLD AUTO: 0.1 10E3/UL (ref 0–0.2)
BASOPHILS NFR BLD AUTO: 1 %
BILIRUB SERPL-MCNC: 0.3 MG/DL
BILIRUB UR QL STRIP: NEGATIVE
BUN SERPL-MCNC: 23.9 MG/DL (ref 8–23)
CALCIUM SERPL-MCNC: 9.5 MG/DL (ref 8.8–10.4)
CHLORIDE SERPL-SCNC: 103 MMOL/L (ref 98–107)
COLOR UR AUTO: YELLOW
CREAT SERPL-MCNC: 0.93 MG/DL (ref 0.51–0.95)
D DIMER PPP FEU-MCNC: 0.54 UG/ML FEU (ref 0–0.5)
EGFRCR SERPLBLD CKD-EPI 2021: 65 ML/MIN/1.73M2
EOSINOPHIL # BLD AUTO: 0.6 10E3/UL (ref 0–0.7)
EOSINOPHIL NFR BLD AUTO: 8 %
ERYTHROCYTE [DISTWIDTH] IN BLOOD BY AUTOMATED COUNT: 14 % (ref 10–15)
GLUCOSE SERPL-MCNC: 102 MG/DL (ref 70–99)
GLUCOSE UR STRIP-MCNC: NEGATIVE MG/DL
HCO3 SERPL-SCNC: 24 MMOL/L (ref 22–29)
HCT VFR BLD AUTO: 40.4 % (ref 35–47)
HGB BLD-MCNC: 13.6 G/DL (ref 11.7–15.7)
HGB UR QL STRIP: NEGATIVE
HOLD SPECIMEN: NORMAL
IMM GRANULOCYTES # BLD: 0.1 10E3/UL
IMM GRANULOCYTES NFR BLD: 1 %
KETONES UR STRIP-MCNC: NEGATIVE MG/DL
LEUKOCYTE ESTERASE UR QL STRIP: NEGATIVE
LYMPHOCYTES # BLD AUTO: 2.2 10E3/UL (ref 0.8–5.3)
LYMPHOCYTES NFR BLD AUTO: 29 %
MCH RBC QN AUTO: 30 PG (ref 26.5–33)
MCHC RBC AUTO-ENTMCNC: 33.7 G/DL (ref 31.5–36.5)
MCV RBC AUTO: 89 FL (ref 78–100)
MONOCYTES # BLD AUTO: 0.9 10E3/UL (ref 0–1.3)
MONOCYTES NFR BLD AUTO: 11 %
NEUTROPHILS # BLD AUTO: 3.8 10E3/UL (ref 1.6–8.3)
NEUTROPHILS NFR BLD AUTO: 49 %
NITRATE UR QL: NEGATIVE
NRBC # BLD AUTO: 0 10E3/UL
NRBC BLD AUTO-RTO: 0 /100
PH UR STRIP: 7 [PH] (ref 5–7)
PLATELET # BLD AUTO: 235 10E3/UL (ref 150–450)
POTASSIUM SERPL-SCNC: 3.9 MMOL/L (ref 3.4–5.3)
PROT SERPL-MCNC: 6.5 G/DL (ref 6.4–8.3)
RBC # BLD AUTO: 4.53 10E6/UL (ref 3.8–5.2)
RBC URINE: <1 /HPF
SODIUM SERPL-SCNC: 138 MMOL/L (ref 135–145)
SP GR UR STRIP: 1 (ref 1–1.03)
SQUAMOUS EPITHELIAL: <1 /HPF
TROPONIN T SERPL HS-MCNC: 17 NG/L
TROPONIN T SERPL HS-MCNC: 18 NG/L
UROBILINOGEN UR STRIP-MCNC: NORMAL MG/DL
WBC # BLD AUTO: 7.7 10E3/UL (ref 4–11)
WBC URINE: <1 /HPF

## 2024-07-24 PROCEDURE — 84484 ASSAY OF TROPONIN QUANT: CPT | Performed by: FAMILY MEDICINE

## 2024-07-24 PROCEDURE — 250N000013 HC RX MED GY IP 250 OP 250 PS 637: Performed by: FAMILY MEDICINE

## 2024-07-24 PROCEDURE — 93971 EXTREMITY STUDY: CPT | Mod: LT

## 2024-07-24 PROCEDURE — 81001 URINALYSIS AUTO W/SCOPE: CPT | Performed by: FAMILY MEDICINE

## 2024-07-24 PROCEDURE — 71045 X-RAY EXAM CHEST 1 VIEW: CPT

## 2024-07-24 PROCEDURE — 36415 COLL VENOUS BLD VENIPUNCTURE: CPT | Performed by: FAMILY MEDICINE

## 2024-07-24 RX ORDER — TRAZODONE HYDROCHLORIDE 50 MG/1
50 TABLET, FILM COATED ORAL AT BEDTIME
Qty: 30 TABLET | Refills: 0 | Status: SHIPPED | OUTPATIENT
Start: 2024-07-24 | End: 2024-08-21

## 2024-07-24 RX ORDER — TRAZODONE HYDROCHLORIDE 50 MG/1
50 TABLET, FILM COATED ORAL ONCE
Status: COMPLETED | OUTPATIENT
Start: 2024-07-24 | End: 2024-07-24

## 2024-07-24 RX ORDER — ASPIRIN 81 MG/1
243 TABLET, CHEWABLE ORAL ONCE
Status: COMPLETED | OUTPATIENT
Start: 2024-07-24 | End: 2024-07-24

## 2024-07-24 RX ADMIN — TRAZODONE HYDROCHLORIDE 50 MG: 50 TABLET ORAL at 04:02

## 2024-07-24 RX ADMIN — ASPIRIN 81 MG 243 MG: 81 TABLET ORAL at 00:48

## 2024-07-24 RX ADMIN — NITROGLYCERIN 15 MG: 20 OINTMENT TOPICAL at 00:49

## 2024-07-24 ASSESSMENT — ACTIVITIES OF DAILY LIVING (ADL)
ADLS_ACUITY_SCORE: 38

## 2024-07-24 NOTE — ED PROVIDER NOTES
History     Chief Complaint   Patient presents with    Chest Pain     HPI  Margareth Green is a 69 year old female who presents to the ED with left jaw and shoulder pain that started about 10:30 PM tonight while she was laying in bed.  She denies any associated chest pain.    She had mitral valve repair back in October 2023 and had a normal echo in May 2024 showing EF of 65-70%.  CTA back in October 2023 showed a normal aorta no signs of aneurysm, dissection or atherosclerosis.  Branches of the aortic root that they could see were also clean.  She is not on any anticoagulation.    She had an angiogram on 8/17/2023 before her mitral valve repair which showed no significant coronary artery disease.  Normal coronary anatomy in the right dominant system and a mid LAD lesion was 10% stenosed.  Overall excellent results.    2 weeks ago she had a dizzy spell at home.  She laid down and felt hot/cold and clammy.  Lasted for about 2 hours and then resolved.     She had a closed fracture of the lateral left tibial plateau earlier this summer and just saw orthopedics in follow-up yesterday.  Her thing is healed and she was allowed to fully bear weight once again after being off it for about 5 weeks.  They refilled her Mobic.  Also noticed some shortness of breath yesterday after getting home from that visit she had stopped done some shopping for a couple of hours in the meantime.  She felt hot and sweaty but had no chest pain.  Today she felt a little bit confused at time and dizzy once again.  Has a bit of a headache posteriorly and has been belching a lot.  She took some Tums and an acid without relief.  Has had some loose stools and also felt hot and cold once again.  Blood pressure has been gradually going up today.  She laid down in bed this evening and that is when she developed jaw and arm pain.  She has had some slight nausea but no vomiting.    Also noted some  posterior left leg pain today.  Wondering if she may  have overdid it a bit since this is her first day bearing weight over a month.    Took a couple of Tylenol PM, 81 mg of aspirin and 12.5 mg of meclizine tonight.  Still feels a little bit dizzy.    Blood pressure markedly elevated at 186/94 in the ED.  In orthopedic clinic on 7/22/2024 it was 156/94.  Prior to that has been in the 118-134/70-82 range except for 1 elevated pressure on 2/28/2024 of 142/92.      Allergies:  Allergies   Allergen Reactions    Penicillins Itching     Vaginal itching - Yeast infection afterward       Problem List:    Patient Active Problem List    Diagnosis Date Noted    S/P MVR (mitral valve repair) 10/26/2023     Priority: Medium    ILD (interstitial lung disease) (H) 10/17/2023     Priority: Medium    Chondromalacia of right knee 07/19/2023     Priority: Medium    Right sided sciatica 07/19/2023     Priority: Medium    Pain of right lower extremity 07/03/2023     Priority: Medium    Muscle cramp 07/03/2023     Priority: Medium    Personal history of COVID-19 01/19/2022     Priority: Medium    T12 compression fracture, sequela 01/19/2022     Priority: Medium    Gastroesophageal reflux disease without esophagitis 11/30/2016     Priority: Medium    Tremor 11/30/2016     Priority: Medium    Cramp of limb 11/30/2016     Priority: Medium    Major depressive disorder, recurrent episode, moderate (H) 10/13/2015     Priority: Medium    Epistaxis 08/15/2013     Priority: Medium     Do you wish to do the replacement in the background? yes        Numbness of left jaw 08/07/2013     Priority: Medium    Neck pain 08/07/2013     Priority: Medium    TMJ (temporomandibular joint syndrome) 08/07/2013     Priority: Medium    Thyroid fullness 08/07/2013     Priority: Medium    Menopausal flushing 05/10/2012     Priority: Medium    Knee pain, left 03/22/2012     Priority: Medium    Mild major depression (H) 11/21/2011     Priority: Medium    Osteoporosis 12/30/2010     Priority: Medium    Atrophic vaginitis  12/07/2010     Priority: Medium    Stenosis of lumbosacral spine      Priority: Medium     l4-5 and l5-s1      Derangement of TMJ (temporomandibular joint) 03/04/2010     Priority: Medium    Hammertoe      Priority: Medium    Osteopenia      Priority: Medium    Menopausal syndrome 12/06/2008     Priority: Medium    DDD (degenerative disc disease), lumbar 06/24/2008     Priority: Medium    Arthralgia 06/12/2008     Priority: Medium    Irritable bowel syndrome 02/18/2008     Priority: Medium    Allergic rhinitis 11/25/2005     Priority: Medium     Problem list name updated by automated process. Provider to review      Mild persistent asthma 10/25/2005     Priority: Medium    Cervicalgia 10/25/2005     Priority: Medium    Malaise and fatigue 04/26/2005     Priority: Medium     Problem list name updated by automated process. Provider to review      Insomnia 12/09/2004     Priority: Medium     Problem list name updated by automated process. Provider to review      TAMMY (generalized anxiety disorder) 09/12/2003     Priority: Medium    Generalized osteoarthrosis, unspecified site 12/19/2002     Priority: Medium    Symptomatic menopausal or female climacteric states 12/15/2002     Priority: Medium    Dermatophytosis of nail 08/20/2002     Priority: Medium        Past Medical History:    Past Medical History:   Diagnosis Date    Abnormal Papanicolaou smear of vagina and vaginal HPV 12/2002    Allergic rhinitis, cause unspecified     Arthritis     Backache, unspecified     Carpal tunnel syndrome     Cervicalgia     Chronic kidney disease, stage 3 (H) 07/14/2021    Depressive disorder     Depressive disorder, not elsewhere classified     Derangement of TMJ (temporomandibular joint) 03/2010    Dermatophytosis of nail     Diaphragmatic hernia without mention of obstruction or gangrene 09/2007    Diffuse cystic mastopathy 2000    Esophageal reflux 04/2005    Hammertoe 10/2009    Headache(784.0)     Intramural leiomyoma of uterus  07/2007    Irregular menstrual cycle     IRRITABLE COLON 02/18/2008    Malaise and fatigue     Menopause     Mild persistent asthma     Mitral valve disease     Mole (skin) 2003    Motion sickness     Neuroma 11/2008    Osteopenia 12/2008    Other malaise and fatigue 04/2005    Personal history of tobacco use, presenting hazards to health     Solitary cyst of breast 12/2005    Stenosis of lumbosacral spine     Symptomatic menopausal or female climacteric states 12/2002    Tachycardia, paroxysmal (H) 11/24/2020    Thyroid fullness 08/07/2013       Past Surgical History:    Past Surgical History:   Procedure Laterality Date    APPLY WOUND VAC Right 2/22/2024    Procedure: PLACEMENT OF RIGHT GROIN PREVENA WOUND VAC;  Surgeon: Brandon Hale MD;  Location:  OR    BIOPSY  4/10/2015    BL DUPLEX LO EXTREM ART UNILAT/LTD  4/3008    lower extr arterial doppler -no stenosis    C DEXA,BONE DENSITY,APPENDICULR SKELTN  12/2008    osteopenia    COLONOSCOPY  10/22/2007    bx benign    COLONOSCOPY N/A 4/10/2015    Procedure: COMBINED COLONOSCOPY, SINGLE OR MULTIPLE BIOPSY/POLYPECTOMY BY BIOPSY;  Surgeon: Cl Wagner MD;  Location:  GI    CV CORONARY ANGIOGRAM N/A 8/17/2023    Procedure: Coronary Angiogram;  Surgeon: Liza Ford MD;  Location:  [facility]ATH [facility]    ESOPHAGOSCOPY, GASTROSCOPY, DUODENOSCOPY (EGD), COMBINED N/A 1/31/2018    Procedure: COMBINED ESOPHAGOSCOPY, GASTROSCOPY, DUODENOSCOPY (EGD);  COMBINED ESOPHAGOSCOPY, GASTROSCOPY, DUODENOSCOPY (EGD);  Surgeon: Tirso Duran MD;  Location:  GI    ESOPHAGOSCOPY, GASTROSCOPY, DUODENOSCOPY (EGD), COMBINED N/A 2/1/2023    Procedure: ESOPHAGOGASTRODUODENOSCOPY (EGD);  Surgeon: Lui Vences MD;  Location:  GI    EYE SURGERY      HC PART REMV BONE METATARSAL HEAD,EA  01/11/10    Right foot plantar plate tear. Also correct hammertoe, 2nd digit & varicose vein ligation, heel.    HC REVISE MEDIAN N/CARPAL TUNNEL SURG      Left in 1997,  right in     INCISION AND DRAINAGE LOWER EXTREMITY, COMBINED Right 2024    Procedure: Excision OF RIGHT GROIN SEROMA WITH LYMPHAZURIN BLUE INJECTION, Right groin exploration;  Surgeon: Brandon Hale MD;  Location: [facility] OR    INJ, ANES/STER, FACET LUMB/SAC      Left L5 nerve root injection    INJECT EPIDURAL CERVICAL Right 2022    Procedure: Cervical 7-Thoracic 1 Interlaminar epidural steroid injection using fluoroscopic guidance with contrast dye, Right;  Surgeon: Tyler Bay MD;  Location: [facility] OR    INJECT EPIDURAL TRANSFORAMINAL  10/09/2013    [facility]    INJECT JOINT SACROILIAC  3/19/14,14    [facility]    REPAIR VALVE MITRAL MINIMALLY INVASIVE N/A 10/26/2023    Procedure: MINIMALLY INVASIVE MITRAL VALVE REPAIR USING CHORD-X PRE-MEASURED LOOP SUTURE SIZE:12MM, MITRAL ANNULOPLASTY RING ERICK-COTTO PHYSIO Il SIZE: 28MM, AND ON CARDIOPULMONARY PUMP OXYGENATOR (INTRAOPERATIVE TRANSESOPHAGEAL ECHOCARDIOGRAM BY THE CARDIOLOGIST  );  Surgeon: Sabas Meredith MD;  Location: [facility] OR    ZZC NONSPECIFIC PROCEDURE      Metatarsal phalangeal joint injection    ZZHC COLONOSCOPY THRU STOMA, DIAGNOSTIC  2002    bx. benign - flex sig in 5 yrs  or colonoscopy in 10 yrs    ZZHC ECHO HEART XTHORACIC, STRESS/REST  2005    WNL    ZZ NCS MOTOR W/O F-WAVE, EACH NERVE  2008    CTS       Family History:    Family History   Problem Relation Age of Onset    Diabetes Father     Depression Father     Cerebrovascular Disease Mother         age 80    Arthritis Mother     Depression Mother         On meds    Eye Disorder Mother         Glaucoma    Osteoporosis Mother     Respiratory Mother          86 YO COPD    Chronic Obstructive Pulmonary Disease Mother     C.A.D. Brother         65 YO MI -    Chronic Obstructive Pulmonary Disease Brother     Myocardial Infarction Brother     Other Cancer Brother     Cancer Brother        Social History:  Marital  "Status:   [2]  Social History     Tobacco Use    Smoking status: Former     Current packs/day: 0.00     Average packs/day: 0.5 packs/day for 30.0 years (15.0 ttl pk-yrs)     Types: Cigarettes     Start date: 3/20/1968     Quit date: 3/20/1998     Years since quittin.3    Smokeless tobacco: Never    Tobacco comments:     No smokers in home   Vaping Use    Vaping status: Never Used   Substance Use Topics    Alcohol use: Not Currently     Alcohol/week: 6.7 standard drinks of alcohol     Types: 7 Standard drinks or equivalent per week     Comment: 2x/week    Drug use: No        Medications:    traZODone (DESYREL) 50 MG tablet  albuterol (PROAIR HFA/PROVENTIL HFA/VENTOLIN HFA) 108 (90 Base) MCG/ACT inhaler  ALLEGRA 180 MG OR TABS  aspirin (ASA) 81 MG chewable tablet  buPROPion (WELLBUTRIN XL) 150 MG 24 hr tablet  CALCIUM /VITAMIN D TABS   OR  Cetaphil Moisturizing (CETAPHIL) external lotion  cyclobenzaprine (FLEXERIL) 5 MG tablet  DULoxetine (CYMBALTA) 30 MG capsule  fluticasone (FLONASE) 50 MCG/ACT nasal spray  magnesium aspartate (MAGINEX) 615 MG EC tablet  MAGNESIUM PO  mometasone-formoterol (DULERA) 100-5 MCG/ACT inhaler  Multiple Vitamins-Minerals (PRESERVISION AREDS) TABS  multivitamin w/minerals (THERA-VIT-M) tablet  omeprazole (PRILOSEC) 20 MG DR capsule  vitamin C (ASCORBIC ACID) 250 MG tablet          Review of Systems   Genitourinary:  Positive for urgency.   All other systems reviewed and are negative.      Physical Exam   BP: (!) 186/94  Pulse: 83  Temp: 97.6  F (36.4  C)  Resp: 19  Height: 162.6 cm (5' 4\")  Weight: 61.2 kg (135 lb)  SpO2: 99 %      Physical Exam  Constitutional:       General: She is not in acute distress.     Appearance: She is well-developed.   HENT:      Right Ear: Tympanic membrane normal.      Left Ear: Tympanic membrane normal.   Eyes:      Extraocular Movements: Extraocular movements intact.      Pupils: Pupils are equal, round, and reactive to light.   Cardiovascular:    "   Rate and Rhythm: Normal rate and regular rhythm.   Pulmonary:      Effort: Pulmonary effort is normal.      Breath sounds: Normal breath sounds.   Abdominal:      Palpations: Abdomen is soft.      Tenderness: There is no abdominal tenderness.   Neurological:      General: No focal deficit present.      Mental Status: She is alert and oriented to person, place, and time.      GCS: GCS eye subscore is 4. GCS verbal subscore is 5. GCS motor subscore is 6.      Cranial Nerves: Cranial nerves 2-12 are intact.      Sensory: Sensation is intact.      Motor: Motor function is intact.      Coordination: Finger-Nose-Finger Test and Heel to Shin Test normal.      Gait: Gait is intact.         ED Course        Procedures              EKG Interpretation:      Interpreted by Butch Senior MD  Time reviewed: 12:27 AM   Symptoms at time of EKG: left jaw/shoulder pain   Rhythm: normal sinus   Rate: 80  Axis: normal  Ectopy: none  Conduction: normal  ST Segments/ T Waves: No ST-T wave changes  Q Waves: none  Comparison to prior: Unchanged from 2/22/2024    Clinical Impression: normal EKG      Critical Care time:  none               Results for orders placed or performed during the hospital encounter of 07/23/24 (from the past 24 hour(s))   Extra Tube ([place] Draw)    Narrative    The following orders were created for panel order Extra Tube ([place] Draw).  Procedure                               Abnormality         Status                     ---------                               -----------         ------                     Extra Blue Top Tube[[id number]]                              Final result               Extra Green Top (Lithium...[[id number]]                      Final result               Extra Purple Top Tube[[id number]]                            Final result                 Please view results for these tests on the individual orders.   Extra Blue Top Tube   Result Value Ref Range    Hold Specimen     Extra  Green Top (Lithium Heparin) Tube   Result Value Ref Range    Hold Specimen     Extra Purple Top Tube   Result Value Ref Range    Hold Specimen     CBC with platelets differential    Narrative    The following orders were created for panel order CBC with platelets differential.  Procedure                               Abnormality         Status                     ---------                               -----------         ------                     CBC with platelets and d...[[id number]]                      Final result                 Please view results for these tests on the individual orders.   Comprehensive metabolic panel   Result Value Ref Range    Sodium 138 135 - 145 mmol/L    Potassium 3.9 3.4 - 5.3 mmol/L    Carbon Dioxide (CO2) 24 22 - 29 mmol/L    Anion Gap 11 7 - 15 mmol/L    Urea Nitrogen 23.9 (H) 8.0 - 23.0 mg/dL    Creatinine 0.93 0.51 - 0.95 mg/dL    GFR Estimate 65 >60 mL/min/1.73m2    Calcium 9.5 8.8 - 10.4 mg/dL    Chloride 103 98 - 107 mmol/L    Glucose 102 (H) 70 - 99 mg/dL    Alkaline Phosphatase 96 40 - 150 U/L    AST 32 0 - 45 U/L    ALT 26 0 - 50 U/L    Protein Total 6.5 6.4 - 8.3 g/dL    Albumin 4.3 3.5 - 5.2 g/dL    Bilirubin Total 0.3 <=1.2 mg/dL   Troponin T, High Sensitivity   Result Value Ref Range    Troponin T, High Sensitivity 18 (H) <=14 ng/L   D dimer quantitative   Result Value Ref Range    D-Dimer Quantitative 0.54 (H) 0.00 - 0.50 ug/mL FEU    Narrative    This D-dimer assay is intended for use in conjunction with a clinical pretest probability assessment model to exclude pulmonary embolism (PE) and deep venous thrombosis (DVT) in outpatients suspected of PE or DVT. The cut-off value is 0.50 ug/mL FEU.    For patients 50 years of age or older, the application of age-adjusted cut-off values for D-Dimer may increase the specificity without significant effect on sensitivity. The literature suggested calculation age adjusted cut-off in ug/L = age in years x 10 ug/L. The results in  this laboratory are reported as ug/mL rather than ug/L. The calculation for age adjusted cut off in ug/mL= age in years x 0.01 ug/mL. For example, the cut off for a 76 year old male is 74 x 0.01 ug/mL = 0.76 ug/mL (760 ug/L).    M Yuri et al. Age adjusted D-dimer cut-off levels to rule out pulmonary embolism: The ADJUST-PE Study. WES 2014;311:1184-5004.; HJ Jerzy et al. Diagnostic accuracy of conventional or age adjusted D-dimer cutoff values in older patients with suspected venous thromboembolism. Systemic review and meta-analysis. BMJ 2013:346:f2492.   CBC with platelets and differential   Result Value Ref Range    WBC Count 7.7 4.0 - 11.0 10e3/uL    RBC Count 4.53 3.80 - 5.20 10e6/uL    Hemoglobin 13.6 11.7 - 15.7 g/dL    Hematocrit 40.4 35.0 - 47.0 %    MCV 89 78 - 100 fL    MCH 30.0 26.5 - 33.0 pg    MCHC 33.7 31.5 - 36.5 g/dL    RDW 14.0 10.0 - 15.0 %    Platelet Count 235 150 - 450 10e3/uL    % Neutrophils 49 %    % Lymphocytes 29 %    % Monocytes 11 %    % Eosinophils 8 %    % Basophils 1 %    % Immature Granulocytes 1 %    NRBCs per 100 WBC 0 <1 /100    Absolute Neutrophils 3.8 1.6 - 8.3 10e3/uL    Absolute Lymphocytes 2.2 0.8 - 5.3 10e3/uL    Absolute Monocytes 0.9 0.0 - 1.3 10e3/uL    Absolute Eosinophils 0.6 0.0 - 0.7 10e3/uL    Absolute Basophils 0.1 0.0 - 0.2 10e3/uL    Absolute Immature Granulocytes 0.1 <=0.4 10e3/uL    Absolute NRBCs 0.0 10e3/uL   UA with Microscopic reflex to Culture    Specimen: Urine, NOS   Result Value Ref Range    Color Urine Yellow Colorless, Straw, Light Yellow, Yellow    Appearance Urine Clear Clear    Glucose Urine Negative Negative mg/dL    Bilirubin Urine Negative Negative    Ketones Urine Negative Negative mg/dL    Specific Gravity Urine 1.005 1.003 - 1.035    Blood Urine Negative Negative    pH Urine 7.0 5.0 - 7.0    Protein Albumin Urine Negative Negative mg/dL    Urobilinogen Urine Normal Normal, 2.0 mg/dL    Nitrite Urine Negative Negative    Leukocyte Esterase  Urine Negative Negative    RBC Urine <1 <=2 /HPF    WBC Urine <1 <=5 /HPF    Squamous Epithelials Urine <1 <=1 /HPF    Narrative    Urine Culture not indicated   US Lower Extremity Venous Duplex Left    Narrative    EXAM: US LOWER EXTREMITY VENOUS DUPLEX LEFT  LOCATION: [facility]  DATE: 7/24/2024    INDICATION: LEFT LEG PAIN, ELEVATED D DIMER, RECENT LATERAL TIBIAL PLATEAU FRACTURE   COMPARISON: None.  TECHNIQUE: Venous Duplex ultrasound of the left lower extremity with and without compression, augmentation and duplex. Color flow and spectral Doppler with waveform analysis performed.    FINDINGS: Exam includes the common femoral, femoral, popliteal, and contralateral common femoral veins as well as segmentally visualized deep calf veins and greater saphenous vein.     LEFT: No deep vein thrombosis. No superficial thrombophlebitis. No popliteal cyst.      Impression    IMPRESSION:  1.  No deep venous thrombosis in the left lower extremity.   Troponin T, High Sensitivity   Result Value Ref Range    Troponin T, High Sensitivity 17 (H) <=14 ng/L   XR Chest Port 1 View    Narrative    EXAM: XR CHEST PORT 1 VIEW  LOCATION: [facility]  DATE: 7/24/2024    INDICATION: chest pain  COMPARISON: 10/28/2023      Impression    IMPRESSION: Cardiac valve prosthesis. No acute cardiopulmonary process. Stable cardiomediastinal silhouette.     *Note: Due to a large number of results and/or encounters for the requested time period, some results have not been displayed. A complete set of results can be found in Results Review.       Medications   traZODone (DESYREL) tablet 50 mg (has no administration in time range)   aspirin (ASA) chewable tablet 243 mg (243 mg Oral $Given 7/24/24 0048)   nitroGLYcerin (NITRO-BID) 2 % ointment 15 mg (15 mg Transdermal $Patch/Med Applied 7/24/24 0049)       Assessments & Plan (with Medical Decision Making)  69-year-old female status post  mitral valve repair back in October 2023 and a normal echo since then.  CTA of the aorta at that time was normal showing no aneurysm, dissection or significant plaque in the aorta or any of its visible branches.  She has some intermittent dizziness which she is treated with meclizine at home.  Shortness of breath yesterday with hot and cold flashes but no associated chest pain.  Today she once again felt little bit confused and dizzy and had some hot and cold flashes and then developed left jaw and shoulder pain this evening around 10:30 PM while laying in bed.  Blood pressure had been going up today as she checked it several times as well.  Also some left posterior leg pain today.  It is her first day of weightbearing after being off it for 5 weeks because of the lateral tibial plateau fracture.  EKG shows no acute ischemic changes.  Age-adjusted D-dimer is normal at 0.54.  I am going to get an ultrasound of her left leg just to make sure she does not have a DVT since she had some relative immobilization recently.  Ultrasound negative for DVT.  Chest x-ray unremarkable.  White count normal.  Argues against any infectious etiology.  Urine was clear.  Blood pressure came down nicely after some Nitropaste.  She feels much better and feels ready to go home.  We discussed starting her on some blood pressure medication she would like to just stop her Mobic and see if her blood pressures normalize.  Really has not had much in the way of elevated blood pressures over the years until her orthopedic follow-up appointment 2 days ago when she was 156/94.  Prior to that had been in the 1 teens to 130 range.  She had clean coronary arteries on her angiogram less than a year ago.  Has a mild headache now after the Nitropaste.  I offered to put her on some losartan but again she opted to hold off on that for now which is reasonable.  I sent a note to her primary provider, Caitlin Beasley PA-C, asking if she could see her in the  next 1-2 weeks for recheck and further plans.  We discussed reportable signs when to return.  Verbal and written discharge instructions given.  She is comfortable this plan.  She asked about something to help her sleep.  Has not been sleeping well the past few days and feels exhausted.  She used amitriptyline years ago and it seemed to help a bit.  Wondering if there is something else.  We could try trazodone 50 mg at bedtime.  Prescription sent to her pharmacy.  She was given the first dose here in the ED and her  will drive her home.  She does not have anything going on the rest of the morning so hopefully she can get some rest.       I have reviewed the nursing notes.    I have reviewed the findings, diagnosis, plan and need for follow up with the patient.           Medical Decision Making  The patient's presentation was of moderate complexity (an undiagnosed new problem with uncertain prognosis).    The patient's evaluation involved:  review of external note(s) from 3+ sources (see separate area of note for details)  review of 3+ test result(s) ordered prior to this encounter (see separate area of note for details)  ordering and/or review of 3+ test(s) in this encounter (see separate area of note for details)    The patient's management necessitated moderate risk (prescription drug management including medications given in the ED).        New Prescriptions    TRAZODONE (DESYREL) 50 MG TABLET    Take 1 tablet (50 mg) by mouth at bedtime       Final diagnoses:   Elevated BP without diagnosis of hypertension   Pain of left lower extremity   Jaw pain   Bilateral shoulder pain, unspecified chronicity   S/P MVR (mitral valve repair)   Difficulty sleeping       7/23/2024   [facility] EMERGENCY DEPT       Butch Senior MD  07/24/24 [id number]       Butch Senior MD  07/24/24 [id number]

## 2024-07-24 NOTE — TELEPHONE ENCOUNTER
----- Message from Butch Senior sent at 7/24/2024  3:41 AM CDT -----  69 year old female in tonight with elevated blood pressure shoulder and jaw pain.  She really has not had problems with elevated blood pressure but it was up a bit at her orthopedic appointment on 7/22/2024 and then markedly elevated tonight when she came in with jaw pain, dizziness, headache and bilateral shoulder pain.  EKG was unremarkable.  Troponin was up slightly but flat on recheck.  Pressure came down nicely with some Nitropaste and she feels better.  She had clean coronary arteries less than a year ago on her angiogram prior to her mitral valve repair.  She has been on Mobic after a tibial plateau fracture and that may be contributing.  We decided to stop the Mobic.  I offered to put her on some losartan but she would like to see how her blood pressures do after stopping the Mobic and I think it is reasonable.  She will try to get by with Tylenol but may need to use some naproxen on occasion.  I told her that could do the same thing but we will have to see how things go.  Could you see her in clinic in the next 1-2 weeks to recheck her blood pressure and see how things are going?  Thanks    Clarence

## 2024-07-24 NOTE — TELEPHONE ENCOUNTER
She  needs follow up in the next 1-2 weeks for bp recheck and ER recheck okay to use blocked spots  Caitlin Beasley PA-C

## 2024-07-24 NOTE — DISCHARGE INSTRUCTIONS
Stop the Mobic.  Try to get by with the Tylenol for pain.  If you need to use the naproxen, go ahead and use it sparingly.  Keep track of your blood pressures, write them down and take those results to your appointment with Caitlin Beasley.  Hopefully you can get into see her within the next 1-2 weeks.  If your blood pressures remain elevated, you may need to start on blood pressure medications as your symptoms seem to be associated with your blood pressure being elevated.  Fortunately your heart tests were reassuring.  Please return to the ED if you worsen or have any concerns.  It was a pleasure visiting with both of you tonight.  I am glad you are feeling better and hope you continue to do well.    You can take the trazodone 50 mg at bedtime for sleep.  If 50 mg is too much, you can break it in half and take 25 mg.  If this is effective, asked Caitlin Beasley to get you a refill.

## 2024-07-24 NOTE — TELEPHONE ENCOUNTER
Patient returned call and appointment was set up for 7/30/24. Closing encounter.    WENDY MariscalN, RN

## 2024-07-24 NOTE — ED TRIAGE NOTES
"8/10 L anterior chest pain onset around 2200 that radiates to her back. Pt states today she has just been feeling \"off'\" chills, and sweats and c/o some 'confusion'. HTN at home. Pt states she has had a valve repaired in her heart, echo done in June for follow up and everything looked great at that time.      Triage Assessment (Adult)       Row Name 07/23/24 0763          Triage Assessment    Airway WDL WDL        Respiratory WDL    Respiratory WDL WDL        Cardiac WDL    Cardiac WDL WDL                     "

## 2024-07-25 ENCOUNTER — PATIENT OUTREACH (OUTPATIENT)
Dept: FAMILY MEDICINE | Facility: CLINIC | Age: 69
End: 2024-07-25
Payer: COMMERCIAL

## 2024-07-25 NOTE — TELEPHONE ENCOUNTER
Patient was called 7/24 by staff per MD advisement.    Adelaide Delong RN  [facility] - Registered Nurse  Clinic Triage [place]   July 25, 2024

## 2024-07-25 NOTE — TELEPHONE ENCOUNTER
Elevated BP w/out HTN DX    Chest CT 9/4  And F/U cards    Adelaide Delong RN  [facility] - Registered Nurse  Clinic Triage [place]   July 25, 2024

## 2024-07-27 ENCOUNTER — MYC MEDICAL ADVICE (OUTPATIENT)
Dept: OTHER | Facility: CLINIC | Age: 69
End: 2024-07-27
Payer: COMMERCIAL

## 2024-07-29 ASSESSMENT — PATIENT HEALTH QUESTIONNAIRE - PHQ9
SUM OF ALL RESPONSES TO PHQ QUESTIONS 1-9: 9
10. IF YOU CHECKED OFF ANY PROBLEMS, HOW DIFFICULT HAVE THESE PROBLEMS MADE IT FOR YOU TO DO YOUR WORK, TAKE CARE OF THINGS AT HOME, OR GET ALONG WITH OTHER PEOPLE: NOT DIFFICULT AT ALL
SUM OF ALL RESPONSES TO PHQ QUESTIONS 1-9: 9

## 2024-07-30 ENCOUNTER — MYC MEDICAL ADVICE (OUTPATIENT)
Dept: FAMILY MEDICINE | Facility: CLINIC | Age: 69
End: 2024-07-30

## 2024-07-30 ENCOUNTER — OFFICE VISIT (OUTPATIENT)
Dept: FAMILY MEDICINE | Facility: CLINIC | Age: 69
End: 2024-07-30
Payer: COMMERCIAL

## 2024-07-30 VITALS
SYSTOLIC BLOOD PRESSURE: 136 MMHG | HEART RATE: 86 BPM | BODY MASS INDEX: 24.18 KG/M2 | WEIGHT: 131.38 LBS | OXYGEN SATURATION: 98 % | DIASTOLIC BLOOD PRESSURE: 84 MMHG | HEIGHT: 62 IN | TEMPERATURE: 97.8 F | RESPIRATION RATE: 18 BRPM

## 2024-07-30 DIAGNOSIS — H61.21 IMPACTED CERUMEN OF RIGHT EAR: ICD-10-CM

## 2024-07-30 DIAGNOSIS — R25.2 MUSCLE CRAMP: ICD-10-CM

## 2024-07-30 DIAGNOSIS — R03.0 ELEVATED BLOOD PRESSURE READING WITHOUT DIAGNOSIS OF HYPERTENSION: Primary | ICD-10-CM

## 2024-07-30 DIAGNOSIS — R35.0 URINARY FREQUENCY: ICD-10-CM

## 2024-07-30 DIAGNOSIS — R10.13 EPIGASTRIC PAIN: ICD-10-CM

## 2024-07-30 DIAGNOSIS — R42 DIZZINESS: ICD-10-CM

## 2024-07-30 LAB
ALBUMIN UR-MCNC: NEGATIVE MG/DL
APPEARANCE UR: CLEAR
BILIRUB UR QL STRIP: NEGATIVE
COLOR UR AUTO: YELLOW
ERYTHROCYTE [DISTWIDTH] IN BLOOD BY AUTOMATED COUNT: 13.7 % (ref 10–15)
GLUCOSE UR STRIP-MCNC: NEGATIVE MG/DL
HCT VFR BLD AUTO: 41.8 % (ref 35–47)
HGB BLD-MCNC: 13.8 G/DL (ref 11.7–15.7)
HGB UR QL STRIP: NEGATIVE
KETONES UR STRIP-MCNC: NEGATIVE MG/DL
LEUKOCYTE ESTERASE UR QL STRIP: NEGATIVE
MAGNESIUM SERPL-MCNC: 2.1 MG/DL (ref 1.7–2.3)
MCH RBC QN AUTO: 30.3 PG (ref 26.5–33)
MCHC RBC AUTO-ENTMCNC: 33 G/DL (ref 31.5–36.5)
MCV RBC AUTO: 92 FL (ref 78–100)
NITRATE UR QL: NEGATIVE
PH UR STRIP: 6.5 [PH] (ref 5–7)
PLATELET # BLD AUTO: 253 10E3/UL (ref 150–450)
RBC # BLD AUTO: 4.56 10E6/UL (ref 3.8–5.2)
SP GR UR STRIP: 1.01 (ref 1–1.03)
UROBILINOGEN UR STRIP-ACNC: 0.2 E.U./DL
WBC # BLD AUTO: 7.1 10E3/UL (ref 4–11)

## 2024-07-30 PROCEDURE — 69209 REMOVE IMPACTED EAR WAX UNI: CPT | Mod: 4MD | Performed by: PHYSICIAN ASSISTANT

## 2024-07-30 PROCEDURE — 81003 URINALYSIS AUTO W/O SCOPE: CPT | Performed by: PHYSICIAN ASSISTANT

## 2024-07-30 PROCEDURE — 36415 COLL VENOUS BLD VENIPUNCTURE: CPT | Performed by: PHYSICIAN ASSISTANT

## 2024-07-30 PROCEDURE — 83735 ASSAY OF MAGNESIUM: CPT | Performed by: PHYSICIAN ASSISTANT

## 2024-07-30 PROCEDURE — 99215 OFFICE O/P EST HI 40 MIN: CPT | Performed by: PHYSICIAN ASSISTANT

## 2024-07-30 PROCEDURE — 85027 COMPLETE CBC AUTOMATED: CPT | Performed by: PHYSICIAN ASSISTANT

## 2024-07-30 PROCEDURE — G2211 COMPLEX E/M VISIT ADD ON: HCPCS | Performed by: PHYSICIAN ASSISTANT

## 2024-07-30 ASSESSMENT — PAIN SCALES - GENERAL: PAINLEVEL: MODERATE PAIN (5)

## 2024-07-30 NOTE — PROGRESS NOTES
Assessment & Plan     Elevated blood pressure reading without diagnosis of hypertension  For now the majority of her blood pressures are within range, she will continue to monitor and let me know if blood pressures are consistently above 140/90 we would start losartan 12.5 mg daily.  She will avoid NSAIDs as that may have been a contributing factor to her blood pressure elevation    Urinary frequency  UA is negative likely related to her increased fluids  - UA Macroscopic with reflex to Microscopic and Culture - Lab Collect; Future  - UA Macroscopic with reflex to Microscopic and Culture - Lab Collect    Dizziness  No clear definitive reason for her dizziness recent labs in the ER were normal will repeat CBC today.  May have a resolving viral infection we will continue to monitor if symptoms increase will need further  evaluation  - CBC with platelets; Future  - CBC with platelets    Muscle cramp  Patient has been taking magnesium supplements will check her magnesium level  - Magnesium; Future  - Magnesium    Epigastric pain  Will add famotidine daily to her current omeprazole.  She has taken this at home and found it helpful.  She may use Tums as needed.  She will avoid greasy, fatty or fried foods, spicy foods alcohol and caffeine and follow-up if this is not improving within 2 weeks  - CBC with platelets; Future  - UA Macroscopic with reflex to Microscopic and Culture - Lab Collect; Future  - CBC with platelets  - UA Macroscopic with reflex to Microscopic and Culture - Lab Collect    Impacted cerumen of right ear  Ear lavage by MA      I spent a total of 44 minutes on the day of the visit.   Time spent by me doing chart review, history and exam, documentation and further activities per the note     MED REC REQUIRED  Post Medication Reconciliation Status: patient was not discharged from an inpatient facility or TCU    This chart documentation was completed in part with Dragon voice recognition software.   Documentation is reviewed after completion, however, some words and grammatical errors may remain.  Caitlin Beasley PA-C      Basilio Zuluaga is a 69 year old, presenting for the following health issues:  ER F/U      7/30/2024    11:28 AM   Additional Questions   Roomed by MINA     HPI          Answers submitted by the patient for this visit:  Patient Health Questionnaire (Submitted on 7/29/2024)  If you checked off any problems, how difficult have these problems made it for you to do your work, take care of things at home, or get along with other people?: Not difficult at all  PHQ9 TOTAL SCORE: 9        ER visit follow-up Visit:documentation reviewed     Hospital/Nursing Home/IP Rehab Facility: [facility]    Date of Admission: 7/23/2024  Date of Discharge: 7/23/2024  Reason(s) for Admission: ER visit patient not admitted  Was the patient in the ICU or did the patient experience delirium during hospitalization?  No  Do you have any other stressors you would like to discuss with your provider? OTHER: discuss medications    Problems taking medications regularly:  None  Medication changes since discharge: None  Problems adhering to non-medication therapy:  None    Summary of hospitalization:    ER visit  Diagnostic Tests/Treatments reviewed.  Follow up needed: Blood pressure  Other Healthcare Providers Involved in Patient s Care:         None  Update since discharge: improved.     She has been keeping track of her blood pressures since she has been home.  They are generally in the 130s over 70s to 80s.  She has had a few 150/70 and 148/91 she has avoided NSAIDs and that seems to be helping.    For the last 10 days she just has not felt quite right.  She has been lightheaded or off balance.  She denies any presyncope or vertigo.  She has had a random mild frontal headache.  She has had slightly loose stools by this she means soft formed, no diarrhea no blood in her stool.  She has had some  "epigastric discomfort she describes it as a sour feeling she believes this may be slightly improved at this point.  Tums was not helpful she already takes omeprazole but she has been taking famotidine as needed which really does help.  She is not taking famotidine daily at this point.  She has not been coughing no nausea or vomiting.  She does have some mild rhinorrhea which is atypical for her.  She did find trazodone to be quite helpful to help her sleep.  She did read that one of the side effects is dizziness so she has not used it more recently.    She continues to have her episodes of dizziness.  She is satisfied with her bupropion dosage and would like to continue.  Thankfully she is now out of the wheelchair she did have a history of a tibial plateau fracture she was seeing also,Merrick Brown for this.  She is seeing another orthopedic specialist for injections in her shoulder.  She saw cardiology in June and was told that she is doing very well in regards to her heart.  That same day she saw Octavio in [place] he did an x-ray of her back which showed an indeterminate T12 fracture she is not having any considerable tenderness in this area. And wonders what if anything needs to be done with this. Since she has been in the wheelchair for quite some time, she is trying to regain her strength and get back in shape.  She does generally have more discomfort in her body than usual because of this.    Plan of care communicated with patient                 Review of Systems  Constitutional, HEENT, cardiovascular, pulmonary, gi and gu systems are negative, except as otherwise noted.      Objective    /84 (BP Location: Left arm, Patient Position: Chair, Cuff Size: Adult Regular)   Pulse 86   Temp 97.8  F (36.6  C) (Temporal)   Resp 18   Ht 1.568 m (5' 1.75\")   Wt 59.6 kg (131 lb 6 oz)   LMP 02/26/2008 (Approximate)   SpO2 98%   Breastfeeding No   BMI 24.22 kg/m    Body mass index is 24.22 kg/m .  Physical " Exam   GENERAL: alert and no distress  EYES: Eyes grossly normal to inspection, PERRL and conjunctivae and sclerae normal  HENT: ear canals and TM's normal, nose and mouth without ulcers or lesions  NECK: no adenopathy, no asymmetry, masses, or scars  RESP: lungs clear to auscultation - no rales, rhonchi or wheezes  CV: regular rate and rhythm, normal S1 S2, no S3 or S4, no murmur, click or rub, no peripheral edema  ABDOMEN: soft, nontender, no hepatosplenomegaly, no masses and bowel sounds normal  MS: no gross musculoskeletal defects noted, no edema  BACK: no CVA tenderness, no paralumbar tenderness, no specific vertebral tenderness in the thoracic or upper lumbar region  PSYCH: mentation appears normal, affect normal/bright does appear anxious which is normal for her    Documentation reviewed admission on 07/23/2024, Discharged on 07/24/2024   Component Date Value Ref Range Status    Sodium 07/23/2024 138  135 - 145 mmol/L Final    Potassium 07/23/2024 3.9  3.4 - 5.3 mmol/L Final    Carbon Dioxide (CO2) 07/23/2024 24  22 - 29 mmol/L Final    Anion Gap 07/23/2024 11  7 - 15 mmol/L Final    Urea Nitrogen 07/23/2024 23.9 (H)  8.0 - 23.0 mg/dL Final    Creatinine 07/23/2024 0.93  0.51 - 0.95 mg/dL Final    GFR Estimate 07/23/2024 65  >60 mL/min/1.73m2 Final    eGFR calculated using 2021 CKD-EPI equation.    Calcium 07/23/2024 9.5  8.8 - 10.4 mg/dL Final    Reference intervals for this test were updated on 7/16/2024 to reflect our healthy population more accurately. There may be differences in the flagging of prior results with similar values performed with this method. Those prior results can be interpreted in the context of the updated reference intervals.    Chloride 07/23/2024 103  98 - 107 mmol/L Final    Glucose 07/23/2024 102 (H)  70 - 99 mg/dL Final    Alkaline Phosphatase 07/23/2024 96  40 - 150 U/L Final    AST 07/23/2024 32  0 - 45 U/L Final    ALT 07/23/2024 26  0 - 50 U/L Final    Protein Total 07/23/2024  6.5  6.4 - 8.3 g/dL Final    Albumin 07/23/2024 4.3  3.5 - 5.2 g/dL Final    Bilirubin Total 07/23/2024 0.3  <=1.2 mg/dL Final    Troponin T, High Sensitivity 07/23/2024 18 (H)  <=14 ng/L Final    Either a High Sensitivity Troponin T baseline (0 hours) value = 100 ng/L, or an increase in High Sensitivity Troponin T = 7 ng/L at 2 hours compared to 0 hours (2-0 hours), suggests myocardial injury, and urgent clinical attention is required.    If the 2-0 hours increase is <7 ng/L, a High Sensitivity Troponin T result above gender-specific reference ranges warrants further evaluation.   Recommendations for further evaluation include correlation with clinical decision-making tool (e.g., HEART), a 3rd High Sensitivity Troponin T test 2 hours after the 2nd (a 20% change from baseline would represent concern), admission for observation, close PCC/cardiology follow-up, or urgent outpatient provocative testing.    D-Dimer Quantitative 07/23/2024 0.54 (H)  0.00 - 0.50 ug/mL FEU Final    WBC Count 07/23/2024 7.7  4.0 - 11.0 10e3/uL Final    RBC Count 07/23/2024 4.53  3.80 - 5.20 10e6/uL Final    Hemoglobin 07/23/2024 13.6  11.7 - 15.7 g/dL Final    Hematocrit 07/23/2024 40.4  35.0 - 47.0 % Final    MCV 07/23/2024 89  78 - 100 fL Final    MCH 07/23/2024 30.0  26.5 - 33.0 pg Final    MCHC 07/23/2024 33.7  31.5 - 36.5 g/dL Final    RDW 07/23/2024 14.0  10.0 - 15.0 % Final    Platelet Count 07/23/2024 235  150 - 450 10e3/uL Final    % Neutrophils 07/23/2024 49  % Final    % Lymphocytes 07/23/2024 29  % Final    % Monocytes 07/23/2024 11  % Final    % Eosinophils 07/23/2024 8  % Final    % Basophils 07/23/2024 1  % Final    % Immature Granulocytes 07/23/2024 1  % Final    NRBCs per 100 WBC 07/23/2024 0  <1 /100 Final    Absolute Neutrophils 07/23/2024 3.8  1.6 - 8.3 10e3/uL Final    Absolute Lymphocytes 07/23/2024 2.2  0.8 - 5.3 10e3/uL Final    Absolute Monocytes 07/23/2024 0.9  0.0 - 1.3 10e3/uL Final    Absolute Eosinophils  07/23/2024 0.6  0.0 - 0.7 10e3/uL Final    Absolute Basophils 07/23/2024 0.1  0.0 - 0.2 10e3/uL Final    Absolute Immature Granulocytes 07/23/2024 0.1  <=0.4 10e3/uL Final    Absolute NRBCs 07/23/2024 0.0  10e3/uL Final    Color Urine 07/24/2024 Yellow  Colorless, Straw, Light Yellow, Yellow Final    Appearance Urine 07/24/2024 Clear  Clear Final    Glucose Urine 07/24/2024 Negative  Negative mg/dL Final    Bilirubin Urine 07/24/2024 Negative  Negative Final    Ketones Urine 07/24/2024 Negative  Negative mg/dL Final    Specific Gravity Urine 07/24/2024 1.005  1.003 - 1.035 Final    Blood Urine 07/24/2024 Negative  Negative Final    pH Urine 07/24/2024 7.0  5.0 - 7.0 Final    Protein Albumin Urine 07/24/2024 Negative  Negative mg/dL Final    Urobilinogen Urine 07/24/2024 Normal  Normal, 2.0 mg/dL Final    Nitrite Urine 07/24/2024 Negative  Negative Final    Leukocyte Esterase Urine 07/24/2024 Negative  Negative Final    RBC Urine 07/24/2024 <1  <=2 /HPF Final    WBC Urine 07/24/2024 <1  <=5 /HPF Final    Squamous Epithelials Urine 07/24/2024 <1  <=1 /HPF Final    Troponin T, High Sensitivity 07/24/2024 17 (H)  <=14 ng/L Final    Either a High Sensitivity Troponin T baseline (0 hours) value = 100 ng/L, or an increase in High Sensitivity Troponin T = 7 ng/L at 2 hours compared to 0 hours (2-0 hours), suggests myocardial injury, and urgent clinical attention is required.    If the 2-0 hours increase is <7 ng/L, a High Sensitivity Troponin T result above gender-specific reference ranges warrants further evaluation.   Recommendations for further evaluation include correlation with clinical decision-making tool (e.g., HEART), a 3rd High Sensitivity Troponin T test 2 hours after the 2nd (a 20% change from baseline would represent concern), admission for observation, close PCC/cardiology follow-up, or urgent outpatient provocative testing.     Results for orders placed or performed in visit on 07/30/24   CBC with platelets      Status: Normal   Result Value Ref Range    WBC Count 7.1 4.0 - 11.0 10e3/uL    RBC Count 4.56 3.80 - 5.20 10e6/uL    Hemoglobin 13.8 11.7 - 15.7 g/dL    Hematocrit 41.8 35.0 - 47.0 %    MCV 92 78 - 100 fL    MCH 30.3 26.5 - 33.0 pg    MCHC 33.0 31.5 - 36.5 g/dL    RDW 13.7 10.0 - 15.0 %    Platelet Count 253 150 - 450 10e3/uL   UA Macroscopic with reflex to Microscopic and Culture - Lab Collect     Status: Normal    Specimen: Urine, Clean Catch   Result Value Ref Range    Color Urine Yellow Colorless, Straw, Light Yellow, Yellow    Appearance Urine Clear Clear    Glucose Urine Negative Negative mg/dL    Bilirubin Urine Negative Negative    Ketones Urine Negative Negative mg/dL    Specific Gravity Urine 1.010 1.003 - 1.035    Blood Urine Negative Negative    pH Urine 6.5 5.0 - 7.0    Protein Albumin Urine Negative Negative mg/dL    Urobilinogen Urine 0.2 0.2, 1.0 E.U./dL    Nitrite Urine Negative Negative    Leukocyte Esterase Urine Negative Negative    Narrative    Microscopic not indicated           Signed Electronically by: Caitlin Beasley PA-C

## 2024-08-04 ENCOUNTER — MYC MEDICAL ADVICE (OUTPATIENT)
Dept: CARDIOLOGY | Facility: CLINIC | Age: 69
End: 2024-08-04
Payer: COMMERCIAL

## 2024-08-04 DIAGNOSIS — I34.0 NONRHEUMATIC MITRAL VALVE REGURGITATION: Primary | ICD-10-CM

## 2024-08-04 DIAGNOSIS — Z29.89 NEED FOR SBE (SUBACUTE BACTERIAL ENDOCARDITIS) PROPHYLAXIS: ICD-10-CM

## 2024-08-04 DIAGNOSIS — Z98.890 S/P MITRAL VALVE REPAIR: ICD-10-CM

## 2024-08-05 RX ORDER — AMOXICILLIN 500 MG/1
CAPSULE ORAL
Qty: 4 CAPSULE | Refills: 0 | Status: SHIPPED | OUTPATIENT
Start: 2024-08-05

## 2024-08-05 NOTE — TELEPHONE ENCOUNTER
"Prescription for amoxicillin 2000 mg taken by mouth 30-60 minutes prior to dental appt sent to patient's pharmacy per infective endocarditis protocol d/t mitral valve repair. Penicillin \"allergy\" listed in chart with reaction of \"vaginal itching - yeast infection\" noted. Reviewed with patient in prior encounter on 5/7/24. She doesn't recall ever having an actual allergic response such as hives, swelling of face, mouth, tongue or airway. Patient's has now taken the amoxicillin once before as noted on 5/7/24. Sent to patient's pharmacy.   "

## 2024-08-07 ENCOUNTER — MYC MEDICAL ADVICE (OUTPATIENT)
Dept: FAMILY MEDICINE | Facility: CLINIC | Age: 69
End: 2024-08-07
Payer: COMMERCIAL

## 2024-08-09 ENCOUNTER — OFFICE VISIT (OUTPATIENT)
Dept: FAMILY MEDICINE | Facility: OTHER | Age: 69
End: 2024-08-09
Payer: COMMERCIAL

## 2024-08-09 VITALS
HEART RATE: 76 BPM | BODY MASS INDEX: 25.17 KG/M2 | OXYGEN SATURATION: 97 % | DIASTOLIC BLOOD PRESSURE: 70 MMHG | WEIGHT: 136.5 LBS | RESPIRATION RATE: 16 BRPM | TEMPERATURE: 98 F | SYSTOLIC BLOOD PRESSURE: 122 MMHG

## 2024-08-09 DIAGNOSIS — M72.2 PLANTAR FASCIITIS: Primary | ICD-10-CM

## 2024-08-09 DIAGNOSIS — R03.0 ELEVATED BLOOD PRESSURE READING WITHOUT DIAGNOSIS OF HYPERTENSION: ICD-10-CM

## 2024-08-09 PROCEDURE — 99213 OFFICE O/P EST LOW 20 MIN: CPT | Performed by: PHYSICIAN ASSISTANT

## 2024-08-09 ASSESSMENT — PAIN SCALES - GENERAL: PAINLEVEL: EXTREME PAIN (8)

## 2024-08-09 NOTE — PATIENT INSTRUCTIONS
I think this is plantar fasciitis.  I recommend a heel cup, home stretching, massage, ice and heat.  Keep walking frequently to build up the left leg muscles.    Follow-up if not improving.    Your blood pressure looks great.

## 2024-08-09 NOTE — PROGRESS NOTES
Assessment & Plan       ICD-10-CM    1. Plantar fasciitis  M72.2       2. Elevated blood pressure reading without diagnosis of hypertension  R03.0         1. Left foot pain most consistent with plantar fasciitis. She is likely stretching this area more than her body is accustomed to given that she was off this leg for 5 weeks. This explains the aching in other parts of the leg as well. I recommend a heel cup, avoidance of walking barefoot, massage, stretching, ice and heat. If not improving, she will contact the clinic or her PCP.    2. Blood pressure has been great, including home pressures, since her recent ED visit. No need for medications at this time.    Basilio Zuluaga is a 69 year old, presenting for the following health issues:  Musculoskeletal Problem        8/9/2024     4:12 PM   Additional Questions   Roomed by María     History of Present Illness       Reason for visit:  Foot pain left  Symptom onset:  3-7 days ago  Symptoms include:  Foot pain and swelling  Symptom intensity:  Moderate  Symptom progression:  Worsening  Had these symptoms before:  No  What makes it worse:  None  What makes it better:  NoneShe consumes 1 sweetened beverage(s) daily.She exercises with enough effort to increase her heart rate 10 to 19 minutes per day.  She exercises with enough effort to increase her heart rate 3 or less days per week.   She is taking medications regularly.       She had a tibial plateau fracture in June so was non-weight bearing on her left leg for 5 weeks but just started walking on it a few weeks ago. She was seen in the ED 2 weeks ago for some left chest/shoulder pain and high blood pressure and also had some increased left leg pain. Work up was reassuring including ruling out a DVT. Now, since Wednesday, she has noticed left heel pain and some swelling, worse with walking. She continues to notice some aching in the left calf and thigh as well. She denies any new injuries to the leg or any  warmth/redness. She has been taking Aleve and Tylenol with some benefit.    Her home blood pressures have been normal since her ED visit and normal again today in clinic.      Review of Systems  Constitutional, HEENT, cardiovascular, pulmonary, musculoskeletal, gi and gu systems are negative, except as otherwise noted.      Objective    /70   Pulse 76   Temp 98  F (36.7  C) (Temporal)   Resp 16   Wt 61.9 kg (136 lb 8 oz)   LMP 02/26/2008 (Approximate)   SpO2 97%   BMI 25.17 kg/m    Body mass index is 25.17 kg/m .  Physical Exam   GENERAL: alert and no distress  MS: no gross musculoskeletal defects noted, no edema. There is tenderness over the left lateral heel and plantar heel. No nodules or obvious swelling.   NEURO: Normal strength and tone, mentation intact and speech normal. Gait is antalgic.           Signed Electronically by: Didier Kingsley PA-C

## 2024-08-11 ENCOUNTER — MYC MEDICAL ADVICE (OUTPATIENT)
Dept: FAMILY MEDICINE | Facility: OTHER | Age: 69
End: 2024-08-11
Payer: COMMERCIAL

## 2024-08-19 ENCOUNTER — ANCILLARY PROCEDURE (OUTPATIENT)
Dept: GENERAL RADIOLOGY | Facility: CLINIC | Age: 69
End: 2024-08-19
Attending: FAMILY MEDICINE
Payer: COMMERCIAL

## 2024-08-19 ENCOUNTER — ANCILLARY PROCEDURE (OUTPATIENT)
Dept: ULTRASOUND IMAGING | Facility: CLINIC | Age: 69
End: 2024-08-19
Attending: FAMILY MEDICINE
Payer: COMMERCIAL

## 2024-08-19 ENCOUNTER — NURSE TRIAGE (OUTPATIENT)
Dept: FAMILY MEDICINE | Facility: CLINIC | Age: 69
End: 2024-08-19
Payer: COMMERCIAL

## 2024-08-19 ENCOUNTER — OFFICE VISIT (OUTPATIENT)
Dept: PEDIATRICS | Facility: CLINIC | Age: 69
End: 2024-08-19
Payer: COMMERCIAL

## 2024-08-19 VITALS
RESPIRATION RATE: 16 BRPM | OXYGEN SATURATION: 97 % | HEART RATE: 79 BPM | DIASTOLIC BLOOD PRESSURE: 69 MMHG | WEIGHT: 130 LBS | BODY MASS INDEX: 23.97 KG/M2 | TEMPERATURE: 97.9 F | SYSTOLIC BLOOD PRESSURE: 119 MMHG

## 2024-08-19 DIAGNOSIS — R60.0 BILATERAL LOWER EXTREMITY EDEMA: Primary | ICD-10-CM

## 2024-08-19 DIAGNOSIS — I87.2 EDEMA OF BOTH LOWER LEGS DUE TO PERIPHERAL VENOUS INSUFFICIENCY: ICD-10-CM

## 2024-08-19 DIAGNOSIS — I80.02 SUPERFICIAL PHLEBITIS OF LEG, LEFT: ICD-10-CM

## 2024-08-19 DIAGNOSIS — M79.672 LEFT FOOT PAIN: ICD-10-CM

## 2024-08-19 DIAGNOSIS — G89.29 CHRONIC PAIN OF LEFT KNEE: ICD-10-CM

## 2024-08-19 DIAGNOSIS — R60.0 EDEMA OF BOTH LOWER LEGS DUE TO PERIPHERAL VENOUS INSUFFICIENCY: ICD-10-CM

## 2024-08-19 DIAGNOSIS — M25.572 PAIN IN JOINT, ANKLE AND FOOT, LEFT: ICD-10-CM

## 2024-08-19 DIAGNOSIS — M25.562 CHRONIC PAIN OF LEFT KNEE: ICD-10-CM

## 2024-08-19 PROCEDURE — 93970 EXTREMITY STUDY: CPT | Performed by: RADIOLOGY

## 2024-08-19 PROCEDURE — 99214 OFFICE O/P EST MOD 30 MIN: CPT | Performed by: FAMILY MEDICINE

## 2024-08-19 PROCEDURE — 73610 X-RAY EXAM OF ANKLE: CPT | Mod: LT | Performed by: RADIOLOGY

## 2024-08-19 PROCEDURE — 73630 X-RAY EXAM OF FOOT: CPT | Mod: LT | Performed by: RADIOLOGY

## 2024-08-19 PROCEDURE — 73562 X-RAY EXAM OF KNEE 3: CPT | Mod: LT | Performed by: RADIOLOGY

## 2024-08-19 ASSESSMENT — PAIN SCALES - GENERAL: PAINLEVEL: EXTREME PAIN (9)

## 2024-08-19 NOTE — TELEPHONE ENCOUNTER
Nurse Triage SBAR    Is this a 2nd Level Triage? YES, LICENSED PRACTITIONER REVIEW IS REQUIRED    Situation: Patient is having swelling, redness and 8/10 pain in both legs. She is unable to walk because of this.    Background:   Pt recently had a visit 8/9/24 for foot pain, listed in the visit as plantar fasciitis. Patient noted she had a fall in June where she broke her left leg and some complications with a surgery last October that has caused numbness in right leg.Per the patient the pain in her leg has worsened since 8/9/24.    Assessment:  She notes both of her legs and feet have had severe swelling, redness that is warm to touch and pain.    Protocol Recommended Disposition:   Go to ED Now    Recommendation:   Gave care advice. Patient stated she would prefer an office visit if available as she does not have the money to go to the ED. Patient would prefer office visit with any provider if possible. Did not discuss ADS, but this could also potentially be an option. Routing to PCP for review and recommendations.    Routed to provider    Does the patient meet one of the following criteria for ADS visit consideration? 16+ years old, with an [facility] PCP     TIP  Providers, please consider if this condition is appropriate for management at one of our Acute and Diagnostic Services sites.     If patient is a good candidate, please use dotphrase <dot>triageresponse and select Refer to ADS to document.    Reason for Disposition   Unable to walk    Additional Information   Negative: Looks like a broken bone or dislocated joint (e.g., crooked or deformed)   Negative: Sounds like a life-threatening emergency to the triager   Negative: Followed a hip injury   Negative: Followed a knee injury   Negative: Followed an ankle or foot injury   Negative: Back pain radiating (shooting) into leg(s)   Negative: Foot pain is main symptom   Negative: Ankle pain is main symptom   Negative: Knee pain is main symptom   Negative: Leg  "swelling is main symptom   Negative: Chest pain   Negative: Difficulty breathing   Negative: Entire foot is cool or blue in comparison to other side    Answer Assessment - Initial Assessment Questions  1. ONSET: \"When did the pain start?\"       Has had pain in her legs, 8/9/24 started getting worse    2. LOCATION: \"Where is the pain located?\"       Both legs and feet, more extreme on the left    3. PAIN: \"How bad is the pain?\"    (Scale 1-10; or mild, moderate, severe)    -  MILD (1-3): doesn't interfere with normal activities     -  MODERATE (4-7): interferes with normal activities (e.g., work or school) or awakens from sleep, limping     -  SEVERE (8-10): excruciating pain, unable to do any normal activities, unable to walk      8/10    4. WORK OR EXERCISE: \"Has there been any recent work or exercise that involved this part of the body?\"       Some exercises for the foot that she was instructed to do at her clinic visit.     5. CAUSE: \"What do you think is causing the leg pain?\"      Walking? She is not sure, broke the left (6/11/24), the right she had nerve pain on the right and had surgery(10/23).    6. OTHER SYMPTOMS: \"Do you have any other symptoms?\" (e.g., chest pain, back pain, breathing difficulty, swelling, rash, fever, numbness, weakness)      Redness, swelling in both, numbness in right leg from groin to knee (injected with nerve block that has not resolved or gone away (from surgery 2/24) weakness/ unable to walk     7. PREGNANCY: \"Is there any chance you are pregnant?\" \"When was your last menstrual period?\"      N/A    Protocols used: Leg Pain-A-[place]    "

## 2024-08-19 NOTE — TELEPHONE ENCOUNTER
Spoke with patient.  Is willing to go to [facility] in [place] but questioning if cheaper than an ED visit.  I did inform that I couldn't answer that because I don't know what tests would/will be ordered,  level of visit, etc.  She states willing to go but wanting to speak with someone there before.  Made aware they would call her to set up appointment.  I did speak with Rina at [facility].  She states provider read note and they will see her.  They will call to schedule.  [employer] #.  Belkis MARSH RN

## 2024-08-19 NOTE — PROGRESS NOTES
[facility] Visit    Assessment & Plan   Problem List Items Addressed This Visit       Knee pain, left    Relevant Orders    XR Knee Left 3 Views (Completed)     Other Visit Diagnoses       Bilateral lower extremity edema    -  Primary    Relevant Orders    US Lower Extremity Venous Duplex Bilateral (Completed)    Physical Therapy  Referral    Left foot pain        Pain in joint, ankle and foot, left        Relevant Orders    XR Foot Left G/E 3 Views (Completed)    XR Ankle Left G/E 3 Views (Completed)    Physical Therapy  Referral    Edema of both lower legs due to peripheral venous insufficiency        Superficial phlebitis of leg, left                 Subjective   Margareth is a 69 year old, presenting for the following health issues:  Leg Swelling (Broke left leg in June, started walking again, went in to doc 8/9, told has planter fasciitis. Given exercises, using tape on foot. Left leg swollen and red, goes up and down, feels hot to touch in comparison to rest of body. )        8/9/2024     4:12 PM   Additional Questions   Roomed by María GARCIA     Edema/Swelling  Onset/Duration: 8/7/24  Description:   Location:  left leg and right leg , side of heel is very painful, throbbing right now  Associated redness: YES  Associated skin changes:  N/A  Associated pain:  YES- pain at 9 on a 0-10 pain scale right now  Progression of Symptoms:  worsening  Accompanying Signs & Symptoms:  Chest pain: No  Shortness of breath: No           Nausea or vomiting: No  Lightheadedness: No  Palpitations: No  Fever/Chills: No  Cough: No  History:   History of heart disease or heart failure: recent heart surgery for a murmur  History of sleep apnea: No  Tobacco use: No           Previous similar symptoms: no   Precipitating factors: broke left leg June 11th, out of wheel chair in 3rd week of July  Alleviating factors: taking naproxen, tylenol arthritis, mild relief   Therapies tried:  elevation, cold, and exercises for renee     69-year-old female presented to [facility] via nurse triage referral.  Patient complaining of bilateral leg swelling left more than right.  June 2024 patient sustained a left distal femur fracture which caused her to be in the wheelchair for 5 weeks.  She started walking again at the end of July 2024, and has been on her feet X 10 days.  Since then she has developed pain and swelling L >>R lower legs with left ankle, left foot & heel pain.  There is definitely more edema in the left lower leg from the knee down; with some redness around her past varicose veins in her left foot.  Patient was seen at [facility], was diagnosed planta fasciitis in the last week.  She has been taping her left foot, using ibuprofen and Tylenol with only partial relief.   No open wounds or paresthesia.  No new injury or fall..  Patient has a history of minimally invasive mitral valve repair October 20 6/2023, she is currently on aspirin but no other anticoagulants; no cardiac prostheses.  August 2023, she sustained a distal tibial laceration had multiple stitches and it has healed with some brownish skin discoloration.  Patient is currently walking with a cane.      See HPI.  No nausea/vomiting.  No fever/chills.  No chest pain/SOB.  No BM/urine problems.  No dizziness or headache or syncope.  Patient does have asthma but that is under control.          Objective    /69 (BP Location: Right arm, Patient Position: Sitting, Cuff Size: Adult Regular)   Pulse 79   Temp 97.9  F (36.6  C) (Oral)   Resp 16   Wt 59 kg (130 lb)   LMP 02/26/2008 (Approximate)   SpO2 97%   BMI 23.97 kg/m    Body mass index is 23.97 kg/m .  Physical Exam   GENERAL: alert and no distress, GCS 15, no cyanosis or accessory muscle use, afebrile, nonseptic, moist mucous membrane  HEENT:  normal, nose and mouth without ulcers or lesions, no adenopathy, no thyromegaly, neck supple  RESP: lungs clear to auscultation - no  rales, rhonchi or wheezes  CV: regular rate and rhythm, normal S1 S2, no S3 or S4, no murmur, click or rub  ABDOMEN: soft, nontender, no hepatosplenomegaly, no masses and bowel sounds normal, no CVA tenderness  MS: no gross musculoskeletal defects noted; left leg foot & ankle edema up to mid calf (2+) with varicosity and some superficial phlebitis noted at the medial aspect of the left foot (chronic according to patient) with no induration, no drainage, no ascending lymphangitis; 1+ edema R lower leg with some varicosity; no ulcers or open wound.  Normal DP/PT pulses bilaterally, 4-seconds capillary refill.  Mild bony tenderness medial and lateral malleolus with mild reduced range of motion of the left ankle due to edema.  Left second hammertoe with crossover deformity over the third toe.  Left knee:  no obvious deformity or redness or swelling or increased temp; mild crepitus on flexion and extension, no effusion, no ligament laxity, anterior drawer negative full range of motion.  Bilateral hips, right knee and right ankle and bilateral popliteal fossa examination normal  SKIN: see MS exam above; no other suspicious lesions or rashes  NEURO: Normal strength and tone, mentation intact and speech normal; no facial asymmetry or pronator drift, able to ambulate steadily with help of a cane on her right side  BACK: no CVA tenderness, no paralumbar tenderness    Results for orders placed or performed in visit on 08/19/24 (from the past 24 hour(s))   XR Knee Left 3 Views    Narrative    EXAM: XR KNEE LEFT 3 VIEWS  LOCATION: [facility]  DATE: 08/19/2024    INDICATION: Five weeks in wheelchair due to femur fracture, walking again x10 days, now pain and swelling L>R lower legs with left ankle, foot, and heel pain.  COMPARISON: 07/22/2024      Impression    IMPRESSION: Tricompartmental degenerative changes which are advanced in the medial compartment. There is no evidence of an acute fracture. Loose  body posteriorly. Faint chondrocalcinosis. No definitive joint effusion.   XR Ankle Left G/E 3 Views    Narrative    EXAM: XR ANKLE LEFT G/E 3 VIEWS, XR FOOT LEFT G/E 3 VIEWS  LOCATION: [facility]  DATE: 08/19/2024    INDICATION: Five weeks in wheelchair due to femur fracture, walking again x10 days, now pain and swelling L>R lower legs with left ankle, foot, and heel pain.  COMPARISON: None.      Impression    IMPRESSION: Postop changes 2nd toe PIP joint. Cross-over deformity 2nd and 3rd toes. No evidence of an acute left ankle or foot fracture. Ankle mortise is intact with soft tissue swelling. No Lisfranc subluxation.   XR Foot Left G/E 3 Views    Narrative    EXAM: XR ANKLE LEFT G/E 3 VIEWS, XR FOOT LEFT G/E 3 VIEWS  LOCATION: [facility]  DATE: 08/19/2024    INDICATION: Five weeks in wheelchair due to femur fracture, walking again x10 days, now pain and swelling L>R lower legs with left ankle, foot, and heel pain.  COMPARISON: None.      Impression    IMPRESSION: Postop changes 2nd toe PIP joint. Cross-over deformity 2nd and 3rd toes. No evidence of an acute left ankle or foot fracture. Ankle mortise is intact with soft tissue swelling. No Lisfranc subluxation.     *Note: Due to a large number of results and/or encounters for the requested time period, some results have not been displayed. A complete set of results can be found in Results Review.         Impression: Chronic edema bilateral lower legs due to peripheral venous insufficiency, left more than right, likely secondary to recent immobility as a sequela of left distal femur fracture which has since healed.  Superficial phlebitis left foot.  Tricompartmental degenerative osteoarthritis left knee.    PLAN: Patient is encouraged to use compression stockings bilateral lower legs.  Elevation encouraged when sitting down or lying down.  Quadriceps strengthening and leg strengthening exercises encouraged.   Offered podiatry and physical therapy referral and patient chose to start with physical therapy.  For the phlebitis and leg pain patient may use acetaminophen up to 1 g every 6 hourly as needed, naproxen 220mg or 250 mg p.o. Q12 hourly as needed.  Since patient has history of mitral valve repair, we will try to minimize NSAIDs use due to cardiovascular risks.  Patient to follow-up with primary care physician within the next few weeks, sooner if no improvement or new problems arise.  Warning signs and symptoms explained, to be seen ASAP if worsening.    Signed Electronically by: Eduardo Greenfield MD

## 2024-08-20 ENCOUNTER — MYC MEDICAL ADVICE (OUTPATIENT)
Dept: FAMILY MEDICINE | Facility: CLINIC | Age: 69
End: 2024-08-20
Payer: COMMERCIAL

## 2024-08-20 DIAGNOSIS — G47.09 OTHER INSOMNIA: Primary | ICD-10-CM

## 2024-08-21 ENCOUNTER — THERAPY VISIT (OUTPATIENT)
Dept: PHYSICAL THERAPY | Facility: CLINIC | Age: 69
End: 2024-08-21
Attending: FAMILY MEDICINE
Payer: COMMERCIAL

## 2024-08-21 DIAGNOSIS — M25.572 PAIN IN JOINT, ANKLE AND FOOT, LEFT: ICD-10-CM

## 2024-08-21 DIAGNOSIS — R60.0 BILATERAL LOWER EXTREMITY EDEMA: ICD-10-CM

## 2024-08-21 PROCEDURE — 97162 PT EVAL MOD COMPLEX 30 MIN: CPT | Mod: GP | Performed by: PHYSICAL THERAPIST

## 2024-08-21 PROCEDURE — 97110 THERAPEUTIC EXERCISES: CPT | Mod: GP | Performed by: PHYSICAL THERAPIST

## 2024-08-21 PROCEDURE — 97112 NEUROMUSCULAR REEDUCATION: CPT | Mod: GP | Performed by: PHYSICAL THERAPIST

## 2024-08-21 RX ORDER — TRAZODONE HYDROCHLORIDE 50 MG/1
50 TABLET, FILM COATED ORAL AT BEDTIME
Qty: 30 TABLET | Refills: 5 | Status: SHIPPED | OUTPATIENT
Start: 2024-08-21 | End: 2024-09-12 | Stop reason: SINTOL

## 2024-08-21 ASSESSMENT — ACTIVITIES OF DAILY LIVING (ADL)
LIGHT_TO_MODERATE_WORK: MODERATE DIFFICULTY
PUTTING_ON_SOCKS_AND_SHOES: EXTREME DIFFICULTY
ABILITY_TO_PARTICIPATE_IN_YOUR_DESIRED_SPORT_AS_LONG_AS_YOU_WOULD_LIKE: UNABLE TO DO
GOING_UP_OR_DOWN_10_STAIRS: QUITE A BIT OF DIFFICULTY
PLEASE_INDICATE_YOR_PRIMARY_REASON_FOR_REFERRAL_TO_THERAPY:: FOOT AND/OR ANKLE
SWELLING: THE SYMPTOM AFFECTS MY ACTIVITY SLIGHTLY
RUNNING_ONE_MILE: UNABLE TO DO
HEAVY_WORK: EXTREME DIFFICULTY
HOW_WOULD_YOU_RATE_YOUR_CURRENT_LEVEL_OF_FUNCTION?: SEVERELY ABNORMAL
PLEASE_INDICATE_YOR_PRIMARY_REASON_FOR_REFERRAL_TO_THERAPY:: KNEE
PAIN: THE SYMPTOM AFFECTS MY ACTIVITY SLIGHTLY
GETTING_INTO_AND_OUT_OF_A_BATHTUB: MODERATE DIFFICULTY
SPORTS_SCORE(%): 0
TWISTING/PIVOTING_ON_INVOLVED_LEG: SLIGHT DIFFICULTY
STAND: ACTIVITY IS FAIRLY DIFFICULT
SHOPPING: EXTREME DIFFICULTY OR UNABLE TO PERFORM ACTIVITY
HOS_ADL_SCORE(%): 45.59
WALKING_15_MINUTES_OR_GREATER: MODERATE DIFFICULTY
WALKING_DOWN_STEEP_HILLS: MODERATE DIFFICULTY
GOING UP 1 FLIGHT OF STAIRS: MODERATE DIFFICULTY
GETTING_INTO_AND_OUT_OF_A_BATH: MODERATE DIFFICULTY
LIMPING: THE SYMPTOM AFFECTS MY ACTIVITY MODERATELY
HOS_ADL_ITEM_SCORE_TOTAL: 31
GO DOWN STAIRS: ACTIVITY IS SOMEWHAT DIFFICULT
WALKING_INITIALLY: MODERATE DIFFICULTY
WEAKNESS: THE SYMPTOM AFFECTS MY ACTIVITY SLIGHTLY
TWISTING/PIVOTING ON INVOLVED LEG: SLIGHT DIFFICULTY
WALKING_FOR_APPROXIMATELY_10_MINUTES: MODERATE DIFFICULTY
HOW_WOULD_YOU_RATE_THE_CURRENT_FUNCTION_OF_YOUR_KNEE_DURING_YOUR_USUAL_DAILY_ACTIVITIES_ON_A_SCALE_FROM_0_TO_100_WITH_100_BEING_YOUR_LEVEL_OF_KNEE_FUNCTION_PRIOR_TO_YOUR_INJURY_AND_0_BEING_THE_INABILITY_TO_PERFORM_ANY_OF_YOUR_USUAL_DAILY_ACTIVITIES?: 75
STEPPING_UP_AND_DOWN_CURBS: MODERATE DIFFICULTY
PERFORMING_LIGHT_ACTIVITIES_AROUND_YOUR_HOME: MODERATE DIFFICULTY
KNEEL ON THE FRONT OF YOUR KNEE: ACTIVITY IS VERY DIFFICULT
KNEEL ON THE FRONT OF YOUR KNEE: ACTIVITY IS VERY DIFFICULT
ADL_COUNT: 17
RUNNING_ON_UNEVEN_GROUND: EXTREME DIFFICULTY OR UNABLE TO PERFORM ACTIVITY
SQUAT: ACTIVITY IS VERY DIFFICULT
KNEE_ACTIVITY_OF_DAILY_LIVING_SCORE: 48.57
GOING_DOWN_1_FLIGHT_OF_STAIRS: MODERATE DIFFICULTY
ROLLING_OVER_IN_BED: SLIGHT DIFFICULTY
SWINGING_OBJECTS_LIKE_A_GOLF_CLUB: UNABLE TO DO
ANY_OF_YOUR_USUAL_WORK,_HOUSEWORK_OR_SCHOOL_ACTIVITIES: MODERATE DIFFICULTY
GIVING WAY, BUCKLING OR SHIFTING OF KNEE: I HAVE THE SYMPTOM BUT IT DOES NOT AFFECT MY ACTIVITY
RAW_SCORE: 34
PUTTING_ON_YOUR_SHOES_OR_SOCKS: MODERATE DIFFICULTY
GO UP STAIRS: ACTIVITY IS SOMEWHAT DIFFICULT
PLEASE_INDICATE_YOR_PRIMARY_REASON_FOR_REFERRAL_TO_THERAPY:: HIP
STEPPING UP AND DOWN CURBS: MODERATE DIFFICULTY
MAKING_SHARP_TURNS_WHILE_RUNNING_FAST: EXTREME DIFFICULTY OR UNABLE TO PERFORM ACTIVITY
WALKING_2_BLOCKS: EXTREME DIFFICULTY OR UNABLE TO PERFORM ACTIVITY
AS_A_RESULT_OF_YOUR_KNEE_INJURY,_HOW_WOULD_YOU_RATE_YOUR_CURRENT_LEVEL_OF_DAILY_ACTIVITY?: ABNORMAL
STANDING FOR 15 MINUTES: MODERATE DIFFICULTY
GO UP STAIRS: ACTIVITY IS SOMEWHAT DIFFICULT
LEFS_SCORE(%): 0
RECREATIONAL ACTIVITIES: EXTREME DIFFICULTY
LIGHT_TO_MODERATE_WORK: MODERATE DIFFICULTY
JUMPING: UNABLE TO DO
RECREATIONAL_ACTIVITIES: EXTREME DIFFICULTY
LEFS_RAW_SCORE: 0
ADL_SCORE(%): 0
LANDING: EXTREME DIFFICULTY
LIMPING: THE SYMPTOM AFFECTS MY ACTIVITY MODERATELY
SIT WITH YOUR KNEE BENT: ACTIVITY IS FAIRLY DIFFICULT
WEAKNESS: THE SYMPTOM AFFECTS MY ACTIVITY SLIGHTLY
WALKING_15_MINUTES_OR_GREATER: MODERATE DIFFICULTY
LOW_IMPACT_ACTIVITIES_LIKE_FAST_WALKING: UNABLE TO DO
GETTING INTO AND OUT OF AN AVERAGE CAR: MODERATE DIFFICULTY
DEEP SQUATTING: UNABLE TO DO
YOUR_USUAL_HOBBIES,_RECREATIONAL_OR_SPORTING_ACTIVITIES: QUITE A BIT OF DIFFICULTY
STAND: ACTIVITY IS FAIRLY DIFFICULT
WALKING_APPROXIMATELY_10_MINUTES: MODERATE DIFFICULTY
GOING DOWN 1 FLIGHT OF STAIRS: MODERATE DIFFICULTY
SPORTS_COUNT: 9
HOW_WOULD_YOU_RATE_YOUR_CURRENT_LEVEL_OF_FUNCTION_DURING_YOUR_USUAL_ACTIVITIES_OF_DAILY_LIVING_FROM_0_TO_100_WITH_100_BEING_YOUR_LEVEL_OF_FUNCTION_PRIOR_TO_YOUR_HIP_PROBLEM_AND_0_BEING_THE_INABILITY_TO_PERFORM_ANY_OF_YOUR_USUAL_DAILY_ACTIVITIES?: 50
GIVING WAY, BUCKLING OR SHIFTING OF KNEE: I HAVE THE SYMPTOM BUT IT DOES NOT AFFECT MY ACTIVITY
LIFTING_AN_OBJECT,_LIKE_A_BAG_OF_GROCERIES_FROM_THE_FLOOR: MODERATE DIFFICULTY
GETTING_INTO_AND_OUT_OF_A_BATHTUB: MODERATE DIFFICULTY
SPORTS_TOTAL_ITEM_SCORE: 0
GETTING_INTO_OR_OUT_OF_A_CAR: MODERATE DIFFICULTY
SITTING_FOR_15_MINUTES: MODERATE DIFFICULTY
HOW_WOULD_YOU_RATE_THE_OVERALL_FUNCTION_OF_YOUR_KNEE_DURING_YOUR_USUAL_DAILY_ACTIVITIES?: ABNORMAL
SITTING FOR 15 MINUTES: MODERATE DIFFICULTY
HOW_WOULD_YOU_RATE_YOUR_CURRENT_LEVEL_OF_FUNCTION_DURING_YOUR_USUAL_ACTIVITIES_OF_DAILY_LIVING_FROM_0_TO_100_WITH_100_BEING_YOUR_LEVEL_OF_FUNCTION_PRIOR_TO_YOUR_HIP_PROBLEM_AND_0_BEING_THE_INABILITY_TO_PERFORM_ANY_OF_YOUR_USUAL_DAILY_ACTIVITIES?: 50
RISE FROM A CHAIR: ACTIVITY IS FAIRLY DIFFICULT
SWELLING: THE SYMPTOM AFFECTS MY ACTIVITY SLIGHTLY
WALKING_DOWN_STEEP_HILLS: MODERATE DIFFICULTY
SQUATTING: QUITE A BIT OF DIFFICULTY
STANDING_FOR_1_HOUR: EXTREME DIFFICULTY OR UNABLE TO PERFORM ACTIVITY
AS_A_RESULT_OF_YOUR_KNEE_INJURY,_HOW_WOULD_YOU_RATE_YOUR_CURRENT_LEVEL_OF_DAILY_ACTIVITY?: ABNORMAL
GOING_UP_1_FLIGHT_OF_STAIRS: MODERATE DIFFICULTY
ABILITY_TO_PERFORM_ACTIVITY_WITH_YOUR_NORMAL_TECHNIQUE: EXTREME DIFFICULTY
STARTING_AND_STOPPING_QUICKLY: EXTREME DIFFICULTY
HEAVY_WORK: EXTREME DIFFICULTY
RISE FROM A CHAIR: ACTIVITY IS FAIRLY DIFFICULT
WALKING_UP_STEEP_HILLS: EXTREME DIFFICULTY
PERFORMING_HEAVY_ACTIVITIES_AROUND_YOUR_HOME: QUITE A BIT OF DIFFICULTY
WALK: ACTIVITY IS FAIRLY DIFFICULT
SQUAT: ACTIVITY IS VERY DIFFICULT
ADL_HIGHEST_POTENTIAL_SCORE: 68
ADL_TOTAL_ITEM_SCORE: 0
SIT WITH YOUR KNEE BENT: ACTIVITY IS FAIRLY DIFFICULT
HOW_WOULD_YOU_RATE_THE_OVERALL_FUNCTION_OF_YOUR_KNEE_DURING_YOUR_USUAL_DAILY_ACTIVITIES?: ABNORMAL
HOW_WOULD_YOU_RATE_THE_CURRENT_FUNCTION_OF_YOUR_KNEE_DURING_YOUR_USUAL_DAILY_ACTIVITIES_ON_A_SCALE_FROM_0_TO_100_WITH_100_BEING_YOUR_LEVEL_OF_KNEE_FUNCTION_PRIOR_TO_YOUR_INJURY_AND_0_BEING_THE_INABILITY_TO_PERFORM_ANY_OF_YOUR_USUAL_DAILY_ACTIVITIES?: 75
PUTTING ON SOCKS AND SHOES: EXTREME DIFFICULTY
CUTTING/LATERAL_MOVEMENTS: EXTREME DIFFICULTY
WALKING_A_MILE: EXTREME DIFFICULTY OR UNABLE TO PERFORM ACTIVITY
KNEE_ACTIVITY_OF_DAILY_LIVING_SUM: 34
SPORTS_HIGHEST_POTENTIAL_SCORE: 36
STIFFNESS: THE SYMPTOM AFFECTS MY ACTIVITY SLIGHTLY
HOS_ADL_HIGHEST_POTENTIAL_SCORE: 68
WALKING_INITIALLY: MODERATE DIFFICULTY
PAIN: THE SYMPTOM AFFECTS MY ACTIVITY SLIGHTLY
STANDING_FOR_15_MINUTES: MODERATE DIFFICULTY
DEEP_SQUATTING: UNABLE TO DO
ROLLING_OVER_IN_BED: A LITTLE BIT OF DIFFICULTY
ROLLING OVER IN BED: SLIGHT DIFFICULTY
WALKING_UP_STEEP_HILLS: EXTREME DIFFICULTY
RUNNING_ON_EVEN_GROUND: EXTREME DIFFICULTY OR UNABLE TO PERFORM ACTIVITY
WALK: ACTIVITY IS FAIRLY DIFFICULT
GETTING_INTO_AND_OUT_OF_AN_AVERAGE_CAR: MODERATE DIFFICULTY
SITTING_FOR_1_HOUR: MODERATE DIFFICULTY
GO DOWN STAIRS: ACTIVITY IS SOMEWHAT DIFFICULT
STIFFNESS: THE SYMPTOM AFFECTS MY ACTIVITY SLIGHTLY
WALKING_BETWEEN_ROOMS: MODERATE DIFFICULTY
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
## 2024-08-23 ENCOUNTER — THERAPY VISIT (OUTPATIENT)
Dept: PHYSICAL THERAPY | Facility: CLINIC | Age: 69
End: 2024-08-23
Attending: FAMILY MEDICINE
Payer: COMMERCIAL

## 2024-08-23 DIAGNOSIS — M25.572 PAIN IN JOINT, ANKLE AND FOOT, LEFT: ICD-10-CM

## 2024-08-23 DIAGNOSIS — R60.0 BILATERAL LOWER EXTREMITY EDEMA: Primary | ICD-10-CM

## 2024-08-23 PROCEDURE — 97110 THERAPEUTIC EXERCISES: CPT | Mod: GP | Performed by: PHYSICAL THERAPIST

## 2024-08-27 ENCOUNTER — TELEPHONE (OUTPATIENT)
Dept: PULMONOLOGY | Facility: CLINIC | Age: 69
End: 2024-08-27
Payer: COMMERCIAL

## 2024-08-27 NOTE — TELEPHONE ENCOUNTER
[facility] Call Center    Phone Message    May a detailed message be left on voicemail: yes     Reason for Call: Other: Pt called to be added on Dr. Varner's wait list. She is currently scheduled on 10/18, she was previously scheduled 9/9 but due to change in provider's schedule, clinic called pt yesterday and pt rescheduled to 10/18. Dr. Varner's schedule template is showing 10/18 appt needs to be rescheduled since provider will be on vacation. Pt states that was not the case when she rescheduled 12 hrs ago. She states she is very frustrated by her care, especially since the next available appt is in Nov and pt was originally seeing Dr. Varner in Sept. Pt is not open to seeing any other PULM providers for the f/u. Please advise on scheduling, 10/18 is still active and needs to be rescheduled.     Action Taken: Other: PULM    Travel Screening: Not Applicable     Date of Service:

## 2024-08-28 ENCOUNTER — THERAPY VISIT (OUTPATIENT)
Dept: PHYSICAL THERAPY | Facility: CLINIC | Age: 69
End: 2024-08-28
Attending: FAMILY MEDICINE
Payer: COMMERCIAL

## 2024-08-28 DIAGNOSIS — M25.562 ACUTE PAIN OF LEFT KNEE: Primary | ICD-10-CM

## 2024-08-28 DIAGNOSIS — M79.604 PAIN OF RIGHT LOWER EXTREMITY: ICD-10-CM

## 2024-08-28 PROCEDURE — 97110 THERAPEUTIC EXERCISES: CPT | Mod: GP | Performed by: PHYSICAL THERAPIST

## 2024-08-28 PROCEDURE — 97140 MANUAL THERAPY 1/> REGIONS: CPT | Mod: GP | Performed by: PHYSICAL THERAPIST

## 2024-08-28 NOTE — TELEPHONE ENCOUNTER
Called patient and there was no answer.   Left message for patient to call back to reschedule.     Lisbeth GUZMAN RN, [facility] 08/28/24 1:12 PM

## 2024-09-03 ENCOUNTER — MYC MEDICAL ADVICE (OUTPATIENT)
Dept: FAMILY MEDICINE | Facility: CLINIC | Age: 69
End: 2024-09-03
Payer: COMMERCIAL

## 2024-09-04 ENCOUNTER — HOSPITAL ENCOUNTER (OUTPATIENT)
Dept: CT IMAGING | Facility: CLINIC | Age: 69
Discharge: HOME | End: 2024-09-04
Attending: INTERNAL MEDICINE | Admitting: INTERNAL MEDICINE
Payer: COMMERCIAL

## 2024-09-04 DIAGNOSIS — J98.59 MEDIASTINAL MASS: ICD-10-CM

## 2024-09-04 LAB
CREAT BLD-MCNC: 1 MG/DL (ref 0.5–1)
EGFRCR SERPLBLD CKD-EPI 2021: 60 ML/MIN/1.73M2

## 2024-09-04 PROCEDURE — 71260 CT THORAX DX C+: CPT

## 2024-09-04 PROCEDURE — 250N000011 HC RX IP 250 OP 636: Performed by: INTERNAL MEDICINE

## 2024-09-04 PROCEDURE — 82565 ASSAY OF CREATININE: CPT

## 2024-09-04 PROCEDURE — 250N000009 HC RX 250: Performed by: INTERNAL MEDICINE

## 2024-09-04 RX ORDER — IOPAMIDOL 755 MG/ML
500 INJECTION, SOLUTION INTRAVASCULAR ONCE
Status: COMPLETED | OUTPATIENT
Start: 2024-09-04 | End: 2024-09-04

## 2024-09-04 RX ADMIN — SODIUM CHLORIDE 60 ML: 9 INJECTION, SOLUTION INTRAVENOUS at 11:05

## 2024-09-04 RX ADMIN — IOPAMIDOL 65 ML: 755 INJECTION, SOLUTION INTRAVENOUS at 11:05

## 2024-09-04 NOTE — TELEPHONE ENCOUNTER
Patient called for earlier appt. PCP does have virtuals on 9/12 but patient has PT during that time. Wondering if PCP can work her in for a virtual visit on 9/9, 9/10, or 9/11. Is aware NP is out this week. Please call patient.     Does not want to see other provider, wants to see NP.

## 2024-09-04 NOTE — TELEPHONE ENCOUNTER
Talked with pt and scheduled her for 9/12 @ 1000 virtual with NP, next availability wasn't until 9/17 and pt said she would move other appt in order to be seen sooner. Added to waitlist for any last minute cancellations per pt request    Cristine Akers MA

## 2024-09-04 NOTE — TELEPHONE ENCOUNTER
I do not have any availability on those days. I am sorry.  Please help her find a time that will work for her  Caitlin Beasley PA-C

## 2024-09-05 ENCOUNTER — THERAPY VISIT (OUTPATIENT)
Dept: PHYSICAL THERAPY | Facility: CLINIC | Age: 69
End: 2024-09-05
Attending: FAMILY MEDICINE
Payer: COMMERCIAL

## 2024-09-05 DIAGNOSIS — R60.0 BILATERAL LOWER EXTREMITY EDEMA: Primary | ICD-10-CM

## 2024-09-05 DIAGNOSIS — M25.572 PAIN IN JOINT, ANKLE AND FOOT, LEFT: ICD-10-CM

## 2024-09-05 PROCEDURE — 97110 THERAPEUTIC EXERCISES: CPT | Mod: GP | Performed by: PHYSICAL THERAPIST

## 2024-09-05 PROCEDURE — 97140 MANUAL THERAPY 1/> REGIONS: CPT | Mod: GP | Performed by: PHYSICAL THERAPIST

## 2024-09-06 ENCOUNTER — THERAPY VISIT (OUTPATIENT)
Dept: PHYSICAL THERAPY | Facility: CLINIC | Age: 69
End: 2024-09-06
Attending: FAMILY MEDICINE
Payer: COMMERCIAL

## 2024-09-06 DIAGNOSIS — R60.0 BILATERAL LOWER EXTREMITY EDEMA: Primary | ICD-10-CM

## 2024-09-06 PROCEDURE — 97140 MANUAL THERAPY 1/> REGIONS: CPT | Mod: GP | Performed by: PHYSICAL THERAPIST

## 2024-09-06 PROCEDURE — 97112 NEUROMUSCULAR REEDUCATION: CPT | Mod: GP | Performed by: PHYSICAL THERAPIST

## 2024-09-10 ENCOUNTER — THERAPY VISIT (OUTPATIENT)
Dept: PHYSICAL THERAPY | Facility: CLINIC | Age: 69
End: 2024-09-10
Attending: FAMILY MEDICINE
Payer: COMMERCIAL

## 2024-09-10 DIAGNOSIS — R60.0 BILATERAL LOWER EXTREMITY EDEMA: Primary | ICD-10-CM

## 2024-09-10 PROCEDURE — 97110 THERAPEUTIC EXERCISES: CPT | Mod: GP | Performed by: PHYSICAL THERAPIST

## 2024-09-10 PROCEDURE — 97140 MANUAL THERAPY 1/> REGIONS: CPT | Mod: GP | Performed by: PHYSICAL THERAPIST

## 2024-09-11 ENCOUNTER — TELEPHONE (OUTPATIENT)
Dept: FAMILY MEDICINE | Facility: CLINIC | Age: 69
End: 2024-09-11
Payer: COMMERCIAL

## 2024-09-11 DIAGNOSIS — M54.2 NECK PAIN: Primary | ICD-10-CM

## 2024-09-11 DIAGNOSIS — G89.29 CHRONIC RIGHT SHOULDER PAIN: ICD-10-CM

## 2024-09-11 DIAGNOSIS — M25.511 CHRONIC RIGHT SHOULDER PAIN: ICD-10-CM

## 2024-09-11 ASSESSMENT — PATIENT HEALTH QUESTIONNAIRE - PHQ9
SUM OF ALL RESPONSES TO PHQ QUESTIONS 1-9: 12
10. IF YOU CHECKED OFF ANY PROBLEMS, HOW DIFFICULT HAVE THESE PROBLEMS MADE IT FOR YOU TO DO YOUR WORK, TAKE CARE OF THINGS AT HOME, OR GET ALONG WITH OTHER PEOPLE: SOMEWHAT DIFFICULT
SUM OF ALL RESPONSES TO PHQ QUESTIONS 1-9: 12

## 2024-09-11 ASSESSMENT — ASTHMA QUESTIONNAIRES
QUESTION_3 LAST FOUR WEEKS HOW OFTEN DID YOUR ASTHMA SYMPTOMS (WHEEZING, COUGHING, SHORTNESS OF BREATH, CHEST TIGHTNESS OR PAIN) WAKE YOU UP AT NIGHT OR EARLIER THAN USUAL IN THE MORNING: NOT AT ALL
QUESTION_2 LAST FOUR WEEKS HOW OFTEN HAVE YOU HAD SHORTNESS OF BREATH: ONCE OR TWICE A WEEK
QUESTION_1 LAST FOUR WEEKS HOW MUCH OF THE TIME DID YOUR ASTHMA KEEP YOU FROM GETTING AS MUCH DONE AT WORK, SCHOOL OR AT HOME: A LITTLE OF THE TIME
ACT_TOTALSCORE: 22
QUESTION_4 LAST FOUR WEEKS HOW OFTEN HAVE YOU USED YOUR RESCUE INHALER OR NEBULIZER MEDICATION (SUCH AS ALBUTEROL): ONCE A WEEK OR LESS
QUESTION_5 LAST FOUR WEEKS HOW WOULD YOU RATE YOUR ASTHMA CONTROL: COMPLETELY CONTROLLED
ACT_TOTALSCORE: 22

## 2024-09-11 ASSESSMENT — ANXIETY QUESTIONNAIRES
8. IF YOU CHECKED OFF ANY PROBLEMS, HOW DIFFICULT HAVE THESE MADE IT FOR YOU TO DO YOUR WORK, TAKE CARE OF THINGS AT HOME, OR GET ALONG WITH OTHER PEOPLE?: SOMEWHAT DIFFICULT
GAD7 TOTAL SCORE: 9
7. FEELING AFRAID AS IF SOMETHING AWFUL MIGHT HAPPEN: NOT AT ALL
GAD7 TOTAL SCORE: 9
GAD7 TOTAL SCORE: 9

## 2024-09-11 NOTE — TELEPHONE ENCOUNTER
----- Message from Joaquina Velasquez sent at 9/10/2024 12:22 PM CDT -----  Regarding: New order  Margareth Lr wanted me to update you on her progress with L lower leg following fracture    Decreased swelling, tightness in calf muscles and decreased calf strength and endurance. Working on ROM, endurance and started strengthening.     She is also wanting a PT order for her neck and R shoulder.     Let me know if you have any questions.     Thank you.     Joaquina Velasquez, PT, LAT, FPS  [email or web address]  [phone]

## 2024-09-12 ENCOUNTER — VIRTUAL VISIT (OUTPATIENT)
Dept: FAMILY MEDICINE | Facility: CLINIC | Age: 69
End: 2024-09-12
Payer: COMMERCIAL

## 2024-09-12 DIAGNOSIS — G47.09 OTHER INSOMNIA: ICD-10-CM

## 2024-09-12 DIAGNOSIS — M54.2 NECK PAIN: ICD-10-CM

## 2024-09-12 DIAGNOSIS — M81.0 OSTEOPOROSIS WITHOUT CURRENT PATHOLOGICAL FRACTURE, UNSPECIFIED OSTEOPOROSIS TYPE: Primary | ICD-10-CM

## 2024-09-12 DIAGNOSIS — F41.1 GAD (GENERALIZED ANXIETY DISORDER): ICD-10-CM

## 2024-09-12 DIAGNOSIS — M25.511 CHRONIC RIGHT SHOULDER PAIN: ICD-10-CM

## 2024-09-12 DIAGNOSIS — G89.29 CHRONIC RIGHT SHOULDER PAIN: ICD-10-CM

## 2024-09-12 DIAGNOSIS — Z12.31 ENCOUNTER FOR SCREENING MAMMOGRAM FOR BREAST CANCER: ICD-10-CM

## 2024-09-12 DIAGNOSIS — F33.1 MAJOR DEPRESSIVE DISORDER, RECURRENT EPISODE, MODERATE (H): ICD-10-CM

## 2024-09-12 PROCEDURE — 99214 OFFICE O/P EST MOD 30 MIN: CPT | Mod: 95 | Performed by: PHYSICIAN ASSISTANT

## 2024-09-12 PROCEDURE — G2211 COMPLEX E/M VISIT ADD ON: HCPCS | Mod: 95 | Performed by: PHYSICIAN ASSISTANT

## 2024-09-12 RX ORDER — SERTRALINE HYDROCHLORIDE 25 MG/1
25 TABLET, FILM COATED ORAL DAILY
Qty: 30 TABLET | Refills: 1 | Status: SHIPPED | OUTPATIENT
Start: 2024-09-12

## 2024-09-12 ASSESSMENT — ENCOUNTER SYMPTOMS
NERVOUS/ANXIOUS: 1
HEADACHES: 1

## 2024-09-12 NOTE — PROGRESS NOTES
Patient completed E-Check in. Questionnaires blown in and patient checked in without being called.     Margareth is a 69 year old who is being evaluated via a billable video visit.    How would you like to obtain your AVS? MyChart  If the video visit is dropped, the invitation should be resent by: Text to cell phone: [phone]  Will anyone else be joining your video visit? No      Assessment & Plan     Osteoporosis without current pathological fracture, unspecified osteoporosis type  Patient would like to repeat her bone density which is appropriate and was last done in 2021  - DEXA HIP/PELVIS/SPINE - Future; Future    TAMMY (generalized anxiety disorder)  Discussed use of medication, common side effects, how medication works and the fact that improvement in anticipated in 4-6 weeks.  She will use this in conjunction with her bupropion.  I was concerned that increasing bupropion would increase her anxiety even if for the short-term but is not advisable with her current level of anxiety  - sertraline (ZOLOFT) 25 MG tablet; Take 1 tablet (25 mg) by mouth daily.  We will plan to do a recheck in 1 month  Major depressive disorder, recurrent episode, moderate (H)  Discussed use of medication, common side effects, how medication works and the fact that improvement in anticipated in 4-6 weeks.  Bupropion is working well for her depression at this current dosage  - sertraline (ZOLOFT) 25 MG tablet; Take 1 tablet (25 mg) by mouth daily.    Encounter for screening mammogram for breast cancer  She will be called to schedule  - MA Screen Bilateral w/[facility]; Future    Chronic right shoulder pain  Neck pain  Physical therapy ordered through a different encounter earlier this week        Other insomnia  She has discontinued trazodone, she may use over-the-counter meds as needed and occasional amitriptyline though we did discuss potential side effects of this med as well      This chart documentation was completed in part with Cj  "voice recognition software.  Documentation is reviewed after completion, however, some words and grammatical errors may remain.  Caitlin Beasley PA-C    The longitudinal plan of care for the diagnosis(es)/condition(s) as documented were addressed during this visit. Due to the added complexity in care, I will continue to support Margareth in the subsequent management and with ongoing continuity of care.      Subjective   Margareth is a 69 year old, presenting for the following health issues:  Anxiety, Headache, and Dizziness      9/12/2024     7:20 AM   Additional Questions   Roomed by Rowena HADDAD   Accompanied by None         9/12/2024     7:20 AM   Patient Reported Additional Medications   Patient reports taking the following new medications NA     Video Start Time:  10:00am    Anxiety  Associated symptoms include headaches.   Headache     History of Present Illness       Mental Health Follow-up:  Patient presents to follow-up on Depression & Anxiety.Patient's depression since last visit has been:  Worse  The patient is having other symptoms associated with depression.  Patient's anxiety since last visit has been:  Worse  The patient is having other symptoms associated with anxiety.  Any significant life events: health concerns  Patient is not feeling anxious or having panic attacks.  Patient has no concerns about alcohol or drug use.    Headaches:   Since the patient's last clinic visit, headaches are: worsened  The patient is getting headaches:  Daily  She is able to do normal daily activities when she has a migraine.  The patient is taking the following rescue/relief medications:  Naproxyn (Aleve) and Tylenol   Patient states \"I get some relief\" from the rescue/relief medications.   The patient is taking the following medications to prevent migraines:  No medications to prevent migraines  In the past 4 weeks, the patient has gone to an Urgent Care or Emergency Room 1 time times due to headaches.    She eats 2-3 servings " of fruits and vegetables daily.She consumes 0 sweetened beverage(s) daily.She exercises with enough effort to increase her heart rate 9 or less minutes per day.  She exercises with enough effort to increase her heart rate 3 or less days per week.   She is taking medications regularly.     Patient has multiple concerns today.  She is seeing physical therapy to help with her persisting leg symptoms ever since her surgery and fracture.  She believes physical therapy is starting to help her.  Her nerve pain is bothersome today it makes her legs feel very heavy.  She feels that this is slowly improving.    She is having arm pain, neck pain and shoulder pain.  She did see Dr. Carrera for this in May.  She had an injection.  She had requested physical therapy from her therapist but was told she needed orders.  I had ordered that earlier this week based on a staff message sent by her physical therapist.  She was not aware of this and is eager to get back to therapy.  She wonders if she should see Dr. Carrera as well.    Trazodone does help her sleep but she is wondering if it is causing side effects.  With trazodone she will sleep approximately 6 hours.  She has noticed headache, dizziness, and loose stools.  In doing some research she thought that perhaps trazodone side effects may be what she is experiencing.  She did not take trazodone last night.  She slept okay she does take 2 Tylenol PM on occasion if she is not taking the trazodone.  She does feel like she is feeling better this morning less drowsy.  She feels most drowsy and dizzy though afternoon so we will see how she does this afternoon with that.  The dizziness she treats with meclizine it does help she describes it as being off balance or slightly lightheaded.  Historically she has taken amitriptyline at bedtime she still has some and wonders if she can use this on occasion we did review the side effect profile of this medication as well.    She has been very  anxious.  She is on bupropion 150 mg in the morning for depression and concentration.  She finds that that is helpful but wonders what else we can do for her anxiety for better control.      Review of Systems  Constitutional, HEENT, cardiovascular, pulmonary, gi and gu systems are negative, except as otherwise noted.      Objective           Vitals:  No vitals were obtained today due to virtual visit.    Physical Exam   GENERAL: alert and no distress  EYES: Eyes grossly normal to inspection.  No discharge or erythema, or obvious scleral/conjunctival abnormalities.  RESP: No audible wheeze, cough, or visible cyanosis.    SKIN: Visible skin clear. No significant rash, abnormal pigmentation or lesions.  NEURO: Cranial nerves grossly intact.  Mentation and speech appropriate for age.  PSYCH: Appropriate affect, tone, and pace of words    Hospital Outpatient Visit on 09/04/2024   Component Date Value Ref Range Status    Creatinine POCT 09/04/2024 1.0  0.5 - 1.0 mg/dL Final    GFR, ESTIMATED POCT 09/04/2024 60 (L)  >60 mL/min/1.73m2 Final         Video-Visit Details    Type of service:  Video Visit   Video End Time: 10:30 AM  Originating Location (pt. Location): Home    Distant Location (provider location):  Off-site  Platform used for Video Visit: Nikko  Signed Electronically by: Caitlin Beasley PA-C

## 2024-09-13 ENCOUNTER — OFFICE VISIT (OUTPATIENT)
Dept: PULMONOLOGY | Facility: CLINIC | Age: 69
End: 2024-09-13
Payer: COMMERCIAL

## 2024-09-13 VITALS — HEART RATE: 78 BPM | OXYGEN SATURATION: 97 % | SYSTOLIC BLOOD PRESSURE: 122 MMHG | DIASTOLIC BLOOD PRESSURE: 71 MMHG

## 2024-09-13 DIAGNOSIS — J45.30 MILD PERSISTENT ASTHMA, UNSPECIFIED WHETHER COMPLICATED: ICD-10-CM

## 2024-09-13 PROCEDURE — 99213 OFFICE O/P EST LOW 20 MIN: CPT | Performed by: INTERNAL MEDICINE

## 2024-09-13 ASSESSMENT — ASTHMA QUESTIONNAIRES: ACT_TOTALSCORE: 22

## 2024-09-13 NOTE — PROGRESS NOTES
[facility] Interstitial Lung Disease Clinic    Reason for Visit  Follow up visit post-COVID residual GGO and reticulation on CT scan.    HPI  69 year old female patient with past medical history significant for COVID19 in December 2021, at that time she was evaluated in the emergency department after 10-day history of COVID symptoms, her chest imaging showed bilateral left more than right groundglass opacities, diffuse and patchy most consistent with COVID-19 pneumonia, she tested positive for COVID-19, she was requiring oxygen support, and she was discharged from the emergency department after 2 days on 1 L/min O2 support at rest and 3 L/min with exertion.  Patient was treated with remdesivir and steroids with dexamethasone.  Patient has been following with her primary care provider, she was complaining of dyspnea on exertion.  Repeat chest CT scan PE study was done in May 2023, this showed no PE but it showed residual groundglass with reticulation at the same distribution where she had her COVID opacities in December 2021.  Patient was referred to pulmonary ILD clinic.  On exam she was noted to have systolic murmur, echocardiogram showed normal EF at 65 to 70% but she was noted to have severe mitral valve prolapse with severe mitral regurgitation and elevated RVSP 40 to 45 mmHg.  Heart cath was done, this showed normal coronary arteries with no significant disease.  The patient had a right mini thoracotomy with mitral valve repair.      The patient was last seen in the pulmonary clinic on 1/10/2024.  At that time I added Singulair given the sinus congestion and postnasal drip, the patient had headache after starting the Singulair, she thought this might be related to the medication versus sinusitis, but she decided to stop the Singulair, interestingly her headache resolved after cessation of the Singulair.  Her asthma seems to be very well-controlled.  She never uses her rescue inhaler.  She continues  to use the Dulera daily.  She has not needed any hospitalization or ED/UC evaluation, no recent steroids.  She had fracture in the lower extremity, and she has been dealing with numbness and weakness in the right lower extremity that she thinks it is a sequelae of her mitral valve surgery.  She has been working with PT, and she does not feel respiratory limitation with that.    Current Outpatient Medications   Medication Sig Dispense Refill    albuterol (PROAIR HFA/PROVENTIL HFA/VENTOLIN HFA) 108 (90 Base) MCG/ACT inhaler Inhale 2 puffs into the lungs every 6 hours as needed for shortness of breath, wheezing or cough      ALLEGRA 180 MG OR TABS Take 180 mg by mouth daily  30 0    amoxicillin (AMOXIL) 500 MG capsule Take 2000mg by mouth 30-60 minutes prior to dental appointment per infective endocarditis protocol due to mitral valve repair. 4 capsule 0    aspirin (ASA) 81 MG chewable tablet Take 1 tablet (81 mg) by mouth daily 90 tablet 3    buPROPion (WELLBUTRIN XL) 150 MG 24 hr tablet TAKE 1 TABLET EVERY MORNING 90 tablet 1    CALCIUM /VITAMIN D TABS   OR Take 1 tablet by mouth daily      fluticasone (FLONASE) 50 MCG/ACT nasal spray       MAGNESIUM PO 2 tablets in the am and 2 tablets in the pm      mometasone-formoterol (DULERA) 100-5 MCG/ACT inhaler Inhale 2 puffs into the lungs 2 times daily 39 g 3    multivitamin w/minerals (THERA-VIT-M) tablet Take 1 tablet by mouth daily RAW Brand      omeprazole (PRILOSEC) 20 MG DR capsule TAKE 1 CAPSULE DAILY 90 capsule 3    sertraline (ZOLOFT) 25 MG tablet Take 1 tablet (25 mg) by mouth daily. 30 tablet 1    vitamin C (ASCORBIC ACID) 250 MG tablet Take 250 mg by mouth daily       Current Facility-Administered Medications   Medication Dose Route Frequency Provider Last Rate Last Admin    2.0 mL bupivacaine (MARCAINE) 0.5% injection (50 mL vial)  2 mL      2 mL at 05/08/24 1231    2.0 mL bupivacaine (MARCAINE) 0.5% injection (50 mL vial)  2 mL      2 mL at 04/24/24 1150     lidocaine 1 % injection 2 mL  2 mL      2 mL at 05/08/24 1231    lidocaine 1 % injection 2 mL  2 mL      2 mL at 04/24/24 1150    triamcinolone (KENALOG-40) injection 40 mg  40 mg      40 mg at 05/08/24 1231    triamcinolone (KENALOG-40) injection 40 mg  40 mg      40 mg at 04/24/24 1150     No Active Allergies    Past Medical History:   Diagnosis Date    Abnormal Papanicolaou smear of vagina and vaginal HPV 12/2002    ASCUS (HPV neg). 3/03 WNL but no transitional zome - repeat 3months    Allergic rhinitis, cause unspecified     Arthritis     Backache, unspecified     Carpal tunnel syndrome     s/p repair    Cervicalgia     Chronic kidney disease, stage 3 (H) 07/14/2021    Depressive disorder     Depressive disorder, not elsewhere classified     doing well on Wellbutrin    Derangement of TMJ (temporomandibular joint) 03/2010    internal derangement    Dermatophytosis of nail     Diaphragmatic hernia without mention of obstruction or gangrene 09/2007    small hiatal hernia    Diffuse cystic mastopathy 2000    FCBD    Esophageal reflux 04/2005    Hammertoe 10/2009    see podiatry consult    Headache(784.0)     Muscle contrature headaches    Intramural leiomyoma of uterus 07/2007    on US    Irregular menstrual cycle     better with BCP -also PMS is better with BCP    IRRITABLE COLON 02/18/2008    Malaise and fatigue     Menopause     lmp 2/2008    Mild persistent asthma     Mitral valve disease     Mole (skin) 2003    9 x 6 mm rt ant chest    Motion sickness     Neuroma 11/2008    lt foot    Osteopenia 12/2008    Other malaise and fatigue 04/2005    stress    Personal history of tobacco use, presenting hazards to health     Quit in 1998    Solitary cyst of breast 12/2005    lt 6 oclock    Stenosis of lumbosacral spine     l4-5 and l5-s1    Symptomatic menopausal or female climacteric states 12/2002    perimenopausal. LMP 2/2008    Tachycardia, paroxysmal (H) 11/24/2020    Thyroid fullness 08/07/2013       Past  Surgical History:   Procedure Laterality Date    APPLY WOUND VAC Right 2/22/2024    Procedure: PLACEMENT OF RIGHT GROIN PREVENA WOUND VAC;  Surgeon: Brandon Hale MD;  Location:  OR    BIOPSY  4/10/2015    BL DUPLEX LO [facility] ART UNILAT/LTD  4/3008    lower extr arterial doppler -no stenosis    C DEXA,BONE DENSITY,APPENDICULR SKELTN  12/2008    osteopenia    COLONOSCOPY  10/22/2007    bx benign    COLONOSCOPY N/A 4/10/2015    Procedure: COMBINED COLONOSCOPY, SINGLE OR MULTIPLE BIOPSY/POLYPECTOMY BY BIOPSY;  Surgeon: Cl Wagner MD;  Location:  GI    CV CORONARY ANGIOGRAM N/A 8/17/2023    Procedure: Coronary Angiogram;  Surgeon: Liza Ford MD;  Location:  HEART CARDIAC CATH LAB    ESOPHAGOSCOPY, GASTROSCOPY, DUODENOSCOPY (EGD), COMBINED N/A 1/31/2018    Procedure: COMBINED ESOPHAGOSCOPY, GASTROSCOPY, DUODENOSCOPY (EGD);  COMBINED ESOPHAGOSCOPY, GASTROSCOPY, DUODENOSCOPY (EGD);  Surgeon: Tirso Duran MD;  Location:  GI    ESOPHAGOSCOPY, GASTROSCOPY, DUODENOSCOPY (EGD), COMBINED N/A 2/1/2023    Procedure: ESOPHAGOGASTRODUODENOSCOPY (EGD);  Surgeon: Lui Vences MD;  Location:  GI    EYE SURGERY      HC PART REMV BONE METATARSAL HEAD,EA  01/11/10    Right foot plantar plate tear. Also correct hammertoe, 2nd digit & varicose vein ligation, heel.    HC REVISE MEDIAN N/CARPAL TUNNEL SURG      Left in 1997, right in 1999    INCISION AND DRAINAGE LOWER EXTREMITY, COMBINED Right 2/22/2024    Procedure: Excision OF RIGHT GROIN SEROMA WITH LYMPHAZURIN BLUE INJECTION, Right groin exploration;  Surgeon: Brandon Hale MD;  Location:  OR    INJ, ANES/STER, FACET LUMB/SAC  2010    Left L5 nerve root injection    INJECT EPIDURAL CERVICAL Right 9/29/2022    Procedure: Cervical 7-Thoracic 1 Interlaminar epidural steroid injection using fluoroscopic guidance with contrast dye, Right;  Surgeon: Tyler Bay MD;  Location: [facility]    INJECT EPIDURAL TRANSFORAMINAL  10/09/2013    [facility]    INJECT JOINT SACROILIAC  3/19/14,14    [facility]    REPAIR VALVE MITRAL MINIMALLY INVASIVE N/A 10/26/2023    Procedure: MINIMALLY INVASIVE MITRAL VALVE REPAIR USING CHORD-X PRE-MEASURED LOOP SUTURE SIZE:12MM, MITRAL ANNULOPLASTY RING ERICK-COTTO PHYSIO Il SIZE: 28MM, AND ON CARDIOPULMONARY PUMP OXYGENATOR (INTRAOPERATIVE TRANSESOPHAGEAL ECHOCARDIOGRAM BY THE CARDIOLOGIST  );  Surgeon: Sabas Meredith MD;  Location:  OR    ZZC NONSPECIFIC PROCEDURE      Metatarsal phalangeal joint [facility]    [facility]  [facility]    bx. benign - flex sig in 5 yrs  or colonoscopy in 10 yrs    ZZ ECHO HEART XTHORACIC, STRESS/REST  2005    WNL    [facility]/O F-WAVE, EACH NERVE  2008    CTS       Social History     Socioeconomic History    Marital status:      Spouse name: Robel    Number of children: 1    Years of education: 12    Highest education level: Not on file   Occupational History    Occupation: [employer]     Employer: [employer]   Tobacco Use    Smoking status: Former     Current packs/day: 0.00     Average packs/day: 0.5 packs/day for 30.0 years (15.0 ttl pk-yrs)     Types: Cigarettes     Start date: 3/20/1968     Quit date: 3/20/1998     Years since quittin.5     Passive exposure: Never    Smokeless tobacco: Never    Tobacco comments:     No smokers in home   Vaping Use    Vaping status: Never Used   Substance and Sexual Activity    Alcohol use: Not Currently     Alcohol/week: 6.7 standard drinks of alcohol     Types: 7 Standard drinks or equivalent per week     Comment: 2x/week    Drug use: No    Sexual activity: Yes     Partners: Male     Birth control/protection: None     Comment: spouse -vas   Other Topics Concern     Service No    Blood Transfusions No    Caffeine Concern Yes     Comment: pop: 1c/wk    Occupational Exposure No    Hobby Hazards No    Sleep Concern Yes     Comment: On  meds    Stress Concern Yes    Weight Concern No    Special Diet No    Back Care Yes     Comment: Hx back pain    Exercise Yes     Comment: stretching qd    Bike Helmet No    Seat Belt Yes    Self-Exams Yes    Parent/sibling w/ CABG, MI or angioplasty before 65F 55M? Yes     Comment: Father & Brother.   Social History Narrative    Lives with spouse.. No domestic violence issues.     Social Determinants of Health     Financial Resource Strain: Unknown (12/21/2023)    Financial Resource Strain     Within the past 12 months, have you or your family members you live with been unable to get utilities (heat, electricity) when it was really needed?: Patient declined   Food Insecurity: Low Risk  (12/21/2023)    Food Insecurity     Within the past 12 months, did you worry that your food would run out before you got money to buy more?: No     Within the past 12 months, did the food you bought just not last and you didn t have money to get more?: Patient declined   Transportation Needs: Unknown (12/21/2023)    Transportation Needs     Within the past 12 months, has lack of transportation kept you from medical appointments, getting your medicines, non-medical meetings or appointments, work, or from getting things that you need?: Patient declined   Physical Activity: Not on file   Stress: Not on file   Social Connections: Not on file   Interpersonal Safety: Low Risk  (8/21/2024)    Interpersonal Safety     Do you feel physically and emotionally safe where you currently live?: Yes     Within the past 12 months, have you been hit, slapped, kicked or otherwise physically hurt by someone?: No     Within the past 12 months, have you been humiliated or emotionally abused in other ways by your partner or ex-partner?: No   Housing Stability: Unknown (12/21/2023)    Housing Stability     Do you have housing? : Patient declined     Are you worried about losing your housing?: Patient declined       Family History   Problem Relation Age of  Onset    Diabetes Father     Depression Father     Cerebrovascular Disease Mother         age 80    Arthritis Mother     Depression Mother         On meds    Eye Disorder Mother         Glaucoma    Osteoporosis Mother     Respiratory Mother          88 YO COPD    Chronic Obstructive Pulmonary Disease Mother     C.A.D. Brother         67 YO MI -    Chronic Obstructive Pulmonary Disease Brother     Myocardial Infarction Brother     Other Cancer Brother     Cancer Brother          Vitals: /71   Pulse 78   LMP 2008 (Approximate)   SpO2 97%     Exam:   GENERAL APPEARANCE: Well developed, well nourished, alert, and in no apparent distress.  HEENT: Normal oral mucus membranes, tongue and dentition. Normal nose.   RESP: good air flow throughout.  No crackles. No rhonchi. No wheezes.  CV: Normal S1, S2, regular rhythm, normal rate.  No murmur.  ABD: Round.  MS: extremities normal. No clubbing. No cyanosis.  SKIN: no rash on limited exam.  NEURO: Mentation intact, speech normal, normal gait and stance.  PSYCH: mentation appears normal. and affect normal/bright.    Results:  CBC on 2023 showed WBC 6.1, absolute eosinophils 600, on 2023, hemoglobin was normal at 13.5, and platelet count was normal at 238.  PFTs:  1/10/2024: Normal spirometry, normal lung volumes, normal DLCO.  Compared to 2023, there is significant increase in FEV1 and FVC.  6MWT: Normal 6MWD.  Significant desaturation but with no hypoxia.        Chest imaging:  Chest CT PE study 2021  IMPRESSION:  1.  Patchy bilateral prominent irregular groundglass consolidation  most consistent with an atypical infectious etiology such as  potentially COVID-19 pneumonia.  2.  No evidence for pulmonary embolism.  3.  Pulmonary nodule incidentally seen on the right. See below for  follow up.  4.  Age indeterminant T12 compression fracture deformity. It is new  since a prior CT abdomen from 3/16/2020.      CT chest PE study  5/31/2023  IMPRESSION:  1.  No pulmonary embolism.   2.  Mild residual groundglass and reticulation in the same distribution as pulmonary opacities on the December 2021 exam. 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 Recommend pulmonary consultation.  3.  Minimal bronchiolectasis.  4.  Stable 11 x 17 mm anterior mediastinal nodule or lymph node (thymoma not excluded). 12 month follow-up chest CT recommended.    TTE 6/1/2023  Interpretation Summary  1. Normal biventricular size and function. Left ventricular ejection fraction  of 65-70%.  2. No segmental wall motion abnormalities noted.  3. Severe mitral valve prolapse, posterior leaflet. There is severe (4+)  mitral regurgitation. EROA is 0.43 cm2 and Reg volume is 73 ml.  4. Elevated pulmonary artery systolic pressure of ~ 40-45 mmHg.  5. The left atrium is severely dilated.  Compared to the resting portion of the stress test in 2019, MR has increased  in severity and is now severe.    HRCT 9/6/2023  IMPRESSION: In this patient who had pulmonary changes of COVID 19 in  December 2021  1. The patient has interstitial changes consistent with sequela of  COVID 19. Compared to the December 6, 2021 study there has been  significant improvement.  2. Again noted the hepatic cyst and hemangioma in the liver.  3. Nonobstructing renal calculi.  4. Prominent mediastinal lymph nodes unchanged from prior studies.  5. Left atrial enlargement was present in 2021.  5. Sub-4 mm pulmonary nodules. In a low risk patient no further  follow-up recommended.    Chest CT scan without contrast on 1/5/2024  IMPRESSION:   1.  Left upper lobe nodules measuring the 0.3 cm. The repeat CT is  recommended in 6 months.  2.  Unchanged to improved additional pulmonary nodules seen  previously.  3.  Mild emphysema.  4.  Unchanged lower thoracic compression deformities.    Chest CT scan with contrast on 9/4/2024  IMPRESSION:   1.  Stable tiny nodules through both lungs. Some of the  previously  noted nodules are decreased or have resolved.  2.  No significant change in the anterior mediastinal soft tissue  nodule versus lymph node when compared to December 2021. There is no  other evidence for adenopathy through the chest.  3.  Mild peripheral interstitial thickening likely representing early  fibrosis.    Assessment and plan:   This is a 69 year old female patient who was referred to the interstitial lung disease clinic due to interstitial lung changes on her CT scans of the chest.  The patient has a history significant for COVID-19 infection in December 2021, this was complicated with acute hypoxemic respiratory failure requiring oxygen support with nasal cannula, did not require noninvasive mechanical ventilation or intubation.  ALICIA was borderline positive, CK was mildly elevated at 216, and total immunoglobulin G was mildly decreased at 590, unclear significance.  I reviewed with ILD MDD conference.  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    -I reviewed the chest CT scan, overall stable reticular changes, no significant evidence for advanced diffuse parenchymal lung disease.  On the most recent CT scan the reticular changes seem to be more prominent, there are no new reticular changes and new areas, most likely this is related to the contrast.  -I will follow-up with the patient in 6 months with repeat PFT, I will do chest imaging studies with high-resolution chest CT scan if there is change in symptoms or decline in PFT indices.  -I recommended vaccination, I recommended COVID-19 booster, I also recommended RSV vaccine and annual influenza vaccine.      Hilar and mediastinal lymphadenopathy, anterior mediastinal nodule, stable from chest CT scan December 2021  Left upper lobe nodule 11 mm, improving, with multiple bilateral sub-4 mm lung  nodules    -All changes are stable or decreasing in size.  The anterior mediastinal lymph node is stable.  No indication for further imaging or workup at this point.      Low level of IgG, unclear significance    -Monitor clinically, if she has recurrent upper respiratory tract or lower respiratory tract infections will consider repeating the immunoglobulin level     Asthma        6/11/2024     9:57 AM 9/11/2024    10:57 PM 9/13/2024    11:38 AM   ACT Total Scores   ACT TOTAL SCORE (Goal Greater than or Equal to 20) 23 22 22   In the past 12 months, how many times did you visit the emergency room for your asthma without being admitted to the hospital? 0 0 0   In the past 12 months, how many times were you hospitalized overnight because of your asthma? 0 0 0         -Asthma is more stable, I will stepdown her therapy to as needed Dulera, this will be used in the form of SMART therapy with the formoterol, and I instructed the patient to contact the clinic if she has increased asthma symptoms with this change then we will go back to the scheduled Dulera.      Follow-up in 6-month with repeat PFT        Chart documentation was completed, in part, with Secure[employer] voice-recognition software. Even though reviewed, some grammatical, spelling, and word errors may remain.    I spent 25 minutes reviewing chart, reviewing test results, talking with and examining patient, formulating plan, and documentation on the day of the encounter.    Khanh Varner MD   Pulmonary and Critical Care

## 2024-09-13 NOTE — PATIENT INSTRUCTIONS
Use the Dulera as needed instead of scheduled   I recommend COVID vaccine, influenza vaccine and RSV vaccine  Follow up in 6 months with PFT

## 2024-09-20 ENCOUNTER — THERAPY VISIT (OUTPATIENT)
Dept: PHYSICAL THERAPY | Facility: CLINIC | Age: 69
End: 2024-09-20
Attending: FAMILY MEDICINE
Payer: COMMERCIAL

## 2024-09-20 DIAGNOSIS — R60.0 BILATERAL LOWER EXTREMITY EDEMA: Primary | ICD-10-CM

## 2024-09-20 PROCEDURE — 97112 NEUROMUSCULAR REEDUCATION: CPT | Mod: GP | Performed by: PHYSICAL THERAPIST

## 2024-09-20 PROCEDURE — 97140 MANUAL THERAPY 1/> REGIONS: CPT | Mod: GP | Performed by: PHYSICAL THERAPIST

## 2024-09-24 ENCOUNTER — THERAPY VISIT (OUTPATIENT)
Dept: PHYSICAL THERAPY | Facility: CLINIC | Age: 69
End: 2024-09-24
Attending: FAMILY MEDICINE
Payer: COMMERCIAL

## 2024-09-24 DIAGNOSIS — G89.29 CHRONIC RIGHT SHOULDER PAIN: ICD-10-CM

## 2024-09-24 DIAGNOSIS — M25.511 CHRONIC RIGHT SHOULDER PAIN: ICD-10-CM

## 2024-09-24 DIAGNOSIS — M54.2 NECK PAIN: Primary | ICD-10-CM

## 2024-09-24 PROCEDURE — 97112 NEUROMUSCULAR REEDUCATION: CPT | Mod: GP | Performed by: PHYSICAL THERAPIST

## 2024-09-24 PROCEDURE — 97161 PT EVAL LOW COMPLEX 20 MIN: CPT | Mod: GP | Performed by: PHYSICAL THERAPIST

## 2024-09-24 NOTE — PROGRESS NOTES
PHYSICAL THERAPY EVALUATION  Type of Visit: Evaluation              Subjective       Presenting condition or subjective complaint:  Referred to PT for cervical pain and R shoulder pain. Currently in PT for L proximal fibular fracture. Has had multiple injuries in the past: fell a few yrs ago and hit her head, many yrs ago was water skiing and hit water, fell ice skating and landed on her mid back. Approximately 5 years ago hit her head and had a CT for her neck at that time. Past treatment for neck and shoulder: injection c 1 yr ago with short term benefit, PT. Thinks her neck and arm symptoms were worsened with the fall she had as well as using arms to propel her WC. Currently working on cervical retraction and uses ice. Finding it difficult to wash under arms, applying deodorant and reaching overhead.    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 on L, diaphragmatic hernia, depressive disorder, back pain, history headache, mitral valve disease,   Dates & types of surgery:  hammertoe surgery L 2nd toe, 2-: incision and drainage of the R groin following catheter for heart, 10-26-23: mitral valve repair     Prior diagnostic imaging/testing results:       Prior therapy history for the same diagnosis, illness or injury:    See above    Prior Level of Function  Transfers: Independent  Ambulation: Independent  ADL: Independent  IADL: Driving, Housekeeping, Laundry, Meal preparation, Medication management, Yard work    Living Environment  Social support:     Type of home:     Stairs to enter the home:         Ramp:     Stairs inside the home:         Help at home:    Equipment owned:       Employment:      Hobbies/Interests:      Patient goals for therapy:  no pain medications for neck or shoulder pain, wash windows and floors, vacuum and over head  work       Objective   CERVICAL SPINE EVALUATION  INTEGUMENTARY (edema, incisions): WNL  POSTURE:  forward head and shoulders, increased thoracic kyphosis and increased cervical lordosis with increased curvature at CT junction.   Work mechanical stresses: overhead reach, pushing, pulling   Leisure mechanical stresses: reading, gardening, walking/shopping: static posture, lift, pulling, lifting pushing  Functional disability score (NDI):  33.33%  VAS score (0-10):   Currently: 8/10, 6/10 increasing to 10/10    ADDITIONAL HISTORY:  Present symptoms:  along spine, upper trap, anterior shoulder, medial aspect humerus, medial forearm, R hyopthenar emince and into digits 3-5 - similar numbness. L arm can have symptoms: anterior shoulder, tightness L lateral neck, no hand or arm symptoms, sching between shoulder and elbow.   Pain quality: Aching and Numb  Paresthesia (yes/no):  yes    Symptoms (improving/unchanging/worsening):  unchanging  Symptoms commenced as a result of: multiple injuries in past and thinks might have increased with recent fall.    Condition occurred in the following environment:  most recent fall    Symptoms at onset (neck/arm/forearm/headache): Headache and neck and arm pain with pushing WC x 5 weeks  Constant symptoms (neck/arm/forearm/headache): neck, arm and hand R  Intermittent symptoms (neck/arm/forearm/headache): L arm - humerus    Symptoms are made worse with the following: Sometimes Bending, Always Turning, Sometimes Rising, time of day - Always as the day progresses, and Sometimes On the move   Symptoms are made better with the following: Sometimes When still, ice, sitting/not moving Always.     Disturbed sleep (yes/no): no  Number of pillows: 1 with roll inside pillow and this helps x 1 weeks    Previous episodes (0/1-5/6-10/11+): 10+ years     Specific Questions: (as reported by the patient)  Dizziness/Tinnitus/Nausea/Swallowing (pos/neg): dizziness - worse since fall, intermittent,  improving, off balance feel  Gait/Upper Limbs (normal/abnormal): abnormal - does not use R arm - intentional  Unexplained weight loss (yes/no): no      Postural Observation:   Sitting: Slump  Protruded head: Yes Lateral deviation:  Right. Relevant: Yes      Movement Loss:   Bipin Mod Min Nil Symptoms   Protrusion   X  No effect   Flexion    X No effect R deviation   Retraction   X  No effect   Extension  X   No effect   Lateral flexion R  X   No effect   Lateral flexion L  X   No effect   Rotation R X    No effect   Rotation L  X   No effect     Test Movements:   During: produces, abolishes, increases, decreases, no effect, centralizing, peripheralizing  After: better, worse, no better, no worse, no effect, centralized, peripheralized    Symptomatic response No effect    During testing After testing     Pretest symptoms sitting: Neck: 7/10  R arm: 7/10, L arm 0/10, HA: 6/10 in frontal area     Rep PRO No Effect    Better   Head X6 , X 6 no change     Rep RET No Effect Better x 6     Rep RET EXT No Effect No Effect x6   X 6 with PT assist better neck and shoulder            FLEXIBILITY:  overall decreased neck    SPECIAL TESTS: Negaitve Spurling's Test, ALar Ligament test, Transverse ligament test, distraction test    Assessment & Plan   CLINICAL IMPRESSIONS  Medical Diagnosis: Neck pain (M54.2)  - Primary    Chronic right shoulder pain (M25.511, G89.29)    Treatment Diagnosis: Neck pain (M54.2)  - Primary    Chronic right shoulder pain (M25.511, G89.29)   Impression/Assessment: Patient is a 69 year old female with limited ability to use R arm for cleaning, reaching overhead, decreased neck ROM complaints.  The following significant findings have been identified: Pain, Decreased ROM/flexibility, Decreased joint mobility, Impaired gait, Impaired muscle performance, Decreased activity tolerance, Impaired posture, and Dizziness. These impairments interfere with their ability to perform self care tasks, recreational  activities, household chores, and driving  as compared to previous level of function.     Clinical Decision Making (Complexity):  Clinical Presentation: Stable/Uncomplicated  Clinical Presentation Rationale: based on medical and personal factors listed in PT evaluation  Clinical Decision Making (Complexity): Low complexity    PLAN OF CARE  Treatment Interventions:  Modalities:  as needed for pain and to improve circulation for muscle relaxation  Interventions: Manual Therapy, Neuromuscular Re-education, Therapeutic Activity, Therapeutic Exercise, Self-Care/Home Management    Long Term Goals     PT Goal 6  Goal Identifier: Symptoms  Goal Description: Margareth will be able to redue medication for neck and R shoulder pain x 50%  Target Date: 10/25/24  PT Goal 7  Goal Identifier: Activities  Goal Description: Margareth will reach over head, complete repeated movements such as vacuuming, washing windows and floors without symptoms increase.  Rationale: to maximize safety and independence with performance of ADLs and functional tasks  Target Date: 10/25/24      Frequency of Treatment: 2 times per week  Duration of Treatment: 6 weeks    Recommended Referrals to Other Professionals:  no needs at this time.   Education Assessment:   Learner/Method: Patient;Listening;No Barriers to Learning  Education Comments: continue  with the cervical retraction and work on scapular sets.    Risks and benefits of evaluation/treatment have been explained.   Patient/Family/caregiver agrees with Plan of Care.     Evaluation Time:     PT Eval, Low Complexity Minutes (25864): 45   Present: Not applicable     Signing Clinician: Joaquina Velasquez, PT        [facility]                                                                                   OUTPATIENT PHYSICAL THERAPY      PLAN OF TREATMENT FOR OUTPATIENT REHABILITATION   Patient's Last Name, First Name, MARCYOLANDA  Margareth Green YOB: 1955    Provider's Name   [facility]   Medical Record No.  [id number]     Onset Date: 06/10/24 (chronic for 10+ years with exacerbation after falling and fracturing her L fibula.)  Start of Care Date: 09/24/24     Medical Diagnosis:  Neck pain (M54.2)  - Primary    Chronic right shoulder pain (M25.511, G89.29)      PT Treatment Diagnosis:  Neck pain (M54.2)  - Primary    Chronic right shoulder pain (M25.511, G89.29) Plan of Treatment  Frequency/Duration: 2 times per week/ 6 weeks    Certification date from 09/24/24 to 11/05/24         See note for plan of treatment details and functional goals     Joaquina Velasquez, PT                         I CERTIFY THE NEED FOR THESE SERVICES FURNISHED UNDER        THIS PLAN OF TREATMENT AND WHILE UNDER MY CARE     (Physician attestation of this document indicates review and certification of the therapy plan).              Referring Provider:  Caitlin RUELAS    Initial Assessment  See Epic Evaluation- Start of Care Date: 09/24/24

## 2024-09-26 ENCOUNTER — HOSPITAL ENCOUNTER (OUTPATIENT)
Dept: MAMMOGRAPHY | Facility: CLINIC | Age: 69
Discharge: HOME | End: 2024-09-26
Attending: PHYSICIAN ASSISTANT
Payer: COMMERCIAL

## 2024-09-26 ENCOUNTER — HOSPITAL ENCOUNTER (OUTPATIENT)
Dept: BONE DENSITY | Facility: CLINIC | Age: 69
Discharge: HOME | End: 2024-09-26
Attending: PHYSICIAN ASSISTANT
Payer: COMMERCIAL

## 2024-09-26 ENCOUNTER — THERAPY VISIT (OUTPATIENT)
Dept: PHYSICAL THERAPY | Facility: CLINIC | Age: 69
End: 2024-09-26
Attending: FAMILY MEDICINE
Payer: COMMERCIAL

## 2024-09-26 DIAGNOSIS — M25.511 CHRONIC RIGHT SHOULDER PAIN: ICD-10-CM

## 2024-09-26 DIAGNOSIS — Z12.31 ENCOUNTER FOR SCREENING MAMMOGRAM FOR BREAST CANCER: ICD-10-CM

## 2024-09-26 DIAGNOSIS — R60.0 BILATERAL LOWER EXTREMITY EDEMA: Primary | ICD-10-CM

## 2024-09-26 DIAGNOSIS — M81.0 OSTEOPOROSIS WITHOUT CURRENT PATHOLOGICAL FRACTURE, UNSPECIFIED OSTEOPOROSIS TYPE: ICD-10-CM

## 2024-09-26 DIAGNOSIS — G89.29 CHRONIC RIGHT SHOULDER PAIN: ICD-10-CM

## 2024-09-26 PROCEDURE — 77063 BREAST TOMOSYNTHESIS BI: CPT

## 2024-09-26 PROCEDURE — 77081 DXA BONE DENSITY APPENDICULR: CPT

## 2024-09-26 PROCEDURE — 97110 THERAPEUTIC EXERCISES: CPT | Mod: GP | Performed by: PHYSICAL THERAPIST

## 2024-09-26 PROCEDURE — 97140 MANUAL THERAPY 1/> REGIONS: CPT | Mod: GP | Performed by: PHYSICAL THERAPIST

## 2024-09-26 NOTE — PROGRESS NOTES
Appropriate assistive devices provided during their visit. na (Yes, No, N/A) na (list device)    Exam table and/or cart  placed in the lowest position. yes (Yes, No, N/A)    Brakes on tables/carts/wheelchairs used at all times. na (Yes, No, N/A)    Non slip footwear applied. na (Yes, No, NA)    Patient was accompanied by staff throughout visit. yes (Yes, No, N/A)    Equipment safety straps used. na (Yes, No, N/A)    Assist with toileting. na (Yes, No, N/A)

## 2024-10-01 ENCOUNTER — THERAPY VISIT (OUTPATIENT)
Dept: PHYSICAL THERAPY | Facility: CLINIC | Age: 69
End: 2024-10-01
Attending: FAMILY MEDICINE
Payer: COMMERCIAL

## 2024-10-01 DIAGNOSIS — M54.2 NECK PAIN: Primary | ICD-10-CM

## 2024-10-01 PROCEDURE — 97140 MANUAL THERAPY 1/> REGIONS: CPT | Mod: GP | Performed by: PHYSICAL THERAPIST

## 2024-10-01 PROCEDURE — 97110 THERAPEUTIC EXERCISES: CPT | Mod: GP | Performed by: PHYSICAL THERAPIST

## 2024-10-02 ENCOUNTER — PATIENT OUTREACH (OUTPATIENT)
Dept: CARE COORDINATION | Facility: CLINIC | Age: 69
End: 2024-10-02
Payer: COMMERCIAL

## 2024-10-04 ENCOUNTER — THERAPY VISIT (OUTPATIENT)
Dept: PHYSICAL THERAPY | Facility: CLINIC | Age: 69
End: 2024-10-04
Attending: FAMILY MEDICINE
Payer: COMMERCIAL

## 2024-10-04 DIAGNOSIS — R60.0 BILATERAL LOWER EXTREMITY EDEMA: ICD-10-CM

## 2024-10-04 DIAGNOSIS — M25.572 PAIN IN JOINT, ANKLE AND FOOT, LEFT: ICD-10-CM

## 2024-10-04 DIAGNOSIS — G89.29 CHRONIC RIGHT SHOULDER PAIN: ICD-10-CM

## 2024-10-04 DIAGNOSIS — M25.511 CHRONIC RIGHT SHOULDER PAIN: ICD-10-CM

## 2024-10-04 DIAGNOSIS — M54.2 NECK PAIN: Primary | ICD-10-CM

## 2024-10-04 PROCEDURE — 97140 MANUAL THERAPY 1/> REGIONS: CPT | Mod: GP | Performed by: PHYSICAL THERAPIST

## 2024-10-04 PROCEDURE — 97112 NEUROMUSCULAR REEDUCATION: CPT | Mod: GP | Performed by: PHYSICAL THERAPIST

## 2024-10-07 ENCOUNTER — MYC MEDICAL ADVICE (OUTPATIENT)
Dept: CARDIOLOGY | Facility: CLINIC | Age: 69
End: 2024-10-07
Payer: COMMERCIAL

## 2024-10-07 DIAGNOSIS — Z98.890 S/P MITRAL VALVE REPAIR: ICD-10-CM

## 2024-10-07 DIAGNOSIS — Z29.89 NEED FOR SBE (SUBACUTE BACTERIAL ENDOCARDITIS) PROPHYLAXIS: ICD-10-CM

## 2024-10-07 DIAGNOSIS — I34.0 NONRHEUMATIC MITRAL VALVE REGURGITATION: ICD-10-CM

## 2024-10-08 RX ORDER — AMOXICILLIN 500 MG/1
CAPSULE ORAL
Qty: 4 CAPSULE | Refills: 0 | Status: SHIPPED | OUTPATIENT
Start: 2024-10-08 | End: 2024-10-24

## 2024-10-08 NOTE — TELEPHONE ENCOUNTER
[facility] Call Center    Phone Message    May a detailed message be left on voicemail: yes     Reason for Call: Medication Refill Request    Has the patient contacted the pharmacy for the refill? Yes   Name of medication being requested: Amoxicillin  Provider who prescribed the medication: Dr Mathews   Pharmacy:    [address]    Date medication is needed: ASAP Today        Action Taken: Other: Cardiology    Travel Screening: Not Applicable  Thank you!  [facility]       Date of Service:

## 2024-10-09 ENCOUNTER — THERAPY VISIT (OUTPATIENT)
Dept: PHYSICAL THERAPY | Facility: CLINIC | Age: 69
End: 2024-10-09
Attending: FAMILY MEDICINE
Payer: COMMERCIAL

## 2024-10-09 DIAGNOSIS — G89.29 CHRONIC RIGHT SHOULDER PAIN: Primary | ICD-10-CM

## 2024-10-09 DIAGNOSIS — M25.511 CHRONIC RIGHT SHOULDER PAIN: Primary | ICD-10-CM

## 2024-10-09 PROCEDURE — 97140 MANUAL THERAPY 1/> REGIONS: CPT | Mod: GP | Performed by: PHYSICAL THERAPIST

## 2024-10-09 PROCEDURE — 97112 NEUROMUSCULAR REEDUCATION: CPT | Mod: GP | Performed by: PHYSICAL THERAPIST

## 2024-10-11 ENCOUNTER — THERAPY VISIT (OUTPATIENT)
Dept: PHYSICAL THERAPY | Facility: CLINIC | Age: 69
End: 2024-10-11
Attending: FAMILY MEDICINE
Payer: COMMERCIAL

## 2024-10-11 DIAGNOSIS — M25.511 CHRONIC RIGHT SHOULDER PAIN: Primary | ICD-10-CM

## 2024-10-11 DIAGNOSIS — M54.2 NECK PAIN: ICD-10-CM

## 2024-10-11 DIAGNOSIS — R60.0 BILATERAL LOWER EXTREMITY EDEMA: ICD-10-CM

## 2024-10-11 DIAGNOSIS — G89.29 CHRONIC RIGHT SHOULDER PAIN: Primary | ICD-10-CM

## 2024-10-11 DIAGNOSIS — M25.572 PAIN IN JOINT, ANKLE AND FOOT, LEFT: ICD-10-CM

## 2024-10-11 PROCEDURE — 97140 MANUAL THERAPY 1/> REGIONS: CPT | Mod: GP | Performed by: PHYSICAL THERAPIST

## 2024-10-13 ENCOUNTER — MYC MEDICAL ADVICE (OUTPATIENT)
Dept: FAMILY MEDICINE | Facility: CLINIC | Age: 69
End: 2024-10-13
Payer: COMMERCIAL

## 2024-10-15 ENCOUNTER — THERAPY VISIT (OUTPATIENT)
Dept: PHYSICAL THERAPY | Facility: CLINIC | Age: 69
End: 2024-10-15
Attending: FAMILY MEDICINE
Payer: COMMERCIAL

## 2024-10-15 DIAGNOSIS — G89.29 CHRONIC RIGHT SHOULDER PAIN: Primary | ICD-10-CM

## 2024-10-15 DIAGNOSIS — M25.511 CHRONIC RIGHT SHOULDER PAIN: Primary | ICD-10-CM

## 2024-10-15 PROBLEM — R60.0 BILATERAL LOWER EXTREMITY EDEMA: Status: RESOLVED | Noted: 2024-09-05 | Resolved: 2024-10-15

## 2024-10-15 PROCEDURE — 97140 MANUAL THERAPY 1/> REGIONS: CPT | Mod: GP | Performed by: PHYSICAL THERAPIST

## 2024-10-16 ENCOUNTER — MYC MEDICAL ADVICE (OUTPATIENT)
Dept: FAMILY MEDICINE | Facility: CLINIC | Age: 69
End: 2024-10-16

## 2024-10-16 ENCOUNTER — OFFICE VISIT (OUTPATIENT)
Dept: FAMILY MEDICINE | Facility: CLINIC | Age: 69
End: 2024-10-16
Payer: COMMERCIAL

## 2024-10-16 VITALS
DIASTOLIC BLOOD PRESSURE: 60 MMHG | HEIGHT: 61 IN | OXYGEN SATURATION: 97 % | RESPIRATION RATE: 20 BRPM | WEIGHT: 131 LBS | HEART RATE: 89 BPM | SYSTOLIC BLOOD PRESSURE: 110 MMHG | TEMPERATURE: 99 F | BODY MASS INDEX: 24.73 KG/M2

## 2024-10-16 DIAGNOSIS — M81.0 OSTEOPOROSIS WITHOUT CURRENT PATHOLOGICAL FRACTURE, UNSPECIFIED OSTEOPOROSIS TYPE: ICD-10-CM

## 2024-10-16 DIAGNOSIS — K21.00 GASTROESOPHAGEAL REFLUX DISEASE WITH ESOPHAGITIS WITHOUT HEMORRHAGE: ICD-10-CM

## 2024-10-16 DIAGNOSIS — Z12.83 SKIN EXAM, SCREENING FOR CANCER: Primary | ICD-10-CM

## 2024-10-16 DIAGNOSIS — R10.2 PELVIC PAIN IN FEMALE: ICD-10-CM

## 2024-10-16 DIAGNOSIS — G47.09 OTHER INSOMNIA: ICD-10-CM

## 2024-10-16 LAB
ALBUMIN UR-MCNC: NEGATIVE MG/DL
APPEARANCE UR: CLEAR
BILIRUB UR QL STRIP: NEGATIVE
COLOR UR AUTO: YELLOW
GLUCOSE UR STRIP-MCNC: NEGATIVE MG/DL
HGB UR QL STRIP: NEGATIVE
KETONES UR STRIP-MCNC: NEGATIVE MG/DL
LEUKOCYTE ESTERASE UR QL STRIP: NEGATIVE
NITRATE UR QL: NEGATIVE
PH UR STRIP: 6.5 [PH] (ref 5–7)
SP GR UR STRIP: 1.01 (ref 1–1.03)
UROBILINOGEN UR STRIP-ACNC: 0.2 E.U./DL

## 2024-10-16 PROCEDURE — 81003 URINALYSIS AUTO W/O SCOPE: CPT | Performed by: PHYSICIAN ASSISTANT

## 2024-10-16 PROCEDURE — 99214 OFFICE O/P EST MOD 30 MIN: CPT | Performed by: PHYSICIAN ASSISTANT

## 2024-10-16 RX ORDER — FAMOTIDINE 20 MG/1
20 TABLET, FILM COATED ORAL DAILY
COMMUNITY
Start: 2024-10-16

## 2024-10-16 RX ORDER — TRAZODONE HYDROCHLORIDE 50 MG/1
50 TABLET, FILM COATED ORAL AT BEDTIME
COMMUNITY
Start: 2024-10-16

## 2024-10-16 RX ORDER — ALENDRONATE SODIUM 70 MG/1
70 TABLET ORAL
Qty: 12 TABLET | Refills: 3 | Status: SHIPPED | OUTPATIENT
Start: 2024-10-16

## 2024-10-16 ASSESSMENT — ASTHMA QUESTIONNAIRES
ACT_TOTALSCORE: 24
QUESTION_2 LAST FOUR WEEKS HOW OFTEN HAVE YOU HAD SHORTNESS OF BREATH: NOT AT ALL
QUESTION_5 LAST FOUR WEEKS HOW WOULD YOU RATE YOUR ASTHMA CONTROL: WELL CONTROLLED
QUESTION_4 LAST FOUR WEEKS HOW OFTEN HAVE YOU USED YOUR RESCUE INHALER OR NEBULIZER MEDICATION (SUCH AS ALBUTEROL): NOT AT ALL
QUESTION_1 LAST FOUR WEEKS HOW MUCH OF THE TIME DID YOUR ASTHMA KEEP YOU FROM GETTING AS MUCH DONE AT WORK, SCHOOL OR AT HOME: NONE OF THE TIME
ACT_TOTALSCORE: 24
QUESTION_3 LAST FOUR WEEKS HOW OFTEN DID YOUR ASTHMA SYMPTOMS (WHEEZING, COUGHING, SHORTNESS OF BREATH, CHEST TIGHTNESS OR PAIN) WAKE YOU UP AT NIGHT OR EARLIER THAN USUAL IN THE MORNING: NOT AT ALL

## 2024-10-16 ASSESSMENT — ANXIETY QUESTIONNAIRES
GAD7 TOTAL SCORE: 12
2. NOT BEING ABLE TO STOP OR CONTROL WORRYING: MORE THAN HALF THE DAYS
4. TROUBLE RELAXING: MORE THAN HALF THE DAYS
5. BEING SO RESTLESS THAT IT IS HARD TO SIT STILL: MORE THAN HALF THE DAYS
3. WORRYING TOO MUCH ABOUT DIFFERENT THINGS: MORE THAN HALF THE DAYS
IF YOU CHECKED OFF ANY PROBLEMS ON THIS QUESTIONNAIRE, HOW DIFFICULT HAVE THESE PROBLEMS MADE IT FOR YOU TO DO YOUR WORK, TAKE CARE OF THINGS AT HOME, OR GET ALONG WITH OTHER PEOPLE: NOT DIFFICULT AT ALL
GAD7 TOTAL SCORE: 12
1. FEELING NERVOUS, ANXIOUS, OR ON EDGE: MORE THAN HALF THE DAYS
GAD7 TOTAL SCORE: 12
7. FEELING AFRAID AS IF SOMETHING AWFUL MIGHT HAPPEN: NOT AT ALL
8. IF YOU CHECKED OFF ANY PROBLEMS, HOW DIFFICULT HAVE THESE MADE IT FOR YOU TO DO YOUR WORK, TAKE CARE OF THINGS AT HOME, OR GET ALONG WITH OTHER PEOPLE?: NOT DIFFICULT AT ALL
6. BECOMING EASILY ANNOYED OR IRRITABLE: MORE THAN HALF THE DAYS
7. FEELING AFRAID AS IF SOMETHING AWFUL MIGHT HAPPEN: NOT AT ALL

## 2024-10-16 ASSESSMENT — PATIENT HEALTH QUESTIONNAIRE - PHQ9
SUM OF ALL RESPONSES TO PHQ QUESTIONS 1-9: 8
10. IF YOU CHECKED OFF ANY PROBLEMS, HOW DIFFICULT HAVE THESE PROBLEMS MADE IT FOR YOU TO DO YOUR WORK, TAKE CARE OF THINGS AT HOME, OR GET ALONG WITH OTHER PEOPLE: SOMEWHAT DIFFICULT
SUM OF ALL RESPONSES TO PHQ QUESTIONS 1-9: 8

## 2024-10-16 NOTE — PROGRESS NOTES
Assessment & Plan     Skin exam, screening for cancer  I have referred her to dermatology to do a skin exam  - Adult Dermatology  Referral; Future    Pelvic pain in female  We will evaluate with urine analysis and pelvic ultrasound, physical therapy is working on this as well per patient could be viral etiology if it is progressing would need to consider diverticulitis as well  - UA Macroscopic with reflex to Microscopic and Culture - Lab Collect; Future  - US Pelvic Complete with Transvaginal; Future  - UA Macroscopic with reflex to Microscopic and Culture - Lab Collect    Osteoporosis without current pathological fracture, unspecified osteoporosis type  Previously on Evista, that has been discontinued many years ago she was on alendronate.  We will restart alendronate due to concerns with blood clot with Evista.  Plan for treatment of no more than 5 years rechecking bone density again in 2 years  - alendronate (FOSAMAX) 70 MG tablet; Take 1 tablet (70 mg) by mouth every 7 days.    Gastroesophageal reflux disease with esophagitis without hemorrhage  She will take as needed in addition to her omeprazole  - famotidine (PEPCID) 20 MG tablet; Take 1 tablet (20 mg) by mouth daily.          This chart documentation was completed in part with Dragon voice recognition software.  Documentation is reviewed after completion, however, some words and grammatical errors may remain.  Caitlin Beasley PA-C      Basilio Zuluaga is a 69 year old, presenting for the following health issues:  Follow Up        10/16/2024     1:03 PM   Additional Questions   Roomed by BT   Accompanied by self     History of Present Illness       Mental Health Follow-up:  Patient presents to follow-up on Depression & Anxiety.Patient's depression since last visit has been:  Medium  The patient is not having other symptoms associated with depression.  Patient's anxiety since last visit has been:  Medium  The patient is not having other  "symptoms associated with anxiety.  Any significant life events: No  Patient is not feeling anxious or having panic attacks.  Patient has no concerns about alcohol or drug use.    Headaches:   Since the patient's last clinic visit, headaches are: no change  The patient is getting headaches:  Daily headaches  She is able to do normal daily activities when she has a migraine.  The patient is taking the following rescue/relief medications:  Naproxyn (Aleve) and Tylenol   Patient states \"I get some relief\" from the rescue/relief medications.   The patient is taking the following medications to prevent migraines:  No medications to prevent migraines  In the past 4 weeks, the patient has gone to an Urgent Care or Emergency Room 0 times times due to headaches.    She eats 2-3 servings of fruits and vegetables daily.She consumes 0 sweetened beverage(s) daily.She exercises with enough effort to increase her heart rate 30 to 60 minutes per day.  She exercises with enough effort to increase her heart rate 3 or less days per week.   She is taking medications regularly.       She has multiple concerns today.  First of all she had been treated with Evista for osteoporosis for many years.  That has recently been discontinued and we rechecked her bone density.  She  has osteo porosis and is at increased fracture risk she would like to discuss treatment recommendations today.    She is gone off sertraline she did not care for the side effects.  She finds trazodone to be more effective to help her sleep than the amitriptyline though she has both meds at home.  She wonders which when she should use she is aware to never use both meds at once.  She continues to take bupropion in the late morning.  That does seem to help improve her energy and her focus.  She is comfortable with the current dosage.      In the last several days her stomach has felt very heavy in the pelvic region.  Is very gurgly.  Her stools are loose though she would " "not say she has diarrhea.  She does not feel nauseous no vomiting does not feel ill.  She does not feel like eating.  Her appetite is reduced.  No one else has similar symptoms that she has been around.  She did try a Gas-X which did help very slightly.  Physical therapy did help to \"release\" her low back and her pelvic region which helped minimally.  She wonders what her symptoms could be from.    She is concerned about her dense breast tissue.  She does get the 3D mammograms and is careful to do them annually as recommended.    She is continuing to do physical therapy for her neck and arm.    She has several moles and spots she would like me to check around her neck chest and arms.  None of them itch burn or bleed.  She does have 1 that is \"clear\" and is worried about that in particular.        Review of Systems  Constitutional, HEENT, cardiovascular, pulmonary, gi and gu systems are negative, except as otherwise noted.      Objective    /60   Pulse 89   Temp 99  F (37.2  C) (Temporal)   Resp 20   Ht 1.561 m (5' 1.46\")   Wt 59.4 kg (131 lb)   LMP 02/26/2008 (Approximate)   SpO2 97%   BMI 24.39 kg/m    Body mass index is 24.39 kg/m .  Physical Exam   GENERAL: alert and no distress  NECK: no adenopathy, no asymmetry, masses, or scars  RESP: lungs clear to auscultation - no rales, rhonchi or wheezes  CV: regular rate and rhythm, normal S1 S2, no S3 or S4, no murmur, click or rub, no peripheral edema  ABDOMEN: soft, nontender, no hepatosplenomegaly, no masses and bowel sounds normal  MS: no gross musculoskeletal defects noted, no edema  SKIN: Sun damaged skin on her upper chest noted she does have a patch of hypopigmentation in the central chest, otherwise she has some random seborrheic keratoses small skin tags but nothing more concerning  NEURO: Normal strength and tone, mentation intact and speech normal  PSYCH: mentation appears normal, affect normal/bright    Hospital Outpatient Visit on 09/04/2024 "   Component Date Value Ref Range Status    Creatinine POCT 09/04/2024 1.0  0.5 - 1.0 mg/dL Final    GFR, ESTIMATED POCT 09/04/2024 60 (L)  >60 mL/min/1.73m2 Final     Results for orders placed or performed in visit on 10/16/24   UA Macroscopic with reflex to Microscopic and Culture - Lab Collect     Status: Normal    Specimen: Urine, Clean Catch   Result Value Ref Range    Color Urine Yellow Colorless, Straw, Light Yellow, Yellow    Appearance Urine Clear Clear    Glucose Urine Negative Negative mg/dL    Bilirubin Urine Negative Negative    Ketones Urine Negative Negative mg/dL    Specific Gravity Urine 1.010 1.003 - 1.035    Blood Urine Negative Negative    pH Urine 6.5 5.0 - 7.0    Protein Albumin Urine Negative Negative mg/dL    Urobilinogen Urine 0.2 0.2, 1.0 E.U./dL    Nitrite Urine Negative Negative    Leukocyte Esterase Urine Negative Negative    Narrative    Microscopic not indicated           Signed Electronically by: Caitlin Beasley PA-C

## 2024-10-17 ENCOUNTER — HOSPITAL ENCOUNTER (OUTPATIENT)
Dept: ULTRASOUND IMAGING | Facility: CLINIC | Age: 69
Discharge: HOME | End: 2024-10-17
Attending: PHYSICIAN ASSISTANT | Admitting: PHYSICIAN ASSISTANT
Payer: COMMERCIAL

## 2024-10-17 ENCOUNTER — THERAPY VISIT (OUTPATIENT)
Dept: PHYSICAL THERAPY | Facility: CLINIC | Age: 69
End: 2024-10-17
Attending: FAMILY MEDICINE
Payer: COMMERCIAL

## 2024-10-17 DIAGNOSIS — M25.511 CHRONIC RIGHT SHOULDER PAIN: Primary | ICD-10-CM

## 2024-10-17 DIAGNOSIS — G89.29 CHRONIC RIGHT SHOULDER PAIN: Primary | ICD-10-CM

## 2024-10-17 DIAGNOSIS — R10.2 PELVIC PAIN IN FEMALE: ICD-10-CM

## 2024-10-17 PROCEDURE — 97140 MANUAL THERAPY 1/> REGIONS: CPT | Mod: GP | Performed by: PHYSICAL THERAPIST

## 2024-10-17 PROCEDURE — 76856 US EXAM PELVIC COMPLETE: CPT

## 2024-10-17 PROCEDURE — 97110 THERAPEUTIC EXERCISES: CPT | Mod: GP | Performed by: PHYSICAL THERAPIST

## 2024-10-24 ENCOUNTER — MYC MEDICAL ADVICE (OUTPATIENT)
Dept: CARDIOLOGY | Facility: CLINIC | Age: 69
End: 2024-10-24
Payer: COMMERCIAL

## 2024-10-24 DIAGNOSIS — Z29.89 NEED FOR SBE (SUBACUTE BACTERIAL ENDOCARDITIS) PROPHYLAXIS: ICD-10-CM

## 2024-10-24 DIAGNOSIS — I34.0 NONRHEUMATIC MITRAL VALVE REGURGITATION: ICD-10-CM

## 2024-10-24 DIAGNOSIS — Z98.890 S/P MITRAL VALVE REPAIR: ICD-10-CM

## 2024-10-24 RX ORDER — AMOXICILLIN 500 MG/1
CAPSULE ORAL
Qty: 4 CAPSULE | Refills: 0 | Status: SHIPPED | OUTPATIENT
Start: 2024-10-24

## 2024-10-29 ENCOUNTER — THERAPY VISIT (OUTPATIENT)
Dept: PHYSICAL THERAPY | Facility: CLINIC | Age: 69
End: 2024-10-29
Attending: FAMILY MEDICINE
Payer: COMMERCIAL

## 2024-10-29 DIAGNOSIS — M25.511 CHRONIC RIGHT SHOULDER PAIN: Primary | ICD-10-CM

## 2024-10-29 DIAGNOSIS — G89.29 CHRONIC RIGHT SHOULDER PAIN: Primary | ICD-10-CM

## 2024-10-29 PROCEDURE — 97140 MANUAL THERAPY 1/> REGIONS: CPT | Mod: GP | Performed by: PHYSICAL THERAPIST

## 2024-10-31 ENCOUNTER — ANCILLARY PROCEDURE (OUTPATIENT)
Dept: GENERAL RADIOLOGY | Facility: OTHER | Age: 69
End: 2024-10-31
Attending: FAMILY MEDICINE
Payer: COMMERCIAL

## 2024-10-31 ENCOUNTER — OFFICE VISIT (OUTPATIENT)
Dept: FAMILY MEDICINE | Facility: OTHER | Age: 69
End: 2024-10-31
Payer: COMMERCIAL

## 2024-10-31 ENCOUNTER — NURSE TRIAGE (OUTPATIENT)
Dept: FAMILY MEDICINE | Facility: CLINIC | Age: 69
End: 2024-10-31

## 2024-10-31 VITALS
BODY MASS INDEX: 24.57 KG/M2 | TEMPERATURE: 98.3 F | HEART RATE: 85 BPM | WEIGHT: 132 LBS | DIASTOLIC BLOOD PRESSURE: 70 MMHG | RESPIRATION RATE: 20 BRPM | OXYGEN SATURATION: 98 % | SYSTOLIC BLOOD PRESSURE: 132 MMHG

## 2024-10-31 DIAGNOSIS — R05.1 ACUTE COUGH: ICD-10-CM

## 2024-10-31 DIAGNOSIS — R05.1 ACUTE COUGH: Primary | ICD-10-CM

## 2024-10-31 DIAGNOSIS — J45.30 MILD PERSISTENT ASTHMA, UNSPECIFIED WHETHER COMPLICATED: ICD-10-CM

## 2024-10-31 PROBLEM — M48.54XA: Status: ACTIVE | Noted: 2024-10-31

## 2024-10-31 PROBLEM — M79.604 PAIN OF RIGHT LOWER EXTREMITY: Status: RESOLVED | Noted: 2023-07-03 | Resolved: 2024-10-31

## 2024-10-31 PROBLEM — M48.54XA: Status: RESOLVED | Noted: 2024-10-31 | Resolved: 2024-10-31

## 2024-10-31 PROBLEM — Z86.16 PERSONAL HISTORY OF COVID-19: Status: RESOLVED | Noted: 2022-01-19 | Resolved: 2024-10-31

## 2024-10-31 LAB
ERYTHROCYTE [DISTWIDTH] IN BLOOD BY AUTOMATED COUNT: 13 % (ref 10–15)
HCT VFR BLD AUTO: 40.8 % (ref 35–47)
HGB BLD-MCNC: 13.4 G/DL (ref 11.7–15.7)
MCH RBC QN AUTO: 30.7 PG (ref 26.5–33)
MCHC RBC AUTO-ENTMCNC: 32.8 G/DL (ref 31.5–36.5)
MCV RBC AUTO: 94 FL (ref 78–100)
PLATELET # BLD AUTO: 302 10E3/UL (ref 150–450)
RBC # BLD AUTO: 4.36 10E6/UL (ref 3.8–5.2)
WBC # BLD AUTO: 11 10E3/UL (ref 4–11)

## 2024-10-31 PROCEDURE — 36415 COLL VENOUS BLD VENIPUNCTURE: CPT | Performed by: FAMILY MEDICINE

## 2024-10-31 PROCEDURE — 99214 OFFICE O/P EST MOD 30 MIN: CPT | Performed by: FAMILY MEDICINE

## 2024-10-31 PROCEDURE — 87798 DETECT AGENT NOS DNA AMP: CPT | Performed by: FAMILY MEDICINE

## 2024-10-31 PROCEDURE — 85027 COMPLETE CBC AUTOMATED: CPT | Performed by: FAMILY MEDICINE

## 2024-10-31 PROCEDURE — 71046 X-RAY EXAM CHEST 2 VIEWS: CPT | Mod: TC | Performed by: RADIOLOGY

## 2024-10-31 RX ORDER — AZITHROMYCIN 250 MG/1
TABLET, FILM COATED ORAL
Qty: 6 TABLET | Refills: 0 | Status: SHIPPED | OUTPATIENT
Start: 2024-10-31 | End: 2024-11-05

## 2024-10-31 RX ORDER — PREDNISONE 20 MG/1
20 TABLET ORAL DAILY
COMMUNITY
Start: 2024-10-22

## 2024-10-31 ASSESSMENT — ENCOUNTER SYMPTOMS: COUGH: 1

## 2024-10-31 NOTE — PROGRESS NOTES
Assessment & Plan     Acute cough/Mild persistent asthma, unspecified whether complicated  Patient has likely had a cough for the last week which she has attributed to increase of her allergies in the windy weather.  She increased her Dulera.  She started taking her prednisone 20 mg a day.  She has taken 8 days of this.  She does not feel that the cough is improving the way that she would expected.  CBC was normal.  She is up-to-date on her Tdap but she is concerned about pertussis.  Pertussis swab was done and is pending.  Will treat with Zithromax.  Encouraged her to take half a tablet of her prednisone to 10 mg daily for the next couple of days and then stop.  There were no wheezes on exam.    - B. pertussis/parapertussis PCR-NP  - XR Chest 2 Views; Future  - CBC with platelets  - azithromycin (ZITHROMAX) 250 MG tablet; Take 2 tablets (500 mg) by mouth daily for 1 day, THEN 1 tablet (250 mg) daily for 4 days.      30 minutes spent by me on the date of the encounter doing chart review, history and exam, documentation and further activities per the note      Subjective   Margareth is a 69 year old, presenting for the following health issues:  Cough        10/31/2024    10:32 AM   Additional Questions   Roomed by barron Hernandez    History of Present Illness       Reason for visit:  Coughing/asthma  Symptom onset:  3-7 days ago  Symptoms include:  Coughing headaches fatigue  Symptom intensity:  Moderate  Symptom progression:  Improving  Had these symptoms before:  Yes  Has tried/received treatment for these symptoms:  Yes  Previous treatment was successful:  Yes  Prior treatment description:  Prednisone antibiotics  What makes it worse:  No  What makes it better:  Prednisone antibiotics   She is taking medications regularly.    Patient has a longstanding history of asthma.  She also has a history of interstitial lung disease secondary to COVID infection in 2021.  She had been following with pulmonology.  Last  month pulmonology felt she was doing well and that she could use her Dulera only as needed.  The patient states she did back off on this a little bit but had been used to being on a controller medication and never completely stopped.  She states that she definitely has allergies and the last week when it has been dry and windy she feels her allergies flared which flared her asthma.  She restarted taking her Dulera 2 puffs twice a day.  She also has prednisone on hand which she started and was taking 20 mg daily.  She has been using her albuterol more often.  She heard on the news about pertussis and is worried about having other contributing factors to her cough.  She denies fevers.  Home COVID test was negative.  She got her RSV immunization at [facility] about 3 weeks ago.  She has not had her influenza or COVID shot yet this year.  Tdap was done in 2022.          Objective    /70 (BP Location: Right arm, Patient Position: Sitting, Cuff Size: Adult Regular)   Pulse 85   Temp 98.3  F (36.8  C) (Temporal)   Resp 20   Wt 59.9 kg (132 lb)   LMP 02/26/2008 (Approximate)   SpO2 98%   BMI 24.57 kg/m    Body mass index is 24.57 kg/m .  Physical Exam   Gen: no apparent distress  HENT: Ears normal. Throat and pharynx normal. Neck supple. No adenopathy or masses in the neck or supraclavicular regions. Sinuses non tender.  Chest/CV: S1 and S2 normal, no murmurs, clicks, gallops or rubs. Regular rate and rhythm. Chest is clear; no wheezes or rales. No edema    Xray - Reviewed and interpreted by me.  No obvious infiltrates, Radiology review pending    Results for orders placed or performed in visit on 10/31/24 (from the past 24 hours)   CBC with platelets   Result Value Ref Range    WBC Count 11.0 4.0 - 11.0 10e3/uL    RBC Count 4.36 3.80 - 5.20 10e6/uL    Hemoglobin 13.4 11.7 - 15.7 g/dL    Hematocrit 40.8 35.0 - 47.0 %    MCV 94 78 - 100 fL    MCH 30.7 26.5 - 33.0 pg    MCHC 32.8 31.5 - 36.5 g/dL    RDW 13.0 10.0  - 15.0 %    Platelet Count 302 150 - 450 10e3/uL     *Note: Due to a large number of results and/or encounters for the requested time period, some results have not been displayed. A complete set of results can be found in Results Review.           Signed Electronically by: Krysta Russ MD

## 2024-10-31 NOTE — TELEPHONE ENCOUNTER
Nurse Triage SBAR    Is this a 2nd Level Triage? NO    Situation: Patient calling to report she has had an ongoing cough for over a week and increased need for use of her inhalers. No fevers, states she gets some relief from her inhalers. She is concerned she may have more going on.    Background: Asthma    Assessment: Patient requesting to be seen in clinic due to worsening cough and increased use of her inhalers    Protocol Recommended Disposition:   See in Office Today or Tomorrow    Recommendation: Patient scheduled for today in clinic          Does the patient meet one of the following criteria for ADS visit consideration? No    WENDY MariscalN, RN      Reason for Disposition   Continuous (nonstop) coughing interferes with work or school and no improvement using cough treatment per Care Advice    Additional Information   Negative: Bluish (or gray) lips or face   Negative: SEVERE difficulty breathing (e.g., struggling for each breath, speaks in single words)   Negative: Rapid onset of cough and has hives   Negative: Coughing started suddenly after medicine, an allergic food or bee sting   Negative: Difficulty breathing after exposure to flames, smoke, or fumes   Negative: Sounds like a life-threatening emergency to the triager   Negative: Previous asthma attacks and this feels like asthma attack   Negative: Dry cough (non-productive; no sputum or minimal clear sputum) and within 14 days of COVID-19 Exposure   Negative: MODERATE difficulty breathing (e.g., speaks in phrases, SOB even at rest, pulse 100-120) and still present when not coughing   Negative: Chest pain present when not coughing   Negative: Passed out (i.e., fainted, collapsed and was not responding)   Negative: Patient sounds very sick or weak to the triager   Negative: MILD difficulty breathing (e.g., minimal/no SOB at rest, SOB with walking, pulse <100) and still present when not coughing   Negative: Coughed up > 1 tablespoon (15 ml) blood  (Exception: Blood-tinged sputum.)   Negative: Fever > 103 F (39.4 C)   Negative: Fever > 101 F (38.3 C) and over 60 years of age   Negative: Fever > 100.0 F (37.8 C) and has diabetes mellitus or a weak immune system (e.g., HIV positive, cancer chemotherapy, organ transplant, splenectomy, chronic steroids)   Negative: Fever > 100.0 F (37.8 C) and bedridden (e.g., CVA, chronic illness, recovering from surgery)   Negative: Increasing ankle swelling   Negative: Wheezing is present   Negative: SEVERE coughing spells (e.g., whooping sound after coughing, vomiting after coughing)   Negative: Coughing up supriya-colored (reddish-brown) or blood-tinged sputum   Negative: Fever present > 3 days (72 hours)   Negative: Fever returns after gone for over 24 hours and symptoms worse or not improved   Negative: Using nasal washes and pain medicine > 24 hours and sinus pain persists   Negative: Known COPD or other severe lung disease (i.e., bronchiectasis, cystic fibrosis, lung surgery) and worsening symptoms (i.e., increased sputum purulence or amount, increased breathing difficulty)    Protocols used: Cough-A-OH

## 2024-11-01 LAB
B PARAPERT DNA SPEC QL NAA+PROBE: NOT DETECTED
B PERT DNA SPEC QL NAA+PROBE: NOT DETECTED

## 2024-11-05 ENCOUNTER — THERAPY VISIT (OUTPATIENT)
Dept: PHYSICAL THERAPY | Facility: CLINIC | Age: 69
End: 2024-11-05
Attending: FAMILY MEDICINE
Payer: COMMERCIAL

## 2024-11-05 DIAGNOSIS — M25.511 CHRONIC RIGHT SHOULDER PAIN: Primary | ICD-10-CM

## 2024-11-05 DIAGNOSIS — G89.29 CHRONIC RIGHT SHOULDER PAIN: Primary | ICD-10-CM

## 2024-11-05 PROCEDURE — 97140 MANUAL THERAPY 1/> REGIONS: CPT | Mod: GP | Performed by: PHYSICAL THERAPIST

## 2024-11-06 ENCOUNTER — MYC MEDICAL ADVICE (OUTPATIENT)
Dept: PULMONOLOGY | Facility: CLINIC | Age: 69
End: 2024-11-06
Payer: COMMERCIAL

## 2024-11-06 DIAGNOSIS — J84.9 ILD (INTERSTITIAL LUNG DISEASE) (H): ICD-10-CM

## 2024-11-06 DIAGNOSIS — J45.30 MILD PERSISTENT ASTHMA, UNSPECIFIED WHETHER COMPLICATED: Primary | ICD-10-CM

## 2024-11-08 RX ORDER — PREDNISONE 20 MG/1
TABLET ORAL
Qty: 10 TABLET | Refills: 0 | Status: SHIPPED | OUTPATIENT
Start: 2024-11-08

## 2024-11-10 ENCOUNTER — MYC MEDICAL ADVICE (OUTPATIENT)
Dept: FAMILY MEDICINE | Facility: CLINIC | Age: 69
End: 2024-11-10
Payer: COMMERCIAL

## 2024-11-10 DIAGNOSIS — G47.09 OTHER INSOMNIA: ICD-10-CM

## 2024-11-11 ENCOUNTER — MYC MEDICAL ADVICE (OUTPATIENT)
Dept: PULMONOLOGY | Facility: CLINIC | Age: 69
End: 2024-11-11
Payer: COMMERCIAL

## 2024-11-11 DIAGNOSIS — J30.89 NON-SEASONAL ALLERGIC RHINITIS, UNSPECIFIED TRIGGER: ICD-10-CM

## 2024-11-11 RX ORDER — FLUTICASONE PROPIONATE 50 MCG
1 SPRAY, SUSPENSION (ML) NASAL DAILY
Qty: 1 ML | Refills: 4 | Status: SHIPPED | OUTPATIENT
Start: 2024-11-11

## 2024-11-11 NOTE — TELEPHONE ENCOUNTER
Pending Prescriptions:                       Disp   Refills    fluticasone (FLONASE) 50 MCG/ACT nasal sp*1 mL   4            Sig: Spray 1 spray into both nostrils daily.    Routing refill request to provider for review/approval because:  Medication is reported/historical    Last office visit with Dr Varner was 9/13/24.    Tisha Howard MSN, RN   [facility], 11/11/2024 1:17 PM

## 2024-11-11 NOTE — TELEPHONE ENCOUNTER
Requesting refill    Adelaide Delong RN  [facility] - Registered Nurse  Clinic Triage Dk   November 11, 2024

## 2024-11-12 ENCOUNTER — THERAPY VISIT (OUTPATIENT)
Dept: PHYSICAL THERAPY | Facility: CLINIC | Age: 69
End: 2024-11-12
Attending: FAMILY MEDICINE
Payer: COMMERCIAL

## 2024-11-12 DIAGNOSIS — M25.511 CHRONIC RIGHT SHOULDER PAIN: Primary | ICD-10-CM

## 2024-11-12 DIAGNOSIS — G89.29 CHRONIC RIGHT SHOULDER PAIN: Primary | ICD-10-CM

## 2024-11-12 PROCEDURE — 97140 MANUAL THERAPY 1/> REGIONS: CPT | Mod: GP | Performed by: PHYSICAL THERAPIST

## 2024-11-14 RX ORDER — TRAZODONE HYDROCHLORIDE 50 MG/1
50 TABLET, FILM COATED ORAL AT BEDTIME
Qty: 90 TABLET | Refills: 0 | Status: SHIPPED | OUTPATIENT
Start: 2024-11-14

## 2024-11-20 ENCOUNTER — THERAPY VISIT (OUTPATIENT)
Dept: PHYSICAL THERAPY | Facility: CLINIC | Age: 69
End: 2024-11-20
Attending: FAMILY MEDICINE
Payer: COMMERCIAL

## 2024-11-20 DIAGNOSIS — M25.511 CHRONIC RIGHT SHOULDER PAIN: Primary | ICD-10-CM

## 2024-11-20 DIAGNOSIS — G89.29 CHRONIC RIGHT SHOULDER PAIN: Primary | ICD-10-CM

## 2024-11-20 PROCEDURE — 97110 THERAPEUTIC EXERCISES: CPT | Mod: GP | Performed by: PHYSICAL THERAPIST

## 2024-11-20 PROCEDURE — 97164 PT RE-EVAL EST PLAN CARE: CPT | Mod: GP | Performed by: PHYSICAL THERAPIST

## 2024-11-21 ENCOUNTER — TELEPHONE (OUTPATIENT)
Dept: FAMILY MEDICINE | Facility: CLINIC | Age: 69
End: 2024-11-21
Payer: COMMERCIAL

## 2024-11-21 NOTE — TELEPHONE ENCOUNTER
Forms/Letter Request    Type of form/letter: Camp      Is Release of Information needed?: No    OTHER: $50 reward mammogram completed [facility]       Do we have the form/letter: Yes:      Who is the form from? Insurance comp    Where did/will the form come from? Patient or family brought in       When is form/letter needed by: complete @ your earliest convenience.  Mail w/attached envelope to [facility]    How would you like the form/letter returned: Mail  Is this the correct address?: Yes  ATTN:  [employer] [facility]  [address]   [place] [address]    Patient Notified form requests are processed in 5-7 business days:Yes    Could we send this information to you in VideoStep or would you prefer to receive a phone call?:   Patient would like to be contacted via VideoStep

## 2024-11-21 NOTE — PROGRESS NOTES
11/20/24 0500   Appointment Info   Signing clinician's name / credentials Joaquina Velasquez, PT   Total/Authorized Visits [employer]   Visits Used 11 - neck   Medical Diagnosis Neck pain (M54.2)  - Primary    Chronic right shoulder pain (M25.511, G89.29)   PT Tx Diagnosis Neck pain (M54.2)  - Primary    Chronic right shoulder pain (M25.511, G89.29)   Progress Note/Certification   Start of Care Date 09/24/24   Onset of illness/injury or Date of Surgery 06/10/24  (chronic for 10+ years with exacerbation after falling and fracturing her L fibula.)   Therapy Frequency 2 times per week   Predicted Duration 6 weeks   Certification date from 09/24/24   Certification date to 11/05/24   Progress Note Completed Date 09/24/24       Present No   GOALS   PT Goals 6;7   PT Goal 6   Goal Identifier Symptoms   Goal Description Margareth will be able to redue medication for neck and R shoulder pain x 50%   Goal Progress increased after vacuuming and cooking.   Target Date 11/30/24   PT Goal 7   Goal Identifier Activities   Goal Description Margareth will reach over head, complete repeated movements such as vacuuming, washing windows and floors without symptoms increase.   Rationale to maximize safety and independence with performance of ADLs and functional tasks   Target Date 11/30/24   Subjective Report   Subjective Report completing corner stretches, walking 30 minutes x 4 times per  week. Feels her balance is baseline.   Objective Measures   Objective Measures Objective Measure 1;Objective Measure 2;Objective Measure 3;Objective Measure 4;Objective Measure 5   Objective Measure 2   Objective Measure Symptoms   Details Headache: 4/10, neck: 6/10, Shoulder 6/10, 4/10 to 10/10.   Objective Measure 3   Objective Measure shoulder AROM   Details worse: any movements, cannot lay on R side at night (x a few yrs), calms: naproxen, Tylenol arthritis,  Volteran Gel, heat, less cold, sometimes pectoralis stretch. Cough -  sometimes, sneezing: neg; deep breathing -, swallowing: neg, vision : floaters, tinnitus: intermittent and not effecting shoulder. Location: knot in blade that is constant and increases with use (uses theracane with some benefit), anterior R upper quad that starts in upper trap and then into the pect area, biceps or triceps area , palm of hand and comes from medial arm, from posterior humerus just to elbow. Fingers: can be all of them had CTS, fingers 2-5 palm side. DESCRIPTION: numbing, intense numbing. R handed. ONSET: 3 months, no injury, trauma or surgery. April and May 2024 had injections. mid june x 5 weeks used arms to push manual WC and pushing self up and going down. Used cane in R hand after WC when felt unstable eg walking on uneven ground. Seen for R shoulder in the past and was given exercises - cervical retraction, Given triceps and biceps curls with band - has not completed these. Standing shoulder AROM (L/R): flexion: 0-147degrees/70 degrees; extension: 0-43 degrees/24 degrees; abduction: 0-162 degrees/62 with pain humerus anterior and posterior; ER: 0-90 degrees/74 degrees; IR: 0-25 degrees/3 degrees   Treatment Interventions (PT)   Interventions Therapeutic Procedure/Exercise   Therapeutic Procedure/Exercise   Therapeutic Procedures: strength, endurance, ROM, flexibility minutes (80607) 12   Ther Proc 1 - Details Exercise Name: Scapular Retraction/Depression, Sets: 1 - Reps: 10 times, 5-10 second hold - Sessions: daily, Notes: Also complete with other exercises, lifting  Exercise Name: Supine AAROM Shoulder Flexion, Sets: 1 - Reps: 10 times 2-3 second hold - Sessions: 2-3 times per day, Notes: have hands just outside of shoulder joint and elbows straight but locked   Skilled Intervention instruction, demonstration, activities to increase R shoulder ROM   Patient Response/Progress no questions about home program.   Eval/Assessments   Assessments PT Re-Eval   PT Eval, Re-eval Minutes ([id number]) 57    Education   Learner/Method Patient;Listening;Reading;Demonstration;Pictures/Video;No Barriers to Learning   Education Comments scapular sets, supine shoulder elevation in scaption, plan of care   Plan   Home program lunges forward and lateral, Cervical retraction, scapular sets,  heel cord stretch in standing, heel and toe lifts wtih support, tandem stranding, light trunk rotation - hand to opposite knee bilaterally,scapular sets, supine shoulder elevation in scaption   Updates to plan of care 1 time per week   Plan for next session Add shoulder IR, scapular mobilization, check shoulder elevation R   Total Session Time   Timed Code Treatment Minutes 12   Total Treatment Time (sum of timed and untimed services) 42

## 2024-11-26 ENCOUNTER — THERAPY VISIT (OUTPATIENT)
Dept: PHYSICAL THERAPY | Facility: CLINIC | Age: 69
End: 2024-11-26
Attending: FAMILY MEDICINE
Payer: COMMERCIAL

## 2024-11-26 DIAGNOSIS — G89.29 CHRONIC RIGHT SHOULDER PAIN: Primary | ICD-10-CM

## 2024-11-26 DIAGNOSIS — M25.511 CHRONIC RIGHT SHOULDER PAIN: Primary | ICD-10-CM

## 2024-11-26 PROCEDURE — 97110 THERAPEUTIC EXERCISES: CPT | Mod: GP | Performed by: PHYSICAL THERAPIST

## 2024-12-03 ENCOUNTER — TELEPHONE (OUTPATIENT)
Dept: CARDIOLOGY | Facility: CLINIC | Age: 69
End: 2024-12-03
Payer: COMMERCIAL

## 2024-12-03 DIAGNOSIS — Z29.89 NEED FOR SBE (SUBACUTE BACTERIAL ENDOCARDITIS) PROPHYLAXIS: ICD-10-CM

## 2024-12-03 DIAGNOSIS — I34.0 NONRHEUMATIC MITRAL VALVE REGURGITATION: ICD-10-CM

## 2024-12-03 DIAGNOSIS — Z98.890 S/P MITRAL VALVE REPAIR: ICD-10-CM

## 2024-12-03 RX ORDER — AMOXICILLIN 500 MG/1
CAPSULE ORAL
Qty: 4 CAPSULE | Refills: 0 | Status: SHIPPED | OUTPATIENT
Start: 2024-12-03

## 2024-12-03 NOTE — TELEPHONE ENCOUNTER
[facility] Call Center    Phone Message    May a detailed message be left on voicemail: yes     Reason for Call: Medication Refill Request    Has the patient contacted the pharmacy for the refill? Yes   Name of medication being requested: amoxicillin (AMOXIL) 500 MG capsule  Provider who prescribed the medication: Reid  Pharmacy: [address]   Date medication is needed: 12/3/24      Thank you!  [facility]

## 2024-12-04 ENCOUNTER — THERAPY VISIT (OUTPATIENT)
Dept: PHYSICAL THERAPY | Facility: CLINIC | Age: 69
End: 2024-12-04
Attending: FAMILY MEDICINE
Payer: COMMERCIAL

## 2024-12-04 DIAGNOSIS — G89.29 CHRONIC RIGHT SHOULDER PAIN: Primary | ICD-10-CM

## 2024-12-04 DIAGNOSIS — M25.511 CHRONIC RIGHT SHOULDER PAIN: Primary | ICD-10-CM

## 2024-12-04 PROCEDURE — 97110 THERAPEUTIC EXERCISES: CPT | Mod: GP | Performed by: PHYSICAL THERAPIST

## 2024-12-10 ENCOUNTER — THERAPY VISIT (OUTPATIENT)
Dept: PHYSICAL THERAPY | Facility: CLINIC | Age: 69
End: 2024-12-10
Attending: FAMILY MEDICINE
Payer: COMMERCIAL

## 2024-12-10 DIAGNOSIS — G89.29 CHRONIC RIGHT SHOULDER PAIN: Primary | ICD-10-CM

## 2024-12-10 DIAGNOSIS — M25.511 CHRONIC RIGHT SHOULDER PAIN: Primary | ICD-10-CM

## 2024-12-10 PROCEDURE — 97110 THERAPEUTIC EXERCISES: CPT | Mod: GP | Performed by: PHYSICAL THERAPIST

## 2024-12-17 ENCOUNTER — THERAPY VISIT (OUTPATIENT)
Dept: PHYSICAL THERAPY | Facility: CLINIC | Age: 69
End: 2024-12-17
Attending: FAMILY MEDICINE
Payer: COMMERCIAL

## 2024-12-17 DIAGNOSIS — R41.840 POOR CONCENTRATION: ICD-10-CM

## 2024-12-17 DIAGNOSIS — F33.0 MAJOR DEPRESSIVE DISORDER, RECURRENT EPISODE, MILD (H): ICD-10-CM

## 2024-12-17 DIAGNOSIS — M25.511 CHRONIC RIGHT SHOULDER PAIN: Primary | ICD-10-CM

## 2024-12-17 DIAGNOSIS — G89.29 CHRONIC RIGHT SHOULDER PAIN: Primary | ICD-10-CM

## 2024-12-17 PROCEDURE — 97110 THERAPEUTIC EXERCISES: CPT | Mod: GP | Performed by: PHYSICAL THERAPIST

## 2024-12-17 RX ORDER — BUPROPION HYDROCHLORIDE 150 MG/1
TABLET ORAL
Qty: 90 TABLET | Refills: 1 | Status: SHIPPED | OUTPATIENT
Start: 2024-12-17

## 2024-12-31 ENCOUNTER — THERAPY VISIT (OUTPATIENT)
Dept: PHYSICAL THERAPY | Facility: CLINIC | Age: 69
End: 2024-12-31
Attending: FAMILY MEDICINE
Payer: COMMERCIAL

## 2024-12-31 DIAGNOSIS — G89.29 CHRONIC RIGHT SHOULDER PAIN: Primary | ICD-10-CM

## 2024-12-31 DIAGNOSIS — M25.511 CHRONIC RIGHT SHOULDER PAIN: Primary | ICD-10-CM

## 2024-12-31 PROCEDURE — 97110 THERAPEUTIC EXERCISES: CPT | Mod: GP | Performed by: PHYSICAL THERAPIST

## 2025-01-01 NOTE — PROGRESS NOTES
[facility]                                                                                   OUTPATIENT PHYSICAL THERAPY    PLAN OF TREATMENT FOR OUTPATIENT REHABILITATION   Patient's Last Name, First Name, Margareth Gamez YOB: 1955   Provider's Name   [facility]   Medical Record No.  [id number]     Onset Date: 06/10/24 (chronic for 10+ years with exacerbation after falling and fracturing her L fibula.)  Start of Care Date: 09/24/24     Medical Diagnosis:  Neck pain (M54.2)  - Primary    Chronic right shoulder pain (M25.511, G89.29)      PT Treatment Diagnosis:  Neck pain (M54.2)  - Primary    Chronic right shoulder pain (M25.511, G89.29) Plan of Treatment  Frequency/Duration: 1 time/ 2 weeks    Certification date from 12/31/24 to 01/14/25         See note for plan of treatment details and functional goals     Joaquina Velasquez, PT                         I CERTIFY THE NEED FOR THESE SERVICES FURNISHED UNDER        THIS PLAN OF TREATMENT AND WHILE UNDER MY CARE     (Physician attestation of this document indicates review and certification of the therapy plan).              Referring Provider:  Eduardo Greenfield MD    Initial Assessment  See Epic Evaluation- Start of Care Date: 09/24/24 12/31/24 0500   Appointment Info   Signing clinician's name / credentials Joaquina Velasquez PT   Total/Authorized Visits [employer]   Visits Used 16   Medical Diagnosis Neck pain (M54.2)  - Primary    Chronic right shoulder pain (M25.511, G89.29)   PT Tx Diagnosis Neck pain (M54.2)  - Primary    Chronic right shoulder pain (M25.511, G89.29)   Progress Note/Certification   Start of Care Date 09/24/24   Onset of illness/injury or Date of Surgery 06/10/24  (chronic for 10+ years with exacerbation after falling and fracturing her L fibula.)   Therapy Frequency 1 time   Predicted Duration 2 weeks   Certification date from 12/31/24   Certification  "date to 01/14/25   Progress Note Completed Date 12/31/24       Present No   GOALS   PT Goals 6;7   PT Goal 6   Goal Identifier Symptoms   Goal Description Margareth will be able to reduce medication for neck and R shoulder pain x 50%   Goal Progress Able to reduce medications approx 30%   Target Date 02/20/25   PT Goal 7   Goal Identifier Activities   Goal Description Margareth will reach over head, complete repeated movements such as vacuuming, washing windows and floors without symptoms increase.   Rationale to maximize safety and independence with performance of ADLs and functional tasks   Goal Progress More aware of pain and asks someone to assist her   Target Date 02/20/25   Subjective Report   Subjective Report Improvement x 80% at this time. She feel able to continue on her own. No confidence symptoms will resolve.   Objective Measure 2   Objective Measure Symptoms   Details Shoulder R: currently 7/10, 4/10 to 10/10   Objective Measure 3   Objective Measure shoulder AROM   Details R: flexion: 0-105 degrees; extension: 0-24 degrees; abduction: 0-78 degrees; ER: 0-66 degrees; IR: 0-11 degrees   Objective Measure 4   Objective Measure NDI   Details 10-16-16-32 no change, discussed   Objective Measure 5   Objective Measure SPADI   Details 63/08 - discussed - derease in score   Treatment Interventions (PT)   Interventions Therapeutic Procedure/Exercise   Therapeutic Procedure/Exercise   Therapeutic Procedures: strength, endurance, ROM, flexibility minutes (01234) 38   Ther Proc 1 - Details Checked symptoms and scores. REcommended working with  at gym , high reps and low weights with osteoporosis, ideas for working out in pool - use of noodles under feet, combined movements eg forward reaching with squats, \"running\" in deeper water- slow. added nerve slide 1 x 5-10 reps 2-3 times per day   Skilled Intervention advanced home program and ideas for pool  workout.   Patient Response/Progress no " questions   Education   Learner/Method Patient;Listening;Reading;Demonstration;Pictures/Video;No Barriers to Learning   Education Comments nerve slide, plan of care   Plan   Home program lunges forward and lateral, Cervical retraction, scapular sets,  heel cord stretch in standing, heel and toe lifts wtih support, tandem stranding, light trunk rotation - hand to opposite knee bilaterally,scapular sets, supine shoulder elevation in scaption, shoulder IR AROM behind back, wall sits, thoracic extension stretches sitting and supine hooklying, nerve slide   Updates to plan of care hold x 2 weeks   Comments   Comments FEels she is ready to continue with her home program. She is 80% improved and plans to workout at gym and use pool. Agreed to hold chart open x 2 weeks and then discharge   Total Session Time   Timed Code Treatment Minutes 38   Total Treatment Time (sum of timed and untimed services) 38

## 2025-01-06 ENCOUNTER — NURSE TRIAGE (OUTPATIENT)
Dept: FAMILY MEDICINE | Facility: CLINIC | Age: 70
End: 2025-01-06

## 2025-01-06 ENCOUNTER — OFFICE VISIT (OUTPATIENT)
Dept: FAMILY MEDICINE | Facility: CLINIC | Age: 70
End: 2025-01-06
Payer: COMMERCIAL

## 2025-01-06 VITALS
WEIGHT: 130.8 LBS | OXYGEN SATURATION: 97 % | HEIGHT: 62 IN | DIASTOLIC BLOOD PRESSURE: 68 MMHG | BODY MASS INDEX: 24.07 KG/M2 | SYSTOLIC BLOOD PRESSURE: 117 MMHG | RESPIRATION RATE: 16 BRPM | TEMPERATURE: 99 F | HEART RATE: 71 BPM

## 2025-01-06 DIAGNOSIS — J45.30 MILD PERSISTENT ASTHMA, UNSPECIFIED WHETHER COMPLICATED: Primary | ICD-10-CM

## 2025-01-06 DIAGNOSIS — J01.00 ACUTE NON-RECURRENT MAXILLARY SINUSITIS: ICD-10-CM

## 2025-01-06 DIAGNOSIS — J45.41 MODERATE PERSISTENT ASTHMA WITH EXACERBATION: ICD-10-CM

## 2025-01-06 PROCEDURE — 99214 OFFICE O/P EST MOD 30 MIN: CPT

## 2025-01-06 RX ORDER — PREDNISONE 20 MG/1
20 TABLET ORAL DAILY
Qty: 5 TABLET | Refills: 0 | Status: SHIPPED | OUTPATIENT
Start: 2025-01-06 | End: 2025-01-11

## 2025-01-06 RX ORDER — AZITHROMYCIN 250 MG/1
TABLET, FILM COATED ORAL
Qty: 6 TABLET | Refills: 0 | Status: SHIPPED | OUTPATIENT
Start: 2025-01-06 | End: 2025-01-11

## 2025-01-06 ASSESSMENT — PATIENT HEALTH QUESTIONNAIRE - PHQ9
SUM OF ALL RESPONSES TO PHQ QUESTIONS 1-9: 8
10. IF YOU CHECKED OFF ANY PROBLEMS, HOW DIFFICULT HAVE THESE PROBLEMS MADE IT FOR YOU TO DO YOUR WORK, TAKE CARE OF THINGS AT HOME, OR GET ALONG WITH OTHER PEOPLE: NOT DIFFICULT AT ALL
SUM OF ALL RESPONSES TO PHQ QUESTIONS 1-9: 8

## 2025-01-06 ASSESSMENT — PAIN SCALES - GENERAL: PAINLEVEL_OUTOF10: MODERATE PAIN (4)

## 2025-01-06 NOTE — PATIENT INSTRUCTIONS
ASSESSMENT    - Asthma exacerbation  - Possible sinusitis  PLAN    - Prescribe prednisone 20 mg to be taken in the morning for five days to manage asthma exacerbation. This dosage is chosen to minimize the impact on sleep, as the patient  has experienced sleep disturbances with prednisone in the past.  - Prescribe Azithromycin (Z-pack) for sinusitis to prevent progression to pneumonia. The regimen includes two tablets on the first day followed by one tablet daily for the next three days, each at a dosage of 250 mg.  - Recommend the use of Flonase nasal spray to alleviate nasal congestion.  - Ensure prescriptions for prednisone and Azithromycin are picked up at the designated pharmacy.  - Advise ample rest and increased fluid intake to aid recovery and manage symptoms effectively.    Prednisone and azithromycin for 5 days    Flonase nasal spray    Treating an asthma attack, also known as an acute asthma exacerbation, involves several steps to quickly relieve symptoms and prevent the attack from worsening. Here s a structured approach to managing an asthma attack:    1. Immediate Assessment  Check Vital Signs: Measure heart rate, respiratory rate, and oxygen saturation.  Peak Flow Meter: If available, use it to assess the severity of the attack.  Symptom Assessment: Evaluate the patient's level of distress, including difficulty speaking, wheezing, and use of accessory muscles for breathing.  2. Medications  Short-Acting Beta-Agonists (Ana):  Albuterol (Salbutamol): Administer via metered-dose inhaler (MDI) with a spacer or nebulizer. Typical dose is 2-4 puffs every 20 minutes for up to an hour. Nebulized albuterol can be given 2.5-5 mg every 20 minutes for three doses, then 2.5-10 mg every 1-4 hours as needed.  Anticholinergics:  Ipratropium Bromide: Often combined with albuterol in a nebulizer, especially in severe attacks. Dose is 0.5 mg every 20 minutes for three doses.  Systemic Corticosteroids:  Prednisone,  Methylprednisolone, or Prednisolone: Administer orally or intravenously to reduce airway inflammation. Dose is typically 40-60 mg of prednisone equivalent.  3. Oxygen Therapy  Supplemental Oxygen: Administer to maintain oxygen saturation (SpO2) above 90% (or 94-98% in pregnant women and those with heart disease).  4. Monitoring and Reassessment  Continuous Monitoring: Reassess the patient s response to treatment every 15-30 minutes.  Repeat Treatments: If the patient does not improve after initial treatments, additional doses of Ana and ipratropium may be required.  5. Additional Interventions for Severe Attacks  Magnesium Sulfate: Intravenous infusion of 1-2 grams over 20 minutes may be considered for severe attacks not responding to initial treatment.  Non-Invasive Ventilation (NIV): Such as CPAP or BiPAP, in cases of severe respiratory distress.  Mechanical Ventilation: For life-threatening asthma unresponsive to all other treatments.  6. Hospital Admission  Criteria for Admission: Poor response to initial treatment, significant hypoxemia, altered mental status, or severe exacerbations despite intensive initial therapy.  Intensive Care Unit (ICU) Admission: For patients requiring mechanical ventilation or with life-threatening exacerbations.  7. Long-term Management and Prevention  Identify Triggers: Work with the patient to identify and avoid triggers.  Medication Review: Ensure the patient is on an appropriate long-term controller medication (e.g., inhaled corticosteroids, long-acting beta-agonists).  Asthma Action Plan: Develop or review the patient s action plan for managing future exacerbations.  Education: Educate the patient on proper inhaler technique and adherence to prescribed medications.  Follow-Up: Schedule a follow-up visit within a week after an asthma attack to reassess and adjust treatment as needed.  8. Patient Education  Self-Management Skills: Teach patients to recognize early signs of  worsening asthma and the importance of early intervention.  Inhaler Technique: Ensure patients know the correct use of their inhalers and spacers.  Prompt and effective treatment of an asthma attack is crucial to prevent complications and ensure the patient's safety. Regular follow-up and ongoing management are key to reducing the frequency and severity of future attacks.

## 2025-01-06 NOTE — PROGRESS NOTES
Assessment & Plan     Mild persistent asthma, unspecified whether complicated    Moderate persistent asthma with exacerbation  - predniSONE (DELTASONE) 20 MG tablet; Take 1 tablet (20 mg) by mouth daily for 5 days.    Acute non-recurrent maxillary sinusitis  - azithromycin (ZITHROMAX) 250 MG tablet; Take 2 tablets (500 mg) by mouth daily for 1 day, THEN 1 tablet (250 mg) daily for 4 days.      No LOS data to display   Ten minutes. Time spent by me today doing chart review, history and exam, documentation and further activities per the note        See Patient Instructions  Patient Instructions   ASSESSMENT    - Asthma exacerbation  - Possible sinusitis  PLAN    - Prescribe prednisone 20 mg to be taken in the morning for five days to manage asthma exacerbation. This dosage is chosen to minimize the impact on sleep, as the patient  has experienced sleep disturbances with prednisone in the past.  - Prescribe Azithromycin (Z-pack) for sinusitis to prevent progression to pneumonia. The regimen includes two tablets on the first day followed by one tablet daily for the next three days, each at a dosage of 250 mg.  - Recommend the use of Flonase nasal spray to alleviate nasal congestion.  - Ensure prescriptions for prednisone and Azithromycin are picked up at the designated pharmacy.  - Advise ample rest and increased fluid intake to aid recovery and manage symptoms effectively.    Prednisone and azithromycin for 5 days    Flonase nasal spray    Treating an asthma attack, also known as an acute asthma exacerbation, involves several steps to quickly relieve symptoms and prevent the attack from worsening. Here s a structured approach to managing an asthma attack:    1. Immediate Assessment  Check Vital Signs: Measure heart rate, respiratory rate, and oxygen saturation.  Peak Flow Meter: If available, use it to assess the severity of the attack.  Symptom Assessment: Evaluate the patient's level of distress, including  difficulty speaking, wheezing, and use of accessory muscles for breathing.  2. Medications  Short-Acting Beta-Agonists (Ana):  Albuterol (Salbutamol): Administer via metered-dose inhaler (MDI) with a spacer or nebulizer. Typical dose is 2-4 puffs every 20 minutes for up to an hour. Nebulized albuterol can be given 2.5-5 mg every 20 minutes for three doses, then 2.5-10 mg every 1-4 hours as needed.  Anticholinergics:  Ipratropium Bromide: Often combined with albuterol in a nebulizer, especially in severe attacks. Dose is 0.5 mg every 20 minutes for three doses.  Systemic Corticosteroids:  Prednisone, Methylprednisolone, or Prednisolone: Administer orally or intravenously to reduce airway inflammation. Dose is typically 40-60 mg of prednisone equivalent.  3. Oxygen Therapy  Supplemental Oxygen: Administer to maintain oxygen saturation (SpO2) above 90% (or 94-98% in pregnant women and those with heart disease).  4. Monitoring and Reassessment  Continuous Monitoring: Reassess the patient s response to treatment every 15-30 minutes.  Repeat Treatments: If the patient does not improve after initial treatments, additional doses of Ana and ipratropium may be required.  5. Additional Interventions for Severe Attacks  Magnesium Sulfate: Intravenous infusion of 1-2 grams over 20 minutes may be considered for severe attacks not responding to initial treatment.  Non-Invasive Ventilation (NIV): Such as CPAP or BiPAP, in cases of severe respiratory distress.  Mechanical Ventilation: For life-threatening asthma unresponsive to all other treatments.  6. Hospital Admission  Criteria for Admission: Poor response to initial treatment, significant hypoxemia, altered mental status, or severe exacerbations despite intensive initial therapy.  Intensive Care Unit (ICU) Admission: For patients requiring mechanical ventilation or with life-threatening exacerbations.  7. Long-term Management and Prevention  Identify Triggers: Work with the  patient to identify and avoid triggers.  Medication Review: Ensure the patient is on an appropriate long-term controller medication (e.g., inhaled corticosteroids, long-acting beta-agonists).  Asthma Action Plan: Develop or review the patient s action plan for managing future exacerbations.  Education: Educate the patient on proper inhaler technique and adherence to prescribed medications.  Follow-Up: Schedule a follow-up visit within a week after an asthma attack to reassess and adjust treatment as needed.  8. Patient Education  Self-Management Skills: Teach patients to recognize early signs of worsening asthma and the importance of early intervention.  Inhaler Technique: Ensure patients know the correct use of their inhalers and spacers.  Prompt and effective treatment of an asthma attack is crucial to prevent complications and ensure the patient's safety. Regular follow-up and ongoing management are key to reducing the frequency and severity of future attacks.     Basilio Zuluaga is a 69 year old, presenting for the following health issues:  No chief complaint on file.        1/6/2025    10:29 AM   Additional Questions   Roomed by Olimpia     History of Present Illness     Asthma:  She presents for follow up of asthma.  She has some cough, some wheezing, and some shortness of breath.  She is using a relief medication every 4 hours. She does not miss any doses of her controller medication throughout the week. Patient is aware of the following triggers: cold air, mold and humidity. The patient has not had a visit to the Emergency Room, Urgent Care or Hospital due to asthma since the last clinic visit. She has been to the Emergency Room or Urgent Care 0 times.She has had a Hospitalization 0 times.    Reason for visit:  Asthma  Symptom onset:  More than a month  Symptoms include:  Coughing , runny nose  Symptom intensity:  Moderate  Symptom progression:  Worsening  Had these symptoms before:  Yes  Has  tried/received treatment for these symptoms:  Yes  Previous treatment was successful:  Yes  Prior treatment description:  Medicaition  What makes it worse:  Cold weather , sleep (not sleeping good)  What makes it better:  Inhaler    She eats 0-1 servings of fruits and vegetables daily.   She is taking medications regularly.     Albuterol 7:30 am . Albuterol last night, helps with the coughing. Cough syrup at home.     SUBJECTIVE    The patient  is a 69-year-old who has had asthma all their life. They reported experiencing increased coughing and asthma attacks, particularly at night. The patient  expressed concern about their asthma exacerbating, especially since they had pneumonia in the past and wish to avoid it again. In November or October, they experienced significant coughing, which was initially suspected to be whooping cough or pneumonia, but it was determined to be related to their asthma. The patient  mentioned being allergic to dust, which exacerbates their asthma symptoms, especially in windy conditions without rain. Cold weather also aggravates their condition, leading to a heavy chest and facial pressure.    The patient  uses a Dulera inhaler, taking two puffs twice a day, and also uses albuterol, which they last took at 7:30 in the morning to help with coughing. They have used prednisone many times in the past to treat asthma exacerbations and find it effective, although they only take it in the morning due to its impact on their sleep. The patient  reported blowing their nose and noticing brownish-greenish mucus, along with sinus pressure and nasal congestion. They sometimes cough up mucus, but at times, they swallow it, which has led to infections in the past. The patient  uses Flonase nasal spray for congestion.    The patient  expressed concern about their heart issues, although they were reassured that their heart sounded good during the visit. They are cautious about their health due to their age  "and past experiences. 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  OBJECTIVE    Vitals:   - Age: 69 years    Physical exam:  - Heart sounds: Excellent    FREE TEXT                      Objective    /68   Pulse 71   Temp 99  F (37.2  C) (Temporal)   Resp 16   Ht 1.563 m (5' 1.54\")   Wt 59.3 kg (130 lb 12.8 oz)   LMP 02/26/2008 (Approximate)   SpO2 97%   BMI 24.29 kg/m    Body mass index is 24.29 kg/m .  Physical Exam   GENERAL: alert and no distress  EYES: Eyes grossly normal to inspection, PERRL and conjunctivae and sclerae normal  HENT: ear canals and TM's normal, nose and mouth without ulcers or lesions  RESP: lungs clear to auscultation - no rales, rhonchi or wheezes  CV: regular rate and rhythm, normal S1 S2, no S3 or S4, no murmur, click or rub, no peripheral edema  MS: no gross musculoskeletal defects noted, no edema  SKIN: no suspicious lesions or rashes  NEURO: Normal strength and tone, mentation intact and speech normal  PSYCH: mentation appears normal, affect normal/bright    Office Visit on 10/31/2024   Component Date Value Ref Range Status    Bordetella pertussis DNA 10/31/2024 Not Detected  Not Detected Final    A negative result does not rule out the presence of PCR inhibitors or Bordetella pertussis DNA in concentrations below the limit of detection of the assay.    Bordetella parapertussis DNA 10/31/2024 Not Detected  Not Detected Final    A negative result does not rule out the presence of PCR inhibitors or Bordetella parapertussis DNA in concentrations below the limit of detection for the assay.    WBC Count 10/31/2024 11.0  4.0 - 11.0 10e3/uL Final    RBC Count 10/31/2024 4.36  3.80 - 5.20 10e6/uL Final    Hemoglobin 10/31/2024 13.4  11.7 - 15.7 g/dL Final    Hematocrit 10/31/2024 40.8  35.0 - 47.0 % Final    MCV 10/31/2024 94  78 - 100 fL Final    MCH 10/31/2024 30.7  26.5 - 33.0 pg Final    MCHC 10/31/2024 32.8  31.5 " - 36.5 g/dL Final    RDW 10/31/2024 13.0  10.0 - 15.0 % Final    Platelet Count 10/31/2024 302  150 - 450 10e3/uL Final     No results found for any visits on 01/06/25.  No results found.        Signed Electronically by: Evelyn Armas PA-C

## 2025-01-06 NOTE — TELEPHONE ENCOUNTER
"S-(situation): Patient is calling and stated she is having coughing spells and cold/ viral symptoms that are increasing her symptoms of asthma.  She stated she has been waking up during the night with coughing and her asthma inhalers are not helping much at this time.  She stated she wants to make sure she \"catches anything\" before it turns into something more serious with her asthma.  Patient verbalized negative COVID home test.    B-(background): Patient has history of asthma    A-(assessment): Patient symptoms increasing asthma irritation     R-(recommendations): Per RN protocol, patient to be seen in clinic.  Offered patient office visit today at requested location, [facility].  Patient scheduled offered visit.  Informed patient to arrive 20 minutes prior to check in at the .  Advised patient to seek urgent/ emergency care for worsening symptoms.  Patient stated understanding.    Reason for Disposition   Wheezing is present    Additional Information   Negative: Bluish (or gray) lips or face   Negative: SEVERE difficulty breathing (e.g., struggling for each breath, speaks in single words)   Negative: Rapid onset of cough and has hives   Negative: Coughing started suddenly after medicine, an allergic food or bee sting   Negative: Difficulty breathing after exposure to flames, smoke, or fumes   Negative: Sounds like a life-threatening emergency to the triager   Negative: Previous asthma attacks and this feels like asthma attack   Negative: Dry cough (non-productive; no sputum or minimal clear sputum) and within 14 days of COVID-19 Exposure   Negative: MODERATE difficulty breathing (e.g., speaks in phrases, SOB even at rest, pulse 100-120) and still present when not coughing   Negative: Chest pain present when not coughing   Negative: Passed out (e.g., fainted, lost consciousness, blacked out and was not responding)   Negative: Patient sounds very sick or weak to the triager   Negative: MILD " "difficulty breathing (e.g., minimal/no SOB at rest, SOB with walking, pulse <100) and still present when not coughing   Negative: Coughed up > 1 tablespoon (15 ml) blood (Exception: Blood-tinged sputum.)   Negative: Fever > 103 F (39.4 C)   Negative: Fever > 101 F (38.3 C) and over 60 years of age   Negative: Fever > 100 F (37.8 C) and has diabetes mellitus or a weak immune system (e.g., HIV positive, cancer chemotherapy, organ transplant, splenectomy, chronic steroids)   Negative: Fever > 100 F (37.8 C) and bedridden (e.g., CVA, chronic illness, recovering from surgery)   Negative: Increasing ankle swelling    Answer Assessment - Initial Assessment Questions  1. ONSET: \"When did the cough begin?\"       Yesterday    2. SEVERITY: \"How bad is the cough today?\"       Moderate    3. SPUTUM: \"Describe the color of your sputum\" (e.g., none, dry cough; clear, white, yellow, green)      Blowing discharge from sinus' greenish/ yellowish    4. HEMOPTYSIS: \"Are you coughing up any blood?\" If Yes, ask: \"How much?\" (e.g., flecks, streaks, tablespoons, etc.)      No    5. DIFFICULTY BREATHING: \"Are you having difficulty breathing?\" If Yes, ask: \"How bad is it?\" (e.g., mild, moderate, severe)       Moderate - asthma     6. FEVER: \"Do you have a fever?\" If Yes, ask: \"What is your temperature, how was it measured, and when did it start?\"      No    7. CARDIAC HISTORY: \"Do you have any history of heart disease?\" (e.g., heart attack, congestive heart failure)       Yes - repair valve    8. LUNG HISTORY: \"Do you have any history of lung disease?\"  (e.g., pulmonary embolus, asthma, emphysema)      Asthma    9. PE RISK FACTORS: \"Do you have a history of blood clots?\" (or: recent major surgery, recent prolonged travel, bedridden)      No    10. OTHER SYMPTOMS: \"Do you have any other symptoms?\" (e.g., runny nose, wheezing, chest pain)        Wheezing, chest feels heavy as when having difficulty with asthma    11. PREGNANCY: \"Is there any " "chance you are pregnant?\" \"When was your last menstrual period?\"        NA    12. TRAVEL: \"Have you traveled out of the country in the last month?\" (e.g., travel history, exposures)        NA    Protocols used: Cough-A-JONY Narvaez RN    "

## 2025-01-07 ENCOUNTER — PATIENT OUTREACH (OUTPATIENT)
Dept: CARE COORDINATION | Facility: CLINIC | Age: 70
End: 2025-01-07
Payer: COMMERCIAL

## 2025-01-09 ASSESSMENT — PATIENT HEALTH QUESTIONNAIRE - PHQ9: SUM OF ALL RESPONSES TO PHQ QUESTIONS 1-9: 10

## 2025-01-14 ENCOUNTER — TELEPHONE (OUTPATIENT)
Dept: FAMILY MEDICINE | Facility: CLINIC | Age: 70
End: 2025-01-14
Payer: COMMERCIAL

## 2025-01-14 NOTE — TELEPHONE ENCOUNTER
Patient was seen 1/6/25 and she is feeling better, but she still has crackles.    She had heart surgery in 2023, she also has asthma. She has a cardiologist and pulminologist in [place].    She is leaving for [place] on Sunday. She is concerned about the respiratory viruses that are currently prevalent in [place].    She is wondering if she can get a refill of the zithromax and prednisone to take with her to [place] to have on hand to protect herself if she gets sick while she is there?    Nina Mcleod RN on 1/14/2025 at 8:14 AM

## 2025-01-14 NOTE — TELEPHONE ENCOUNTER
No, we cannot refill antibiotics and prednisone just in case she gets sick in the future.  If she becomes sick or develop symptoms she should be seen by medical provider for appropriate treatment.  I am more than happy to see her before she leaves for her trip if she would like.  Please notify

## 2025-01-16 ENCOUNTER — HOSPITAL ENCOUNTER (OUTPATIENT)
Dept: GENERAL RADIOLOGY | Facility: CLINIC | Age: 70
Discharge: HOME | End: 2025-01-16
Payer: COMMERCIAL

## 2025-01-16 ENCOUNTER — OFFICE VISIT (OUTPATIENT)
Dept: FAMILY MEDICINE | Facility: CLINIC | Age: 70
End: 2025-01-16
Payer: COMMERCIAL

## 2025-01-16 VITALS
RESPIRATION RATE: 20 BRPM | WEIGHT: 125.8 LBS | HEIGHT: 62 IN | OXYGEN SATURATION: 97 % | SYSTOLIC BLOOD PRESSURE: 129 MMHG | DIASTOLIC BLOOD PRESSURE: 66 MMHG | BODY MASS INDEX: 23.15 KG/M2 | HEART RATE: 91 BPM | TEMPERATURE: 98 F

## 2025-01-16 DIAGNOSIS — R06.2 WHEEZING: ICD-10-CM

## 2025-01-16 DIAGNOSIS — R05.2 SUBACUTE COUGH: ICD-10-CM

## 2025-01-16 DIAGNOSIS — J45.41 MODERATE PERSISTENT ASTHMA WITH EXACERBATION: ICD-10-CM

## 2025-01-16 DIAGNOSIS — R05.2 SUBACUTE COUGH: Primary | ICD-10-CM

## 2025-01-16 PROCEDURE — 71046 X-RAY EXAM CHEST 2 VIEWS: CPT

## 2025-01-16 RX ORDER — PREDNISONE 20 MG/1
40 TABLET ORAL DAILY
Qty: 10 TABLET | Refills: 0 | Status: SHIPPED | OUTPATIENT
Start: 2025-01-16 | End: 2025-01-21

## 2025-01-16 RX ORDER — ALBUTEROL SULFATE 90 UG/1
2 INHALANT RESPIRATORY (INHALATION) EVERY 6 HOURS PRN
Qty: 18 G | Refills: 0 | Status: SHIPPED | OUTPATIENT
Start: 2025-01-16

## 2025-01-16 ASSESSMENT — COPD QUESTIONNAIRES: COPD: 0

## 2025-01-16 ASSESSMENT — ENCOUNTER SYMPTOMS
COUGH: 1
EYE REDNESS: 0
SORE THROAT: 0
SWEATS: 0
HEADACHES: 1
MYALGIAS: 1
CHILLS: 1
SHORTNESS OF BREATH: 0
WHEEZING: 1
WEIGHT LOSS: 1
RHINORRHEA: 0

## 2025-01-16 ASSESSMENT — PAIN SCALES - GENERAL: PAINLEVEL_OUTOF10: NO PAIN (0)

## 2025-01-16 ASSESSMENT — LIFESTYLE VARIABLES: SMOKING_STATUS: 0

## 2025-01-16 NOTE — PROGRESS NOTES
Assessment & Plan     Subacute cough  - predniSONE (DELTASONE) 20 MG tablet; Take 2 tablets (40 mg) by mouth daily for 5 days.  - XR Chest 2 Views; Future  - albuterol (PROAIR HFA/PROVENTIL HFA/VENTOLIN HFA) 108 (90 Base) MCG/ACT inhaler; Inhale 2 puffs into the lungs every 6 hours as needed for shortness of breath, wheezing or cough.    Wheezing  - predniSONE (DELTASONE) 20 MG tablet; Take 2 tablets (40 mg) by mouth daily for 5 days.  - XR Chest 2 Views; Future  - albuterol (PROAIR HFA/PROVENTIL HFA/VENTOLIN HFA) 108 (90 Base) MCG/ACT inhaler; Inhale 2 puffs into the lungs every 6 hours as needed for shortness of breath, wheezing or cough.    Moderate persistent asthma with exacerbation  - predniSONE (DELTASONE) 20 MG tablet; Take 2 tablets (40 mg) by mouth daily for 5 days.  - XR Chest 2 Views; Future  - albuterol (PROAIR HFA/PROVENTIL HFA/VENTOLIN HFA) 108 (90 Base) MCG/ACT inhaler; Inhale 2 puffs into the lungs every 6 hours as needed for shortness of breath, wheezing or cough.        No LOS data to display   Time spent by me today doing chart review, history and exam, documentation and further activities per the note      See Patient Instructions  Patient Instructions   ASSESSMENT    - Moderate persistent asthma exacerbation.  - Acute non-recurrent maxillary sinusitis.  - Possible pneumonia, pending chest X-ray results.  PLAN    - Prescribe prednisone 40 mg to be taken daily for five days to manage asthma exacerbation and sinusitis symptoms.  - Prescribe an albuterol inhaler to be used every six hours as needed for wheezing and shortness of breath. Advise against using the inhaler right before bedtime to avoid potential side effects such as palpitations and anxiety.  - Conduct a chest X-ray today to assess for possible pneumonia. The patient  is free to leave after the X-ray, and results will be communicated via [employer] message.  - If the chest X-ray indicates pneumonia, consider prescribing a combination of  antibiotics, such as Azithromycin with Augmentin or amoxicillin, to ensure effective treatment before the patient 's trip to [place].      Chest xr    Prednsione for 5 days    Albuterol as needed every  6 hours as needed    Follow up with pulmonology      Basliio Zuluaga is a 69 year old, presenting for the following health issues:  Cough      1/16/2025     9:10 AM   Additional Questions   Roomed by Rosa Elena         1/16/2025     9:10 AM   Patient Reported Additional Medications   Patient reports taking the following new medications none     Cough  This is a new problem. The current episode started more than 1 week ago. The problem occurs every few hours. The problem has been gradually improving. The cough is Productive of sputum (yellow/green). There has been no fever. Associated symptoms include chills, weight loss, headaches, myalgias and wheezing. Pertinent negatives include no chest pain, no sweats, no ear congestion, no ear pain, no rhinorrhea, no sore throat, no shortness of breath and no eye redness. She has tried mist and decongestants for the symptoms. The treatment provided mild relief. She is not a smoker. Her past medical history is significant for bronchitis, pneumonia and asthma. Her past medical history does not include bronchiectasis, COPD or emphysema.      SUBJECTIVE    The patient , a female, is here for a follow-up regarding her cough. She plans to travel to [place] soon and is concerned about the prevalent respiratory viruses there. She had an office visit on January 6, 2025, during which she was prescribed prednisone and Azithromycin for a moderate persistent asthma exacerbation and acute non-recurrent maxillary sinusitis. She was also instructed to use Flonase nasal sprays.     She reports that her symptoms include crackles and a sensation of tightness in her lungs, particularly noticeable when lying down. She describes hearing a rattling sound in her lungs and expresses concern about the  "possibility of pneumonia, as she has experienced it before and does not wish to have it again. She mentions using a spacer with her Dulera inhaler and taking ipratropium bromide for her cough, with two puffs every six hours. She has not used an albuterol inhaler before.    The patient  is considering a nebulizer treatment but is unsure due to concerns about potential side effects, such as muscle pain or cramps, wheezing, and trouble breathing. She recalls being given a nebulizer during a past hospital stay for pneumonia and COVID. She is interested in having a nebulizer on hand but is also considering discussing it further with her lung doctor.    She expresses concern about how her medications might affect her sleep, particularly the albuterol inhaler, which she understands can cause palpitations and anxiety if taken before bedtime. She plans to use her medications, including Dulera and albuterol, as needed for cough, wheezing, and shortness of breath.    The patient  recalls a past reaction to penicillin, which caused itching, but notes that she no longer experiences this reaction. She mentions having amoxicillin on hand for dental issues related to her heart.  OBJECTIVE    Physical exam:    - Wheezing noted upon auscultation    Test results:    - Chest X-ray ordered                      Objective    /66 (BP Location: Right arm, Patient Position: Sitting, Cuff Size: Adult Small)   Pulse 91   Temp 98  F (36.7  C) (Temporal)   Resp 20   Ht 1.565 m (5' 1.61\")   Wt 57.1 kg (125 lb 12.8 oz)   LMP 02/26/2008 (Approximate)   SpO2 97%   BMI 23.30 kg/m    Body mass index is 23.3 kg/m .  Physical Exam   GENERAL: alert and no distress  EYES: Eyes grossly normal to inspection, PERRL and conjunctivae and sclerae normal  RESP: Wheezing or crackles heard over the left upper lung field.  Otherwise lung, s clear to auscultation - no other rales, rhonchi or wheezes  CV: regular rate and rhythm, normal S1 S2, no S3 or " S4, no murmur, click or rub, no peripheral edema  MS: no gross musculoskeletal defects noted, no edema  SKIN: no suspicious lesions or rashes  NEURO: Normal strength and tone, mentation intact and speech normal  PSYCH: mentation appears normal, affect normal/bright    Office Visit on 10/31/2024   Component Date Value Ref Range Status    Bordetella pertussis DNA 10/31/2024 Not Detected  Not Detected Final    A negative result does not rule out the presence of PCR inhibitors or Bordetella pertussis DNA in concentrations below the limit of detection of the assay.    Bordetella parapertussis DNA 10/31/2024 Not Detected  Not Detected Final    A negative result does not rule out the presence of PCR inhibitors or Bordetella parapertussis DNA in concentrations below the limit of detection for the assay.    WBC Count 10/31/2024 11.0  4.0 - 11.0 10e3/uL Final    RBC Count 10/31/2024 4.36  3.80 - 5.20 10e6/uL Final    Hemoglobin 10/31/2024 13.4  11.7 - 15.7 g/dL Final    Hematocrit 10/31/2024 40.8  35.0 - 47.0 % Final    MCV 10/31/2024 94  78 - 100 fL Final    MCH 10/31/2024 30.7  26.5 - 33.0 pg Final    MCHC 10/31/2024 32.8  31.5 - 36.5 g/dL Final    RDW 10/31/2024 13.0  10.0 - 15.0 % Final    Platelet Count 10/31/2024 302  150 - 450 10e3/uL Final     No results found for any visits on 01/16/25.  No results found.        Signed Electronically by: Evelyn Armas PA-C    Answers submitted by the patient for this visit:  Patient Health Questionnaire (Submitted on 1/16/2025)  If you checked off any problems, how difficult have these problems made it for you to do your work, take care of things at home, or get along with other people?: Not difficult at all  PHQ9 TOTAL SCORE: 7

## 2025-01-16 NOTE — PATIENT INSTRUCTIONS
ASSESSMENT    - Moderate persistent asthma exacerbation.  - Acute non-recurrent maxillary sinusitis.  - Possible pneumonia, pending chest X-ray results.  PLAN    - Prescribe prednisone 40 mg to be taken daily for five days to manage asthma exacerbation and sinusitis symptoms.  - Prescribe an albuterol inhaler to be used every six hours as needed for wheezing and shortness of breath. Advise against using the inhaler right before bedtime to avoid potential side effects such as palpitations and anxiety.  - Conduct a chest X-ray today to assess for possible pneumonia. The patient  is free to leave after the X-ray, and results will be communicated via leemail.  - If the chest X-ray indicates pneumonia, consider prescribing a combination of antibiotics, such as Azithromycin with Augmentin or amoxicillin, to ensure effective treatment before the patient 's trip to [place].      Chest xr    Prednsione for 5 days    Albuterol as needed every  6 hours as needed    Follow up with pulmonology

## 2025-01-16 NOTE — PROGRESS NOTES
Appropriate assistive devices provided during their visit. yes (Yes, No, N/A) tech(list device)    Exam table and/or cart  placed in the lowest position. yes (Yes, No, N/A)    Brakes on tables/carts/wheelchairs used at all times. yes (Yes, No, N/A)    Non slip footwear applied. yes (Yes, No, NA)    Patient was accompanied by staff throughout visit. yes (Yes, No, N/A)    Equipment safety straps used. N/a (Yes, No, N/A)    Assist with toileting. N/a (Yes, No, N/A)     Marilu Linton  1/16/2025  9:59 AM

## 2025-01-23 ENCOUNTER — TELEPHONE (OUTPATIENT)
Dept: PHYSICAL THERAPY | Facility: CLINIC | Age: 70
End: 2025-01-23
Payer: COMMERCIAL

## 2025-01-23 DIAGNOSIS — G89.29 CHRONIC RIGHT SHOULDER PAIN: Primary | ICD-10-CM

## 2025-01-23 DIAGNOSIS — M25.511 CHRONIC RIGHT SHOULDER PAIN: Primary | ICD-10-CM

## 2025-01-27 DIAGNOSIS — J45.30 MILD PERSISTENT ASTHMA, UNSPECIFIED WHETHER COMPLICATED: ICD-10-CM

## 2025-01-27 NOTE — TELEPHONE ENCOUNTER
Medication already sent via [employer] request.    Tisha Howard MSN, RN   [facility], 1/27/2025 11:59 AM

## 2025-01-29 ENCOUNTER — MYC MEDICAL ADVICE (OUTPATIENT)
Dept: FAMILY MEDICINE | Facility: CLINIC | Age: 70
End: 2025-01-29
Payer: COMMERCIAL

## 2025-01-29 RX ORDER — MOMETASONE FUROATE AND FORMOTEROL FUMARATE DIHYDRATE 100; 5 UG/1; UG/1
AEROSOL RESPIRATORY (INHALATION)
Qty: 39 G | Refills: 0 | OUTPATIENT
Start: 2025-01-29

## 2025-01-29 NOTE — TELEPHONE ENCOUNTER
Patient Quality Outreach    Patient is due for the following:   Physical Annual Wellness Visit      Topic Date Due    COVID-19 Vaccine (3 - Moderna risk series) 06/13/2022       Action(s) Taken:   Schedule a Annual Wellness Visit    Type of outreach:    Sent [employer] message.  Call in 1 week if not read/completed; send letter in 2 weeks if not returned/read.     Questions for provider review:    None           Kyra Dawson, [facility]  Chart routed to Care Team.

## 2025-02-25 ENCOUNTER — TELEPHONE (OUTPATIENT)
Dept: FAMILY MEDICINE | Facility: CLINIC | Age: 70
End: 2025-02-25
Payer: COMMERCIAL

## 2025-02-25 NOTE — TELEPHONE ENCOUNTER
Patient called back to reschedule. Would like to know if provider can see her before 3/10? Has been rescheduled x2 now and does not want to wait until the end of March if possible. Will route to NP to see if any openings.

## 2025-03-02 ENCOUNTER — MYC MEDICAL ADVICE (OUTPATIENT)
Dept: CARDIOLOGY | Facility: CLINIC | Age: 70
End: 2025-03-02
Payer: COMMERCIAL

## 2025-03-02 DIAGNOSIS — I34.0 NONRHEUMATIC MITRAL VALVE REGURGITATION: ICD-10-CM

## 2025-03-02 DIAGNOSIS — Z29.89 NEED FOR SBE (SUBACUTE BACTERIAL ENDOCARDITIS) PROPHYLAXIS: ICD-10-CM

## 2025-03-02 DIAGNOSIS — Z98.890 S/P MITRAL VALVE REPAIR: ICD-10-CM

## 2025-03-03 RX ORDER — AMOXICILLIN 500 MG/1
CAPSULE ORAL
Qty: 4 CAPSULE | Refills: 0 | Status: SHIPPED | OUTPATIENT
Start: 2025-03-03

## 2025-03-03 NOTE — TELEPHONE ENCOUNTER
[facility] Call Center    Phone Message    May a detailed message be left on voicemail: yes     Reason for Call: Medication Refill Request    Has the patient contacted the pharmacy for the refill? Yes   Name of medication being requested: Amoxicillin  Provider who prescribed the medication: Dr Mathews  Pharmacy:    [address]  Date medication is needed: Today    The patient asked to pick this up today for her dental appointment on Wednesday     Action Taken: Other: Cardiology    Travel Screening: Not Applicable     Date of Service:

## 2025-03-04 NOTE — PROGRESS NOTES
Margareth Green  :  1955  DOS: 3/6/2025  MRN: [id number]  PCP: Caitlin Beasley    Sports Medicine Clinic Visit    Interim History - 2025  - Last seen on 2024 for cervical radiculopathy, right rotator cuff tendinopathy and right primary osteoarthritis of the shoulder.   - 2024 right glenohumeral joint injection and 2024 right subacromial injection. She is unsure which injection helped more, but did get relief from the combination.   - Since the last visit, she notes that it is continuing to be more difficult to reach over her head, with more pain. She notes that her lateral right chest is still painful from her heart surgery last year, where she was told that she would have some lingering nerve pain. Has been to physical therapy but  notes she has not been consistent with her exercises. Continues to prefer nonoperative management. No interim injury.       Initial Visit: 2024  HPI  Margareth Green is a 69 year old female who is seen in consultation at the request of  Caitlin Beasley PA-C presenting with right shoulder pain.    - Mechanism of Injury:    - No inciting injury  - Pertinent history and prior evaluations:    - 4/10/2024 with Caitlin Beasley PA-C: right shoulder and cervical radiculopathy noted at C8 with multilevel DDD and central and foraminal stenosis on cervical MRI in  and has had success with a C7-T1 ILESI. Prednisone prescription given.   - Right shoulder xray IMPRESSION: 1. Severe osteoarthrosis of the glenohumeral joint again noted. 2. Calcification in the region of the supraspinatus/infraspinatus tendon raises the question of calcific tendinosis. This has progressed. 3. No other change.    - Pain Character:    - Location:  anterior, lateral, posterior right shoulder  - Character:  aching shoulder pain, more sharp with movements  - Duration: One year  - Course:  worsening  - Endorses:    - Anterior, lateral, posterior right shoulder pain with achy  characteristic present at rest and worse with shoulder abduction and rotation.  -Separate and distinct numbness and tingling into the right digits 4-5, chronic neck pain and radicular pain down the right arm intermittently.  - Denies:    - swelling, clicking/popping, grinding, mechanical locking symptoms, neurogenic weakness  - Alleviating factors:    - Naproxen, Tylenol arthritis, activity modification, rest  - Aggravating factors:    - repetitive movements with gardening, grabbing, grasping, abduction of shoulder, above head movments  - Other treatments tried:    - nothing    - Patient Goals:    - discuss treatment options  - Social History:   - Retired.  Previous work:  Medical records for [employer]      Review of Systems  Musculoskeletal: as above  Remainder of review of systems is negative including constitutional, CV, pulmonary, GI, Skin and Neurologic except as noted in HPI or medical history.    Past Medical History:   Diagnosis Date    Abnormal Papanicolaou smear of vagina and vaginal HPV 12/2002    ASCUS (HPV neg). 3/03 WNL but no transitional zome - repeat 3months    Allergic rhinitis, cause unspecified     Arthritis     Backache, unspecified     Carpal tunnel syndrome     s/p repair    Cervicalgia     Chronic kidney disease, stage 3 (H) 07/14/2021    Depressive disorder     Depressive disorder, not elsewhere classified     doing well on Wellbutrin    Derangement of TMJ (temporomandibular joint) 03/2010    internal derangement    Dermatophytosis of nail     Diaphragmatic hernia without mention of obstruction or gangrene 09/2007    small hiatal hernia    Diffuse cystic mastopathy 2000    FCBD    Esophageal reflux 04/2005    Hammertoe 10/2009    see podiatry consult    Headache(784.0)     Muscle contrature headaches    Intramural leiomyoma of uterus 07/2007    on US    Irregular menstrual cycle     better with BCP -also PMS is better with BCP    IRRITABLE COLON 02/18/2008    Malaise and fatigue     Menopause      lmp 2/2008    Mild persistent asthma     Mitral valve disease     Mole (skin) 2003    9 x 6 mm rt ant chest    Motion sickness     Neuroma 11/2008    lt foot    Osteopenia 12/2008    Other malaise and fatigue 04/2005    stress    Personal history of tobacco use, presenting hazards to health     Quit in 1998    Solitary cyst of breast 12/2005    lt 6 oclock    Stenosis of lumbosacral spine     l4-5 and l5-s1    Symptomatic menopausal or female climacteric states 12/2002    perimenopausal. LMP 2/2008    Tachycardia, paroxysmal (H) 11/24/2020    Thyroid fullness 08/07/2013     Past Surgical History:   Procedure Laterality Date    APPLY WOUND VAC Right 2/22/2024    Procedure: PLACEMENT OF RIGHT GROIN PREVENA WOUND VAC;  Surgeon: Brandon Hale MD;  Location:  OR    BIOPSY  4/10/2015    BL DUPLEX LO EXTREM ART UNILAT/LTD  4/3008    lower extr arterial doppler -no stenosis    C DEXA,BONE DENSITY,APPENDICULR SKELTN  12/2008    osteopenia    COLONOSCOPY  10/22/2007    bx benign    COLONOSCOPY N/A 4/10/2015    Procedure: COMBINED COLONOSCOPY, SINGLE OR MULTIPLE BIOPSY/POLYPECTOMY BY BIOPSY;  Surgeon: Cl Wagner MD;  Location:  GI    CV CORONARY ANGIOGRAM N/A 8/17/2023    Procedure: Coronary Angiogram;  Surgeon: Liza Ford MD;  Location:  HEART CARDIAC CATH LAB    ESOPHAGOSCOPY, GASTROSCOPY, DUODENOSCOPY (EGD), COMBINED N/A 1/31/2018    Procedure: COMBINED ESOPHAGOSCOPY, GASTROSCOPY, DUODENOSCOPY (EGD);  COMBINED ESOPHAGOSCOPY, GASTROSCOPY, DUODENOSCOPY (EGD);  Surgeon: Tirso Duran MD;  Location:  GI    ESOPHAGOSCOPY, GASTROSCOPY, DUODENOSCOPY (EGD), COMBINED N/A 2/1/2023    Procedure: ESOPHAGOGASTRODUODENOSCOPY (EGD);  Surgeon: Lui Vences MD;  Location:  GI    EYE SURGERY      HC PART REMV BONE METATARSAL HEAD,EA  01/11/10    Right foot plantar plate tear. Also correct hammertoe, 2nd digit & varicose vein ligation, heel.    HC REVISE MEDIAN N/CARPAL TUNNEL SURG      Left in  , right in     INCISION AND DRAINAGE LOWER EXTREMITY, COMBINED Right 2024    Procedure: Excision OF RIGHT GROIN SEROMA WITH LYMPHAZURIN BLUE INJECTION, Right groin exploration;  Surgeon: Brandon Hale MD;  Location: [facility] OR    INJ, ANES/STER, FACET LUMB/SAC      Left L5 nerve root injection    INJECT EPIDURAL CERVICAL Right 2022    Procedure: Cervical 7-Thoracic 1 Interlaminar epidural steroid injection using fluoroscopic guidance with contrast dye, Right;  Surgeon: Tyler Bay MD;  Location: [facility] OR    INJECT EPIDURAL TRANSFORAMINAL  10/09/2013    [facility]    INJECT JOINT SACROILIAC  3/19/14,14    [facility]    REPAIR VALVE MITRAL MINIMALLY INVASIVE N/A 10/26/2023    Procedure: MINIMALLY INVASIVE MITRAL VALVE REPAIR USING CHORD-X PRE-MEASURED LOOP SUTURE SIZE:12MM, MITRAL ANNULOPLASTY RING ERICK-COTTO PHYSIO Il SIZE: 28MM, AND ON CARDIOPULMONARY PUMP OXYGENATOR (INTRAOPERATIVE TRANSESOPHAGEAL ECHOCARDIOGRAM BY THE CARDIOLOGIST  );  Surgeon: Sabas Meredith MD;  Location: [facility] OR    ZZC NONSPECIFIC PROCEDURE      Metatarsal phalangeal joint injection    ZZHC COLONOSCOPY THRU STOMA, DIAGNOSTIC  2002    bx. benign - flex sig in 5 yrs  or colonoscopy in 10 yrs    ZZHC ECHO HEART XTHORACIC, STRESS/REST  2005    WNL    ZZHC NCS MOTOR W/O F-WAVE, EACH NERVE  2008    CTS     Family History   Problem Relation Age of Onset    Diabetes Father     Depression Father     Cerebrovascular Disease Mother         age 80    Arthritis Mother     Depression Mother         On meds    Eye Disorder Mother         Glaucoma    Osteoporosis Mother     Respiratory Mother          86 YO COPD    Chronic Obstructive Pulmonary Disease Mother     C.A.D. Brother         67 YO MI -    Chronic Obstructive Pulmonary Disease Brother     Myocardial Infarction Brother     Other Cancer Brother     Cancer Brother          Objective  LMP 2008 (Approximate)      General: healthy, alert and in no acute distress.    HEENT: no scleral icterus or conjunctival erythema.   Skin: no suspicious lesions or rash. No jaundice.   CV: regular rhythm by palpation, 2+ distal pulses.  Resp: normal respiratory effort without conversational dyspnea.   Psych: normal mood and affect.    Gait: nonantalgic, appropriate coordination and balance.     Neuro:        - Sensation to light touch:    - Intact throughout the BUE including all peripheral nerve distributions.        - MSR:       RUE  LUE  - Biceps  2+ 2+  - Brachioradialis 2+ 2+  - Triceps  2+ 2+       - Special tests:   - Spurling's: Positive for some mild increase in numbness/tingling in digits 4-5 on the right    MSK - Shoulder:       - Inspection:    - No significant swelling, erythema, warmth, ecchymosis, lesion, or atrophy noted.        - ROM:    - Slightly limited AROM/PROM with anterolateral shoulder pain with shoulder abduction, flexion, IR/ER.       - Palpation:    - TTP at the anterior shoulder joint.   - NTTP elsewhere including the subacromial space, proximal biceps tendon, right-sided cervical paraspinals.        - Strength:  (*antalgic)  - Shoulder Abduction   5-*    - Shoulder Flexion   5-*    - Shoulder Internal Rotation  5-*    - Shoulder External Rotation  5-*             - Special tests:        - Sims: Positive   - Neers: Positive   - Empty can: Positive for pain and mild weakness    - Mesilla: Positive    - Scarf:  Neg    - Speeds:  Neg    - Yergason:  Neg    - Crank:  Pos    Radiology  I independently reviewed the available relevant imaging in the chart with my interpretations as above in HPI.     I independently reviewed today's new relevant imaging, with the following interpretation:  - XR R shoulder 3/6/2025 shows severe osteoarthritis of the right glenohumeral joint with bone-on-bone articulation and subchondral cyst formation.  Moderate degenerative changes acromioclavicular joint.  No acute fractures or  dislocations.      Procedure  Large Joint Injection/Arthocentesis: R glenohumeral joint    Date/Time: 3/6/2025 10:42 AM    Performed by: Octavio Carrera DO  Authorized by: Octavio Carrera DO    Indications:  Pain  Needle Size:  22 G  Guidance: ultrasound    Approach:  Posterolateral  Location:  Shoulder      Site:  R glenohumeral joint  Medications:  40 mg triamcinolone 40 MG/ML; 2 mL lidocaine 1 %; 2 mL BUPivacaine 0.5 %  Outcome:  Tolerated well, no immediate complications  Procedure discussed: discussed risks, benefits, and alternatives    Consent Given by:  Patient  Prep: patient was prepped and draped in usual sterile fashion     Ultrasound images of procedure were permanently stored.      Glenohumeral Injection - Ultrasound Guided  The patient was informed of the risks and the benefits of the procedure and a written consent was signed. The patient s shoulder was prepped with chlorhexidine in sterile fashion. 40 mg of kenalog suspension was drawn up into a 5 mL syringe with 2 mL of 1% lidocaine and 2 ml of 0.5% bupivacaine.  Injection was performed using sterile technique.  Under ultrasound guidance a 3.5-inch 22-gauge needle was used to enter the glenohumeral joint.  Posterolateral approach was used with the patient in lateral recumbent position, arm in neutral position at the side.  Needle placement was visualized and documented with ultrasound.  Ultrasound visualization was necessary due to the small joint space entered.  Injection performed long axis to the probe.  Injection solution visualized within the joint space.  Images were permanently stored for the patient's record. There were no complications. The patient tolerated the procedure well. There was negligible bleeding.       Assessment  1. Primary osteoarthritis, right shoulder    2. Cervical radiculopathy          Plan  Margareth Green is a pleasant 70 year old female that presents for follow up of her chronic right shoulder pain that is  multifactorial in nature.  She does have radiographic and clinical evidence of severe osteoarthritis of the glenohumeral joint with some deep achy pain within the joint that hurts worse with movements and can bother her at night and at rest.  This appeared to be her most bothersome pain at her original exam and we performed ultrasound-guided glenohumeral joint and subacromial bursa corticosteroid injections, which relieved a fair amount of her pain.    She also has pain in the anterolateral shoulder with shoulder abduction, flexion, IR/ER with positive impingement testing on exam that is consistent with tendinopathy of the rotator cuff tendons.  She also has known cervical stenosis with right-sided radiculopathy that is responded well to C7-T1 ILESI in the past.  Includes numbness/tingling and occasional radicular shooting pain down to digits 4-5 on the right consistent with a likely C8 nerve root involvement.     In the interim, she has been feeling more shoulder pain.  Deep within the joint that radiates into the upper arm and up into her neck.  Radiographs today demonstrate worsening advancement of her severe glenohumeral osteoarthritis with bone-on-bone articulation and prominent subchondral cystic formation.  It is highly likely that this is driving procedures with a vast majority of her right shoulder pain.    We discussed the nature of the condition and available treatment options, and mutually agreed upon the following plan:    - Imaging:          - Reviewed and independently interpreted the relevant imaging in the chart, including any imaging ordered for today's clinic.  - Reviewed results and images with patient.   - MRI right shoulder order has been placed to evaluate the integrity of the rotator cuff tendons for possible replacement surgery for the shoulder.  - Medications:          - Discussed pharmacologic options for pain relief.   - May use NSAIDs (Ibuprofen, Naproxen) or Acetaminophen (Tylenol) as  needed for pain control.   - Do not take these if previously advised to avoid them for other medical conditions.  - May also use topical medications such as lidocaine, IcyHot, BioFreeze, or Voltaren gel as needed for pain control.    - Voltaren gel is an anti-inflammatory cream that may be used up to 4 times per day over the painful area.   - Prednisone was very helpful for her, and could be considered in the future if other conservative treatment is not managing the pain well  - Injections:          - Discussed possible injection options and alternatives.    - Injection options include: Corticosteroid injection of the glenohumeral joint, subacromial bursa. Also may consider cervical epidural steroid injection in the future if needed.      - Previous glenohumeral joint and subacromial bursa injections were helpful.  I would prioritize a glenohumeral joint injection today based on the progression of her arthritis.    - She also would like a repeat cervical epidural steroid injection, referral placed today.  - Performed a corticosteroid injection of the right glenohumeral joint under ultrasound guidance today in clinic. Patient tolerated the procedure well without complications.     - Post-procedure instructions:    - Keep the injection site clean and dry.   - Do not submerge the injection site for 24 hours (no baths, pools). Showers are ok.   - Rest the area for 24-48 hours before resuming normal activities. Avoid overexerting the area for the first few weeks.   - It may take 2-3 days to start noticing the effects of the injection and up to 3-4 weeks to feel significant benefits.   - Therapy:          - Discussed the benefits of therapy vs home exercise program for optimization of range of motion, flexibility, strength, stability and function.   - She has previously attended physical therapy and has a quality home exercise program at home.  Encouraged to continue HEP as instructed.  - Modalities:          - May use  ice, heat, massage or other modalities as needed.  - Surgery:          - Discussed non-operative and operative treatment options for the patient's condition.  Originally against any surgery for the shoulder.  Discussed that a replacement would be the only definitive treatment for the advanced arthritis in the glenohumeral joint.  She would like to discuss what a replacement may look like preoperatively and postoperatively.  She would like a referral to orthopedic surgery to discuss this option.  Orthopedic surgery referral placed today.  - Activity:          - Encouraged to remain active and participate in regular activities as symptoms allow.   Avoid or modify exacerbating activities as needed.  - Follow up:          - When results of the advanced imaging are available to discuss the results and next steps in treatment.   - May follow up sooner for new/worsening symptoms.  - May contact clinic by phone or MyChart for questions or concerns.       Octavio Carrera DO, BRIAN  [facility]  [facility] - Department of Orthopedic Surgery       Disclaimer:  This note was prepared and written using Dragon Medical dictation software. As a result, there may be errors in the script that have gone undetected. Please consider this when interpreting the information in this note.

## 2025-03-05 ENCOUNTER — OFFICE VISIT (OUTPATIENT)
Dept: FAMILY MEDICINE | Facility: CLINIC | Age: 70
End: 2025-03-05
Payer: COMMERCIAL

## 2025-03-05 VITALS
RESPIRATION RATE: 20 BRPM | DIASTOLIC BLOOD PRESSURE: 68 MMHG | SYSTOLIC BLOOD PRESSURE: 132 MMHG | WEIGHT: 130.1 LBS | OXYGEN SATURATION: 99 % | TEMPERATURE: 98.5 F | HEIGHT: 62 IN | HEART RATE: 83 BPM | BODY MASS INDEX: 23.94 KG/M2

## 2025-03-05 DIAGNOSIS — R20.2 PARESTHESIA: ICD-10-CM

## 2025-03-05 DIAGNOSIS — J45.50 SEVERE PERSISTENT ASTHMA WITHOUT COMPLICATION (H): ICD-10-CM

## 2025-03-05 DIAGNOSIS — M81.0 OSTEOPOROSIS WITHOUT CURRENT PATHOLOGICAL FRACTURE, UNSPECIFIED OSTEOPOROSIS TYPE: ICD-10-CM

## 2025-03-05 DIAGNOSIS — J84.9 ILD (INTERSTITIAL LUNG DISEASE) (H): ICD-10-CM

## 2025-03-05 DIAGNOSIS — G47.09 OTHER INSOMNIA: ICD-10-CM

## 2025-03-05 DIAGNOSIS — R41.840 POOR CONCENTRATION: ICD-10-CM

## 2025-03-05 DIAGNOSIS — Z00.00 ENCOUNTER FOR MEDICARE ANNUAL WELLNESS EXAM: Primary | ICD-10-CM

## 2025-03-05 DIAGNOSIS — Z12.11 SPECIAL SCREENING FOR MALIGNANT NEOPLASMS, COLON: ICD-10-CM

## 2025-03-05 DIAGNOSIS — F33.0 MAJOR DEPRESSIVE DISORDER, RECURRENT EPISODE, MILD: ICD-10-CM

## 2025-03-05 DIAGNOSIS — Z13.220 SCREENING FOR HYPERLIPIDEMIA: ICD-10-CM

## 2025-03-05 LAB
CRP SERPL-MCNC: <3 MG/L
ERYTHROCYTE [SEDIMENTATION RATE] IN BLOOD BY WESTERGREN METHOD: 6 MM/HR (ref 0–30)
VIT B12 SERPL-MCNC: 1097 PG/ML (ref 232–1245)

## 2025-03-05 PROCEDURE — 36415 COLL VENOUS BLD VENIPUNCTURE: CPT | Performed by: PHYSICIAN ASSISTANT

## 2025-03-05 PROCEDURE — 82607 VITAMIN B-12: CPT | Performed by: PHYSICIAN ASSISTANT

## 2025-03-05 PROCEDURE — 85652 RBC SED RATE AUTOMATED: CPT | Performed by: PHYSICIAN ASSISTANT

## 2025-03-05 PROCEDURE — 86140 C-REACTIVE PROTEIN: CPT | Performed by: PHYSICIAN ASSISTANT

## 2025-03-05 RX ORDER — BUPROPION HYDROCHLORIDE 150 MG/1
TABLET ORAL
Qty: 90 TABLET | Refills: 1 | Status: SHIPPED | OUTPATIENT
Start: 2025-03-05

## 2025-03-05 RX ORDER — ALENDRONATE SODIUM 70 MG/1
70 TABLET ORAL
Qty: 12 TABLET | Refills: 3 | Status: CANCELLED | OUTPATIENT
Start: 2025-03-05

## 2025-03-05 RX ORDER — TRAZODONE HYDROCHLORIDE 50 MG/1
50 TABLET ORAL AT BEDTIME
Qty: 90 TABLET | Refills: 0 | Status: CANCELLED | OUTPATIENT
Start: 2025-03-05

## 2025-03-05 SDOH — HEALTH STABILITY: PHYSICAL HEALTH: ON AVERAGE, HOW MANY MINUTES DO YOU ENGAGE IN EXERCISE AT THIS LEVEL?: 30 MIN

## 2025-03-05 SDOH — HEALTH STABILITY: PHYSICAL HEALTH: ON AVERAGE, HOW MANY DAYS PER WEEK DO YOU ENGAGE IN MODERATE TO STRENUOUS EXERCISE (LIKE A BRISK WALK)?: 2 DAYS

## 2025-03-05 ASSESSMENT — PATIENT HEALTH QUESTIONNAIRE - PHQ9
SUM OF ALL RESPONSES TO PHQ QUESTIONS 1-9: 13
SUM OF ALL RESPONSES TO PHQ QUESTIONS 1-9: 13
10. IF YOU CHECKED OFF ANY PROBLEMS, HOW DIFFICULT HAVE THESE PROBLEMS MADE IT FOR YOU TO DO YOUR WORK, TAKE CARE OF THINGS AT HOME, OR GET ALONG WITH OTHER PEOPLE: SOMEWHAT DIFFICULT

## 2025-03-05 ASSESSMENT — SOCIAL DETERMINANTS OF HEALTH (SDOH): HOW OFTEN DO YOU GET TOGETHER WITH FRIENDS OR RELATIVES?: ONCE A WEEK

## 2025-03-05 ASSESSMENT — PAIN SCALES - GENERAL: PAINLEVEL_OUTOF10: NO PAIN (0)

## 2025-03-05 NOTE — PATIENT INSTRUCTIONS
Patient Education   Preventive Care Advice   This is general advice given by our system to help you stay healthy. However, your care team may have specific advice just for you. Please talk to your care team about your preventive care needs.  Nutrition  Eat 5 or more servings of fruits and vegetables each day.  Try wheat bread, brown rice and whole grain pasta (instead of white bread, rice, and pasta).  Get enough calcium and vitamin D. Check the label on foods and aim for 100% of the RDA (recommended daily allowance).  Lifestyle  Exercise at least 150 minutes each week  (30 minutes a day, 5 days a week).  Do muscle strengthening activities 2 days a week. These help control your weight and prevent disease.  No smoking.  Wear sunscreen to prevent skin cancer.  Have a dental exam and cleaning every 6 months.  Yearly exams  See your health care team every year to talk about:  Any changes in your health.  Any medicines your care team has prescribed.  Preventive care, family planning, and ways to prevent chronic diseases.  Shots (vaccines)   HPV shots (up to age 26), if you've never had them before.  Hepatitis B shots (up to age 59), if you've never had them before.  COVID-19 shot: Get this shot when it's due.  Flu shot: Get a flu shot every year.  Tetanus shot: Get a tetanus shot every 10 years.  Pneumococcal, hepatitis A, and RSV shots: Ask your care team if you need these based on your risk.  Shingles shot (for age 50 and up)  General health tests  Diabetes screening:  Starting at age 35, Get screened for diabetes at least every 3 years.  If you are younger than age 35, ask your care team if you should be screened for diabetes.  Cholesterol test: At age 39, start having a cholesterol test every 5 years, or more often if advised.  Bone density scan (DEXA): At age 50, ask your care team if you should have this scan for osteoporosis (brittle bones).  Hepatitis C: Get tested at least once in your life.  STIs (sexually  transmitted infections)  Before age 24: Ask your care team if you should be screened for STIs.  After age 24: Get screened for STIs if you're at risk. You are at risk for STIs (including HIV) if:  You are sexually active with more than one person.  You don't use condoms every time.  You or a partner was diagnosed with a sexually transmitted infection.  If you are at risk for HIV, ask about PrEP medicine to prevent HIV.  Get tested for HIV at least once in your life, whether you are at risk for HIV or not.  Cancer screening tests  Cervical cancer screening: If you have a cervix, begin getting regular cervical cancer screening tests starting at age 21.  Breast cancer scan (mammogram): If you've ever had breasts, begin having regular mammograms starting at age 40. This is a scan to check for breast cancer.  Colon cancer screening: It is important to start screening for colon cancer at age 45.  Have a colonoscopy test every 10 years (or more often if you're at risk) Or, ask your provider about stool tests like a FIT test every year or Cologuard test every 3 years.  To learn more about your testing options, visit:   .  For help making a decision, visit:   https://bit.ly/na75911.  Prostate cancer screening test: If you have a prostate, ask your care team if a prostate cancer screening test (PSA) at age 55 is right for you.  Lung cancer screening: If you are a current or former smoker ages 50 to 80, ask your care team if ongoing lung cancer screenings are right for you.  For informational purposes only. Not to replace the advice of your health care provider. Copyright   2023 [facility]. All rights reserved. Clinically reviewed by the [facility]. [employer] 296686 - REV 01/24.  Learning About Stress  What is stress?     Stress is your body's response to a hard situation. Your body can have a physical, emotional, or mental response. Stress is a fact of life for most people, and it  affects everyone differently. What causes stress for you may not be stressful for someone else.  A lot of things can cause stress. You may feel stress when you go on a job interview, take a test, or run a race. This kind of short-term stress is normal and even useful. It can help you if you need to work hard or react quickly. For example, stress can help you finish an important job on time.  Long-term stress is caused by ongoing stressful situations or events. Examples of long-term stress include long-term health problems, ongoing problems at work, or conflicts in your family. Long-term stress can harm your health.  How does stress affect your health?  When you are stressed, your body responds as though you are in danger. It makes hormones that speed up your heart, make you breathe faster, and give you a burst of energy. This is called the fight-or-flight stress response. If the stress is over quickly, your body goes back to normal and no harm is done.  But if stress happens too often or lasts too long, it can have bad effects. Long-term stress can make you more likely to get sick, and it can make symptoms of some diseases worse. If you tense up when you are stressed, you may develop neck, shoulder, or low back pain. Stress is linked to high blood pressure and heart disease.  Stress also harms your emotional health. It can make you martin, tense, or depressed. Your relationships may suffer, and you may not do well at work or school.  What can you do to manage stress?  You can try these things to help manage stress:   Do something active. Exercise or activity can help reduce stress. Walking is a great way to get started. Even everyday activities such as housecleaning or yard work can help.  Try yoga or johnny chi. These techniques combine exercise and meditation. You may need some training at first to learn them.  Do something you enjoy. For example, listen to music or go to a movie. Practice your hobby or do volunteer  "work.  Meditate. This can help you relax, because you are not worrying about what happened before or what may happen in the future.  Do guided imagery. Imagine yourself in any setting that helps you feel calm. You can use online videos, books, or a teacher to guide you.  Do breathing exercises. For example:  From a standing position, bend forward from the waist with your knees slightly bent. Let your arms dangle close to the floor.  Breathe in slowly and deeply as you return to a standing position. Roll up slowly and lift your head last.  Hold your breath for just a few seconds in the standing position.  Breathe out slowly and bend forward from the waist.  Let your feelings out. Talk, laugh, cry, and express anger when you need to. Talking with supportive friends or family, a counselor, or a dk leader about your feelings is a healthy way to relieve stress. Avoid discussing your feelings with people who make you feel worse.  Write. It may help to write about things that are bothering you. This helps you find out how much stress you feel and what is causing it. When you know this, you can find better ways to cope.  What can you do to prevent stress?  You might try some of these things to help prevent stress:  Manage your time. This helps you find time to do the things you want and need to do.  Get enough sleep. Your body recovers from the stresses of the day while you are sleeping.  Get support. Your family, friends, and community can make a difference in how you experience stress.  Limit your news feed. Avoid or limit time on social media or news that may make you feel stressed.  Do something active. Exercise or activity can help reduce stress. Walking is a great way to get started.  Where can you learn more?  Go to https://www.[employer].net/patiented  Enter N032 in the search box to learn more about \"Learning About Stress.\"  Current as of: October 24, 2023  Content Version: 14.3    2024 [employer]. "   Care instructions adapted under license by your healthcare professional. If you have questions about a medical condition or this instruction, always ask your healthcare professional. [employer] disclaims any warranty or liability for your use of this information.    Learning About Sleeping Well  What does sleeping well mean?     Sleeping well means getting enough sleep to feel good and stay healthy. How much sleep is enough varies among people.  The number of hours you sleep and how you feel when you wake up are both important. If you do not feel refreshed, you probably need more sleep. Another sign of not getting enough sleep is feeling tired during the day.  Experts recommend that adults get at least 7 or more hours of sleep per day. Children and older adults need more sleep.  Why is getting enough sleep important?  Getting enough quality sleep is a basic part of good health. When your sleep suffers, your physical health, mood, and your thoughts can suffer too. You may find yourself feeling more grumpy or stressed. Not getting enough sleep also can lead to serious problems, including injury, accidents, anxiety, and depression.  What might cause poor sleeping?  Many things can cause sleep problems, including:  Changes to your sleep schedule.  Stress. Stress can be caused by fear about a single event, such as giving a speech. Or you may have ongoing stress, such as worry about work or school.  Depression, anxiety, and other mental or emotional conditions.  Changes in your sleep habits or surroundings. This includes changes that happen where you sleep, such as noise, light, or sleeping in a different bed. It also includes changes in your sleep pattern, such as having jet lag or working a late shift.  Health problems, such as pain, breathing problems, and restless legs syndrome.  Lack of regular exercise.  Using alcohol, nicotine, or caffeine before bed.  How can you help yourself?  Here are some tips  "that may help you sleep more soundly and wake up feeling more refreshed.  Your sleeping area   Use your bedroom only for sleeping and sex. A bit of light reading may help you fall asleep. But if it doesn't, do your reading elsewhere in the house. Try not to use your TV, computer, smartphone, or tablet while you are in bed.  Be sure your bed is big enough to stretch out comfortably, especially if you have a sleep partner.  Keep your bedroom quiet, dark, and cool. Use curtains, blinds, or a sleep mask to block out light. To block out noise, use earplugs, soothing music, or a \"white noise\" machine.  Your evening and bedtime routine   Create a relaxing bedtime routine. You might want to take a warm shower or bath, or listen to soothing music.  Go to bed at the same time every night. And get up at the same time every morning, even if you feel tired.  What to avoid   Limit caffeine (coffee, tea, caffeinated sodas) during the day, and don't have any for at least 6 hours before bedtime.  Avoid drinking alcohol before bedtime. Alcohol can cause you to wake up more often during the night.  Try not to smoke or use tobacco, especially in the evening. Nicotine can keep you awake.  Limit naps during the day, especially close to bedtime.  Avoid lying in bed awake for too long. If you can't fall asleep or if you wake up in the middle of the night and can't get back to sleep within about 20 minutes, get out of bed and go to another room until you feel sleepy.  Avoid taking medicine right before bed that may keep you awake or make you feel hyper or energized. Your doctor can tell you if your medicine may do this and if you can take it earlier in the day.  If you can't sleep   Imagine yourself in a peaceful, pleasant scene. Focus on the details and feelings of being in a place that is relaxing.  Get up and do a quiet or boring activity until you feel sleepy.  Avoid drinking any liquids before going to bed to help prevent waking up " "often to use the bathroom.  Where can you learn more?  Go to https://www.[employer].net/patiented  Enter J942 in the search box to learn more about \"Learning About Sleeping Well.\"  Current as of: July 31, 2024  Content Version: 14.3    2024 [employer].   Care instructions adapted under license by your healthcare professional. If you have questions about a medical condition or this instruction, always ask your healthcare professional. [employer] disclaims any warranty or liability for your use of this information.    Learning About Depression Screening  What is depression screening?  Depression screening is a way to see if you have depression symptoms. It may be done by a doctor or counselor. It's often part of a routine checkup. That's because your mental health is just as important as your physical health.  Depression is a mental health condition that affects how you feel, think, and act. You may:  Have less energy.  Lose interest in your daily activities.  Feel sad and grouchy for a long time.  Depression is very common. It affects people of all ages.  Many things can lead to depression. Some people become depressed after they have a stroke or find out they have a major illness like cancer or heart disease. The death of a loved one or a breakup may lead to depression. It can run in families. Most experts believe that a combination of inherited genes and stressful life events can cause it.  What happens during screening?  You may be asked to fill out a form about your depression symptoms. You and the doctor will discuss your answers. The doctor may ask you more questions to learn more about how you think, act, and feel.  What happens after screening?  If you have symptoms of depression, your doctor will talk to you about your options.  Doctors usually treat depression with medicines or counseling. Often, combining the two works best. Many people don't get help because they think that they'll " "get over the depression on their own. But people with depression may not get better unless they get treatment.  The cause of depression is not well understood. There may be many factors involved. But if you have depression, it's not your fault.  A serious symptom of depression is thinking about death or suicide. If you or someone you care about talks about this or about feeling hopeless, get help right away.  It's important to know that depression can be treated. Medicine, counseling, and self-care may help.  Where can you learn more?  Go to https://www.[employer].net/patiented  Enter T185 in the search box to learn more about \"Learning About Depression Screening.\"  Current as of: July 31, 2024  Content Version: 14.3    2024 [employer].   Care instructions adapted under license by your healthcare professional. If you have questions about a medical condition or this instruction, always ask your healthcare professional. [employer] disclaims any warranty or liability for your use of this information.    Substance Use Disorder: Care Instructions  Overview     You can improve your life and health by stopping your use of alcohol or drugs. When you don't drink or use drugs, you may feel and sleep better. You may get along better with your family, friends, and coworkers. There are medicines and programs that can help with substance use disorder.  How can you care for yourself at home?  Here are some ways to help you stay sober and prevent relapse.  If you have been given medicine to help keep you sober or reduce your cravings, be sure to take it exactly as prescribed.  Talk to your doctor about programs that can help you stop using drugs or drinking alcohol.  Do not keep alcohol or drugs in your home.  Plan ahead. Think about what you'll say if other people ask you to drink or use drugs. Try not to spend time with people who drink or use drugs.  Use the time and money spent on drinking or drugs to do " something that's important to you.  Preventing a relapse  Have a plan to deal with relapse. Learn to recognize changes in your thinking that lead you to drink or use drugs. Get help before you start to drink or use drugs again.  Try to stay away from situations, friends, or places that may lead you to drink or use drugs.  If you feel the need to drink alcohol or use drugs again, seek help right away. Call a trusted friend or family member. Some people get support from organizations such as Narcotics Anonymous or [employer] or from treatment facilities.  If you relapse, get help as soon as you can. Some people make a plan with another person that outlines what they want that person to do for them if they relapse. The plan usually includes how to handle the relapse and who to notify in case of relapse.  Don't give up. Remember that a relapse doesn't mean that you have failed. Use the experience to learn the triggers that lead you to drink or use drugs. Then quit again. Recovery is a lifelong process. Many people have several relapses before they are able to quit for good.  Follow-up care is a key part of your treatment and safety. Be sure to make and go to all appointments, and call your doctor if you are having problems. It's also a good idea to know your test results and keep a list of the medicines you take.  When should you call for help?   Call 911  anytime you think you may need emergency care. For example, call if you or someone else:    Has overdosed or has withdrawal signs. Be sure to tell the emergency workers that you are or someone else is using or trying to quit using drugs. Overdose or withdrawal signs may include:  Losing consciousness.  Seizure.  Seeing or hearing things that aren't there (hallucinations).     Is thinking or talking about suicide or harming others.   Where to get help 24 hours a day, 7 days a week   If you or someone you know talks about suicide, self-harm, a mental health crisis,  "a substance use crisis, or any other kind of emotional distress, get help right away. You can:    Call the Suicide and Crisis Lifeline at 988.     Call 0-089-783-ILVM (1-864.145.8141).     Text HOME to 671268 to access the Crisis Text Line.   Consider saving these numbers in your phone.  Go to [employer] for more information or to chat online.  Call your doctor now or seek immediate medical care if:    You are having withdrawal symptoms. These may include nausea or vomiting, sweating, shakiness, and anxiety.   Watch closely for changes in your health, and be sure to contact your doctor if:    You have a relapse.     You need more help or support to stop.   Where can you learn more?  Go to https://www.[employer].net/patiented  Enter H573 in the search box to learn more about \"Substance Use Disorder: Care Instructions.\"  Current as of: November 15, 2023  Content Version: 14.3    2024 [employer].   Care instructions adapted under license by your healthcare professional. If you have questions about a medical condition or this instruction, always ask your healthcare professional. [employer] disclaims any warranty or liability for your use of this information.       "

## 2025-03-05 NOTE — PROGRESS NOTES
Preventive Care Visit  [facility] JAMES Beasley PA-C, Family Medicine  Mar 5, 2025      Assessment & Plan     Encounter for Medicare annual wellness exam  Recommend routine annual visits.  Cancer screening will be updated as below.  Mammogram is up-to-date until the fall she will need to update her colonoscopy by April  She is interested in considering gene testing for metabolic pathways due to various side effects she has had for multiple meds    - Med Therapy Management Referral    Osteoporosis without current pathological fracture, unspecified osteoporosis type  Continue Fosamax  - Med Therapy Management Referral    Poor concentration  Stable, continue current meds  - buPROPion (WELLBUTRIN XL) 150 MG 24 hr tablet; TAKE ONE TABLET BY MOUTH EVERY MORNING  - Med Therapy Management Referral    Major depressive disorder, recurrent episode, mild  Stable, continue current meds  - buPROPion (WELLBUTRIN XL) 150 MG 24 hr tablet; TAKE ONE TABLET BY MOUTH EVERY MORNING  - Med Therapy Management Referral  Recheck 6 months  Other insomnia  Has tried and failed amitriptyline and trazodone currently using CBD and some THC.  She also uses magnesium  - Med Therapy Management Referral    Paresthesia  Tapping sensation in her left parietal lobe on occasion not painful no other neurologic symptoms we will start with lab workup follow-up if this is not improving  - Vitamin B12; Future  - ESR: Erythrocyte sedimentation rate; Future  - CRP, inflammation; Future  - Med Therapy Management Referral  - Vitamin B12  - ESR: Erythrocyte sedimentation rate  - CRP, inflammation    Special screening for malignant neoplasms, colon  She will be called to schedule  - Colonoscopy Screening  Referral; Future    Screening for hyperlipidemia    - Lipid panel reflex to direct LDL Fasting; Future    Severe persistent asthma without complication (H)  Continues to see her asthma specialist    ILD (interstitial lung disease)  (H)  Has upcoming appointment with pulmonology will discuss with pulmonology the need to continue to see asthma specialist    Patient has been advised of split billing requirements and indicates understanding: Yes        Counseling  Appropriate preventive services were addressed with this patient via screening, questionnaire, or discussion as appropriate for fall prevention, nutrition, physical activity, Tobacco-use cessation, social engagement, weight loss and cognition.  Checklist reviewing preventive services available has been given to the patient.  Reviewed patient's diet, addressing concerns and/or questions.   She is at risk for lack of exercise and has been provided with information to increase physical activity for the benefit of her well-being.   She is at risk for psychosocial distress and has been provided with information to reduce risk.   Discussed possible causes of fatigue. The patient's PHQ-9 score is consistent with moderate depression. She was provided with information regarding depression.       This chart documentation was completed in part with Dragon voice recognition software.  Documentation is reviewed after completion, however, some words and grammatical errors may remain.  Caitlin Beasley PA-C      Basilio Zuluaga is a 70 year old, presenting for the following:  Physical  R shoulder concerns       3/5/2025     1:22 PM   Additional Questions   Roomed by Marva   Accompanied by Self           HPI  Many questions and concerns today.  She is currently not taking trazodone.  We have tried amitriptyline but it was not helpful.  She is currently using CBD and THC just a small amount with some benefit.  She uses magnesium for both sleep and cramps as well.  She wonders about [employer] testing.  She wonders if it is covered by insurance and if that would be beneficial.  She does have a lot of side effects to most of the medications we give her.    She is not taking omeprazole anymore for her  reflux she takes famotidine over-the-counter as needed.    She uses bupropion 150 mg for lack of concentration depression with good improvement of her symptoms.  She does not necessarily want to increase the dosage and is pleased with where it is at.    She has never had this before but with her last dosage of Fosamax she did have some stomach upset and feeling dizzy.  She was supposed to take it today but forgot.  She does not recall that anything was different the last time she took her Fosamax.    She is seeing Dr. Carrera tomorrow for her shoulder pain.  She saw her dentist for right sided tooth pain that radiates into her jaw down her arm and down her upper extremity she wonders if that is the cause of her right shoulder pain.    She has follow-up planned with cardiology this summer for her mitral valve replacement.    She has a gentle tapping sensation on her left side of her scalp from time to time.  She has some tinnitus though is unchanged she does not get other neurologic symptoms with this no paresthesias, no facial drooping no numbness tingling weakness no headaches directly related to this.    In regards to her cervical spine pain, Dr. Bay told her at 1 point she should consider regenerative medicine.  She wonders if we have any providers here for that.         Advance Care Planning  Patient has a Health Care Directive on file  Advance care planning document is on file and is current.      3/5/2025   General Health   How would you rate your overall physical health? (!) FAIR   Feel stress (tense, anxious, or unable to sleep) Rather much   (!) STRESS CONCERN      3/5/2025   Nutrition   Diet: Regular (no restrictions)         3/5/2025   Exercise   Days per week of moderate/strenous exercise 2 days   Average minutes spent exercising at this level 30 min   (!) EXERCISE CONCERN      3/5/2025   Social Factors   Frequency of gathering with friends or relatives Once a week   Worry food won't last until get  money to buy more No   Food not last or not have enough money for food? No   Do you have housing? (Housing is defined as stable permanent housing and does not include staying ouside in a car, in a tent, in an abandoned building, in an overnight shelter, or couch-surfing.) Yes   Are you worried about losing your housing? No   Lack of transportation? No   Unable to get utilities (heat,electricity)? No         3/5/2025   Fall Risk   Fallen 2 or more times in the past year? No   Trouble with walking or balance? No          3/5/2025   Activities of Daily Living- Home Safety   Needs help with the following daily activites None of the above   Safety concerns in the home None of the above         3/5/2025   Dental   Dentist two times every year? Yes         3/5/2025   Hearing Screening   Hearing concerns? None of the above         3/5/2025   Driving Risk Screening   Patient/family members have concerns about driving No         3/5/2025   General Alertness/Fatigue Screening   Have you been more tired than usual lately? (!) YES         3/5/2025   Urinary Incontinence Screening   Bothered by leaking urine in past 6 months No         4/10/2024   TB Screening   Were you born outside of the [place]? No       Today's PHQ-9 Score:       3/5/2025     1:12 PM   PHQ-9 SCORE   PHQ-9 Total Score MyChart 13 (Moderate depression)   PHQ-9 Total Score 13        Patient-reported         3/5/2025   Substance Use   Alcohol more than 3/day or more than 7/wk No   Do you have a current opioid prescription? No   How severe/bad is pain from 1 to 10? 5/10   Do you use any other substances recreationally? (!) CANNABIS PRODUCTS     Social History     Tobacco Use    Smoking status: Former     Current packs/day: 0.00     Average packs/day: 0.5 packs/day for 30.0 years (15.0 ttl pk-yrs)     Types: Cigarettes     Start date: 3/20/1968     Quit date: 3/20/1998     Years since quittin.9     Passive exposure: Never    Smokeless tobacco: Never    Tobacco  comments:     No smokers in home   Vaping Use    Vaping status: Never Used   Substance Use Topics    Alcohol use: Not Currently     Alcohol/week: 6.7 standard drinks of alcohol     Types: 7 Standard drinks or equivalent per week     Comment: 2x/week    Drug use: No           9/26/2024   LAST FHS-7 RESULTS   1st degree relative breast or ovarian cancer No   Any relative bilateral breast cancer No   Any male have breast cancer No   Any ONE woman have BOTH breast AND ovarian cancer No   Any woman with breast cancer before 50yrs No   2 or more relatives with breast AND/OR ovarian cancer No   2 or more relatives with breast AND/OR bowel cancer No        Mammogram Screening - Mammogram every 1-2 years updated in Health Maintenance based on mutual decision making    ASCVD Risk   The 10-year ASCVD risk score (Mir GIBBS, et al., 2019) is: 11.4%    Values used to calculate the score:      Age: 70 years      Sex: Female      Is Non- : No      Diabetic: No      Tobacco smoker: No      Systolic Blood Pressure: 132 mmHg      Is BP treated: No      HDL Cholesterol: 79 mg/dL      Total Cholesterol: 162 mg/dL    Osteoporosis last bone density Sept 2024 she is currently on Fosamax        Reviewed and updated as needed this visit by Provider                    Lab work is in process  Labs reviewed in EPIC  BP Readings from Last 3 Encounters:   03/05/25 132/68   01/16/25 129/66   01/06/25 117/68    Wt Readings from Last 3 Encounters:   03/05/25 59 kg (130 lb 1.6 oz)   01/16/25 57.1 kg (125 lb 12.8 oz)   01/06/25 59.3 kg (130 lb 12.8 oz)                  Patient Active Problem List   Diagnosis    Dermatophytosis of nail    Generalized osteoarthrosis, unspecified site    TAMMY (generalized anxiety disorder)    Insomnia    Malaise and fatigue    Mild persistent asthma    Cervicalgia    Allergic rhinitis    Irritable bowel syndrome    Pain in joint, ankle and foot, left    DDD (degenerative disc disease),  lumbar    Hammertoe    Stenosis of lumbosacral spine    Atrophic vaginitis    Osteoporosis    Knee pain, left    Numbness of left jaw    TMJ (temporomandibular joint syndrome)    Thyroid fullness    Major depressive disorder, recurrent episode, moderate (H)    Gastroesophageal reflux disease without esophagitis    Tremor    T12 compression fracture, sequela    Muscle cramp    Chondromalacia of right knee    Right sided sciatica    ILD (interstitial lung disease) (H)    S/P MVR (mitral valve repair)     Past Surgical History:   Procedure Laterality Date    APPLY WOUND VAC Right 2/22/2024    Procedure: PLACEMENT OF RIGHT GROIN PREVENA WOUND VAC;  Surgeon: Brandon Hale MD;  Location:  OR    BIOPSY  4/10/2015    BL DUPLEX LO EXTREM ART UNILAT/LTD  4/3008    lower extr arterial doppler -no stenosis    C DEXA,BONE DENSITY,APPENDICULR SKELTN  12/2008    osteopenia    COLONOSCOPY  10/22/2007    bx benign    COLONOSCOPY N/A 4/10/2015    Procedure: COMBINED COLONOSCOPY, SINGLE OR MULTIPLE BIOPSY/POLYPECTOMY BY BIOPSY;  Surgeon: Cl Wagner MD;  Location:  GI    CV CORONARY ANGIOGRAM N/A 8/17/2023    Procedure: Coronary Angiogram;  Surgeon: Liza Ford MD;  Location:  HEART CARDIAC CATH LAB    ESOPHAGOSCOPY, GASTROSCOPY, DUODENOSCOPY (EGD), COMBINED N/A 1/31/2018    Procedure: COMBINED ESOPHAGOSCOPY, GASTROSCOPY, DUODENOSCOPY (EGD);  COMBINED ESOPHAGOSCOPY, GASTROSCOPY, DUODENOSCOPY (EGD);  Surgeon: Tirso Duran MD;  Location:  GI    ESOPHAGOSCOPY, GASTROSCOPY, DUODENOSCOPY (EGD), COMBINED N/A 2/1/2023    Procedure: ESOPHAGOGASTRODUODENOSCOPY (EGD);  Surgeon: Lui Vences MD;  Location:  GI    EYE SURGERY      HC PART REMV BONE METATARSAL HEAD,EA  01/11/10    Right foot plantar plate tear. Also correct hammertoe, 2nd digit & varicose vein ligation, heel.    HC REVISE MEDIAN N/CARPAL TUNNEL SURG      Left in 1997, right in 1999    INCISION AND DRAINAGE LOWER EXTREMITY, COMBINED Right  2024    Procedure: Excision OF RIGHT GROIN SEROMA WITH LYMPHAZURIN BLUE INJECTION, Right groin exploration;  Surgeon: Brandon Hale MD;  Location: [facility] OR    INJ, ANES/STER, FACET LUMB/SAC      Left L5 nerve root injection    INJECT EPIDURAL CERVICAL Right 2022    Procedure: Cervical 7-Thoracic 1 Interlaminar epidural steroid injection using fluoroscopic guidance with contrast dye, Right;  Surgeon: Tyler Bay MD;  Location: [facility] OR    INJECT EPIDURAL TRANSFORAMINAL  10/09/2013    [facility]    INJECT JOINT SACROILIAC  3/19/14,14    [facility]    REPAIR VALVE MITRAL MINIMALLY INVASIVE N/A 10/26/2023    Procedure: MINIMALLY INVASIVE MITRAL VALVE REPAIR USING CHORD-X PRE-MEASURED LOOP SUTURE SIZE:12MM, MITRAL ANNULOPLASTY RING ERICK-COTTO PHYSIO Il SIZE: 28MM, AND ON CARDIOPULMONARY PUMP OXYGENATOR (INTRAOPERATIVE TRANSESOPHAGEAL ECHOCARDIOGRAM BY THE CARDIOLOGIST  );  Surgeon: Sabas Meredith MD;  Location: [facility] OR    ZZC NONSPECIFIC PROCEDURE      Metatarsal phalangeal joint injection    ZZHC COLONOSCOPY THRU STOMA, DIAGNOSTIC  2002    bx. benign - flex sig in 5 yrs  or colonoscopy in 10 yrs    ZZHC ECHO HEART XTHORACIC, STRESS/REST  2005    WNL    ZZHC NCS MOTOR W/O F-WAVE, EACH NERVE  2008    CTS       Social History     Tobacco Use    Smoking status: Former     Current packs/day: 0.00     Average packs/day: 0.5 packs/day for 30.0 years (15.0 ttl pk-yrs)     Types: Cigarettes     Start date: 3/20/1968     Quit date: 3/20/1998     Years since quittin.9     Passive exposure: Never    Smokeless tobacco: Never    Tobacco comments:     No smokers in home   Substance Use Topics    Alcohol use: Not Currently     Alcohol/week: 6.7 standard drinks of alcohol     Types: 7 Standard drinks or equivalent per week     Comment: 2x/week     Family History   Problem Relation Age of Onset    Diabetes Father     Depression Father     Cerebrovascular  Disease Mother         age 80    Arthritis Mother     Depression Mother         On meds    Eye Disorder Mother         Glaucoma    Osteoporosis Mother     Respiratory Mother          88 YO COPD    Chronic Obstructive Pulmonary Disease Mother     C.A.D. Brother         67 YO MI -    Chronic Obstructive Pulmonary Disease Brother     Myocardial Infarction Brother     Other Cancer Brother     Cancer Brother          Current Outpatient Medications   Medication Sig Dispense Refill    albuterol (PROAIR HFA/PROVENTIL HFA/VENTOLIN HFA) 108 (90 Base) MCG/ACT inhaler Inhale 2 puffs into the lungs every 6 hours as needed for shortness of breath, wheezing or cough. 18 g 0    albuterol (PROAIR HFA/PROVENTIL HFA/VENTOLIN HFA) 108 (90 Base) MCG/ACT inhaler Inhale 2 puffs into the lungs every 6 hours as needed for shortness of breath, wheezing or cough.      alendronate (FOSAMAX) 70 MG tablet Take 1 tablet (70 mg) by mouth every 7 days. 12 tablet 3    ALLEGRA 180 MG OR TABS Take 180 mg by mouth daily  30 0    amoxicillin (AMOXIL) 500 MG capsule Take 2000mg by mouth 30-60 minutes prior to dental appointment per infective endocarditis protocol due to mitral valve repair. 4 capsule 0    aspirin (ASA) 81 MG chewable tablet Take 1 tablet (81 mg) by mouth daily 90 tablet 3    buPROPion (WELLBUTRIN XL) 150 MG 24 hr tablet TAKE ONE TABLET BY MOUTH EVERY MORNING 90 tablet 1    CALCIUM /VITAMIN D TABS   OR Take 1 tablet by mouth daily      famotidine (PEPCID) 20 MG tablet Take 1 tablet (20 mg) by mouth daily.      fluticasone (FLONASE) 50 MCG/ACT nasal spray Spray 1 spray into both nostrils daily. 1 mL 4    fluticasone (FLONASE) 50 MCG/ACT nasal spray       ipratropium (ATROVENT HFA) 17 MCG/ACT inhaler Inhale 2 puffs into the lungs every 6 hours. 1 g 4    MAGNESIUM PO 2 tablets in the am and 2 tablets in the pm      mometasone-formoterol (DULERA) 100-5 MCG/ACT inhaler Inhale 2 puffs into the lungs 2 times daily. 39 g 0    multivitamin  w/minerals (THERA-VIT-M) tablet Take 1 tablet by mouth daily RAW Brand      omeprazole (PRILOSEC) 20 MG DR capsule TAKE 1 CAPSULE DAILY 90 capsule 3    predniSONE (DELTASONE) 20 MG tablet Take 40 mg (2 tabs) for 5 days. 10 tablet 0    predniSONE (DELTASONE) 20 MG tablet Take 20 mg by mouth daily.      vitamin C (ASCORBIC ACID) 250 MG tablet Take 250 mg by mouth daily       No Known Allergies  Recent Labs   Lab Test 09/04/24  1105 07/23/24  2355 02/22/24  0622 02/13/24  1449 10/28/23  0530 10/27/23  0437 10/26/23  0829 10/18/23  1215 05/31/23  1728 04/07/23  1023 01/19/22  1414 07/13/21  1019 11/25/20  1020 03/16/20  1555 03/04/20  0932 11/21/19  0953 10/14/19  1541 02/14/19  1223   A1C  --   --  5.9*  --   --   --   --  5.0  --   --  5.6  --   --   --   --   --   --   --    LDL  --   --   --   --   --   --   --   --   --  73  --   --  66  --   --  66  --   --    HDL  --   --   --   --   --   --   --   --   --  79  --   --  82  --   --  76  --   --    TRIG  --   --   --   --   --   --   --   --   --  49  --   --  105  --   --  67  --   --    ALT  --  26  --  18  --  24   < > 19   < > 20  --    < >  --  23  --   --  27  --    CR 1.0 0.93 0.97* 0.93   < > 0.85   < > 0.88   < > 0.85  --    < >  --  0.91 0.92 1.03 1.18*  --    GFRESTIMATED 60* 65 62 65   < > 73   < > 70   < > 73  --    < >  --  65 64 56* 48*  --    GFRESTBLACK  --   --   --   --   --   --   --   --   --   --   --   --   --  76 75 65 55*  --    POTASSIUM  --  3.9 4.0 4.4   < > 4.5   < > 4.2   < > 3.8  --    < >  --  3.6  --  3.9 3.8  --    TSH  --   --   --   --   --   --   --   --   --   --   --   --   --   --   --   --  2.29 1.50    < > = values in this interval not displayed.      Current providers sharing in care for this patient include:  Patient Care Team:  System, Provider Not In as PCP - General (Clinic)  Caitlin Beasley PA-C as Assigned PCP  Andrey Mathews MD as MD (Cardiovascular Disease)  Marylin Perea, RN as Specialty Care  "Coordinator (Pulmonary Disease)  Caitlin Ocampo, RN as Specialty Care Coordinator (Pulmonary Disease)  Khanh Varner MD as Assigned Pulmonology Provider  Grazyna Gill APRN CNP as Nurse Practitioner (Cardiovascular Disease)  Octavio Carrera DO as Assigned Neuroscience Provider  Lui Brown PA-C as Assigned Musculoskeletal Provider  Trudy Taylor PA-C (Dermatology)  Selam Barfield NP as Assigned Heart and Vascular Provider    The following health maintenance items are reviewed in Epic and correct as of today:  Health Maintenance   Topic Date Due    ASTHMA ACTION PLAN  10/14/2020    COVID-19 Vaccine (3 - Moderna risk series) 06/13/2022    MEDICARE ANNUAL WELLNESS VISIT  04/05/2024    ANNUAL REVIEW OF HM ORDERS  10/17/2024    DEPRESSION 6 MO INDEX REPEAT PHQ-9  03/19/2025    COLORECTAL CANCER SCREENING  04/10/2025    ASTHMA CONTROL TEST  04/16/2025    PHQ-9  09/05/2025    TAMMY ASSESSMENT  10/16/2025    FALL RISK ASSESSMENT  03/05/2026    DEXA  09/26/2026    MAMMO SCREENING  09/26/2026    GLUCOSE  07/23/2027    LIPID  04/07/2028    ADVANCE CARE PLANNING  04/11/2029    DTAP/TDAP/TD IMMUNIZATION (3 - Td or Tdap) 03/14/2032    HEPATITIS C SCREENING  Completed    DEPRESSION ACTION PLAN  Completed    INFLUENZA VACCINE  Completed    Pneumococcal Vaccine: 50+ Years  Completed    ZOSTER IMMUNIZATION  Completed    RSV VACCINE  Completed    HPV IMMUNIZATION  Aged Out    MENINGITIS IMMUNIZATION  Aged Out         Review of Systems  Constitutional, HEENT, cardiovascular, pulmonary, gi and gu systems are negative, except as otherwise noted.     Objective    Exam  /68   Pulse 83   Temp 98.5  F (36.9  C) (Temporal)   Resp 20   Ht 1.563 m (5' 1.54\")   Wt 59 kg (130 lb 1.6 oz)   LMP 02/26/2008 (Approximate)   SpO2 99%   BMI 24.16 kg/m     Estimated body mass index is 24.16 kg/m  as calculated from the following:    Height as of this encounter: 1.563 m (5' 1.54\").    Weight as of this encounter: 59 kg (130 lb 1.6 " oz).    Physical Exam  GENERAL: alert and no distress  EYES: Eyes grossly normal to inspection, PERRL and conjunctivae and sclerae normal  HENT: ear canals and TM's normal, nose and mouth without ulcers or lesions  NECK: no adenopathy, no asymmetry, masses, or scars  RESP: lungs clear to auscultation - no rales, rhonchi or wheezes  CV: regular rate and rhythm, normal S1 S2, no S3 or S4, no murmur, click or rub, no peripheral edema  ABDOMEN: soft, nontender, no hepatosplenomegaly, no masses and bowel sounds normal  MS: no gross musculoskeletal defects noted, no edema  SKIN: no suspicious lesions or rashes  NEURO: Normal strength and tone, mentation intact and speech normal  PSYCH: mentation appears normal, affect normal/bright         3/5/2025   Mini Cog   Clock Draw Score 2 Normal   3 Item Recall 2 objects recalled   Mini Cog Total Score 4            Labs are pending  Signed Electronically by: Caitlin Beasley PA-C

## 2025-03-06 ENCOUNTER — OFFICE VISIT (OUTPATIENT)
Dept: ORTHOPEDICS | Facility: CLINIC | Age: 70
End: 2025-03-06
Payer: COMMERCIAL

## 2025-03-06 DIAGNOSIS — M54.12 CERVICAL RADICULOPATHY: ICD-10-CM

## 2025-03-06 DIAGNOSIS — M67.911 TENDINOPATHY OF RIGHT ROTATOR CUFF: Primary | ICD-10-CM

## 2025-03-06 DIAGNOSIS — M19.011 PRIMARY OSTEOARTHRITIS, RIGHT SHOULDER: ICD-10-CM

## 2025-03-06 DIAGNOSIS — M19.011 PRIMARY OSTEOARTHRITIS, RIGHT SHOULDER: Primary | ICD-10-CM

## 2025-03-06 LAB
CHOLEST SERPL-MCNC: 170 MG/DL
FASTING STATUS PATIENT QL REPORTED: YES
HDLC SERPL-MCNC: 73 MG/DL
LDLC SERPL CALC-MCNC: 81 MG/DL
NONHDLC SERPL-MCNC: 97 MG/DL
TRIGL SERPL-MCNC: 79 MG/DL

## 2025-03-06 RX ORDER — BUPIVACAINE HYDROCHLORIDE 5 MG/ML
2 INJECTION, SOLUTION PERINEURAL
Status: COMPLETED | OUTPATIENT
Start: 2025-03-06 | End: 2025-03-06

## 2025-03-06 RX ORDER — LIDOCAINE HYDROCHLORIDE 10 MG/ML
2 INJECTION, SOLUTION INFILTRATION; PERINEURAL
Status: COMPLETED | OUTPATIENT
Start: 2025-03-06 | End: 2025-03-06

## 2025-03-06 RX ORDER — TRIAMCINOLONE ACETONIDE 40 MG/ML
40 INJECTION, SUSPENSION INTRA-ARTICULAR; INTRAMUSCULAR
Status: COMPLETED | OUTPATIENT
Start: 2025-03-06 | End: 2025-03-06

## 2025-03-06 RX ADMIN — BUPIVACAINE HYDROCHLORIDE 2 ML: 5 INJECTION, SOLUTION PERINEURAL at 10:42

## 2025-03-06 RX ADMIN — LIDOCAINE HYDROCHLORIDE 2 ML: 10 INJECTION, SOLUTION INFILTRATION; PERINEURAL at 10:42

## 2025-03-06 RX ADMIN — TRIAMCINOLONE ACETONIDE 40 MG: 40 INJECTION, SUSPENSION INTRA-ARTICULAR; INTRAMUSCULAR at 10:42

## 2025-03-06 NOTE — PATIENT INSTRUCTIONS
- Total shoulder replacement  - Reverse total shoulder replacement      MRI Scheduling Instructions  Please follow both steps below    1.  Advanced imaging is done by appointment. Please call [facility] ([facility]/[place]) [phone] to schedule your MRI at your earliest convenience.   - Some insurance companies may require a prior authorization to be completed which can delay the time until you are able to schedule your appointment.     - If you are active on [facility], you may have access to your test results before your provider is able to review the study and advise on next steps.      2. After the date of your MRI has been scheduled, please call [phone] to get on my schedule for an in-person or telephone follow up appointment to discuss the results and updated treatment recommendations. This follow up should be scheduled for 1-2 days after the date of your MRI.

## 2025-03-06 NOTE — LETTER
3/6/2025      Margareth Green  [address] [facility] [address]      Dear Colleague,    Thank you for referring your patient, Margareth Green, to the [facility] [place]. Please see a copy of my visit note below.    Margareth Green  :  1955  DOS: 3/6/2025  MRN: [id number]  PCP: Caitlin Beasley    Sports Medicine Clinic Visit    Interim History - 2025  - Last seen on 2024 for cervical radiculopathy, right rotator cuff tendinopathy and right primary osteoarthritis of the shoulder.   - 2024 right glenohumeral joint injection and 2024 right subacromial injection. She is unsure which injection helped more, but did get relief from the combination.   - Since the last visit, she notes that it is continuing to be more difficult to reach over her head, with more pain. She notes that her lateral right chest is still painful from her heart surgery last year, where she was told that she would have some lingering nerve pain. Has been to physical therapy but  notes she has not been consistent with her exercises. Continues to prefer nonoperative management. No interim injury.       Initial Visit: 2024  HPI  Margareth Green is a 69 year old female who is seen in consultation at the request of  Caitlin Beasley PA-C presenting with right shoulder pain.    - Mechanism of Injury:    - No inciting injury  - Pertinent history and prior evaluations:    - 4/10/2024 with Caitlin Beasley PA-C: right shoulder and cervical radiculopathy noted at C8 with multilevel DDD and central and foraminal stenosis on cervical MRI in  and has had success with a C7-T1 ILESI. Prednisone prescription given.   - Right shoulder xray IMPRESSION: 1. Severe osteoarthrosis of the glenohumeral joint again noted. 2. Calcification in the region of the supraspinatus/infraspinatus tendon raises the question of calcific tendinosis. This has progressed. 3. No other change.    - Pain  Character:    - Location:  anterior, lateral, posterior right shoulder  - Character:  aching shoulder pain, more sharp with movements  - Duration: One year  - Course:  worsening  - Endorses:    - Anterior, lateral, posterior right shoulder pain with achy characteristic present at rest and worse with shoulder abduction and rotation.  -Separate and distinct numbness and tingling into the right digits 4-5, chronic neck pain and radicular pain down the right arm intermittently.  - Denies:    - swelling, clicking/popping, grinding, mechanical locking symptoms, neurogenic weakness  - Alleviating factors:    - Naproxen, Tylenol arthritis, activity modification, rest  - Aggravating factors:    - repetitive movements with gardening, grabbing, grasping, abduction of shoulder, above head movments  - Other treatments tried:    - nothing    - Patient Goals:    - discuss treatment options  - Social History:   - Retired.  Previous work:  Medical records for [employer]      Review of Systems  Musculoskeletal: as above  Remainder of review of systems is negative including constitutional, CV, pulmonary, GI, Skin and Neurologic except as noted in HPI or medical history.    Past Medical History:   Diagnosis Date     Abnormal Papanicolaou smear of vagina and vaginal HPV 12/2002    ASCUS (HPV neg). 3/03 WNL but no transitional zome - repeat 3months     Allergic rhinitis, cause unspecified      Arthritis      Backache, unspecified      Carpal tunnel syndrome     s/p repair     Cervicalgia      Chronic kidney disease, stage 3 (H) 07/14/2021     Depressive disorder      Depressive disorder, not elsewhere classified     doing well on Wellbutrin     Derangement of TMJ (temporomandibular joint) 03/2010    internal derangement     Dermatophytosis of nail      Diaphragmatic hernia without mention of obstruction or gangrene 09/2007    small hiatal hernia     Diffuse cystic mastopathy 2000    FCBD     Esophageal reflux 04/2005     Jossue 10/2009     see podiatry consult     Headache(784.0)     Muscle contrature headaches     Intramural leiomyoma of uterus 07/2007    on US     Irregular menstrual cycle     better with BCP -also PMS is better with BCP     IRRITABLE COLON 02/18/2008     Malaise and fatigue      Menopause     lmp 2/2008     Mild persistent asthma      Mitral valve disease      Mole (skin) 2003    9 x 6 mm rt ant chest     Motion sickness      Neuroma 11/2008    lt foot     Osteopenia 12/2008     Other malaise and fatigue 04/2005    stress     Personal history of tobacco use, presenting hazards to health     Quit in 1998     Solitary cyst of breast 12/2005    lt 6 oclock     Stenosis of lumbosacral spine     l4-5 and l5-s1     Symptomatic menopausal or female climacteric states 12/2002    perimenopausal. LMP 2/2008     Tachycardia, paroxysmal (H) 11/24/2020     Thyroid fullness 08/07/2013     Past Surgical History:   Procedure Laterality Date     APPLY WOUND VAC Right 2/22/2024    Procedure: PLACEMENT OF RIGHT GROIN PREVENA WOUND VAC;  Surgeon: Brandon Hale MD;  Location:  OR     BIOPSY  4/10/2015     BL DUPLEX LO EXTREM ART UNILAT/LTD  4/3008    lower extr arterial doppler -no stenosis     C DEXA,BONE DENSITY,APPENDICULR SKELTN  12/2008    osteopenia     COLONOSCOPY  10/22/2007    bx benign     COLONOSCOPY N/A 4/10/2015    Procedure: COMBINED COLONOSCOPY, SINGLE OR MULTIPLE BIOPSY/POLYPECTOMY BY BIOPSY;  Surgeon: Cl Wagner MD;  Location:  GI     CV CORONARY ANGIOGRAM N/A 8/17/2023    Procedure: Coronary Angiogram;  Surgeon: Liza Ford MD;  Location:  HEART CARDIAC CATH LAB     ESOPHAGOSCOPY, GASTROSCOPY, DUODENOSCOPY (EGD), COMBINED N/A 1/31/2018    Procedure: COMBINED ESOPHAGOSCOPY, GASTROSCOPY, DUODENOSCOPY (EGD);  COMBINED ESOPHAGOSCOPY, GASTROSCOPY, DUODENOSCOPY (EGD);  Surgeon: Tirso Duran MD;  Location:  GI     ESOPHAGOSCOPY, GASTROSCOPY, DUODENOSCOPY (EGD), COMBINED N/A 2/1/2023    Procedure:  ESOPHAGOGASTRODUODENOSCOPY (EGD);  Surgeon: Lui Vences MD;  Location: [facility] GI     EYE SURGERY       HC PART REMV BONE METATARSAL HEAD,EA  01/11/10    Right foot plantar plate tear. Also correct hammertoe, 2nd digit & varicose vein ligation, heel.     HC REVISE MEDIAN N/CARPAL TUNNEL SURG      Left in 1997, right in 1999     INCISION AND DRAINAGE LOWER EXTREMITY, COMBINED Right 2/22/2024    Procedure: Excision OF RIGHT GROIN SEROMA WITH LYMPHAZURIN BLUE INJECTION, Right groin exploration;  Surgeon: Brandon Hale MD;  Location:  OR     INJ, ANES/STER, FACET LUMB/SAC  2010    Left L5 nerve root injection     INJECT EPIDURAL CERVICAL Right 9/29/2022    Procedure: Cervical 7-Thoracic 1 Interlaminar epidural steroid injection using fluoroscopic guidance with contrast dye, Right;  Surgeon: Tyler Bay MD;  Location: [facility] OR     INJECT EPIDURAL TRANSFORAMINAL  10/09/2013    [facility]     INJECT JOINT SACROILIAC  3/19/14,4/21/14    [facility]     REPAIR VALVE MITRAL MINIMALLY INVASIVE N/A 10/26/2023    Procedure: MINIMALLY INVASIVE MITRAL VALVE REPAIR USING CHORD-X PRE-MEASURED LOOP SUTURE SIZE:12MM, MITRAL ANNULOPLASTY RING ERICK-COTTO PHYSIO Il SIZE: 28MM, AND ON CARDIOPULMONARY PUMP OXYGENATOR (INTRAOPERATIVE TRANSESOPHAGEAL ECHOCARDIOGRAM BY THE CARDIOLOGIST  );  Surgeon: Sabas Meredith MD;  Location:  OR     ZZC NONSPECIFIC PROCEDURE  2009    Metatarsal phalangeal joint injection     ZZ COLONOSCOPY THRU STOMA, DIAGNOSTIC  7/2002    bx. benign - flex sig in 5 yrs  or colonoscopy in 10 yrs     ZZ ECHO HEART XTHORACIC, STRESS/REST  2/2005    WNL     ZZ NCS MOTOR W/O F-WAVE, EACH NERVE  7/2008    CTS     Family History   Problem Relation Age of Onset     Diabetes Father      Depression Father      Cerebrovascular Disease Mother         age 80     Arthritis Mother      Depression Mother         On meds     Eye Disorder Mother         Glaucoma      Osteoporosis Mother      Respiratory Mother          88 YO COPD     Chronic Obstructive Pulmonary Disease Mother      NELIDA. Brother         67 YO MI -     Chronic Obstructive Pulmonary Disease Brother      Myocardial Infarction Brother      Other Cancer Brother      Cancer Brother          Objective  LMP 2008 (Approximate)     General: healthy, alert and in no acute distress.    HEENT: no scleral icterus or conjunctival erythema.   Skin: no suspicious lesions or rash. No jaundice.   CV: regular rhythm by palpation, 2+ distal pulses.  Resp: normal respiratory effort without conversational dyspnea.   Psych: normal mood and affect.    Gait: nonantalgic, appropriate coordination and balance.     Neuro:        - Sensation to light touch:    - Intact throughout the BUE including all peripheral nerve distributions.        - MSR:       RUE  LUE  - Biceps  2+ 2+  - Brachioradialis 2+ 2+  - Triceps  2+ 2+       - Special tests:   - Spurling's: Positive for some mild increase in numbness/tingling in digits 4-5 on the right    MSK - Shoulder:       - Inspection:    - No significant swelling, erythema, warmth, ecchymosis, lesion, or atrophy noted.        - ROM:    - Slightly limited AROM/PROM with anterolateral shoulder pain with shoulder abduction, flexion, IR/ER.       - Palpation:    - TTP at the anterior shoulder joint.   - NTTP elsewhere including the subacromial space, proximal biceps tendon, right-sided cervical paraspinals.        - Strength:  (*antalgic)  - Shoulder Abduction   5-*    - Shoulder Flexion   5-*    - Shoulder Internal Rotation  5-*    - Shoulder External Rotation  5-*             - Special tests:        - Sims: Positive   - Neers: Positive   - Empty can: Positive for pain and mild weakness    - Tipton: Positive    - Scarf:  Neg    - Speeds:  Neg    - Yergason:  Neg    - Crank:  Pos    Radiology  I independently reviewed the available relevant imaging in the chart with my interpretations as  above in HPI.     I independently reviewed today's new relevant imaging, with the following interpretation:  - XR R shoulder 3/6/2025 shows severe osteoarthritis of the right glenohumeral joint with bone-on-bone articulation and subchondral cyst formation.  Moderate degenerative changes acromioclavicular joint.  No acute fractures or dislocations.      Procedure  Large Joint Injection/Arthocentesis: R glenohumeral joint    Date/Time: 3/6/2025 10:42 AM    Performed by: Octavio Carrera DO  Authorized by: Octavio Carrera DO    Indications:  Pain  Needle Size:  22 G  Guidance: ultrasound    Approach:  Posterolateral  Location:  Shoulder      Site:  R glenohumeral joint  Medications:  40 mg triamcinolone 40 MG/ML; 2 mL lidocaine 1 %; 2 mL BUPivacaine 0.5 %  Outcome:  Tolerated well, no immediate complications  Procedure discussed: discussed risks, benefits, and alternatives    Consent Given by:  Patient  Prep: patient was prepped and draped in usual sterile fashion     Ultrasound images of procedure were permanently stored.      Glenohumeral Injection - Ultrasound Guided  The patient was informed of the risks and the benefits of the procedure and a written consent was signed. The patient s shoulder was prepped with chlorhexidine in sterile fashion. 40 mg of kenalog suspension was drawn up into a 5 mL syringe with 2 mL of 1% lidocaine and 2 ml of 0.5% bupivacaine.  Injection was performed using sterile technique.  Under ultrasound guidance a 3.5-inch 22-gauge needle was used to enter the glenohumeral joint.  Posterolateral approach was used with the patient in lateral recumbent position, arm in neutral position at the side.  Needle placement was visualized and documented with ultrasound.  Ultrasound visualization was necessary due to the small joint space entered.  Injection performed long axis to the probe.  Injection solution visualized within the joint space.  Images were permanently stored for the patient's record.  There were no complications. The patient tolerated the procedure well. There was negligible bleeding.       Assessment  1. Primary osteoarthritis, right shoulder    2. Cervical radiculopathy          Plan  Margareth Green is a pleasant 70 year old female that presents for follow up of her chronic right shoulder pain that is multifactorial in nature.  She does have radiographic and clinical evidence of severe osteoarthritis of the glenohumeral joint with some deep achy pain within the joint that hurts worse with movements and can bother her at night and at rest.  This appeared to be her most bothersome pain at her original exam and we performed ultrasound-guided glenohumeral joint and subacromial bursa corticosteroid injections, which relieved a fair amount of her pain.    She also has pain in the anterolateral shoulder with shoulder abduction, flexion, IR/ER with positive impingement testing on exam that is consistent with tendinopathy of the rotator cuff tendons.  She also has known cervical stenosis with right-sided radiculopathy that is responded well to C7-T1 ILESI in the past.  Includes numbness/tingling and occasional radicular shooting pain down to digits 4-5 on the right consistent with a likely C8 nerve root involvement.     In the interim, she has been feeling more shoulder pain.  Deep within the joint that radiates into the upper arm and up into her neck.  Radiographs today demonstrate worsening advancement of her severe glenohumeral osteoarthritis with bone-on-bone articulation and prominent subchondral cystic formation.  It is highly likely that this is driving procedures with a vast majority of her right shoulder pain.    We discussed the nature of the condition and available treatment options, and mutually agreed upon the following plan:    - Imaging:          - Reviewed and independently interpreted the relevant imaging in the chart, including any imaging ordered for today's clinic.  - Reviewed  results and images with patient.   - MRI right shoulder order has been placed to evaluate the integrity of the rotator cuff tendons for possible replacement surgery for the shoulder.  - Medications:          - Discussed pharmacologic options for pain relief.   - May use NSAIDs (Ibuprofen, Naproxen) or Acetaminophen (Tylenol) as needed for pain control.   - Do not take these if previously advised to avoid them for other medical conditions.  - May also use topical medications such as lidocaine, IcyHot, BioFreeze, or Voltaren gel as needed for pain control.    - Voltaren gel is an anti-inflammatory cream that may be used up to 4 times per day over the painful area.   - Prednisone was very helpful for her, and could be considered in the future if other conservative treatment is not managing the pain well  - Injections:          - Discussed possible injection options and alternatives.    - Injection options include: Corticosteroid injection of the glenohumeral joint, subacromial bursa. Also may consider cervical epidural steroid injection in the future if needed.      - Previous glenohumeral joint and subacromial bursa injections were helpful.  I would prioritize a glenohumeral joint injection today based on the progression of her arthritis.    - She also would like a repeat cervical epidural steroid injection, referral placed today.  - Performed a corticosteroid injection of the right glenohumeral joint under ultrasound guidance today in clinic. Patient tolerated the procedure well without complications.     - Post-procedure instructions:    - Keep the injection site clean and dry.   - Do not submerge the injection site for 24 hours (no baths, pools). Showers are ok.   - Rest the area for 24-48 hours before resuming normal activities. Avoid overexerting the area for the first few weeks.   - It may take 2-3 days to start noticing the effects of the injection and up to 3-4 weeks to feel significant benefits.   - Therapy:           - Discussed the benefits of therapy vs home exercise program for optimization of range of motion, flexibility, strength, stability and function.   - She has previously attended physical therapy and has a quality home exercise program at home.  Encouraged to continue HEP as instructed.  - Modalities:          - May use ice, heat, massage or other modalities as needed.  - Surgery:          - Discussed non-operative and operative treatment options for the patient's condition.  Originally against any surgery for the shoulder.  Discussed that a replacement would be the only definitive treatment for the advanced arthritis in the glenohumeral joint.  She would like to discuss what a replacement may look like preoperatively and postoperatively.  She would like a referral to orthopedic surgery to discuss this option.  Orthopedic surgery referral placed today.  - Activity:          - Encouraged to remain active and participate in regular activities as symptoms allow.   Avoid or modify exacerbating activities as needed.  - Follow up:          - When results of the advanced imaging are available to discuss the results and next steps in treatment.   - May follow up sooner for new/worsening symptoms.  - May contact clinic by phone or MyChart for questions or concerns.       Octavio Carrera DO, CAQSM  [facility] Sports Medicine  [facility] - Department of Orthopedic Surgery       Disclaimer:  This note was prepared and written using Dragon Medical dictation software. As a result, there may be errors in the script that have gone undetected. Please consider this when interpreting the information in this note.       Again, thank you for allowing me to participate in the care of your patient.        Sincerely,        Octavio Carrera DO    Electronically signed

## 2025-03-11 ENCOUNTER — HOSPITAL ENCOUNTER (OUTPATIENT)
Dept: RESPIRATORY THERAPY | Facility: CLINIC | Age: 70
Discharge: HOME | End: 2025-03-11
Attending: INTERNAL MEDICINE
Payer: COMMERCIAL

## 2025-03-11 DIAGNOSIS — J45.30 MILD PERSISTENT ASTHMA, UNSPECIFIED WHETHER COMPLICATED: ICD-10-CM

## 2025-03-11 LAB
DLCOUNC-%PRED-PRE: 94 %
DLCOUNC-PRE: 16.66 ML/MIN/MMHG
DLCOUNC-PRED: 17.66 ML/MIN/MMHG
ERV-%PRED-PRE: 50 %
ERV-PRE: 0.43 L
ERV-PRED: 0.86 L
EXPTIME-PRE: 5.69 SEC
FEF2575-%PRED-PRE: 164 %
FEF2575-PRE: 2.65 L/SEC
FEF2575-PRED: 1.61 L/SEC
FEFMAX-%PRED-PRE: 142 %
FEFMAX-PRE: 7.29 L/SEC
FEFMAX-PRED: 5.1 L/SEC
FEV1-%PRED-PRE: 142 %
FEV1-PRE: 2.61 L
FEV1FEV6-PRE: 82 %
FEV1FEV6-PRED: 79 %
FEV1FVC-PRE: 81 %
FEV1FVC-PRED: 79 %
FEV1SVC-PRE: 83 %
FEV1SVC-PRED: 66 %
FIFMAX-PRE: 4.57 L/SEC
FRCPLETH-%PRED-PRE: 100 %
FRCPLETH-PRE: 2.68 L
FRCPLETH-PRED: 2.67 L
FVC-%PRED-PRE: 137 %
FVC-PRE: 3.21 L
FVC-PRED: 2.34 L
IC-%PRED-PRE: 155 %
IC-PRE: 2.7 L
IC-PRED: 1.73 L
RVPLETH-%PRED-PRE: 113 %
RVPLETH-PRE: 2.24 L
RVPLETH-PRED: 1.98 L
TLCPLETH-%PRED-PRE: 118 %
TLCPLETH-PRE: 5.38 L
TLCPLETH-PRED: 4.52 L
VA-%PRED-PRE: 120 %
VA-PRE: 5.02 L
VC-%PRED-PRE: 111 %
VC-PRE: 3.13 L
VC-PRED: 2.8 L

## 2025-03-11 PROCEDURE — 94726 PLETHYSMOGRAPHY LUNG VOLUMES: CPT

## 2025-03-11 PROCEDURE — 94729 DIFFUSING CAPACITY: CPT

## 2025-03-11 PROCEDURE — 94010 BREATHING CAPACITY TEST: CPT

## 2025-03-11 NOTE — DISCHARGE INSTRUCTIONS
Thank you for completing pulmonary function testing today.  All results will be scanned into your epic results for your doctor to review.  Please resume taking all your current prescribed medications and diet as directed by your provider.   If you have not heard from your provider about your testing within two weeks and do not have a follow-up appointment scheduled with them please contact your provider about any questions you have concerning your testing.   Thank you  The [facility] Pulmonary Function Lab

## 2025-03-18 ENCOUNTER — MYC MEDICAL ADVICE (OUTPATIENT)
Dept: ORTHOPEDICS | Facility: CLINIC | Age: 70
End: 2025-03-18
Payer: COMMERCIAL

## 2025-03-19 ENCOUNTER — OFFICE VISIT (OUTPATIENT)
Dept: PHARMACY | Facility: CLINIC | Age: 70
End: 2025-03-19
Attending: PHYSICIAN ASSISTANT
Payer: COMMERCIAL

## 2025-03-19 VITALS — SYSTOLIC BLOOD PRESSURE: 126 MMHG | DIASTOLIC BLOOD PRESSURE: 76 MMHG

## 2025-03-19 DIAGNOSIS — M54.2 CERVICALGIA: ICD-10-CM

## 2025-03-19 DIAGNOSIS — K21.9 GASTROESOPHAGEAL REFLUX DISEASE WITHOUT ESOPHAGITIS: ICD-10-CM

## 2025-03-19 DIAGNOSIS — M81.0 OSTEOPOROSIS WITHOUT CURRENT PATHOLOGICAL FRACTURE, UNSPECIFIED OSTEOPOROSIS TYPE: ICD-10-CM

## 2025-03-19 DIAGNOSIS — Z98.890 S/P MVR (MITRAL VALVE REPAIR): ICD-10-CM

## 2025-03-19 DIAGNOSIS — G47.09 OTHER INSOMNIA: ICD-10-CM

## 2025-03-19 DIAGNOSIS — J45.50 SEVERE PERSISTENT ASTHMA WITHOUT COMPLICATION (H): Primary | ICD-10-CM

## 2025-03-19 DIAGNOSIS — Z78.9 TAKES DIETARY SUPPLEMENTS: ICD-10-CM

## 2025-03-19 DIAGNOSIS — F41.1 GAD (GENERALIZED ANXIETY DISORDER): ICD-10-CM

## 2025-03-19 PROCEDURE — 99607 MTMS BY PHARM ADDL 15 MIN: CPT | Performed by: PHARMACIST

## 2025-03-19 PROCEDURE — 3074F SYST BP LT 130 MM HG: CPT | Performed by: PHARMACIST

## 2025-03-19 PROCEDURE — 3078F DIAST BP <80 MM HG: CPT | Performed by: PHARMACIST

## 2025-03-19 PROCEDURE — 99605 MTMS BY PHARM NP 15 MIN: CPT | Performed by: PHARMACIST

## 2025-03-19 RX ORDER — ANTIOX #8/OM3/DHA/EPA/LUT/ZEAX 250-2.5 MG
1 CAPSULE ORAL 2 TIMES DAILY
COMMUNITY

## 2025-03-19 RX ORDER — MECLIZINE HCL 12.5 MG 12.5 MG/1
6.25 TABLET ORAL 3 TIMES DAILY PRN
COMMUNITY

## 2025-03-19 RX ORDER — MIRTAZAPINE 15 MG/1
15 TABLET, FILM COATED ORAL AT BEDTIME
Qty: 30 TABLET | Refills: 2 | Status: SHIPPED | OUTPATIENT
Start: 2025-03-19

## 2025-03-19 RX ORDER — NAPROXEN SODIUM 220 MG/1
220 TABLET, FILM COATED ORAL 2 TIMES DAILY PRN
COMMUNITY

## 2025-03-19 RX ORDER — SENNOSIDES 8.6 MG
1300 CAPSULE ORAL EVERY 8 HOURS PRN
COMMUNITY

## 2025-03-19 RX ORDER — INHALER, ASSIST DEVICES
SPACER (EA) MISCELLANEOUS
Qty: 1 EACH | Refills: 0 | Status: SHIPPED | OUTPATIENT
Start: 2025-03-19

## 2025-03-19 RX ORDER — CALCIUM CARBONATE 500 MG/1
1-2 TABLET, CHEWABLE ORAL DAILY PRN
COMMUNITY

## 2025-03-19 NOTE — PATIENT INSTRUCTIONS
"Recommendations from today's MTM visit:                                                    MTM (medication therapy management) is a service provided by a clinical pharmacist designed to help you get the most of out of your medicines.   Today we reviewed what your medicines are for, how to know if they are working, that your medicines are safe and how to make your medicine regimen as easy as possible.      Consider scheduling acetaminophen (Extra Strength or Arthritis Strength) two tablets (1,000 mg to 1,300 mg) by mouth three times daily scheduled. Try to avoid Tylenol PM due to diphenhydramine (Benadryl) and the risks associated with this.    Move ALLEGRA (fexofenadine) 180 mg by mouth to every evening.     START MIRTAZAPINE 15 MG every evening to see if this helps with insomnia/depression/anxiety    I placed an order for a spacer chamber to see if this helps with getting your medication into your lungs.      Follow-up: In-person on Wednesday, April 16th at 11 AM at the [facility].     It was great speaking with you today.  I value your experience and would be very thankful for your time in providing feedback in our clinic survey. In the next few days, you may receive an email or text message from [employer] with a link to a survey related to your  clinical pharmacist.\"     To schedule another MTM appointment, please call the clinic directly or you may call the MTM scheduling line at [phone] or toll-free at [phone].     My Clinical Pharmacist's contact information:                                                      Please feel free to contact me with any questions or concerns you have.      Kris Alvarado, PharmD, [facility] Hospital[facility]  Medication Therapy Management Pharmacist  Voicemail: [phone]  "

## 2025-03-19 NOTE — PROGRESS NOTES
Medication Therapy Management (MTM) Encounter    ASSESSMENT:                            Medication Adherence/Access: See below for considerations.    Asthma: Stable.     GERD: Stable.     Anxiety/Insomnia: Not well controlled. Given patient preference to pursue therapy for both sleep and mood will trial mirtazapine to see if this is beneficial for patient.     S/p mitral valve repair: Stable. Following with cardiology.     Pain: Not well controlled - would likely benefit from increasing acetaminophen frequency to better cover her pain.     Supplements/Osteoporosis: Stable.     PLAN:                            Consider scheduling acetaminophen (Extra Strength or Arthritis Strength) two tablets (1,000 mg to 1,300 mg) by mouth three times daily scheduled. Try to avoid Tylenol PM due to diphenhydramine (Benadryl) and the risks associated with this.    Move ALLEGRA (fexofenadine) 180 mg by mouth to every evening.     START MIRTAZAPINE 15 MG every evening to see if this helps with insomnia/depression/anxiety    I placed an order for a spacer chamber to see if this helps with getting your medication into your lungs.      Follow-up: In-person on Wednesday, April 16th at 11 AM at the [facility].     SUBJECTIVE/OBJECTIVE:                          Margareth Green is a 70 year old female seen for an initial visit. She was referred to me from Caitlin Beasley PA-C.      Reason for visit: Comprehensive medication review - ADHD, depression, insomnia.    Allergies/ADRs: Reviewed in chart  Past Medical History: Reviewed in chart  Tobacco: She reports that she quit smoking about 27 years ago. Her smoking use included cigarettes. She started smoking about 57 years ago. She has a 15 pack-year smoking history. She has never been exposed to tobacco smoke. She has never used smokeless tobacco.  Alcohol: 1-3 beverages / week    Medication Adherence/Access: Patient uses pill box(es).  Patient takes medications 1 time(s) per day.   Per  "patient, misses medication 0 time(s) per week.   Medication barriers: none.   The patient fills medications at [facility]: YES.    Asthma   Dulera 100-5 mcg - two puffs twice daily  Albuterol HFA - two puffs every 6 hours as needed - up to twice daily on some days  Ipratropium HFA every 6 hours as needed - rare use - prefers albuterol  Patient rinses their mouth after using steroid inhaler. Sometimes with \"silver water\".   Allegra 180 mg every morning  Fluticasone nasal spray every morning  Prednisone as needed for flares.   Patient reports no current medication side effects.      Triggers include: dust mites, animal dander, and mold.  Patient reports the following symptoms: ongoing nasal congestion/cough/some shortness of breath.       GERD    Famotidine 10-20 mg daily   Tums as needed  Patient reports heartburn.   Patient feels that current regimen is somewhat effective.       Mental Health   Anxiety/Insomnia  Bupropion  mg every morning  Occasionally will use CBD with THC with some benefits for sleep/pain.   Patient reports no current medication side effects. Higher dose of bupropion made her anxious.   Mood/anxiety is not completely controlled. Sleep is a large concern for patient that she feels is impacted by anxious thoughts and contributes to issues with focus/fatigue during the day.  Previous medication trials include trazodone (dizziness/headaches/loose stools), amitriptyline (not as effective - did not like side effect profile), melatonin (not very helpful), Tylenol PM (sometimes effective).        S/p mitral valve repair: Patient is taking aspirin 81 mg daily. Follows with cardiology. Denies any issues.     Pain: Patient is taking acetaminophen 1,300 mg daily to twice daily and naproxen 220 mg daily as needed (not as often as tylenol use).  Usually waits until she is pain. Finds to be somewhat effective. Denies any current side effects.     Supplements   Osteoporosis  Alendronate 70 mg weekly (did " have a little more upset than normal stomach after last dose)  Calcium/vitamin D daily  Collagen daily  Magnesium - two tablets twice daily  Daily Multivitamin  Eye vitamin (AREDS2) twice daily  Vitamin C 250 mg daily  No other reported issues at this time.   Patient is not interested in stopping/changing supplements.        Today's Vitals: /76   LMP 02/26/2008 (Approximate)  Patient declined being weighed.    BP Readings from Last 3 Encounters:   03/19/25 126/76   03/14/25 128/74   03/05/25 132/68     Wt Readings from Last 5 Encounters:   03/14/25 129 lb (58.5 kg)   03/05/25 130 lb 1.6 oz (59 kg)   01/16/25 125 lb 12.8 oz (57.1 kg)   01/06/25 130 lb 12.8 oz (59.3 kg)   10/31/24 132 lb (59.9 kg)     ----------------      I spent 60 minutes with this patient today. All changes were made via collaborative practice agreement with Provider Not In System.     A summary of these recommendations was given to the patient.    Kris Alvarado, PharmD, BCACP  Medication Therapy Management Pharmacist  Voicemail: [phone]           Medication Therapy Recommendations  Insomnia   1 Rationale: Untreated condition - Needs additional medication therapy - Indication   Recommendation: Start Medication - mirtazapine 15 MG tablet   Status: Accepted per CPA   Identified Date: 3/19/2025 Completed Date: 3/19/2025         Pain in joint, ankle and foot, left   1 Current Medication: acetaminophen (ACETAMINOPHEN 8 HOUR) 650 MG CR tablet   Current Medication Sig: Take 1,300 mg by mouth every 8 hours as needed for mild pain or fever.   Rationale: Dose too low - Dosage too low - Effectiveness   Recommendation: Increase Frequency   Status: Accepted - no CPA Needed   Identified Date: 3/19/2025 Completed Date: 3/19/2025         Severe persistent asthma without complication (H)   1 Current Medication: ALLEGRA 180 MG OR TABS   Current Medication Sig: Take 180 mg by mouth daily.   Rationale: Does not understand instructions - Adherence - Adherence    Recommendation: Change Administration Time   Status: Accepted - no CPA Needed   Identified Date: 3/19/2025 Completed Date: 3/19/2025

## 2025-03-19 NOTE — LETTER
_  Medication List        Prepared on: Mar 19, 2025     Bring your Medication List when you go to the doctor, hospital, or   emergency room. And, share it with your family or caregivers.     Note any changes to how you take your medications.  Cross out medications when you no longer use them.    Medication How I take it Why I use it Prescriber   acetaminophen (ACETAMINOPHEN 8 HOUR) 650 MG CR tablet Take 1,300 mg by mouth every 8 hours as needed for mild pain or fever. Pain Patient Reported   albuterol (PROAIR HFA/PROVENTIL HFA/VENTOLIN HFA) 108 (90 Base) MCG/ACT inhaler Inhale 2 puffs into the lungs every 6 hours as needed for shortness of breath, wheezing or cough. Subacute cough; Wheezing; Moderate Persistent Asthma Evelyn Armas PA-C   alendronate (FOSAMAX) 70 MG tablet Take 1 tablet (70 mg) by mouth every 7 days. Osteoporosis  Caitlin Beasley PA-C   ALLEGRA 180 MG OR TABS Take 180 mg by mouth daily. Asthma; Allergies Patient Reported   amoxicillin (AMOXIL) 500 MG capsule Take 2000mg by mouth 30-60 minutes prior to dental appointment per infective endocarditis protocol due to mitral valve repair. Mitral Valve Repair Andrey Mathews MD   aspirin (ASA) 81 MG chewable tablet Take 1 tablet (81 mg) by mouth daily S/P MVR (mitral valve repair) Angela Persaud NP   buPROPion (WELLBUTRIN XL) 150 MG 24 hr tablet TAKE ONE TABLET BY MOUTH EVERY MORNING Poor Concentration; Major Depressive Disorder Caitlin Beasley PA-C   CALCIUM /VITAMIN D TABS   OR Take 1 tablet by mouth daily. Bone Health Patient Reported   calcium carbonate (TUMS) 500 MG chewable tablet Take 1-2 chew tab by mouth daily as needed for heartburn. Heartburn Patient Reported   Collagen-Vitamin C-Biotin (COLLAGEN PO) Take 2-3 Scoops by mouth daily. Skin Health Patient Reported   famotidine (PEPCID) 20 MG tablet Take 1 tablet (20 mg) by mouth daily. Gastroesophageal reflux disease Caitlin Beasley PA-C   fluticasone (FLONASE) 50 MCG/ACT nasal spray Spray 1  spray into both nostrils daily. Non-seasonal allergic rhinitis Khanh Varner MD   ipratropium (ATROVENT HFA) 17 MCG/ACT inhaler Inhale 2 puffs into the lungs every 6 hours. Mild persistent asthma Khanh Varner MD   MAGNESIUM PO Take 2 tablets by mouth 2 times daily. Cramping; Sleep Patient Reported   meclizine (ANTIVERT) 12.5 MG tablet Take 6.25 mg by mouth 3 times daily as needed for dizziness. Dizzy Patient Reported   mirtazapine (REMERON) 15 MG tablet Take 1 tablet (15 mg) by mouth at bedtime. TAMMY (Generalized Anxiety Disorder); Other insomnia Caitlin Beasley PA-C   mometasone-formoterol (DULERA) 100-5 MCG/ACT inhaler Inhale 2 puffs into the lungs 2 times daily. Mild persistent asthma Khanh Varner MD   Multiple Vitamins-Minerals (PRESERVISION AREDS 2) CAPS Take 1 capsule by mouth 2 times daily. Macular Degeneration Patient Reported   multivitamin w/minerals (THERA-VIT-M) tablet Take 1 tablet by mouth daily  General Health Patient Reported   naproxen sodium (ALEVE) 220 MG tablet Take 220 mg by mouth 2 times daily as needed for moderate pain. Pain Patient Reported   predniSONE (DELTASONE) 20 MG tablet Take 40 mg (2 tabs) for 5 days. Mild persistent asthma Khanh Varner MD   spacer (OPTICHAMBER NASIR) holding chamber Use with Dulera and albuterol inhaler Severe persistent asthma Caitlin Beasley PA-C   vitamin C (ASCORBIC ACID) 250 MG tablet Take 250 mg by mouth daily. General Health Caitlin Beasley PA-C         Add new medications, over-the-counter drugs, herbals, vitamins, or  minerals in the blank rows below.    Medication How I take it Why I use it Prescriber                                      Allergies:      No Known Allergies        Side effects I have had:      No Known Side Effects        Other Information:              My notes and questions:

## 2025-03-19 NOTE — LETTER
March 19, 2025  Margareth Green  [address] [facility] [id number]    Dear Ms. Green, [facility]     Thank you for talking with me on Mar 19, 2025 about your health and medications. As a follow-up to our conversation, I have included two documents:      Your Recommended To-Do List has steps you should take to get the best results from your medications.  Your Medication List will help you keep track of your medications and how to take them.    If you want to talk about these documents, please call David Alvarado RPH at phone: [phone], Monday-Friday 8-4:30pm.    I look forward to working with you and your doctors to make sure your medications work well for you.    Sincerely,  David Alvarado RPH  [facility] Pharmacist, [facility]   Received call from Lisa at [facility]. She reports patient's been in SB, ST, SR, and SA. Rate is between  bpm.

## 2025-03-19 NOTE — LETTER
"Recommended To-Do List      Prepared on: Mar 19, 2025       You can get the best results from your medications by completing the items on this \"To-Do List.\"      Bring your To-Do List when you go to your doctor. And, share it with your family or caregivers.    My To-Do List:  What we talked about: What I should do:   A new medication that may be of benefit to you    START MIRTAZAPINE 15 MG every evening to see if this helps with insomnia/depression/anxiety          What we talked about: What I should do:   Your medication dosage being too low    Consider scheduling acetaminophen (Extra Strength or Arthritis Strength) two tablets (1,000 mg to 1,300 mg) by mouth three times daily scheduled. Try to avoid Tylenol PM due to diphenhydramine (Benadryl) and the risks associated with this.          What we talked about: What I should do:   The importance of taking your medication as intended    Change when you are taking ALLEGRA (fexofenadine) 180 mg by mouth to every evening.                 "

## 2025-03-21 ENCOUNTER — HOSPITAL ENCOUNTER (OUTPATIENT)
Dept: MRI IMAGING | Facility: CLINIC | Age: 70
Discharge: HOME | End: 2025-03-21
Attending: STUDENT IN AN ORGANIZED HEALTH CARE EDUCATION/TRAINING PROGRAM | Admitting: STUDENT IN AN ORGANIZED HEALTH CARE EDUCATION/TRAINING PROGRAM
Payer: COMMERCIAL

## 2025-03-21 DIAGNOSIS — M19.011 PRIMARY OSTEOARTHRITIS, RIGHT SHOULDER: ICD-10-CM

## 2025-03-21 PROCEDURE — 73221 MRI JOINT UPR EXTREM W/O DYE: CPT | Mod: 26 | Performed by: RADIOLOGY

## 2025-03-21 PROCEDURE — 73221 MRI JOINT UPR EXTREM W/O DYE: CPT | Mod: RT

## 2025-03-26 NOTE — PROGRESS NOTES
"SUBJECTIVE:   Margareth Green is a 66 year old female who presents for Preventive Visit.      Patient has been advised of split billing requirements and indicates understanding: Yes  Are you in the first 12 months of your Medicare coverage?  No    Healthy Habits:     In general, how would you rate your overall health?  Good    Frequency of exercise:  4-5 days/week    Duration of exercise:  15-30 minutes    Do you usually eat at least 4 servings of fruit and vegetables a day, include whole grains    & fiber and avoid regularly eating high fat or \"junk\" foods?  Yes    Taking medications regularly:  Yes    Medication side effects:  Muscle aches and Lightheadedness    Ability to successfully perform activities of daily living:  Transportation requires assistance, shopping requires assistance and housework requires assistance    Home Safety:  No safety concerns identified    Hearing Impairment:  No hearing concerns    In the past 6 months, have you been bothered by leaking of urine?  No    In general, how would you rate your overall mental or emotional health?  Good      PHQ-2 Total Score: 2    Additional concerns today:  Yes    Do you feel safe in your environment? Yes    Have you ever done Advance Care Planning? (For example, a Health Directive, POLST, or a discussion with a medical provider or your loved ones about your wishes): Yes, advance care planning is on file.       Fall risk  Fallen 2 or more times in the past year?: (P) No  Any fall with injury in the past year?: (P) No    Cognitive Screening   1) Repeat 3 items (Leader, Season, Table)    2) Clock draw: NORMAL  3) 3 item recall: Recalls 3 objects  Results: NORMAL clock, 1-2 items recalled: COGNITIVE IMPAIRMENT LESS LIKELY    Mini-CogTM Copyright BRANDON Kyle. Licensed by the author for use in [facility]; reprinted with permission (rd@.[facility]). All rights reserved.      Do you have sleep apnea, excessive snoring or daytime drowsiness?: " See portal message and sign the    yes    Reviewed and updated as needed this visit by clinical staff  Tobacco  Allergies  Meds   Med Hx  Surg Hx  Fam Hx  Soc Hx       Reviewed and updated as needed this visit by Provider               Social History     Tobacco Use     Smoking status: Former Smoker     Packs/day: 0.50     Years: 30.00     Pack years: 15.00     Types: Cigarettes     Quit date: 3/20/1998     Years since quittin.8     Smokeless tobacco: Never Used     Tobacco comment: No smokers in home   Substance Use Topics     Alcohol use: Yes     Alcohol/week: 6.7 standard drinks     Comment: 2x/week         Alcohol Use 2022   Prescreen: >3 drinks/day or >7 drinks/week? No   Prescreen: >3 drinks/day or >7 drinks/week? -   AUDIT SCORE  -         Concern -discussion about asthma, diabetes, T12 compression fracture, and depression.  Onset: Patient mentions that she has a diagnosis of diabetes but not is on current medications for it.  Wishes to have this evaluated.  She also states that she is had depression for a long time currently on Wellbutrin.  Also when she did have her CT scans in the hospital and noted a old T12 compression fracture and wishes to discuss about this.    Current providers sharing in care for this patient include:   Patient Care Team:  Caitlin Beasley PA-C as PCP - General (Family Practice)  Caitlin Beasley PA-C as Assigned PCP    The following health maintenance items are reviewed in Epic and correct as of today:  Health Maintenance Due   Topic Date Due     COVID-19 Vaccine (1) Never done     ASTHMA ACTION PLAN  10/14/2020     INFLUENZA VACCINE (1) 2021       Any new diagnosis of family breast, ovarian, or bowel cancer? No    .    Review of Systems   Constitutional: Negative for chills and fever.   HENT: Negative for congestion, ear pain, hearing loss and sore throat.    Eyes: Negative for pain and visual disturbance.   Respiratory: Positive for shortness of breath. Negative for cough.    Cardiovascular:  "Negative for chest pain, palpitations and peripheral edema.   Gastrointestinal: Positive for heartburn. Negative for abdominal pain, constipation, diarrhea, hematochezia and nausea.   Breasts:  Negative for tenderness, breast mass and discharge.   Genitourinary: Negative for dysuria, frequency, genital sores, hematuria, pelvic pain, urgency, vaginal bleeding and vaginal discharge.   Musculoskeletal: Positive for myalgias. Negative for joint swelling.   Skin: Negative for rash.   Neurological: Positive for weakness and headaches. Negative for dizziness and paresthesias.   Psychiatric/Behavioral: The patient is not nervous/anxious.      OBJECTIVE:   /70   Pulse 98   Temp 98.6  F (37  C) (Temporal)   Resp 20   Ht 1.56 m (5' 1.42\")   Wt 54.9 kg (121 lb)   LMP 02/26/2008 (Approximate)   SpO2 98%   BMI 22.55 kg/m   Estimated body mass index is 22.55 kg/m  as calculated from the following:    Height as of this encounter: 1.56 m (5' 1.42\").    Weight as of this encounter: 54.9 kg (121 lb).  Physical Exam  Vitals and nursing note reviewed.   Constitutional:       General: She is not in acute distress.     Appearance: Normal appearance. She is not ill-appearing, toxic-appearing or diaphoretic.   HENT:      Head: Normocephalic.      Right Ear: External ear normal.      Left Ear: External ear normal.      Nose: No rhinorrhea.   Eyes:      General:         Right eye: No discharge.         Left eye: No discharge.      Extraocular Movements: Extraocular movements intact.      Conjunctiva/sclera: Conjunctivae normal.      Pupils: Pupils are equal, round, and reactive to light.   Pulmonary:      Effort: Pulmonary effort is normal. No respiratory distress.   Musculoskeletal:         General: Normal range of motion.      Cervical back: Normal range of motion.   Neurological:      Mental Status: She is alert and oriented to person, place, and time.   Psychiatric:         Mood and Affect: Mood normal.         Behavior: " Behavior normal.           ASSESSMENT / PLAN:   (Z00.00) Encounter for Medicare annual wellness exam  (primary encounter diagnosis)  Comment: This was completed today.  She is up-to-date on DEXA scanning, mammogram, and colonoscopy.  Fall assessment was discussed.  Appropriate lab work was ordered.  Vaccines were also discussed.    (Z13.1) Screening for diabetes mellitus  Comment:   (R73.09) High glucose level  Comment: Patient mentions that she is a diabetic, I do not see an A1c on file anywhere or any medications.  A1c was drawn and is within normal limits.  Patient is not a diabetic.  Plan: Hemoglobin A1c    (J45.30) Mild persistent asthma, unspecified whether complicated  (Z86.16) Personal history of COVID-19  On supplemental oxygen  Comment: Patient is doing actually quite well since her COVID-19 diagnosis about 1.5 months ago.  She has been weaning her oxygen pretty good.  Currently on liter but sometimes off of oxygen completely.  She does have a physical therapy visiting her multiple times per week.  Overall she has made great progress since her COVID-19 diagnosis.  Continue with ProAir as needed.  There has been an issue for her to refill this, hopefully at this most recent prescription today will go through for her.  Plan: albuterol (PROAIR HFA) 108 (90 Base) MCG/ACT         inhaler, albuterol (PROAIR HFA/PROVENTIL         HFA/VENTOLIN HFA) 108 (90 Base) MCG/ACT inhaler    (F33.0) Major depressive disorder, recurrent episode, mild (H)  Poor sleep hygiene  Insomnia  Comment: Fairly stable, continue with Wellbutrin.  PHQ looks good.  Also continue with amitriptyline at bedtime.  We also discussed sleep hygiene modification and a handout was given to her in regards to this.  My hopes that if her sleep is more corrected that this will help her depression.  Could consider other daily medication such as a SSRI or SNRI in the future.  She should also consider taking vitamin D daily throughout the winter months  "but also for her bone health.    (S22.080S) T12 compression fracture, sequela  Comment: This is a no CT scan, old fracture.  Has not bothered she does not mention if any pain.  Continue with vitamin D, calcium supplementation as well as raloxifene.     Patient has been advised of split billing requirements and indicates understanding: Yes    COUNSELING:  Reviewed preventive health counseling, as reflected in patient instructions       Regular exercise       Healthy diet/nutrition       Vision screening       Fall risk prevention       Osteoporosis prevention/bone health       Consider lung cancer screening for ages 55-80 years and 30 pack-year smoking history       Colon cancer screening       Hepatitis C screening       HIV screening for high risk patient    Estimated body mass index is 22.55 kg/m  as calculated from the following:    Height as of this encounter: 1.56 m (5' 1.42\").    Weight as of this encounter: 54.9 kg (121 lb).    Weight management plan noted, stable and monitoring    She reports that she quit smoking about 23 years ago. Her smoking use included cigarettes. She has a 15.00 pack-year smoking history. She has never used smokeless tobacco.      Appropriate preventive services were discussed with this patient, including applicable screening as appropriate for cardiovascular disease, diabetes, osteopenia/osteoporosis, and glaucoma.  As appropriate for age/gender, discussed screening for colorectal cancer, prostate cancer, breast cancer, and cervical cancer. Checklist reviewing preventive services available has been given to the patient.    Reviewed patients plan of care and provided an AVS. The Intermediate Care Plan ( asthma action plan, low back pain action plan, and migraine action plan) for Margareth meets the Care Plan requirement. This Care Plan has been established and reviewed with the Patient.    Counseling Resources:  ATP IV Guidelines  Pooled Cohorts Equation Calculator  Breast Cancer Risk " Calculator  Breast Cancer: Medication to Reduce Risk  FRAX Risk Assessment  ICSI Preventive Guidelines  Dietary Guidelines for Americans, 2010  [employer]'s MyPlate  ASA Prophylaxis  Lung CA Screening    SELIN ESCALONA MD  [facility]    Identified Health Risks:

## 2025-03-27 ENCOUNTER — HOSPITAL ENCOUNTER (OUTPATIENT)
Facility: CLINIC | Age: 70
Discharge: HOME | End: 2025-03-27
Attending: ANESTHESIOLOGY | Admitting: ANESTHESIOLOGY
Payer: COMMERCIAL

## 2025-03-27 ENCOUNTER — APPOINTMENT (OUTPATIENT)
Dept: GENERAL RADIOLOGY | Facility: CLINIC | Age: 70
End: 2025-03-27
Attending: ANESTHESIOLOGY
Payer: COMMERCIAL

## 2025-03-27 VITALS
SYSTOLIC BLOOD PRESSURE: 117 MMHG | OXYGEN SATURATION: 96 % | RESPIRATION RATE: 16 BRPM | DIASTOLIC BLOOD PRESSURE: 77 MMHG | HEART RATE: 79 BPM

## 2025-03-27 DIAGNOSIS — M54.12 CERVICAL RADICULOPATHY: ICD-10-CM

## 2025-03-27 PROCEDURE — 250N000011 HC RX IP 250 OP 636: Performed by: ANESTHESIOLOGY

## 2025-03-27 PROCEDURE — 999N000179 XR SURGERY CARM FLUORO LESS THAN 5 MIN W STILLS

## 2025-03-27 PROCEDURE — 62321 NJX INTERLAMINAR CRV/THRC: CPT | Performed by: ANESTHESIOLOGY

## 2025-03-27 PROCEDURE — 250N000009 HC RX 250: Performed by: NURSE ANESTHETIST, CERTIFIED REGISTERED

## 2025-03-27 RX ORDER — TRIAMCINOLONE ACETONIDE 40 MG/ML
INJECTION, SUSPENSION INTRA-ARTICULAR; INTRAMUSCULAR PRN
Status: DISCONTINUED | OUTPATIENT
Start: 2025-03-27 | End: 2025-03-27 | Stop reason: HOSPADM

## 2025-03-27 RX ORDER — IOPAMIDOL 612 MG/ML
INJECTION, SOLUTION INTRATHECAL PRN
Status: DISCONTINUED | OUTPATIENT
Start: 2025-03-27 | End: 2025-03-27 | Stop reason: HOSPADM

## 2025-03-27 RX ADMIN — LIDOCAINE HYDROCHLORIDE 0.1 ML: 10 INJECTION, SOLUTION EPIDURAL; INFILTRATION; INTRACAUDAL; PERINEURAL at 12:02

## 2025-03-27 ASSESSMENT — ACTIVITIES OF DAILY LIVING (ADL)
ADLS_ACUITY_SCORE: 59
ADLS_ACUITY_SCORE: 59

## 2025-03-27 NOTE — DISCHARGE INSTRUCTIONS

## 2025-03-27 NOTE — OP NOTE
CHIEF COMPLAINT: Neck pain secondary to cervical spondylosis and cervical disc degeneration  PROCEDURE: C7-T1 Interlaminar epidural steroid injection using fluoroscopic guidance with contrast dye.   PROCEDURE DETAILS: After written informed consent was obtained from the patient, the patient was escorted to the procedure room.  The patient was placed in the prone position.  A  time out  was conducted to verify patient identity, procedure to be performed, side, site, allergies and any special requirements.  The skin over the neck and upper back region were prepped and draped in normal sterile fashion. Fluoroscopy was used to identify the C7-T1 interspace in an AP view and the skin was anesthetized with 2 mL of 1% lidocaine with bicarbonate buffer.  A 20-gauge 3-1/2 inch Tuohy needle was advanced using the loss of resistance technique with preservative free normal saline with fluoroscopic guidance. After negative aspiration for CSF and blood, 1.5 cc of Isovue contrast dye was injected revealing the appropriate cervical epidurogram without evidence of intrathecal or intravascular spread. Following this, a 3-mL solution of 40 mg of triamcinolone with 2 cc preservative-free normal saline was slowly injected.  Good spread of medication covering the right C7 and C8 and T1 nerve roots off into the right epidural gutter.  After injection of the medication, as the needle tip was withdrawn, it was flushed with local anesthetic.  The patient was monitored with blood pressure and pulse oximetry machines with the assistance of an RN throughout the procedure.  The patient was alert and responsive to questions throughout the procedure.   The patient tolerated the procedure well and was observed in the post-procedural area.  The patient was dismissed without apparent complications.      BLOOD LOSS:  less than 5 cc     DIAGNOSIS:  1.  Right-sided neck pain secondary to cervical spondylosis and cervical disc degeneration  2.  Right C8  radiculopathy secondary to a spondylolisthesis at C7-T1  3.  Severe facet arthropathy at C5-6 on the right side  PLAN:  1. Performed a C7-T1 interlaminar epidural steroid injection.   2. The patient was instructed to call the [place] [facility] if today's procedure is not helpful.  I also had recommended that if the injection today is not helpful that she gets an updated MRI and an EMG.  In the past she has had carpal tunnel and she may have had a recurrence of the carpal tunnel although the distribution is more in the C7 dermatome and not just isolated to the median nerve.  She also has reasons to have a true radiculopathy from the neck at C8 and perhaps C7.  I think an EMG would help delineate whether this is a radiculopathy versus a peripheral nerve entrapment.  Tyler Bay MD  Diplomate of the American Board of Anesthesiology, Pain Medicine

## 2025-04-01 ENCOUNTER — THERAPY VISIT (OUTPATIENT)
Dept: PHYSICAL THERAPY | Facility: CLINIC | Age: 70
End: 2025-04-01
Attending: STUDENT IN AN ORGANIZED HEALTH CARE EDUCATION/TRAINING PROGRAM
Payer: COMMERCIAL

## 2025-04-01 ENCOUNTER — MYC MEDICAL ADVICE (OUTPATIENT)
Dept: FAMILY MEDICINE | Facility: OTHER | Age: 70
End: 2025-04-01

## 2025-04-01 DIAGNOSIS — M54.12 CERVICAL RADICULOPATHY: Primary | ICD-10-CM

## 2025-04-01 PROCEDURE — 97162 PT EVAL MOD COMPLEX 30 MIN: CPT | Mod: GP | Performed by: PHYSICAL THERAPIST

## 2025-04-01 PROCEDURE — 97110 THERAPEUTIC EXERCISES: CPT | Mod: GP | Performed by: PHYSICAL THERAPIST

## 2025-04-01 ASSESSMENT — ACTIVITIES OF DAILY LIVING (ADL)
PUSHING_WITH_THE_INVOLVED_ARM: 8
PLACING_AN_OBJECT_ON_A_HIGH_SHELF: 8
PUTTING_ON_YOUR_PANTS: 7
WASHING_YOUR_BACK: 8
PLEASE_INDICATE_YOR_PRIMARY_REASON_FOR_REFERRAL_TO_THERAPY:: SHOULDER
WHEN_LYING_ON_THE_INVOLVED_SIDE: 9
CARRYING_A_HEAVY_OBJECT_OF_10_POUNDS: 8
TOUCHING_THE_BACK_OF_YOUR_NECK: 7
PUTTING_ON_A_SHIRT_THAT_BUTTONS_DOWN_THE_FRONT: 8
AT_ITS_WORST?: 8
WASHING_YOUR_HAIR?: 8
REMOVING_SOMETHING_FROM_YOUR_BACK_POCKET: 8
PUTTING_ON_AN_UNDERSHIRT_OR_A_PULLOVER_SWEATER: 8
REACHING_FOR_SOMETHING_ON_A_HIGH_SHELF: 8

## 2025-04-01 NOTE — PROGRESS NOTES
PHYSICAL THERAPY EVALUATION  Type of Visit: Evaluation       Fall Risk Screen:  Fall screen completed by: PT  Have you fallen 2 or more times in the past year?: No  Have you fallen and had an injury in the past year?: Yes  Is patient a fall risk?: No  Fall screen comments: Patient had PT for the balance and the fall    Subjective     Patient has had shoulder pain Nov 1 2024. MRI confirmed supraspinatus tear with 9mm retraction, low grade subscapularis tear, and high grade bone on bone in the GH joint. Had injection in the shoulder 4 wks ago and this helped with the pain in the shoulder.  Also has neck pain with referral to the RUE.  Patient had injection last Thursday C7-T1 steroid injection.  Since the injection last wk neck pain is better, also had short term relief in finger pain. But last night had pain in the right middle fingers. Today neck pain #5 right side, shoulder pain is a #7, upper arm #8, hand pain into fingers is a #3, the finger pain is not constant. Got ex's from the provider for shoulder and pt wanted to wait with them until guidance from PT.      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     Type of home:   house     Employment:     retired  Hobbies/Interests:   gardening, walking    Patient goals for therapy:   to have no more pain, and try to avoid having surgery on her shoulder         Objective   SHOULDER EVALUATION  PAIN: increase in shoulder pain with elevation past 115 degrees.  Also referral pain from the neck to the palm of the right hand.   POSTURE: forward head and rounded shoulders  GAIT:   WNL  BALANCE/PROPRIOCEPTION:  WNL  ROM: AROM: WW=779  Abd=115, IR=to pocket, ER=45. PROM all the same and limited by pain    STRENGTH: FF=4/5,  abd=3+/5, IR=4/5    PALPATION: Pain in the right shoulder and throughout the neck    CERVICAL SCREEN: AROM very limited other than flexion. FF=WNL, ext=70%, rota  B=50%, SB B 50%    Assessment & Plan   CLINICAL IMPRESSIONS  Medical Diagnosis: Cervical radiculopathy (M54.12)  - Primary    Primary osteoarthritis, right shoulder (M19.011)    Tendinopathy of right rotator cuff (M67.911)    Treatment Diagnosis: neck pain, referral pain RUE, right shoulder pain, osteoarthritis right GH joint   Impression/Assessment:  Patient is having pain in the neck and the right UE including pain in the right shoulder. This is complicated by severe OA in the GH joint and may have referral pain from the neck that is also causing some of the UE pain. Rotator cuff at this time seems moderate strength.     Clinical Decision Making (Complexity):  Clinical Presentation: Evolving/Changing  Clinical Presentation Rationale: based on medical and personal factors listed in PT evaluation  Clinical Decision Making (Complexity): Moderate complexity    PLAN OF CARE  Treatment Interventions:  Modalities: Cryotherapy, E-stim, Ultrasound  Interventions: Manual Therapy, Neuromuscular Re-education, Therapeutic Activity, Therapeutic Exercise, Self-Care/Home Management    Long Term Goals     PT Goal 1  Goal Identifier: 1  Goal Description: Patient no longer has neck pain or referral pain in to the RUE so she can use the RUE for all ADL's with minimal pain in the RUE.  Rationale: to maximize safety and independence with performance of ADLs and functional tasks  Target Date: 06/29/25  PT Goal 2  Goal Identifier: 2  Goal Description: Patient has 50% less functional pain in the shoulder as seen by a SPADI score of 40.  Rationale: to maximize safety and independence with performance of ADLs and functional tasks  Target Date: 06/29/25      Frequency of Treatment: 1x wk  Duration of Treatment: 90 days      Education Assessment:   Learner/Method: Patient;Listening;Reading;Demonstration;Pictures/Video    Risks and benefits of evaluation/treatment have been explained.   Patient/Family/caregiver agrees with Plan of Care.      Evaluation Time:     PT Eval, Moderate Complexity Minutes (29093): 15       Signing Clinician: Leana James, PT        [facility]                                                                                   OUTPATIENT PHYSICAL THERAPY      PLAN OF TREATMENT FOR OUTPATIENT REHABILITATION   Patient's Last Name, First Name, M.IDada  Margareth Green YOB: 1955   Provider's Name   [facility]   Medical Record No.  [id number]     Onset Date: 11/01/24  Start of Care Date: 04/01/25     Medical Diagnosis:  Cervical radiculopathy (M54.12)  - Primary    Primary osteoarthritis, right shoulder (M19.011)    Tendinopathy of right rotator cuff (M67.911)      PT Treatment Diagnosis:  neck pain, referral pain RUE, right shoulder pain, osteoarthritis right GH joint Plan of Treatment  Frequency/Duration: 1x wk/ 90 days    Certification date from 04/01/25 to 06/29/25         See note for plan of treatment details and functional goals     Leana James, PT                         I CERTIFY THE NEED FOR THESE SERVICES FURNISHED UNDER        THIS PLAN OF TREATMENT AND WHILE UNDER MY CARE     (Physician attestation of this document indicates review and certification of the therapy plan).              Referring Provider:  Octavio Carrera    Initial Assessment  See Epic Evaluation- Start of Care Date: 04/01/25

## 2025-04-08 ENCOUNTER — TELEPHONE (OUTPATIENT)
Dept: GASTROENTEROLOGY | Facility: CLINIC | Age: 70
End: 2025-04-08
Payer: COMMERCIAL

## 2025-04-08 NOTE — TELEPHONE ENCOUNTER
"Endoscopy Scheduling Screen    Have you had any respiratory illness or flu-like symptoms in the last 10 days?  No    What is your communication preference for Instructions and/or Bowel Prep?   MyChart    What insurance is in the chart?  Other:  [facility]    Ordering/Referring Provider: HAFSA ARROYO    (If ordering provider performs procedure, schedule with ordering provider unless otherwise instructed. )    BMI: Estimated body mass index is 23.95 kg/m  as calculated from the following:    Height as of 3/5/25: 1.563 m (5' 1.54\").    Weight as of 3/14/25: 58.5 kg (129 lb).     Sedation Ordered  moderate sedation.   If patient BMI > 50 do not schedule in [facility].    If patient BMI > 45 do not schedule at [facility].    Are you taking methadone or Suboxone?  NO, No RN review required.    Have you been diagnosed and are being treated for severe PTSD or severe anxiety?  NO, No RN review required.    Are you taking any prescription medications for pain 3 or more times per week?   NO, No RN review required.    Do you have a history of malignant hyperthermia?  No    (Females) Are you currently pregnant?   No     Have you been diagnosed or told you have pulmonary hypertension?   No    Do you have an LVAD?  No    Have you been told you have moderate to severe sleep apnea?  No.    Have you been told you have COPD, asthma, or any other lung disease?  Yes     What breathing problems do you have?  Asthma     Do you use home oxygen?  No    Have your breathing problems required an ED visit or hospitalization in the last year?  No.    Has your doctor ordered any cardiac tests like echo, angiogram, stress test, ablation, or EKG, that you have not completed yet?  Yes (RN Review required for scheduling.)    Do you  have a history of any heart conditions?  Yes     Have you had any hospitalizations  in the last year for heart related issues, for example a stent placement, heart attack, or cardiomyopathy?  No    Do you have any implantable devices in " "your body (pacemaker, ICD)?  Other annuloplasty ring    Do you take nitroglycerine?  No    Have you ever had or are you waiting for an organ transplant?  No. Continue scheduling, no site restrictions.    Have you had a stroke or transient ischemic attack (TIA aka \"mini stroke\") in the last 2 years?   No.    Have you been diagnosed with or been told you have cirrhosis of the liver?   No.    Are you currently on dialysis?   No    Do you need assistance transferring?   No    BMI: Estimated body mass index is 23.95 kg/m  as calculated from the following:    Height as of 3/5/25: 1.563 m (5' 1.54\").    Weight as of 3/14/25: 58.5 kg (129 lb).     Is patients BMI > 40 and scheduling location [facility]?  No    Do you take an injectable or oral medication for weight loss or diabetes (excluding insulin)?  No    Do you take the medication Naltrexone?  No    Do you take blood thinners?  No       Prep   Are you currently on dialysis or do you have chronic kidney disease?  No    Do you have a diagnosis of diabetes?  No    Do you have a diagnosis of cystic fibrosis (CF)?  No    On a regular basis do you go 3 -5 days between bowel movements?  No    BMI > 40?  No    Preferred Pharmacy:    [place], [place] - [address] [facility] [address] [place] [address]  Phone: [phone] Fax: [phone]          Final Scheduling Details     Procedure scheduled  Colonoscopy    Surgeon:  LOTTIE     Date of procedure:  5/28/25     Pre-OP / PAC:   No - Not required for this site.    Location  [facility] - Per order.    Sedation   MAC/Deep Sedation  Per location      Patient Reminders:   You will receive a call from a Nurse to review instructions and health history.  This assessment must be completed prior to your procedure.  Failure to complete the Nurse assessment may result in the procedure being cancelled.      On the day of your procedure, please designate an adult(s) who can drive you home stay with you for the next 24 hours. " The medicines used in the exam will make you sleepy. You will not be able to drive.      You cannot take public transportation, ride share services, or non-medical taxi service without a responsible caregiver.  Medical transport services are allowed with the requirement that a responsible caregiver will receive you at your destination.  We require that drivers and caregivers are confirmed prior to your procedure.

## 2025-04-08 NOTE — TELEPHONE ENCOUNTER
Pre Assessment RN Review    Received message from  to check if patient needs to complete the scheduled echocardiogram prior to colonoscopy procedure.    Focused Assessments     looking to schedule patient in [place].       Further review indicates severe asthma dx so will need hospital setting.     Asthma Severity    Patient report and review of chart, indicate patient's asthma is documented as severe. (Hospital Only) - need MAC and PAC eval if scheduling at [facility].      Scheduling Status & Recommendations    Sedation: Moderate Sedation OK unless scheduling at [facility] - then would need MAC w/PAC eval d/t dx severe asthma.  Location Type: Hospital - Per exclusion criteria - severe asthma.  PAC: PAC required if scheduling at [facility].    Advised  to have patient complete the scheduled echocardiogram before the colonoscopy as the colonoscopy is order for screening purposes (not urgent).  Advised  that it would be fine to schedule the colonoscopy about 1 week after echocardiogram.       Fariba Hastings RN  Endoscopy Procedure Pre-Assessment RN  [phone], option 3

## 2025-04-09 ENCOUNTER — THERAPY VISIT (OUTPATIENT)
Dept: PHYSICAL THERAPY | Facility: CLINIC | Age: 70
End: 2025-04-09
Attending: STUDENT IN AN ORGANIZED HEALTH CARE EDUCATION/TRAINING PROGRAM
Payer: COMMERCIAL

## 2025-04-09 DIAGNOSIS — M54.12 CERVICAL RADICULOPATHY: Primary | ICD-10-CM

## 2025-04-09 PROCEDURE — 97110 THERAPEUTIC EXERCISES: CPT | Mod: GP | Performed by: PHYSICAL THERAPIST

## 2025-04-09 PROCEDURE — 97140 MANUAL THERAPY 1/> REGIONS: CPT | Mod: GP | Performed by: PHYSICAL THERAPIST

## 2025-04-10 ENCOUNTER — OFFICE VISIT (OUTPATIENT)
Dept: ORTHOPEDICS | Facility: CLINIC | Age: 70
End: 2025-04-10
Payer: COMMERCIAL

## 2025-04-10 ENCOUNTER — ANCILLARY PROCEDURE (OUTPATIENT)
Dept: GENERAL RADIOLOGY | Facility: CLINIC | Age: 70
End: 2025-04-10
Attending: STUDENT IN AN ORGANIZED HEALTH CARE EDUCATION/TRAINING PROGRAM
Payer: COMMERCIAL

## 2025-04-10 DIAGNOSIS — S89.92XA INJURY OF LEFT KNEE, INITIAL ENCOUNTER: Primary | ICD-10-CM

## 2025-04-10 DIAGNOSIS — M25.562 ACUTE PAIN OF LEFT KNEE: Primary | ICD-10-CM

## 2025-04-10 DIAGNOSIS — S89.92XA INJURY OF LEFT KNEE, INITIAL ENCOUNTER: ICD-10-CM

## 2025-04-10 NOTE — PROGRESS NOTES
Margareth Green  :  1955  DOS: 4/10/2025  MRN: [id number]  PCP: No Ref-Primary, Physician    Sports Medicine Clinic Visit      HPI  Margareth Green is a 70 year old female who is seen as a self referral presenting with a left knee injury    - Mechanism of Injury:    - Left knee gave out while on uneven ground yesterday and she fell, 2025. Fall in 2024 caused posterolateral tibial plateau fracture. So she was very worried about re-fracturing the tibia.   - Pertinent history and prior evaluations:    - 2024 left knee CT shows  1.  A small area of subtle cortical irregularity to the far posterior  lateral tibial plateau is suspicious for a nondisplaced fracture. This  could be confirmed with MRI as clinically indicated.  2.  The previously seen lipohemarthrosis has essentially resolved.  3.  Additional chronic appearing findings, as described.    - Pain Character:    - Location:  anterior knee, peripatellar, some posterior pain in the popliteal fossa  - Character:  dull, achy, constant  - Duration:  x 1 day  - Course:  Stable, not worsening or improving  - Endorses:    - buckling episode yesterday, but this is not a common occurrence.  Swelling since the injury.  Bruising.  Pain as described above.  - Denies:    - mechanical locking symptoms, instability, numbness, tingling, radicular shooting pain, erythema or warmth.  - Alleviating factors:    - ice, tylenol, NSAIDs  - Aggravating factors:    - weight bearing, stairs, getting in & out of chairs  - Other treatments tried:    - rest, bracing     - Patient Goals:    - understand the cause of the symptoms, be able to manage the symptoms successfully  - Social History:   - Retired.  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    Past Medical  History:   Diagnosis Date    Abnormal Papanicolaou smear of vagina and vaginal HPV 12/2002    ASCUS (HPV neg). 3/03 WNL but no transitional zome - repeat 3months    Allergic rhinitis, cause unspecified     Arthritis     Backache, unspecified     Carpal tunnel syndrome     s/p repair    Cervicalgia     Chronic kidney disease, stage 3 (H) 07/14/2021    Depressive disorder     Depressive disorder, not elsewhere classified     doing well on Wellbutrin    Derangement of TMJ (temporomandibular joint) 03/2010    internal derangement    Dermatophytosis of nail     Diaphragmatic hernia without mention of obstruction or gangrene 09/2007    small hiatal hernia    Diffuse cystic mastopathy 2000    FCBD    Esophageal reflux 04/2005    Hammertoe 10/2009    see podiatry consult    Headache(784.0)     Muscle contrature headaches    Intramural leiomyoma of uterus 07/2007    on US    Irregular menstrual cycle     better with BCP -also PMS is better with BCP    IRRITABLE COLON 02/18/2008    Malaise and fatigue     Menopause     lmp 2/2008    Mild persistent asthma     Mitral valve disease     Mole (skin) 2003    9 x 6 mm rt ant chest    Motion sickness     Neuroma 11/2008    lt foot    Osteopenia 12/2008    Other malaise and fatigue 04/2005    stress    Personal history of tobacco use, presenting hazards to health     Quit in 1998    Solitary cyst of breast 12/2005    lt 6 oclock    Stenosis of lumbosacral spine     l4-5 and l5-s1    Symptomatic menopausal or female climacteric states 12/2002    perimenopausal. LMP 2/2008    Tachycardia, paroxysmal (H) 11/24/2020    Thyroid fullness 08/07/2013     Past Surgical History:   Procedure Laterality Date    APPLY WOUND VAC Right 2/22/2024    Procedure: PLACEMENT OF RIGHT GROIN PREVENA WOUND VAC;  Surgeon: Brandon Hale MD;  Location: [facility]    BIOPSY  4/10/2015    BL DUPLEX LO EXTREM ART UNILAT/LTD  4/3008    lower extr arterial doppler -no stenosis    C DEXA,BONE DENSITY,APPENDICULR  KYRIE  12/2008    osteopenia    COLONOSCOPY  10/22/2007    bx benign    COLONOSCOPY N/A 4/10/2015    Procedure: COMBINED COLONOSCOPY, SINGLE OR MULTIPLE BIOPSY/POLYPECTOMY BY BIOPSY;  Surgeon: Cl Wagner MD;  Location:  GI    CV CORONARY ANGIOGRAM N/A 8/17/2023    Procedure: Coronary Angiogram;  Surgeon: Liza Ford MD;  Location:  HEART CARDIAC CATH LAB    ESOPHAGOSCOPY, GASTROSCOPY, DUODENOSCOPY (EGD), COMBINED N/A 1/31/2018    Procedure: COMBINED ESOPHAGOSCOPY, GASTROSCOPY, DUODENOSCOPY (EGD);  COMBINED ESOPHAGOSCOPY, GASTROSCOPY, DUODENOSCOPY (EGD);  Surgeon: Tirso Duran MD;  Location:  GI    ESOPHAGOSCOPY, GASTROSCOPY, DUODENOSCOPY (EGD), COMBINED N/A 2/1/2023    Procedure: ESOPHAGOGASTRODUODENOSCOPY (EGD);  Surgeon: Lui Vences MD;  Location:  GI    EYE SURGERY      HC PART REMV BONE METATARSAL HEAD,EA  01/11/10    Right foot plantar plate tear. Also correct hammertoe, 2nd digit & varicose vein ligation, heel.    HC REVISE MEDIAN N/CARPAL TUNNEL SURG      Left in 1997, right in 1999    INCISION AND DRAINAGE LOWER EXTREMITY, COMBINED Right 2/22/2024    Procedure: Excision OF RIGHT GROIN SEROMA WITH LYMPHAZURIN BLUE INJECTION, Right groin exploration;  Surgeon: Brandon Hale MD;  Location:  OR    INJ, ANES/STER, FACET LUMB/SAC  2010    Left L5 nerve root injection    INJECT EPIDURAL CERVICAL Right 9/29/2022    Procedure: Cervical 7-Thoracic 1 Interlaminar epidural steroid injection using fluoroscopic guidance with contrast dye, Right;  Surgeon: Tyler Bay MD;  [facility]    INJECT EPIDURAL CERVICAL N/A 3/27/2025    Procedure: Cervical 7-Thoracic 1 Interlaminar epidural steroid injection using fluoroscopic guidance with contrast dye.;  Surgeon: Tyler Bay MD;  [facility]    INJECT EPIDURAL TRANSFORAMINAL  10/09/2013    [facility]    INJECT JOINT SACROILIAC  3/19/14,4/21/14    [facility]    REPAIR VALVE MITRAL MINIMALLY  INVASIVE N/A 10/26/2023    Procedure: MINIMALLY INVASIVE MITRAL VALVE REPAIR USING CHORD-X PRE-MEASURED LOOP SUTURE SIZE:12MM, MITRAL ANNULOPLASTY RING ERICK-COTTO PHYSIO Il SIZE: 28MM, AND ON CARDIOPULMONARY PUMP OXYGENATOR (INTRAOPERATIVE TRANSESOPHAGEAL ECHOCARDIOGRAM BY THE CARDIOLOGIST  );  Surgeon: Sabas Meredith MD;  Location:  OR    [facility] NONSPECIFIC PROCEDURE      Metatarsal phalangeal joint injection    [facility]  [facility]    bx. benign - flex sig in 5 yrs  or colonoscopy in 10 yrs    ZZ ECHO HEART XTHORACIC, STRESS/REST  2005    WNL    [facility] MOTOR W/O F-WAVE, EACH NERVE  2008    CTS     Family History   Problem Relation Age of Onset    Diabetes Father     Depression Father     Cerebrovascular Disease Mother         age 80    Arthritis Mother     Depression Mother         On meds    Eye Disorder Mother         Glaucoma    Osteoporosis Mother     Respiratory Mother          88 YO COPD    Chronic Obstructive Pulmonary Disease Mother     C.A.D. Brother         65 YO MI -    Chronic Obstructive Pulmonary Disease Brother     Myocardial Infarction Brother     Other Cancer Brother     Cancer Brother          Objective  LMP 2008 (Approximate)     General: healthy, alert and in no acute distress.    HEENT: no scleral icterus or conjunctival erythema.   Skin: no suspicious lesions or rash. No jaundice.   CV: regular rhythm by palpation, 2+ distal pulses.  Resp: normal respiratory effort without conversational dyspnea.   Psych: normal mood and affect.    Gait: antalgic favoring the left leg with a limp, appropriate compensated coordination and balance.     Neuro:        - Sensation to light touch:    - Intact throughout the BLE including all peripheral nerve distributions.     MSK - Knee:       - Inspection:    - Effusion present without surrounding erythema, warmth, lesion.  There is mild ecchymosis laterally.       - ROM:    - Limited in knee  flexion by swelling and discomfort       - Palpation:    - TTP at the lateral joint line, medial joint line, peripatellar border, slightly at the popliteal fossa.   - NTTP elsewhere.        - Strength:  (*antalgic)   - Knee Flexion  5   - Knee Extension 5        - Special tests:        - Lachman:  Neg        - A/P drawer:  Neg        - Pivot shift:  Neg    - Germania:  Neg     - Varus stress:  Neg for laxity or pain     - Valgus stress: Pos for laxity and mild pain   - Patellar grind:  Neg     Radiology  I independently reviewed the available relevant imaging in the chart with my interpretations as above in HPI.     I independently reviewed today's new relevant imaging, with the following interpretation:  - XR L knee 4/10/2025 with advanced degenerative changes of the medial compartment and a moderate effusion.  No acute fractures or dislocations.      Assessment  1. Acute pain of left knee        Plan  Margareth Green is a pleasant 70 year old female that presents with acute left knee pain after an injury yesterday.  She reports that she was taking a step down on uneven ground and felt the knee buckle into a varus position and she fell to the ground.  Does not remember directly impacting the knee but had pain and difficulty weightbearing afterward.  She does have an effusion after the injury.  Pain has been a little bit better today, as she is been resting it and treating conservatively with OTC medications, ice, and a knee brace.  She has a significant history of a lateral tibial plateau fracture last summer, which healed conservatively.  Her primary concern is making sure that she did not fracture the knee again.    On exam, she does have an effusion with some mild laxity on MCL testing and crepitus during knee flexion/extension. Her exam was otherwise relatively stable, so I have less of a concern of an internal derangement of the knee including fracture or ligamentous/meniscus tear.  However, with her  significant injury less than 1 year ago, we will be extra careful with this injury.      We discussed additional workup and treatment recommendations and elected to proceed with watchful waiting and continuing with conservative cares for the next 2 weeks until we already have a follow-up for her shoulder.  We will reevaluate the knee at that time as well.  When the swelling and acute pain has improved, we will reexamine the knee for possible internal derangement and consider advanced imaging if necessary.  In the interim, she will continue with OTC pain medications, RICE protocol, and her hinged knee stability brace.      Octavio Carrera DO, JOHANNM  [facility] Sports Medicine  [facility] - Department of Orthopedic Surgery       Disclaimer:  This note was prepared and written using Dragon Medical dictation software. As a result, there may be errors in the script that have gone undetected. Please consider this when interpreting the information in this note.

## 2025-04-10 NOTE — LETTER
4/10/2025      Margareth Green  [address] [facility] [id number]      Dear Colleague,    Thank you for referring your patient, Margareth Green, to the [facility] [place]. Please see a copy of my visit note below.    Margareth Green  :  1955  DOS: 4/10/2025  MRN: [id number]  PCP: No Ref-Primary, Physician    Sports Medicine Clinic Visit      HPI  Margareth Green is a 70 year old female who is seen as a self referral presenting with a left knee injury    - Mechanism of Injury:    - Left knee gave out while on uneven ground yesterday and she fell, 2025. Fall in 2024 caused posterolateral tibial plateau fracture. So she was very worried about re-fracturing the tibia.   - Pertinent history and prior evaluations:    - 2024 left knee CT shows  1.  A small area of subtle cortical irregularity to the far posterior  lateral tibial plateau is suspicious for a nondisplaced fracture. This  could be confirmed with MRI as clinically indicated.  2.  The previously seen lipohemarthrosis has essentially resolved.  3.  Additional chronic appearing findings, as described.    - Pain Character:    - Location:  anterior knee, peripatellar, some posterior pain in the popliteal fossa  - Character:  dull, achy, constant  - Duration:  x 1 day  - Course:  Stable, not worsening or improving  - Endorses:    - buckling episode yesterday, but this is not a common occurrence.  Swelling since the injury.  Bruising.  Pain as described above.  - Denies:    - mechanical locking symptoms, instability, numbness, tingling, radicular shooting pain, erythema or warmth.  - Alleviating factors:    - ice, tylenol, NSAIDs  - Aggravating factors:    - weight bearing, stairs, getting in & out of chairs  - Other treatments tried:    - rest, bracing     - Patient Goals:    - understand the cause of the symptoms, be able to manage the symptoms successfully  - Social History:   - Retired.  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    Past Medical History:   Diagnosis Date     Abnormal Papanicolaou smear of vagina and vaginal HPV 12/2002    ASCUS (HPV neg). 3/03 WNL but no transitional zome - repeat 3months     Allergic rhinitis, cause unspecified      Arthritis      Backache, unspecified      Carpal tunnel syndrome     s/p repair     Cervicalgia      Chronic kidney disease, stage 3 (H) 07/14/2021     Depressive disorder      Depressive disorder, not elsewhere classified     doing well on Wellbutrin     Derangement of TMJ (temporomandibular joint) 03/2010    internal derangement     Dermatophytosis of nail      Diaphragmatic hernia without mention of obstruction or gangrene 09/2007    small hiatal hernia     Diffuse cystic mastopathy 2000    FCBD     Esophageal reflux 04/2005     Hammertoe 10/2009    see podiatry consult     Headache(784.0)     Muscle contrature headaches     Intramural leiomyoma of uterus 07/2007    on US     Irregular menstrual cycle     better with BCP -also PMS is better with BCP     IRRITABLE COLON 02/18/2008     Malaise and fatigue      Menopause     lmp 2/2008     Mild persistent asthma      Mitral valve disease      Mole (skin) 2003    9 x 6 mm rt ant chest     Motion sickness      Neuroma 11/2008    lt foot     Osteopenia 12/2008     Other malaise and fatigue 04/2005    stress     Personal history of tobacco use, presenting hazards to health     Quit in 1998     Solitary cyst of breast 12/2005    lt 6 oclock     Stenosis of lumbosacral spine     l4-5 and l5-s1     Symptomatic menopausal or female climacteric states 12/2002    perimenopausal. LMP 2/2008     Tachycardia, paroxysmal (H) 11/24/2020     Thyroid fullness 08/07/2013     Past Surgical History:   Procedure Laterality Date      APPLY WOUND VAC Right 2/22/2024    Procedure: PLACEMENT OF RIGHT GROIN PREVENA WOUND VAC;  Surgeon: Brandon Hale MD;  Location:  OR     BIOPSY  4/10/2015     BL DUPLEX LO EXTREM ART UNILAT/LTD  4/3008    lower extr arterial doppler -no stenosis     C DEXA,BONE DENSITY,APPENDICULR ARIADNAN  12/2008    osteopenia     COLONOSCOPY  10/22/2007    bx benign     COLONOSCOPY N/A 4/10/2015    Procedure: COMBINED COLONOSCOPY, SINGLE OR MULTIPLE BIOPSY/POLYPECTOMY BY BIOPSY;  Surgeon: Cl Wagner MD;  Location:  GI     CV CORONARY ANGIOGRAM N/A 8/17/2023    Procedure: Coronary Angiogram;  Surgeon: Liza Ford MD;  Location:  HEART CARDIAC CATH LAB     ESOPHAGOSCOPY, GASTROSCOPY, DUODENOSCOPY (EGD), COMBINED N/A 1/31/2018    Procedure: COMBINED ESOPHAGOSCOPY, GASTROSCOPY, DUODENOSCOPY (EGD);  COMBINED ESOPHAGOSCOPY, GASTROSCOPY, DUODENOSCOPY (EGD);  Surgeon: Tirso Duran MD;  Location:  GI     ESOPHAGOSCOPY, GASTROSCOPY, DUODENOSCOPY (EGD), COMBINED N/A 2/1/2023    Procedure: ESOPHAGOGASTRODUODENOSCOPY (EGD);  Surgeon: Lui Vences MD;  Location:  GI     EYE SURGERY       HC PART REMV BONE METATARSAL HEAD,EA  01/11/10    Right foot plantar plate tear. Also correct hammertoe, 2nd digit & varicose vein ligation, heel.     HC REVISE MEDIAN N/CARPAL TUNNEL SURG      Left in 1997, right in 1999     INCISION AND DRAINAGE LOWER EXTREMITY, COMBINED Right 2/22/2024    Procedure: Excision OF RIGHT GROIN SEROMA WITH LYMPHAZURIN BLUE INJECTION, Right groin exploration;  Surgeon: Brandon Hale MD;  Location:  OR     INJ, ANES/STER, FACET LUMB/SAC  2010    Left L5 nerve root injection     INJECT EPIDURAL CERVICAL Right 9/29/2022    Procedure: Cervical 7-Thoracic 1 Interlaminar epidural steroid injection using fluoroscopic guidance with contrast dye, Right;  Surgeon: Tyler Bay MD;  Location:  OR     INJECT EPIDURAL CERVICAL N/A 3/27/2025    Procedure: Cervical 7-Thoracic 1 Interlaminar  epidural steroid injection using fluoroscopic guidance with contrast dye.;  Surgeon: Tyler Bay MD;  Location: [facility]     INJECT EPIDURAL TRANSFORAMINAL  10/09/2013    [facility]     INJECT JOINT SACROILIAC  3/19/14,14    [facility]     REPAIR VALVE MITRAL MINIMALLY INVASIVE N/A 10/26/2023    Procedure: MINIMALLY INVASIVE MITRAL VALVE REPAIR USING CHORD-X PRE-MEASURED LOOP SUTURE SIZE:12MM, MITRAL ANNULOPLASTY RING ERICK-COTTO PHYSIO Il SIZE: 28MM, AND ON CARDIOPULMONARY PUMP OXYGENATOR (INTRAOPERATIVE TRANSESOPHAGEAL ECHOCARDIOGRAM BY THE CARDIOLOGIST  );  Surgeon: Sabas Meredith MD;  Location: [facility] OR     [facility] NONSPECIFIC PROCEDURE      Metatarsal phalangeal joint injection     ZZ COLONOSCOPY THRU STOMA, DIAGNOSTIC  2002    bx. benign - flex sig in 5 yrs  or colonoscopy in 10 yrs     [facility] ECHO HEART XTHORACIC, STRESS/REST  2005    WNL     [facility] NCS MOTOR W/O F-WAVE, EACH NERVE  2008    CTS     Family History   Problem Relation Age of Onset     Diabetes Father      Depression Father      Cerebrovascular Disease Mother         age 80     Arthritis Mother      Depression Mother         On meds     Eye Disorder Mother         Glaucoma     Osteoporosis Mother      Respiratory Mother          88 YO COPD     Chronic Obstructive Pulmonary Disease Mother      C.A.D. Brother         65 YO MI -     Chronic Obstructive Pulmonary Disease Brother      Myocardial Infarction Brother      Other Cancer Brother      Cancer Brother          Objective  LMP 2008 (Approximate)     General: healthy, alert and in no acute distress.    HEENT: no scleral icterus or conjunctival erythema.   Skin: no suspicious lesions or rash. No jaundice.   CV: regular rhythm by palpation, 2+ distal pulses.  Resp: normal respiratory effort without conversational dyspnea.   Psych: normal mood and affect.    Gait: antalgic favoring the left leg with a limp, appropriate compensated  coordination and balance.     Neuro:        - Sensation to light touch:    - Intact throughout the BLE including all peripheral nerve distributions.     MSK - Knee:       - Inspection:    - Effusion present without surrounding erythema, warmth, lesion.  There is mild ecchymosis laterally.       - ROM:    - Limited in knee flexion by swelling and discomfort       - Palpation:    - TTP at the lateral joint line, medial joint line, peripatellar border, slightly at the popliteal fossa.   - NTTP elsewhere.        - Strength:  (*antalgic)   - Knee Flexion  5   - Knee Extension 5        - Special tests:        - Lachman:  Neg        - A/P drawer:  Neg        - Pivot shift:  Neg    - Germania:  Neg     - Varus stress:  Neg for laxity or pain     - Valgus stress: Pos for laxity and mild pain   - Patellar grind:  Neg     Radiology  I independently reviewed the available relevant imaging in the chart with my interpretations as above in HPI.     I independently reviewed today's new relevant imaging, with the following interpretation:  - XR L knee 4/10/2025 with advanced degenerative changes of the medial compartment and a moderate effusion.  No acute fractures or dislocations.      Assessment  1. Acute pain of left knee        Plan  Margareth Green is a pleasant 70 year old female that presents with acute left knee pain after an injury yesterday.  She reports that she was taking a step down on uneven ground and felt the knee buckle into a varus position and she fell to the ground.  Does not remember directly impacting the knee but had pain and difficulty weightbearing afterward.  She does have an effusion after the injury.  Pain has been a little bit better today, as she is been resting it and treating conservatively with OTC medications, ice, and a knee brace.  She has a significant history of a lateral tibial plateau fracture last summer, which healed conservatively.  Her primary concern is making sure that she did not  fracture the knee again.    On exam, she does have an effusion with some mild laxity on MCL testing and crepitus during knee flexion/extension. Her exam was otherwise relatively stable, so I have less of a concern of an internal derangement of the knee including fracture or ligamentous/meniscus tear.  However, with her significant injury less than 1 year ago, we will be extra careful with this injury.      We discussed additional workup and treatment recommendations and elected to proceed with watchful waiting and continuing with conservative cares for the next 2 weeks until we already have a follow-up for her shoulder.  We will reevaluate the knee at that time as well.  When the swelling and acute pain has improved, we will reexamine the knee for possible internal derangement and consider advanced imaging if necessary.  In the interim, she will continue with OTC pain medications, RICE protocol, and her hinged knee stability brace.      Octavio Carrera DO, CAQSM  [facility] Sports Medicine  [facility] - Department of Orthopedic Surgery       Disclaimer:  This note was prepared and written using Dragon Medical dictation software. As a result, there may be errors in the script that have gone undetected. Please consider this when interpreting the information in this note.        Again, thank you for allowing me to participate in the care of your patient.        Sincerely,        Octavio Carrera DO    Electronically signed

## 2025-04-16 ENCOUNTER — OFFICE VISIT (OUTPATIENT)
Dept: PHARMACY | Facility: CLINIC | Age: 70
End: 2025-04-16
Payer: COMMERCIAL

## 2025-04-16 ENCOUNTER — THERAPY VISIT (OUTPATIENT)
Dept: PHYSICAL THERAPY | Facility: CLINIC | Age: 70
End: 2025-04-16
Attending: STUDENT IN AN ORGANIZED HEALTH CARE EDUCATION/TRAINING PROGRAM
Payer: COMMERCIAL

## 2025-04-16 VITALS — SYSTOLIC BLOOD PRESSURE: 128 MMHG | DIASTOLIC BLOOD PRESSURE: 78 MMHG | BODY MASS INDEX: 24.1 KG/M2 | WEIGHT: 129.8 LBS

## 2025-04-16 DIAGNOSIS — M54.12 CERVICAL RADICULOPATHY: Primary | ICD-10-CM

## 2025-04-16 DIAGNOSIS — G47.09 OTHER INSOMNIA: ICD-10-CM

## 2025-04-16 DIAGNOSIS — F41.1 GAD (GENERALIZED ANXIETY DISORDER): Primary | ICD-10-CM

## 2025-04-16 PROCEDURE — 97110 THERAPEUTIC EXERCISES: CPT | Mod: GP | Performed by: PHYSICAL THERAPIST

## 2025-04-16 NOTE — PROGRESS NOTES
Medication Therapy Management (MTM) Encounter    ASSESSMENT:                            Medication Adherence/Access: See below for considerations.    Anxiety/Insomnia: Anxiety not well controlled, but some improvement with melatonin will see if more consistent sleep helps. If not will consider options for anxiety including fluoxetine, citalopram/escitalopram, and possibly tricyclic given past benefits.     PLAN:                            Both of your melatonin products are appropriate to use - try to limit your dose of melatonin to no more than 5 mg per night (so okay to split dose like you have been doing).     Magnesium glycinate may be worth trying for your next version to see if this helps better for sleep/cramping    Give these changes 1-2 weeks to see how this effects sleep - we will check in through MyChart     Follow-up: In-person on Wednesday, May 21st at 9:30 AM at the [facility]     SUBJECTIVE/OBJECTIVE:                          Margareth Green is a 70 year old female seen for a follow-up visit.       Reason for visit: Insomnia/mood follow-up.    Allergies/ADRs: Reviewed in chart  Past Medical History: Reviewed in chart  Tobacco: She reports that she quit smoking about 27 years ago. Her smoking use included cigarettes. She started smoking about 57 years ago. She has a 15 pack-year smoking history. She has never been exposed to tobacco smoke. She has never used smokeless tobacco.  Alcohol: 1-3 beverages / week    Medication Adherence/Access: Patient uses pill box(es).  Patient takes medications 1 time(s) per day.   Per patient, misses medication 0 time(s) per week.   Medication barriers: none.   The patient fills medications at [facility]: YES.    Mental Health   Anxiety/Insomnia  Bupropion  mg every morning  Was not able to tolerate mirtzapine due to appetite increase.   Occasionally will use CBD with THC with some benefits for sleep/pain.   Patient reports no current medication side effects.  Higher dose of bupropion made her anxious.   Mood/anxiety is not completely controlled. Sleep is a large concern for patient that she feels is impacted by anxious thoughts and contributes to issues with focus/fatigue during the day. She plans on trying melatonin products to see if this helps with sleep.  Some improvement with initial dosing. Previous medication trials include citalopram (never actual took), duloxetine (though it may be causing issues with sleep), gabapentin (made her feel funny), hydroxyzine (made her foggy, dizzy hallucuinate), nortriptyline (difficulty swallowing capsule), trazodone (dizziness/headaches/loose stools), sertraline (side effects), amitriptyline (not as effective - did not like side effect profile), melatonin (not very helpful), Tylenol PM (sometimes effective). She does note Going to the bathroom all of the time.   Urinary frequency - leakage.  Noticeable change. Unclear if related to any of the above - may reach out to her provider.          1/6/2025    10:34 AM 1/16/2025     9:18 AM 3/5/2025     1:12 PM   PHQ   PHQ-9 Total Score 10 7  13    Q9: Thoughts of better off dead/self-harm past 2 weeks Not at all Not at all  Not at all       Patient-reported    Proxy-reported             Today's Vitals: LMP 02/26/2008 (Approximate)     Wt Readings from Last 5 Encounters:   04/16/25 129 lb 12.8 oz (58.9 kg)   03/14/25 129 lb (58.5 kg)   03/05/25 130 lb 1.6 oz (59 kg)   01/16/25 125 lb 12.8 oz (57.1 kg)   01/06/25 130 lb 12.8 oz (59.3 kg)     ----------------    I spent 30 minutes with this patient today.     A summary of these recommendations was given to the patient.    Kris Alvarado, PharmD, BCACP  Medication Therapy Management Pharmacist  Voicemail: [phone]     Medication Therapy Recommendations  No medication therapy recommendations to display

## 2025-04-16 NOTE — PATIENT INSTRUCTIONS
"Recommendations from today's MTM visit:                                                    MTM (medication therapy management) is a service provided by a clinical pharmacist designed to help you get the most of out of your medicines.      Both of your melatonin products are appropriate to use - try to limit your dose of melatonin to no more than 5 mg per night (so okay to split dose like you have been doing).     Magnesium glycinate may be worth trying for your next version to see if this helps better for sleep/cramping    Give these changes 1-2 weeks to see how this effects sleep - we will check in through [facility]     Follow-up: In-person on Wednesday, May 21st at 9:30 AM at the [facility]     It was great speaking with you today.  I value your experience and would be very thankful for your time in providing feedback in our clinic survey. In the next few days, you may receive an email or text message from [employer] with a link to a survey related to your  clinical pharmacist.\"     To schedule another MTM appointment, please call the clinic directly or you may call the MTM scheduling line at [phone] or toll-free at [phone].     My Clinical Pharmacist's contact information:                                                      Please feel free to contact me with any questions or concerns you have.      Kris Alvarado, PharmD, [facility]  Medication Therapy Management Pharmacist  Voicemail: [phone]    "

## 2025-04-21 ENCOUNTER — THERAPY VISIT (OUTPATIENT)
Dept: PHYSICAL THERAPY | Facility: CLINIC | Age: 70
End: 2025-04-21
Attending: STUDENT IN AN ORGANIZED HEALTH CARE EDUCATION/TRAINING PROGRAM
Payer: COMMERCIAL

## 2025-04-21 DIAGNOSIS — M19.011 PRIMARY OSTEOARTHRITIS, RIGHT SHOULDER: ICD-10-CM

## 2025-04-21 DIAGNOSIS — M67.911 TENDINOPATHY OF RIGHT ROTATOR CUFF: ICD-10-CM

## 2025-04-21 DIAGNOSIS — M54.12 CERVICAL RADICULOPATHY: ICD-10-CM

## 2025-04-21 PROCEDURE — 97140 MANUAL THERAPY 1/> REGIONS: CPT | Mod: GP | Performed by: PHYSICAL THERAPIST

## 2025-04-21 PROCEDURE — 97110 THERAPEUTIC EXERCISES: CPT | Mod: GP | Performed by: PHYSICAL THERAPIST

## 2025-04-22 NOTE — PROGRESS NOTES
Margareth Green  :  1955  DOS: 2025  MRN: [id number]  PCP: Caitlin Beasley    Sports Medicine Clinic Visit    Interim History - 2025  - Last seen on 4/10/2025 for cervical radiculopathy, right rotator cuff tendinopathy and right primary osteoarthritis of the shoulder. Also acute left knee pain after a fall on 25.  - 3/27/2025 C7-T1 IESI completed by Dr. Bay allowed for good relief in radicular symptoms.     - Since the last visit, her right shoulder and neck have been doing better with physical therapy and sleeping modifications.  She feels like she is making good progress.  She is interested in another glenohumeral joint corticosteroid injection, and would like to plan at the end of May for some important events in .  - Her left knee is also been making improvements, able to bear weight on it and has not been as painful.  X-rays do not show fracture.  Still with some generalized pain through the knee joint.  No major instability or mechanical locking.  - No interim injury.       Interim History - 2025  - Last seen on 2024 for cervical radiculopathy, right rotator cuff tendinopathy and right primary osteoarthritis of the shoulder.   - 2024 right glenohumeral joint injection and 2024 right subacromial injection. She is unsure which injection helped more, but did get relief from the combination.   - Since the last visit, she notes that it is continuing to be more difficult to reach over her head, with more pain. She notes that her lateral right chest is still painful from her heart surgery last year, where she was told that she would have some lingering nerve pain. Has been to physical therapy but  notes she has not been consistent with her exercises. Continues to prefer nonoperative management. No interim injury.       Initial Visit: 2024  HPI  Margareth Green is a 69 year old female who is seen in consultation at the request of  Caitlin Beasley   TOI presenting with right shoulder pain.    - Mechanism of Injury:    - No inciting injury  - Pertinent history and prior evaluations:    - 4/10/2024 with Caitlin Beasley PA-C: right shoulder and cervical radiculopathy noted at C8 with multilevel DDD and central and foraminal stenosis on cervical MRI in 2022 and has had success with a C7-T1 ILESI. Prednisone prescription given.   - Right shoulder xray IMPRESSION: 1. Severe osteoarthrosis of the glenohumeral joint again noted. 2. Calcification in the region of the supraspinatus/infraspinatus tendon raises the question of calcific tendinosis. This has progressed. 3. No other change.    - Pain Character:    - Location:  anterior, lateral, posterior right shoulder  - Character:  aching shoulder pain, more sharp with movements  - Duration: One year  - Course:  worsening  - Endorses:    - Anterior, lateral, posterior right shoulder pain with achy characteristic present at rest and worse with shoulder abduction and rotation.  -Separate and distinct numbness and tingling into the right digits 4-5, chronic neck pain and radicular pain down the right arm intermittently.  - Denies:    - swelling, clicking/popping, grinding, mechanical locking symptoms, neurogenic weakness  - Alleviating factors:    - Naproxen, Tylenol arthritis, activity modification, rest  - Aggravating factors:    - repetitive movements with gardening, grabbing, grasping, abduction of shoulder, above head movments  - Other treatments tried:    - nothing    - Patient Goals:    - discuss treatment options  - Social History:   - Retired.  Previous work:  Medical records for [employer]      Review of Systems  Musculoskeletal: as above  Remainder of review of systems is negative including constitutional, CV, pulmonary, GI, Skin and Neurologic except as noted in HPI or medical history.    Past Medical History:   Diagnosis Date    Abnormal Papanicolaou smear of vagina and vaginal HPV 12/2002    ASCUS (HPV neg). 3/03  WNL but no transitional zome - repeat 3months    Allergic rhinitis, cause unspecified     Arthritis     Backache, unspecified     Carpal tunnel syndrome     s/p repair    Cervicalgia     Chronic kidney disease, stage 3 (H) 07/14/2021    Depressive disorder     Depressive disorder, not elsewhere classified     doing well on Wellbutrin    Derangement of TMJ (temporomandibular joint) 03/2010    internal derangement    Dermatophytosis of nail     Diaphragmatic hernia without mention of obstruction or gangrene 09/2007    small hiatal hernia    Diffuse cystic mastopathy 2000    FCBD    Esophageal reflux 04/2005    Hammertoe 10/2009    see podiatry consult    Headache(784.0)     Muscle contrature headaches    Intramural leiomyoma of uterus 07/2007    on US    Irregular menstrual cycle     better with BCP -also PMS is better with BCP    IRRITABLE COLON 02/18/2008    Malaise and fatigue     Menopause     lmp 2/2008    Mild persistent asthma     Mitral valve disease     Mole (skin) 2003    9 x 6 mm rt ant chest    Motion sickness     Neuroma 11/2008    lt foot    Osteopenia 12/2008    Other malaise and fatigue 04/2005    stress    Personal history of tobacco use, presenting hazards to health     Quit in 1998    Solitary cyst of breast 12/2005    lt 6 oclock    Stenosis of lumbosacral spine     l4-5 and l5-s1    Symptomatic menopausal or female climacteric states 12/2002    perimenopausal. LMP 2/2008    Tachycardia, paroxysmal (H) 11/24/2020    Thyroid fullness 08/07/2013     Past Surgical History:   Procedure Laterality Date    APPLY WOUND VAC Right 2/22/2024    Procedure: PLACEMENT OF RIGHT GROIN PREVENA WOUND VAC;  Surgeon: Brandon Hale MD;  Location: [facility] OR    BIOPSY  4/10/2015    BL DUPLEX LO EXTREM ART UNILAT/LTD  4/3008    lower extr arterial doppler -no stenosis    C DEXA,BONE DENSITY,APPENDICULR SKELTN  12/2008    osteopenia    COLONOSCOPY  10/22/2007    bx benign    COLONOSCOPY N/A 4/10/2015    Procedure:  COMBINED COLONOSCOPY, SINGLE OR MULTIPLE BIOPSY/POLYPECTOMY BY BIOPSY;  Surgeon: Cl Wagner MD;  Location:  GI    CV CORONARY ANGIOGRAM N/A 8/17/2023    Procedure: Coronary Angiogram;  Surgeon: Liza Ford MD;  Location:  HEART CARDIAC CATH LAB    ESOPHAGOSCOPY, GASTROSCOPY, DUODENOSCOPY (EGD), COMBINED N/A 1/31/2018    Procedure: COMBINED ESOPHAGOSCOPY, GASTROSCOPY, DUODENOSCOPY (EGD);  COMBINED ESOPHAGOSCOPY, GASTROSCOPY, DUODENOSCOPY (EGD);  Surgeon: Tirso Duran MD;  Location:  GI    ESOPHAGOSCOPY, GASTROSCOPY, DUODENOSCOPY (EGD), COMBINED N/A 2/1/2023    Procedure: ESOPHAGOGASTRODUODENOSCOPY (EGD);  Surgeon: Lui Vences MD;  Location:  GI    EYE SURGERY      HC PART REMV BONE METATARSAL HEAD,EA  01/11/10    Right foot plantar plate tear. Also correct hammertoe, 2nd digit & varicose vein ligation, heel.    HC REVISE MEDIAN N/CARPAL TUNNEL SURG      Left in 1997, right in 1999    INCISION AND DRAINAGE LOWER EXTREMITY, COMBINED Right 2/22/2024    Procedure: Excision OF RIGHT GROIN SEROMA WITH LYMPHAZURIN BLUE INJECTION, Right groin exploration;  Surgeon: Brandon Hale MD;  Location:  OR    INJ, ANES/STER, FACET LUMB/SAC  2010    Left L5 nerve root injection    INJECT EPIDURAL CERVICAL Right 9/29/2022    Procedure: Cervical 7-Thoracic 1 Interlaminar epidural steroid injection using fluoroscopic guidance with contrast dye, Right;  Surgeon: Tyler Bay MD;  Location: [facility] OR    INJECT EPIDURAL CERVICAL N/A 3/27/2025    Procedure: Cervical 7-Thoracic 1 Interlaminar epidural steroid injection using fluoroscopic guidance with contrast dye.;  Surgeon: Tyler Bay MD;  Location: [facility] OR    INJECT EPIDURAL TRANSFORAMINAL  10/09/2013    [facility]    INJECT JOINT SACROILIAC  3/19/14,4/21/14    [facility]    REPAIR VALVE MITRAL MINIMALLY INVASIVE N/A 10/26/2023    Procedure: MINIMALLY INVASIVE MITRAL VALVE REPAIR USING CHORD-X PRE-MEASURED LOOP SUTURE  SIZE:12MM, MITRAL ANNULOPLASTY RING ERICK-COTTO PHYSIO Il SIZE: 28MM, AND ON CARDIOPULMONARY PUMP OXYGENATOR (INTRAOPERATIVE TRANSESOPHAGEAL ECHOCARDIOGRAM BY THE CARDIOLOGIST  );  Surgeon: Sabas Meredith MD;  Location:  OR    [facility] NONSPECIFIC PROCEDURE      Metatarsal phalangeal joint injection    [facility] COLONOSCOPY THRU STOMA, DIAGNOSTIC  2002    bx. benign - flex sig in 5 yrs  or colonoscopy in 10 yrs    [facility] ECHO HEART XTHORACIC, STRESS/REST  2005    WNL    [facility] NCS MOTOR W/O F-WAVE, EACH NERVE  2008    CTS     Family History   Problem Relation Age of Onset    Diabetes Father     Depression Father     Cerebrovascular Disease Mother         age 80    Arthritis Mother     Depression Mother         On meds    Eye Disorder Mother         Glaucoma    Osteoporosis Mother     Respiratory Mother          88 YO COPD    Chronic Obstructive Pulmonary Disease Mother     C.A.D. Brother         67 YO MI -    Chronic Obstructive Pulmonary Disease Brother     Myocardial Infarction Brother     Other Cancer Brother     Cancer Brother          Objective  LMP 2008 (Approximate)     General: healthy, alert and in no acute distress.    HEENT: no scleral icterus or conjunctival erythema.   Skin: no suspicious lesions or rash. No jaundice.   CV: regular rhythm by palpation, 2+ distal pulses.  Resp: normal respiratory effort without conversational dyspnea.   Psych: normal mood and affect.    Gait: nonantalgic with only a slight limp today, appropriate coordination and balance.     Neuro:        - Sensation to light touch:    - Intact throughout the BUE/BLE including all peripheral nerve distributions.        - MSR:       RUE  LUE  - Biceps  2+ 2+  - Brachioradialis 2+ 2+  - Triceps  2+ 2+       - Special tests:   - Spurling's:  Neg today    MSK - Shoulder:       - Inspection:    - No significant swelling, erythema, warmth, ecchymosis, lesion, or atrophy noted.        - ROM:    - Slightly limited  AROM/PROM with anterolateral shoulder pain with shoulder abduction, flexion, IR/ER.       - Palpation:    - TTP at the anterior shoulder joint.   - NTTP elsewhere including the subacromial space, proximal biceps tendon, right-sided cervical paraspinals.        - Strength:  (*antalgic)  - Shoulder Abduction   5-*    - Shoulder Flexion   5-*    - Shoulder Internal Rotation  5-*    - Shoulder External Rotation  5-*             - Special tests:        - Sims: Positive   - Neers: Positive   - Empty can: Positive for pain and mild weakness    - [place]: Positive    - Scarf:  Neg    - Speeds:  Neg    - Yergason:  Neg    - Crank:  Pos     MSK - Knee:       - Inspection:    - Very small effusion present without surrounding erythema, warmth, lesion.       - ROM:    - Limited in knee flexion slightly by swelling and mild discomfort       - Palpation:    - TTP slightly at the lateral joint line, medial joint line, peripatellar border  - NTTP elsewhere.        - Strength:  (*antalgic)          - Knee Flexion            5            - Knee Extension        5                 - Special tests:              - Lachman:  Softer endpoint, no major laxity              - A/P drawer:  Neg               - Pivot shift:  Neg               - Germania:  Neg              - Varus stress:  Neg for laxity or pain               - Valgus stress: Neg for laxity and mild pain              - Patellar grind:  Neg     Radiology  I independently reviewed the available relevant imaging in the chart with my interpretations as above in HPI.   - XR R shoulder 3/6/2025 shows severe osteoarthritis of the right glenohumeral joint with bone-on-bone articulation and subchondral cyst formation.  Moderate degenerative changes acromioclavicular joint.  No acute fractures or dislocations.  - XR L knee 4/10/2025 with advanced degenerative changes of the medial compartment and a moderate effusion.  No acute fractures or dislocations.       Procedure  Large Joint  Injection/Arthocentesis: L knee joint    Date/Time: 4/23/2025 1:47 PM    Performed by: Octavio Carrera DO  Authorized by: Octavio Carrera DO    Indications:  Pain  Needle Size:  22 G  Guidance: landmark guided    Approach:  Anterolateral  Location:  Knee      Medications:  40 mg triamcinolone 40 MG/ML; 2 mL lidocaine 1 %; 2 mL BUPivacaine 0.5 %  Outcome:  Tolerated well, no immediate complications  Procedure discussed: discussed risks, benefits, and alternatives    Consent Given by:  Patient  Prep: patient was prepped and draped in usual sterile fashion         PROCEDURE  Intraarticular Knee Joint Injection  The patient was informed of the risks and benefits of the procedure and alternatives were discussed. A written consent was signed by the patient.   The injection site was prepped with chlorhexidine in sterile fashion.   An injectate solution containing 2 mL of 1% lidocaine, 2 mL of 0.5% bupivacaine, and 1 mL of Kenalog (40 mg/mL) was drawn up into a 5 mL syringe.  Injection was performed using sterile technique.  A 1.5-inch 22-gauge needle was used to enter the knee joint using a lateral infrapatellar approach and injectate was injected successfully. After the injection, the site was cleaned and a bandage applied. The patient tolerated the procedure well without complications.       Assessment  1. Primary osteoarthritis, right shoulder    2. Acute pain of left knee        Plan  Margareth Green is a pleasant 70 year old female that presents for follow up of her chronic right shoulder pain that is multifactorial in nature.  She does have radiographic and clinical evidence of severe osteoarthritis of the glenohumeral joint with some deep achy pain within the joint that hurts worse with movements and can bother her at night and at rest.  This appeared to be her most bothersome pain at her original exam and we performed ultrasound-guided glenohumeral joint and subacromial bursa corticosteroid injections, which  relieved a fair amount of her pain.    - Glenohumeral joint injection was the most effective.    She also has pain in the anterolateral shoulder with shoulder abduction, flexion, IR/ER with positive impingement testing on exam that is consistent with tendinopathy of the rotator cuff tendons.  She also has known cervical stenosis with right-sided radiculopathy that is responded well to C7-T1 ILESI in the past.  Includes numbness/tingling and occasional radicular shooting pain down to digits 4-5 on the right consistent with a likely C8 nerve root involvement.    - Cervical epidural steroid injection has been helpful for radicular pain.    She would also like to follow-up on her acute left knee pain after a fall on 4/8/2025.  Based on her history of a tibial plateau fracture, she wanted to make sure she did not fracture it again.  Radiographs were negative.  She had a lot of guarding on the last exam.  Today's exam was better with less pain.  There is a slight soft endpoint on her Lachman testing, but no major laxity.  Otherwise a more stable exam today.  Internal derangement is still possible but less likely.  She has been able to bear weight onto her knee more effectively without pain.    We discussed the nature of the condition and available treatment options, and mutually agreed upon the following plan:    - Imaging:          - Reviewed and independently interpreted the relevant imaging in the chart, including any imaging ordered for today's clinic.  - Reviewed results and images with patient.   - Medications:          - Discussed pharmacologic options for pain relief.   - May use NSAIDs (Ibuprofen, Naproxen) or Acetaminophen (Tylenol) as needed for pain control.   - Do not take these if previously advised to avoid them for other medical conditions.  - May also use topical medications such as lidocaine, IcyHot, BioFreeze, or Voltaren gel as needed for pain control.    - Voltaren gel is an anti-inflammatory cream  that may be used up to 4 times per day over the painful area.   - Prednisone was very helpful for her, and could be considered in the future if other conservative treatment is not managing the pain well  - Injections:          - Discussed possible injection options and alternatives.    - Injection options include: Corticosteroid injection of the glenohumeral joint, subacromial bursa.  Glenohumeral joint was the most effective.  Also may consider repeat cervical epidural steroid injection in the future if needed.  Also consider left knee joint corticosteroid injection.  - Performed a corticosteroid injection of the left knee joint today in clinic. Patient tolerated the procedure well without complications.     - Post-procedure instructions:    - Keep the injection site clean and dry.   - Do not submerge the injection site for 24 hours (no baths, pools). Showers are ok.   - Rest the area for 24-48 hours before resuming normal activities. Avoid overexerting the area for the first few weeks.   - It may take 2-3 days to start noticing the effects of the injection and up to 3-4 weeks to feel significant benefits.   - Therapy:          - Discussed the benefits of therapy vs home exercise program for optimization of range of motion, flexibility, strength, stability and function.   - She has previously attended physical therapy and has a quality home exercise program at home.  Encouraged to continue HEP as instructed.  - Modalities:          - May use ice, heat, massage or other modalities as needed.  - Surgery:          - Discussed non-operative and operative treatment options for the patient's condition.  Originally against any surgery for the shoulder.  Discussed that a replacement would be the only definitive treatment for the advanced arthritis in the glenohumeral joint.  She would like to discuss what a replacement may look like preoperatively and postoperatively.  She would like a referral to orthopedic surgery to  discuss this option.  Orthopedic surgery referral placed at last visit  - Activity:          - Encouraged to remain active and participate in regular activities as symptoms allow.   Avoid or modify exacerbating activities as needed.  - Follow up:          - As needed for reevaluation and update to treatment plan.  - May follow up sooner for new/worsening symptoms.  - May contact clinic by phone or MyChart for questions or concerns.       Octavio Carrera DO, CAQSM  [facility] - Sports Medicine  [facility] - Department of Orthopedic Surgery       Disclaimer:  This note was prepared and written using Dragon Medical dictation software. As a result, there may be errors in the script that have gone undetected. Please consider this when interpreting the information in this note.

## 2025-04-23 ENCOUNTER — OFFICE VISIT (OUTPATIENT)
Dept: ORTHOPEDICS | Facility: CLINIC | Age: 70
End: 2025-04-23
Payer: COMMERCIAL

## 2025-04-23 DIAGNOSIS — M19.011 PRIMARY OSTEOARTHRITIS, RIGHT SHOULDER: Primary | ICD-10-CM

## 2025-04-23 DIAGNOSIS — M25.562 ACUTE PAIN OF LEFT KNEE: ICD-10-CM

## 2025-04-23 PROCEDURE — 99214 OFFICE O/P EST MOD 30 MIN: CPT | Mod: 25 | Performed by: STUDENT IN AN ORGANIZED HEALTH CARE EDUCATION/TRAINING PROGRAM

## 2025-04-23 PROCEDURE — 20610 DRAIN/INJ JOINT/BURSA W/O US: CPT | Mod: LT | Performed by: STUDENT IN AN ORGANIZED HEALTH CARE EDUCATION/TRAINING PROGRAM

## 2025-04-23 RX ADMIN — LIDOCAINE HYDROCHLORIDE 2 ML: 10 INJECTION, SOLUTION INFILTRATION; PERINEURAL at 13:47

## 2025-04-23 RX ADMIN — BUPIVACAINE HYDROCHLORIDE 2 ML: 5 INJECTION, SOLUTION PERINEURAL at 13:47

## 2025-04-23 RX ADMIN — TRIAMCINOLONE ACETONIDE 40 MG: 40 INJECTION, SUSPENSION INTRA-ARTICULAR; INTRAMUSCULAR at 13:47

## 2025-04-23 NOTE — LETTER
2025      Margareth Green  [address]  [place] [address]      Dear Colleague,    Thank you for referring your patient, Margareth Green, to the [facility] [place]. Please see a copy of my visit note below.    Margareth Green  :  1955  DOS: 2025  MRN: [id number]  PCP: Caitlin Beasley    Sports Medicine Clinic Visit    Interim History - 2025  - Last seen on 4/10/2025 for cervical radiculopathy, right rotator cuff tendinopathy and right primary osteoarthritis of the shoulder. Also acute left knee pain after a fall on 25.  - 3/27/2025 C7-T1 IESI completed by Dr. Bay allowed for good relief in radicular symptoms.     - Since the last visit, her right shoulder and neck have been doing better with physical therapy and sleeping modifications.  She feels like she is making good progress.  She is interested in another glenohumeral joint corticosteroid injection, and would like to plan at the end of May for some important events in .  - Her left knee is also been making improvements, able to bear weight on it and has not been as painful.  X-rays do not show fracture.  Still with some generalized pain through the knee joint.  No major instability or mechanical locking.  - No interim injury.       Interim History - 2025  - Last seen on 2024 for cervical radiculopathy, right rotator cuff tendinopathy and right primary osteoarthritis of the shoulder.   - 2024 right glenohumeral joint injection and 2024 right subacromial injection. She is unsure which injection helped more, but did get relief from the combination.   - Since the last visit, she notes that it is continuing to be more difficult to reach over her head, with more pain. She notes that her lateral right chest is still painful from her heart surgery last year, where she was told that she would have some lingering nerve pain. Has been to physical therapy but  notes  she has not been consistent with her exercises. Continues to prefer nonoperative management. No interim injury.       Initial Visit: April 24, 2024  HPI  Margareth Green is a 69 year old female who is seen in consultation at the request of  Caitlin Beasley PA-C presenting with right shoulder pain.    - Mechanism of Injury:    - No inciting injury  - Pertinent history and prior evaluations:    - 4/10/2024 with Caitlin Beasley PA-C: right shoulder and cervical radiculopathy noted at C8 with multilevel DDD and central and foraminal stenosis on cervical MRI in 2022 and has had success with a C7-T1 ILESI. Prednisone prescription given.   - Right shoulder xray IMPRESSION: 1. Severe osteoarthrosis of the glenohumeral joint again noted. 2. Calcification in the region of the supraspinatus/infraspinatus tendon raises the question of calcific tendinosis. This has progressed. 3. No other change.    - Pain Character:    - Location:  anterior, lateral, posterior right shoulder  - Character:  aching shoulder pain, more sharp with movements  - Duration: One year  - Course:  worsening  - Endorses:    - Anterior, lateral, posterior right shoulder pain with achy characteristic present at rest and worse with shoulder abduction and rotation.  -Separate and distinct numbness and tingling into the right digits 4-5, chronic neck pain and radicular pain down the right arm intermittently.  - Denies:    - swelling, clicking/popping, grinding, mechanical locking symptoms, neurogenic weakness  - Alleviating factors:    - Naproxen, Tylenol arthritis, activity modification, rest  - Aggravating factors:    - repetitive movements with gardening, grabbing, grasping, abduction of shoulder, above head movments  - Other treatments tried:    - nothing    - Patient Goals:    - discuss treatment options  - Social History:   - Retired.  Previous work:  Medical records for [employer]      Review of Systems  Musculoskeletal: as above  Remainder of review of  systems is negative including constitutional, CV, pulmonary, GI, Skin and Neurologic except as noted in HPI or medical history.    Past Medical History:   Diagnosis Date     Abnormal Papanicolaou smear of vagina and vaginal HPV 12/2002    ASCUS (HPV neg). 3/03 WNL but no transitional zome - repeat 3months     Allergic rhinitis, cause unspecified      Arthritis      Backache, unspecified      Carpal tunnel syndrome     s/p repair     Cervicalgia      Chronic kidney disease, stage 3 (H) 07/14/2021     Depressive disorder      Depressive disorder, not elsewhere classified     doing well on Wellbutrin     Derangement of TMJ (temporomandibular joint) 03/2010    internal derangement     Dermatophytosis of nail      Diaphragmatic hernia without mention of obstruction or gangrene 09/2007    small hiatal hernia     Diffuse cystic mastopathy 2000    FCBD     Esophageal reflux 04/2005     Hammertoe 10/2009    see podiatry consult     Headache(784.0)     Muscle contrature headaches     Intramural leiomyoma of uterus 07/2007    on US     Irregular menstrual cycle     better with BCP -also PMS is better with BCP     IRRITABLE COLON 02/18/2008     Malaise and fatigue      Menopause     lmp 2/2008     Mild persistent asthma      Mitral valve disease      Mole (skin) 2003    9 x 6 mm rt ant chest     Motion sickness      Neuroma 11/2008    lt foot     Osteopenia 12/2008     Other malaise and fatigue 04/2005    stress     Personal history of tobacco use, presenting hazards to health     Quit in 1998     Solitary cyst of breast 12/2005    lt 6 oclock     Stenosis of lumbosacral spine     l4-5 and l5-s1     Symptomatic menopausal or female climacteric states 12/2002    perimenopausal. LMP 2/2008     Tachycardia, paroxysmal (H) 11/24/2020     Thyroid fullness 08/07/2013     Past Surgical History:   Procedure Laterality Date     APPLY WOUND VAC Right 2/22/2024    Procedure: PLACEMENT OF RIGHT GROIN PREVENA WOUND VAC;  Surgeon: Geoffrey  Brandon Cameron MD;  Location:  OR     BIOPSY  4/10/2015     BL DUPLEX LO EXTREM ART UNILAT/LTD  4/3008    lower extr arterial doppler -no stenosis     C DEXA,BONE DENSITY,APPENDICULR ARIADNAN  12/2008    osteopenia     COLONOSCOPY  10/22/2007    bx benign     COLONOSCOPY N/A 4/10/2015    Procedure: COMBINED COLONOSCOPY, SINGLE OR MULTIPLE BIOPSY/POLYPECTOMY BY BIOPSY;  Surgeon: Cl Wagner MD;  Location:  GI     CV CORONARY ANGIOGRAM N/A 8/17/2023    Procedure: Coronary Angiogram;  Surgeon: Liza Ford MD;  Location:  HEART CARDIAC CATH LAB     ESOPHAGOSCOPY, GASTROSCOPY, DUODENOSCOPY (EGD), COMBINED N/A 1/31/2018    Procedure: COMBINED ESOPHAGOSCOPY, GASTROSCOPY, DUODENOSCOPY (EGD);  COMBINED ESOPHAGOSCOPY, GASTROSCOPY, DUODENOSCOPY (EGD);  Surgeon: Tirso Duran MD;  Location:  GI     ESOPHAGOSCOPY, GASTROSCOPY, DUODENOSCOPY (EGD), COMBINED N/A 2/1/2023    Procedure: ESOPHAGOGASTRODUODENOSCOPY (EGD);  Surgeon: Lui Vences MD;  Location:  GI     EYE SURGERY       HC PART REMV BONE METATARSAL HEAD,EA  01/11/10    Right foot plantar plate tear. Also correct hammertoe, 2nd digit & varicose vein ligation, heel.     HC REVISE MEDIAN N/CARPAL TUNNEL SURG      Left in 1997, right in 1999     INCISION AND DRAINAGE LOWER EXTREMITY, COMBINED Right 2/22/2024    Procedure: Excision OF RIGHT GROIN SEROMA WITH LYMPHAZURIN BLUE INJECTION, Right groin exploration;  Surgeon: Brandon Hale MD;  Location:  OR     INJ, ANES/STER, FACET LUMB/SAC  2010    Left L5 nerve root injection     INJECT EPIDURAL CERVICAL Right 9/29/2022    Procedure: Cervical 7-Thoracic 1 Interlaminar epidural steroid injection using fluoroscopic guidance with contrast dye, Right;  Surgeon: Tyler Bay MD;  Location:  OR     INJECT EPIDURAL CERVICAL N/A 3/27/2025    Procedure: Cervical 7-Thoracic 1 Interlaminar epidural steroid injection using fluoroscopic guidance with contrast dye.;  Surgeon: Tyler Bay MD;   Location: [facility] OR     INJECT EPIDURAL TRANSFORAMINAL  10/09/2013    [facility]     INJECT JOINT SACROILIAC  3/19/14,14    [facility]     REPAIR VALVE MITRAL MINIMALLY INVASIVE N/A 10/26/2023    Procedure: MINIMALLY INVASIVE MITRAL VALVE REPAIR USING CHORD-X PRE-MEASURED LOOP SUTURE SIZE:12MM, MITRAL ANNULOPLASTY RING ERICK-COTTO PHYSIO Il SIZE: 28MM, AND ON CARDIOPULMONARY PUMP OXYGENATOR (INTRAOPERATIVE TRANSESOPHAGEAL ECHOCARDIOGRAM BY THE CARDIOLOGIST  );  Surgeon: Sabas Meredith MD;  Location:  OR     ZZC NONSPECIFIC PROCEDURE      Metatarsal phalangeal joint injection     ZZ COLONOSCOPY THRU STOMA, DIAGNOSTIC  2002    bx. benign - flex sig in 5 yrs  or colonoscopy in 10 yrs     ZZ ECHO HEART XTHORACIC, STRESS/REST  2005    WNL     ZZ NCS MOTOR W/O F-WAVE, EACH NERVE  2008    CTS     Family History   Problem Relation Age of Onset     Diabetes Father      Depression Father      Cerebrovascular Disease Mother         age 80     Arthritis Mother      Depression Mother         On meds     Eye Disorder Mother         Glaucoma     Osteoporosis Mother      Respiratory Mother          86 YO COPD     Chronic Obstructive Pulmonary Disease Mother      C.A.D. Brother         67 YO MI -     Chronic Obstructive Pulmonary Disease Brother      Myocardial Infarction Brother      Other Cancer Brother      Cancer Brother          Objective  LMP 2008 (Approximate)     General: healthy, alert and in no acute distress.    HEENT: no scleral icterus or conjunctival erythema.   Skin: no suspicious lesions or rash. No jaundice.   CV: regular rhythm by palpation, 2+ distal pulses.  Resp: normal respiratory effort without conversational dyspnea.   Psych: normal mood and affect.    Gait: nonantalgic with only a slight limp today, appropriate coordination and balance.     Neuro:        - Sensation to light touch:    - Intact throughout the BUE/BLE including all  peripheral nerve distributions.        - MSR:       KASHIF DENISE  - Biceps  2+ 2+  - Brachioradialis 2+ 2+  - Triceps  2+ 2+       - Special tests:   - Spurling's:  Neg today    MSK - Shoulder:       - Inspection:    - No significant swelling, erythema, warmth, ecchymosis, lesion, or atrophy noted.        - ROM:    - Slightly limited AROM/PROM with anterolateral shoulder pain with shoulder abduction, flexion, IR/ER.       - Palpation:    - TTP at the anterior shoulder joint.   - NTTP elsewhere including the subacromial space, proximal biceps tendon, right-sided cervical paraspinals.        - Strength:  (*antalgic)  - Shoulder Abduction   5-*    - Shoulder Flexion   5-*    - Shoulder Internal Rotation  5-*    - Shoulder External Rotation  5-*             - Special tests:        - Sims: Positive   - Neers: Positive   - Empty can: Positive for pain and mild weakness    - [place]: Positive    - Scarf:  Neg    - Speeds:  Neg    - Yergason:  Neg    - Crank:  Pos     MSK - Knee:       - Inspection:    - Very small effusion present without surrounding erythema, warmth, lesion.       - ROM:    - Limited in knee flexion slightly by swelling and mild discomfort       - Palpation:    - TTP slightly at the lateral joint line, medial joint line, peripatellar border  - NTTP elsewhere.        - Strength:  (*antalgic)          - Knee Flexion            5            - Knee Extension        5                 - Special tests:              - Lachman:  Softer endpoint, no major laxity              - A/P drawer:  Neg               - Pivot shift:  Neg               - Germania:  Neg              - Varus stress:  Neg for laxity or pain               - Valgus stress: Neg for laxity and mild pain              - Patellar grind:  Neg     Radiology  I independently reviewed the available relevant imaging in the chart with my interpretations as above in HPI.   - XR R shoulder 3/6/2025 shows severe osteoarthritis of the right glenohumeral joint with  bone-on-bone articulation and subchondral cyst formation.  Moderate degenerative changes acromioclavicular joint.  No acute fractures or dislocations.  - XR L knee 4/10/2025 with advanced degenerative changes of the medial compartment and a moderate effusion.  No acute fractures or dislocations.       Procedure  Large Joint Injection/Arthocentesis: L knee joint    Date/Time: 4/23/2025 1:47 PM    Performed by: Octavio Carrera DO  Authorized by: Octavio Carrera DO    Indications:  Pain  Needle Size:  22 G  Guidance: landmark guided    Approach:  Anterolateral  Location:  Knee      Medications:  40 mg triamcinolone 40 MG/ML; 2 mL lidocaine 1 %; 2 mL BUPivacaine 0.5 %  Outcome:  Tolerated well, no immediate complications  Procedure discussed: discussed risks, benefits, and alternatives    Consent Given by:  Patient  Prep: patient was prepped and draped in usual sterile fashion         PROCEDURE  Intraarticular Knee Joint Injection  The patient was informed of the risks and benefits of the procedure and alternatives were discussed. A written consent was signed by the patient.   The injection site was prepped with chlorhexidine in sterile fashion.   An injectate solution containing 2 mL of 1% lidocaine, 2 mL of 0.5% bupivacaine, and 1 mL of Kenalog (40 mg/mL) was drawn up into a 5 mL syringe.  Injection was performed using sterile technique.  A 1.5-inch 22-gauge needle was used to enter the knee joint using a lateral infrapatellar approach and injectate was injected successfully. After the injection, the site was cleaned and a bandage applied. The patient tolerated the procedure well without complications.       Assessment  1. Primary osteoarthritis, right shoulder    2. Acute pain of left knee        Plan  Margareth Green is a pleasant 70 year old female that presents for follow up of her chronic right shoulder pain that is multifactorial in nature.  She does have radiographic and clinical evidence of severe  osteoarthritis of the glenohumeral joint with some deep achy pain within the joint that hurts worse with movements and can bother her at night and at rest.  This appeared to be her most bothersome pain at her original exam and we performed ultrasound-guided glenohumeral joint and subacromial bursa corticosteroid injections, which relieved a fair amount of her pain.    - Glenohumeral joint injection was the most effective.    She also has pain in the anterolateral shoulder with shoulder abduction, flexion, IR/ER with positive impingement testing on exam that is consistent with tendinopathy of the rotator cuff tendons.  She also has known cervical stenosis with right-sided radiculopathy that is responded well to C7-T1 ILESI in the past.  Includes numbness/tingling and occasional radicular shooting pain down to digits 4-5 on the right consistent with a likely C8 nerve root involvement.    - Cervical epidural steroid injection has been helpful for radicular pain.    She would also like to follow-up on her acute left knee pain after a fall on 4/8/2025.  Based on her history of a tibial plateau fracture, she wanted to make sure she did not fracture it again.  Radiographs were negative.  She had a lot of guarding on the last exam.  Today's exam was better with less pain.  There is a slight soft endpoint on her Lachman testing, but no major laxity.  Otherwise a more stable exam today.  Internal derangement is still possible but less likely.  She has been able to bear weight onto her knee more effectively without pain.    We discussed the nature of the condition and available treatment options, and mutually agreed upon the following plan:    - Imaging:          - Reviewed and independently interpreted the relevant imaging in the chart, including any imaging ordered for today's clinic.  - Reviewed results and images with patient.   - Medications:          - Discussed pharmacologic options for pain relief.   - May use NSAIDs  (Ibuprofen, Naproxen) or Acetaminophen (Tylenol) as needed for pain control.   - Do not take these if previously advised to avoid them for other medical conditions.  - May also use topical medications such as lidocaine, IcyHot, BioFreeze, or Voltaren gel as needed for pain control.    - Voltaren gel is an anti-inflammatory cream that may be used up to 4 times per day over the painful area.   - Prednisone was very helpful for her, and could be considered in the future if other conservative treatment is not managing the pain well  - Injections:          - Discussed possible injection options and alternatives.    - Injection options include: Corticosteroid injection of the glenohumeral joint, subacromial bursa.  Glenohumeral joint was the most effective.  Also may consider repeat cervical epidural steroid injection in the future if needed.  Also consider left knee joint corticosteroid injection.  - Performed a corticosteroid injection of the left knee joint today in clinic. Patient tolerated the procedure well without complications.     - Post-procedure instructions:    - Keep the injection site clean and dry.   - Do not submerge the injection site for 24 hours (no baths, pools). Showers are ok.   - Rest the area for 24-48 hours before resuming normal activities. Avoid overexerting the area for the first few weeks.   - It may take 2-3 days to start noticing the effects of the injection and up to 3-4 weeks to feel significant benefits.   - Therapy:          - Discussed the benefits of therapy vs home exercise program for optimization of range of motion, flexibility, strength, stability and function.   - She has previously attended physical therapy and has a quality home exercise program at home.  Encouraged to continue HEP as instructed.  - Modalities:          - May use ice, heat, massage or other modalities as needed.  - Surgery:          - Discussed non-operative and operative treatment options for the patient's  condition.  Originally against any surgery for the shoulder.  Discussed that a replacement would be the only definitive treatment for the advanced arthritis in the glenohumeral joint.  She would like to discuss what a replacement may look like preoperatively and postoperatively.  She would like a referral to orthopedic surgery to discuss this option.  Orthopedic surgery referral placed at last visit  - Activity:          - Encouraged to remain active and participate in regular activities as symptoms allow.   Avoid or modify exacerbating activities as needed.  - Follow up:          - As needed for reevaluation and update to treatment plan.  - May follow up sooner for new/worsening symptoms.  - May contact clinic by phone or MyChart for questions or concerns.       Octavio Carrera DO, CAQSM  [facility]  [facility] - Department of Orthopedic Surgery       Disclaimer:  This note was prepared and written using Dragon Medical dictation software. As a result, there may be errors in the script that have gone undetected. Please consider this when interpreting the information in this note.       Again, thank you for allowing me to participate in the care of your patient.        Sincerely,        Octavio Carrera DO    Electronically signed

## 2025-04-28 ENCOUNTER — THERAPY VISIT (OUTPATIENT)
Dept: PHYSICAL THERAPY | Facility: CLINIC | Age: 70
End: 2025-04-28
Attending: STUDENT IN AN ORGANIZED HEALTH CARE EDUCATION/TRAINING PROGRAM
Payer: COMMERCIAL

## 2025-04-28 DIAGNOSIS — M54.12 CERVICAL RADICULOPATHY: Primary | ICD-10-CM

## 2025-04-28 PROCEDURE — 97140 MANUAL THERAPY 1/> REGIONS: CPT | Mod: GP | Performed by: PHYSICAL THERAPIST

## 2025-04-28 PROCEDURE — 97110 THERAPEUTIC EXERCISES: CPT | Mod: GP | Performed by: PHYSICAL THERAPIST

## 2025-04-29 RX ORDER — LIDOCAINE HYDROCHLORIDE 10 MG/ML
2 INJECTION, SOLUTION INFILTRATION; PERINEURAL
Status: COMPLETED | OUTPATIENT
Start: 2025-04-23 | End: 2025-04-23

## 2025-04-29 RX ORDER — BUPIVACAINE HYDROCHLORIDE 5 MG/ML
2 INJECTION, SOLUTION PERINEURAL
Status: COMPLETED | OUTPATIENT
Start: 2025-04-23 | End: 2025-04-23

## 2025-04-29 RX ORDER — TRIAMCINOLONE ACETONIDE 40 MG/ML
40 INJECTION, SUSPENSION INTRA-ARTICULAR; INTRAMUSCULAR
Status: COMPLETED | OUTPATIENT
Start: 2025-04-23 | End: 2025-04-23

## 2025-05-01 ENCOUNTER — OFFICE VISIT (OUTPATIENT)
Dept: FAMILY MEDICINE | Facility: OTHER | Age: 70
End: 2025-05-01
Payer: COMMERCIAL

## 2025-05-01 ENCOUNTER — NURSE TRIAGE (OUTPATIENT)
Dept: FAMILY MEDICINE | Facility: CLINIC | Age: 70
End: 2025-05-01

## 2025-05-01 ENCOUNTER — ALLIED HEALTH/NURSE VISIT (OUTPATIENT)
Dept: FAMILY MEDICINE | Facility: OTHER | Age: 70
End: 2025-05-01
Payer: COMMERCIAL

## 2025-05-01 VITALS
SYSTOLIC BLOOD PRESSURE: 112 MMHG | TEMPERATURE: 98.6 F | RESPIRATION RATE: 18 BRPM | OXYGEN SATURATION: 97 % | DIASTOLIC BLOOD PRESSURE: 64 MMHG | HEART RATE: 90 BPM

## 2025-05-01 DIAGNOSIS — R35.0 URINARY FREQUENCY: ICD-10-CM

## 2025-05-01 DIAGNOSIS — Z20.828 EXPOSURE TO INFLUENZA: Primary | ICD-10-CM

## 2025-05-01 DIAGNOSIS — J45.50 SEVERE PERSISTENT ASTHMA WITHOUT COMPLICATION (H): ICD-10-CM

## 2025-05-01 DIAGNOSIS — J06.9 VIRAL URI WITH COUGH: ICD-10-CM

## 2025-05-01 DIAGNOSIS — R05.1 ACUTE COUGH: Primary | ICD-10-CM

## 2025-05-01 LAB
ALBUMIN UR-MCNC: NEGATIVE MG/DL
APPEARANCE UR: CLEAR
BILIRUB UR QL STRIP: NEGATIVE
COLOR UR AUTO: YELLOW
FLUAV RNA SPEC QL NAA+PROBE: NEGATIVE
FLUBV RNA RESP QL NAA+PROBE: NEGATIVE
GLUCOSE UR STRIP-MCNC: NEGATIVE MG/DL
HGB UR QL STRIP: NEGATIVE
KETONES UR STRIP-MCNC: NEGATIVE MG/DL
LEUKOCYTE ESTERASE UR QL STRIP: NEGATIVE
NITRATE UR QL: NEGATIVE
PH UR STRIP: 6 [PH] (ref 5–7)
RSV RNA SPEC NAA+PROBE: NEGATIVE
SARS-COV-2 RNA RESP QL NAA+PROBE: NEGATIVE
SP GR UR STRIP: <=1.005 (ref 1–1.03)
UROBILINOGEN UR STRIP-ACNC: 0.2 E.U./DL

## 2025-05-01 RX ORDER — BENZONATATE 200 MG/1
200 CAPSULE ORAL 3 TIMES DAILY PRN
Qty: 30 CAPSULE | Refills: 0 | Status: SHIPPED | OUTPATIENT
Start: 2025-05-01

## 2025-05-01 RX ORDER — OSELTAMIVIR PHOSPHATE 75 MG/1
75 CAPSULE ORAL 2 TIMES DAILY
Qty: 10 CAPSULE | Refills: 0 | Status: SHIPPED | OUTPATIENT
Start: 2025-05-01 | End: 2025-05-06

## 2025-05-01 NOTE — TELEPHONE ENCOUNTER
Nurse Triage SBAR    Is this a 2nd Level Triage? YES, LICENSED PRACTITIONER REVIEW IS REQUIRED    Situation: patient presents to the clinic with complaints of cough, body aches, wheezing.  Patient states she was with friends last Thursday or Friday, the next day they called her and said they were having flu symptoms. They tested positive yesterday.  Patient states she has a history of asthma, has prednisone for flare ups. She started coughing on Monday. She started the prednisone yesterday. She takes 20mg daily x5 days. Patient states she has had coughing, a headache, body aches, had some wheezing last night.  Denies fever. Cough is sometimes productive, light yellow phlegm.     B/p today 112/64. Temp 98.6. HR 90 bpm. 97% on room air. LS diminished.     Background: exposure to flu, cough, body aches, headache. Started on prednisone yesterday.     Assessment: advised patient to be seen today or tomorrow per protocol.     Protocol Recommended Disposition:   See in Office Today or Tomorrow, See More Appropriate Protocol    Recommendation: patient verbalized understanding. RN did huddle with provider. Alisson Billingsley NP did agree to see patient today, will see now. Patient placed on schedule and MA given information. Patient will be seen by provider. Patient thankful.  Denies any other questions.      Huddled with provider.     Does the patient meet one of the following criteria for ADS visit consideration? 16+ years old, with an [facility] PCP     TIP  Providers, please consider if this condition is appropriate for management at one of our Acute and Diagnostic Services sites.     If patient is a good candidate, please use dotphrase <dot>triageresponse and select Refer to ADS to document.      Reason for Disposition   Symptoms of FLU (e.g., cough, runny nose, SOB, sore throat; with or without fever) and within 14 days of EXPOSURE (close contact) with someone diagnosed with influenza (e.g., flu test positive)   Patient wants  to be seen    Additional Information   Negative: Difficulty breathing that is not severe and not relieved by cleaning out the nose   Negative: Patient sounds very sick or weak to the triager   Negative: Fever > 104 F (40 C)   Negative: Fever > 101 F (38.3 C) and over 60 years of age   Negative: Fever > 100 F (37.8 C) and diabetes mellitus or weak immune system (e.g., HIV positive, cancer chemo, splenectomy, organ transplant, chronic steroids)   Negative: Fever > 100 F (37.8 C) and bedridden (e.g., CVA, chronic illness, recovering from surgery)   Negative: Using nasal washes and pain medicine > 24 hours and sinus pain (lower forehead, cheekbone, or eye) persists   Negative: Fever present > 3 days (72 hours)   Negative: Fever returns after gone for over 24 hours and symptoms worse (or not improved)   Negative: Earache    Protocols used: Common Cold-A-OH, Influenza (Flu) - Seasonal-A-OH

## 2025-05-01 NOTE — PROGRESS NOTES
Assessment & Plan     Exposure to influenza  Patient is a 70 year-old female with severe persistent asthma, GERD, and osteoporosis presenting with URI symptoms for the past 3 days and confirmed influenza exposure. Given co-morbidities and confirmed influenza exposure, prescribed post-exposure prophylaxis for influenza in the form of Tamiflu. Discussed proper use of medication(s) and potential side effects. Follow-up if symptoms fail to improve despite above interventions. Patient understands and is agreeable to plan as discussed in clinic.  - oseltamivir (TAMIFLU) 75 MG capsule; Take 1 capsule (75 mg) by mouth 2 times daily for 5 days.    Viral URI with cough  Given confirmed exposure to influenza, high suspicion for viral process at this time likely being influenza.  Patient would like to complete viral testing to confirm presence of viral illness.  Ordered viral PCR checking for influenza, RSV, and COVID.  Continue with symptom management.  Prescribed as needed benzonatate for additional cough suppression.   - benzonatate (TESSALON) 200 MG capsule; Take 1 capsule (200 mg) by mouth 3 times daily as needed for cough.  - Influenza A/B, RSV and SARS-CoV2 PCR (COVID-19) Nose    Severe persistent asthma without complication (H)  No evidence for acute exacerbation on exam today.  Respiratory rate is 18 without profound tachypnea, respiratory distress, or audible wheezing.  SpO2 maintained at 97%.  Lungs are clear to auscultation without adventitious breath sounds. Continue with standing prednisone as started on Wednesday. Continue with PRN albuterol and daily maintenance inhaler. Low threshold for CXR imaging and serologic testing is cough persists or fever develops given underlying lung disease.     Urinary frequency  UA today to evaluate for possible acute cystitis. Denies dysuria. No CVA tenderness on exam.   - UA Macroscopic with reflex to Microscopic and Culture - Lab Collect; Future      Subjective   Margareth is a 70  year old, presenting for the following health issues:  URI        5/1/2025     1:39 PM   Additional Questions   Roomed by barron GARCIA      Acute Illness  Acute illness concerns: cough  Onset/Duration: Monday night  Symptoms:  Fever: No  Chills/Sweats: No  Headache (location?): YES  Sinus Pressure: YES  Conjunctivitis:  No  Ear Pain: no  Rhinorrhea: YES  Congestion: YES  Sore Throat: No  Cough: YES-productive of yellow sputum  Wheeze: YES  Decreased Appetite: No  Nausea: YES  Vomiting: No  Diarrhea: No  Dysuria/Freq.: YES- frequency  Dysuria or Hematuria: No  Fatigue/Achiness: YES  Sick/Strep Exposure: YES  Therapies tried and outcome: None    Presents with concerns of URI symptoms that started on Monday.  States that she was with some friends late last week who tested positive for influenza over the weekend.  History of severe asthma and is concerned about symptoms progressing to bronchitis or pneumonia.  Started standing order prednisone on Wednesday due to cough and breathing symptoms.  Increase use and albuterol use every 4-6 hours for cough/shortness of breath.  Has not attempted use of other OTC agents for symptom management.    Denies fevers, chills, exertional chest pain, dyspnea, or orthopnea.    Also concerned about increased urinary frequency and urgency over the past 3 weeks.  Denies dysuria, localized abdominal pain, pelvic pain, or flank pain.  History of urinary frequency and urinary tract infections.        Objective    /64   Pulse 90   Temp 98.6  F (37  C) (Temporal)   LMP 02/26/2008 (Approximate)   SpO2 97%   There is no height or weight on file to calculate BMI.  Physical Exam  Vitals reviewed.   Constitutional:       General: She is not in acute distress.     Appearance: Normal appearance. She is not ill-appearing.   HENT:      Nose: Congestion and rhinorrhea present.      Right Sinus: No maxillary sinus tenderness or frontal sinus tenderness.      Left Sinus: No maxillary sinus  tenderness or frontal sinus tenderness.      Mouth/Throat:      Mouth: Mucous membranes are moist. No oral lesions.      Pharynx: Oropharynx is clear. No oropharyngeal exudate or posterior oropharyngeal erythema.   Eyes:      Conjunctiva/sclera: Conjunctivae normal.      Right eye: Right conjunctiva is not injected. No exudate.     Left eye: Left conjunctiva is not injected. No exudate.  Cardiovascular:      Rate and Rhythm: Normal rate and regular rhythm.      Heart sounds: Normal heart sounds, S1 normal and S2 normal. No murmur heard.  Pulmonary:      Effort: Pulmonary effort is normal. No tachypnea or respiratory distress.      Breath sounds: Normal breath sounds. No wheezing, rhonchi or rales.   Abdominal:      Tenderness: There is no abdominal tenderness. There is no right CVA tenderness or left CVA tenderness.   Lymphadenopathy:      Cervical: No cervical adenopathy.   Skin:     General: Skin is warm and dry.      Capillary Refill: Capillary refill takes less than 2 seconds.      Findings: No lesion or rash.   Neurological:      Mental Status: She is alert.   Psychiatric:         Attention and Perception: Attention and perception normal.         Mood and Affect: Mood and affect normal.                Signed Electronically by: JUAN Cordoba CNP

## 2025-05-01 NOTE — PATIENT INSTRUCTIONS
Given exposure to influenza and history of severe asthma- I have prescribed the antiviral treatment for influenza. This medication is called Tamiflu and reduces risk of hospitalization and decreases severity and duration of symptoms.  Please take 1 capsule 2 times a day for the next 5 days.  Continue with prednisone as you started on Wednesday for breathing symptoms.  Okay to use as needed albuterol inhaler every 4-6 hours for shortness of breath/cough.  Continue with maintenance inhaler for asthma management.  Today we completed viral testing to check for RSV, influenza, and COVID.  These test results should be back later this evening into tomorrow.  I have also prescribed an as needed gel capsule cough suppressant called benzonatate (Tessalon).  You can take this tablet as needed up to 3 times a day for cough.  Please make sure you are continuing to drink a good amount of water and rest to further recover from illness.  If you develop a fever over the weekend early next week and cough persist despite management with prednisone and Tamiflu, I would recommend follow-up in clinic for further evaluation and possible x-ray at that time.    For your urinary symptoms we will complete a urine test today to make sure there is not evidence of a urinary tract infection at this time.

## 2025-05-05 ENCOUNTER — MYC MEDICAL ADVICE (OUTPATIENT)
Dept: PULMONOLOGY | Facility: CLINIC | Age: 70
End: 2025-05-05
Payer: COMMERCIAL

## 2025-05-05 DIAGNOSIS — J45.30 MILD PERSISTENT ASTHMA, UNSPECIFIED WHETHER COMPLICATED: Primary | ICD-10-CM

## 2025-05-06 ENCOUNTER — MYC MEDICAL ADVICE (OUTPATIENT)
Dept: CARDIOLOGY | Facility: CLINIC | Age: 70
End: 2025-05-06
Payer: COMMERCIAL

## 2025-05-06 DIAGNOSIS — I34.0 NONRHEUMATIC MITRAL VALVE REGURGITATION: ICD-10-CM

## 2025-05-06 DIAGNOSIS — Z98.890 S/P MITRAL VALVE REPAIR: ICD-10-CM

## 2025-05-06 DIAGNOSIS — Z29.89 NEED FOR SBE (SUBACUTE BACTERIAL ENDOCARDITIS) PROPHYLAXIS: ICD-10-CM

## 2025-05-06 RX ORDER — PREDNISONE 10 MG/1
TABLET ORAL
Qty: 21 TABLET | Refills: 0 | Status: SHIPPED | OUTPATIENT
Start: 2025-05-06 | End: 2025-05-15

## 2025-05-06 RX ORDER — AMOXICILLIN 500 MG/1
CAPSULE ORAL
Qty: 4 CAPSULE | Refills: 0 | Status: SHIPPED | OUTPATIENT
Start: 2025-05-06

## 2025-05-07 DIAGNOSIS — J45.41 MODERATE PERSISTENT ASTHMA WITH EXACERBATION: ICD-10-CM

## 2025-05-07 DIAGNOSIS — R05.2 SUBACUTE COUGH: ICD-10-CM

## 2025-05-07 DIAGNOSIS — R06.2 WHEEZING: ICD-10-CM

## 2025-05-07 RX ORDER — ALBUTEROL SULFATE 90 UG/1
AEROSOL, METERED RESPIRATORY (INHALATION)
Qty: 18 G | Refills: 1 | Status: SHIPPED | OUTPATIENT
Start: 2025-05-07

## 2025-05-08 ENCOUNTER — THERAPY VISIT (OUTPATIENT)
Dept: PHYSICAL THERAPY | Facility: CLINIC | Age: 70
End: 2025-05-08
Attending: STUDENT IN AN ORGANIZED HEALTH CARE EDUCATION/TRAINING PROGRAM
Payer: COMMERCIAL

## 2025-05-08 DIAGNOSIS — M54.12 CERVICAL RADICULOPATHY: Primary | ICD-10-CM

## 2025-05-08 PROCEDURE — 97140 MANUAL THERAPY 1/> REGIONS: CPT | Mod: GP | Performed by: PHYSICAL THERAPIST

## 2025-05-13 ENCOUNTER — TELEPHONE (OUTPATIENT)
Dept: GASTROENTEROLOGY | Facility: CLINIC | Age: 70
End: 2025-05-13
Payer: COMMERCIAL

## 2025-05-13 NOTE — TELEPHONE ENCOUNTER
ECHO scheduled for 5/14/25. Will await to call for pre assessment until after ECHO completed. Screening colonoscopy warrants cardiac testing to be completed and resulted prior. .     ---------------------------------------------------------------------------------------       Pre visit planning completed.      Procedure details:    Patient scheduled for Colonoscopy on 5/28/25.     Arrival time: 0830. Procedure time 0930    Facility location: [facility]; [address] [place] [address] , [place] [address]. Check in location: Main entrance at Surgery registation desk.    Sedation type: MAC    Pre op exam needed? No.    Indication for procedure: screening       Chart review:     Electronic implanted devices? No    Recent diagnosis of diverticulitis within the last 6 weeks? No      Medication review:    Diabetic? No    Anticoagulants? No    Weight loss medication/injectable? No. Patient is on GLP-1 medication but for DM (see above).    Other medication HOLDING recommendations:  N/A      Prep for procedure:     Bowel prep recommendation: Standard Miralax.   Due to: standard bowel prep    Procedure information and instructions sent via [employer] - bowel prep instructions sent only. Awaiting         Enriqueta Rosales RN  Endoscopy Procedure Pre Assessment   [phone] option 3

## 2025-05-14 ENCOUNTER — HOSPITAL ENCOUNTER (OUTPATIENT)
Dept: CARDIOLOGY | Facility: CLINIC | Age: 70
Discharge: HOME | End: 2025-05-14
Attending: INTERNAL MEDICINE
Payer: COMMERCIAL

## 2025-05-14 DIAGNOSIS — I34.0 NONRHEUMATIC MITRAL VALVE REGURGITATION: ICD-10-CM

## 2025-05-14 DIAGNOSIS — Z98.890 S/P MITRAL VALVE REPAIR: ICD-10-CM

## 2025-05-14 LAB — LVEF ECHO: NORMAL

## 2025-05-14 PROCEDURE — 93306 TTE W/DOPPLER COMPLETE: CPT

## 2025-05-15 ENCOUNTER — RESULTS FOLLOW-UP (OUTPATIENT)
Dept: CARDIOLOGY | Facility: CLINIC | Age: 70
End: 2025-05-15

## 2025-05-15 ENCOUNTER — OFFICE VISIT (OUTPATIENT)
Dept: FAMILY MEDICINE | Facility: CLINIC | Age: 70
End: 2025-05-15
Payer: COMMERCIAL

## 2025-05-15 VITALS
HEIGHT: 62 IN | SYSTOLIC BLOOD PRESSURE: 110 MMHG | RESPIRATION RATE: 17 BRPM | HEART RATE: 68 BPM | OXYGEN SATURATION: 99 % | WEIGHT: 127.25 LBS | DIASTOLIC BLOOD PRESSURE: 64 MMHG | TEMPERATURE: 98.5 F | BODY MASS INDEX: 23.42 KG/M2

## 2025-05-15 DIAGNOSIS — M26.621 ARTHRALGIA OF RIGHT TEMPOROMANDIBULAR JOINT: Primary | ICD-10-CM

## 2025-05-15 ASSESSMENT — ASTHMA QUESTIONNAIRES
QUESTION_1 LAST FOUR WEEKS HOW MUCH OF THE TIME DID YOUR ASTHMA KEEP YOU FROM GETTING AS MUCH DONE AT WORK, SCHOOL OR AT HOME: SOME OF THE TIME
ACT_TOTALSCORE: 14
QUESTION_3 LAST FOUR WEEKS HOW OFTEN DID YOUR ASTHMA SYMPTOMS (WHEEZING, COUGHING, SHORTNESS OF BREATH, CHEST TIGHTNESS OR PAIN) WAKE YOU UP AT NIGHT OR EARLIER THAN USUAL IN THE MORNING: ONCE A WEEK
QUESTION_2 LAST FOUR WEEKS HOW OFTEN HAVE YOU HAD SHORTNESS OF BREATH: THREE TO SIX TIMES A WEEK
QUESTION_5 LAST FOUR WEEKS HOW WOULD YOU RATE YOUR ASTHMA CONTROL: SOMEWHAT CONTROLLED
QUESTION_4 LAST FOUR WEEKS HOW OFTEN HAVE YOU USED YOUR RESCUE INHALER OR NEBULIZER MEDICATION (SUCH AS ALBUTEROL): ONE OR TWO TIMES PER DAY

## 2025-05-15 ASSESSMENT — PAIN SCALES - GENERAL: PAINLEVEL_OUTOF10: MODERATE PAIN (5)

## 2025-05-15 NOTE — TELEPHONE ENCOUNTER
ECHO completed. Mild pulmonary hypertension noted. Case is already scheduled with MAC sedation. Ok to proceed as scheduled.     Procedure instructions sent via [employer].     Attempted to contact patient in order to complete pre assessment questions.     No answer. Left message to return call to [phone] option 3.    Callback communication sent via [employer].    Enriqueta Rosales RN

## 2025-05-15 NOTE — PROGRESS NOTES
Assessment & Plan     Arthralgia of right temporomandibular joint  She will eat soft foods, avoid chewing gum as she has been, see her dentist for dental exam, and continue to follow up with PT.  I will order PT specific to TMJ, she is doing PT for right shoulder and neck pain  - Physical Therapy  Referral; Future  Can consider referral to head and pain clinic if this is not improving      This chart documentation was completed in part with Dragon voice recognition software.  Documentation is reviewed after completion, however, some words and grammatical errors may remain.  Caitlin Beasley PA-C    The longitudinal plan of care for the diagnosis(es)/condition(s) as documented were addressed during this visit. Due to the added complexity in care, I will continue to support Margareth in the subsequent management and with ongoing continuity of care.     Subjective   Margareth is a 70 year old, presenting for the following health issues:  Jaw Pain        5/15/2025     9:54 AM   Additional Questions   Roomed by VE     History of Present Illness       Reason for visit:  Jaw pain  Symptom onset:  3-4 weeks ago  Symptoms include:  Jaw is tight  Symptom intensity:  Moderate  Symptom progression:  Staying the same  Had these symptoms before:  No  What makes it worse:  Yawning,eating  What makes it better:  No   She is taking medications regularly.      Popping/clicking sounds in right jaw only.  This has been ongoing for several weeks.  She has had no trauma her jaw does not lock but she is fearful it may.  It feels tight on the right, it does click when she opens her mouth and it seems as if her jaw is shifting a bit.  She does not believe she is a teeth clincher currently but has been in the past she has an old dental guard that she used to wear but it is very old.  She states it is a heavy feeling in her right jaw she feels that her teeth are meeting up the way they should.  She has already changed her diet to avoid  "crunchy foods such as carrots or anything that requires a lot of chewing.  She does not chew gum.  She is in PT for right neck and shoulder pain.  Her therapist thinks it is possible it may be related            Review of Systems  Constitutional, HEENT, cardiovascular, pulmonary, gi and gu systems are negative, except as otherwise noted.      Objective    /64 (BP Location: Left arm, Patient Position: Chair, Cuff Size: Adult Regular)   Pulse 68   Temp 98.5  F (36.9  C) (Temporal)   Resp 17   Ht 1.568 m (5' 1.75\")   Wt 57.7 kg (127 lb 4 oz)   LMP 02/26/2008 (Approximate)   SpO2 99%   Breastfeeding No   BMI 23.46 kg/m    Body mass index is 23.46 kg/m .  Physical Exam   GENERAL: alert and no distress  HENT: ear canals and TM's normal, nose and mouth without ulcers or lesions  HENT: She is not tender to palpation over the TM joints bilaterally but as she opens and closes her jaw there is shifting on the right with a palpable click  NECK: no adenopathy, no asymmetry, masses, or scars  RESP: lungs clear to auscultation - no rales, rhonchi or wheezes  CV: regular rate and rhythm, normal S1 S2, no S3 or S4, no murmur, click or rub, no peripheral edema  MS: no gross musculoskeletal defects noted, no edema  NEURO: Normal strength and tone, mentation intact and speech normal  PSYCH: mentation appears normal, affect normal/bright    No results found for any visits on 05/15/25.        Signed Electronically by: Caitlin Beasley PA-C    "

## 2025-05-19 NOTE — TELEPHONE ENCOUNTER
Pre assessment completed for upcoming procedure.   (Please see previous telephone encounter notes for complete details)      Procedure details:    Arrival time and facility location reviewed.    Pre op exam needed? No.    Designated  policy reviewed. Instructed to have someone stay 24  hours post procedure.       Medication review:    Medications reviewed. Please see supporting documentation below. Holding recommendations discussed (if applicable).       Prep for procedure:     Procedure prep instructions reviewed.        Any additional information needed:  N/A      Patient verbalized understanding and had no questions or concerns at this time.      Enriqueta Hillman LPN  Endoscopy Procedure Pre Assessment   [phone] option 3

## 2025-05-21 ENCOUNTER — OFFICE VISIT (OUTPATIENT)
Dept: CARDIOLOGY | Facility: CLINIC | Age: 70
End: 2025-05-21
Payer: COMMERCIAL

## 2025-05-21 VITALS
OXYGEN SATURATION: 99 % | SYSTOLIC BLOOD PRESSURE: 116 MMHG | HEIGHT: 62 IN | BODY MASS INDEX: 23.74 KG/M2 | DIASTOLIC BLOOD PRESSURE: 74 MMHG | WEIGHT: 129 LBS | HEART RATE: 75 BPM

## 2025-05-21 DIAGNOSIS — I34.0 NONRHEUMATIC MITRAL VALVE REGURGITATION: ICD-10-CM

## 2025-05-21 DIAGNOSIS — Z98.890 S/P MITRAL VALVE REPAIR: Primary | ICD-10-CM

## 2025-05-21 DIAGNOSIS — Z29.89 NEED FOR SBE (SUBACUTE BACTERIAL ENDOCARDITIS) PROPHYLAXIS: ICD-10-CM

## 2025-05-21 RX ORDER — AMOXICILLIN 500 MG/1
CAPSULE ORAL
Qty: 4 CAPSULE | Refills: 1 | Status: SHIPPED | OUTPATIENT
Start: 2025-05-21

## 2025-05-21 ASSESSMENT — PAIN SCALES - GENERAL: PAINLEVEL_OUTOF10: NO PAIN (0)

## 2025-05-21 NOTE — LETTER
5/21/2025    Caitlin Beasley PA-C  [address] [facility] [address]    RE: Margareth Green       Dear Colleague,     I had the pleasure of seeing Margareth Green in the [facility].              ~Cardiology Clinic Visit~    Primary Cardiologist: Dr. Mathews  Reason for visit: Annual follow up    History of Present Illness    Margareth Green is a very pleasant 70 year old female with a past medical history notable for mitral valve prolapse and severe mitral regurgitation requiring mitral valve repair in October 2023.    In review, patient was seen in cardiology clinic for reevaluation of her mitral valve disease following discovery in 2023.  We met with her in consultation in July 2023 after she had been in the emergency room with leg pain.  During evaluation, a murmur was noted and echocardiogram revealed MR.  DOUG was performed which confirmed severe mitral valve prolapse.  Coronary angiogram revealed no significant coronary artery disease and she went on to have successful mitral valve repair in October that same year.    In follow-up June 2024, patient was doing well.  Her echocardiogram demonstrated excellent mitral valve repair with no mitral regurgitation and normal mean gradient of 5 mmHg.  LV size and function remained normal.  At that visit, she reported ongoing, mild shortness of breath at times, less likely to be cardiac in nature given stable structure and function on echocardiographic imaging.  S she had some mild residual pains in her right lateral chest from the thoracotomy approach mitral valve surgery.  She did not have a sternotomy.  Her physical examination at that encounter was largely unremarkable.    Diagnostics:  5/14/2025 echocardiogram: LV normal size and wall thickness with EF 60-65%.  The posterior mitral leaflet is thickened and fixed consistent with prior repair surgery, and annuloplasty ring is in place.  There is trace MR and mild mitral stenosis with mean  gradient 5 mmHg at heart rate 87.  There is mild pulmonary hypertension.  No significant changes since prior study.    Interval 05/21/25    Doing well overall, no new cardiac concerns.  Struggles in the summer months with allergies.  SOB stable.  Thoracotomy nerve pain controlled, intermittent discomfort.  Patient denies rosey CP, SOB, PHILIP, orthopnea, PND, LH or dizziness, pre-syncope, palpitations, edema or swelling.  __________________________________________________________________         Assessment and Impression:     Nonrheumatic mitral regurgitation  Mitral valve prolapse  Status post MVR, October 2023  Stable exertional shortness of breath         Recommendations and Plan:     Continue current plan without changes.  OK for dental work.  Sent Rx for prophylactic abx.  Patient has been recommended lifelong prophylactic abx for dental work.  Echocardiogram in 1-year.  Routine cardiology follow up in 1-year.  __________________________________________________________________    Thank you for the opportunity to participate in this pleasant patient's care.    We would be happy to see this patient sooner for any concerns in the meantime.    JUAN Gill, CNP   Nurse Practitioner  [facility]    Today's clinic visit entailed:  The following tests were independently interpreted by me as noted in my documentation: labs, echo  Ordering of each unique test  Prescription drug management  The level of medical decision making during this visit was of moderate complexity.  Review of the result(s) of each unique test - cardiac testing, cardiac imaging, labs  Care everywhere reviewed for additional records to facilitate comprehensive patient care.  Recent hospitalization records and notes reviewed to facilitate comprehensive care coordination.    Orders this Visit:  Orders Placed This Encounter   Procedures     Follow-Up with Cardiology- KIRSTIE     Echocardiogram Complete     Orders Placed This  Encounter   Medications     amoxicillin (AMOXIL) 500 MG capsule     Sig: Take 2000mg by mouth 30-60 minutes prior to dental appointment per infective endocarditis protocol due to mitral valve repair.     Dispense:  4 capsule     Refill:  1     Medications Discontinued During This Encounter   Medication Reason     benzonatate (TESSALON) 200 MG capsule Therapy completed (No AVS)     amoxicillin (AMOXIL) 500 MG capsule Reorder (No AVS)     Encounter Diagnoses   Name Primary?     Nonrheumatic mitral valve regurgitation      S/P mitral valve repair Yes     Need for SBE (subacute bacterial endocarditis) prophylaxis      CURRENT MEDICATIONS:  Current Outpatient Medications   Medication Sig Dispense Refill     acetaminophen (ACETAMINOPHEN 8 HOUR) 650 MG CR tablet Take 1,300 mg by mouth every 8 hours as needed for mild pain or fever.       albuterol (VENTOLIN HFA) 108 (90 Base) MCG/ACT inhaler INHALE 2 PUFFS INTO THE LUNGS EVERY 6 HOURS AS NEEDED FOR SHORTNESS OF BREATH, WHEEZING OR COUGH. 18 g 1     alendronate (FOSAMAX) 70 MG tablet Take 1 tablet (70 mg) by mouth every 7 days. 12 tablet 3     ALLEGRA 180 MG OR TABS Take 180 mg by mouth daily. 30 0     amoxicillin (AMOXIL) 500 MG capsule Take 2000mg by mouth 30-60 minutes prior to dental appointment per infective endocarditis protocol due to mitral valve repair. 4 capsule 1     aspirin (ASA) 81 MG chewable tablet Take 1 tablet (81 mg) by mouth daily 90 tablet 3     buPROPion (WELLBUTRIN XL) 150 MG 24 hr tablet TAKE ONE TABLET BY MOUTH EVERY MORNING 90 tablet 1     CALCIUM /VITAMIN D TABS   OR Take 1 tablet by mouth daily.       calcium carbonate (TUMS) 500 MG chewable tablet Take 1-2 chew tab by mouth daily as needed for heartburn.       Collagen-Vitamin C-Biotin (COLLAGEN PO) Take 2-3 Scoops by mouth daily.       DULERA 100-5 MCG/ACT inhaler Inhale 2 puffs by mouth 2 times daily. 39 g 0     famotidine (PEPCID) 20 MG tablet Take 1 tablet (20 mg) by mouth daily.        "FLUoxetine (PROZAC) 10 MG capsule Take 1 capsule (10 mg) by mouth daily. 30 capsule 1     fluticasone (FLONASE) 50 MCG/ACT nasal spray Spray 1 spray into both nostrils daily. 1 mL 4     MAGNESIUM PO Take 2 tablets by mouth 2 times daily.       meclizine (ANTIVERT) 12.5 MG tablet Take 6.25 mg by mouth 3 times daily as needed for dizziness.       melatonin 5 MG tablet Take 5 mg by mouth nightly as needed for sleep.       Multiple Vitamins-Minerals (PRESERVISION AREDS 2) CAPS Take 1 capsule by mouth 2 times daily.       multivitamin w/minerals (THERA-VIT-M) tablet Take 1 tablet by mouth daily RAW Brand       naproxen sodium (ALEVE) 220 MG tablet Take 220 mg by mouth 2 times daily as needed for moderate pain.       spacer (OPTICHAMBER NASIR) holding chamber Use with Dulera and albuterol inhaler 1 each 0     vitamin C (ASCORBIC ACID) 250 MG tablet Take 250 mg by mouth daily.       ipratropium (ATROVENT HFA) 17 MCG/ACT inhaler Inhale 2 puffs into the lungs every 6 hours. (Patient not taking: Reported on 5/21/2025) 1 g 4     predniSONE (DELTASONE) 20 MG tablet Take 40 mg (2 tabs) for 5 days. (Patient not taking: Reported on 5/21/2025) 10 tablet 0     ALLERGIES   No Known Allergies  PAST MEDICAL, SURGICAL, FAMILY, SOCIAL HISTORY:  History was reviewed and updated as needed, see medical record.    Review of Systems:  A 10-point Review Of Systems is otherwise normal except for that which is noted in the HPI and interval summary.    Physical Exam:    Vitals: /74 (BP Location: Right arm, Patient Position: Sitting, Cuff Size: Adult Regular)   Pulse 75   Ht 1.57 m (5' 1.8\")   Wt 58.5 kg (129 lb)   LMP 02/26/2008 (Approximate)   SpO2 99%   BMI 23.75 kg/m    Constitutional: Appears stated age, well nourished, NAD.  Respiratory: Non-labored. Lungs clear  Cardiovascular: RRR, normal S1 and S2. No murmur.  No edema.  GI: Soft, non-distended, non-tender.  Skin: Warm and dry.   Musculoskeletal/Extremities: Symmetrical " movement.  Neurologic: No gross focal deficits. Alert, awake.  Psychiatric: Affect appropriate. Mentation normal.    Recent Lab Results:  LIPID RESULTS:  Lab Results   Component Value Date    CHOL 170 03/06/2025    CHOL 169 11/25/2020    HDL 73 03/06/2025    HDL 82 11/25/2020    LDL 81 03/06/2025    LDL 66 11/25/2020    TRIG 79 03/06/2025    TRIG 105 11/25/2020    CHOLHDLRATIO 1.8 01/14/2015     LIVER ENZYME RESULTS:  Lab Results   Component Value Date    AST 32 07/23/2024    AST 20 03/16/2020    ALT 26 07/23/2024    ALT 23 03/16/2020     CBC RESULTS:  Lab Results   Component Value Date    WBC 11.0 10/31/2024    WBC 10.8 03/16/2020    RBC 4.36 10/31/2024    RBC 4.26 03/16/2020    HGB 13.4 10/31/2024    HGB 13.2 03/16/2020    HCT 40.8 10/31/2024    HCT 40.4 03/16/2020    MCV 94 10/31/2024    MCV 95 03/16/2020    MCH 30.7 10/31/2024    MCH 31.0 03/16/2020    MCHC 32.8 10/31/2024    MCHC 32.7 03/16/2020    RDW 13.0 10/31/2024    RDW 13.1 03/16/2020     10/31/2024     03/16/2020     BMP RESULTS:  Lab Results   Component Value Date     07/23/2024     03/16/2020    POTASSIUM 3.9 07/23/2024    POTASSIUM 4.6 10/26/2023    POTASSIUM 3.9 12/06/2021    POTASSIUM 3.6 03/16/2020    CHLORIDE 103 07/23/2024    CHLORIDE 105 12/06/2021    CHLORIDE 107 03/16/2020    CO2 24 07/23/2024    CO2 26 12/06/2021    CO2 28 03/16/2020    ANIONGAP 11 07/23/2024    ANIONGAP 5 12/06/2021    ANIONGAP 5 03/16/2020     (H) 07/23/2024     (H) 10/30/2023     (H) 12/06/2021    GLC 92 03/16/2020    BUN 23.9 (H) 07/23/2024    BUN 13 12/06/2021    BUN 17 03/16/2020    CR 1.0 09/04/2024    CR 0.93 07/23/2024    CR 0.91 03/16/2020    GFRESTIMATED 60 (L) 09/04/2024    GFRESTIMATED 65 03/16/2020    GFRESTBLACK 76 03/16/2020    TOLU 9.5 07/23/2024    TOLU 8.5 03/16/2020      A1C RESULTS:  Lab Results   Component Value Date    A1C 5.9 (H) 02/22/2024     INR RESULTS:  Lab Results   Component Value Date    INR 1.27 (H)  10/26/2023    INR 1.37 (H) 10/26/2023    INR 1.00 2012       Recent Results (from the past 4320 hours)   Echocardiogram Complete   Result Value    LVEF  60-65%    [facility]    [id number]  [id number]  [id number]  [id number]^ANUSHA^MAU^ED     [facility]  Echocardiography Laboratory  [address] [place] [address]     Name: SHELLY MARTÍNEZ  MRN: [id number]  : 1955  Study Date: 2025 12:56 PM  Age: 70 yrs  Gender: Female  Patient Location: [facility]  Reason For Study: Nonrheumatic mitral valve regurgitation, S/P mitral valve  repair  History: none  Ordering Physician: MAU TAVARES  Referring Physician: Caitlin Beasley  Performed By: Alisha King     BSA: 1.6 m2  Height: 61 in  Weight: 129 lb  HR: 87  BP: 112/72 mmHg  ______________________________________________________________________________  Procedure  Echocardiogram with two-dimensional, color and spectral Doppler.  ______________________________________________________________________________  Interpretation Summary     The posterior mitral leaflet is thickened and fixed consistent with prior  mitral repair surgery.  An annuloplasty ring is noted in the mitral position.  There is mild mitral stenosis. ( MDG 5mmHg@HR 87)  There is trace mitral regurgitation.  Left ventricular systolic function is normal.  The visual ejection fraction is 60-65%.  The left ventricle is normal in size.  The right ventricle is normal in structure, function and size.  The left atrium is mild to moderately dilated.  Right ventricular systolic pressure is elevated, consistent with mild  pulmonary hypertension.  Sinus rhythm was noted.     No change since 2024. The mitral valve repair reamins intact     ______________________________________________________________________________  Left Ventricle  The left ventricle is normal in size. There is normal left ventricular wall  thickness. Left ventricular systolic function is normal. The  visual ejection  fraction is 60-65%. Left ventricular diastolic function is indeterminate.  Septal motion is consistent with post-operative state.     Right Ventricle  The right ventricle is normal in structure, function and size. There is no  mass or thrombus in the right ventricle.     Atria  The left atrium is mild to moderately dilated. Right atrial size is normal.  There is no atrial shunt seen. The left atrial appendage is not well  visualized.     Mitral Valve  The posterior mitral leaflet is thickened and fixed consistent with prior  mitral repair surgery. There is trace mitral regurgitation. The mean mitral  valve gradient is 5.1 mmHg. There is mild mitral stenosis. An annuloplasty  ring is noted in the mitral position.     Tricuspid Valve  Normal tricuspid valve. The right ventricular systolic pressure is elevated at  36.1 mmHg. Right ventricular systolic pressure is elevated, consistent with  mild pulmonary hypertension. There is no tricuspid stenosis.     Aortic Valve  There is mild trileaflet aortic sclerosis. No aortic regurgitation is present.  No aortic stenosis is present.     Pulmonic Valve  Normal pulmonic valve. There is no pulmonic valvular regurgitation. There is  no pulmonic valvular stenosis.     Vessels  The aortic root is normal size. Normal size ascending aorta. The inferior vena  cava is normal. The pulmonary artery is normal size.     Pericardium  The pericardium appears normal. There is no pleural effusion.     Rhythm  Sinus rhythm was noted.  ______________________________________________________________________________  MMode/2D Measurements & Calculations  IVSd: 0.94 cm     LVIDd: 4.5 cm  LVIDs: 2.6 cm  LVPWd: 1.0 cm  FS: 42.1 %  LV mass(C)d: 145.1 grams  LV mass(C)dI: 92.5 grams/m2  Ao root diam: 2.9 cm  LA dimension: 4.0 cm  asc Aorta Diam: 3.0 cm  LA/Ao: 1.3  Ao root diam index Ht(cm/m): 1.9  Ao root diam index BSA (cm/m2): 1.9  Asc Ao diam index BSA (cm/m2): 1.9  Asc Ao diam  index Ht(cm/m): 1.9  EF Biplane: 64.1 %  LA Volume (BP): 69.1 ml     LA Volume Index (BP): 44.0 ml/m2  RWT: 0.45  TAPSE: 2.5 cm     Doppler Measurements & Calculations  MV E max khanh: 138.0 cm/sec  MV A max khanh: 132.3 cm/sec  MV E/A: 1.0  MV max P.0 mmHg  MV mean P.1 mmHg  MV V2 VTI: 45.4 cm  MV P1/2t max khanh: 153.8 cm/sec  MV P1/2t: 93.8 msec  MVA(P1/2t): 2.3 cm2  MV dec slope: 480.0 cm/sec2  MV dec time: 0.37 sec  Ao V2 max: 152.7 cm/sec  Ao max P.0 mmHg  LV V1 max P.1 mmHg  LV V1 max: 133.1 cm/sec  MR PISA: 2.1 cm2  MR ERO: 0.08 cm2  MR volume: 14.8 ml  PA V2 max: 101.0 cm/sec  PA max P.1 mmHg  PA acc time: 0.09 sec  TR max khanh: 300.4 cm/sec  TR max P.1 mmHg  AV Khanh Ratio (DI): 0.87     Medial E/e': 20.5  RV S Khanh: 13.2 cm/sec     ______________________________________________________________________________  Report approved by: Dr. Lui Tyson on 2025 03:52 PM                              Thank you for allowing me to participate in the care of your patient.      Sincerely,     JUAN Gill CNP     [facility]  cc:   Andrey Mathews MD  [address]  [place] [id number]

## 2025-05-21 NOTE — PROGRESS NOTES
~Cardiology Clinic Visit~    Primary Cardiologist: Dr. Mathews  Reason for visit: Annual follow up    History of Present Illness    Margareth Green is a very pleasant 70 year old female with a past medical history notable for mitral valve prolapse and severe mitral regurgitation requiring mitral valve repair in October 2023.    In review, patient was seen in cardiology clinic for reevaluation of her mitral valve disease following discovery in 2023.  We met with her in consultation in July 2023 after she had been in the emergency room with leg pain.  During evaluation, a murmur was noted and echocardiogram revealed MR.  DOUG was performed which confirmed severe mitral valve prolapse.  Coronary angiogram revealed no significant coronary artery disease and she went on to have successful mitral valve repair in October that same year.    In follow-up June 2024, patient was doing well.  Her echocardiogram demonstrated excellent mitral valve repair with no mitral regurgitation and normal mean gradient of 5 mmHg.  LV size and function remained normal.  At that visit, she reported ongoing, mild shortness of breath at times, less likely to be cardiac in nature given stable structure and function on echocardiographic imaging.  S she had some mild residual pains in her right lateral chest from the thoracotomy approach mitral valve surgery.  She did not have a sternotomy.  Her physical examination at that encounter was largely unremarkable.    Diagnostics:  5/14/2025 echocardiogram: LV normal size and wall thickness with EF 60-65%.  The posterior mitral leaflet is thickened and fixed consistent with prior repair surgery, and annuloplasty ring is in place.  There is trace MR and mild mitral stenosis with mean gradient 5 mmHg at heart rate 87.  There is mild pulmonary hypertension.  No significant changes since prior study.    Interval 05/21/25    Doing well overall, no new cardiac concerns.  Struggles in the summer  months with allergies.  SOB stable.  Thoracotomy nerve pain controlled, intermittent discomfort.  Patient denies rosey CP, SOB, PHILIP, orthopnea, PND, LH or dizziness, pre-syncope, palpitations, edema or swelling.  __________________________________________________________________         Assessment and Impression:     Nonrheumatic mitral regurgitation  Mitral valve prolapse  Status post MVR, October 2023  Stable exertional shortness of breath         Recommendations and Plan:     Continue current plan without changes.  OK for dental work.  Sent Rx for prophylactic abx.  Patient has been recommended lifelong prophylactic abx for dental work.  Echocardiogram in 1-year.  Routine cardiology follow up in 1-year.  __________________________________________________________________    Thank you for the opportunity to participate in this pleasant patient's care.    We would be happy to see this patient sooner for any concerns in the meantime.    JUAN Gill, CNP   Nurse Practitioner  [facility]    Today's clinic visit entailed:  The following tests were independently interpreted by me as noted in my documentation: labs, echo  Ordering of each unique test  Prescription drug management  The level of medical decision making during this visit was of moderate complexity.  Review of the result(s) of each unique test - cardiac testing, cardiac imaging, labs  Care everywhere reviewed for additional records to facilitate comprehensive patient care.  Recent hospitalization records and notes reviewed to facilitate comprehensive care coordination.    Orders this Visit:  Orders Placed This Encounter   Procedures    Follow-Up with Cardiology- KIRSTIE    Echocardiogram Complete     Orders Placed This Encounter   Medications    amoxicillin (AMOXIL) 500 MG capsule     Sig: Take 2000mg by mouth 30-60 minutes prior to dental appointment per infective endocarditis protocol due to mitral valve repair.     Dispense:  4  capsule     Refill:  1     Medications Discontinued During This Encounter   Medication Reason    benzonatate (TESSALON) 200 MG capsule Therapy completed (No AVS)    amoxicillin (AMOXIL) 500 MG capsule Reorder (No AVS)     Encounter Diagnoses   Name Primary?    Nonrheumatic mitral valve regurgitation     S/P mitral valve repair Yes    Need for SBE (subacute bacterial endocarditis) prophylaxis      CURRENT MEDICATIONS:  Current Outpatient Medications   Medication Sig Dispense Refill    acetaminophen (ACETAMINOPHEN 8 HOUR) 650 MG CR tablet Take 1,300 mg by mouth every 8 hours as needed for mild pain or fever.      albuterol (VENTOLIN HFA) 108 (90 Base) MCG/ACT inhaler INHALE 2 PUFFS INTO THE LUNGS EVERY 6 HOURS AS NEEDED FOR SHORTNESS OF BREATH, WHEEZING OR COUGH. 18 g 1    alendronate (FOSAMAX) 70 MG tablet Take 1 tablet (70 mg) by mouth every 7 days. 12 tablet 3    ALLEGRA 180 MG OR TABS Take 180 mg by mouth daily. 30 0    amoxicillin (AMOXIL) 500 MG capsule Take 2000mg by mouth 30-60 minutes prior to dental appointment per infective endocarditis protocol due to mitral valve repair. 4 capsule 1    aspirin (ASA) 81 MG chewable tablet Take 1 tablet (81 mg) by mouth daily 90 tablet 3    buPROPion (WELLBUTRIN XL) 150 MG 24 hr tablet TAKE ONE TABLET BY MOUTH EVERY MORNING 90 tablet 1    CALCIUM /VITAMIN D TABS   OR Take 1 tablet by mouth daily.      calcium carbonate (TUMS) 500 MG chewable tablet Take 1-2 chew tab by mouth daily as needed for heartburn.      Collagen-Vitamin C-Biotin (COLLAGEN PO) Take 2-3 Scoops by mouth daily.      DULERA 100-5 MCG/ACT inhaler Inhale 2 puffs by mouth 2 times daily. 39 g 0    famotidine (PEPCID) 20 MG tablet Take 1 tablet (20 mg) by mouth daily.      FLUoxetine (PROZAC) 10 MG capsule Take 1 capsule (10 mg) by mouth daily. 30 capsule 1    fluticasone (FLONASE) 50 MCG/ACT nasal spray Spray 1 spray into both nostrils daily. 1 mL 4    MAGNESIUM PO Take 2 tablets by mouth 2 times daily.       "meclizine (ANTIVERT) 12.5 MG tablet Take 6.25 mg by mouth 3 times daily as needed for dizziness.      melatonin 5 MG tablet Take 5 mg by mouth nightly as needed for sleep.      Multiple Vitamins-Minerals (PRESERVISION AREDS 2) CAPS Take 1 capsule by mouth 2 times daily.      multivitamin w/minerals (THERA-VIT-M) tablet Take 1 tablet by mouth daily RAW Brand      naproxen sodium (ALEVE) 220 MG tablet Take 220 mg by mouth 2 times daily as needed for moderate pain.      spacer (OPTICHAMBER NASIR) holding chamber Use with Dulera and albuterol inhaler 1 each 0    vitamin C (ASCORBIC ACID) 250 MG tablet Take 250 mg by mouth daily.      ipratropium (ATROVENT HFA) 17 MCG/ACT inhaler Inhale 2 puffs into the lungs every 6 hours. (Patient not taking: Reported on 5/21/2025) 1 g 4    predniSONE (DELTASONE) 20 MG tablet Take 40 mg (2 tabs) for 5 days. (Patient not taking: Reported on 5/21/2025) 10 tablet 0     ALLERGIES   No Known Allergies  PAST MEDICAL, SURGICAL, FAMILY, SOCIAL HISTORY:  History was reviewed and updated as needed, see medical record.    Review of Systems:  A 10-point Review Of Systems is otherwise normal except for that which is noted in the HPI and interval summary.    Physical Exam:    Vitals: /74 (BP Location: Right arm, Patient Position: Sitting, Cuff Size: Adult Regular)   Pulse 75   Ht 1.57 m (5' 1.8\")   Wt 58.5 kg (129 lb)   LMP 02/26/2008 (Approximate)   SpO2 99%   BMI 23.75 kg/m    Constitutional: Appears stated age, well nourished, NAD.  Respiratory: Non-labored. Lungs clear  Cardiovascular: RRR, normal S1 and S2. No murmur.  No edema.  GI: Soft, non-distended, non-tender.  Skin: Warm and dry.   Musculoskeletal/Extremities: Symmetrical movement.  Neurologic: No gross focal deficits. Alert, awake.  Psychiatric: Affect appropriate. Mentation normal.    Recent Lab Results:  LIPID RESULTS:  Lab Results   Component Value Date    CHOL 170 03/06/2025    CHOL 169 11/25/2020    HDL 73 03/06/2025 "    HDL 82 11/25/2020    LDL 81 03/06/2025    LDL 66 11/25/2020    TRIG 79 03/06/2025    TRIG 105 11/25/2020    CHOLHDLRATIO 1.8 01/14/2015     LIVER ENZYME RESULTS:  Lab Results   Component Value Date    AST 32 07/23/2024    AST 20 03/16/2020    ALT 26 07/23/2024    ALT 23 03/16/2020     CBC RESULTS:  Lab Results   Component Value Date    WBC 11.0 10/31/2024    WBC 10.8 03/16/2020    RBC 4.36 10/31/2024    RBC 4.26 03/16/2020    HGB 13.4 10/31/2024    HGB 13.2 03/16/2020    HCT 40.8 10/31/2024    HCT 40.4 03/16/2020    MCV 94 10/31/2024    MCV 95 03/16/2020    MCH 30.7 10/31/2024    MCH 31.0 03/16/2020    MCHC 32.8 10/31/2024    MCHC 32.7 03/16/2020    RDW 13.0 10/31/2024    RDW 13.1 03/16/2020     10/31/2024     03/16/2020     BMP RESULTS:  Lab Results   Component Value Date     07/23/2024     03/16/2020    POTASSIUM 3.9 07/23/2024    POTASSIUM 4.6 10/26/2023    POTASSIUM 3.9 12/06/2021    POTASSIUM 3.6 03/16/2020    CHLORIDE 103 07/23/2024    CHLORIDE 105 12/06/2021    CHLORIDE 107 03/16/2020    CO2 24 07/23/2024    CO2 26 12/06/2021    CO2 28 03/16/2020    ANIONGAP 11 07/23/2024    ANIONGAP 5 12/06/2021    ANIONGAP 5 03/16/2020     (H) 07/23/2024     (H) 10/30/2023     (H) 12/06/2021    GLC 92 03/16/2020    BUN 23.9 (H) 07/23/2024    BUN 13 12/06/2021    BUN 17 03/16/2020    CR 1.0 09/04/2024    CR 0.93 07/23/2024    CR 0.91 03/16/2020    GFRESTIMATED 60 (L) 09/04/2024    GFRESTIMATED 65 03/16/2020    GFRESTBLACK 76 03/16/2020    TOLU 9.5 07/23/2024    TOLU 8.5 03/16/2020      A1C RESULTS:  Lab Results   Component Value Date    A1C 5.9 (H) 02/22/2024     INR RESULTS:  Lab Results   Component Value Date    INR 1.27 (H) 10/26/2023    INR 1.37 (H) 10/26/2023    INR 1.00 01/30/2012       Recent Results (from the past 4320 hours)   Echocardiogram Complete   Result Value    LVEF  60-65%    Narrative    [id number]  [id number]  [id number]  [id number]^ANUSHA^MAU^ED Valentin  [facility]  Echocardiography Laboratory  [address] [place] [address]     Name: SHELLY MARTÍNEZ  MRN: [id number]  : 1955  Study Date: 2025 12:56 PM  Age: 70 yrs  Gender: Female  Patient Location: [facility]  Reason For Study: Nonrheumatic mitral valve regurgitation, S/P mitral valve  repair  History: none  Ordering Physician: MAU TAVARES  Referring Physician: Caitlin Beasley  Performed By: Alisha King     BSA: 1.6 m2  Height: 61 in  Weight: 129 lb  HR: 87  BP: 112/72 mmHg  ______________________________________________________________________________  Procedure  Echocardiogram with two-dimensional, color and spectral Doppler.  ______________________________________________________________________________  Interpretation Summary     The posterior mitral leaflet is thickened and fixed consistent with prior  mitral repair surgery.  An annuloplasty ring is noted in the mitral position.  There is mild mitral stenosis. ( MDG 5mmHg@HR 87)  There is trace mitral regurgitation.  Left ventricular systolic function is normal.  The visual ejection fraction is 60-65%.  The left ventricle is normal in size.  The right ventricle is normal in structure, function and size.  The left atrium is mild to moderately dilated.  Right ventricular systolic pressure is elevated, consistent with mild  pulmonary hypertension.  Sinus rhythm was noted.     No change since 2024. The mitral valve repair reamins intact     ______________________________________________________________________________  Left Ventricle  The left ventricle is normal in size. There is normal left ventricular wall  thickness. Left ventricular systolic function is normal. The visual ejection  fraction is 60-65%. Left ventricular diastolic function is indeterminate.  Septal motion is consistent with post-operative state.     Right Ventricle  The right ventricle is normal in structure, function and size. There is no  mass  or thrombus in the right ventricle.     Atria  The left atrium is mild to moderately dilated. Right atrial size is normal.  There is no atrial shunt seen. The left atrial appendage is not well  visualized.     Mitral Valve  The posterior mitral leaflet is thickened and fixed consistent with prior  mitral repair surgery. There is trace mitral regurgitation. The mean mitral  valve gradient is 5.1 mmHg. There is mild mitral stenosis. An annuloplasty  ring is noted in the mitral position.     Tricuspid Valve  Normal tricuspid valve. The right ventricular systolic pressure is elevated at  36.1 mmHg. Right ventricular systolic pressure is elevated, consistent with  mild pulmonary hypertension. There is no tricuspid stenosis.     Aortic Valve  There is mild trileaflet aortic sclerosis. No aortic regurgitation is present.  No aortic stenosis is present.     Pulmonic Valve  Normal pulmonic valve. There is no pulmonic valvular regurgitation. There is  no pulmonic valvular stenosis.     Vessels  The aortic root is normal size. Normal size ascending aorta. The inferior vena  cava is normal. The pulmonary artery is normal size.     Pericardium  The pericardium appears normal. There is no pleural effusion.     Rhythm  Sinus rhythm was noted.  ______________________________________________________________________________  MMode/2D Measurements & Calculations  IVSd: 0.94 cm     LVIDd: 4.5 cm  LVIDs: 2.6 cm  LVPWd: 1.0 cm  FS: 42.1 %  LV mass(C)d: 145.1 grams  LV mass(C)dI: 92.5 grams/m2  Ao root diam: 2.9 cm  LA dimension: 4.0 cm  asc Aorta Diam: 3.0 cm  LA/Ao: 1.3  Ao root diam index Ht(cm/m): 1.9  Ao root diam index BSA (cm/m2): 1.9  Asc Ao diam index BSA (cm/m2): 1.9  Asc Ao diam index Ht(cm/m): 1.9  EF Biplane: 64.1 %  LA Volume (BP): 69.1 ml     LA Volume Index (BP): 44.0 ml/m2  RWT: 0.45  TAPSE: 2.5 cm     Doppler Measurements & Calculations  MV E max tisha: 138.0 cm/sec  MV A max tisha: 132.3 cm/sec  MV E/A: 1.0  MV max P.0  mmHg  MV mean P.1 mmHg  MV V2 VTI: 45.4 cm  MV P1/2t max khanh: 153.8 cm/sec  MV P1/2t: 93.8 msec  MVA(P1/2t): 2.3 cm2  MV dec slope: 480.0 cm/sec2  MV dec time: 0.37 sec  Ao V2 max: 152.7 cm/sec  Ao max P.0 mmHg  LV V1 max P.1 mmHg  LV V1 max: 133.1 cm/sec  MR PISA: 2.1 cm2  MR ERO: 0.08 cm2  MR volume: 14.8 ml  PA V2 max: 101.0 cm/sec  PA max P.1 mmHg  PA acc time: 0.09 sec  TR max khanh: 300.4 cm/sec  TR max P.1 mmHg  AV Khanh Ratio (DI): 0.87     Medial E/e': 20.5  RV S Khanh: 13.2 cm/sec     ______________________________________________________________________________  Report approved by: Dr. Lui Tyson on 2025 03:52 PM

## 2025-05-21 NOTE — PATIENT INSTRUCTIONS
Thank you for your visit with the [facility] today.    Today's Summary:    Will send prescription for prophylactic antibiotics for upcoming dental surgery.  Regular echocardiogram in 1-year.    Follow-up:  Cardiology follow up at [facility]: 1-year.     Cardiology Scheduling ~[phone]    It was a pleasure seeing you today.     JUAN Gill, CNP  Certified Nurse Practitioner  [facility]  May 21, 2025  ________________________________________________________

## 2025-05-22 ENCOUNTER — THERAPY VISIT (OUTPATIENT)
Dept: PHYSICAL THERAPY | Facility: CLINIC | Age: 70
End: 2025-05-22
Attending: STUDENT IN AN ORGANIZED HEALTH CARE EDUCATION/TRAINING PROGRAM
Payer: COMMERCIAL

## 2025-05-22 DIAGNOSIS — M54.12 CERVICAL RADICULOPATHY: Primary | ICD-10-CM

## 2025-05-22 PROCEDURE — 97140 MANUAL THERAPY 1/> REGIONS: CPT | Mod: GP | Performed by: PHYSICAL THERAPIST

## 2025-05-27 ENCOUNTER — ANESTHESIA EVENT (OUTPATIENT)
Dept: GASTROENTEROLOGY | Facility: CLINIC | Age: 70
End: 2025-05-27
Payer: COMMERCIAL

## 2025-05-27 NOTE — H&P
[facility]    Margareth Green MRN# [id number]   Age: 70 year old YOB: 1955      Date of Surgery: 5/28/2025 Location [facility]      Date of Exam 5/28/2025 Facility (In hospital)       Home clinic: [facility]  Primary care provider: Caitlin Beasley         Chief Complaint and/or Reason for Procedure:   No chief complaint on file.  Colonoscopy  Exam 2015 neg.       Active problem list:     Patient Active Problem List    Diagnosis Date Noted    Cervical radiculopathy 04/01/2025     Priority: Medium    Severe persistent asthma without complication (H) 03/05/2025     Priority: Medium    S/P MVR (mitral valve repair) 10/26/2023     Priority: Medium    ILD (interstitial lung disease) (H) 10/17/2023     Priority: Medium    Chondromalacia of right knee 07/19/2023     Priority: Medium    Right sided sciatica 07/19/2023     Priority: Medium    Muscle cramp 07/03/2023     Priority: Medium    T12 compression fracture, sequela 01/19/2022     Priority: Medium    Gastroesophageal reflux disease without esophagitis 11/30/2016     Priority: Medium    Tremor 11/30/2016     Priority: Medium    Numbness of left jaw 08/07/2013     Priority: Medium    TMJ (temporomandibular joint syndrome) 08/07/2013     Priority: Medium    Thyroid fullness 08/07/2013     Priority: Medium    Knee pain, left 03/22/2012     Priority: Medium    Osteoporosis 12/30/2010     Priority: Medium    Atrophic vaginitis 12/07/2010     Priority: Medium    Stenosis of lumbosacral spine      Priority: Medium     l4-5 and l5-s1      Hammertoe      Priority: Medium    DDD (degenerative disc disease), lumbar 06/24/2008     Priority: Medium    Pain in joint, ankle and foot, left 06/12/2008     Priority: Medium    Irritable bowel syndrome 02/18/2008     Priority: Medium    Allergic rhinitis 11/25/2005     Priority: Medium    Mild persistent asthma 10/25/2005      Priority: Medium    Cervicalgia 10/25/2005     Priority: Medium    Malaise and fatigue 04/26/2005     Priority: Medium    Insomnia 12/09/2004     Priority: Medium    TAMMY (generalized anxiety disorder) 09/12/2003     Priority: Medium    Generalized osteoarthrosis, unspecified site 12/19/2002     Priority: Medium    Dermatophytosis of nail 08/20/2002     Priority: Medium            Medications (include herbals and vitamins):   Any Plavix use in the last 7 days? No     No current facility-administered medications for this encounter.     Current Outpatient Medications   Medication Sig Dispense Refill    acetaminophen (ACETAMINOPHEN 8 HOUR) 650 MG CR tablet Take 1,300 mg by mouth every 8 hours as needed for mild pain or fever.      albuterol (VENTOLIN HFA) 108 (90 Base) MCG/ACT inhaler INHALE 2 PUFFS INTO THE LUNGS EVERY 6 HOURS AS NEEDED FOR SHORTNESS OF BREATH, WHEEZING OR COUGH. 18 g 1    alendronate (FOSAMAX) 70 MG tablet Take 1 tablet (70 mg) by mouth every 7 days. 12 tablet 3    ALLEGRA 180 MG OR TABS Take 180 mg by mouth daily. 30 0    amoxicillin (AMOXIL) 500 MG capsule Take 2000mg by mouth 30-60 minutes prior to dental appointment per infective endocarditis protocol due to mitral valve repair. 4 capsule 1    aspirin (ASA) 81 MG chewable tablet Take 1 tablet (81 mg) by mouth daily 90 tablet 3    buPROPion (WELLBUTRIN XL) 150 MG 24 hr tablet TAKE ONE TABLET BY MOUTH EVERY MORNING 90 tablet 1    CALCIUM /VITAMIN D TABS   OR Take 1 tablet by mouth daily.      calcium carbonate (TUMS) 500 MG chewable tablet Take 1-2 chew tab by mouth daily as needed for heartburn.      Collagen-Vitamin C-Biotin (COLLAGEN PO) Take 2-3 Scoops by mouth daily.      DULERA 100-5 MCG/ACT inhaler Inhale 2 puffs by mouth 2 times daily. 39 g 0    famotidine (PEPCID) 20 MG tablet Take 1 tablet (20 mg) by mouth daily.      FLUoxetine (PROZAC) 10 MG capsule Take 1 capsule (10 mg) by mouth daily. 30 capsule 1    fluticasone (FLONASE) 50 MCG/ACT  nasal spray Spray 1 spray into both nostrils daily. 1 mL 4    ipratropium (ATROVENT HFA) 17 MCG/ACT inhaler Inhale 2 puffs into the lungs every 6 hours. (Patient not taking: Reported on 5/21/2025) 1 g 4    MAGNESIUM PO Take 2 tablets by mouth 2 times daily.      meclizine (ANTIVERT) 12.5 MG tablet Take 6.25 mg by mouth 3 times daily as needed for dizziness.      melatonin 5 MG tablet Take 5 mg by mouth nightly as needed for sleep.      Multiple Vitamins-Minerals (PRESERVISION AREDS 2) CAPS Take 1 capsule by mouth 2 times daily.      multivitamin w/minerals (THERA-VIT-M) tablet Take 1 tablet by mouth daily RAW Brand      naproxen sodium (ALEVE) 220 MG tablet Take 220 mg by mouth 2 times daily as needed for moderate pain.      predniSONE (DELTASONE) 20 MG tablet Take 40 mg (2 tabs) for 5 days. (Patient not taking: Reported on 5/21/2025) 10 tablet 0    spacer (OPTICHAMBER NASIR) holding chamber Use with Dulera and albuterol inhaler 1 each 0    vitamin C (ASCORBIC ACID) 250 MG tablet Take 250 mg by mouth daily.               Allergies:    No Known Allergies  Allergy to Latex? No  Allergy to tape?   No  Intolerances:             Physical Exam:   All vitals have been reviewed  No data found.  No intake/output data recorded.  Lungs:   No increased work of breathing, good air exchange, clear to auscultation bilaterally, no crackles or wheezing     Cardiovascular:   Normal apical impulse, regular rate and rhythm, normal S1 and S2, no S3 or S4, and no murmur noted             Lab / Radiology Results:            Anesthetic risk and/or ASA classification:       Lui Vences MD

## 2025-05-28 ENCOUNTER — ANESTHESIA (OUTPATIENT)
Dept: GASTROENTEROLOGY | Facility: CLINIC | Age: 70
End: 2025-05-28
Payer: COMMERCIAL

## 2025-05-28 ENCOUNTER — HOSPITAL ENCOUNTER (OUTPATIENT)
Facility: CLINIC | Age: 70
Discharge: HOME | End: 2025-05-28
Attending: INTERNAL MEDICINE | Admitting: INTERNAL MEDICINE
Payer: COMMERCIAL

## 2025-05-28 VITALS
RESPIRATION RATE: 16 BRPM | HEART RATE: 113 BPM | SYSTOLIC BLOOD PRESSURE: 116 MMHG | OXYGEN SATURATION: 97 % | TEMPERATURE: 98.2 F | DIASTOLIC BLOOD PRESSURE: 104 MMHG

## 2025-05-28 LAB — COLONOSCOPY: NORMAL

## 2025-05-28 PROCEDURE — 250N000011 HC RX IP 250 OP 636: Performed by: NURSE ANESTHETIST, CERTIFIED REGISTERED

## 2025-05-28 PROCEDURE — 250N000009 HC RX 250: Performed by: NURSE ANESTHETIST, CERTIFIED REGISTERED

## 2025-05-28 PROCEDURE — 258N000003 HC RX IP 258 OP 636: Performed by: NURSE ANESTHETIST, CERTIFIED REGISTERED

## 2025-05-28 PROCEDURE — G0121 COLON CA SCRN NOT HI RSK IND: HCPCS | Performed by: INTERNAL MEDICINE

## 2025-05-28 PROCEDURE — 370N000017 HC ANESTHESIA TECHNICAL FEE, PER MIN: Performed by: INTERNAL MEDICINE

## 2025-05-28 RX ORDER — PROPOFOL 10 MG/ML
INJECTION, EMULSION INTRAVENOUS CONTINUOUS PRN
Status: DISCONTINUED | OUTPATIENT
Start: 2025-05-28 | End: 2025-05-28

## 2025-05-28 RX ORDER — SODIUM CHLORIDE, SODIUM LACTATE, POTASSIUM CHLORIDE, CALCIUM CHLORIDE 600; 310; 30; 20 MG/100ML; MG/100ML; MG/100ML; MG/100ML
INJECTION, SOLUTION INTRAVENOUS CONTINUOUS
Status: DISCONTINUED | OUTPATIENT
Start: 2025-05-28 | End: 2025-05-28 | Stop reason: HOSPADM

## 2025-05-28 RX ORDER — PROPOFOL 10 MG/ML
INJECTION, EMULSION INTRAVENOUS PRN
Status: DISCONTINUED | OUTPATIENT
Start: 2025-05-28 | End: 2025-05-28

## 2025-05-28 RX ORDER — LIDOCAINE 40 MG/G
CREAM TOPICAL
Status: DISCONTINUED | OUTPATIENT
Start: 2025-05-28 | End: 2025-05-28 | Stop reason: HOSPADM

## 2025-05-28 RX ORDER — LIDOCAINE HYDROCHLORIDE 20 MG/ML
INJECTION, SOLUTION INFILTRATION; PERINEURAL PRN
Status: DISCONTINUED | OUTPATIENT
Start: 2025-05-28 | End: 2025-05-28

## 2025-05-28 RX ADMIN — SODIUM CHLORIDE, SODIUM LACTATE, POTASSIUM CHLORIDE, AND CALCIUM CHLORIDE: .6; .31; .03; .02 INJECTION, SOLUTION INTRAVENOUS at 08:50

## 2025-05-28 RX ADMIN — PROPOFOL 70 MG: 10 INJECTION, EMULSION INTRAVENOUS at 09:17

## 2025-05-28 RX ADMIN — PROPOFOL 150 MCG/KG/MIN: 10 INJECTION, EMULSION INTRAVENOUS at 09:17

## 2025-05-28 RX ADMIN — LIDOCAINE HYDROCHLORIDE 50 MG: 20 INJECTION, SOLUTION INFILTRATION; PERINEURAL at 09:16

## 2025-05-28 ASSESSMENT — ACTIVITIES OF DAILY LIVING (ADL)
ADLS_ACUITY_SCORE: 59
ADLS_ACUITY_SCORE: 59

## 2025-05-28 NOTE — ANESTHESIA PREPROCEDURE EVALUATION
Anesthesia Pre-Procedure Evaluation    Patient: Margareth Green   MRN: [id number] : 1955          Procedure : Procedure(s):  Colonoscopy, Screening         Past Medical History:   Diagnosis Date    Abnormal Papanicolaou smear of vagina and vaginal HPV 2002    ASCUS (HPV neg). 3/03 WNL but no transitional zome - repeat 3months    Allergic rhinitis, cause unspecified     Arthritis     Backache, unspecified     Carpal tunnel syndrome     s/p repair    Cervicalgia     Chronic kidney disease, stage 3 (H) 2021    Depressive disorder     Depressive disorder, not elsewhere classified     doing well on Wellbutrin    Derangement of TMJ (temporomandibular joint) 2010    internal derangement    Dermatophytosis of nail     Diaphragmatic hernia without mention of obstruction or gangrene 2007    small hiatal hernia    Diffuse cystic mastopathy     FCBD    Esophageal reflux 2005    Hammertoe 10/2009    see podiatry consult    Headache(784.0)     Muscle contrature headaches    Intramural leiomyoma of uterus 2007    on US    Irregular menstrual cycle     better with BCP -also PMS is better with BCP    IRRITABLE COLON 2008    Malaise and fatigue     Menopause     lmp 2008    Mild persistent asthma     Mitral valve disease     Mole (skin)     9 x 6 mm rt ant chest    Motion sickness     Neuroma 2008    lt foot    Osteopenia 2008    Other malaise and fatigue 2005    stress    Personal history of tobacco use, presenting hazards to health     Quit in     Solitary cyst of breast 2005    lt 6 oclock    Stenosis of lumbosacral spine     l4-5 and l5-s1    Symptomatic menopausal or female climacteric states 2002    perimenopausal. LMP 2008    Tachycardia, paroxysmal (H) 2020    Thyroid fullness 2013      Past Surgical History:   Procedure Laterality Date    APPLY WOUND VAC Right 2024    Procedure: PLACEMENT OF RIGHT GROIN PREVENA WOUND VAC;  Surgeon: Geoffrey  Brandon Cameron MD;  Location:  OR    BIOPSY  4/10/2015    BL DUPLEX LO EXTREM ART UNILAT/LTD  4/3008    lower extr arterial doppler -no stenosis    C DEXA,BONE DENSITY,APPENDICULR ARIADNAN  12/2008    osteopenia    COLONOSCOPY  10/22/2007    bx benign    COLONOSCOPY N/A 4/10/2015    Procedure: COMBINED COLONOSCOPY, SINGLE OR MULTIPLE BIOPSY/POLYPECTOMY BY BIOPSY;  Surgeon: Cl Wagner MD;  Location:  GI    CV CORONARY ANGIOGRAM N/A 8/17/2023    Procedure: Coronary Angiogram;  Surgeon: Liza Ford MD;  Location:  HEART [facility]ARD[facility] CATH LAB    ESOPHAGOSCOPY, GASTROSCOPY, DUODENOSCOPY (EGD), COMBINED N/A 1/31/2018    Procedure: COMBINED ESOPHAGOSCOPY, GASTROSCOPY, DUODENOSCOPY (EGD);  COMBINED ESOPHAGOSCOPY, GASTROSCOPY, DUODENOSCOPY (EGD);  Surgeon: Tirso Duran MD;  Location:  GI    ESOPHAGOSCOPY, GASTROSCOPY, DUODENOSCOPY (EGD), COMBINED N/A 2/1/2023    Procedure: ESOPHAGOGASTRODUODENOSCOPY (EGD);  Surgeon: Lui Vences MD;  Location:  GI    EYE SURGERY      HC PART REMV BONE METATARSAL HEAD,EA  01/11/10    Right foot plantar plate tear. Also correct hammertoe, 2nd digit & varicose vein ligation, heel.    HC REVISE MEDIAN N/CARPAL TUNNEL SURG      Left in 1997, right in 1999    INCISION AND DRAINAGE LOWER EXTREMITY, COMBINED Right 2/22/2024    Procedure: Excision OF RIGHT GROIN SEROMA WITH LYMPHAZURIN BLUE INJECTION, Right groin exploration;  Surgeon: Brandon Hale MD;  Location:  OR    INJ, ANES/STER, FACET LUMB/SAC  2010    Left L5 nerve root injection    INJECT EPIDURAL CERVICAL Right 9/29/2022    Procedure: Cervical 7-Thoracic 1 Interlaminar epidural steroid injection using fluoroscopic guidance with contrast dye, Right;  Surgeon: Tyler Bay MD;  Location:  OR    INJECT EPIDURAL CERVICAL N/A 3/27/2025    Procedure: Cervical 7-Thoracic 1 Interlaminar epidural steroid injection using fluoroscopic guidance with contrast dye.;  Surgeon: Tyler Bay MD;  Location:  OR     INJECT EPIDURAL TRANSFORAMINAL  10/09/2013    [facility]    INJECT JOINT SACROILIAC  3/19/14,14    [facility]    REPAIR VALVE MITRAL MINIMALLY INVASIVE N/A 10/26/2023    Procedure: MINIMALLY INVASIVE MITRAL VALVE REPAIR USING CHORD-X PRE-MEASURED LOOP SUTURE SIZE:12MM, MITRAL ANNULOPLASTY RING ERICK-COTTO PHYSIO Il SIZE: 28MM, AND ON CARDIOPULMONARY PUMP OXYGENATOR (INTRAOPERATIVE TRANSESOPHAGEAL ECHOCARDIOGRAM BY THE CARDIOLOGIST  );  Surgeon: Sabas Meredith MD;  Location:  OR    Z[facility] NONSPECIFIC PROCEDURE      Metatarsal phalangeal joint injection    [facility]  [facility]    bx. benign - flex sig in 5 yrs  or colonoscopy in 10 yrs    [facility]REST  2005    WNL    [facility] NCS MOTOR W/O F-WAVE, EACH NERVE  2008    CTS      No Known Allergies   Social History     Tobacco Use    Smoking status: Former     Current packs/day: 0.00     Average packs/day: 0.5 packs/day for 30.0 years (15.0 ttl pk-yrs)     Types: Cigarettes     Start date: 3/20/1968     Quit date: 3/20/1998     Years since quittin.2     Passive exposure: Never    Smokeless tobacco: Never    Tobacco comments:     No smokers in home   Substance Use Topics    Alcohol use: Not Currently     Alcohol/week: 6.7 standard drinks of alcohol     Types: 7 Standard drinks or equivalent per week     Comment: 2x/week      Wt Readings from Last 1 Encounters:   25 58.5 kg (129 lb)        Anesthesia Evaluation            ROS/MED HX  ENT/Pulmonary: Comment: TMJ    (+)                      asthma                  Neurologic:  - neg neurologic ROS     Cardiovascular:     (+) Dyslipidemia hypertension- -   -  - -                           valvular problems/murmurs      Previous cardiac testing   Echo: Date: Results:  Procedure  Echocardiogram with two-dimensional, color and spectral  Doppler.  ______________________________________________________________________________  Interpretation Summary     The posterior mitral leaflet is thickened and fixed consistent with prior  mitral repair surgery.  An annuloplasty ring is noted in the mitral position.  There is mild mitral stenosis. ( MDG 5mmHg@HR 87)  There is trace mitral regurgitation.  Left ventricular systolic function is normal.  The visual ejection fraction is 60-65%.  The left ventricle is normal in size.  The right ventricle is normal in structure, function and size.  The left atrium is mild to moderately dilated.  Right ventricular systolic pressure is elevated, consistent with mild  pulmonary hypertension.  Sinus rhythm was noted.     No change since 5/29/2024. The mitral valve repair reamins intact     ______________________________________________________________________________  Left Ventricle  The left ventricle is normal in size. There is normal left ventricular wall  thickness. Left ventricular systolic function is normal. The visual ejection  fraction is 60-65%. Left ventricular diastolic function is indeterminate.  Septal motion is consistent with post-operative state.     Right Ventricle  The right ventricle is normal in structure, function and size. There is no  mass or thrombus in the right ventricle.     Atria  The left atrium is mild to moderately dilated. Right atrial size is normal.  There is no atrial shunt seen. The left atrial appendage is not well  visualized.     Mitral Valve  The posterior mitral leaflet is thickened and fixed consistent with prior  mitral repair surgery. There is trace mitral regurgitation. The mean mitral  valve gradient is 5.1 mmHg. There is mild mitral stenosis. An annuloplasty  ring is noted in the mitral position.     Tricuspid Valve  Normal tricuspid valve. The right ventricular systolic pressure is elevated at  36.1 mmHg. Right ventricular systolic pressure is elevated, consistent with  mild  pulmonary hypertension. There is no tricuspid stenosis.     Aortic Valve  There is mild trileaflet aortic sclerosis. No aortic regurgitation is present.  No aortic stenosis is present.     Pulmonic Valve  Normal pulmonic valve. There is no pulmonic valvular regurgitation. There is  no pulmonic valvular stenosis.     Vessels  The aortic root is normal size. Normal size ascending aorta. The inferior vena  cava is normal. The pulmonary artery is normal size.     Pericardium  The pericardium appears normal. There is no pleural effusion.     Rhythm  Sinus rhythm was noted.    Stress Test:  Date: Results:    ECG Reviewed:  Date: Results:    Cath:  Date: Results:      METS/Exercise Tolerance:     Hematologic:  - neg hematologic  ROS     Musculoskeletal:  - neg musculoskeletal ROS     GI/Hepatic:     (+) GERD,      Inflammatory bowel disease,             Renal/Genitourinary:     (+) renal disease, type: CRI,            Endo:  - neg endo ROS     Psychiatric/Substance Use:     (+) psychiatric history anxiety and depression       Infectious Disease:  - neg infectious disease ROS     Malignancy:  - neg malignancy ROS     Other:              Physical Exam  Airway  Mallampati: II  TM distance: >3 FB  Neck ROM: full  Upper bite lip test: II  Mouth opening: >= 4 cm    Cardiovascular - normal exam  Rhythm: regular  Rate: normal rate     Dental     Pulmonary - normal examBreath sounds clear to auscultation        Neurological - normal exam  She appears awake, alert and oriented x3.    Other Findings       OUTSIDE LABS:  CBC:   Lab Results   Component Value Date    WBC 11.0 10/31/2024    WBC 7.1 07/30/2024    HGB 13.4 10/31/2024    HGB 13.8 07/30/2024    HCT 40.8 10/31/2024    HCT 41.8 07/30/2024     10/31/2024     07/30/2024     BMP:   Lab Results   Component Value Date     07/23/2024     02/22/2024    POTASSIUM 3.9 07/23/2024    POTASSIUM 4.0 02/22/2024    CHLORIDE 103 07/23/2024    CHLORIDE 107 02/22/2024  "   CO2 24 07/23/2024    CO2 25 02/22/2024    BUN 23.9 (H) 07/23/2024    BUN 29.6 (H) 02/22/2024    CR 1.0 09/04/2024    CR 0.93 07/23/2024     (H) 07/23/2024    GLC 96 02/22/2024     COAGS:   Lab Results   Component Value Date    PTT 32 10/26/2023    INR 1.27 (H) 10/26/2023    FIBR 181 10/26/2023     POC: No results found for: \"BGM\", \"HCG\", \"HCGS\"  HEPATIC:   Lab Results   Component Value Date    ALBUMIN 4.3 07/23/2024    PROTTOTAL 6.5 07/23/2024    ALT 26 07/23/2024    AST 32 07/23/2024    ALKPHOS 96 07/23/2024    BILITOTAL 0.3 07/23/2024     OTHER:   Lab Results   Component Value Date    PH 7.34 (L) 10/26/2023    LACT 1.0 10/26/2023    A1C 5.9 (H) 02/22/2024    TOLU 9.5 07/23/2024    PHOS 3.3 10/30/2023    MAG 2.1 07/30/2024    LIPASE 284 12/06/2021    TSH 2.29 10/14/2019    CRP 25.9 (H) 03/31/2013    SED 6 03/05/2025       Anesthesia Plan    ASA Status:  3      NPO Status: NPO Appropriate   Anesthesia Type: MAC.  Airway: natural airway.  Induction: intravenous.  Maintenance: TIVA.   Techniques and Equipment:       - Monitoring Plan: standard ASA monitoring     Consents    Anesthesia Plan(s) and associated risks, benefits, and realistic alternatives discussed. Questions answered and patient/representative(s) expressed understanding.     - Discussed:     - Discussed with:  Patient        - Pt is DNR/DNI Status: no DNR          Postoperative Care         Comments:    Other Comments: The risks and benefits of anesthesia, and the alternatives where applicable, have been discussed with the patient, and they wish to proceed.                  Luís Mason, APRN CRNA    I have reviewed the pertinent notes and labs in the chart from the past 30 days and (re)examined the patient.  Any updates or changes from those notes are reflected in this note.    Clinically Significant Risk Factors Present on Admission                                 # Asthma: noted on problem list              "

## 2025-05-28 NOTE — PROGRESS NOTES
Margareth Green  :  1955  DOS: 2025  MRN: [id number]  PCP: Caitlin Beasley    Sports Medicine Clinic Visit    Interim History - May 29, 2025  - Last seen on 2025 for left knee pain, right shoulder pain, and cervical radiculopathy.   - Left knee corticosteroid injection completed on 2025 provides relief to date.  - Since the last visit, she notes her right shoulder have started to become painful again. Pain after repetitive motions. Hopeful for repeat injection today, as previously discussed.   - No interim injury.       Interim History - 2025  - Last seen on 2025 for cervical radiculopathy, right rotator cuff tendinopathy and right primary osteoarthritis of the shoulder. Also acute left knee pain after a fall on 25.  - 3/27/2025 C7-T1 IESI completed by Dr. Bay allowed for good relief in radicular symptoms.     - Since the last visit, her right shoulder and neck have been doing better with physical therapy and sleeping modifications.  She feels like she is making good progress.  She is interested in another glenohumeral joint corticosteroid injection, and would like to plan at the end of May for some important events in .  - Her left knee is also been making improvements, able to bear weight on it and has not been as painful.  X-rays do not show fracture.  Still with some generalized pain through the knee joint.  No major instability or mechanical locking.  - No interim injury.       Interim History - 2025  - Last seen on 2024 for cervical radiculopathy, right rotator cuff tendinopathy and right primary osteoarthritis of the shoulder.   - 2024 right glenohumeral joint injection and 2024 right subacromial injection. She is unsure which injection helped more, but did get relief from the combination.   - Since the last visit, she notes that it is continuing to be more difficult to reach over her head, with more pain. She notes that her lateral  right chest is still painful from her heart surgery last year, where she was told that she would have some lingering nerve pain. Has been to physical therapy but  notes she has not been consistent with her exercises. Continues to prefer nonoperative management. No interim injury.       Initial Visit: April 24, 2024  HPI  Margareth Green is a 69 year old female who is seen in consultation at the request of  Caitlin Beasley PA-C presenting with right shoulder pain.    - Mechanism of Injury:    - No inciting injury  - Pertinent history and prior evaluations:    - 4/10/2024 with Caitlin Beasley PA-C: right shoulder and cervical radiculopathy noted at C8 with multilevel DDD and central and foraminal stenosis on cervical MRI in 2022 and has had success with a C7-T1 ILESI. Prednisone prescription given.   - Right shoulder xray IMPRESSION: 1. Severe osteoarthrosis of the glenohumeral joint again noted. 2. Calcification in the region of the supraspinatus/infraspinatus tendon raises the question of calcific tendinosis. This has progressed. 3. No other change.    - Pain Character:    - Location:  anterior, lateral, posterior right shoulder  - Character:  aching shoulder pain, more sharp with movements  - Duration: One year  - Course:  worsening  - Endorses:    - Anterior, lateral, posterior right shoulder pain with achy characteristic present at rest and worse with shoulder abduction and rotation.  -Separate and distinct numbness and tingling into the right digits 4-5, chronic neck pain and radicular pain down the right arm intermittently.  - Denies:    - swelling, clicking/popping, grinding, mechanical locking symptoms, neurogenic weakness  - Alleviating factors:    - Naproxen, Tylenol arthritis, activity modification, rest  - Aggravating factors:    - repetitive movements with gardening, grabbing, grasping, abduction of shoulder, above head movments  - Other treatments tried:    - nothing    - Patient Goals:    - discuss  treatment options  - Social History:   - Retired.  Previous work:  Medical records for [employer]      Review of Systems  Musculoskeletal: as above  Remainder of review of systems is negative including constitutional, CV, pulmonary, GI, Skin and Neurologic except as noted in HPI or medical history.    Past Medical History:   Diagnosis Date    Abnormal Papanicolaou smear of vagina and vaginal HPV 12/2002    ASCUS (HPV neg). 3/03 WNL but no transitional zome - repeat 3months    Allergic rhinitis, cause unspecified     Arthritis     Backache, unspecified     Carpal tunnel syndrome     s/p repair    Cervicalgia     Chronic kidney disease, stage 3 (H) 07/14/2021    Depressive disorder     Depressive disorder, not elsewhere classified     doing well on Wellbutrin    Derangement of TMJ (temporomandibular joint) 03/2010    internal derangement    Dermatophytosis of nail     Diaphragmatic hernia without mention of obstruction or gangrene 09/2007    small hiatal hernia    Diffuse cystic mastopathy 2000    FCBD    Esophageal reflux 04/2005    Hammertoe 10/2009    see podiatry consult    Headache(784.0)     Muscle contrature headaches    Intramural leiomyoma of uterus 07/2007    on US    Irregular menstrual cycle     better with BCP -also PMS is better with BCP    IRRITABLE COLON 02/18/2008    Malaise and fatigue     Menopause     lmp 2/2008    Mild persistent asthma     Mitral valve disease     Mole (skin) 2003    9 x 6 mm rt ant chest    Motion sickness     Neuroma 11/2008    lt foot    Osteopenia 12/2008    Other malaise and fatigue 04/2005    stress    Personal history of tobacco use, presenting hazards to health     Quit in 1998    Solitary cyst of breast 12/2005    lt 6 oclock    Stenosis of lumbosacral spine     l4-5 and l5-s1    Symptomatic menopausal or female climacteric states 12/2002    perimenopausal. LMP 2/2008    Tachycardia, paroxysmal (H) 11/24/2020    Thyroid fullness 08/07/2013     Past Surgical History:    Procedure Laterality Date    APPLY WOUND VAC Right 2/22/2024    Procedure: PLACEMENT OF RIGHT GROIN PREVENA WOUND VAC;  Surgeon: Brandon Hale MD;  Location:  OR    BIOPSY  4/10/2015    BL DUPLEX LO [facility] ART UNILAT/LTD  4/3008    lower extr arterial doppler -no stenosis    C DEXA,BONE DENSITY,APPENDICULR ARIADNAN  12/2008    osteopenia    COLONOSCOPY  10/22/2007    bx benign    COLONOSCOPY N/A 4/10/2015    Procedure: COMBINED COLONOSCOPY, SINGLE OR MULTIPLE BIOPSY/POLYPECTOMY BY BIOPSY;  Surgeon: Cl Wagner MD;  Location:  GI    COLONOSCOPY, SCREENING N/A 5/28/2025    Procedure: Colonoscopy, Screening;  Surgeon: Lui Vences MD;  Location:  GI    CV CORONARY ANGIOGRAM N/A 8/17/2023    Procedure: Coronary Angiogram;  Surgeon: Liza Ford MD;  Location:  HEART CARDIAC CATH LAB    ESOPHAGOSCOPY, GASTROSCOPY, DUODENOSCOPY (EGD), COMBINED N/A 1/31/2018    Procedure: COMBINED ESOPHAGOSCOPY, GASTROSCOPY, DUODENOSCOPY (EGD);  COMBINED ESOPHAGOSCOPY, GASTROSCOPY, DUODENOSCOPY (EGD);  Surgeon: Tirso Duran MD;  Location:  GI    ESOPHAGOSCOPY, GASTROSCOPY, DUODENOSCOPY (EGD), COMBINED N/A 2/1/2023    Procedure: ESOPHAGOGASTRODUODENOSCOPY (EGD);  Surgeon: Lui Vences MD;  Location:  GI    EYE SURGERY      HC PART REMV BONE METATARSAL HEAD,EA  01/11/10    Right foot plantar plate tear. Also correct hammertoe, 2nd digit & varicose vein ligation, heel.    HC REVISE MEDIAN N/CARPAL TUNNEL SURG      Left in 1997, right in 1999    INCISION AND DRAINAGE LOWER EXTREMITY, COMBINED Right 2/22/2024    Procedure: Excision OF RIGHT GROIN SEROMA WITH LYMPHAZURIN BLUE INJECTION, Right groin exploration;  Surgeon: Brandon Hale MD;  Location:  OR    INJ, ANES/STER, FACET LUMB/SAC  2010    Left L5 nerve root injection    INJECT EPIDURAL CERVICAL Right 9/29/2022    Procedure: Cervical 7-Thoracic 1 Interlaminar epidural steroid injection using fluoroscopic guidance with contrast dye,  Right;  Surgeon: Tyler Bay MD;  Location: [facility] OR    INJECT EPIDURAL CERVICAL N/A 3/27/2025    Procedure: Cervical 7-Thoracic 1 Interlaminar epidural steroid injection using fluoroscopic guidance with contrast dye.;  Surgeon: Tyler Bay MD;  Location: [facility] OR    INJECT EPIDURAL TRANSFORAMINAL  10/09/2013    [facility]    INJECT JOINT SACROILIAC  3/19/14,14    [facility]    REPAIR VALVE MITRAL MINIMALLY INVASIVE N/A 10/26/2023    Procedure: MINIMALLY INVASIVE MITRAL VALVE REPAIR USING CHORD-X PRE-MEASURED LOOP SUTURE SIZE:12MM, MITRAL ANNULOPLASTY RING ERICK-COTTO PHYSIO Il SIZE: 28MM, AND ON CARDIOPULMONARY PUMP OXYGENATOR (INTRAOPERATIVE TRANSESOPHAGEAL ECHOCARDIOGRAM BY THE CARDIOLOGIST  );  Surgeon: Sabas Meredith MD;  Location: [facility] OR    ZZC NONSPECIFIC PROCEDURE      Metatarsal phalangeal joint injection    ZZ COLONOSCOPY THRU STOMA, DIAGNOSTIC  2002    bx. benign - flex sig in 5 yrs  or colonoscopy in 10 yrs    ZZHC ECHO HEART XTHORACIC, STRESS/REST  2005    WNL    ZZHC NCS MOTOR W/O F-WAVE, EACH NERVE  2008    CTS     Family History   Problem Relation Age of Onset    Diabetes Father     Depression Father     Cerebrovascular Disease Mother         age 80    Arthritis Mother     Depression Mother         On meds    Eye Disorder Mother         Glaucoma    Osteoporosis Mother     Respiratory Mother          86 YO COPD    Chronic Obstructive Pulmonary Disease Mother     C.A.D. Brother         65 YO MI -    Chronic Obstructive Pulmonary Disease Brother     Myocardial Infarction Brother     Other Cancer Brother     Cancer Brother          Objective  LMP 2008 (Approximate)     General: healthy, alert and in no acute distress.    HEENT: no scleral icterus or conjunctival erythema.   Skin: no suspicious lesions or rash. No jaundice.   CV: regular rhythm by palpation, 2+ distal pulses.  Resp: normal respiratory effort without conversational  dyspnea.   Psych: normal mood and affect.    Gait: nonantalgic with only a slight limp today, appropriate coordination and balance.     Neuro:        - Sensation to light touch:    - Intact throughout the BUE/BLE including all peripheral nerve distributions.        - MSR:       RUE  LUE  - Biceps  2+ 2+  - Brachioradialis 2+ 2+  - Triceps  2+ 2+       - Special tests:   - Spurling's:  Neg today    MSK - Shoulder:       - Inspection:    - No significant swelling, erythema, warmth, ecchymosis, lesion, or atrophy noted.        - ROM:    - Slightly limited AROM/PROM with anterolateral shoulder pain with shoulder abduction, flexion, IR/ER.       - Palpation:    - TTP at the anterior shoulder joint.   - NTTP elsewhere including the subacromial space, proximal biceps tendon, right-sided cervical paraspinals.        - Strength:  (*antalgic)  - Shoulder Abduction   5-*    - Shoulder Flexion   5-*    - Shoulder Internal Rotation  5-*    - Shoulder External Rotation  5-*             - Special tests:        - Sims: Positive   - Neers: Positive   - Empty can: Positive for pain and mild weakness    - Falls: Positive    - Scarf:  Neg    - Speeds:  Neg    - Yergason:  Neg    - Crank:  Pos      Radiology  I independently reviewed the available relevant imaging in the chart with my interpretations as above in HPI.   - XR R shoulder 3/6/2025 shows severe osteoarthritis of the right glenohumeral joint with bone-on-bone articulation and subchondral cyst formation.  Moderate degenerative changes acromioclavicular joint.  No acute fractures or dislocations.  - XR L knee 4/10/2025 with advanced degenerative changes of the medial compartment and a moderate effusion.  No acute fractures or dislocations.   - MR R shoulder 3/21/2025 shows a high-grade tear of the anterior mid supraspinatus tendon with approximately 9 mm of tendon retraction, also with some tendinopathy and intrasubstance tearing of the subscapularis tendon.  Also shows  high-grade glenohumeral osteoarthritis.      Procedure  Large Joint Injection/Arthocentesis: R glenohumeral joint    Date/Time: 5/29/2025 11:23 AM    Performed by: Octavio Carrera DO  Authorized by: Octavio Carrera DO    Indications:  Pain  Needle Size:  22 G  Guidance: ultrasound    Approach:  Posterolateral  Location:  Shoulder      Site:  R glenohumeral joint  Medications:  40 mg triamcinolone 40 MG/ML; 2 mL lidocaine 1 %; 2 mL BUPivacaine 0.5 %  Outcome:  Tolerated well, no immediate complications  Procedure discussed: discussed risks, benefits, and alternatives    Consent Given by:  Patient  Prep: patient was prepped and draped in usual sterile fashion     Ultrasound images of procedure were permanently stored.      Glenohumeral Injection - Ultrasound Guided  The patient was informed of the risks and the benefits of the procedure and a written consent was signed.  The patient s shoulder was prepped with chlorhexidine in sterile fashion.   40 mg of kenalog suspension was drawn up into a 5 mL syringe with 2 mL of 1% lidocaine and 2 ml of 0.5% bupivacaine.   Injection was performed using sterile technique.  Under ultrasound guidance a 2-inch 25-gauge needle was used to enter the glenohumeral joint.  Posterolateral approach was used with the patient in lateral recumbent position, arm in neutral position at the side.  Needle placement was visualized and documented with ultrasound.  Ultrasound visualization was necessary due to the small joint space entered.  Injection performed long axis to the probe.  Injection solution visualized within the joint space.  Images were permanently stored for the patient's record.  There were no complications. The patient tolerated the procedure well. There was negligible bleeding.       Assessment  1. Primary osteoarthritis, right shoulder        Plan  Margareth Green is a pleasant 70 year old female that presents for follow up of her chronic right shoulder pain that is  multifactorial in nature.  She does have radiographic and clinical evidence of severe osteoarthritis of the glenohumeral joint with some deep achy pain within the joint that hurts worse with movements and can bother her at night and at rest.  This appeared to be her most bothersome pain at her original exam and we performed ultrasound-guided glenohumeral joint and subacromial bursa corticosteroid injections, which relieved a fair amount of her pain.      She also has pain in the anterolateral shoulder with shoulder abduction, flexion, IR/ER with positive impingement testing on exam that is consistent with tendinopathy of the rotator cuff tendons.  MRI confirms a high-grade tear of the supraspinatus, tendinopathy of the subscapularis, and high-grade glenohumeral osteoarthritis.  - Glenohumeral joint injection was the most effective.    She also has known cervical stenosis with right-sided radiculopathy that is responded well to C7-T1 ILESI in the past.  Includes numbness/tingling and occasional radicular shooting pain down to digits 4-5 on the right consistent with a likely C8 nerve root involvement.    - Cervical epidural steroid injection has been helpful for radicular pain.    She would also like to follow-up on her acute left knee pain after a fall on 4/8/2025.  Based on her history of a tibial plateau fracture, she wanted to make sure she did not fracture it again.  Radiographs were negative.  She had a lot of guarding on the last exam.  Today's exam was better with less pain.  There is a slight soft endpoint on her Lachman testing, but no major laxity.  Otherwise a more stable exam today.  Internal derangement is still possible but less likely.  She has been able to bear weight onto her knee more effectively without pain.    We discussed the nature of the condition and available treatment options, and mutually agreed upon the following plan:    - Imaging:          - Reviewed and independently interpreted the  relevant imaging in the chart, including any imaging ordered for today's clinic.  - Reviewed results and images with patient.   - Medications:          - Discussed pharmacologic options for pain relief.   - May use NSAIDs (Ibuprofen, Naproxen) or Acetaminophen (Tylenol) as needed for pain control.   - Do not take these if previously advised to avoid them for other medical conditions.  - May also use topical medications such as lidocaine, IcyHot, BioFreeze, or Voltaren gel as needed for pain control.    - Voltaren gel is an anti-inflammatory cream that may be used up to 4 times per day over the painful area.   - Prednisone was very helpful for her, and could be considered in the future if other conservative treatment is not managing the pain well  - Injections:          - Discussed possible injection options and alternatives.    - Injection options include: Corticosteroid injection of the glenohumeral joint, subacromial bursa.  Glenohumeral joint was the most effective.  Also may consider repeat cervical epidural steroid injection in the future if needed.  Also consider left knee joint corticosteroid injection.  - Performed a corticosteroid injection of the right glenohumeral joint today in clinic. Patient tolerated the procedure well without complications.     - Post-procedure instructions:    - Keep the injection site clean and dry.   - Do not submerge the injection site for 24 hours (no baths, pools). Showers are ok.   - Rest the area for 24-48 hours before resuming normal activities. Avoid overexerting the area for the first few weeks.   - It may take 2-3 days to start noticing the effects of the injection and up to 3-4 weeks to feel significant benefits.   - Therapy:          - Discussed the benefits of therapy vs home exercise program for optimization of range of motion, flexibility, strength, stability and function.   - She has previously attended physical therapy and has a quality home exercise program at  home.  Encouraged to continue HEP as instructed.  - Modalities:          - May use ice, heat, massage or other modalities as needed.  - Surgery:          - Discussed non-operative and operative treatment options for the patient's condition.  Originally against any surgery for the shoulder.  Discussed that a replacement would be the only definitive treatment for the advanced arthritis in the glenohumeral joint.  She would like to discuss what a replacement may look like preoperatively and postoperatively.  She would like a referral to orthopedic surgery to discuss this option.  Orthopedic surgery referral placed at last visit  - Activity:          - Encouraged to remain active and participate in regular activities as symptoms allow.   Avoid or modify exacerbating activities as needed.  - Follow up:          - As needed for reevaluation and update to treatment plan.  - May follow up sooner for new/worsening symptoms.  - May contact clinic by phone or MyChart for questions or concerns.       Octavio Carrera DO, JOHANNM  [facility] - Sports Medicine  [facility] - Department of Orthopedic Surgery       Disclaimer:  This note was prepared and written using Dragon Medical dictation software. As a result, there may be errors in the script that have gone undetected. Please consider this when interpreting the information in this note.

## 2025-05-28 NOTE — DISCHARGE INSTRUCTIONS
[facility]    Home Care Following Endoscopy          Activity:  You have just undergone an endoscopic procedure usually performed with conscious sedation.  Do not work or operate machinery (including a car) for at least 12 hours.    I encourage you to walk and attempt to pass this air as soon as possible.    Diet:  Return to the diet you were on before your procedure but eat lightly for the first 12-24 hours.  Drink plenty of water.  Resume any regular medications unless otherwise advised by your physician.  Please begin any new medication prescribed as a result of your procedure as directed by your physician.   If you had any biopsy or polyp removed please refrain from aspirin or aspirin products for 2 days.  If on Coumadin please restart as instructed by your physician.   Pain:  You may take Tylenol as needed for pain.  Expected Recovery:  You can expect some mild abdominal fullness and/or discomfort due to the air used to inflate your intestinal tract. It is also normal to have a mild sore throat after upper endoscopy.    Call Your Physician if You Have:    After Colonoscopy:  Worsening persisting abdominal pain which is worse with activity.  Fevers (>101 degrees F), chills or shakes.  Passage of continued blood with bowel movements.     Any questions or concerns about your recovery, please call [phone] or after hours [phone] ([phone]) Nurse Advice Line.    Follow-up Care:  You did have polyps/biopsy tissue sample(s) removed.  The polyps/biopsy tissue sample(s) will be sent to pathology.    You should receive letter in your My Chart from Dr Vences with your results within 1-2 weeks. If you do not participate in My Chart a physical letter will come in the mail in 2-3 weeks.  Please call if you have not received a notification of your results.  If asked to return to clinic please make an appointment 1 week after your procedure.  Call [phone].

## 2025-05-28 NOTE — ANESTHESIA POSTPROCEDURE EVALUATION
Patient: Margareth Green    Procedure: Procedure(s):  Colonoscopy, Screening       Anesthesia Type:  MAC    Note:  Disposition: Outpatient   Postop Pain Control: Uneventful            Sign Out: Well controlled pain   PONV: No   Neuro/Psych: Uneventful            Sign Out: Acceptable/Baseline neuro status   Airway/Respiratory: Uneventful            Sign Out: Acceptable/Baseline resp. status   CV/Hemodynamics: Uneventful            Sign Out: Acceptable CV status   Other NRE: NONE   DID A NON-ROUTINE EVENT OCCUR? No    Event details/Postop Comments:  Pt was happy with anesthesia care.  No complications.  I will follow up with the pt if needed.           Last vitals:  Vitals Value Taken Time   /100 05/28/25 09:58   Temp     Pulse 71 05/28/25 09:58   Resp 16 05/28/25 09:50   SpO2 96 % 05/28/25 09:51   Vitals shown include unfiled device data.    Electronically Signed By: JUAN Burdick CRNA  May 28, 2025  11:52 AM

## 2025-05-28 NOTE — ANESTHESIA CARE TRANSFER NOTE
Patient: Margareth Green    Procedure: Procedure(s):  Colonoscopy, Screening       Diagnosis: Special screening for malignant neoplasms, colon [Z12.11]  Diagnosis Additional Information: No value filed.    Anesthesia Type:   MAC     Note:    Oropharynx: oropharynx clear of all foreign objects and spontaneously breathing  Level of Consciousness: drowsy  Oxygen Supplementation: room air    Independent Airway: airway patency satisfactory and stable  Dentition: dentition unchanged  Vital Signs Stable: post-procedure vital signs reviewed and stable  Report to RN Given: handoff report given  Patient transferred to: PACU    Handoff Report: Identifed the Patient, Identified the Reponsible Provider, Reviewed the pertinent medical history, Discussed the surgical course, Reviewed Intra-OP anesthesia mangement and issues during anesthesia, Set expectations for post-procedure period and Allowed opportunity for questions and acknowledgement of understanding      Vitals:  Vitals Value Taken Time   BP 92/71 05/28/25 09:38   Temp     Pulse 79 05/28/25 09:38   Resp     SpO2 96 % 05/28/25 09:40   Vitals shown include unfiled device data.    Electronically Signed By: JUAN Henderson CRNA  May 28, 2025  9:41 AM

## 2025-05-29 ENCOUNTER — THERAPY VISIT (OUTPATIENT)
Dept: PHYSICAL THERAPY | Facility: CLINIC | Age: 70
End: 2025-05-29
Attending: STUDENT IN AN ORGANIZED HEALTH CARE EDUCATION/TRAINING PROGRAM
Payer: COMMERCIAL

## 2025-05-29 ENCOUNTER — OFFICE VISIT (OUTPATIENT)
Dept: ORTHOPEDICS | Facility: CLINIC | Age: 70
End: 2025-05-29
Payer: COMMERCIAL

## 2025-05-29 DIAGNOSIS — M54.12 CERVICAL RADICULOPATHY: Primary | ICD-10-CM

## 2025-05-29 DIAGNOSIS — M19.011 PRIMARY OSTEOARTHRITIS, RIGHT SHOULDER: Primary | ICD-10-CM

## 2025-05-29 PROCEDURE — 97110 THERAPEUTIC EXERCISES: CPT | Mod: GP | Performed by: PHYSICAL THERAPIST

## 2025-05-29 PROCEDURE — 97140 MANUAL THERAPY 1/> REGIONS: CPT | Mod: GP | Performed by: PHYSICAL THERAPIST

## 2025-05-29 RX ORDER — TRIAMCINOLONE ACETONIDE 40 MG/ML
40 INJECTION, SUSPENSION INTRA-ARTICULAR; INTRAMUSCULAR
Status: COMPLETED | OUTPATIENT
Start: 2025-05-29 | End: 2025-05-29

## 2025-05-29 RX ORDER — LIDOCAINE HYDROCHLORIDE 10 MG/ML
2 INJECTION, SOLUTION INFILTRATION; PERINEURAL
Status: COMPLETED | OUTPATIENT
Start: 2025-05-29 | End: 2025-05-29

## 2025-05-29 RX ORDER — BUPIVACAINE HYDROCHLORIDE 5 MG/ML
2 INJECTION, SOLUTION PERINEURAL
Status: COMPLETED | OUTPATIENT
Start: 2025-05-29 | End: 2025-05-29

## 2025-05-29 RX ADMIN — TRIAMCINOLONE ACETONIDE 40 MG: 40 INJECTION, SUSPENSION INTRA-ARTICULAR; INTRAMUSCULAR at 11:23

## 2025-05-29 RX ADMIN — LIDOCAINE HYDROCHLORIDE 2 ML: 10 INJECTION, SOLUTION INFILTRATION; PERINEURAL at 11:23

## 2025-05-29 RX ADMIN — BUPIVACAINE HYDROCHLORIDE 2 ML: 5 INJECTION, SOLUTION PERINEURAL at 11:23

## 2025-05-29 NOTE — LETTER
2025      Margareth Green  [address] [facility]  [place] [address]      Dear Colleague,    Thank you for referring your patient, Margareth Green, to the [facility] [place]. Please see a copy of my visit note below.    Margareth Green  :  1955  DOS: 2025  MRN: [id number]  PCP: Caitlin Beasley    Sports Medicine Clinic Visit    Interim History - May 29, 2025  - Last seen on 2025 for left knee pain, right shoulder pain, and cervical radiculopathy.   - Left knee corticosteroid injection completed on 2025 provides relief to date.  - Since the last visit, she notes her right shoulder have started to become painful again. Pain after repetitive motions. Hopeful for repeat injection today, as previously discussed.   - No interim injury.       Interim History - 2025  - Last seen on 2025 for cervical radiculopathy, right rotator cuff tendinopathy and right primary osteoarthritis of the shoulder. Also acute left knee pain after a fall on 25.  - 3/27/2025 C7-T1 IESI completed by Dr. Bay allowed for good relief in radicular symptoms.     - Since the last visit, her right shoulder and neck have been doing better with physical therapy and sleeping modifications.  She feels like she is making good progress.  She is interested in another glenohumeral joint corticosteroid injection, and would like to plan at the end of May for some important events in .  - Her left knee is also been making improvements, able to bear weight on it and has not been as painful.  X-rays do not show fracture.  Still with some generalized pain through the knee joint.  No major instability or mechanical locking.  - No interim injury.       Interim History - 2025  - Last seen on 2024 for cervical radiculopathy, right rotator cuff tendinopathy and right primary osteoarthritis of the shoulder.   - 2024 right glenohumeral joint injection and 2024  right subacromial injection. She is unsure which injection helped more, but did get relief from the combination.   - Since the last visit, she notes that it is continuing to be more difficult to reach over her head, with more pain. She notes that her lateral right chest is still painful from her heart surgery last year, where she was told that she would have some lingering nerve pain. Has been to physical therapy but  notes she has not been consistent with her exercises. Continues to prefer nonoperative management. No interim injury.       Initial Visit: April 24, 2024  HPI  Margareth Green is a 69 year old female who is seen in consultation at the request of  Caitlin Beasley PA-C presenting with right shoulder pain.    - Mechanism of Injury:    - No inciting injury  - Pertinent history and prior evaluations:    - 4/10/2024 with Caitlin Beasley PA-C: right shoulder and cervical radiculopathy noted at C8 with multilevel DDD and central and foraminal stenosis on cervical MRI in 2022 and has had success with a C7-T1 ILESI. Prednisone prescription given.   - Right shoulder xray IMPRESSION: 1. Severe osteoarthrosis of the glenohumeral joint again noted. 2. Calcification in the region of the supraspinatus/infraspinatus tendon raises the question of calcific tendinosis. This has progressed. 3. No other change.    - Pain Character:    - Location:  anterior, lateral, posterior right shoulder  - Character:  aching shoulder pain, more sharp with movements  - Duration: One year  - Course:  worsening  - Endorses:    - Anterior, lateral, posterior right shoulder pain with achy characteristic present at rest and worse with shoulder abduction and rotation.  -Separate and distinct numbness and tingling into the right digits 4-5, chronic neck pain and radicular pain down the right arm intermittently.  - Denies:    - swelling, clicking/popping, grinding, mechanical locking symptoms, neurogenic weakness  - Alleviating factors:    -  Naproxen, Tylenol arthritis, activity modification, rest  - Aggravating factors:    - repetitive movements with gardening, grabbing, grasping, abduction of shoulder, above head movments  - Other treatments tried:    - nothing    - Patient Goals:    - discuss treatment options  - Social History:   - Retired.  Previous work:  Medical records for [employer]      Review of Systems  Musculoskeletal: as above  Remainder of review of systems is negative including constitutional, CV, pulmonary, GI, Skin and Neurologic except as noted in HPI or medical history.    Past Medical History:   Diagnosis Date     Abnormal Papanicolaou smear of vagina and vaginal HPV 12/2002    ASCUS (HPV neg). 3/03 WNL but no transitional zome - repeat 3months     Allergic rhinitis, cause unspecified      Arthritis      Backache, unspecified      Carpal tunnel syndrome     s/p repair     Cervicalgia      Chronic kidney disease, stage 3 (H) 07/14/2021     Depressive disorder      Depressive disorder, not elsewhere classified     doing well on Wellbutrin     Derangement of TMJ (temporomandibular joint) 03/2010    internal derangement     Dermatophytosis of nail      Diaphragmatic hernia without mention of obstruction or gangrene 09/2007    small hiatal hernia     Diffuse cystic mastopathy 2000    FCBD     Esophageal reflux 04/2005     Hammertoe 10/2009    see podiatry consult     Headache(784.0)     Muscle contrature headaches     Intramural leiomyoma of uterus 07/2007    on US     Irregular menstrual cycle     better with BCP -also PMS is better with BCP     IRRITABLE COLON 02/18/2008     Malaise and fatigue      Menopause     lmp 2/2008     Mild persistent asthma      Mitral valve disease      Mole (skin) 2003    9 x 6 mm rt ant chest     Motion sickness      Neuroma 11/2008    lt foot     Osteopenia 12/2008     Other malaise and fatigue 04/2005    stress     Personal history of tobacco use, presenting hazards to health     Quit in 1998     Solitary  cyst of breast 12/2005    lt 6 oclock     Stenosis of lumbosacral spine     l4-5 and l5-s1     Symptomatic menopausal or female climacteric states 12/2002    perimenopausal. LMP 2/2008     Tachycardia, paroxysmal (H) 11/24/2020     Thyroid fullness 08/07/2013     Past Surgical History:   Procedure Laterality Date     APPLY WOUND VAC Right 2/22/2024    Procedure: PLACEMENT OF RIGHT GROIN PREVENA WOUND VAC;  Surgeon: Brandon Hale MD;  Location:  OR     BIOPSY  4/10/2015     BL DUPLEX LO EXTREM ART UNILAT/LTD  4/3008    lower extr arterial doppler -no stenosis     C DEXA,BONE DENSITY,APPENDICULR SKELTN  12/2008    osteopenia     COLONOSCOPY  10/22/2007    bx benign     COLONOSCOPY N/A 4/10/2015    Procedure: COMBINED COLONOSCOPY, SINGLE OR MULTIPLE BIOPSY/POLYPECTOMY BY BIOPSY;  Surgeon: Cl Wagner MD;  Location:  GI     COLONOSCOPY, SCREENING N/A 5/28/2025    Procedure: Colonoscopy, Screening;  Surgeon: Lui Vences MD;  Location:  GI     CV CORONARY ANGIOGRAM N/A 8/17/2023    Procedure: Coronary Angiogram;  Surgeon: Liza Ford MD;  Location:  HEART CARDIAC CATH LAB     ESOPHAGOSCOPY, GASTROSCOPY, DUODENOSCOPY (EGD), COMBINED N/A 1/31/2018    Procedure: COMBINED ESOPHAGOSCOPY, GASTROSCOPY, DUODENOSCOPY (EGD);  COMBINED ESOPHAGOSCOPY, GASTROSCOPY, DUODENOSCOPY (EGD);  Surgeon: Tirso Duran MD;  Location:  GI     ESOPHAGOSCOPY, GASTROSCOPY, DUODENOSCOPY (EGD), COMBINED N/A 2/1/2023    Procedure: ESOPHAGOGASTRODUODENOSCOPY (EGD);  Surgeon: Lui Vences MD;  Location:  GI     EYE SURGERY       HC PART REMV BONE METATARSAL HEAD,EA  01/11/10    Right foot plantar plate tear. Also correct hammertoe, 2nd digit & varicose vein ligation, heel.     HC REVISE MEDIAN N/CARPAL TUNNEL SURG      Left in 1997, right in 1999     INCISION AND DRAINAGE LOWER EXTREMITY, COMBINED Right 2/22/2024    Procedure: Excision OF RIGHT GROIN SEROMA WITH LYMPHAZURIN BLUE INJECTION, Right groin  exploration;  Surgeon: Brandon Hale MD;  Location: [facility] OR     INJ, ANES/STER, FACET LUMB/SAC  2010    Left L5 nerve root injection     INJECT EPIDURAL CERVICAL Right 2022    Procedure: Cervical 7-Thoracic 1 Interlaminar epidural steroid injection using fluoroscopic guidance with contrast dye, Right;  Surgeon: Tyler Bay MD;  Location: [facility] OR     INJECT EPIDURAL CERVICAL N/A 3/27/2025    Procedure: Cervical 7-Thoracic 1 Interlaminar epidural steroid injection using fluoroscopic guidance with contrast dye.;  Surgeon: Tyler Bay MD;  Location: [facility] OR     INJECT EPIDURAL TRANSFORAMINAL  10/09/2013    [facility]     INJECT JOINT SACROILIAC  3/19/14,14    [facility]     REPAIR VALVE MITRAL MINIMALLY INVASIVE N/A 10/26/2023    Procedure: MINIMALLY INVASIVE MITRAL VALVE REPAIR USING CHORD-X PRE-MEASURED LOOP SUTURE SIZE:12MM, MITRAL ANNULOPLASTY RING ERICK-COTTO PHYSIO Il SIZE: 28MM, AND ON CARDIOPULMONARY PUMP OXYGENATOR (INTRAOPERATIVE TRANSESOPHAGEAL ECHOCARDIOGRAM BY THE CARDIOLOGIST  );  Surgeon: Sabas Meredith MD;  Location: [facility] OR     ZZC NONSPECIFIC PROCEDURE      Metatarsal phalangeal joint injection     ZZHC COLONOSCOPY THRU STOMA, DIAGNOSTIC  2002    bx. benign - flex sig in 5 yrs  or colonoscopy in 10 yrs     ZZHC ECHO HEART XTHORACIC, STRESS/REST  2005    WNL     ZZHC NCS MOTOR W/O F-WAVE, EACH NERVE  2008    CTS     Family History   Problem Relation Age of Onset     Diabetes Father      Depression Father      Cerebrovascular Disease Mother         age 80     Arthritis Mother      Depression Mother         On meds     Eye Disorder Mother         Glaucoma     Osteoporosis Mother      Respiratory Mother          88 YO COPD     Chronic Obstructive Pulmonary Disease Mother      C.A.D. Brother         65 YO MI -     Chronic Obstructive Pulmonary Disease Brother      Myocardial Infarction Brother      Other Cancer Brother      Cancer  Brother          Objective  LMP 02/26/2008 (Approximate)     General: healthy, alert and in no acute distress.    HEENT: no scleral icterus or conjunctival erythema.   Skin: no suspicious lesions or rash. No jaundice.   CV: regular rhythm by palpation, 2+ distal pulses.  Resp: normal respiratory effort without conversational dyspnea.   Psych: normal mood and affect.    Gait: nonantalgic with only a slight limp today, appropriate coordination and balance.     Neuro:        - Sensation to light touch:    - Intact throughout the BUE/BLE including all peripheral nerve distributions.        - MSR:       RUE  LUE  - Biceps  2+ 2+  - Brachioradialis 2+ 2+  - Triceps  2+ 2+       - Special tests:   - Spurling's:  Neg today    MSK - Shoulder:       - Inspection:    - No significant swelling, erythema, warmth, ecchymosis, lesion, or atrophy noted.        - ROM:    - Slightly limited AROM/PROM with anterolateral shoulder pain with shoulder abduction, flexion, IR/ER.       - Palpation:    - TTP at the anterior shoulder joint.   - NTTP elsewhere including the subacromial space, proximal biceps tendon, right-sided cervical paraspinals.        - Strength:  (*antalgic)  - Shoulder Abduction   5-*    - Shoulder Flexion   5-*    - Shoulder Internal Rotation  5-*    - Shoulder External Rotation  5-*             - Special tests:        - Sims: Positive   - Neers: Positive   - Empty can: Positive for pain and mild weakness    - [place]: Positive    - Scarf:  Neg    - Speeds:  Neg    - Yergason:  Neg    - Crank:  Pos      Radiology  I independently reviewed the available relevant imaging in the chart with my interpretations as above in HPI.   - XR R shoulder 3/6/2025 shows severe osteoarthritis of the right glenohumeral joint with bone-on-bone articulation and subchondral cyst formation.  Moderate degenerative changes acromioclavicular joint.  No acute fractures or dislocations.  - XR L knee 4/10/2025 with advanced degenerative  changes of the medial compartment and a moderate effusion.  No acute fractures or dislocations.   - MR R shoulder 3/21/2025 shows a high-grade tear of the anterior mid supraspinatus tendon with approximately 9 mm of tendon retraction, also with some tendinopathy and intrasubstance tearing of the subscapularis tendon.  Also shows high-grade glenohumeral osteoarthritis.      Procedure  Large Joint Injection/Arthocentesis: R glenohumeral joint    Date/Time: 5/29/2025 11:23 AM    Performed by: Octavio Carrera DO  Authorized by: Octavio Carrera DO    Indications:  Pain  Needle Size:  22 G  Guidance: ultrasound    Approach:  Posterolateral  Location:  Shoulder      Site:  R glenohumeral joint  Medications:  40 mg triamcinolone 40 MG/ML; 2 mL lidocaine 1 %; 2 mL BUPivacaine 0.5 %  Outcome:  Tolerated well, no immediate complications  Procedure discussed: discussed risks, benefits, and alternatives    Consent Given by:  Patient  Prep: patient was prepped and draped in usual sterile fashion     Ultrasound images of procedure were permanently stored.      Glenohumeral Injection - Ultrasound Guided  The patient was informed of the risks and the benefits of the procedure and a written consent was signed.  The patient s shoulder was prepped with chlorhexidine in sterile fashion.   40 mg of kenalog suspension was drawn up into a 5 mL syringe with 2 mL of 1% lidocaine and 2 ml of 0.5% bupivacaine.   Injection was performed using sterile technique.  Under ultrasound guidance a 2-inch 25-gauge needle was used to enter the glenohumeral joint.  Posterolateral approach was used with the patient in lateral recumbent position, arm in neutral position at the side.  Needle placement was visualized and documented with ultrasound.  Ultrasound visualization was necessary due to the small joint space entered.  Injection performed long axis to the probe.  Injection solution visualized within the joint space.  Images were permanently stored for  the patient's record.  There were no complications. The patient tolerated the procedure well. There was negligible bleeding.       Assessment  1. Primary osteoarthritis, right shoulder        Plan  Margareth Green is a pleasant 70 year old female that presents for follow up of her chronic right shoulder pain that is multifactorial in nature.  She does have radiographic and clinical evidence of severe osteoarthritis of the glenohumeral joint with some deep achy pain within the joint that hurts worse with movements and can bother her at night and at rest.  This appeared to be her most bothersome pain at her original exam and we performed ultrasound-guided glenohumeral joint and subacromial bursa corticosteroid injections, which relieved a fair amount of her pain.      She also has pain in the anterolateral shoulder with shoulder abduction, flexion, IR/ER with positive impingement testing on exam that is consistent with tendinopathy of the rotator cuff tendons.  MRI confirms a high-grade tear of the supraspinatus, tendinopathy of the subscapularis, and high-grade glenohumeral osteoarthritis.  - Glenohumeral joint injection was the most effective.    She also has known cervical stenosis with right-sided radiculopathy that is responded well to C7-T1 ILESI in the past.  Includes numbness/tingling and occasional radicular shooting pain down to digits 4-5 on the right consistent with a likely C8 nerve root involvement.    - Cervical epidural steroid injection has been helpful for radicular pain.    She would also like to follow-up on her acute left knee pain after a fall on 4/8/2025.  Based on her history of a tibial plateau fracture, she wanted to make sure she did not fracture it again.  Radiographs were negative.  She had a lot of guarding on the last exam.  Today's exam was better with less pain.  There is a slight soft endpoint on her Lachman testing, but no major laxity.  Otherwise a more stable exam today.  Internal  derangement is still possible but less likely.  She has been able to bear weight onto her knee more effectively without pain.    We discussed the nature of the condition and available treatment options, and mutually agreed upon the following plan:    - Imaging:          - Reviewed and independently interpreted the relevant imaging in the chart, including any imaging ordered for today's clinic.  - Reviewed results and images with patient.   - Medications:          - Discussed pharmacologic options for pain relief.   - May use NSAIDs (Ibuprofen, Naproxen) or Acetaminophen (Tylenol) as needed for pain control.   - Do not take these if previously advised to avoid them for other medical conditions.  - May also use topical medications such as lidocaine, IcyHot, BioFreeze, or Voltaren gel as needed for pain control.    - Voltaren gel is an anti-inflammatory cream that may be used up to 4 times per day over the painful area.   - Prednisone was very helpful for her, and could be considered in the future if other conservative treatment is not managing the pain well  - Injections:          - Discussed possible injection options and alternatives.    - Injection options include: Corticosteroid injection of the glenohumeral joint, subacromial bursa.  Glenohumeral joint was the most effective.  Also may consider repeat cervical epidural steroid injection in the future if needed.  Also consider left knee joint corticosteroid injection.  - Performed a corticosteroid injection of the right glenohumeral joint today in clinic. Patient tolerated the procedure well without complications.     - Post-procedure instructions:    - Keep the injection site clean and dry.   - Do not submerge the injection site for 24 hours (no baths, pools). Showers are ok.   - Rest the area for 24-48 hours before resuming normal activities. Avoid overexerting the area for the first few weeks.   - It may take 2-3 days to start noticing the effects of the  injection and up to 3-4 weeks to feel significant benefits.   - Therapy:          - Discussed the benefits of therapy vs home exercise program for optimization of range of motion, flexibility, strength, stability and function.   - She has previously attended physical therapy and has a quality home exercise program at home.  Encouraged to continue HEP as instructed.  - Modalities:          - May use ice, heat, massage or other modalities as needed.  - Surgery:          - Discussed non-operative and operative treatment options for the patient's condition.  Originally against any surgery for the shoulder.  Discussed that a replacement would be the only definitive treatment for the advanced arthritis in the glenohumeral joint.  She would like to discuss what a replacement may look like preoperatively and postoperatively.  She would like a referral to orthopedic surgery to discuss this option.  Orthopedic surgery referral placed at last visit  - Activity:          - Encouraged to remain active and participate in regular activities as symptoms allow.   Avoid or modify exacerbating activities as needed.  - Follow up:          - As needed for reevaluation and update to treatment plan.  - May follow up sooner for new/worsening symptoms.  - May contact clinic by phone or MyChart for questions or concerns.       Octavio Carrera DO, CAQSM  [facility] - Sports Medicine  [facility] - Department of Orthopedic Surgery       Disclaimer:  This note was prepared and written using Dragon Medical dictation software. As a result, there may be errors in the script that have gone undetected. Please consider this when interpreting the information in this note.       Again, thank you for allowing me to participate in the care of your patient.        Sincerely,        Octavio Carrera DO    Electronically signed

## 2025-06-10 ENCOUNTER — TRANSFERRED RECORDS (OUTPATIENT)
Dept: HEALTH INFORMATION MANAGEMENT | Facility: CLINIC | Age: 70
End: 2025-06-10
Payer: COMMERCIAL

## 2025-06-11 ENCOUNTER — OFFICE VISIT (OUTPATIENT)
Dept: PHARMACY | Facility: CLINIC | Age: 70
End: 2025-06-11
Payer: COMMERCIAL

## 2025-06-11 VITALS — WEIGHT: 127.2 LBS | DIASTOLIC BLOOD PRESSURE: 72 MMHG | SYSTOLIC BLOOD PRESSURE: 118 MMHG | BODY MASS INDEX: 23.42 KG/M2

## 2025-06-11 DIAGNOSIS — M54.2 CERVICALGIA: ICD-10-CM

## 2025-06-11 DIAGNOSIS — G47.09 OTHER INSOMNIA: ICD-10-CM

## 2025-06-11 DIAGNOSIS — F41.1 GAD (GENERALIZED ANXIETY DISORDER): Primary | ICD-10-CM

## 2025-06-11 PROCEDURE — 3078F DIAST BP <80 MM HG: CPT | Performed by: PHARMACIST

## 2025-06-11 PROCEDURE — 99607 MTMS BY PHARM ADDL 15 MIN: CPT | Performed by: PHARMACIST

## 2025-06-11 PROCEDURE — 3074F SYST BP LT 130 MM HG: CPT | Performed by: PHARMACIST

## 2025-06-11 PROCEDURE — 99606 MTMS BY PHARM EST 15 MIN: CPT | Performed by: PHARMACIST

## 2025-06-11 NOTE — PATIENT INSTRUCTIONS
"Recommendations from today's MTM visit:                                                    MTM (medication therapy management) is a service provided by a clinical pharmacist designed to help you get the most of out of your medicines.   Today we reviewed what your medicines are for, how to know if they are working, that your medicines are safe and how to make your medicine regimen as easy as possible.      Increase fluoxetine to 20 mg by mouth every morning - a new order for 20 mg tablets sent to your pharmacy    Consider TURMERIC 500 MG by mouth daily - can find a product with independent testing (sometimes will have a USP marking) helps to make sure you are getting what's on the label in the product    Reasonable to consider glucosamine/chondroitin in the future, but hold off to see if the turmeric is helping    We talked about collagen with peptides - okay if you are not taking this too    Send me the beet product you were thinking about and I will review.     Follow-up: In-person on Wednesday, July 9th at 11:00 AM at the [facility] before your physical therapy appointment    It was great speaking with you today.  I value your experience and would be very thankful for your time in providing feedback in our clinic survey. In the next few days, you may receive an email or text message from [employer] with a link to a survey related to your  clinical pharmacist.\"     To schedule another MTM appointment, please call the clinic directly or you may call the MTM scheduling line at [phone] or toll-free at [phone].     My Clinical Pharmacist's contact information:                                                      Please feel free to contact me with any questions or concerns you have.      Kris Alvarado, Sherry, [facility] CenterCP  Medication Therapy Management Pharmacist  Voicemail: [phone]  "

## 2025-06-11 NOTE — PROGRESS NOTES
Medication Therapy Management (MTM) Encounter    ASSESSMENT:                            Medication Adherence/Access: See below for considerations.    Anxiety/Insomnia: Unchanged. Not any better or worse - could benefit from further dose increase of fluoxetine.     Pain: Not well controlled, but slightly improved since recent injection.  Could potentially benefit from addition of turmeric as discussed with sports medicine provider.     PLAN:                            Increase fluoxetine to 20 mg by mouth every morning - a new order for 20 mg tablets sent to your pharmacy    Consider TURMERIC 500 MG by mouth daily - can find a product with independent testing (sometimes will have a USP marking) helps to make sure you are getting what's on the label in the product    Reasonable to consider glucosamine/chondroitin in the future, but hold off to see if the turmeric is helping    We talked about collagen with peptides - okay if you are not taking this too    Send me the beet product you were thinking about and I will review.     Follow-up: In-person on Wednesday, July 9th at 11:00 AM at the [facility] before your physical therapy appointment    SUBJECTIVE/OBJECTIVE:                          Margareth Green is a 70 year old female seen for a follow-up visit.       Reason for visit: Anxiety/Insomnia Follow-Up.    Allergies/ADRs: Reviewed in chart  Past Medical History: Reviewed in chart  Tobacco: She reports that she quit smoking about 27 years ago. Her smoking use included cigarettes. She started smoking about 57 years ago. She has a 15 pack-year smoking history. She has never been exposed to tobacco smoke. She has never used smokeless tobacco.  Alcohol: 1-3 beverages / week    Medication Adherence/Access: Patient uses pill box(es).  Patient takes medications 1 time(s) per day.   Per patient, misses medication 0 time(s) per week.   Medication barriers: none.   The patient fills medications at [facility]: YES.    Mental  Health   Anxiety/Insomnia  Bupropion  mg every morning  Fluoxetine 10 mg daily in the morning  Was not able to tolerate mirtzapine due to appetite increase.   Occasionally will use CBD with THC with some benefits for sleep/pain.   Patient reports no current medication side effects. Higher dose of bupropion made her anxious.    Mood/anxiety is not completely controlled - noticing she is still feeling blue - overall not much change as current dose. Open to increasing. Sleep is a large concern for patient that she feels is impacted by anxious thoughts and contributes to issues with focus/fatigue during the day. Melatonin has not helped much at night nor has magnesium so far.  Previous medication trials include citalopram (never actual took), duloxetine (though it may be causing issues with sleep), gabapentin (made her feel funny), hydroxyzine (made her foggy, dizzy hallucuinate), nortriptyline (difficulty swallowing capsule), trazodone (dizziness/headaches/loose stools), sertraline (side effects), amitriptyline (not as effective - did not like side effect profile), melatonin (not very helpful), Tylenol PM (sometimes effective).      Pain: Patient is taking acetaminophen 1,000 mg daily to twice to three times daily and naproxen 220 mg daily as needed (not as often as tylenol use).  Did get steroid injection that has helped some. Wonders about turmeric and glucosamine/chondroitin recommended by sports medicine provider.  Finds to be somewhat effective. Denies any current side effects.     Today's Vitals: /72   Wt 127 lb 3.2 oz (57.7 kg)   LMP 02/26/2008 (Approximate)   BMI 23.42 kg/m      BP Readings from Last 3 Encounters:   06/11/25 118/72   05/28/25 (!) 116/104   05/21/25 116/74       Wt Readings from Last 5 Encounters:   06/11/25 127 lb 3.2 oz (57.7 kg)   05/21/25 129 lb (58.5 kg)   05/15/25 127 lb 4 oz (57.7 kg)   04/16/25 129 lb 12.8 oz (58.9 kg)   03/14/25 129 lb (58.5 kg)      ----------------      I spent 25 minutes with this patient today. All changes were made via collaborative practice agreement with Caitlin Beasley PA-C.     A summary of these recommendations was given to the patient.    Kris Alvarado, PharmD, [facility]  Medication Therapy Management Pharmacist  Voicemail: [phone]     Medication Therapy Recommendations  Cervical radiculopathy   1 Current Medication: acetaminophen (ACETAMINOPHEN 8 HOUR) 650 MG CR tablet   Current Medication Sig: Take 1,300 mg by mouth every 8 hours as needed for mild pain or fever.   Rationale: Synergistic therapy - Needs additional medication therapy - Indication   Recommendation: Start Medication - Turmeric 500 MG Caps   Status: Accepted - no CPA Needed   Identified Date: 6/11/2025 Completed Date: 6/11/2025         TAMMY (generalized anxiety disorder)   1 Current Medication: FLUoxetine (PROZAC) 10 MG capsule (Discontinued)   Current Medication Sig: Take 1 capsule (10 mg) by mouth daily.   Rationale: Dose too low - Dosage too low - Effectiveness   Recommendation: Increase Dose - FLUoxetine 20 MG capsule   Status: Accepted per CPA   Identified Date: 6/11/2025 Completed Date: 6/11/2025

## 2025-07-01 ENCOUNTER — THERAPY VISIT (OUTPATIENT)
Dept: PHYSICAL THERAPY | Facility: CLINIC | Age: 70
End: 2025-07-01
Attending: STUDENT IN AN ORGANIZED HEALTH CARE EDUCATION/TRAINING PROGRAM
Payer: COMMERCIAL

## 2025-07-01 DIAGNOSIS — M54.12 CERVICAL RADICULOPATHY: Primary | ICD-10-CM

## 2025-07-01 PROCEDURE — 97110 THERAPEUTIC EXERCISES: CPT | Mod: GP | Performed by: PHYSICAL THERAPIST

## 2025-07-01 PROCEDURE — 97140 MANUAL THERAPY 1/> REGIONS: CPT | Mod: GP | Performed by: PHYSICAL THERAPIST

## 2025-07-01 NOTE — PROGRESS NOTES
[facility]                                                                                   OUTPATIENT PHYSICAL THERAPY    PLAN OF TREATMENT FOR OUTPATIENT REHABILITATION   Patient's Last Name, First Name, Margareth Gamez YOB: 1955   Provider's Name   [facility]   Medical Record No.  [id number]     Onset Date: 11/01/24  Start of Care Date: 04/01/25 (Jaw May 1 2025)     Medical Diagnosis:  Cervical radiculopathy (M54.12)  - Primary    Primary osteoarthritis, right shoulder (M19.011)    Tendinopathy of right rotator cuff (M67.911)      PT Treatment Diagnosis:  neck pain, referral pain RUE, right shoulder pain, osteoarthritis right GH joint, jaw pain R Plan of Treatment  Frequency/Duration: 1x wk/ 90 days    Certification date from 06/29/25 to 09/29/25         See note for plan of treatment details and functional goals     Leana James, PT                         I CERTIFY THE NEED FOR THESE SERVICES FURNISHED UNDER        THIS PLAN OF TREATMENT AND WHILE UNDER MY CARE     (Physician attestation of this document indicates review and certification of the therapy plan).              Referring Provider:  Octavio Carrera    Initial Assessment  See Epic Evaluation- Start of Care Date: 04/01/25 (Jaw May 1 2025)            PLAN  Continue therapy per current plan of care. Added treat   07/01/25 0500   Appointment Info   Signing clinician's name / credentials Leana James PT   Total/Authorized Visits medicare recert due  9/29/25   Visits Used 10/10   Medical Diagnosis Cervical radiculopathy (M54.12)  - Primary    Primary osteoarthritis, right shoulder (M19.011)    Tendinopathy of right rotator cuff (M67.911)   PT Tx Diagnosis neck pain, referral pain RUE, right shoulder pain, osteoarthritis right GH joint, jaw pain R   Progress Note/Certification   Start of Care Date 04/01/25  (Jaw May 1 2025)   Onset of illness/injury or Date of Surgery  11/01/24   Therapy Frequency 1x wk   Predicted Duration 90 days   Certification date from 06/29/25   Certification date to 09/29/25   Progress Note Due Date 09/29/25   Progress Note Completed Date 04/01/25   GOALS   PT Goals 2;3   PT Goal 1   Goal Identifier 1   Goal Description Patient no longer has neck pain or referral pain in to the RUE so she can use the RUE for all ADL's with minimal pain in the RUE.   Rationale to maximize safety and independence with performance of ADLs and functional tasks   Goal Progress goal met   Target Date 06/29/25   Date Met 07/01/25   PT Goal 2   Goal Identifier 2   Goal Description Patient has 50% less functional pain in the shoulder as seen by a SPADI score of 40.   Rationale to maximize safety and independence with performance of ADLs and functional tasks   Goal Progress SPADI is 70   Target Date 09/29/25   PT Goal 3   Goal Identifier 3   Goal Description Patient has no jaw pain with yawning and or chewing   Rationale to maximize safety and independence with performance of ADLs and functional tasks   Goal Progress new goal   Target Date 09/29/25   Subjective Report   Subjective Report Jaw pain off and on and started in May and worse with yawning. and worsens when the neck worsens.   Objective Measures   Objective Measures Objective Measure 3   Objective Measure 1   Objective Measure SPADI   Details 70   Objective Measure 2   Objective Measure pain   Details neck pain #5   right jaw pain #4  yawning #6-7   Objective Measure 3   Objective Measure jaw opening   Details jogs to the left and clicking on the right   Therapeutic Procedure/Exercise   Therapeutic Procedures: strength, endurance, ROM, flexibility minutes (25385) 15   Ther Proc 1 - Details reinstructed in the tongue behind the teeth with the slight opening of the jaw. instructed in the neck retractions to do 10x with pillow blocking the thoracic   Patient Response/Progress pt has forward head and needs a better marion line  for better jaw biomechanics   Manual Therapy   Manual Therapy: Mobilization, MFR, MLD, friction massage minutes (45896) 30   Manual Therapy 1 - Details Taping kinesio star taping all pulled 20% place over L5 area. MFR in supine: thoracic,OCB, UT's   Patient Response/Progress see progress note pt is better but still jaw pain that started in May   Education   Learner/Method Patient;Listening;Reading;Demonstration;Pictures/Video   Plan   Home program proper posture,supine shoulder flexion wand to 100 with this neck retractions and inf trap set. neck retractions in standing, wall sits + TA, heel cord stretch, lunges with TA, SLS, tandem stance, slow marching. jaw openng with tongue behind the top teeth 1x day 5-10 reps.   Plan for next session MFR HEP, foam roll?   Total Session Time   Timed Code Treatment Minutes 45   Total Treatment Time (sum of timed and untimed services) 45     ment for the right jaw    Beginning/End Dates of Progress Note Reporting Period:  04/01/25 to 07/01/2025    Referring Provider:  Octavio Carrera

## 2025-07-09 ENCOUNTER — THERAPY VISIT (OUTPATIENT)
Dept: PHYSICAL THERAPY | Facility: CLINIC | Age: 70
End: 2025-07-09
Attending: STUDENT IN AN ORGANIZED HEALTH CARE EDUCATION/TRAINING PROGRAM
Payer: COMMERCIAL

## 2025-07-09 ENCOUNTER — TELEPHONE (OUTPATIENT)
Dept: FAMILY MEDICINE | Facility: CLINIC | Age: 70
End: 2025-07-09
Payer: COMMERCIAL

## 2025-07-09 ENCOUNTER — OFFICE VISIT (OUTPATIENT)
Dept: PHARMACY | Facility: CLINIC | Age: 70
End: 2025-07-09
Payer: COMMERCIAL

## 2025-07-09 ENCOUNTER — LAB (OUTPATIENT)
Dept: LAB | Facility: CLINIC | Age: 70
End: 2025-07-09
Payer: COMMERCIAL

## 2025-07-09 VITALS — WEIGHT: 126.4 LBS | BODY MASS INDEX: 23.27 KG/M2 | DIASTOLIC BLOOD PRESSURE: 74 MMHG | SYSTOLIC BLOOD PRESSURE: 128 MMHG

## 2025-07-09 DIAGNOSIS — F41.1 GAD (GENERALIZED ANXIETY DISORDER): ICD-10-CM

## 2025-07-09 DIAGNOSIS — G47.09 OTHER INSOMNIA: ICD-10-CM

## 2025-07-09 DIAGNOSIS — M81.0 OSTEOPOROSIS WITHOUT CURRENT PATHOLOGICAL FRACTURE, UNSPECIFIED OSTEOPOROSIS TYPE: ICD-10-CM

## 2025-07-09 DIAGNOSIS — M54.2 CERVICALGIA: ICD-10-CM

## 2025-07-09 DIAGNOSIS — M54.12 CERVICAL RADICULOPATHY: Primary | ICD-10-CM

## 2025-07-09 DIAGNOSIS — F41.1 GAD (GENERALIZED ANXIETY DISORDER): Primary | ICD-10-CM

## 2025-07-09 DIAGNOSIS — M81.0 OSTEOPOROSIS WITHOUT CURRENT PATHOLOGICAL FRACTURE, UNSPECIFIED OSTEOPOROSIS TYPE: Primary | ICD-10-CM

## 2025-07-09 LAB
TSH SERPL DL<=0.005 MIU/L-ACNC: 1.91 UIU/ML (ref 0.3–4.2)
VIT D+METAB SERPL-MCNC: 46 NG/ML (ref 20–50)

## 2025-07-09 PROCEDURE — 3074F SYST BP LT 130 MM HG: CPT | Performed by: PHARMACIST

## 2025-07-09 PROCEDURE — 3078F DIAST BP <80 MM HG: CPT | Performed by: PHARMACIST

## 2025-07-09 PROCEDURE — 99607 MTMS BY PHARM ADDL 15 MIN: CPT | Performed by: PHARMACIST

## 2025-07-09 PROCEDURE — 97530 THERAPEUTIC ACTIVITIES: CPT | Mod: GP | Performed by: PHYSICAL THERAPIST

## 2025-07-09 PROCEDURE — 36415 COLL VENOUS BLD VENIPUNCTURE: CPT

## 2025-07-09 PROCEDURE — 84443 ASSAY THYROID STIM HORMONE: CPT

## 2025-07-09 PROCEDURE — 99606 MTMS BY PHARM EST 15 MIN: CPT | Performed by: PHARMACIST

## 2025-07-09 PROCEDURE — 97140 MANUAL THERAPY 1/> REGIONS: CPT | Mod: GP | Performed by: PHYSICAL THERAPIST

## 2025-07-09 PROCEDURE — 82306 VITAMIN D 25 HYDROXY: CPT

## 2025-07-09 RX ORDER — VIT C/B6/B5/MAGNESIUM/HERB 173 50-5-6-5MG
1 CAPSULE ORAL DAILY
COMMUNITY

## 2025-07-09 NOTE — PROGRESS NOTES
Medication Therapy Management (MTM) Encounter    ASSESSMENT:                            Medication Adherence/Access: See below for considerations.    Anxiety/Insomnia: Issues tolerating fluoxetine. Would benefit from discontinuing therapy. Given anxiety/pain/insomnia concerns possibly could benefit from monitoring of thyroid/vitamin D labs to rule out potential underlying causes. If normal would consider buspirone as a lower side effect profile option vs tricyclic antidepressant at this time.    Pain: Improved.     PLAN:                            I will send plan to Caitlin Beasley PA-C for consideration of the following:   Consider checking thyroid labs and vitamin D levels to determine if any of these are out of range.   If both come back normal we would consider starting buspirone 5 mg by mouth twice daily.  This is a depression/anxiety medication that may be better tolerated and also have some positive effects on anxiety/insomnia.    Stop fluoxetine    Follow-up: after labs     SUBJECTIVE/OBJECTIVE:                          Margareth Green is a 70 year old female seen for a follow-up visit.       Reason for visit: Anxiety/Insomnia Follow-Up.    Allergies/ADRs: Reviewed in chart  Past Medical History: Reviewed in chart  Tobacco: She reports that she quit smoking about 27 years ago. Her smoking use included cigarettes. She started smoking about 57 years ago. She has a 15 pack-year smoking history. She has never been exposed to tobacco smoke. She has never used smokeless tobacco.  Alcohol: 1-3 beverages / week    Medication Adherence/Access: Patient uses pill box(es).  Patient takes medications 1 time(s) per day.   Per patient, misses medication 0 time(s) per week.   Medication barriers: none.   The patient fills medications at [facility]: YES.    Mental Health   Anxiety/Insomnia  Bupropion  mg every morning  Stopped fluoxetine due to shakiness/dizziness. Thinks the dizziness has already been getting better  after a few days off medications. Anxiety/insomnia is ongoing issue.  She wonders about alternatives - even possible going back on amitriptyline/alternative TCA. Never tried buspirone.  Also has not had vitamin D or thyroid labs checked in sometime.   Occasionally will use CBD with THC with some benefits for sleep/pain.   Patient reports no current medication side effects. Higher dose of bupropion made her anxious.    Sleep is a large concern for patient that she feels is impacted by anxious thoughts and contributes to issues with focus/fatigue during the day. Melatonin has not helped much at night nor has magnesium so far.  Previous medication trials include citalopram (never actual took), mirtazapine (significant appetite increase), duloxetine (though it may be causing issues with sleep), gabapentin (made her feel funny), hydroxyzine (made her foggy, dizzy hallucuinate), nortriptyline (difficulty swallowing capsule), trazodone (dizziness/headaches/loose stools), sertraline (side effects), amitriptyline (not as effective - did not like side effect profile), melatonin (not very helpful), Tylenol PM (sometimes effective).        Pain: Patient is taking acetaminophen 1,000 mg daily to twice to three times daily, turmeric 500 mg dialy and naproxen 220 mg daily as needed (not as often as tylenol use).  Does feel that pain is getting better with turmeric and physical therapy. Finds to be somewhat effective. Denies any current side effects.     Today's Vitals: /74   Wt 126 lb 6.4 oz (57.3 kg)   LMP 02/26/2008 (Approximate)   BMI 23.27 kg/m      BP Readings from Last 3 Encounters:   07/09/25 128/74   06/11/25 118/72   05/28/25 (!) 116/104     Wt Readings from Last 5 Encounters:   07/09/25 126 lb 6.4 oz (57.3 kg)   06/11/25 127 lb 3.2 oz (57.7 kg)   05/21/25 129 lb (58.5 kg)   05/15/25 127 lb 4 oz (57.7 kg)   04/16/25 129 lb 12.8 oz (58.9 kg)     ----------------      I spent 24 minutes with this patient today. I  offer these suggestions for consideration by Caitlin Beasley PA-C.     A summary of these recommendations was given to the patient.    Kris Alvarado, PharmD, [facility]  Medication Therapy Management Pharmacist  Voicemail: [phone]         Medication Therapy Recommendations  TAMMY (generalized anxiety disorder)   1 Current Medication: FLUoxetine (PROZAC) 20 MG capsule (Discontinued)   Current Medication Sig: Take 1 capsule (20 mg) by mouth daily.   Rationale: Undesirable effect - Adverse medication event - Safety   Recommendation: Discontinue Medication   Status: Accepted per CPA   Identified Date: 7/9/2025 Completed Date: 7/9/2025

## 2025-07-09 NOTE — PATIENT INSTRUCTIONS
"Recommendations from today's MTM visit:                                                    MTM (medication therapy management) is a service provided by a clinical pharmacist designed to help you get the most of out of your medicines.      I will send plan to Caitlin Beasley PA-C for consideration of the following:   Consider checking thyroid labs and vitamin D levels to determine if any of these are out of range.   If both come back normal we would consider starting buspirone 5 mg by mouth twice daily.  This is a depression/anxiety medication that may be better tolerated and also have some positive effects on anxiety/insomnia.      Follow-up: after labs     It was great speaking with you today.  I value your experience and would be very thankful for your time in providing feedback in our clinic survey. In the next few days, you may receive an email or text message from [employer] with a link to a survey related to your  clinical pharmacist.\"     To schedule another MTM appointment, please call the clinic directly or you may call the MTM scheduling line at [phone] or toll-free at [phone].     My Clinical Pharmacist's contact information:                                                      Please feel free to contact me with any questions or concerns you have.      Kris Alvarado, PharmD, BCACP  Medication Therapy Management Pharmacist  Voicemail: [phone]  "

## 2025-07-09 NOTE — Clinical Note
Caitlin - see secure chat, but wondering if you're okay with vitamin D and thyroid labs to rule out any underlying causes for Zayda anxiety/insomnia/mood/pain symptoms.   Kris Alvarado, PharmD, [facility] Medication Therapy Management Pharmacist Voicemail: [phone]

## 2025-07-15 ENCOUNTER — MYC MEDICAL ADVICE (OUTPATIENT)
Dept: FAMILY MEDICINE | Facility: CLINIC | Age: 70
End: 2025-07-15
Payer: COMMERCIAL

## 2025-07-15 NOTE — TELEPHONE ENCOUNTER
Patient Quality Outreach    Patient is due for the following:   Depression  -  PHQ-9 needed    Action(s) Taken:   Patient was assigned appropriate questionnaire to complete    Type of outreach:    Sent [employer] message.    Questions for provider review:    None         Kyra Dawson [facility]  Chart routed to Care Team.

## 2025-07-16 ENCOUNTER — THERAPY VISIT (OUTPATIENT)
Dept: PHYSICAL THERAPY | Facility: CLINIC | Age: 70
End: 2025-07-16
Attending: STUDENT IN AN ORGANIZED HEALTH CARE EDUCATION/TRAINING PROGRAM
Payer: COMMERCIAL

## 2025-07-16 DIAGNOSIS — M54.12 CERVICAL RADICULOPATHY: Primary | ICD-10-CM

## 2025-07-16 PROCEDURE — 97110 THERAPEUTIC EXERCISES: CPT | Mod: GP | Performed by: PHYSICAL THERAPIST

## 2025-07-16 PROCEDURE — 97140 MANUAL THERAPY 1/> REGIONS: CPT | Mod: GP | Performed by: PHYSICAL THERAPIST

## 2025-07-23 DIAGNOSIS — J45.30 MILD PERSISTENT ASTHMA, UNSPECIFIED WHETHER COMPLICATED: ICD-10-CM

## 2025-07-23 NOTE — TELEPHONE ENCOUNTER
Pending Prescriptions:                       Disp   Refills    DULERA 100-5 MCG/ACT inhaler [Pharmacy Me*39 g   0            Sig: Inhale 2 puffs by mouth 2 times daily.    Routing refill request to provider for review/approval because:  Last ACT=14 on 5/15/25    LOV: 3/14/25  Upcoming appointment: 9/19/25    Sent [facility] message to obtain current ACT.    WENDY WilkinsonN, RN, PHN

## 2025-07-24 ENCOUNTER — THERAPY VISIT (OUTPATIENT)
Dept: PHYSICAL THERAPY | Facility: CLINIC | Age: 70
End: 2025-07-24
Attending: STUDENT IN AN ORGANIZED HEALTH CARE EDUCATION/TRAINING PROGRAM
Payer: COMMERCIAL

## 2025-07-24 DIAGNOSIS — M54.12 CERVICAL RADICULOPATHY: Primary | ICD-10-CM

## 2025-07-24 PROCEDURE — 97035 APP MDLTY 1+ULTRASOUND EA 15: CPT | Mod: GP | Performed by: PHYSICAL THERAPIST

## 2025-07-24 PROCEDURE — 97110 THERAPEUTIC EXERCISES: CPT | Mod: GP | Performed by: PHYSICAL THERAPIST

## 2025-07-28 RX ORDER — MOMETASONE FUROATE AND FORMOTEROL FUMARATE DIHYDRATE 100; 5 UG/1; UG/1
2 AEROSOL RESPIRATORY (INHALATION) 2 TIMES DAILY
Qty: 39 G | Refills: 0 | Status: SHIPPED | OUTPATIENT
Start: 2025-07-28

## 2025-07-30 ENCOUNTER — THERAPY VISIT (OUTPATIENT)
Dept: PHYSICAL THERAPY | Facility: CLINIC | Age: 70
End: 2025-07-30
Attending: STUDENT IN AN ORGANIZED HEALTH CARE EDUCATION/TRAINING PROGRAM
Payer: COMMERCIAL

## 2025-07-30 DIAGNOSIS — M54.12 CERVICAL RADICULOPATHY: Primary | ICD-10-CM

## 2025-07-30 PROCEDURE — 97035 APP MDLTY 1+ULTRASOUND EA 15: CPT | Mod: GP | Performed by: PHYSICAL THERAPIST

## 2025-07-30 PROCEDURE — 97140 MANUAL THERAPY 1/> REGIONS: CPT | Mod: GP | Performed by: PHYSICAL THERAPIST

## 2025-08-13 ENCOUNTER — THERAPY VISIT (OUTPATIENT)
Dept: PHYSICAL THERAPY | Facility: CLINIC | Age: 70
End: 2025-08-13
Attending: STUDENT IN AN ORGANIZED HEALTH CARE EDUCATION/TRAINING PROGRAM
Payer: COMMERCIAL

## 2025-08-13 DIAGNOSIS — J30.89 NON-SEASONAL ALLERGIC RHINITIS, UNSPECIFIED TRIGGER: ICD-10-CM

## 2025-08-13 DIAGNOSIS — M54.12 CERVICAL RADICULOPATHY: Primary | ICD-10-CM

## 2025-08-13 PROCEDURE — 97140 MANUAL THERAPY 1/> REGIONS: CPT | Mod: GP | Performed by: PHYSICAL THERAPIST

## 2025-08-13 PROCEDURE — 97035 APP MDLTY 1+ULTRASOUND EA 15: CPT | Mod: GP | Performed by: PHYSICAL THERAPIST

## 2025-08-13 RX ORDER — FLUTICASONE PROPIONATE 50 MCG
1 SPRAY, SUSPENSION (ML) NASAL DAILY
Qty: 1 ML | Refills: 4 | OUTPATIENT
Start: 2025-08-13

## 2025-08-19 ENCOUNTER — THERAPY VISIT (OUTPATIENT)
Dept: PHYSICAL THERAPY | Facility: CLINIC | Age: 70
End: 2025-08-19
Attending: STUDENT IN AN ORGANIZED HEALTH CARE EDUCATION/TRAINING PROGRAM
Payer: COMMERCIAL

## 2025-08-19 DIAGNOSIS — M54.12 CERVICAL RADICULOPATHY: Primary | ICD-10-CM

## 2025-08-19 PROCEDURE — 97140 MANUAL THERAPY 1/> REGIONS: CPT | Mod: GP | Performed by: PHYSICAL THERAPIST

## 2025-08-19 PROCEDURE — 97035 APP MDLTY 1+ULTRASOUND EA 15: CPT | Mod: GP | Performed by: PHYSICAL THERAPIST

## 2025-08-20 ENCOUNTER — OFFICE VISIT (OUTPATIENT)
Dept: ORTHOPEDICS | Facility: CLINIC | Age: 70
End: 2025-08-20
Payer: COMMERCIAL

## 2025-08-20 DIAGNOSIS — M19.011 PRIMARY OSTEOARTHRITIS, RIGHT SHOULDER: Primary | ICD-10-CM

## 2025-08-20 PROCEDURE — 99213 OFFICE O/P EST LOW 20 MIN: CPT | Mod: 25 | Performed by: STUDENT IN AN ORGANIZED HEALTH CARE EDUCATION/TRAINING PROGRAM

## 2025-08-20 PROCEDURE — 20611 DRAIN/INJ JOINT/BURSA W/US: CPT | Mod: RT | Performed by: STUDENT IN AN ORGANIZED HEALTH CARE EDUCATION/TRAINING PROGRAM

## 2025-08-20 RX ORDER — LIDOCAINE HYDROCHLORIDE 10 MG/ML
2 INJECTION, SOLUTION INFILTRATION; PERINEURAL
Status: COMPLETED | OUTPATIENT
Start: 2025-08-20 | End: 2025-08-20

## 2025-08-20 RX ORDER — ROPIVACAINE HYDROCHLORIDE 2 MG/ML
2 INJECTION, SOLUTION EPIDURAL; INFILTRATION; PERINEURAL
Status: COMPLETED | OUTPATIENT
Start: 2025-08-20 | End: 2025-08-20

## 2025-08-20 RX ORDER — TRIAMCINOLONE ACETONIDE 40 MG/ML
40 INJECTION, SUSPENSION INTRA-ARTICULAR; INTRAMUSCULAR
Status: COMPLETED | OUTPATIENT
Start: 2025-08-20 | End: 2025-08-20

## 2025-08-20 RX ADMIN — ROPIVACAINE HYDROCHLORIDE 2 ML: 2 INJECTION, SOLUTION EPIDURAL; INFILTRATION; PERINEURAL at 11:26

## 2025-08-20 RX ADMIN — TRIAMCINOLONE ACETONIDE 40 MG: 40 INJECTION, SUSPENSION INTRA-ARTICULAR; INTRAMUSCULAR at 11:26

## 2025-08-20 RX ADMIN — LIDOCAINE HYDROCHLORIDE 2 ML: 10 INJECTION, SOLUTION INFILTRATION; PERINEURAL at 11:26

## (undated) DEVICE — ESU ELEC BLADE NN-STCK PLUMEPEN PRO 360D 10FT PLPRO4020

## (undated) DEVICE — INSERT INTRAC 66MM XT CYGNET N-10174

## (undated) DEVICE — SYR 10ML LL W/O NDL

## (undated) DEVICE — CABLE MYO/LEAD PACING WHITE DISP 019-530

## (undated) DEVICE — PREP CHLORAPREP 26ML TINTED ORANGE  260815

## (undated) DEVICE — TRAY SINGLE DOSE EPIDURAL ANESTHESIA

## (undated) DEVICE — TUBING PERFUSION 1/4X1/16X8FT

## (undated) DEVICE — SU VICRYL 2-0 CT-1 27" UND J259H

## (undated) DEVICE — SU PROLENE 5-0 C-1DA 36" 8720H

## (undated) DEVICE — TUBING SUCTION 12"X1/4" N612

## (undated) DEVICE — SYSTEM LAPAROVUE VISIBILITY LAPVUE10

## (undated) DEVICE — SU VICRYL 2-0 SH 27" J317H

## (undated) DEVICE — TUBING SUCTION DRAINAGE PLEURAL DUAL 8884714200

## (undated) DEVICE — ENDO TROCAR FIRST ENTRY KII FIOS Z-THRD 11X100MM CTF33

## (undated) DEVICE — OXYGENATOR PERFUSION AFFINITY FUSION BB841

## (undated) DEVICE — PACK MINOR SBA15MIFSE

## (undated) DEVICE — DEFIB PRO-PADZ LVP LQD GEL ADULT 8900-2105-01

## (undated) DEVICE — GLOVE PROTEXIS W/NEU-THERA 7.5  2D73TE75

## (undated) DEVICE — ENDO DISSECTOR BLUNT 10MM CHERRY BCD10

## (undated) DEVICE — SU SILK 2-0 TIE 24" SA75H

## (undated) DEVICE — ENDO DISSECTOR BLUNT 05MM  BTD05

## (undated) DEVICE — SU ETHIBOND 2-0 V-5 EXC 30" PXX82

## (undated) DEVICE — SU DEVICE COR-KNOT MINI 4X14MM 031350

## (undated) DEVICE — SUCTION MANIFOLD NEPTUNE 2 SYS 1 PORT 702-025-000

## (undated) DEVICE — TUBING EXTENSION SET MICROBORE 21CM LL 6N8374

## (undated) DEVICE — PREP SKIN SCRUB TRAY 4461A

## (undated) DEVICE — TOTE ANGIO CORP PC15AT SAN32CC83O

## (undated) DEVICE — SU ETHIBOND 5 V-37 4X30" MB66G

## (undated) DEVICE — DRAIN CHEST TUBE 28FR STR 8028

## (undated) DEVICE — DEVICE ASSEMBLY SUCTION/ANTI COAG BTC93

## (undated) DEVICE — PUMP UNIT NEGATIVE PRESSURE PREVENA PLUS PRE4010.S

## (undated) DEVICE — ESU ELEC BLADE 6" COATED/INSULATED E1455-6

## (undated) DEVICE — SU ETHIBOND 3-0 BBDA 36" X588H

## (undated) DEVICE — SOL NACL 0.9% IRRIG 1000ML BOTTLE 2F7124

## (undated) DEVICE — Device

## (undated) DEVICE — SOMASENSOR CEREBRAL OXIMETER ADULT SAFB-SM

## (undated) DEVICE — SYR 10ML PERFIX LL 332152

## (undated) DEVICE — SYR 05ML LL W/O NDL

## (undated) DEVICE — MANIFOLD KIT ANGIO AUTOMATED 014613

## (undated) DEVICE — CANNULA 25FR MULIT-STAGE 96880-25

## (undated) DEVICE — INTRODUCER CATH VASC 5FRX10CM  MPIS-501-NT-U-SST

## (undated) DEVICE — COVER TABLE POLY 65X90" 8186

## (undated) DEVICE — DRSG PREVENA NEGATIVE PRESSURE WND 13CM SGL LF PRE1101US

## (undated) DEVICE — SU SILK 1 TIE 10X30" SA87G

## (undated) DEVICE — DECANTER BAG 2002S

## (undated) DEVICE — LINEN TOWEL PACK X5 5464

## (undated) DEVICE — STPL SKIN 35W ROTATING HEAD PRW35

## (undated) DEVICE — TUBING SUCTION 6"X3/16" N56A

## (undated) DEVICE — KIT WASH CELL SAVING ATL2001

## (undated) DEVICE — SU VICRYL 3-0 SH 27" J316H

## (undated) DEVICE — DRAPE MAYO STAND 23X54 8337

## (undated) DEVICE — SU DEVICE ENDO COR KNOT QUICK LOAD RELOAD 030874

## (undated) DEVICE — TOURNIQUET VASCULAR

## (undated) DEVICE — SYR 8ML PERFIX LOSS OF RESIS 332152

## (undated) DEVICE — ORGANIZER SUTURE CIRCUMFERENTIAL BELT MI-ISBA-001

## (undated) DEVICE — ESU ELEC BLADE 6" COATED E1450-6

## (undated) DEVICE — SU PLEDGET SOFT TFE 3/8"X3/26"X1/16" PCP40

## (undated) DEVICE — PACK OPEN HEART PV12OH524

## (undated) DEVICE — KIT ENDO TURNOVER/PROCEDURE CARRY-ON 101822

## (undated) DEVICE — DECANTER VIAL 2006S

## (undated) DEVICE — SU SILK 3-0 TIE 24" SA74H

## (undated) DEVICE — DRAPE IOBAN INCISE 23X17" 6650EZ

## (undated) DEVICE — SU VICRYL 0 CTX 36" J370H

## (undated) DEVICE — LUBRICATING JELLY 4.25OZ

## (undated) DEVICE — SUCTION CANISTER MEDIVAC LINER 3000ML W/LID 65651-530

## (undated) DEVICE — SUCTION TIP YANKAUER STR K87

## (undated) DEVICE — ENDO TROCAR FIRST ENTRY KII FIOS Z-THRD 05X100MM CTF03

## (undated) DEVICE — SU VICRYL 3-0 FS-1 27" J442H

## (undated) DEVICE — TRAY EPDRL 3.5IN 17GA 19GA PRFX BPA DEHP 332079

## (undated) DEVICE — NDL EPIDURAL TUOHY 20GA 3.5" LF DISP 183A12

## (undated) DEVICE — SUCTION CATH 14FR ORAL TRI-FLO T60

## (undated) DEVICE — RESERVOIR CELL SAVING BLOOD COLLECTION EL2120

## (undated) DEVICE — GLOVE BIOGEL PI ULTRATOUCH G SZ 7.5 42175

## (undated) DEVICE — SYR BULB IRRIG DOVER 60 ML LATEX FREE 67000

## (undated) DEVICE — SU ETHIBOND 0 CT-1 CR 8X18" CX21D

## (undated) DEVICE — LINEN TOWEL PACK X30 5481

## (undated) DEVICE — VALVE VRV 9156R2

## (undated) DEVICE — PROTECTOR ARM ONE-STEP TRENDELENBURG 40418

## (undated) DEVICE — SU CHORD-X 12MM LOOPS 2-0 18MM CXL-20-1838-12

## (undated) DEVICE — PACK SURGICAL PROCEDURE CUSTOM 3/8IN X 3/8IN BB11W21R

## (undated) DEVICE — PUMP CP37 QUANTUM CENTRIFUGAL BLOOD CP37V-V0

## (undated) DEVICE — ESU PENCIL W/HOLSTER E2350H

## (undated) DEVICE — SU MONOCRYL 4-0 PS-2 18" UND Y496G

## (undated) DEVICE — NDL 19GA 1.5"

## (undated) DEVICE — KIT HAND CONTROL ANGIOTOUCH ACIST 65CM AT-P65

## (undated) DEVICE — CUP MEDICINE METAL 2OZ STERILE

## (undated) DEVICE — DRAIN SUMP INTRACARDIAC 20FR 12" POOL TIP 12112

## (undated) DEVICE — SUCTION TIP YANKAUER W/O VENT K86

## (undated) DEVICE — SOL WATER IRRIG 1000ML BOTTLE 2F7114

## (undated) DEVICE — LEAD PACER MYOCARDIAL BIPOLAR TEMPORARY 53CM 6495F

## (undated) DEVICE — CATH ANGIO INFINITI 3DRC 4FRX100CM 538476

## (undated) DEVICE — SYR 10ML FINGER CONTROL W/O NDL 309695

## (undated) DEVICE — SU PROLENE 4-0 SHDA 36" 8521H

## (undated) DEVICE — SU SILK 2-0 SH 30" K833H

## (undated) DEVICE — DRAPE LAP W/ARMBOARD 29410

## (undated) DEVICE — SOLUTION WATER 1000ML BOTTLE R5000-01

## (undated) DEVICE — SPONGE RAY-TEC 4X8" 7318

## (undated) DEVICE — SUCTION IRR STRYKERFLOW II W/TIP 250-070-520

## (undated) DEVICE — SU PROLENE 5-0 RB-2DA 30" 8710H

## (undated) DEVICE — CONNECTOR BLAKE DRAIN SGL BCC1

## (undated) DEVICE — CLIP HORIZON MULTI SM YELLOW 001204

## (undated) DEVICE — CANNULA AORTIC ROOT 12GA 11012L

## (undated) DEVICE — ADAPTER CARDIOPLG CLAMP 7.5" DLP .25" CON 10005

## (undated) DEVICE — SUCTION DRY CHEST DRAIN OASIS 3600-100

## (undated) DEVICE — GLOVE PROTEXIS POWDER FREE 8.0 ORTHOPEDIC 2D73ET80

## (undated) DEVICE — CATH DIAG 4FR JL 4.5 538417

## (undated) DEVICE — KIT VASC DILATOR ESTECH 200-120

## (undated) DEVICE — PACK SURGICAL PROCEDURE CUSTOM 1/2IN X 3/8IN BB11W18R1

## (undated) DEVICE — SU PROLENE 4-0 RB-1DA 36" 8557H

## (undated) DEVICE — INTRO SHEATH 4FRX10CM PINNACLE RSS402

## (undated) RX ORDER — LIDOCAINE HYDROCHLORIDE 10 MG/ML
INJECTION, SOLUTION EPIDURAL; INFILTRATION; INTRACAUDAL; PERINEURAL
Status: DISPENSED
Start: 2025-03-27

## (undated) RX ORDER — INDOCYANINE GREEN AND WATER 25 MG
KIT INJECTION
Status: DISPENSED
Start: 2024-02-22

## (undated) RX ORDER — CEFAZOLIN SODIUM/WATER 2 G/20 ML
SYRINGE (ML) INTRAVENOUS
Status: DISPENSED
Start: 2023-10-26

## (undated) RX ORDER — FENTANYL CITRATE 50 UG/ML
INJECTION, SOLUTION INTRAMUSCULAR; INTRAVENOUS
Status: DISPENSED
Start: 2024-02-22

## (undated) RX ORDER — HEPARIN SODIUM 1000 [USP'U]/ML
INJECTION, SOLUTION INTRAVENOUS; SUBCUTANEOUS
Status: DISPENSED
Start: 2023-08-17

## (undated) RX ORDER — GLYCOPYRROLATE 0.2 MG/ML
INJECTION, SOLUTION INTRAMUSCULAR; INTRAVENOUS
Status: DISPENSED
Start: 2023-08-17

## (undated) RX ORDER — VASOPRESSIN IN 0.9 % NACL 2 UNIT/2ML
SYRINGE (ML) INTRAVENOUS
Status: DISPENSED
Start: 2023-10-26

## (undated) RX ORDER — VANCOMYCIN HYDROCHLORIDE 500 MG/10ML
INJECTION, POWDER, LYOPHILIZED, FOR SOLUTION INTRAVENOUS
Status: DISPENSED
Start: 2023-10-26

## (undated) RX ORDER — LIDOCAINE HYDROCHLORIDE 10 MG/ML
INJECTION, SOLUTION EPIDURAL; INFILTRATION; INTRACAUDAL; PERINEURAL
Status: DISPENSED
Start: 2023-08-17

## (undated) RX ORDER — GLYCOPYRROLATE 0.2 MG/ML
INJECTION, SOLUTION INTRAMUSCULAR; INTRAVENOUS
Status: DISPENSED
Start: 2023-10-26

## (undated) RX ORDER — EPHEDRINE SULFATE 50 MG/ML
INJECTION, SOLUTION INTRAMUSCULAR; INTRAVENOUS; SUBCUTANEOUS
Status: DISPENSED
Start: 2023-10-26

## (undated) RX ORDER — FENTANYL CITRATE 50 UG/ML
INJECTION, SOLUTION INTRAMUSCULAR; INTRAVENOUS
Status: DISPENSED
Start: 2023-08-17

## (undated) RX ORDER — VECURONIUM BROMIDE 1 MG/ML
INJECTION, POWDER, LYOPHILIZED, FOR SOLUTION INTRAVENOUS
Status: DISPENSED
Start: 2023-10-26

## (undated) RX ORDER — ONDANSETRON 2 MG/ML
INJECTION INTRAMUSCULAR; INTRAVENOUS
Status: DISPENSED
Start: 2023-10-26

## (undated) RX ORDER — LIDOCAINE HYDROCHLORIDE 10 MG/ML
INJECTION, SOLUTION EPIDURAL; INFILTRATION; INTRACAUDAL; PERINEURAL
Status: DISPENSED
Start: 2018-01-31

## (undated) RX ORDER — FLUMAZENIL 0.1 MG/ML
INJECTION, SOLUTION INTRAVENOUS
Status: DISPENSED
Start: 2023-08-17

## (undated) RX ORDER — HEPARIN SODIUM 200 [USP'U]/100ML
INJECTION, SOLUTION INTRAVENOUS
Status: DISPENSED
Start: 2023-08-17

## (undated) RX ORDER — PROPOFOL 10 MG/ML
INJECTION, EMULSION INTRAVENOUS
Status: DISPENSED
Start: 2023-10-26

## (undated) RX ORDER — CLINDAMYCIN PHOSPHATE 900 MG/50ML
INJECTION, SOLUTION INTRAVENOUS
Status: DISPENSED
Start: 2023-10-26

## (undated) RX ORDER — HEPARIN SODIUM 1000 [USP'U]/ML
INJECTION, SOLUTION INTRAVENOUS; SUBCUTANEOUS
Status: DISPENSED
Start: 2023-10-26

## (undated) RX ORDER — NALOXONE HYDROCHLORIDE 0.4 MG/ML
INJECTION, SOLUTION INTRAMUSCULAR; INTRAVENOUS; SUBCUTANEOUS
Status: DISPENSED
Start: 2023-08-17

## (undated) RX ORDER — PROPOFOL 10 MG/ML
INJECTION, EMULSION INTRAVENOUS
Status: DISPENSED
Start: 2024-02-22

## (undated) RX ORDER — PROTAMINE SULFATE 10 MG/ML
INJECTION, SOLUTION INTRAVENOUS
Status: DISPENSED
Start: 2023-10-26

## (undated) RX ORDER — FENTANYL CITRATE 50 UG/ML
INJECTION, SOLUTION INTRAMUSCULAR; INTRAVENOUS
Status: DISPENSED
Start: 2018-01-31

## (undated) RX ORDER — POTASSIUM CHLORIDE 1500 MG/1
TABLET, EXTENDED RELEASE ORAL
Status: DISPENSED
Start: 2023-08-17

## (undated) RX ORDER — CHLORHEXIDINE GLUCONATE ORAL RINSE 1.2 MG/ML
SOLUTION DENTAL
Status: DISPENSED
Start: 2023-10-26

## (undated) RX ORDER — CLINDAMYCIN PHOSPHATE 900 MG/50ML
INJECTION, SOLUTION INTRAVENOUS
Status: DISPENSED
Start: 2024-02-22

## (undated) RX ORDER — HYDROMORPHONE HYDROCHLORIDE 1 MG/ML
INJECTION, SOLUTION INTRAMUSCULAR; INTRAVENOUS; SUBCUTANEOUS
Status: DISPENSED
Start: 2024-02-22

## (undated) RX ORDER — FENTANYL CITRATE 0.05 MG/ML
INJECTION, SOLUTION INTRAMUSCULAR; INTRAVENOUS
Status: DISPENSED
Start: 2023-10-26

## (undated) RX ORDER — FAMOTIDINE 20 MG/1
TABLET, FILM COATED ORAL
Status: DISPENSED
Start: 2023-10-26

## (undated) RX ORDER — LIDOCAINE HYDROCHLORIDE 40 MG/ML
SOLUTION TOPICAL
Status: DISPENSED
Start: 2023-08-17

## (undated) RX ORDER — BUPIVACAINE HYDROCHLORIDE 5 MG/ML
INJECTION, SOLUTION EPIDURAL; INTRACAUDAL
Status: DISPENSED
Start: 2023-10-26

## (undated) RX ORDER — VANCOMYCIN HYDROCHLORIDE 1 G/200ML
INJECTION, SOLUTION INTRAVENOUS
Status: DISPENSED
Start: 2023-10-26